# Patient Record
Sex: FEMALE | Race: BLACK OR AFRICAN AMERICAN | NOT HISPANIC OR LATINO | URBAN - METROPOLITAN AREA
[De-identification: names, ages, dates, MRNs, and addresses within clinical notes are randomized per-mention and may not be internally consistent; named-entity substitution may affect disease eponyms.]

---

## 2017-04-26 ENCOUNTER — EMERGENCY (EMERGENCY)
Facility: HOSPITAL | Age: 62
LOS: 1 days | Discharge: HOME | End: 2017-04-26
Attending: EMERGENCY MEDICINE | Admitting: EMERGENCY MEDICINE
Payer: COMMERCIAL

## 2017-04-26 VITALS
TEMPERATURE: 97 F | HEART RATE: 95 BPM | DIASTOLIC BLOOD PRESSURE: 83 MMHG | SYSTOLIC BLOOD PRESSURE: 164 MMHG | RESPIRATION RATE: 20 BRPM | OXYGEN SATURATION: 98 %

## 2017-04-26 DIAGNOSIS — Z98.89 OTHER SPECIFIED POSTPROCEDURAL STATES: Chronic | ICD-10-CM

## 2017-04-26 DIAGNOSIS — Z88.8 ALLERGY STATUS TO OTHER DRUGS, MEDICAMENTS AND BIOLOGICAL SUBSTANCES STATUS: ICD-10-CM
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
## 2017-04-26 DIAGNOSIS — K21.9 GASTRO-ESOPHAGEAL REFLUX DISEASE WITHOUT ESOPHAGITIS: ICD-10-CM

## 2017-04-26 DIAGNOSIS — Z96.641 PRESENCE OF RIGHT ARTIFICIAL HIP JOINT: Chronic | ICD-10-CM

## 2017-04-26 DIAGNOSIS — Z79.82 LONG TERM (CURRENT) USE OF ASPIRIN: ICD-10-CM
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
## 2017-04-26 DIAGNOSIS — S20.212A CONTUSION OF LEFT FRONT WALL OF THORAX, INITIAL ENCOUNTER: ICD-10-CM

## 2017-04-26 DIAGNOSIS — R07.82 INTERCOSTAL PAIN: ICD-10-CM

## 2017-04-26 DIAGNOSIS — I10 ESSENTIAL (PRIMARY) HYPERTENSION: ICD-10-CM

## 2017-04-26 PROCEDURE — 99284 EMERGENCY DEPT VISIT MOD MDM: CPT

## 2017-04-26 PROCEDURE — 71100 X-RAY EXAM RIBS UNI 2 VIEWS: CPT | Mod: 26,LT

## 2017-04-26 PROCEDURE — 71100 X-RAY EXAM RIBS UNI 2 VIEWS: CPT

## 2017-04-26 PROCEDURE — 99284 EMERGENCY DEPT VISIT MOD MDM: CPT | Mod: 25

## 2017-04-26 PROCEDURE — 71020: CPT | Mod: 26

## 2017-04-26 PROCEDURE — 71046 X-RAY EXAM CHEST 2 VIEWS: CPT

## 2017-04-26 NOTE — ED PROVIDER NOTE - PHYSICAL EXAMINATION
Attending note. Patient is alert and in no acute distress. Neck is nontender. Spine is nontender. Posterior ribs are nontender. Patient has pain in the left anterior rib approximately #6 and 7 in the midclavicular line. Lungs are clear and equal bilaterally. Heart is regular rate and rhythm without murmur. Abdomen is soft and nontender. There is no CVA tenderness.   Patient has limited range of motion of her left shoulder due to a total shoulder replacement.
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
## 2017-04-26 NOTE — ED ADULT NURSE NOTE - PSH
S/P arthroscopy of shoulder  left in 2009  S/P arthroscopy of shoulder  right - 2014  S/P hip replacement  left - 2010  S/P hip replacement, right  May 2016

## 2017-11-19 ENCOUNTER — INPATIENT (INPATIENT)
Facility: HOSPITAL | Age: 62
LOS: 2 days | Discharge: HOME | DRG: 440 | End: 2017-11-22
Attending: HOSPITALIST | Admitting: HOSPITALIST
Payer: COMMERCIAL

## 2017-11-19 VITALS
RESPIRATION RATE: 22 BRPM | OXYGEN SATURATION: 98 % | HEART RATE: 122 BPM | DIASTOLIC BLOOD PRESSURE: 104 MMHG | SYSTOLIC BLOOD PRESSURE: 165 MMHG | TEMPERATURE: 99 F

## 2017-11-19 DIAGNOSIS — Z98.89 OTHER SPECIFIED POSTPROCEDURAL STATES: Chronic | ICD-10-CM

## 2017-11-19 DIAGNOSIS — Z96.641 PRESENCE OF RIGHT ARTIFICIAL HIP JOINT: Chronic | ICD-10-CM

## 2017-11-19 LAB — GAS PNL BLDV: SIGNIFICANT CHANGE UP

## 2017-11-19 PROCEDURE — 93010 ELECTROCARDIOGRAM REPORT: CPT

## 2017-11-19 PROCEDURE — 99285 EMERGENCY DEPT VISIT HI MDM: CPT | Mod: 25

## 2017-11-19 RX ORDER — MORPHINE SULFATE 50 MG/1
4 CAPSULE, EXTENDED RELEASE ORAL ONCE
Qty: 0 | Refills: 0 | Status: DISCONTINUED | OUTPATIENT
Start: 2017-11-19 | End: 2017-11-19

## 2017-11-19 RX ORDER — PANTOPRAZOLE SODIUM 20 MG/1
40 TABLET, DELAYED RELEASE ORAL ONCE
Qty: 0 | Refills: 0 | Status: COMPLETED | OUTPATIENT
Start: 2017-11-19 | End: 2017-11-19

## 2017-11-19 RX ORDER — ONDANSETRON 8 MG/1
4 TABLET, FILM COATED ORAL ONCE
Qty: 0 | Refills: 0 | Status: COMPLETED | OUTPATIENT
Start: 2017-11-19 | End: 2017-11-19

## 2017-11-19 RX ORDER — SODIUM CHLORIDE 9 MG/ML
3 INJECTION INTRAMUSCULAR; INTRAVENOUS; SUBCUTANEOUS ONCE
Qty: 0 | Refills: 0 | Status: COMPLETED | OUTPATIENT
Start: 2017-11-19 | End: 2017-11-19

## 2017-11-19 RX ADMIN — MORPHINE SULFATE 4 MILLIGRAM(S): 50 CAPSULE, EXTENDED RELEASE ORAL at 23:53

## 2017-11-19 RX ADMIN — SODIUM CHLORIDE 3 MILLILITER(S): 9 INJECTION INTRAMUSCULAR; INTRAVENOUS; SUBCUTANEOUS at 23:52

## 2017-11-19 RX ADMIN — PANTOPRAZOLE SODIUM 40 MILLIGRAM(S): 20 TABLET, DELAYED RELEASE ORAL at 23:52

## 2017-11-19 RX ADMIN — ONDANSETRON 4 MILLIGRAM(S): 8 TABLET, FILM COATED ORAL at 23:52

## 2017-11-19 RX ADMIN — ONDANSETRON 4 MILLIGRAM(S): 8 TABLET, FILM COATED ORAL at 23:10

## 2017-11-19 NOTE — ED PROVIDER NOTE - MEDICAL DECISION MAKING DETAILS
60yo F with h/o chronic pancreatitis, c/o epigastric pain and nausea/vomiting; on exam afebrile, but in significant pain and vomiting; given zofran/morphine with some improvement.  Tender in epigastrium; concern for recurrence of pancreatitis: will obtain labs, ct ap and ecg.  Pain control and antiemetics as needed.

## 2017-11-19 NOTE — ED ADULT NURSE NOTE - OBJECTIVE STATEMENT
patient report hx of pancreatitis and c/o abd pain that started on Friday, worsening over the past couple of days  patient is complaining of abd pain, nausea and vomiting and dehydration, also reports some cramping. no blood in the vomiting.

## 2017-11-19 NOTE — ED PROVIDER NOTE - CARE PLAN
Principal Discharge DX:	Chronic pancreatitis, unspecified pancreatitis type Principal Discharge DX:	Chronic pancreatitis, unspecified pancreatitis type  Secondary Diagnosis:	Esophagitis

## 2017-11-19 NOTE — ED PROVIDER NOTE - OBJECTIVE STATEMENT
60yo F with h/o chronic pancreatitis, c/o nausea and abdominal pain since friday, intermittent.  Has vomiting numerous time all day today, now brown.  Feels like pancreatitis flare. Non-drinker. Unable to eat.  Last BM: Today.        PCP: Ananda Ascencio  GI: DANIELLE Messer 62yo F with h/o chronic pancreatitis, c/o nausea and abdominal pain since Friday, intermittent.  Has vomiting numerous time all day today, now brown.  Feels like pancreatitis flare. Non-drinker. Unable to eat.  Last BM: Today.  Denies fever/chills.      PCP: Ananda Ascencio  GI: DANIELLE Messer

## 2017-11-19 NOTE — ED ADULT NURSE NOTE - PSH
S/P arthroscopy of shoulder  right - 2014  S/P arthroscopy of shoulder  left in 2009  S/P hip replacement  left - 2010  S/P hip replacement, right  May 2016

## 2017-11-19 NOTE — ED PROVIDER NOTE - ATTENDING CONTRIBUTION TO CARE
I have examined and evaluated this patient with the above resident or PA, and agree with the documented clinical history, exam and plan.   Briefly: 60yo F with h/o chronic pancreatitis, c/o epigastric pain and nausea/vomiting; on exam afebrile, but in significant pain and vomiting; given zofran/morphine with some improvement.  Tender in epigastrium; concern for recurrence of pancreatitis.     Labs obtained: elevated lactate (4) and hypokalemia noted.  Lipase 11 but may not correlate given history of pancreatitis.  Yocasta's initial score for pancreatitis only 1 (age; no other criteria, WBC 12, , glucose 190, ast wnl).      CT AP obtained:    Plan for admission for continued iv hydration, pain control and additional workup and management as needed. I have examined and evaluated this patient with the above resident or PA, and agree with the documented clinical history, exam and plan.   Briefly: 62yo F with h/o chronic pancreatitis, c/o epigastric pain and nausea/vomiting; on exam afebrile, but in significant pain and vomiting; given zofran/morphine with some improvement.  Tender in epigastrium; concern for recurrence of pancreatitis.     Labs obtained: elevated lactate (4) and hypokalemia noted.  Lipase 11 but may not correlate given history of pancreatitis.  Yocasta's initial score for pancreatitis only 1 (age; no other criteria, WBC 12, , glucose 190, ast wnl).      CT AP obtained: shows chronic pancreatitis as well as evidence of esophagitis.    Plan for admission for chronic pancreatitis, for continued iv hydration given continued inability to tolerate po, pain control and additional workup and management as needed.

## 2017-11-20 DIAGNOSIS — Z29.9 ENCOUNTER FOR PROPHYLACTIC MEASURES, UNSPECIFIED: ICD-10-CM

## 2017-11-20 DIAGNOSIS — K86.1 OTHER CHRONIC PANCREATITIS: ICD-10-CM

## 2017-11-20 DIAGNOSIS — R52 PAIN, UNSPECIFIED: ICD-10-CM

## 2017-11-20 DIAGNOSIS — K20.9 ESOPHAGITIS, UNSPECIFIED: ICD-10-CM

## 2017-11-20 DIAGNOSIS — I10 ESSENTIAL (PRIMARY) HYPERTENSION: ICD-10-CM

## 2017-11-20 DIAGNOSIS — E86.0 DEHYDRATION: ICD-10-CM

## 2017-11-20 DIAGNOSIS — E87.6 HYPOKALEMIA: ICD-10-CM

## 2017-11-20 DIAGNOSIS — K85.90 ACUTE PANCREATITIS WITHOUT NECROSIS OR INFECTION, UNSPECIFIED: ICD-10-CM

## 2017-11-20 LAB
ALBUMIN SERPL ELPH-MCNC: 4.9 G/DL — SIGNIFICANT CHANGE UP (ref 3.3–5)
ALP SERPL-CCNC: 101 U/L — SIGNIFICANT CHANGE UP (ref 40–120)
ALT FLD-CCNC: 15 U/L RC — SIGNIFICANT CHANGE UP (ref 10–45)
ANION GAP SERPL CALC-SCNC: 13 MMOL/L — SIGNIFICANT CHANGE UP (ref 5–17)
ANION GAP SERPL CALC-SCNC: 15 MMOL/L — SIGNIFICANT CHANGE UP (ref 5–17)
ANION GAP SERPL CALC-SCNC: 22 MMOL/L — HIGH (ref 5–17)
APPEARANCE UR: CLEAR — SIGNIFICANT CHANGE UP
APTT BLD: 31.2 SEC — SIGNIFICANT CHANGE UP (ref 27.5–37.4)
AST SERPL-CCNC: 18 U/L — SIGNIFICANT CHANGE UP (ref 10–40)
BACTERIA # UR AUTO: ABNORMAL /HPF
BASE EXCESS BLDV CALC-SCNC: 4.3 MMOL/L — HIGH (ref -2–2)
BASE EXCESS BLDV CALC-SCNC: 6.8 MMOL/L — HIGH (ref -2–2)
BASOPHILS # BLD AUTO: 0 K/UL — SIGNIFICANT CHANGE UP (ref 0–0.2)
BASOPHILS # BLD AUTO: 0.01 K/UL — SIGNIFICANT CHANGE UP (ref 0–0.2)
BASOPHILS NFR BLD AUTO: 0.1 % — SIGNIFICANT CHANGE UP (ref 0–2)
BASOPHILS NFR BLD AUTO: 0.3 % — SIGNIFICANT CHANGE UP (ref 0–2)
BILIRUB SERPL-MCNC: 0.5 MG/DL — SIGNIFICANT CHANGE UP (ref 0.2–1.2)
BILIRUB UR-MCNC: NEGATIVE — SIGNIFICANT CHANGE UP
BUN SERPL-MCNC: 5 MG/DL — LOW (ref 7–23)
BUN SERPL-MCNC: 5 MG/DL — LOW (ref 7–23)
BUN SERPL-MCNC: 7 MG/DL — SIGNIFICANT CHANGE UP (ref 7–23)
CA-I SERPL-SCNC: 1.14 MMOL/L — SIGNIFICANT CHANGE UP (ref 1.12–1.3)
CA-I SERPL-SCNC: 1.16 MMOL/L — SIGNIFICANT CHANGE UP (ref 1.12–1.3)
CALCIUM SERPL-MCNC: 10.5 MG/DL — SIGNIFICANT CHANGE UP (ref 8.4–10.5)
CALCIUM SERPL-MCNC: 8.6 MG/DL — SIGNIFICANT CHANGE UP (ref 8.4–10.5)
CALCIUM SERPL-MCNC: 8.6 MG/DL — SIGNIFICANT CHANGE UP (ref 8.4–10.5)
CHLORIDE BLDV-SCNC: 100 MMOL/L — SIGNIFICANT CHANGE UP (ref 96–108)
CHLORIDE BLDV-SCNC: 106 MMOL/L — SIGNIFICANT CHANGE UP (ref 96–108)
CHLORIDE SERPL-SCNC: 104 MMOL/L — SIGNIFICANT CHANGE UP (ref 96–108)
CHLORIDE SERPL-SCNC: 105 MMOL/L — SIGNIFICANT CHANGE UP (ref 96–108)
CHLORIDE SERPL-SCNC: 97 MMOL/L — SIGNIFICANT CHANGE UP (ref 96–108)
CK MB BLD-MCNC: 3.4 % — SIGNIFICANT CHANGE UP (ref 0–3.5)
CK MB CFR SERPL CALC: 1.6 NG/ML — SIGNIFICANT CHANGE UP (ref 0–3.8)
CK SERPL-CCNC: 47 U/L — SIGNIFICANT CHANGE UP (ref 25–170)
CO2 BLDV-SCNC: 30 MMOL/L — SIGNIFICANT CHANGE UP (ref 22–30)
CO2 BLDV-SCNC: 31 MMOL/L — HIGH (ref 22–30)
CO2 SERPL-SCNC: 26 MMOL/L — SIGNIFICANT CHANGE UP (ref 22–31)
CO2 SERPL-SCNC: 26 MMOL/L — SIGNIFICANT CHANGE UP (ref 22–31)
CO2 SERPL-SCNC: 27 MMOL/L — SIGNIFICANT CHANGE UP (ref 22–31)
COLOR SPEC: SIGNIFICANT CHANGE UP
COMMENT - URINE: SIGNIFICANT CHANGE UP
CREAT SERPL-MCNC: 0.68 MG/DL — SIGNIFICANT CHANGE UP (ref 0.5–1.3)
CREAT SERPL-MCNC: 0.77 MG/DL — SIGNIFICANT CHANGE UP (ref 0.5–1.3)
CREAT SERPL-MCNC: 0.88 MG/DL — SIGNIFICANT CHANGE UP (ref 0.5–1.3)
DIFF PNL FLD: NEGATIVE — SIGNIFICANT CHANGE UP
EOSINOPHIL # BLD AUTO: 0 K/UL — SIGNIFICANT CHANGE UP (ref 0–0.5)
EOSINOPHIL # BLD AUTO: 0 K/UL — SIGNIFICANT CHANGE UP (ref 0–0.5)
EOSINOPHIL NFR BLD AUTO: 0 — SIGNIFICANT CHANGE UP
EOSINOPHIL NFR BLD AUTO: 0.3 % — SIGNIFICANT CHANGE UP (ref 0–6)
EPI CELLS # UR: SIGNIFICANT CHANGE UP /HPF
GAS PNL BLDV: 143 MMOL/L — SIGNIFICANT CHANGE UP (ref 136–145)
GAS PNL BLDV: 146 MMOL/L — HIGH (ref 136–145)
GAS PNL BLDV: SIGNIFICANT CHANGE UP
GLUCOSE BLDV-MCNC: 122 MG/DL — HIGH (ref 70–99)
GLUCOSE BLDV-MCNC: 196 MG/DL — HIGH (ref 70–99)
GLUCOSE SERPL-MCNC: 102 MG/DL — HIGH (ref 70–99)
GLUCOSE SERPL-MCNC: 117 MG/DL — HIGH (ref 70–99)
GLUCOSE SERPL-MCNC: 190 MG/DL — HIGH (ref 70–99)
GLUCOSE UR QL: NEGATIVE — SIGNIFICANT CHANGE UP
HCO3 BLDV-SCNC: 29 MMOL/L — SIGNIFICANT CHANGE UP (ref 21–29)
HCO3 BLDV-SCNC: 30 MMOL/L — HIGH (ref 21–29)
HCT VFR BLD CALC: 40.6 % — SIGNIFICANT CHANGE UP (ref 34.5–45)
HCT VFR BLD CALC: 46.6 % — HIGH (ref 34.5–45)
HCT VFR BLDA CALC: 45 % — SIGNIFICANT CHANGE UP (ref 39–50)
HCT VFR BLDA CALC: 47 % — SIGNIFICANT CHANGE UP (ref 39–50)
HGB BLD CALC-MCNC: 14.7 G/DL — SIGNIFICANT CHANGE UP (ref 11.5–15.5)
HGB BLD CALC-MCNC: 15.4 G/DL — SIGNIFICANT CHANGE UP (ref 11.5–15.5)
HGB BLD-MCNC: 13.2 G/DL — SIGNIFICANT CHANGE UP (ref 11.5–15.5)
HGB BLD-MCNC: 15.2 G/DL — SIGNIFICANT CHANGE UP (ref 11.5–15.5)
HIV 1 & 2 AB SERPL IA.RAPID: SIGNIFICANT CHANGE UP
IMM GRANULOCYTES NFR BLD AUTO: 0.5 % — SIGNIFICANT CHANGE UP (ref 0–1.5)
INR BLD: 1.1 RATIO — SIGNIFICANT CHANGE UP (ref 0.88–1.16)
KETONES UR-MCNC: ABNORMAL
LACTATE BLDV-MCNC: 1.9 MMOL/L — SIGNIFICANT CHANGE UP (ref 0.7–2)
LACTATE BLDV-MCNC: 4.5 MMOL/L — CRITICAL HIGH (ref 0.7–2)
LDH SERPL L TO P-CCNC: 249 U/L — HIGH (ref 50–242)
LEUKOCYTE ESTERASE UR-ACNC: NEGATIVE — SIGNIFICANT CHANGE UP
LIDOCAIN IGE QN: 11 U/L — SIGNIFICANT CHANGE UP (ref 7–60)
LYMPHOCYTES # BLD AUTO: 18.6 % — SIGNIFICANT CHANGE UP (ref 13–44)
LYMPHOCYTES # BLD AUTO: 2.3 K/UL — SIGNIFICANT CHANGE UP (ref 1–3.3)
LYMPHOCYTES # BLD AUTO: 2.76 K/UL — SIGNIFICANT CHANGE UP (ref 1–3.3)
LYMPHOCYTES # BLD AUTO: 27.4 % — SIGNIFICANT CHANGE UP (ref 13–44)
MAGNESIUM SERPL-MCNC: 1.5 MG/DL — LOW (ref 1.6–2.6)
MCHC RBC-ENTMCNC: 27.4 PG — SIGNIFICANT CHANGE UP (ref 27–34)
MCHC RBC-ENTMCNC: 28.7 PG — SIGNIFICANT CHANGE UP (ref 27–34)
MCHC RBC-ENTMCNC: 32.5 GM/DL — SIGNIFICANT CHANGE UP (ref 32–36)
MCHC RBC-ENTMCNC: 32.6 GM/DL — SIGNIFICANT CHANGE UP (ref 32–36)
MCV RBC AUTO: 84.4 FL — SIGNIFICANT CHANGE UP (ref 80–100)
MCV RBC AUTO: 88 FL — SIGNIFICANT CHANGE UP (ref 80–100)
MONOCYTES # BLD AUTO: 0.6 K/UL — SIGNIFICANT CHANGE UP (ref 0–0.9)
MONOCYTES # BLD AUTO: 0.81 K/UL — SIGNIFICANT CHANGE UP (ref 0–0.9)
MONOCYTES NFR BLD AUTO: 4.8 % — SIGNIFICANT CHANGE UP (ref 2–14)
MONOCYTES NFR BLD AUTO: 8 % — SIGNIFICANT CHANGE UP (ref 2–14)
NEUTROPHILS # BLD AUTO: 6.45 K/UL — SIGNIFICANT CHANGE UP (ref 1.8–7.4)
NEUTROPHILS # BLD AUTO: 9.3 K/UL — HIGH (ref 1.8–7.4)
NEUTROPHILS NFR BLD AUTO: 64 % — SIGNIFICANT CHANGE UP (ref 43–77)
NEUTROPHILS NFR BLD AUTO: 76 % — SIGNIFICANT CHANGE UP (ref 43–77)
NITRITE UR-MCNC: NEGATIVE — SIGNIFICANT CHANGE UP
OTHER CELLS CSF MANUAL: 16 ML/DL — LOW (ref 18–22)
PCO2 BLDV: 38 MMHG — SIGNIFICANT CHANGE UP (ref 35–50)
PCO2 BLDV: 43 MMHG — SIGNIFICANT CHANGE UP (ref 35–50)
PH BLDV: 7.44 — SIGNIFICANT CHANGE UP (ref 7.35–7.45)
PH BLDV: 7.51 — HIGH (ref 7.35–7.45)
PH UR: 7.5 — SIGNIFICANT CHANGE UP (ref 5–8)
PHOSPHATE SERPL-MCNC: 2.9 MG/DL — SIGNIFICANT CHANGE UP (ref 2.5–4.5)
PLATELET # BLD AUTO: 377 K/UL — SIGNIFICANT CHANGE UP (ref 150–400)
PLATELET # BLD AUTO: 416 K/UL — HIGH (ref 150–400)
PO2 BLDV: 46 MMHG — HIGH (ref 25–45)
PO2 BLDV: 49 MMHG — HIGH (ref 25–45)
POTASSIUM BLDV-SCNC: 2.8 MMOL/L — CRITICAL LOW (ref 3.5–5)
POTASSIUM BLDV-SCNC: 3.3 MMOL/L — LOW (ref 3.5–5)
POTASSIUM SERPL-MCNC: 3 MMOL/L — LOW (ref 3.5–5.3)
POTASSIUM SERPL-MCNC: 3.3 MMOL/L — LOW (ref 3.5–5.3)
POTASSIUM SERPL-MCNC: 3.6 MMOL/L — SIGNIFICANT CHANGE UP (ref 3.5–5.3)
POTASSIUM SERPL-SCNC: 3 MMOL/L — LOW (ref 3.5–5.3)
POTASSIUM SERPL-SCNC: 3.3 MMOL/L — LOW (ref 3.5–5.3)
POTASSIUM SERPL-SCNC: 3.6 MMOL/L — SIGNIFICANT CHANGE UP (ref 3.5–5.3)
PROT SERPL-MCNC: 9.4 G/DL — HIGH (ref 6–8.3)
PROT UR-MCNC: 100 MG/DL
PROTHROM AB SERPL-ACNC: 11.9 SEC — SIGNIFICANT CHANGE UP (ref 9.8–12.7)
RBC # BLD: 4.81 M/UL — SIGNIFICANT CHANGE UP (ref 3.8–5.2)
RBC # BLD: 5.3 M/UL — HIGH (ref 3.8–5.2)
RBC # FLD: 13.1 % — SIGNIFICANT CHANGE UP (ref 10.3–14.5)
RBC # FLD: 14.7 % — HIGH (ref 10.3–14.5)
RBC CASTS # UR COMP ASSIST: SIGNIFICANT CHANGE UP /HPF (ref 0–2)
SAO2 % BLDV: 79 % — SIGNIFICANT CHANGE UP (ref 67–88)
SAO2 % BLDV: 86 % — SIGNIFICANT CHANGE UP (ref 67–88)
SODIUM SERPL-SCNC: 144 MMOL/L — SIGNIFICANT CHANGE UP (ref 135–145)
SODIUM SERPL-SCNC: 145 MMOL/L — SIGNIFICANT CHANGE UP (ref 135–145)
SODIUM SERPL-SCNC: 146 MMOL/L — HIGH (ref 135–145)
SP GR SPEC: >1.03 — HIGH (ref 1.01–1.02)
TROPONIN T SERPL-MCNC: <0.01 NG/ML — SIGNIFICANT CHANGE UP (ref 0–0.06)
UROBILINOGEN FLD QL: NEGATIVE — SIGNIFICANT CHANGE UP
WBC # BLD: 10.08 K/UL — SIGNIFICANT CHANGE UP (ref 3.8–10.5)
WBC # BLD: 12.2 K/UL — HIGH (ref 3.8–10.5)
WBC # FLD AUTO: 10.08 K/UL — SIGNIFICANT CHANGE UP (ref 3.8–10.5)
WBC # FLD AUTO: 12.2 K/UL — HIGH (ref 3.8–10.5)
WBC UR QL: SIGNIFICANT CHANGE UP /HPF (ref 0–5)

## 2017-11-20 PROCEDURE — 74177 CT ABD & PELVIS W/CONTRAST: CPT | Mod: 26

## 2017-11-20 PROCEDURE — 71010: CPT | Mod: 26

## 2017-11-20 PROCEDURE — 99223 1ST HOSP IP/OBS HIGH 75: CPT | Mod: AI,GC

## 2017-11-20 RX ORDER — MORPHINE SULFATE 50 MG/1
4 CAPSULE, EXTENDED RELEASE ORAL ONCE
Qty: 0 | Refills: 0 | Status: DISCONTINUED | OUTPATIENT
Start: 2017-11-20 | End: 2017-11-20

## 2017-11-20 RX ORDER — ONDANSETRON 8 MG/1
4 TABLET, FILM COATED ORAL ONCE
Qty: 0 | Refills: 0 | Status: COMPLETED | OUTPATIENT
Start: 2017-11-20 | End: 2017-11-20

## 2017-11-20 RX ORDER — POTASSIUM CHLORIDE 20 MEQ
10 PACKET (EA) ORAL
Qty: 0 | Refills: 0 | Status: COMPLETED | OUTPATIENT
Start: 2017-11-20 | End: 2017-11-20

## 2017-11-20 RX ORDER — LIPASE/PROTEASE/AMYLASE 16-48-48K
24000 CAPSULE,DELAYED RELEASE (ENTERIC COATED) ORAL
Qty: 0 | Refills: 0 | Status: DISCONTINUED | OUTPATIENT
Start: 2017-11-20 | End: 2017-11-20

## 2017-11-20 RX ORDER — HYDROMORPHONE HYDROCHLORIDE 2 MG/ML
0.5 INJECTION INTRAMUSCULAR; INTRAVENOUS; SUBCUTANEOUS ONCE
Qty: 0 | Refills: 0 | Status: DISCONTINUED | OUTPATIENT
Start: 2017-11-20 | End: 2017-11-20

## 2017-11-20 RX ORDER — DOCUSATE SODIUM 100 MG
100 CAPSULE ORAL THREE TIMES A DAY
Qty: 0 | Refills: 0 | Status: DISCONTINUED | OUTPATIENT
Start: 2017-11-20 | End: 2017-11-22

## 2017-11-20 RX ORDER — ACETAMINOPHEN 500 MG
1000 TABLET ORAL ONCE
Qty: 0 | Refills: 0 | Status: COMPLETED | OUTPATIENT
Start: 2017-11-20 | End: 2017-11-20

## 2017-11-20 RX ORDER — HYDROMORPHONE HYDROCHLORIDE 2 MG/ML
1 INJECTION INTRAMUSCULAR; INTRAVENOUS; SUBCUTANEOUS ONCE
Qty: 0 | Refills: 0 | Status: DISCONTINUED | OUTPATIENT
Start: 2017-11-20 | End: 2017-11-20

## 2017-11-20 RX ORDER — SODIUM CHLORIDE 9 MG/ML
1000 INJECTION, SOLUTION INTRAVENOUS
Qty: 0 | Refills: 0 | Status: DISCONTINUED | OUTPATIENT
Start: 2017-11-20 | End: 2017-11-22

## 2017-11-20 RX ORDER — SODIUM CHLORIDE 9 MG/ML
1000 INJECTION INTRAMUSCULAR; INTRAVENOUS; SUBCUTANEOUS
Qty: 0 | Refills: 0 | Status: DISCONTINUED | OUTPATIENT
Start: 2017-11-20 | End: 2017-11-20

## 2017-11-20 RX ORDER — ONDANSETRON 8 MG/1
8 TABLET, FILM COATED ORAL EVERY 6 HOURS
Qty: 0 | Refills: 0 | Status: DISCONTINUED | OUTPATIENT
Start: 2017-11-20 | End: 2017-11-22

## 2017-11-20 RX ORDER — SODIUM CHLORIDE 9 MG/ML
2000 INJECTION INTRAMUSCULAR; INTRAVENOUS; SUBCUTANEOUS ONCE
Qty: 0 | Refills: 0 | Status: COMPLETED | OUTPATIENT
Start: 2017-11-20 | End: 2017-11-20

## 2017-11-20 RX ORDER — SENNA PLUS 8.6 MG/1
2 TABLET ORAL AT BEDTIME
Qty: 0 | Refills: 0 | Status: DISCONTINUED | OUTPATIENT
Start: 2017-11-20 | End: 2017-11-22

## 2017-11-20 RX ORDER — SODIUM CHLORIDE 9 MG/ML
1000 INJECTION, SOLUTION INTRAVENOUS
Qty: 0 | Refills: 0 | Status: DISCONTINUED | OUTPATIENT
Start: 2017-11-20 | End: 2017-11-20

## 2017-11-20 RX ORDER — LIPASE/PROTEASE/AMYLASE 16-48-48K
2 CAPSULE,DELAYED RELEASE (ENTERIC COATED) ORAL
Qty: 0 | Refills: 0 | Status: DISCONTINUED | OUTPATIENT
Start: 2017-11-20 | End: 2017-11-22

## 2017-11-20 RX ORDER — SODIUM CHLORIDE 9 MG/ML
1000 INJECTION INTRAMUSCULAR; INTRAVENOUS; SUBCUTANEOUS ONCE
Qty: 0 | Refills: 0 | Status: COMPLETED | OUTPATIENT
Start: 2017-11-20 | End: 2017-11-20

## 2017-11-20 RX ORDER — PANTOPRAZOLE SODIUM 20 MG/1
20 TABLET, DELAYED RELEASE ORAL ONCE
Qty: 0 | Refills: 0 | Status: DISCONTINUED | OUTPATIENT
Start: 2017-11-20 | End: 2017-11-20

## 2017-11-20 RX ORDER — FAMOTIDINE 10 MG/ML
20 INJECTION INTRAVENOUS ONCE
Qty: 0 | Refills: 0 | Status: DISCONTINUED | OUTPATIENT
Start: 2017-11-20 | End: 2017-11-20

## 2017-11-20 RX ORDER — METOPROLOL TARTRATE 50 MG
100 TABLET ORAL DAILY
Qty: 0 | Refills: 0 | Status: DISCONTINUED | OUTPATIENT
Start: 2017-11-20 | End: 2017-11-22

## 2017-11-20 RX ORDER — HYDROMORPHONE HYDROCHLORIDE 2 MG/ML
0.5 INJECTION INTRAMUSCULAR; INTRAVENOUS; SUBCUTANEOUS
Qty: 0 | Refills: 0 | Status: DISCONTINUED | OUTPATIENT
Start: 2017-11-20 | End: 2017-11-21

## 2017-11-20 RX ORDER — ACETAMINOPHEN 500 MG
650 TABLET ORAL EVERY 6 HOURS
Qty: 0 | Refills: 0 | Status: DISCONTINUED | OUTPATIENT
Start: 2017-11-20 | End: 2017-11-22

## 2017-11-20 RX ORDER — AMLODIPINE BESYLATE 2.5 MG/1
10 TABLET ORAL DAILY
Qty: 0 | Refills: 0 | Status: DISCONTINUED | OUTPATIENT
Start: 2017-11-20 | End: 2017-11-22

## 2017-11-20 RX ORDER — HYDROMORPHONE HYDROCHLORIDE 2 MG/ML
1 INJECTION INTRAMUSCULAR; INTRAVENOUS; SUBCUTANEOUS EVERY 6 HOURS
Qty: 0 | Refills: 0 | Status: DISCONTINUED | OUTPATIENT
Start: 2017-11-20 | End: 2017-11-21

## 2017-11-20 RX ORDER — PANTOPRAZOLE SODIUM 20 MG/1
40 TABLET, DELAYED RELEASE ORAL
Qty: 0 | Refills: 0 | Status: DISCONTINUED | OUTPATIENT
Start: 2017-11-20 | End: 2017-11-22

## 2017-11-20 RX ADMIN — SODIUM CHLORIDE 250 MILLILITER(S): 9 INJECTION, SOLUTION INTRAVENOUS at 13:38

## 2017-11-20 RX ADMIN — Medication 2 CAPSULE(S): at 13:38

## 2017-11-20 RX ADMIN — HYDROMORPHONE HYDROCHLORIDE 1 MILLIGRAM(S): 2 INJECTION INTRAMUSCULAR; INTRAVENOUS; SUBCUTANEOUS at 20:04

## 2017-11-20 RX ADMIN — ONDANSETRON 8 MILLIGRAM(S): 8 TABLET, FILM COATED ORAL at 21:40

## 2017-11-20 RX ADMIN — Medication 100 MILLIEQUIVALENT(S): at 02:33

## 2017-11-20 RX ADMIN — HYDROMORPHONE HYDROCHLORIDE 0.5 MILLIGRAM(S): 2 INJECTION INTRAMUSCULAR; INTRAVENOUS; SUBCUTANEOUS at 10:06

## 2017-11-20 RX ADMIN — ONDANSETRON 8 MILLIGRAM(S): 8 TABLET, FILM COATED ORAL at 09:42

## 2017-11-20 RX ADMIN — SODIUM CHLORIDE 1000 MILLILITER(S): 9 INJECTION INTRAMUSCULAR; INTRAVENOUS; SUBCUTANEOUS at 04:20

## 2017-11-20 RX ADMIN — Medication 100 MILLIEQUIVALENT(S): at 10:58

## 2017-11-20 RX ADMIN — SODIUM CHLORIDE 150 MILLILITER(S): 9 INJECTION, SOLUTION INTRAVENOUS at 15:01

## 2017-11-20 RX ADMIN — ONDANSETRON 4 MILLIGRAM(S): 8 TABLET, FILM COATED ORAL at 03:20

## 2017-11-20 RX ADMIN — MORPHINE SULFATE 4 MILLIGRAM(S): 50 CAPSULE, EXTENDED RELEASE ORAL at 03:41

## 2017-11-20 RX ADMIN — Medication 2 CAPSULE(S): at 17:51

## 2017-11-20 RX ADMIN — HYDROMORPHONE HYDROCHLORIDE 0.5 MILLIGRAM(S): 2 INJECTION INTRAMUSCULAR; INTRAVENOUS; SUBCUTANEOUS at 22:02

## 2017-11-20 RX ADMIN — ONDANSETRON 8 MILLIGRAM(S): 8 TABLET, FILM COATED ORAL at 16:05

## 2017-11-20 RX ADMIN — Medication 100 MILLIEQUIVALENT(S): at 13:39

## 2017-11-20 RX ADMIN — HYDROMORPHONE HYDROCHLORIDE 0.5 MILLIGRAM(S): 2 INJECTION INTRAMUSCULAR; INTRAVENOUS; SUBCUTANEOUS at 07:44

## 2017-11-20 RX ADMIN — Medication 100 MILLIGRAM(S): at 12:28

## 2017-11-20 RX ADMIN — Medication 1000 MILLIGRAM(S): at 04:12

## 2017-11-20 RX ADMIN — Medication 650 MILLIGRAM(S): at 07:07

## 2017-11-20 RX ADMIN — MORPHINE SULFATE 4 MILLIGRAM(S): 50 CAPSULE, EXTENDED RELEASE ORAL at 00:30

## 2017-11-20 RX ADMIN — HYDROMORPHONE HYDROCHLORIDE 1 MILLIGRAM(S): 2 INJECTION INTRAMUSCULAR; INTRAVENOUS; SUBCUTANEOUS at 12:29

## 2017-11-20 RX ADMIN — Medication 100 MILLIEQUIVALENT(S): at 07:03

## 2017-11-20 RX ADMIN — Medication 100 MILLIGRAM(S): at 07:08

## 2017-11-20 RX ADMIN — Medication 100 MILLIEQUIVALENT(S): at 09:20

## 2017-11-20 RX ADMIN — SODIUM CHLORIDE 150 MILLILITER(S): 9 INJECTION, SOLUTION INTRAVENOUS at 21:40

## 2017-11-20 RX ADMIN — Medication 100 MILLIEQUIVALENT(S): at 03:39

## 2017-11-20 RX ADMIN — Medication 400 MILLIGRAM(S): at 03:20

## 2017-11-20 RX ADMIN — PANTOPRAZOLE SODIUM 40 MILLIGRAM(S): 20 TABLET, DELAYED RELEASE ORAL at 17:09

## 2017-11-20 RX ADMIN — Medication 100 MILLIEQUIVALENT(S): at 01:36

## 2017-11-20 RX ADMIN — HYDROMORPHONE HYDROCHLORIDE 1 MILLIGRAM(S): 2 INJECTION INTRAMUSCULAR; INTRAVENOUS; SUBCUTANEOUS at 05:12

## 2017-11-20 RX ADMIN — SODIUM CHLORIDE 4000 MILLILITER(S): 9 INJECTION INTRAMUSCULAR; INTRAVENOUS; SUBCUTANEOUS at 01:36

## 2017-11-20 RX ADMIN — HYDROMORPHONE HYDROCHLORIDE 0.5 MILLIGRAM(S): 2 INJECTION INTRAMUSCULAR; INTRAVENOUS; SUBCUTANEOUS at 10:58

## 2017-11-20 RX ADMIN — MORPHINE SULFATE 4 MILLIGRAM(S): 50 CAPSULE, EXTENDED RELEASE ORAL at 03:46

## 2017-11-20 RX ADMIN — Medication 100 MILLIGRAM(S): at 13:42

## 2017-11-20 RX ADMIN — HYDROMORPHONE HYDROCHLORIDE 0.5 MILLIGRAM(S): 2 INJECTION INTRAMUSCULAR; INTRAVENOUS; SUBCUTANEOUS at 09:36

## 2017-11-20 RX ADMIN — MORPHINE SULFATE 4 MILLIGRAM(S): 50 CAPSULE, EXTENDED RELEASE ORAL at 04:12

## 2017-11-20 RX ADMIN — HYDROMORPHONE HYDROCHLORIDE 1 MILLIGRAM(S): 2 INJECTION INTRAMUSCULAR; INTRAVENOUS; SUBCUTANEOUS at 12:42

## 2017-11-20 RX ADMIN — Medication 100 MILLIEQUIVALENT(S): at 12:29

## 2017-11-20 RX ADMIN — HYDROMORPHONE HYDROCHLORIDE 0.5 MILLIGRAM(S): 2 INJECTION INTRAMUSCULAR; INTRAVENOUS; SUBCUTANEOUS at 07:14

## 2017-11-20 RX ADMIN — AMLODIPINE BESYLATE 10 MILLIGRAM(S): 2.5 TABLET ORAL at 12:28

## 2017-11-20 RX ADMIN — HYDROMORPHONE HYDROCHLORIDE 0.5 MILLIGRAM(S): 2 INJECTION INTRAMUSCULAR; INTRAVENOUS; SUBCUTANEOUS at 23:35

## 2017-11-20 RX ADMIN — HYDROMORPHONE HYDROCHLORIDE 1 MILLIGRAM(S): 2 INJECTION INTRAMUSCULAR; INTRAVENOUS; SUBCUTANEOUS at 19:34

## 2017-11-20 RX ADMIN — HYDROMORPHONE HYDROCHLORIDE 0.5 MILLIGRAM(S): 2 INJECTION INTRAMUSCULAR; INTRAVENOUS; SUBCUTANEOUS at 21:32

## 2017-11-20 RX ADMIN — MORPHINE SULFATE 4 MILLIGRAM(S): 50 CAPSULE, EXTENDED RELEASE ORAL at 01:36

## 2017-11-20 RX ADMIN — Medication 100 MILLIGRAM(S): at 21:40

## 2017-11-20 NOTE — PROGRESS NOTE ADULT - PROBLEM SELECTOR PLAN 4
- K 3.0 on admission   - Replete with 2 runs of 10mEq/100ml x3 of KCl   - Monitor K q8h - K 3.0 on admission   - Replete with 2 runs of 10mEq/100ml x3 of KCl   - Monitor K

## 2017-11-20 NOTE — PROGRESS NOTE ADULT - PROBLEM SELECTOR PLAN 5
- Lactate was 4.1 and K 3.0   - due to excessive vomiting   - c/w IVF LR 250cc/hr Lactate improving w IVF hydration   - due to excessive vomiting   - c/w IVF LR 150cc/hr  -PO fluids as tolerated

## 2017-11-20 NOTE — H&P ADULT - ASSESSMENT
61 y.o. female with chronic pancreatitis and HTN admitted for intractable vomiting, possible due to acute on chronic pancreatitis complicated by esophagitis, hypokalemia and dehydration.

## 2017-11-20 NOTE — PROGRESS NOTE ADULT - PROBLEM SELECTOR PLAN 7
- SCDs DVT: Heparin Subq  Diet: Advance as tolerated     Marlo Hastings [id number]  Department of Internal Medicine  Pager [phone]

## 2017-11-20 NOTE — H&P ADULT - HISTORY OF PRESENT ILLNESS
61 y.o. female with chronic pancreatitis and HTN presenting with nausea and multiple episodes of emesis over the last 3 days. The patient states she say her PCP on Thursday for her check up, complained at that time her morphine dose was no longer working for her chronic abdominal pain from her chronic pancreatitis.  The patient was started on prednisone to help with the pain by her PCP before increasing her morphine dose. On Friday the patient began to have emesis a few times a day, before becoming progressively worse over the weekend to the point where she was vomiting through out the day. The vomit has now become bilious, and scant specks of blood were seen in the vomit. Denies nell hematemesis or coffee ground vomit. The patient states he abdominal pain began to radiate to back, but was also having cramping pain in the abdomen burning epigastric after the emesis. The patient also endorsed fever and chills, though her highest temp was 100 F at home. Has been unable to tolerate PO intake excepts for sips of water. Patient has vocal cord polyps, which she is planned for surgery for in one month. The patient denies night sweats, cough, nasal congestion, dyspnea, chest pain, palpations, diarrhea, constipation, dysuria, urinary frequency, leg swelling, joint pain, and rashes. 61 y.o. female with chronic pancreatitis and HTN presenting with nausea and multiple episodes of emesis over the last 3 days. The patient states she say her PCP on Thursday for her check up, complained at that time her morphine dose was no longer working for her chronic abdominal pain from her chronic pancreatitis.  The patient was started on prednisone to help with the pain by her PCP before increasing her morphine dose. On Friday the patient began to have emesis a few times a day, before becoming progressively worse over the weekend to the point where she was vomiting through out the day. The vomit has now become bilious, and scant specks of blood were seen in the vomit. Denies nell hematemesis or coffee ground vomit. The patient states he abdominal pain began to radiate to back, but was also having cramping pain in the abdomen burning epigastric after the emesis. The patient also endorsed fever and chills, though her highest temp was 100 F at home. Has been unable to tolerate PO intake excepts for sips of water. Patient has vocal cord polyps, which she is planned for surgery for in one month. The patient denies night sweats, cough, nasal congestion, dyspnea, chest pain, palpations, diarrhea, constipation, dysuria, urinary frequency, leg swelling, joint pain, and rashes. Denies any recent sick contacts, recent travel, or new foods.

## 2017-11-20 NOTE — H&P ADULT - PROBLEM SELECTOR PLAN 2
- pt has epigastric pain, burning since the emesis became more frequent   - CT abd/pelvis shows Circumferential submucosal edema in the distal esophagus concerning for an esophagitis.  - most likely 2/2 to recurrent emesis  - Pt may need an EGD, given specks of blood in the emesis   - Protonix 40mg IV BID for now   - Zofran 8mg IV q6h for nausea/vomiting - pt has epigastric pain, burning since the emesis became more frequent   - CT abd/pelvis shows Circumferential submucosal edema in the distal esophagus concerning for an esophagitis.  - most likely 2/2 to recurrent emesis  - Protonix 40mg IV BID for now   - Zofran 8mg IV q6h for nausea/vomiting

## 2017-11-20 NOTE — H&P ADULT - NSHPPHYSICALEXAM_GEN_ALL_CORE
Vital Signs Last 24 Hrs  T(C): 37.2 (20 Nov 2017 04:10), Max: 37.3 (19 Nov 2017 22:49)  T(F): 99 (20 Nov 2017 04:10), Max: 99.2 (19 Nov 2017 22:49)  HR: 102 (20 Nov 2017 04:10) (102 - 122)  BP: 161/95 (20 Nov 2017 04:10) (161/95 - 208/105)  BP(mean): --  RR: 20 (20 Nov 2017 04:10) (20 - 22)  SpO2: 98% (20 Nov 2017 04:10) (97% - 98%)    PHYSICAL EXAM:  GENERAL: NAD, well-groomed, well-developed  HEAD:  Atraumatic, Normocephalic  EYES: EOMI, PERRLA, conjunctiva and sclera clear  ENMT: Dry mucous membranes   CHEST/LUNG: Clear to percussion bilaterally; No rales, rhonchi, wheezing, or rubs  HEART: tachycardia; No murmurs, rubs, or gallops  ABDOMEN: Soft, tender at the RUQ and epigastric, Normal BS, no rebound or guarding.   VASCULAR:  2+ Peripheral Pulses, No clubbing, cyanosis, or edema  NERVOUS SYSTEM:  Alert & Oriented X3, Good concentration; Motor Strength 5/5 B/L upper and lower extremities

## 2017-11-20 NOTE — H&P ADULT - PROBLEM SELECTOR PLAN 1
- Pt has hx of chronic pancreatitis with previous episodes of acute exacerbation   - The has had 3 days of acute abdominal pain radiating to the back with recurrent emesis   - Abd tender to palpation at the RUQ and epigastric region   - Lipase is low though at 11   - Pt is non drinker, and c/o of fever and chills, possible pt has viral infection that casued the flare.   - CT abd/pelvis shows signs of chronic pancreatitis, no acute inflammation or edema   - Pain control: Dilaudid 1mg q6h for severe pain, .5mg q3h for breakthrough pain   - Advance diet as tolerated   - IVF: Normal saline at 100cc/hr   - c/w pancrolipase - Pt has hx of chronic pancreatitis with previous episodes of acute exacerbation   - The has had 3 days of acute abdominal pain radiating to the back with recurrent emesis   - Abd tender to palpation at the RUQ and epigastric region   - Lipase is low though at 11   - Pt is non drinker, and c/o of fever and chills, possible pt has viral infection that casued the flare.   - CT abd/pelvis shows signs of chronic pancreatitis, no acute inflammation or edema   - Pain control: Dilaudid 1mg q6h for severe pain, .5mg q3h for breakthrough pain   - Advance diet as tolerated   - IVF: LR  at 250cc/hr   - c/w pancrolipase
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
## 2017-11-20 NOTE — PROGRESS NOTE ADULT - PROBLEM SELECTOR PLAN 2
- Pt has epigastric pain, burning since the emesis became more frequent   - CT abd/pelvis shows circumferential submucosal edema in the distal esophagus concerning for esophagitis.  - Most likely 2/2 to recurrent emesis  - C/w protonix 40mg IV BID for now   - C/w zofran 8mg IV q6h for nausea/vomiting

## 2017-11-20 NOTE — H&P ADULT - NSHPREVIEWOFSYSTEMS_GEN_ALL_CORE
REVIEW OF SYSTEMS:  CONSTITUTIONAL: No night sweats, or fatigue. + for fever, chills  EYES: No eye pain, visual disturbances, or discharge  ENMT:  No difficulty hearing, tinnitus, vertigo; No sinus or throat pain  RESPIRATORY: No cough, wheezing, or hemoptysis; No shortness of breath  CARDIOVASCULAR: No chest pain, palpitations, dizziness, or leg swelling  GASTROINTESTINAL: No hematemesis; No diarrhea or constipation. + for abdominal, epigastric pain, nausea, vomiting  GENITOURINARY: No dysuria, frequency, hematuria, or incontinence  NEUROLOGICAL: No headaches, memory loss, loss of strength, numbness, or tremors  SKIN: No itching, burning, rashes, or lesions   MUSCULOSKELETAL: No joint pain or swelling; No muscle, back, or extremity pain

## 2017-11-20 NOTE — PROGRESS NOTE ADULT - PROBLEM SELECTOR PLAN 2
- pt has epigastric pain, burning since the emesis became more frequent   - CT abd/pelvis shows Circumferential submucosal edema in the distal esophagus concerning for an esophagitis.  - most likely 2/2 to recurrent emesis  - Protonix 40mg IV BID for now   - Zofran 8mg IV q6h for nausea/vomiting Likely 2/2 to frequent emesis   - CT abd/pelvis shows Circumferential submucosal edema in the distal esophagus concerning for an esophagitis.  - Protonix 40mg IV BID for now   - Zofran 8mg IV q6h for nausea/vomiting

## 2017-11-20 NOTE — PROGRESS NOTE ADULT - SUBJECTIVE AND OBJECTIVE BOX
Patient is a 61y old  Female who presents with a chief complaint of vomiting (20 Nov 2017 05:18)      SUBJECTIVE / OVERNIGHT EVENTS:    HPI:  61 y.o. female with chronic pancreatitis and HTN presenting with nausea and multiple episodes of emesis over the last 3 days. The patient states she say her PCP on Thursday for her check up, complained at that time her morphine dose was no longer working for her chronic abdominal pain from her chronic pancreatitis.  The patient was started on prednisone to help with the pain by her PCP before increasing her morphine dose. On Friday the patient began to have emesis a few times a day, before becoming progressively worse over the weekend to the point where she was vomiting through out the day. The vomit has now become bilious, and scant specks of blood were seen in the vomit. Denies nell hematemesis or coffee ground vomit. The patient states he abdominal pain began to radiate to back, but was also having cramping pain in the abdomen burning epigastric after the emesis. The patient also endorsed fever and chills, though her highest temp was 100 F at home. Has been unable to tolerate PO intake excepts for sips of water. Patient has vocal cord polyps, which she is planned for surgery for in one month. The patient denies night sweats, cough, nasal congestion, dyspnea, chest pain, palpations, diarrhea, constipation, dysuria, urinary frequency, leg swelling, joint pain, and rashes. Denies any recent sick contacts, recent travel, or new foods. (20 Nov 2017 05:18)      MEDICATIONS  (STANDING):  amLODIPine   Tablet 10 milliGRAM(s) Oral daily  amylase/lipase/protease  (CREON 12,000 Units) 2 Capsule(s) Oral two times a day with meals  docusate sodium 100 milliGRAM(s) Oral three times a day  lactated ringers. 1000 milliLiter(s) (150 mL/Hr) IV Continuous <Continuous>  metoprolol succinate  milliGRAM(s) Oral daily  pantoprazole  Injectable 40 milliGRAM(s) IV Push two times a day    MEDICATIONS  (PRN):  acetaminophen   Tablet 650 milliGRAM(s) Oral every 6 hours PRN For Temp greater than 38 C (100.4 F)  HYDROmorphone  Injectable 1 milliGRAM(s) IV Push every 6 hours PRN Severe Pain (7 - 10)  HYDROmorphone  Injectable 0.5 milliGRAM(s) IV Push every 3 hours PRN Breakthrough severe pain  ondansetron Injectable 8 milliGRAM(s) IV Push every 6 hours PRN Nausea and/or Vomiting  senna 2 Tablet(s) Oral at bedtime PRN Constipation      Vital Signs Last 24 Hrs  T(C): 36.7 (20 Nov 2017 16:27), Max: 37.5 (20 Nov 2017 11:43)  T(F): 98.1 (20 Nov 2017 16:27), Max: 99.5 (20 Nov 2017 11:43)  HR: 81 (20 Nov 2017 16:27) (64 - 122)  BP: 142/72 (20 Nov 2017 16:27) (136/79 - 208/105)  BP(mean): --  RR: 18 (20 Nov 2017 16:27) (18 - 22)  SpO2: 96% (20 Nov 2017 16:27) (95% - 98%)  CAPILLARY BLOOD GLUCOSE        I&O's Summary    19 Nov 2017 07:01  -  20 Nov 2017 07:00  --------------------------------------------------------  IN: 1000 mL / OUT: 0 mL / NET: 1000 mL    20 Nov 2017 07:01  -  20 Nov 2017 20:03  --------------------------------------------------------  IN: 420 mL / OUT: 0 mL / NET: 420 mL        PHYSICAL EXAM:  GENERAL: NAD, well-developed  HEAD:  Atraumatic, Normocephalic  EYES: EOMI, PERRLA, conjunctiva and sclera clear  NECK: Supple, No JVD  CHEST/LUNG: Clear to auscultation bilaterally; No wheeze  HEART: Regular rate and rhythm; No murmurs, rubs, or gallops  ABDOMEN: Soft, Nontender, Nondistended; Bowel sounds present  EXTREMITIES:  2+ Peripheral Pulses, No clubbing, cyanosis, or edema  PSYCH: AAOx3  NEUROLOGY: non-focal  SKIN: No rashes or lesions    LABS:                        13.2   10.08 )-----------( 377      ( 20 Nov 2017 07:18 )             40.6     11-20    145  |  104  |  5<L>  ----------------------------<  117<H>  3.3<L>   |  26  |  0.68    Ca    8.6      20 Nov 2017 06:17  Phos  2.9     11-20  Mg     1.5     11-20    TPro  9.4<H>  /  Alb  4.9  /  TBili  0.5  /  DBili  x   /  AST  18  /  ALT  15  /  AlkPhos  101  11-19    PT/INR - ( 19 Nov 2017 23:48 )   PT: 11.9 sec;   INR: 1.10 ratio         PTT - ( 19 Nov 2017 23:48 )  PTT:31.2 sec  CARDIAC MARKERS ( 19 Nov 2017 23:48 )  x     / <0.01 ng/mL / 47 U/L / x     / 1.6 ng/mL      Urinalysis Basic - ( 20 Nov 2017 00:48 )    Color: PL Yellow / Appearance: Clear / SG: >1.030 / pH: x  Gluc: x / Ketone: Trace  / Bili: Negative / Urobili: Negative   Blood: x / Protein: 100 mg/dL / Nitrite: Negative   Leuk Esterase: Negative / RBC: 3-5 /HPF / WBC 3-5 /HPF   Sq Epi: x / Non Sq Epi: OCC /HPF / Bacteria: Few /HPF        RADIOLOGY & ADDITIONAL TESTS:    Imaging Personally Reviewed:    Consultant(s) Notes Reviewed:      Care Discussed with Consultants/Other Providers:

## 2017-11-20 NOTE — H&P ADULT - NSHPSOCIALHISTORY_GEN_ALL_CORE
Social History:    Marital Status:  (  x )    (   ) Single    (   )    (  )   Occupation:   Lives with: (  ) alone  (  ) children   (x  ) spouse   (  ) parents  (  ) other    Substance Use (street drugs): ( x ) never used  (  ) other:  Tobacco Usage:  (   ) never smoked   (   ) former smoker   (x   ) current smoker  (  10  ) pack year  (        ) last cigarette date  Alcohol Usage: non drinker

## 2017-11-20 NOTE — H&P ADULT - PROBLEM SELECTOR PLAN 5
- Lactate was 4.1 and K 3.0   - due to excessive vomiting   - c/w IVF NS 100cc/hr - Lactate was 4.1 and K 3.0   - due to excessive vomiting   - c/w IVF LR 250cc/hr

## 2017-11-20 NOTE — PROGRESS NOTE ADULT - PROBLEM SELECTOR PLAN 1
- Unclear etiology  - Pt has hx of chronic pancreatitis with previous episodes of acute exacerbation   - CT abd/pelvis shows signs of chronic pancreatitis, with no acute inflammation or edema, and pancreatic ductal dilatation but with no visualized stones  - C/w Dilaudid 1mg q6h for severe pain, 0.5mg q3h for breakthrough pain   - Advance diet as tolerated   - C/w LR @ 250 cc/hr, and if pt tolerating PO diet, will decrease fluid rate  - C/w Creon  - Will contact pt's outpatient GI for any additional workup

## 2017-11-20 NOTE — H&P ADULT - PROBLEM SELECTOR PLAN 6
- pt remains HTN   -most likely due to pain   - c/w amlodipine - pt remains HTN   -most likely due to pain   - c/w amlodipine and metoprolol

## 2017-11-20 NOTE — H&P ADULT - NSHPLABSRESULTS_GEN_ALL_CORE
The Labs were reviewed by me   The Radiology was reviewed by me    EKG tracing reviewed by me    11-19    146<H>  |  97  |  7   ----------------------------<  190<H>  3.0<L>   |  27  |  0.88    Ca    10.5      19 Nov 2017 23:48    TPro  9.4<H>  /  Alb  4.9  /  TBili  0.5  /  DBili  x   /  AST  18  /  ALT  15  /  AlkPhos  101  11-19      PT/INR - ( 19 Nov 2017 23:48 )   PT: 11.9 sec;   INR: 1.10 ratio         PTT - ( 19 Nov 2017 23:48 )  PTT:31.2 sec              Urinalysis Basic - ( 20 Nov 2017 00:48 )    Color: PL Yellow / Appearance: Clear / SG: >1.030 / pH: x  Gluc: x / Ketone: Trace  / Bili: Negative / Urobili: Negative   Blood: x / Protein: 100 mg/dL / Nitrite: Negative   Leuk Esterase: Negative / RBC: 3-5 /HPF / WBC 3-5 /HPF   Sq Epi: x / Non Sq Epi: OCC /HPF / Bacteria: Few /HPF                              15.2   12.2  )-----------( 416      ( 19 Nov 2017 23:48 )             46.6     CAPILLARY BLOOD GLUCOSE        pCO2, Venous: 43 mmHg (11-20-17 @ 03:38)  HCO3, Venous: 29 mmol/L (11-20-17 @ 03:38)  pO2, Venous: 46 mmHg (11-20-17 @ 03:38)  pH, Venous: 7.44 (11-20-17 @ 03:38)    Blood Gas Venous - Lactate: 1.9 mmoL/L (11-20-17 @ 03:38)  Blood Gas Venous - Lactate: 4.5 mmoL/L (11-19-17 @ 23:48)      Radiology:    < from: CT Abdomen and Pelvis w/ IV Cont (11.20.17 @ 00:22) >    1. Circumferential submucosal edema in the distal esophagus concerning   for an esophagitis.  2. Chronic pancreatitis.    < end of copied text > The Labs were reviewed by me   The Radiology was reviewed by me    EKG tracing reviewed by me    11-19    146<H>  |  97  |  7   ----------------------------<  190<H>  3.0<L>   |  27  |  0.88    Ca    10.5      19 Nov 2017 23:48    TPro  9.4<H>  /  Alb  4.9  /  TBili  0.5  /  DBili  x   /  AST  18  /  ALT  15  /  AlkPhos  101  11-19      PT/INR - ( 19 Nov 2017 23:48 )   PT: 11.9 sec;   INR: 1.10 ratio         PTT - ( 19 Nov 2017 23:48 )  PTT:31.2 sec              Urinalysis Basic - ( 20 Nov 2017 00:48 )    Color: PL Yellow / Appearance: Clear / SG: >1.030 / pH: x  Gluc: x / Ketone: Trace  / Bili: Negative / Urobili: Negative   Blood: x / Protein: 100 mg/dL / Nitrite: Negative   Leuk Esterase: Negative / RBC: 3-5 /HPF / WBC 3-5 /HPF   Sq Epi: x / Non Sq Epi: OCC /HPF / Bacteria: Few /HPF                              15.2   12.2  )-----------( 416      ( 19 Nov 2017 23:48 )             46.6     CAPILLARY BLOOD GLUCOSE        pCO2, Venous: 43 mmHg (11-20-17 @ 03:38)  HCO3, Venous: 29 mmol/L (11-20-17 @ 03:38)  pO2, Venous: 46 mmHg (11-20-17 @ 03:38)  pH, Venous: 7.44 (11-20-17 @ 03:38)    Blood Gas Venous - Lactate: 1.9 mmoL/L (11-20-17 @ 03:38)  Blood Gas Venous - Lactate: 4.5 mmoL/L (11-19-17 @ 23:48)      Radiology:    < from: CT Abdomen and Pelvis w/ IV Cont (11.20.17 @ 00:22) >    1. Circumferential submucosal edema in the distal esophagus concerning   for an esophagitis.  2. Chronic pancreatitis.    < end of copied text >    EKG: Sinus tachycardia

## 2017-11-20 NOTE — H&P ADULT - PROBLEM SELECTOR PLAN 3
- Dilaudid 1mg q6h for severe pain, .5mg q3h for breakthrough pain with hold parameters   - for abd. pain

## 2017-11-20 NOTE — PROGRESS NOTE ADULT - PROBLEM SELECTOR PLAN 1
- Hx of chronic pancreatitis with previous episodes of acute exacerbation c/b ARDS  - Abd tender to palpation at the RUQ and epigastric region, Lipase min elevated   - Pt is non drinker, and c/o of fever and chills, possible pt has viral infection that casued the flare.   - CT abd/pelvis shows signs of chronic pancreatitis, no acute inflammation or edema   - Pain control: Dilaudid 1mg q6h for severe pain, .5mg q3h for breakthrough pain   - Advance diet as tolerated   - IVF: LR  at 250cc/hr   - c/w pancrolipase - Abd tender to palpation at the RUQ and epigastric region, Lipase min elevated   - CT abd/pelvis shows signs of chronic pancreatitis, no acute inflammation or edema   - Pain control: Dilaudid 1mg q6h for severe pain, .5mg q3h for breakthrough pain   - Advance diet as tolerated   - IVF: LR  at 150cc/hr   - c/w pancrolipase

## 2017-11-20 NOTE — H&P ADULT - ATTENDING COMMENTS
Attending addendum:  Patient seen and examined, I agree with the above assessment and plan with any changes indicated below.    61 year old female with a history of chronic pancreatitis here with nausea, vomiting, and abdominal pain consistent with prior flares. Denies any fevers, chills or other symptoms. Labs, imaging, and ecg as above. Notable for mild leukocytosis in setting of significant hemoconcentration, hypokalemia, hypomag, and lactate elevation. UA concentrated with elevated spec grav.    Patient reports pain improving, able to tolerate ginger ale. Has appetite and would like to trial regular diet.   Labs improved with normal lactate, tachycardia improving.     Aggressive fluid resuscitation, pain control, electrolyte repletion, and PPI. ADAT. GI follow up outpatient regarding esophageal edema, likely secondary to emesis though would want to eval for less benign process. Restart antihypertensives.     Remainder of plan as above.

## 2017-11-21 ENCOUNTER — TRANSCRIPTION ENCOUNTER (OUTPATIENT)
Age: 62
End: 2017-11-21

## 2017-11-21 LAB
ALBUMIN SERPL ELPH-MCNC: 3.4 G/DL — SIGNIFICANT CHANGE UP (ref 3.3–5)
ALP SERPL-CCNC: 64 U/L — SIGNIFICANT CHANGE UP (ref 40–120)
ALT FLD-CCNC: 15 U/L — SIGNIFICANT CHANGE UP (ref 10–45)
ANION GAP SERPL CALC-SCNC: 13 MMOL/L — SIGNIFICANT CHANGE UP (ref 5–17)
AST SERPL-CCNC: 33 U/L — SIGNIFICANT CHANGE UP (ref 10–40)
BILIRUB SERPL-MCNC: 0.4 MG/DL — SIGNIFICANT CHANGE UP (ref 0.2–1.2)
BUN SERPL-MCNC: 5 MG/DL — LOW (ref 7–23)
CALCIUM SERPL-MCNC: 8.8 MG/DL — SIGNIFICANT CHANGE UP (ref 8.4–10.5)
CHLORIDE SERPL-SCNC: 103 MMOL/L — SIGNIFICANT CHANGE UP (ref 96–108)
CO2 SERPL-SCNC: 26 MMOL/L — SIGNIFICANT CHANGE UP (ref 22–31)
CREAT SERPL-MCNC: 0.59 MG/DL — SIGNIFICANT CHANGE UP (ref 0.5–1.3)
GLUCOSE SERPL-MCNC: 77 MG/DL — SIGNIFICANT CHANGE UP (ref 70–99)
HCT VFR BLD CALC: 37.6 % — SIGNIFICANT CHANGE UP (ref 34.5–45)
HGB BLD-MCNC: 12.1 G/DL — SIGNIFICANT CHANGE UP (ref 11.5–15.5)
MCHC RBC-ENTMCNC: 27.1 PG — SIGNIFICANT CHANGE UP (ref 27–34)
MCHC RBC-ENTMCNC: 32.2 GM/DL — SIGNIFICANT CHANGE UP (ref 32–36)
MCV RBC AUTO: 84.3 FL — SIGNIFICANT CHANGE UP (ref 80–100)
PLATELET # BLD AUTO: 358 K/UL — SIGNIFICANT CHANGE UP (ref 150–400)
POTASSIUM SERPL-MCNC: 3.9 MMOL/L — SIGNIFICANT CHANGE UP (ref 3.5–5.3)
POTASSIUM SERPL-SCNC: 3.9 MMOL/L — SIGNIFICANT CHANGE UP (ref 3.5–5.3)
PROT SERPL-MCNC: 6.4 G/DL — SIGNIFICANT CHANGE UP (ref 6–8.3)
RBC # BLD: 4.46 M/UL — SIGNIFICANT CHANGE UP (ref 3.8–5.2)
RBC # FLD: 14.8 % — HIGH (ref 10.3–14.5)
SODIUM SERPL-SCNC: 142 MMOL/L — SIGNIFICANT CHANGE UP (ref 135–145)
TRIGL SERPL-MCNC: 119 MG/DL — SIGNIFICANT CHANGE UP (ref 10–149)
WBC # BLD: 9.24 K/UL — SIGNIFICANT CHANGE UP (ref 3.8–10.5)
WBC # FLD AUTO: 9.24 K/UL — SIGNIFICANT CHANGE UP (ref 3.8–10.5)

## 2017-11-21 PROCEDURE — 99233 SBSQ HOSP IP/OBS HIGH 50: CPT | Mod: GC

## 2017-11-21 RX ORDER — ACETAMINOPHEN 500 MG
650 TABLET ORAL ONCE
Qty: 0 | Refills: 0 | Status: COMPLETED | OUTPATIENT
Start: 2017-11-21 | End: 2017-11-21

## 2017-11-21 RX ORDER — BENZOCAINE AND MENTHOL 5; 1 G/100ML; G/100ML
1 LIQUID ORAL EVERY 6 HOURS
Qty: 0 | Refills: 0 | Status: DISCONTINUED | OUTPATIENT
Start: 2017-11-21 | End: 2017-11-22

## 2017-11-21 RX ORDER — HYDROMORPHONE HYDROCHLORIDE 2 MG/ML
0.5 INJECTION INTRAMUSCULAR; INTRAVENOUS; SUBCUTANEOUS ONCE
Qty: 0 | Refills: 0 | Status: DISCONTINUED | OUTPATIENT
Start: 2017-11-21 | End: 2017-11-21

## 2017-11-21 RX ORDER — HYDROMORPHONE HYDROCHLORIDE 2 MG/ML
2 INJECTION INTRAMUSCULAR; INTRAVENOUS; SUBCUTANEOUS
Qty: 0 | Refills: 0 | Status: DISCONTINUED | OUTPATIENT
Start: 2017-11-21 | End: 2017-11-22

## 2017-11-21 RX ORDER — HYDROMORPHONE HYDROCHLORIDE 2 MG/ML
4 INJECTION INTRAMUSCULAR; INTRAVENOUS; SUBCUTANEOUS EVERY 4 HOURS
Qty: 0 | Refills: 0 | Status: DISCONTINUED | OUTPATIENT
Start: 2017-11-21 | End: 2017-11-22

## 2017-11-21 RX ADMIN — HYDROMORPHONE HYDROCHLORIDE 2 MILLIGRAM(S): 2 INJECTION INTRAMUSCULAR; INTRAVENOUS; SUBCUTANEOUS at 23:06

## 2017-11-21 RX ADMIN — SODIUM CHLORIDE 100 MILLILITER(S): 9 INJECTION, SOLUTION INTRAVENOUS at 17:06

## 2017-11-21 RX ADMIN — PANTOPRAZOLE SODIUM 40 MILLIGRAM(S): 20 TABLET, DELAYED RELEASE ORAL at 17:00

## 2017-11-21 RX ADMIN — HYDROMORPHONE HYDROCHLORIDE 1 MILLIGRAM(S): 2 INJECTION INTRAMUSCULAR; INTRAVENOUS; SUBCUTANEOUS at 14:55

## 2017-11-21 RX ADMIN — Medication 100 MILLIGRAM(S): at 22:02

## 2017-11-21 RX ADMIN — Medication 100 MILLIGRAM(S): at 05:02

## 2017-11-21 RX ADMIN — HYDROMORPHONE HYDROCHLORIDE 1 MILLIGRAM(S): 2 INJECTION INTRAMUSCULAR; INTRAVENOUS; SUBCUTANEOUS at 07:58

## 2017-11-21 RX ADMIN — AMLODIPINE BESYLATE 10 MILLIGRAM(S): 2.5 TABLET ORAL at 05:03

## 2017-11-21 RX ADMIN — ONDANSETRON 8 MILLIGRAM(S): 8 TABLET, FILM COATED ORAL at 05:03

## 2017-11-21 RX ADMIN — PANTOPRAZOLE SODIUM 40 MILLIGRAM(S): 20 TABLET, DELAYED RELEASE ORAL at 05:03

## 2017-11-21 RX ADMIN — HYDROMORPHONE HYDROCHLORIDE 0.5 MILLIGRAM(S): 2 INJECTION INTRAMUSCULAR; INTRAVENOUS; SUBCUTANEOUS at 16:57

## 2017-11-21 RX ADMIN — HYDROMORPHONE HYDROCHLORIDE 1 MILLIGRAM(S): 2 INJECTION INTRAMUSCULAR; INTRAVENOUS; SUBCUTANEOUS at 01:57

## 2017-11-21 RX ADMIN — Medication 2 CAPSULE(S): at 17:00

## 2017-11-21 RX ADMIN — HYDROMORPHONE HYDROCHLORIDE 0.5 MILLIGRAM(S): 2 INJECTION INTRAMUSCULAR; INTRAVENOUS; SUBCUTANEOUS at 00:05

## 2017-11-21 RX ADMIN — Medication 650 MILLIGRAM(S): at 23:07

## 2017-11-21 RX ADMIN — HYDROMORPHONE HYDROCHLORIDE 0.5 MILLIGRAM(S): 2 INJECTION INTRAMUSCULAR; INTRAVENOUS; SUBCUTANEOUS at 09:08

## 2017-11-21 RX ADMIN — HYDROMORPHONE HYDROCHLORIDE 0.5 MILLIGRAM(S): 2 INJECTION INTRAMUSCULAR; INTRAVENOUS; SUBCUTANEOUS at 01:24

## 2017-11-21 RX ADMIN — HYDROMORPHONE HYDROCHLORIDE 0.5 MILLIGRAM(S): 2 INJECTION INTRAMUSCULAR; INTRAVENOUS; SUBCUTANEOUS at 11:24

## 2017-11-21 RX ADMIN — HYDROMORPHONE HYDROCHLORIDE 0.5 MILLIGRAM(S): 2 INJECTION INTRAMUSCULAR; INTRAVENOUS; SUBCUTANEOUS at 00:54

## 2017-11-21 RX ADMIN — Medication 2 CAPSULE(S): at 09:03

## 2017-11-21 RX ADMIN — HYDROMORPHONE HYDROCHLORIDE 1 MILLIGRAM(S): 2 INJECTION INTRAMUSCULAR; INTRAVENOUS; SUBCUTANEOUS at 02:27

## 2017-11-21 RX ADMIN — Medication 100 MILLIGRAM(S): at 05:03

## 2017-11-21 RX ADMIN — HYDROMORPHONE HYDROCHLORIDE 1 MILLIGRAM(S): 2 INJECTION INTRAMUSCULAR; INTRAVENOUS; SUBCUTANEOUS at 08:28

## 2017-11-21 RX ADMIN — HYDROMORPHONE HYDROCHLORIDE 0.5 MILLIGRAM(S): 2 INJECTION INTRAMUSCULAR; INTRAVENOUS; SUBCUTANEOUS at 05:33

## 2017-11-21 RX ADMIN — HYDROMORPHONE HYDROCHLORIDE 0.5 MILLIGRAM(S): 2 INJECTION INTRAMUSCULAR; INTRAVENOUS; SUBCUTANEOUS at 16:27

## 2017-11-21 RX ADMIN — HYDROMORPHONE HYDROCHLORIDE 4 MILLIGRAM(S): 2 INJECTION INTRAMUSCULAR; INTRAVENOUS; SUBCUTANEOUS at 21:08

## 2017-11-21 RX ADMIN — HYDROMORPHONE HYDROCHLORIDE 0.5 MILLIGRAM(S): 2 INJECTION INTRAMUSCULAR; INTRAVENOUS; SUBCUTANEOUS at 11:54

## 2017-11-21 RX ADMIN — BENZOCAINE AND MENTHOL 1 LOZENGE: 5; 1 LIQUID ORAL at 06:53

## 2017-11-21 RX ADMIN — HYDROMORPHONE HYDROCHLORIDE 4 MILLIGRAM(S): 2 INJECTION INTRAMUSCULAR; INTRAVENOUS; SUBCUTANEOUS at 20:18

## 2017-11-21 RX ADMIN — HYDROMORPHONE HYDROCHLORIDE 1 MILLIGRAM(S): 2 INJECTION INTRAMUSCULAR; INTRAVENOUS; SUBCUTANEOUS at 14:25

## 2017-11-21 RX ADMIN — HYDROMORPHONE HYDROCHLORIDE 0.5 MILLIGRAM(S): 2 INJECTION INTRAMUSCULAR; INTRAVENOUS; SUBCUTANEOUS at 05:03

## 2017-11-21 RX ADMIN — HYDROMORPHONE HYDROCHLORIDE 0.5 MILLIGRAM(S): 2 INJECTION INTRAMUSCULAR; INTRAVENOUS; SUBCUTANEOUS at 09:38

## 2017-11-21 NOTE — DISCHARGE NOTE ADULT - CARE PROVIDER_API CALL
Juan Messer), Medicine  [address] [facility]  [address] [place] [address]  Phone: [phone]  Fax: [phone]

## 2017-11-21 NOTE — PROGRESS NOTE ADULT - PROBLEM SELECTOR PLAN 2
Likely 2/2 to frequent emesis   - CT abd/pelvis shows Circumferential submucosal edema in the distal esophagus concerning for an esophagitis.  - Protonix 40mg IV BID for now   - Zofran 8mg IV q6h for nausea/vomiting  - Cepacol for symptom relief

## 2017-11-21 NOTE — DISCHARGE NOTE ADULT - PATIENT PORTAL LINK FT
“You can access the [facility] Patient Portal, offered by [facility], by registering with the following website: [email or web address]”

## 2017-11-21 NOTE — DISCHARGE NOTE ADULT - HOSPITAL COURSE
62 y/o F PMH chronic pancreatitis presenting with worsening epigastric pain and emesis, unrelieved by pain control management by outpatient PCP. Pt admitted to medicine service for acute on chronic pancreatitis, course complicated by esophagitis, hypokalemia and dehydration. Patient's investigative studies were significant for minimally elevated lipase, hypokalemia and CT chest findings concerns for esophagitis likely secondary to frequent emesis and chronic pancreatitis. Pt was treated w IV fluid resuscitation, pain control and bowel rest. Her electrolyte abnormalities were corrected. She is currently symptomatically improving, now with mild epigastric pain, improved pain medication requirements and diet. Pt is currently hemodynamically stable and suitable for discharge home with outpatient follow up with her gastroenterologist.

## 2017-11-21 NOTE — DISCHARGE NOTE ADULT - OTHER SIGNIFICANT FINDINGS
< from: CT Abdomen and Pelvis w/ IV Cont (11.20.17 @ 00:22) >  IMPRESSION:     1. Circumferential submucosal edema in the distal esophagus concerning   for an esophagitis.  2. Chronic pancreatitis.    < end of copied text >

## 2017-11-21 NOTE — DISCHARGE NOTE ADULT - MEDICATION SUMMARY - MEDICATIONS TO STOP TAKING
I will STOP taking the medications listed below when I get home from the hospital:    methylPREDNISolone 4 mg oral tablet  -- 1 tab(s) by mouth once a day

## 2017-11-21 NOTE — DISCHARGE NOTE ADULT - MEDICATION SUMMARY - MEDICATIONS TO TAKE
I will START or STAY ON the medications listed below when I get home from the hospital:    methylPREDNISolone 4 mg oral tablet  -- 1 tab(s) by mouth once a day  -- Indication: For Acute on chronic pancreatitis    morphine 30 mg/12 hr oral capsule, extended release  -- 1 cap(s) by mouth every 12 hours  -- Indication: For Acute on chronic pancreatitis    ondansetron 8 mg oral tablet  -- 1 tab(s) by mouth every 6 hours   -- Indication: For Acute on chronic pancreatitis    Metoprolol Succinate  mg oral tablet, extended release  -- 1 tab(s) by mouth once a day  -- Indication: For Essential hypertension    Norvasc 10 mg oral tablet  -- 1 tab(s) by mouth once a day  -- Indication: For Essential hypertension    Creon 24,000 units oral delayed release capsule  -- 1 cap(s) by mouth 3 times a day  -- Indication: For Other chronic pancreatitis

## 2017-11-21 NOTE — PROGRESS NOTE ADULT - PROBLEM SELECTOR PLAN 1
- Epigastric tenderness, improving this AM. Lipase min elevated.  - CT abd/pelvis shows signs of chronic pancreatitis, no acute inflammation or edema   -Endoscopy 8/14 sig for inflammatory changes of gastric mucosa H pylori negative. Cyst biopsy at pancreatic head sig for inflammatory changes.  -F/U serum triglycerides    - Pain control: Dilaudid 1mg q6h for severe pain, .5mg q3h for breakthrough pain   - Advance diet as tolerated   - IVF: LR  at 150cc/hr   - c/w pancrolipase

## 2017-11-21 NOTE — DISCHARGE NOTE ADULT - ADDITIONAL INSTRUCTIONS
Please follow-up with your outpatient gastroenterologist Dr. Gurjit Mccauley DO (Pain medicine)  November 30th, 2017 at 10 am    Dr. Messer (Gastroenterology)  November 30th, 2017 at 1:45 pm

## 2017-11-21 NOTE — DISCHARGE NOTE ADULT - CARE PLAN
Principal Discharge DX:	Acute on chronic pancreatitis  Goal:	Improving  Instructions for follow-up, activity and diet:	You were admitted for acute exacerbation of chronic pancreatitis. You were treated w IV fluids, pain control and bowel rest. Your symptoms have improved. Please avoid oily, greasy foods, and/or alcohol as these are some of the causes of pancreatitis. Please follow-up with your outpatient gastroenterologist within 2 weeks of discharge.  Secondary Diagnosis:	Esophagitis  Goal:	Improving  Instructions for follow-up, activity and diet:	Your were found to have symptoms and imaging consistent w esophagitis likely secondary to frequent emesis. Cont conservative management including throat lozenges for persisting sore throat and or chest pain. Please follow-up with your outpatient provider with 2 weeks of discharge.

## 2017-11-21 NOTE — DISCHARGE NOTE ADULT - PLAN OF CARE
Improving You were admitted for acute exacerbation of chronic pancreatitis. You were treated w IV fluids, pain control and bowel rest. Your symptoms have improved. Please avoid oily, greasy foods, and/or alcohol as these are some of the causes of pancreatitis. Please follow-up with your outpatient gastroenterologist within 2 weeks of discharge. Your were found to have symptoms and imaging consistent w esophagitis likely secondary to frequent emesis. Cont conservative management including throat lozenges for persisting sore throat and or chest pain. Please follow-up with your outpatient provider with 2 weeks of discharge.

## 2017-11-21 NOTE — PROGRESS NOTE ADULT - PROBLEM SELECTOR PLAN 7
DVT: Heparin Subq  Diet: Advance as tolerated     Marlo Hastings MD-PGY1  Department of Internal Medicine  Pager [phone]

## 2017-11-22 VITALS
OXYGEN SATURATION: 96 % | RESPIRATION RATE: 18 BRPM | TEMPERATURE: 99 F | SYSTOLIC BLOOD PRESSURE: 143 MMHG | DIASTOLIC BLOOD PRESSURE: 70 MMHG | HEART RATE: 79 BPM

## 2017-11-22 PROCEDURE — 85014 HEMATOCRIT: CPT

## 2017-11-22 PROCEDURE — 96375 TX/PRO/DX INJ NEW DRUG ADDON: CPT

## 2017-11-22 PROCEDURE — 86703 HIV-1/HIV-2 1 RESULT ANTBDY: CPT

## 2017-11-22 PROCEDURE — 82330 ASSAY OF CALCIUM: CPT

## 2017-11-22 PROCEDURE — 82435 ASSAY OF BLOOD CHLORIDE: CPT

## 2017-11-22 PROCEDURE — 74177 CT ABD & PELVIS W/CONTRAST: CPT

## 2017-11-22 PROCEDURE — 84478 ASSAY OF TRIGLYCERIDES: CPT

## 2017-11-22 PROCEDURE — 82565 ASSAY OF CREATININE: CPT

## 2017-11-22 PROCEDURE — 83615 LACTATE (LD) (LDH) ENZYME: CPT

## 2017-11-22 PROCEDURE — 84100 ASSAY OF PHOSPHORUS: CPT

## 2017-11-22 PROCEDURE — 96374 THER/PROPH/DIAG INJ IV PUSH: CPT | Mod: XU

## 2017-11-22 PROCEDURE — 82947 ASSAY GLUCOSE BLOOD QUANT: CPT

## 2017-11-22 PROCEDURE — 85610 PROTHROMBIN TIME: CPT

## 2017-11-22 PROCEDURE — 84295 ASSAY OF SERUM SODIUM: CPT

## 2017-11-22 PROCEDURE — 85730 THROMBOPLASTIN TIME PARTIAL: CPT

## 2017-11-22 PROCEDURE — 80048 BASIC METABOLIC PNL TOTAL CA: CPT

## 2017-11-22 PROCEDURE — 93005 ELECTROCARDIOGRAM TRACING: CPT

## 2017-11-22 PROCEDURE — 82803 BLOOD GASES ANY COMBINATION: CPT

## 2017-11-22 PROCEDURE — 83690 ASSAY OF LIPASE: CPT

## 2017-11-22 PROCEDURE — 80053 COMPREHEN METABOLIC PANEL: CPT

## 2017-11-22 PROCEDURE — 84132 ASSAY OF SERUM POTASSIUM: CPT

## 2017-11-22 PROCEDURE — 82553 CREATINE MB FRACTION: CPT

## 2017-11-22 PROCEDURE — 99285 EMERGENCY DEPT VISIT HI MDM: CPT | Mod: 25

## 2017-11-22 PROCEDURE — 83735 ASSAY OF MAGNESIUM: CPT

## 2017-11-22 PROCEDURE — 81001 URINALYSIS AUTO W/SCOPE: CPT

## 2017-11-22 PROCEDURE — 83605 ASSAY OF LACTIC ACID: CPT

## 2017-11-22 PROCEDURE — 82550 ASSAY OF CK (CPK): CPT

## 2017-11-22 PROCEDURE — 71045 X-RAY EXAM CHEST 1 VIEW: CPT

## 2017-11-22 PROCEDURE — 99233 SBSQ HOSP IP/OBS HIGH 50: CPT | Mod: GC

## 2017-11-22 PROCEDURE — 84484 ASSAY OF TROPONIN QUANT: CPT

## 2017-11-22 PROCEDURE — 85027 COMPLETE CBC AUTOMATED: CPT

## 2017-11-22 PROCEDURE — 96376 TX/PRO/DX INJ SAME DRUG ADON: CPT

## 2017-11-22 RX ORDER — ONDANSETRON 8 MG/1
1 TABLET, FILM COATED ORAL
Qty: 12 | Refills: 0 | OUTPATIENT
Start: 2017-11-22 | End: 2017-11-25

## 2017-11-22 RX ADMIN — HYDROMORPHONE HYDROCHLORIDE 4 MILLIGRAM(S): 2 INJECTION INTRAMUSCULAR; INTRAVENOUS; SUBCUTANEOUS at 02:16

## 2017-11-22 RX ADMIN — HYDROMORPHONE HYDROCHLORIDE 4 MILLIGRAM(S): 2 INJECTION INTRAMUSCULAR; INTRAVENOUS; SUBCUTANEOUS at 01:15

## 2017-11-22 RX ADMIN — Medication 2 CAPSULE(S): at 08:51

## 2017-11-22 RX ADMIN — Medication 100 MILLIGRAM(S): at 15:43

## 2017-11-22 RX ADMIN — HYDROMORPHONE HYDROCHLORIDE 4 MILLIGRAM(S): 2 INJECTION INTRAMUSCULAR; INTRAVENOUS; SUBCUTANEOUS at 05:55

## 2017-11-22 RX ADMIN — HYDROMORPHONE HYDROCHLORIDE 4 MILLIGRAM(S): 2 INJECTION INTRAMUSCULAR; INTRAVENOUS; SUBCUTANEOUS at 15:00

## 2017-11-22 RX ADMIN — HYDROMORPHONE HYDROCHLORIDE 4 MILLIGRAM(S): 2 INJECTION INTRAMUSCULAR; INTRAVENOUS; SUBCUTANEOUS at 14:03

## 2017-11-22 RX ADMIN — Medication 100 MILLIGRAM(S): at 05:56

## 2017-11-22 RX ADMIN — Medication 650 MILLIGRAM(S): at 00:16

## 2017-11-22 RX ADMIN — HYDROMORPHONE HYDROCHLORIDE 4 MILLIGRAM(S): 2 INJECTION INTRAMUSCULAR; INTRAVENOUS; SUBCUTANEOUS at 09:56

## 2017-11-22 RX ADMIN — PANTOPRAZOLE SODIUM 40 MILLIGRAM(S): 20 TABLET, DELAYED RELEASE ORAL at 05:59

## 2017-11-22 RX ADMIN — HYDROMORPHONE HYDROCHLORIDE 4 MILLIGRAM(S): 2 INJECTION INTRAMUSCULAR; INTRAVENOUS; SUBCUTANEOUS at 10:40

## 2017-11-22 RX ADMIN — HYDROMORPHONE HYDROCHLORIDE 4 MILLIGRAM(S): 2 INJECTION INTRAMUSCULAR; INTRAVENOUS; SUBCUTANEOUS at 06:50

## 2017-11-22 RX ADMIN — AMLODIPINE BESYLATE 10 MILLIGRAM(S): 2.5 TABLET ORAL at 05:56

## 2017-11-22 RX ADMIN — Medication 2 CAPSULE(S): at 13:01

## 2017-11-22 NOTE — PROGRESS NOTE ADULT - PROBLEM SELECTOR PLAN 7
DVT: Heparin Subq  Diet: Advance as tolerated   Dispo: discharge planning    Marlo Hastings MD-PGY1  Department of Internal Medicine  Pager [phone]

## 2017-11-22 NOTE — PROGRESS NOTE ADULT - PROBLEM SELECTOR PROBLEM 1
Acute on chronic pancreatitis

## 2017-11-22 NOTE — PROGRESS NOTE ADULT - PROBLEM SELECTOR PLAN 3
- PO Dilaudid 4mg for severe pain, 2mg for breakthrough pain with hold parameters
- Dilaudid 1mg q6h for severe pain, .5mg q3h for breakthrough pain with hold parameters
- Dilaudid 1mg q6h for severe pain, .5mg q3h for breakthrough pain with hold parameters   - for abd. pain
- K 3.0 on admission   - Replete with 3 runs of 10mEq/100ml x3 of KCl   - Monitor K q12h

## 2017-11-22 NOTE — PROGRESS NOTE ADULT - SUBJECTIVE AND OBJECTIVE BOX
Patient is a 61y old  Female who presents with a chief complaint of Vomiting (21 Nov 2017 21:10)      SUBJECTIVE / OVERNIGHT EVENTS: No acute events overnight. Pt with improving epigastric pain this AM, now on PO pain control. Feels ready to go home today. Denies chest pain, SOB, emesis.     MEDICATIONS  (STANDING):  amLODIPine   Tablet 10 milliGRAM(s) Oral daily  amylase/lipase/protease  (CREON 12,000 Units) 2 Capsule(s) Oral two times a day with meals  docusate sodium 100 milliGRAM(s) Oral three times a day  lactated ringers. 1000 milliLiter(s) (100 mL/Hr) IV Continuous <Continuous>  metoprolol succinate  milliGRAM(s) Oral daily  pantoprazole  Injectable 40 milliGRAM(s) IV Push two times a day    MEDICATIONS  (PRN):  acetaminophen   Tablet 650 milliGRAM(s) Oral every 6 hours PRN For Temp greater than 38 C (100.4 F)  benzocaine 15 mG/menthol 3.6 mG Lozenge 1 Lozenge Oral every 6 hours PRN Sore Throat  HYDROmorphone   Tablet 4 milliGRAM(s) Oral every 4 hours PRN Moderate to Severe Pain  HYDROmorphone   Tablet 2 milliGRAM(s) Oral every 3 hours PRN Breakthrough Pain  ondansetron Injectable 8 milliGRAM(s) IV Push every 6 hours PRN Nausea and/or Vomiting  senna 2 Tablet(s) Oral at bedtime PRN Constipation      T(C): 37.1 (11-22-17 @ 06:17), Max: 37.2 (11-21-17 @ 20:50)  HR: 79 (11-22-17 @ 06:17) (79 - 90)  BP: 160/85 (11-22-17 @ 06:17) (143/77 - 160/85)  RR: 18 (11-22-17 @ 06:17) (18 - 18)  SpO2: 97% (11-22-17 @ 06:17) (95% - 97%)  CAPILLARY BLOOD GLUCOSE        I&O's Summary    21 Nov 2017 07:01  -  22 Nov 2017 07:00  --------------------------------------------------------  IN: 2280 mL / OUT: 0 mL / NET: 2280 mL        PHYSICAL EXAM:  GENERAL: NAD, well-developed  middle aged woman  HEAD:  Atraumatic, Normocephalic  CHEST/LUNG: Clear to auscultation bilaterally; No wheeze  HEART: Regular rate and rhythm; No murmurs, rubs, or gallops  ABDOMEN: Soft, Nondistended; Mildly reproducible epigastric pain   EXTREMITIES:  2+ Peripheral Pulses, No clubbing, cyanosis, or edema  PSYCH: AAOx3      LABS:    WBC Trend: 9.24<--, 10.08<--, 12.2<--  Hb Trend: 12.1<--, 13.2<--, 15.2<--  Plt Trend: 358<--, 377<--, 416<--  11-21    142  |  103  |  5<L>  ----------------------------<  77  3.9   |  26  |  0.59    Ca    8.8      21 Nov 2017 07:43    TPro  6.4  /  Alb  3.4  /  TBili  0.4  /  DBili  x   /  AST  33  /  ALT  15  /  AlkPhos  64  11-21    Creatinine Trend: 0.59<--, 0.77<--, 0.68<--, 0.88<--            Microbiology:  Urine Cx:  Blood Cx:    RADIOLOGY & ADDITIONAL TESTS:  X- Ray:  CT:  Ultrasound:  [ ] imaging personally reviewed and interpreted by me    Consultant(s) Notes Reviewed:      Care Discussed with Consultants/Other Providers:

## 2017-11-22 NOTE — PROGRESS NOTE ADULT - PROBLEM SELECTOR PLAN 6
- Well controlled on home medications   - c/w amlodipine and metoprolol
- SCDs for now given some specks of blood with emesis at home, but will start pharm DVT ppx if no additional episodes of blood with emesis    Katelin Patton PGY-3  [phone]
- pt remains HTN   -most likely due to pain   - c/w amlodipine and metoprolol
- pt remains HTN   -most likely due to pain   - c/w amlodipine and metoprolol

## 2017-11-22 NOTE — PROGRESS NOTE ADULT - PROBLEM SELECTOR PLAN 1
- Epigastric tenderness, improving this AM. Discharge Planning   - CT abd/pelvis shows signs of chronic pancreatitis, no acute inflammation or edema   -Endoscopy 8/14 sig for inflammatory changes of gastric mucosa H pylori negative. Cyst biopsy at pancreatic head sig for inflammatory changes.  -Triglycerides  WNL, unclear etiology of chronic pancreatitis   - Pain control: PO Dilaudid 4mg , PO Dilaudid 2 mg breakthrough  - Advance diet as tolerated   - IVF: LR  at 100cc/hr   - c/w pancrolipase

## 2017-11-22 NOTE — PROGRESS NOTE ADULT - ATTENDING COMMENTS
Patient is tolerating PO intake. Had breakfast this morning. Pain controlled on oral pain medications. Can be discharged today.     More than 60 minutes spent on discharge planning.

## 2019-04-04 ENCOUNTER — INPATIENT (INPATIENT)
Facility: HOSPITAL | Age: 64
LOS: 7 days | Discharge: HOME | DRG: 438 | End: 2019-04-12
Attending: HOSPITALIST | Admitting: STUDENT IN AN ORGANIZED HEALTH CARE EDUCATION/TRAINING PROGRAM
Payer: COMMERCIAL

## 2019-04-04 VITALS
HEART RATE: 134 BPM | HEIGHT: 64 IN | RESPIRATION RATE: 17 BRPM | OXYGEN SATURATION: 97 % | TEMPERATURE: 99 F | DIASTOLIC BLOOD PRESSURE: 95 MMHG | SYSTOLIC BLOOD PRESSURE: 139 MMHG | WEIGHT: 121.92 LBS

## 2019-04-04 DIAGNOSIS — Z98.89 OTHER SPECIFIED POSTPROCEDURAL STATES: Chronic | ICD-10-CM

## 2019-04-04 DIAGNOSIS — Z96.641 PRESENCE OF RIGHT ARTIFICIAL HIP JOINT: Chronic | ICD-10-CM

## 2019-04-04 LAB
ALBUMIN SERPL ELPH-MCNC: 4.6 G/DL — SIGNIFICANT CHANGE UP (ref 3.3–5)
ALP SERPL-CCNC: 79 U/L — SIGNIFICANT CHANGE UP (ref 40–120)
ALT FLD-CCNC: 21 U/L — SIGNIFICANT CHANGE UP (ref 10–45)
ANION GAP SERPL CALC-SCNC: 19 MMOL/L — HIGH (ref 5–17)
AST SERPL-CCNC: 42 U/L — HIGH (ref 10–40)
BASOPHILS # BLD AUTO: 0.1 K/UL — SIGNIFICANT CHANGE UP (ref 0–0.2)
BASOPHILS NFR BLD AUTO: 0.6 % — SIGNIFICANT CHANGE UP (ref 0–2)
BILIRUB SERPL-MCNC: 0.4 MG/DL — SIGNIFICANT CHANGE UP (ref 0.2–1.2)
BUN SERPL-MCNC: 14 MG/DL — SIGNIFICANT CHANGE UP (ref 7–23)
CALCIUM SERPL-MCNC: 10.6 MG/DL — HIGH (ref 8.4–10.5)
CHLORIDE SERPL-SCNC: 97 MMOL/L — SIGNIFICANT CHANGE UP (ref 96–108)
CO2 SERPL-SCNC: 25 MMOL/L — SIGNIFICANT CHANGE UP (ref 22–31)
CREAT SERPL-MCNC: 0.96 MG/DL — SIGNIFICANT CHANGE UP (ref 0.5–1.3)
EOSINOPHIL # BLD AUTO: 0 K/UL — SIGNIFICANT CHANGE UP (ref 0–0.5)
EOSINOPHIL NFR BLD AUTO: 0.3 % — SIGNIFICANT CHANGE UP (ref 0–6)
GAS PNL BLDV: SIGNIFICANT CHANGE UP
GLUCOSE SERPL-MCNC: 117 MG/DL — HIGH (ref 70–99)
HCT VFR BLD CALC: 44.9 % — SIGNIFICANT CHANGE UP (ref 34.5–45)
HGB BLD-MCNC: 14.1 G/DL — SIGNIFICANT CHANGE UP (ref 11.5–15.5)
LYMPHOCYTES # BLD AUTO: 26.7 % — SIGNIFICANT CHANGE UP (ref 13–44)
LYMPHOCYTES # BLD AUTO: 3 K/UL — SIGNIFICANT CHANGE UP (ref 1–3.3)
MCHC RBC-ENTMCNC: 27.8 PG — SIGNIFICANT CHANGE UP (ref 27–34)
MCHC RBC-ENTMCNC: 31.4 GM/DL — LOW (ref 32–36)
MCV RBC AUTO: 88.4 FL — SIGNIFICANT CHANGE UP (ref 80–100)
MONOCYTES # BLD AUTO: 0.9 K/UL — SIGNIFICANT CHANGE UP (ref 0–0.9)
MONOCYTES NFR BLD AUTO: 8.4 % — SIGNIFICANT CHANGE UP (ref 2–14)
NEUTROPHILS # BLD AUTO: 7.2 K/UL — SIGNIFICANT CHANGE UP (ref 1.8–7.4)
NEUTROPHILS NFR BLD AUTO: 64 % — SIGNIFICANT CHANGE UP (ref 43–77)
PLATELET # BLD AUTO: 361 K/UL — SIGNIFICANT CHANGE UP (ref 150–400)
POTASSIUM SERPL-MCNC: 4.6 MMOL/L — SIGNIFICANT CHANGE UP (ref 3.5–5.3)
POTASSIUM SERPL-SCNC: 4.6 MMOL/L — SIGNIFICANT CHANGE UP (ref 3.5–5.3)
PROT SERPL-MCNC: 7.9 G/DL — SIGNIFICANT CHANGE UP (ref 6–8.3)
RBC # BLD: 5.08 M/UL — SIGNIFICANT CHANGE UP (ref 3.8–5.2)
RBC # FLD: 15.2 % — HIGH (ref 10.3–14.5)
SODIUM SERPL-SCNC: 141 MMOL/L — SIGNIFICANT CHANGE UP (ref 135–145)
WBC # BLD: 11.3 K/UL — HIGH (ref 3.8–10.5)
WBC # FLD AUTO: 11.3 K/UL — HIGH (ref 3.8–10.5)

## 2019-04-04 PROCEDURE — 99285 EMERGENCY DEPT VISIT HI MDM: CPT | Mod: 25

## 2019-04-04 PROCEDURE — 74177 CT ABD & PELVIS W/CONTRAST: CPT | Mod: 26

## 2019-04-04 PROCEDURE — 93010 ELECTROCARDIOGRAM REPORT: CPT

## 2019-04-04 PROCEDURE — 71046 X-RAY EXAM CHEST 2 VIEWS: CPT | Mod: 26

## 2019-04-04 RX ORDER — MORPHINE SULFATE 50 MG/1
4 CAPSULE, EXTENDED RELEASE ORAL ONCE
Qty: 0 | Refills: 0 | Status: DISCONTINUED | OUTPATIENT
Start: 2019-04-04 | End: 2019-04-04

## 2019-04-04 RX ORDER — SODIUM CHLORIDE 9 MG/ML
1000 INJECTION INTRAMUSCULAR; INTRAVENOUS; SUBCUTANEOUS ONCE
Qty: 0 | Refills: 0 | Status: COMPLETED | OUTPATIENT
Start: 2019-04-04 | End: 2019-04-04

## 2019-04-04 RX ORDER — ONDANSETRON 8 MG/1
4 TABLET, FILM COATED ORAL ONCE
Qty: 0 | Refills: 0 | Status: COMPLETED | OUTPATIENT
Start: 2019-04-04 | End: 2019-04-04

## 2019-04-04 RX ORDER — SODIUM CHLORIDE 9 MG/ML
1000 INJECTION, SOLUTION INTRAVENOUS ONCE
Qty: 0 | Refills: 0 | Status: COMPLETED | OUTPATIENT
Start: 2019-04-04 | End: 2019-04-04

## 2019-04-04 RX ORDER — SODIUM CHLORIDE 9 MG/ML
3 INJECTION INTRAMUSCULAR; INTRAVENOUS; SUBCUTANEOUS ONCE
Qty: 0 | Refills: 0 | Status: COMPLETED | OUTPATIENT
Start: 2019-04-04 | End: 2019-04-04

## 2019-04-04 RX ADMIN — MORPHINE SULFATE 4 MILLIGRAM(S): 50 CAPSULE, EXTENDED RELEASE ORAL at 20:19

## 2019-04-04 RX ADMIN — SODIUM CHLORIDE 1000 MILLILITER(S): 9 INJECTION, SOLUTION INTRAVENOUS at 22:19

## 2019-04-04 RX ADMIN — SODIUM CHLORIDE 1000 MILLILITER(S): 9 INJECTION INTRAMUSCULAR; INTRAVENOUS; SUBCUTANEOUS at 20:19

## 2019-04-04 RX ADMIN — SODIUM CHLORIDE 3 MILLILITER(S): 9 INJECTION INTRAMUSCULAR; INTRAVENOUS; SUBCUTANEOUS at 20:20

## 2019-04-04 RX ADMIN — ONDANSETRON 4 MILLIGRAM(S): 8 TABLET, FILM COATED ORAL at 20:19

## 2019-04-04 NOTE — ED PROVIDER NOTE - CLINICAL SUMMARY MEDICAL DECISION MAKING FREE TEXT BOX
63F pmh chronic pancreatitis p/w severe abd pain and vomiting (possibly bloody?) since last night.  While abdominal exam is nontender, patient has dry mucus membranes and is tachycardic.  Concern for pancreatitis, possible sbo.  Will check labs, ekg, cxr, CT, give fluids, analgesia and reassess.  - Dena Esquivel, DO 63F pmh chronic pancreatitis p/w severe abd pain and vomiting (possibly bloody?) since last night.  While abdominal exam is nontender, patient has dry mucus membranes and is tachycardic.  Concern for pancreatitis, possible sbo.  Will check labs, ekg, cxr, CT, give fluids, analgesia and reassess.  - DO Denzel Mattson: vomiting blood and abdominal pain. hx of pancreatitis. abd soft.

## 2019-04-04 NOTE — ED ADULT NURSE NOTE - CHPI ED NUR SYMPTOMS NEG
no fever/no abdominal distension/no blood in stool/no burning urination/no chills/no hematuria/no dysuria/no diarrhea

## 2019-04-04 NOTE — ED PROVIDER NOTE - OBJECTIVE STATEMENT
63F pmh HTN, chronic pancreatitis p/w acute onset abd pain and vomiting since last night.  Pt has not been able to tolerate any PO and states she has been vomiting lots of "dark vomit" that she thinks was blood.  Had a small BM this morning and is passing small amounts of gas.  No hx of abdominal surgeries.  Pain is "shock-like" and severe but patient states it's not when you press on her abdomen.  denies chest pain, shortness of breath, f/c, urinary symptoms.  Pt has not taken anything for pain so far.

## 2019-04-04 NOTE — ED PROVIDER NOTE - ATTENDING CONTRIBUTION TO CARE
I performed a history and physical exam of the patient and discussed their management with the resident and /or advanced care provider. I reviewed the resident and /or ACP's note and agree with the documented findings and plan of care. My medical decision making and observations are found above.  Lungs trace rhonchi, Abd no extra tenderness with palpation. stated abd pain

## 2019-04-04 NOTE — ED PROVIDER NOTE - PHYSICAL EXAMINATION
Gen: AOx3, uncomfortable appearing  Head: NCAT  HEENT: PERRL, oral mucosa dry, normal conjunctiva  Lung: CTAB, no respiratory distress  CV: tachycardic, no murmurs, Normal perfusion  Abd: soft, nondistended and nontender to palpation, no CVA tenderness  MSK: No edema, no visible deformities  Neuro: No focal neurologic deficits  Skin: No rash

## 2019-04-04 NOTE — ED ADULT NURSE NOTE - OBJECTIVE STATEMENT
62 yo female presents to ED with abdominal pain.  PMH of HTN, and chronic pancreatitis.  pt states that she has not had an episoe of pancreatitis in two years, but yester day she started feeling severe abdominal pain, with N and V, and then later last night she started vomiting blood.  pt states she threw up blood today.  pt is AOx4, PERRL, lungs clear and equal bilat, abdomen is soft and non tender, but pt has episodes of pain unrelated to palpation.  pt states she has pain in her lower abdomen as well. Bowel sounds heard in all quadrants, PMS intact in all extremities, and full ROM in all Extremities.  PT Denies CP, SOB, HA, Blurry vision, dizziness, diarrhea. pt resting comfortably educated to call for assistance or any worsening symptoms.

## 2019-04-05 DIAGNOSIS — K92.0 HEMATEMESIS: ICD-10-CM

## 2019-04-05 DIAGNOSIS — I10 ESSENTIAL (PRIMARY) HYPERTENSION: ICD-10-CM

## 2019-04-05 DIAGNOSIS — K86.1 OTHER CHRONIC PANCREATITIS: ICD-10-CM

## 2019-04-05 DIAGNOSIS — Z29.9 ENCOUNTER FOR PROPHYLACTIC MEASURES, UNSPECIFIED: ICD-10-CM

## 2019-04-05 LAB
ANION GAP SERPL CALC-SCNC: 16 MMOL/L — SIGNIFICANT CHANGE UP (ref 5–17)
APTT BLD: 28.9 SEC — SIGNIFICANT CHANGE UP (ref 27.5–36.3)
BASOPHILS # BLD AUTO: 0.02 K/UL — SIGNIFICANT CHANGE UP (ref 0–0.2)
BASOPHILS NFR BLD AUTO: 0.2 % — SIGNIFICANT CHANGE UP (ref 0–2)
BLD GP AB SCN SERPL QL: NEGATIVE — SIGNIFICANT CHANGE UP
BUN SERPL-MCNC: 11 MG/DL — SIGNIFICANT CHANGE UP (ref 7–23)
CALCIUM SERPL-MCNC: 9.4 MG/DL — SIGNIFICANT CHANGE UP (ref 8.4–10.5)
CHLORIDE SERPL-SCNC: 101 MMOL/L — SIGNIFICANT CHANGE UP (ref 96–108)
CO2 SERPL-SCNC: 25 MMOL/L — SIGNIFICANT CHANGE UP (ref 22–31)
CREAT SERPL-MCNC: 0.71 MG/DL — SIGNIFICANT CHANGE UP (ref 0.5–1.3)
EOSINOPHIL # BLD AUTO: 0.05 K/UL — SIGNIFICANT CHANGE UP (ref 0–0.5)
EOSINOPHIL NFR BLD AUTO: 0.5 % — SIGNIFICANT CHANGE UP (ref 0–6)
GLUCOSE SERPL-MCNC: 94 MG/DL — SIGNIFICANT CHANGE UP (ref 70–99)
HCT VFR BLD CALC: 39.4 % — SIGNIFICANT CHANGE UP (ref 34.5–45)
HGB BLD-MCNC: 12.1 G/DL — SIGNIFICANT CHANGE UP (ref 11.5–15.5)
IMM GRANULOCYTES NFR BLD AUTO: 0.5 % — SIGNIFICANT CHANGE UP (ref 0–1.5)
INR BLD: 1.05 RATIO — SIGNIFICANT CHANGE UP (ref 0.88–1.16)
LYMPHOCYTES # BLD AUTO: 3.32 K/UL — HIGH (ref 1–3.3)
LYMPHOCYTES # BLD AUTO: 34.2 % — SIGNIFICANT CHANGE UP (ref 13–44)
MAGNESIUM SERPL-MCNC: 1.8 MG/DL — SIGNIFICANT CHANGE UP (ref 1.6–2.6)
MCHC RBC-ENTMCNC: 27.2 PG — SIGNIFICANT CHANGE UP (ref 27–34)
MCHC RBC-ENTMCNC: 30.7 GM/DL — LOW (ref 32–36)
MCV RBC AUTO: 88.5 FL — SIGNIFICANT CHANGE UP (ref 80–100)
MONOCYTES # BLD AUTO: 0.79 K/UL — SIGNIFICANT CHANGE UP (ref 0–0.9)
MONOCYTES NFR BLD AUTO: 8.1 % — SIGNIFICANT CHANGE UP (ref 2–14)
NEUTROPHILS # BLD AUTO: 5.48 K/UL — SIGNIFICANT CHANGE UP (ref 1.8–7.4)
NEUTROPHILS NFR BLD AUTO: 56.5 % — SIGNIFICANT CHANGE UP (ref 43–77)
PHOSPHATE SERPL-MCNC: 2.7 MG/DL — SIGNIFICANT CHANGE UP (ref 2.5–4.5)
PLATELET # BLD AUTO: 215 K/UL — SIGNIFICANT CHANGE UP (ref 150–400)
POTASSIUM SERPL-MCNC: 3.5 MMOL/L — SIGNIFICANT CHANGE UP (ref 3.5–5.3)
POTASSIUM SERPL-SCNC: 3.5 MMOL/L — SIGNIFICANT CHANGE UP (ref 3.5–5.3)
PROTHROM AB SERPL-ACNC: 11.9 SEC — SIGNIFICANT CHANGE UP (ref 10–13.1)
RBC # BLD: 4.45 M/UL — SIGNIFICANT CHANGE UP (ref 3.8–5.2)
RBC # FLD: 17.5 % — HIGH (ref 10.3–14.5)
RH IG SCN BLD-IMP: POSITIVE — SIGNIFICANT CHANGE UP
SODIUM SERPL-SCNC: 142 MMOL/L — SIGNIFICANT CHANGE UP (ref 135–145)
WBC # BLD: 9.71 K/UL — SIGNIFICANT CHANGE UP (ref 3.8–10.5)
WBC # FLD AUTO: 9.71 K/UL — SIGNIFICANT CHANGE UP (ref 3.8–10.5)

## 2019-04-05 PROCEDURE — 99223 1ST HOSP IP/OBS HIGH 75: CPT | Mod: GC

## 2019-04-05 PROCEDURE — 12345: CPT | Mod: NC

## 2019-04-05 RX ORDER — ONDANSETRON 8 MG/1
8 TABLET, FILM COATED ORAL EVERY 6 HOURS
Qty: 0 | Refills: 0 | Status: DISCONTINUED | OUTPATIENT
Start: 2019-04-05 | End: 2019-04-12

## 2019-04-05 RX ORDER — LIPASE/PROTEASE/AMYLASE 16-48-48K
2 CAPSULE,DELAYED RELEASE (ENTERIC COATED) ORAL THREE TIMES A DAY
Qty: 0 | Refills: 0 | Status: DISCONTINUED | OUTPATIENT
Start: 2019-04-05 | End: 2019-04-12

## 2019-04-05 RX ORDER — ACETAMINOPHEN 500 MG
650 TABLET ORAL EVERY 6 HOURS
Qty: 0 | Refills: 0 | Status: DISCONTINUED | OUTPATIENT
Start: 2019-04-05 | End: 2019-04-12

## 2019-04-05 RX ORDER — SODIUM CHLORIDE 9 MG/ML
1000 INJECTION, SOLUTION INTRAVENOUS
Qty: 0 | Refills: 0 | Status: DISCONTINUED | OUTPATIENT
Start: 2019-04-05 | End: 2019-04-05

## 2019-04-05 RX ORDER — MORPHINE SULFATE 50 MG/1
4 CAPSULE, EXTENDED RELEASE ORAL EVERY 6 HOURS
Qty: 0 | Refills: 0 | Status: DISCONTINUED | OUTPATIENT
Start: 2019-04-05 | End: 2019-04-05

## 2019-04-05 RX ORDER — MORPHINE SULFATE 50 MG/1
4 CAPSULE, EXTENDED RELEASE ORAL ONCE
Qty: 0 | Refills: 0 | Status: DISCONTINUED | OUTPATIENT
Start: 2019-04-05 | End: 2019-04-05

## 2019-04-05 RX ORDER — MORPHINE SULFATE 50 MG/1
1 CAPSULE, EXTENDED RELEASE ORAL
Qty: 0 | Refills: 0 | COMMUNITY

## 2019-04-05 RX ORDER — SODIUM CHLORIDE 9 MG/ML
1000 INJECTION, SOLUTION INTRAVENOUS
Qty: 0 | Refills: 0 | Status: DISCONTINUED | OUTPATIENT
Start: 2019-04-05 | End: 2019-04-08

## 2019-04-05 RX ORDER — MORPHINE SULFATE 50 MG/1
2 CAPSULE, EXTENDED RELEASE ORAL ONCE
Qty: 0 | Refills: 0 | Status: DISCONTINUED | OUTPATIENT
Start: 2019-04-05 | End: 2019-04-05

## 2019-04-05 RX ORDER — MORPHINE SULFATE 50 MG/1
4 CAPSULE, EXTENDED RELEASE ORAL EVERY 4 HOURS
Qty: 0 | Refills: 0 | Status: DISCONTINUED | OUTPATIENT
Start: 2019-04-05 | End: 2019-04-07

## 2019-04-05 RX ORDER — PANTOPRAZOLE SODIUM 20 MG/1
40 TABLET, DELAYED RELEASE ORAL EVERY 12 HOURS
Qty: 0 | Refills: 0 | Status: DISCONTINUED | OUTPATIENT
Start: 2019-04-05 | End: 2019-04-12

## 2019-04-05 RX ADMIN — MORPHINE SULFATE 4 MILLIGRAM(S): 50 CAPSULE, EXTENDED RELEASE ORAL at 02:27

## 2019-04-05 RX ADMIN — MORPHINE SULFATE 4 MILLIGRAM(S): 50 CAPSULE, EXTENDED RELEASE ORAL at 17:13

## 2019-04-05 RX ADMIN — Medication 650 MILLIGRAM(S): at 16:45

## 2019-04-05 RX ADMIN — Medication 2 CAPSULE(S): at 17:25

## 2019-04-05 RX ADMIN — SODIUM CHLORIDE 100 MILLILITER(S): 9 INJECTION, SOLUTION INTRAVENOUS at 04:57

## 2019-04-05 RX ADMIN — Medication 2 CAPSULE(S): at 12:56

## 2019-04-05 RX ADMIN — ONDANSETRON 8 MILLIGRAM(S): 8 TABLET, FILM COATED ORAL at 20:51

## 2019-04-05 RX ADMIN — MORPHINE SULFATE 4 MILLIGRAM(S): 50 CAPSULE, EXTENDED RELEASE ORAL at 12:43

## 2019-04-05 RX ADMIN — MORPHINE SULFATE 4 MILLIGRAM(S): 50 CAPSULE, EXTENDED RELEASE ORAL at 21:05

## 2019-04-05 RX ADMIN — MORPHINE SULFATE 4 MILLIGRAM(S): 50 CAPSULE, EXTENDED RELEASE ORAL at 06:23

## 2019-04-05 RX ADMIN — PANTOPRAZOLE SODIUM 40 MILLIGRAM(S): 20 TABLET, DELAYED RELEASE ORAL at 17:33

## 2019-04-05 RX ADMIN — Medication 2 CAPSULE(S): at 08:13

## 2019-04-05 RX ADMIN — MORPHINE SULFATE 4 MILLIGRAM(S): 50 CAPSULE, EXTENDED RELEASE ORAL at 07:59

## 2019-04-05 RX ADMIN — SODIUM CHLORIDE 100 MILLILITER(S): 9 INJECTION, SOLUTION INTRAVENOUS at 02:27

## 2019-04-05 RX ADMIN — MORPHINE SULFATE 4 MILLIGRAM(S): 50 CAPSULE, EXTENDED RELEASE ORAL at 00:03

## 2019-04-05 RX ADMIN — Medication 650 MILLIGRAM(S): at 17:15

## 2019-04-05 RX ADMIN — MORPHINE SULFATE 4 MILLIGRAM(S): 50 CAPSULE, EXTENDED RELEASE ORAL at 20:50

## 2019-04-05 RX ADMIN — MORPHINE SULFATE 2 MILLIGRAM(S): 50 CAPSULE, EXTENDED RELEASE ORAL at 10:58

## 2019-04-05 RX ADMIN — ONDANSETRON 8 MILLIGRAM(S): 8 TABLET, FILM COATED ORAL at 08:29

## 2019-04-05 RX ADMIN — MORPHINE SULFATE 4 MILLIGRAM(S): 50 CAPSULE, EXTENDED RELEASE ORAL at 04:31

## 2019-04-05 RX ADMIN — MORPHINE SULFATE 4 MILLIGRAM(S): 50 CAPSULE, EXTENDED RELEASE ORAL at 16:43

## 2019-04-05 RX ADMIN — PANTOPRAZOLE SODIUM 40 MILLIGRAM(S): 20 TABLET, DELAYED RELEASE ORAL at 04:57

## 2019-04-05 RX ADMIN — MORPHINE SULFATE 4 MILLIGRAM(S): 50 CAPSULE, EXTENDED RELEASE ORAL at 13:10

## 2019-04-05 RX ADMIN — MORPHINE SULFATE 2 MILLIGRAM(S): 50 CAPSULE, EXTENDED RELEASE ORAL at 10:28

## 2019-04-05 NOTE — CHART NOTE - NSCHARTNOTEFT_GEN_A_CORE
63F with HTN and chronic pancreatitis pw likely acute on chronic pancreatitis pain, c/b poss selam martinez tear. Pt was seen and examined at bedside this morning, resting comfortably, she is currently requiring IV morphine Q4h for pain management.       Problem/Plan - 1:  ·  Problem: Chronic pancreatitis, unspecified pancreatitis type.  Plan: CT abdomen/pelvis showing acute on chronic pancreatitis pain likely, no other acute pathology noted. Also, pts abdominal pain reminiscent of previous episodes.  - Continue IV morphine 4 mg q6h PRN for severe pain (roughly equivalent to her home dose, but she has not taken max doses at home)  - Continue zofran 8 mg IV q6h PRN for nausea and to help with PO intake.  - Clear liquids for now, can advance as tolerated.   - S/p 2L LR bolus, continue maintenance IVF due to dehydration and poor PO intake.  Continue Creon supplements 24,000U TID.    ISTOP reference # [id number].     Problem/Plan - 2:  ·  Problem: Hematemesis with nausea.  Plan: Pt with reported bleeding in emesis after a full day of vomiting and poor PO intake. Concern for Selam Martinez tear.   CBC shows stable Hgb, no anemia. Hgb 14.1.  No bloody emesis since patient presented to ED. Continue to monitor.  - GI consult emailed overnight.  - started on IV protonix 40 mg BID.      Problem/Plan - 3:  ·  Problem: HTN (hypertension).  Plan: Hold amlodipine 10 mg and metoprolol succinate  mg for now. Pt requires fluid hydration given multiple episodes of vomiting.      Problem/Plan - 4:  ·  Problem: Preventive measure.  Plan: DVT ppx: SCDs  Diet: Clear liquids, advance as tolerated 63F with HTN and chronic pancreatitis pw likely acute on chronic pancreatitis pain, c/b poss selam martinez tear. Pt was seen and examined at bedside this morning, resting comfortably, she is currently requiring IV morphine Q4h for pain management. states morphine helps control her pain well. currently her pain is 4/10, intermittent. On exam today There is no TTP, + BS, non distended.     # Chronic pancreatitis, unspecified pancreatitis type.    - Change IV morphine 4 mg to q4h PRN for severe pain   - Continue zofran 8 mg IV q6h PRN for nausea and to help with PO intake.  - Clear liquids for now, can advance as tolerated.   - S/p 2L LR bolus, continue maintenance IVF due to dehydration and poor PO intake.  Continue Creon supplements 24,000U TID.    ISTOP reference # [id number].    # Hematemesis with nausea.     Pt with reported bleeding in emesis after a full day of vomiting and poor PO intake. Concern for Selam Martinez tear.   - CBC does not show anemia, Hb stable   - GI consult   - started on IV protonix 40 mg BID.     # HTN (hypertension).    - continue to hold amlodipine 10 mg and metoprolol succinate  mg for now.  - Pt requires fluid hydration given multiple episodes of vomiting.   - BP currently well controlled     D/w medicine MONI Barba

## 2019-04-05 NOTE — H&P ADULT - NSHPREVIEWOFSYSTEMS_GEN_ALL_CORE
CONSTITUTIONAL: No weakness, fevers or chills  EYES/ENT: No visual changes; No throat pain   NECK: No pain or stiffness  RESPIRATORY: Slight cough 2/2 vomiting. No shortness of breath  CARDIOVASCULAR: No chest pain or palpitations. No leg swelling.   GASTROINTESTINAL: +Epigastric pain, +generalized abdominal pain, +Nausea and vomiting, +reported hematemesis. No diarrhea or constipation. No melena or hematochezia.  GENITOURINARY: No dysuria, frequency or hematuria  NEUROLOGICAL: No numbness or weakness  SKIN: No itching, rashes  MUSCULOSKELETAL: abd pain radiating to back; no other pains

## 2019-04-05 NOTE — H&P ADULT - PROBLEM SELECTOR PLAN 4
DVT ppx: SCDs  Diet: Clear liquids, advance as tolerated    Patt Morales MD PGY2  Medicine Night Admit Resident  Pager [phone]

## 2019-04-05 NOTE — H&P ADULT - NSICDXPASTSURGICALHX_GEN_ALL_CORE_FT
PAST SURGICAL HISTORY:  S/P arthroscopy of shoulder left in 2009    S/P arthroscopy of shoulder right - 2014    S/P hip replacement left - 2010    S/P hip replacement, right May 2016

## 2019-04-05 NOTE — H&P ADULT - PROBLEM SELECTOR PLAN 3
Hold amlodipine 10 mg and metoprolol succinate  mg for now. Pt requires fluid hydration given multiple episodes of vomiting.

## 2019-04-05 NOTE — H&P ADULT - PROBLEM SELECTOR PLAN 1
CT abdomen/pelvis showing acute on chronic pancreatitis, no other acute pathology noted. Also, pts abdominal pain reminiscent of previous episodes.  Continue IV morphine 4 mg q6h PRN (roughly equivalent to her home dose, but she has not taken max doses at home)  Continue zofran 8 mg IV q6h PRN for nausea and to help with PO intake.  Clear liquids for now, can advance as tolerated.   S/p 2L LR bolus, continue maintenance IVF due to dehydration and poor PO intake. CT abdomen/pelvis showing acute on chronic pancreatitis, no other acute pathology noted. Also, pts abdominal pain reminiscent of previous episodes.  - Continue IV morphine 4 mg q6h PRN for severe pain (roughly equivalent to her home dose, but she has not taken max doses at home)  - Continue zofran 8 mg IV q6h PRN for nausea and to help with PO intake.  - Clear liquids for now, can advance as tolerated.   - S/p 2L LR bolus, continue maintenance IVF due to dehydration and poor PO intake.  Continue Creon supplements 24,000U TID.    ISTOP reference # [id number] CT abdomen/pelvis showing acute on chronic pancreatitis pain likely, no other acute pathology noted. Also, pts abdominal pain reminiscent of previous episodes.  - Continue IV morphine 4 mg q6h PRN for severe pain (roughly equivalent to her home dose, but she has not taken max doses at home)  - Continue zofran 8 mg IV q6h PRN for nausea and to help with PO intake.  - Clear liquids for now, can advance as tolerated.   - S/p 2L LR bolus, continue maintenance IVF due to dehydration and poor PO intake.  Continue Creon supplements 24,000U TID.    ISTOP reference # [id number]

## 2019-04-05 NOTE — H&P ADULT - NSICDXPASTMEDICALHX_GEN_ALL_CORE_FT
PAST MEDICAL HISTORY:  ARDS survivor     Chronic pancreatitis     GERD (gastroesophageal reflux disease)     HTN (hypertension)     Hypokalemia     Insomnia

## 2019-04-05 NOTE — H&P ADULT - HISTORY OF PRESENT ILLNESS
63F with HTN and chronic pancreaittis presents with recurrent epigastric abdominal pain radiating to the back, along with diffuse abdominal pain, for the past 2 days, associated with nausea and multiple episodes of vomiting. 2 days ago, patient began to vomit with poor PO tolerance, vomited >7 times that day, and the following day her vomit turned dark and she reports bloody emesis. Last vomit was prior to coming to ER. Pt denies diarrhea or blood in stool. Pt states that the epigastric pain radiating to the back is typical of her pancreatitis pain. She states she was diagnosed first with pancreatitis about 5 years ago and gets flareups on a regular basis. States she has never abused alcohol, never had gallstones or hypertriglyceridemia. Pt denies eating unusual foods prior to start of abdominal pain and emesis. She cooks most of her food at home.  Pt is prescribed morphine 15 mg q6h PRN at home and states she took 2 morphine pills the day prior to admission and they did not help her pain.     IN the ED, patient afebrile, T 98.7, HR initially 134, /95, satting 97% on room air. Hgb within normal limits, 14.1, CT abdomen/pelvis showing pancreatic ductal dilatation consistent with chronic pancreatitis. Pt given 2L IV LR bolus, morphine 4 mg IV x 1, and zofran 4 mg x 1. 63F with HTN and chronic pancreatitis presents with recurrent epigastric abdominal pain radiating to the back, along with diffuse abdominal pain, for the past 2 days, associated with nausea and multiple episodes of vomiting. 2 days ago, patient began to vomit with poor PO tolerance, vomited >7 times that day, and the following day her vomit turned dark and she reports bloody emesis. Last vomit was prior to coming to ER. Pt denies diarrhea or blood in stool. Pt states that the epigastric pain radiating to the back is typical of her pancreatitis pain, which she gets every week. She states she was diagnosed first with pancreatitis about 5 years ago and gets flareups on a regular basis. States she has never abused alcohol, never had gallstones or hypertriglyceridemia. Pt denies eating unusual foods prior to start of abdominal pain and emesis. She cooks most of her food at home.  Pt is prescribed morphine 15 mg q6h PRN at home and states she took 2 morphine pills the day prior to admission and they did not help her pain.     IN the ED, patient afebrile, T 98.7, HR initially 134, /95, satting 97% on room air. Hgb within normal limits, 14.1, CT abdomen/pelvis showing pancreatic ductal dilatation consistent with chronic pancreatitis. Pt given 2L IV LR bolus, morphine 4 mg IV x 1, and zofran 4 mg x 1.

## 2019-04-05 NOTE — H&P ADULT - PROBLEM SELECTOR PLAN 2
Pt with reported bleeding in emesis after a full day of vomiting and poor PO intake.  CBC shows stable Hgb, no anemia. Hgb 14.1.  No bloody emesis since patient presented to ED. Appears to have stabilized.  Continue to monitor.  May have been in setting of frequent vomiting and esophageal irritation. Pt with reported bleeding in emesis after a full day of vomiting and poor PO intake. Concern for Carolynn Martinez tear.   CBC shows stable Hgb, no anemia. Hgb 14.1.  No bloody emesis since patient presented to ED. Continue to monitor.  - GI consult emailed overnight.  - started on IV protonix 40 mg BID

## 2019-04-05 NOTE — H&P ADULT - NSHPSOCIALHISTORY_GEN_ALL_CORE
Patient lives with her , sister lives close by as well.  Former smoker, quit earlier this year. Smoked 3-4 cigarettes daily for 10 years.  No drug use.  NO alcohol use.

## 2019-04-05 NOTE — H&P ADULT - ASSESSMENT
63F with HTN and chronic pancreatitis presenting with acute onset vomiting and generalized abdominal pain, admitted for acute on chronic pancreatitis. 63F with HTN and chronic pancreatitis pw likely acute on chronic pancreatitis pain, c/b poss selam shannon tear.

## 2019-04-05 NOTE — H&P ADULT - NSHPLABSRESULTS_GEN_ALL_CORE
04-04    141  |  97  |  14  ----------------------------<  117<H>  4.6   |  25  |  0.96    Ca    10.6<H>      04 Apr 2019 20:31    TPro  7.9  /  Alb  4.6  /  TBili  0.4  /  DBili  x   /  AST  42<H>  /  ALT  21  /  AlkPhos  79  04-04    CBC Full  -  ( 04 Apr 2019 22:06 )  WBC Count : 11.3 K/uL  RBC Count : 5.08 M/uL  Hemoglobin : 14.1 g/dL  Hematocrit : 44.9 %  Platelet Count - Automated : 361 K/uL  Mean Cell Volume : 88.4 fl  Mean Cell Hemoglobin : 27.8 pg  Mean Cell Hemoglobin Concentration : 31.4 gm/dL  Auto Neutrophil # : 7.2 K/uL  Auto Lymphocyte # : 3.0 K/uL  Auto Monocyte # : 0.9 K/uL  Auto Eosinophil # : 0.0 K/uL  Auto Basophil # : 0.1 K/uL  Auto Neutrophil % : 64.0 %  Auto Lymphocyte % : 26.7 %  Auto Monocyte % : 8.4 %  Auto Eosinophil % : 0.3 %  Auto Basophil % : 0.6 %    Lipase 11    VBG lactate 1.7    CT abdomen/pelvis 4/4/19  EXAM:  CT ABDOMEN AND PELVIS OC                           PROCEDURE DATE:  04/04/2019    INTERPRETATION:  CLINICAL INFORMATION: Abdominal pain and vomiting    COMPARISON: CT the abdomen and pelvis 11/20/2017.    PROCEDURE:   CT of the Abdomen and Pelvis was performed with intravenous contrast.   FINDINGS:    LOWER CHEST: Within normal limits.    LIVER: Within normal limits.  BILE DUCTS: Normal caliber.  GALLBLADDER: Within normal limits.  SPLEEN: Within normal limits.  PANCREAS: Redemonstration of multiple calcifications throughout the   pancreas with pancreatic ductal dilatation compatible with chronic   pancreatitis.   ADRENALS: Indeterminate 1.2 x 0.8 cm right adrenal lesion, not   significantly changed. Left adrenal gland within normal limits.  KIDNEYS/URETERS: Bilateral renal cysts and subcentimeter hypoattenuating   foci too small to characterize.    BLADDER: Limited evaluation due to streak artifact from bilateral hip   arthroplasties.  REPRODUCTIVE ORGANS: The uterus appears within normal limits. The adnexa   are difficult to evaluate due to streak artifact from bilateral hip   arthroplasties.    BOWEL: No bowel obstruction or inflammation. Appendix is normal. Small   hilar hernia. Colonic diverticulosis without evidence of diverticulitis.  PERITONEUM: No ascites.  VESSELS:  Atherosclerotic changes of the aorta and bilateral iliac   vessels.  RETROPERITONEUM: No lymphadenopathy.    ABDOMINAL WALL: Within normal limits.  BONES: Partially visualized bilateral total hip arthroplasties.   Degenerative changes of the spine.    IMPRESSION:   1. No bowel obstruction or inflammation.  2. Chronic pancreatitis.      CXR- clear lungs Personally reviewed imaging, labs, ekg.    04-04    141  |  97  |  14  ----------------------------<  117<H>  4.6   |  25  |  0.96    Ca    10.6<H>      04 Apr 2019 20:31    TPro  7.9  /  Alb  4.6  /  TBili  0.4  /  DBili  x   /  AST  42<H>  /  ALT  21  /  AlkPhos  79  04-04    CBC Full  -  ( 04 Apr 2019 22:06 )  WBC Count : 11.3 K/uL  RBC Count : 5.08 M/uL  Hemoglobin : 14.1 g/dL  Hematocrit : 44.9 %  Platelet Count - Automated : 361 K/uL  Mean Cell Volume : 88.4 fl  Mean Cell Hemoglobin : 27.8 pg  Mean Cell Hemoglobin Concentration : 31.4 gm/dL  Auto Neutrophil # : 7.2 K/uL  Auto Lymphocyte # : 3.0 K/uL  Auto Monocyte # : 0.9 K/uL  Auto Eosinophil # : 0.0 K/uL  Auto Basophil # : 0.1 K/uL  Auto Neutrophil % : 64.0 %  Auto Lymphocyte % : 26.7 %  Auto Monocyte % : 8.4 %  Auto Eosinophil % : 0.3 %  Auto Basophil % : 0.6 %    Lipase 11    VBG lactate 1.7    CT abdomen/pelvis 4/4/19  EXAM:  CT ABDOMEN AND PELVIS OC                           PROCEDURE DATE:  04/04/2019    INTERPRETATION:  CLINICAL INFORMATION: Abdominal pain and vomiting    COMPARISON: CT the abdomen and pelvis 11/20/2017.    PROCEDURE:   CT of the Abdomen and Pelvis was performed with intravenous contrast.   FINDINGS:    LOWER CHEST: Within normal limits.    LIVER: Within normal limits.  BILE DUCTS: Normal caliber.  GALLBLADDER: Within normal limits.  SPLEEN: Within normal limits.  PANCREAS: Redemonstration of multiple calcifications throughout the   pancreas with pancreatic ductal dilatation compatible with chronic   pancreatitis.   ADRENALS: Indeterminate 1.2 x 0.8 cm right adrenal lesion, not   significantly changed. Left adrenal gland within normal limits.  KIDNEYS/URETERS: Bilateral renal cysts and subcentimeter hypoattenuating   foci too small to characterize.    BLADDER: Limited evaluation due to streak artifact from bilateral hip   arthroplasties.  REPRODUCTIVE ORGANS: The uterus appears within normal limits. The adnexa   are difficult to evaluate due to streak artifact from bilateral hip   arthroplasties.    BOWEL: No bowel obstruction or inflammation. Appendix is normal. Small   hilar hernia. Colonic diverticulosis without evidence of diverticulitis.  PERITONEUM: No ascites.  VESSELS:  Atherosclerotic changes of the aorta and bilateral iliac   vessels.  RETROPERITONEUM: No lymphadenopathy.    ABDOMINAL WALL: Within normal limits.  BONES: Partially visualized bilateral total hip arthroplasties.   Degenerative changes of the spine.    IMPRESSION:   1. No bowel obstruction or inflammation.  2. Chronic pancreatitis.      CXR- clear lungs

## 2019-04-06 LAB
ANION GAP SERPL CALC-SCNC: 14 MMOL/L — SIGNIFICANT CHANGE UP (ref 5–17)
BASOPHILS # BLD AUTO: 0.03 K/UL — SIGNIFICANT CHANGE UP (ref 0–0.2)
BASOPHILS NFR BLD AUTO: 0.4 % — SIGNIFICANT CHANGE UP (ref 0–2)
BUN SERPL-MCNC: 6 MG/DL — LOW (ref 7–23)
CALCIUM SERPL-MCNC: 8.8 MG/DL — SIGNIFICANT CHANGE UP (ref 8.4–10.5)
CHLORIDE SERPL-SCNC: 105 MMOL/L — SIGNIFICANT CHANGE UP (ref 96–108)
CO2 SERPL-SCNC: 24 MMOL/L — SIGNIFICANT CHANGE UP (ref 22–31)
CREAT SERPL-MCNC: 0.58 MG/DL — SIGNIFICANT CHANGE UP (ref 0.5–1.3)
EOSINOPHIL # BLD AUTO: 0.15 K/UL — SIGNIFICANT CHANGE UP (ref 0–0.5)
EOSINOPHIL NFR BLD AUTO: 2.1 % — SIGNIFICANT CHANGE UP (ref 0–6)
GLUCOSE SERPL-MCNC: 79 MG/DL — SIGNIFICANT CHANGE UP (ref 70–99)
HCT VFR BLD CALC: 36 % — SIGNIFICANT CHANGE UP (ref 34.5–45)
HGB BLD-MCNC: 11.1 G/DL — LOW (ref 11.5–15.5)
IMM GRANULOCYTES NFR BLD AUTO: 0.6 % — SIGNIFICANT CHANGE UP (ref 0–1.5)
LYMPHOCYTES # BLD AUTO: 2.71 K/UL — SIGNIFICANT CHANGE UP (ref 1–3.3)
LYMPHOCYTES # BLD AUTO: 37.3 % — SIGNIFICANT CHANGE UP (ref 13–44)
MCHC RBC-ENTMCNC: 27.6 PG — SIGNIFICANT CHANGE UP (ref 27–34)
MCHC RBC-ENTMCNC: 30.8 GM/DL — LOW (ref 32–36)
MCV RBC AUTO: 89.6 FL — SIGNIFICANT CHANGE UP (ref 80–100)
MONOCYTES # BLD AUTO: 0.61 K/UL — SIGNIFICANT CHANGE UP (ref 0–0.9)
MONOCYTES NFR BLD AUTO: 8.4 % — SIGNIFICANT CHANGE UP (ref 2–14)
NEUTROPHILS # BLD AUTO: 3.73 K/UL — SIGNIFICANT CHANGE UP (ref 1.8–7.4)
NEUTROPHILS NFR BLD AUTO: 51.2 % — SIGNIFICANT CHANGE UP (ref 43–77)
PLATELET # BLD AUTO: 229 K/UL — SIGNIFICANT CHANGE UP (ref 150–400)
POTASSIUM SERPL-MCNC: 4 MMOL/L — SIGNIFICANT CHANGE UP (ref 3.5–5.3)
POTASSIUM SERPL-SCNC: 4 MMOL/L — SIGNIFICANT CHANGE UP (ref 3.5–5.3)
RBC # BLD: 4.02 M/UL — SIGNIFICANT CHANGE UP (ref 3.8–5.2)
RBC # FLD: 17.1 % — HIGH (ref 10.3–14.5)
SODIUM SERPL-SCNC: 143 MMOL/L — SIGNIFICANT CHANGE UP (ref 135–145)
WBC # BLD: 7.27 K/UL — SIGNIFICANT CHANGE UP (ref 3.8–10.5)
WBC # FLD AUTO: 7.27 K/UL — SIGNIFICANT CHANGE UP (ref 3.8–10.5)

## 2019-04-06 PROCEDURE — 99233 SBSQ HOSP IP/OBS HIGH 50: CPT

## 2019-04-06 RX ORDER — METOPROLOL TARTRATE 50 MG
100 TABLET ORAL DAILY
Qty: 0 | Refills: 0 | Status: DISCONTINUED | OUTPATIENT
Start: 2019-04-06 | End: 2019-04-12

## 2019-04-06 RX ORDER — SUCRALFATE 1 G
1 TABLET ORAL
Qty: 0 | Refills: 0 | Status: DISCONTINUED | OUTPATIENT
Start: 2019-04-06 | End: 2019-04-12

## 2019-04-06 RX ADMIN — MORPHINE SULFATE 4 MILLIGRAM(S): 50 CAPSULE, EXTENDED RELEASE ORAL at 13:26

## 2019-04-06 RX ADMIN — MORPHINE SULFATE 4 MILLIGRAM(S): 50 CAPSULE, EXTENDED RELEASE ORAL at 21:50

## 2019-04-06 RX ADMIN — MORPHINE SULFATE 4 MILLIGRAM(S): 50 CAPSULE, EXTENDED RELEASE ORAL at 00:43

## 2019-04-06 RX ADMIN — Medication 2 CAPSULE(S): at 12:38

## 2019-04-06 RX ADMIN — PANTOPRAZOLE SODIUM 40 MILLIGRAM(S): 20 TABLET, DELAYED RELEASE ORAL at 05:00

## 2019-04-06 RX ADMIN — MORPHINE SULFATE 4 MILLIGRAM(S): 50 CAPSULE, EXTENDED RELEASE ORAL at 04:51

## 2019-04-06 RX ADMIN — ONDANSETRON 8 MILLIGRAM(S): 8 TABLET, FILM COATED ORAL at 04:55

## 2019-04-06 RX ADMIN — MORPHINE SULFATE 4 MILLIGRAM(S): 50 CAPSULE, EXTENDED RELEASE ORAL at 05:06

## 2019-04-06 RX ADMIN — Medication 100 MILLIGRAM(S): at 12:56

## 2019-04-06 RX ADMIN — SODIUM CHLORIDE 100 MILLILITER(S): 9 INJECTION, SOLUTION INTRAVENOUS at 01:04

## 2019-04-06 RX ADMIN — ONDANSETRON 8 MILLIGRAM(S): 8 TABLET, FILM COATED ORAL at 21:32

## 2019-04-06 RX ADMIN — MORPHINE SULFATE 4 MILLIGRAM(S): 50 CAPSULE, EXTENDED RELEASE ORAL at 00:58

## 2019-04-06 RX ADMIN — Medication 2 CAPSULE(S): at 21:31

## 2019-04-06 RX ADMIN — MORPHINE SULFATE 4 MILLIGRAM(S): 50 CAPSULE, EXTENDED RELEASE ORAL at 12:56

## 2019-04-06 RX ADMIN — MORPHINE SULFATE 4 MILLIGRAM(S): 50 CAPSULE, EXTENDED RELEASE ORAL at 17:47

## 2019-04-06 RX ADMIN — PANTOPRAZOLE SODIUM 40 MILLIGRAM(S): 20 TABLET, DELAYED RELEASE ORAL at 17:36

## 2019-04-06 RX ADMIN — MORPHINE SULFATE 4 MILLIGRAM(S): 50 CAPSULE, EXTENDED RELEASE ORAL at 08:49

## 2019-04-06 RX ADMIN — SODIUM CHLORIDE 100 MILLILITER(S): 9 INJECTION, SOLUTION INTRAVENOUS at 12:56

## 2019-04-06 RX ADMIN — Medication 2 CAPSULE(S): at 08:46

## 2019-04-06 RX ADMIN — MORPHINE SULFATE 4 MILLIGRAM(S): 50 CAPSULE, EXTENDED RELEASE ORAL at 21:25

## 2019-04-06 RX ADMIN — MORPHINE SULFATE 4 MILLIGRAM(S): 50 CAPSULE, EXTENDED RELEASE ORAL at 17:17

## 2019-04-06 RX ADMIN — Medication 1 GRAM(S): at 17:36

## 2019-04-06 RX ADMIN — MORPHINE SULFATE 4 MILLIGRAM(S): 50 CAPSULE, EXTENDED RELEASE ORAL at 09:10

## 2019-04-06 NOTE — PROGRESS NOTE ADULT - ASSESSMENT
63F with HTN and chronic pancreatitis pw likely acute on chronic pancreatitis pain, c/b poss selam shannon tear.

## 2019-04-06 NOTE — PROGRESS NOTE ADULT - PROBLEM SELECTOR PLAN 4
DVT ppx: SCDs  Diet: Clear liquids, advance as tolerated    Patt Morales MD PGY2  Medicine Night Admit Resident  Pager [phone]

## 2019-04-06 NOTE — PROGRESS NOTE ADULT - PROBLEM SELECTOR PLAN 3
BP meds on hold yesterday due to dehydration n pt requiring IVF  BP noted to be elevated will start home metoprolol  mg today  - if BP remains uncontrolled will restart pt's home med amlodipine 10 mg daily.

## 2019-04-06 NOTE — PROGRESS NOTE ADULT - PROBLEM SELECTOR PLAN 2
Pt with reported bleeding in emesis after a full day of vomiting and poor PO intake. Concern for Carolynn Martinez tear.   CBC shows stable Hgb, no anemia. Hgb 12.  No bloody emesis since patient presented to ED. Continue to monitor.  GI consult called   c/w  protonix 40 mg BID

## 2019-04-06 NOTE — PROGRESS NOTE ADULT - SUBJECTIVE AND OBJECTIVE BOX
Patient is a 63y old  Female who presents with a chief complaint of abdominal pain (05 Apr 2019 03:29)      SUBJECTIVE / OVERNIGHT EVENTS: Patient seen and examined at bedside. States she still is requiring IV pain meds to help with abdominal pain thinks the pian is improving, however her nausea is resolved, reports she would like to eat soft food as she is hungry. Denies any further episodes of hematemesis. Denies any CP, SOB, n/v and dizziness      ROS:  All other review of systems negative    Allergies    diazepam (Other)    Intolerances        MEDICATIONS  (STANDING):  lactated ringers. 1000 milliLiter(s) (100 mL/Hr) IV Continuous <Continuous>  pancrelipase  (CREON 12,000 Lipase Units) 2 Capsule(s) Oral three times a day  pantoprazole  Injectable 40 milliGRAM(s) IV Push every 12 hours    MEDICATIONS  (PRN):  acetaminophen   Tablet .. 650 milliGRAM(s) Oral every 6 hours PRN Mild Pain (1 - 3), Moderate Pain (4 - 6)  morphine  - Injectable 4 milliGRAM(s) IV Push every 4 hours PRN Severe Pain (7 - 10)  ondansetron Injectable 8 milliGRAM(s) IV Push every 6 hours PRN Nausea and/or Vomiting      Vital Signs Last 24 Hrs  T(C): 37 (06 Apr 2019 07:24), Max: 37.3 (05 Apr 2019 15:42)  T(F): 98.6 (06 Apr 2019 07:24), Max: 99.2 (05 Apr 2019 15:42)  HR: 72 (06 Apr 2019 07:24) (72 - 96)  BP: 158/90 (06 Apr 2019 07:24) (125/82 - 185/100)  BP(mean): --  RR: 18 (06 Apr 2019 07:24) (18 - 20)  SpO2: 96% (06 Apr 2019 07:24) (94% - 97%)  CAPILLARY BLOOD GLUCOSE        I&O's Summary    05 Apr 2019 07:01  -  06 Apr 2019 07:00  --------------------------------------------------------  IN: 1960 mL / OUT: 0 mL / NET: 1960 mL    06 Apr 2019 07:01  -  06 Apr 2019 10:30  --------------------------------------------------------  IN: 360 mL / OUT: 0 mL / NET: 360 mL        PHYSICAL EXAM:  GENERAL: NAD, well-developed  HEAD:  Atraumatic, Normocephalic  EYES: EOMI, PERRLA, conjunctiva and sclera clear  CHEST/LUNG: Clear to auscultation bilaterally; No wheeze  HEART: Regular rate and rhythm; No murmurs, rubs, or gallops  ABDOMEN: Soft, mild TTP epigastric region, Nondistended; Bowel sounds present,   EXTREMITIES:  2+ Peripheral Pulses, No clubbing, cyanosis, or edema  NEUROLOGY: AAOx3, non-focal  SKIN: No rashes or lesions    LABS:                        12.1   9.71  )-----------( 215      ( 05 Apr 2019 09:01 )             39.4     04-06    143  |  105  |  6<L>  ----------------------------<  79  4.0   |  24  |  0.58    Ca    8.8      06 Apr 2019 07:12  Phos  2.7     04-05  Mg     1.8     04-05    TPro  7.9  /  Alb  4.6  /  TBili  0.4  /  DBili  x   /  AST  42<H>  /  ALT  21  /  AlkPhos  79  04-04    PT/INR - ( 05 Apr 2019 08:49 )   PT: 11.9 sec;   INR: 1.05 ratio         PTT - ( 05 Apr 2019 08:49 )  PTT:28.9 sec          RADIOLOGY & ADDITIONAL TESTS:      Care Discussed with Consultants/Other Providers: Medicine MONI Shields

## 2019-04-06 NOTE — CONSULT NOTE ADULT - SUBJECTIVE AND OBJECTIVE BOX
Chief Complaint:  Patient is a 63y old  Female who presents with a chief complaint of abdominal pain 63F with HTN and chronic pancreatitis presents with recurrent epigastric abdominal pain radiating to the back, along with diffuse abdominal pain, for the past 2 days, associated with nausea and multiple episodes of vomiting. 2 days ago, patient began to vomit with poor PO tolerance, vomited >7 times that day, and the following day her vomit turned dark and she reports bloody emesis. Last vomit was prior to coming to ER. Pt denies diarrhea or blood in stool. Pt states that the epigastric pain radiating to the back is typical of her pancreatitis pain, which she gets every week. She states she was diagnosed first with pancreatitis about 5 years ago and gets flareups on a regular basis. States she has never abused alcohol, never had gallstones or hypertriglyceridemia. Pt denies eating unusual foods prior to start of abdominal pain and emesis. She cooks most of her food at home.  Pt is prescribed morphine 15 mg q6h PRN at home and states she took 2 morphine pills the day prior to admission and they did not help her pain.     IN the ED, patient afebrile, T 98.7, HR initially 134, /95, satting 97% on room air. Hgb within normal limits, 14.1, CT abdomen/pelvis showing pancreatic ductal dilatation consistent with chronic pancreatitis. Pt given 2L IV LR bolus, morphine 4 mg IV x 1, and zofran 4 mg x 1.      Review of Systems:  Review of Systems: CONSTITUTIONAL: No weakness, fevers or chills  EYES/ENT: No visual changes; No throat pain   NECK: No pain or stiffness  RESPIRATORY: Slight cough 2/2 vomiting. No shortness of breath  CARDIOVASCULAR: No chest pain or palpitations. No leg swelling.   GASTROINTESTINAL: +Epigastric pain, +generalized abdominal pain, +Nausea and vomiting, +reported hematemesis. No diarrhea or constipation. No melena or hematochezia.  GENITOURINARY: No dysuria, frequency or hematuria  NEUROLOGICAL: No numbness or weakness  SKIN: No itching, rashes MUSCULOSKELETAL: abd pain radiating to back; no other pains	  now with some vomiting '? melena vs hematemesis    Allergies:  diazepam (Other)      Medications:  acetaminophen   Tablet .. 650 milliGRAM(s) Oral every 6 hours PRN  lactated ringers. 1000 milliLiter(s) IV Continuous <Continuous>  metoprolol succinate  milliGRAM(s) Oral daily  morphine  - Injectable 4 milliGRAM(s) IV Push every 4 hours PRN  ondansetron Injectable 8 milliGRAM(s) IV Push every 6 hours PRN  pancrelipase  (CREON 12,000 Lipase Units) 2 Capsule(s) Oral three times a day  pantoprazole  Injectable 40 milliGRAM(s) IV Push every 12 hours      PMHX/PSHX:  Insomnia  ARDS survivor  Chronic pancreatitis  Hypokalemia  GERD (gastroesophageal reflux disease)  HTN (hypertension)  S/P hip replacement, right  S/P arthroscopy of shoulder  S/P hip replacement  S/P arthroscopy of shoulder      Family history:  Family history of alcohol abuse  No pertinent family history in first degree relatives      Social History:     ROS:     General:  No wt loss, fevers, chills, night sweats, fatigue,   Eyes:  Good vision, no reported pain  ENT:  No sore throat, pain, runny nose, dysphagia  CV:  No pain, palpitations, hypo/hypertension  Resp:  No dyspnea, cough, tachypnea, wheezing  GI:  No pain, No nausea, No vomiting, No diarrhea, No constipation, No weight loss, No fever, No pruritis, No rectal bleeding, No tarry stools, No dysphagia,  :  No pain, bleeding, incontinence, nocturia  Muscle:  No pain, weakness  Neuro:  No weakness, tingling, memory problems  Psych:  No fatigue, insomnia, mood problems, depression  Endocrine:  No polyuria, polydipsia, cold/heat intolerance  Heme:  No petechiae, ecchymosis, easy bruisability  Skin:  No rash, tattoos, scars, edema      PHYSICAL EXAM:   Vital Signs:  Vital Signs Last 24 Hrs  T(C): 36.7 (06 Apr 2019 12:44), Max: 37.2 (05 Apr 2019 16:35)  T(F): 98 (06 Apr 2019 12:44), Max: 98.9 (05 Apr 2019 16:35)  HR: 87 (06 Apr 2019 12:44) (72 - 95)  BP: 159/93 (06 Apr 2019 12:44) (137/95 - 185/100)  BP(mean): --  RR: 18 (06 Apr 2019 12:44) (18 - 20)  SpO2: 87% (06 Apr 2019 12:44) (87% - 97%)  Daily     Daily     GENERAL:  Appears stated age, well-groomed, well-nourished, no distress  HEENT:  NC/AT,  conjunctivae clear and pink, no thyromegaly, nodules, adenopathy, no JVD, sclera -anicteric  CHEST:  Full & symmetric excursion, no increased effort, breath sounds clear  HEART:  Regular rhythm, S1, S2, no murmur/rub/S3/S4, no abdominal bruit, no edema  ABDOMEN:  Soft, non-tender, non-distended, normoactive bowel sounds,  no masses ,no hepato-splenomegaly, no signs of chronic liver disease  EXTEREMITIES:  no cyanosis,clubbing or edema  SKIN:  No rash/erythema/ecchymoses/petechiae/wounds/abscess/warm/dry  NEURO:  Alert, oriented, no asterixis, no tremor, no encephalopathy    LABS:                        11.1   7.27  )-----------( 229      ( 06 Apr 2019 11:27 )             36.0     04-06    143  |  105  |  6<L>  ----------------------------<  79  4.0   |  24  |  0.58    Ca    8.8      06 Apr 2019 07:12  Phos  2.7     04-05  Mg     1.8     04-05    TPro  7.9  /  Alb  4.6  /  TBili  0.4  /  DBili  x   /  AST  42<H>  /  ALT  21  /  AlkPhos  79  04-04    LIVER FUNCTIONS - ( 04 Apr 2019 20:31 )  Alb: 4.6 g/dL / Pro: 7.9 g/dL / ALK PHOS: 79 U/L / ALT: 21 U/L / AST: 42 U/L / GGT: x           PT/INR - ( 05 Apr 2019 08:49 )   PT: 11.9 sec;   INR: 1.05 ratio         PTT - ( 05 Apr 2019 08:49 )  PTT:28.9 sec        Imaging: Chief Complaint:  Patient is a 63y old  Female who presents with a chief complaint of abdominal pain 63F with HTN and chronic pancreatitis presents with recurrent epigastric abdominal pain radiating to the back, along with diffuse abdominal pain, for the past 2 days, associated with nausea and multiple episodes of vomiting. 2 days ago, patient began to vomit with poor PO tolerance, vomited >7 times that day, and the following day her vomit turned dark and she reports bloody emesis. Last vomit was prior to coming to ER. Pt denies diarrhea or blood in stool. Pt states that the epigastric pain radiating to the back is typical of her pancreatitis pain, which she gets every week. She states she was diagnosed first with pancreatitis about 5 years ago and gets flareups on a regular basis. States she has never abused alcohol, never had gallstones or hypertriglyceridemia. Pt denies eating unusual foods prior to start of abdominal pain and emesis. She cooks most of her food at home.  Pt is prescribed morphine 15 mg q6h PRN at home and states she took 2 morphine pills the day prior to admission and they did not help her pain.     IN the ED, patient afebrile, T 98.7, HR initially 134, /95, satting 97% on room air. Hgb within normal limits, 14.1, CT abdomen/pelvis showing pancreatic ductal dilatation consistent with chronic pancreatitis. Pt given 2L IV LR bolus, morphine 4 mg IV x 1, and zofran 4 mg x 1.      Review of Systems:  Review of Systems: CONSTITUTIONAL: No weakness, fevers or chills  EYES/ENT: No visual changes; No throat pain   NECK: No pain or stiffness  RESPIRATORY: Slight cough 2/2 vomiting. No shortness of breath  CARDIOVASCULAR: No chest pain or palpitations. No leg swelling.   GASTROINTESTINAL: +Epigastric pain, +generalized abdominal pain, +Nausea and vomiting, +reported hematemesis. No diarrhea or constipation. No melena or hematochezia.  GENITOURINARY: No dysuria, frequency or hematuria  NEUROLOGICAL: No numbness or weakness  SKIN: No itching, rashes MUSCULOSKELETAL: abd pain radiating to back; no other pains	  now with some vomiting '? melena vs hematemesis    Allergies:  diazepam (Other)      Medications:  acetaminophen   Tablet .. 650 milliGRAM(s) Oral every 6 hours PRN  lactated ringers. 1000 milliLiter(s) IV Continuous <Continuous>  metoprolol succinate  milliGRAM(s) Oral daily  morphine  - Injectable 4 milliGRAM(s) IV Push every 4 hours PRN  ondansetron Injectable 8 milliGRAM(s) IV Push every 6 hours PRN  pancrelipase  (CREON 12,000 Lipase Units) 2 Capsule(s) Oral three times a day  pantoprazole  Injectable 40 milliGRAM(s) IV Push every 12 hours      PMHX/PSHX:  Insomnia  ARDS survivor  Chronic pancreatitis  Hypokalemia  GERD (gastroesophageal reflux disease)  HTN (hypertension)  S/P hip replacement, right  S/P arthroscopy of shoulder  S/P hip replacement  S/P arthroscopy of shoulder      Family history:  Family history of alcohol abuse  No pertinent family history in first degree relatives      Social History: no etoh no cigs no ivda lives at home    ROS:     General:  No wt loss, fevers, chills, night sweats, fatigue,   Eyes:  Good vision, no reported pain  ENT:  No sore throat, pain, runny nose, dysphagia  CV:  No pain, palpitations, hypo/hypertension  Resp:  No dyspnea, cough, tachypnea, wheezing  GI:  No pain, No nausea, No vomiting, No diarrhea, No constipation, No weight loss, No fever, No pruritis, No rectal bleeding, No tarry stools, No dysphagia,  :  No pain, bleeding, incontinence, nocturia  Muscle:  No pain, weakness  Neuro:  No weakness, tingling, memory problems  Psych:  No fatigue, insomnia, mood problems, depression  Endocrine:  No polyuria, polydipsia, cold/heat intolerance  Heme:  No petechiae, ecchymosis, easy bruisability  Skin:  No rash, tattoos, scars, edema      PHYSICAL EXAM:   Vital Signs:  Vital Signs Last 24 Hrs  T(C): 36.7 (06 Apr 2019 12:44), Max: 37.2 (05 Apr 2019 16:35)  T(F): 98 (06 Apr 2019 12:44), Max: 98.9 (05 Apr 2019 16:35)  HR: 87 (06 Apr 2019 12:44) (72 - 95)  BP: 159/93 (06 Apr 2019 12:44) (137/95 - 185/100)  BP(mean): --  RR: 18 (06 Apr 2019 12:44) (18 - 20)  SpO2: 87% (06 Apr 2019 12:44) (87% - 97%)  Daily     Daily     GENERAL:  Appears stated age, well-groomed, well-nourished, no distress  HEENT:  NC/AT,  conjunctivae clear and pink, no thyromegaly, nodules, adenopathy, no JVD, sclera -anicteric  CHEST:  Full & symmetric excursion, no increased effort, breath sounds clear  HEART:  Regular rhythm, S1, S2, no murmur/rub/S3/S4, no abdominal bruit, no edema  ABDOMEN:  Soft, non-tender, non-distended, normoactive bowel sounds,  no masses ,no hepato-splenomegaly, no signs of chronic liver disease  EXTEREMITIES:  no cyanosis,clubbing or edema  SKIN:  No rash/erythema/ecchymoses/petechiae/wounds/abscess/warm/dry  NEURO:  Alert, oriented, no asterixis, no tremor, no encephalopathy    LABS:                        11.1   7.27  )-----------( 229      ( 06 Apr 2019 11:27 )             36.0     04-06    143  |  105  |  6<L>  ----------------------------<  79  4.0   |  24  |  0.58    Ca    8.8      06 Apr 2019 07:12  Phos  2.7     04-05  Mg     1.8     04-05    TPro  7.9  /  Alb  4.6  /  TBili  0.4  /  DBili  x   /  AST  42<H>  /  ALT  21  /  AlkPhos  79  04-04    LIVER FUNCTIONS - ( 04 Apr 2019 20:31 )  Alb: 4.6 g/dL / Pro: 7.9 g/dL / ALK PHOS: 79 U/L / ALT: 21 U/L / AST: 42 U/L / GGT: x           PT/INR - ( 05 Apr 2019 08:49 )   PT: 11.9 sec;   INR: 1.05 ratio         PTT - ( 05 Apr 2019 08:49 )  PTT:28.9 sec        Imaging:

## 2019-04-07 LAB
AMYLASE P1 CFR SERPL: 43 U/L — SIGNIFICANT CHANGE UP (ref 25–125)
ANION GAP SERPL CALC-SCNC: 12 MMOL/L — SIGNIFICANT CHANGE UP (ref 5–17)
BUN SERPL-MCNC: 5 MG/DL — LOW (ref 7–23)
CALCIUM SERPL-MCNC: 9 MG/DL — SIGNIFICANT CHANGE UP (ref 8.4–10.5)
CANCER AG19-9 SERPL-ACNC: 11 U/ML — SIGNIFICANT CHANGE UP
CEA SERPL-MCNC: 4 NG/ML — HIGH (ref 0–3.8)
CHLORIDE SERPL-SCNC: 106 MMOL/L — SIGNIFICANT CHANGE UP (ref 96–108)
CHOLEST SERPL-MCNC: 178 MG/DL — SIGNIFICANT CHANGE UP (ref 10–199)
CO2 SERPL-SCNC: 26 MMOL/L — SIGNIFICANT CHANGE UP (ref 22–31)
CREAT SERPL-MCNC: 0.57 MG/DL — SIGNIFICANT CHANGE UP (ref 0.5–1.3)
GLUCOSE SERPL-MCNC: 94 MG/DL — SIGNIFICANT CHANGE UP (ref 70–99)
HCT VFR BLD CALC: 35.8 % — SIGNIFICANT CHANGE UP (ref 34.5–45)
HGB BLD-MCNC: 11 G/DL — LOW (ref 11.5–15.5)
LIDOCAIN IGE QN: 6 U/L — LOW (ref 7–60)
MCHC RBC-ENTMCNC: 27.3 PG — SIGNIFICANT CHANGE UP (ref 27–34)
MCHC RBC-ENTMCNC: 30.7 GM/DL — LOW (ref 32–36)
MCV RBC AUTO: 88.8 FL — SIGNIFICANT CHANGE UP (ref 80–100)
PLATELET # BLD AUTO: 208 K/UL — SIGNIFICANT CHANGE UP (ref 150–400)
POTASSIUM SERPL-MCNC: 4 MMOL/L — SIGNIFICANT CHANGE UP (ref 3.5–5.3)
POTASSIUM SERPL-SCNC: 4 MMOL/L — SIGNIFICANT CHANGE UP (ref 3.5–5.3)
RBC # BLD: 4.03 M/UL — SIGNIFICANT CHANGE UP (ref 3.8–5.2)
RBC # FLD: 16.6 % — HIGH (ref 10.3–14.5)
SODIUM SERPL-SCNC: 144 MMOL/L — SIGNIFICANT CHANGE UP (ref 135–145)
TRIGL SERPL-MCNC: 108 MG/DL — SIGNIFICANT CHANGE UP (ref 10–149)
TSH SERPL-MCNC: 2.26 UIU/ML — SIGNIFICANT CHANGE UP (ref 0.27–4.2)
WBC # BLD: 6.78 K/UL — SIGNIFICANT CHANGE UP (ref 3.8–10.5)
WBC # FLD AUTO: 6.78 K/UL — SIGNIFICANT CHANGE UP (ref 3.8–10.5)

## 2019-04-07 PROCEDURE — 74181 MRI ABDOMEN W/O CONTRAST: CPT | Mod: 26

## 2019-04-07 PROCEDURE — 99233 SBSQ HOSP IP/OBS HIGH 50: CPT

## 2019-04-07 RX ORDER — MORPHINE SULFATE 50 MG/1
4 CAPSULE, EXTENDED RELEASE ORAL
Qty: 0 | Refills: 0 | Status: DISCONTINUED | OUTPATIENT
Start: 2019-04-07 | End: 2019-04-12

## 2019-04-07 RX ORDER — MORPHINE SULFATE 50 MG/1
1 CAPSULE, EXTENDED RELEASE ORAL EVERY 8 HOURS
Qty: 0 | Refills: 0 | Status: DISCONTINUED | OUTPATIENT
Start: 2019-04-07 | End: 2019-04-12

## 2019-04-07 RX ORDER — TRAMADOL HYDROCHLORIDE 50 MG/1
25 TABLET ORAL
Qty: 0 | Refills: 0 | Status: DISCONTINUED | OUTPATIENT
Start: 2019-04-07 | End: 2019-04-12

## 2019-04-07 RX ORDER — AMLODIPINE BESYLATE 2.5 MG/1
10 TABLET ORAL DAILY
Qty: 0 | Refills: 0 | Status: DISCONTINUED | OUTPATIENT
Start: 2019-04-07 | End: 2019-04-12

## 2019-04-07 RX ADMIN — MORPHINE SULFATE 4 MILLIGRAM(S): 50 CAPSULE, EXTENDED RELEASE ORAL at 20:24

## 2019-04-07 RX ADMIN — MORPHINE SULFATE 4 MILLIGRAM(S): 50 CAPSULE, EXTENDED RELEASE ORAL at 10:14

## 2019-04-07 RX ADMIN — MORPHINE SULFATE 4 MILLIGRAM(S): 50 CAPSULE, EXTENDED RELEASE ORAL at 02:03

## 2019-04-07 RX ADMIN — PANTOPRAZOLE SODIUM 40 MILLIGRAM(S): 20 TABLET, DELAYED RELEASE ORAL at 05:50

## 2019-04-07 RX ADMIN — MORPHINE SULFATE 4 MILLIGRAM(S): 50 CAPSULE, EXTENDED RELEASE ORAL at 17:46

## 2019-04-07 RX ADMIN — Medication 650 MILLIGRAM(S): at 23:15

## 2019-04-07 RX ADMIN — MORPHINE SULFATE 4 MILLIGRAM(S): 50 CAPSULE, EXTENDED RELEASE ORAL at 09:59

## 2019-04-07 RX ADMIN — MORPHINE SULFATE 4 MILLIGRAM(S): 50 CAPSULE, EXTENDED RELEASE ORAL at 17:16

## 2019-04-07 RX ADMIN — MORPHINE SULFATE 4 MILLIGRAM(S): 50 CAPSULE, EXTENDED RELEASE ORAL at 01:33

## 2019-04-07 RX ADMIN — MORPHINE SULFATE 4 MILLIGRAM(S): 50 CAPSULE, EXTENDED RELEASE ORAL at 06:20

## 2019-04-07 RX ADMIN — MORPHINE SULFATE 4 MILLIGRAM(S): 50 CAPSULE, EXTENDED RELEASE ORAL at 23:31

## 2019-04-07 RX ADMIN — Medication 2 CAPSULE(S): at 05:50

## 2019-04-07 RX ADMIN — PANTOPRAZOLE SODIUM 40 MILLIGRAM(S): 20 TABLET, DELAYED RELEASE ORAL at 17:09

## 2019-04-07 RX ADMIN — Medication 1 GRAM(S): at 05:50

## 2019-04-07 RX ADMIN — ONDANSETRON 8 MILLIGRAM(S): 8 TABLET, FILM COATED ORAL at 04:04

## 2019-04-07 RX ADMIN — ONDANSETRON 8 MILLIGRAM(S): 8 TABLET, FILM COATED ORAL at 20:24

## 2019-04-07 RX ADMIN — Medication 2 CAPSULE(S): at 17:08

## 2019-04-07 RX ADMIN — MORPHINE SULFATE 4 MILLIGRAM(S): 50 CAPSULE, EXTENDED RELEASE ORAL at 05:50

## 2019-04-07 RX ADMIN — MORPHINE SULFATE 4 MILLIGRAM(S): 50 CAPSULE, EXTENDED RELEASE ORAL at 14:00

## 2019-04-07 RX ADMIN — SODIUM CHLORIDE 100 MILLILITER(S): 9 INJECTION, SOLUTION INTRAVENOUS at 23:10

## 2019-04-07 RX ADMIN — Medication 1 GRAM(S): at 17:08

## 2019-04-07 RX ADMIN — MORPHINE SULFATE 4 MILLIGRAM(S): 50 CAPSULE, EXTENDED RELEASE ORAL at 21:00

## 2019-04-07 RX ADMIN — AMLODIPINE BESYLATE 10 MILLIGRAM(S): 2.5 TABLET ORAL at 17:07

## 2019-04-07 RX ADMIN — MORPHINE SULFATE 4 MILLIGRAM(S): 50 CAPSULE, EXTENDED RELEASE ORAL at 13:45

## 2019-04-07 RX ADMIN — Medication 100 MILLIGRAM(S): at 05:50

## 2019-04-07 RX ADMIN — Medication 650 MILLIGRAM(S): at 23:50

## 2019-04-07 NOTE — PROGRESS NOTE ADULT - PROBLEM SELECTOR PLAN 2
Pt with reported bleeding in emesis after a full day of vomiting and poor PO intake. Concern for Carolynn Martinez tear. Now resolved.  continue monitoring cbc/hb. Transfuse for hb < 7  No bloody emesis since patient presented to ED. Continue to monitor.  GI consult called- no plans for scope however will f/u MRCP   c/w  protonix 40 mg BID

## 2019-04-07 NOTE — PROGRESS NOTE ADULT - SUBJECTIVE AND OBJECTIVE BOX
INTERVAL HPI/OVERNIGHT EVENTS:  No new overnight event.  No N/V/D.  Tolerating diet clears but still with pain no hx of etoh abuse    Allergies    diazepam (Other)    Intolerances    General:  No wt loss, fevers, chills, night sweats, fatigue,   Eyes:  Good vision, no reported pain  ENT:  No sore throat, pain, runny nose, dysphagia  CV:  No pain, palpitations, hypo/hypertension  Resp:  No dyspnea, cough, tachypnea, wheezing  GI:  No pain, No nausea, No vomiting, No diarrhea, No constipation, No weight loss, No fever, No pruritis, No rectal bleeding, No tarry stools, No dysphagia,  :  No pain, bleeding, incontinence, nocturia  Muscle:  No pain, weakness  Neuro:  No weakness, tingling, memory problems  Psych:  No fatigue, insomnia, mood problems, depression  Endocrine:  No polyuria, polydipsia, cold/heat intolerance  Heme:  No petechiae, ecchymosis, easy bruisability  Skin:  No rash, tattoos, scars, edema      PHYSICAL EXAM:   Vital Signs:  Vital Signs Last 24 Hrs  T(C): 36.6 (07 Apr 2019 08:00), Max: 37.2 (07 Apr 2019 05:44)  T(F): 97.8 (07 Apr 2019 08:00), Max: 99 (07 Apr 2019 05:44)  HR: 68 (07 Apr 2019 08:00) (68 - 86)  BP: 155/88 (07 Apr 2019 08:00) (136/79 - 191/90)  BP(mean): --  RR: 18 (07 Apr 2019 08:00) (18 - 18)  SpO2: 96% (07 Apr 2019 08:00) (95% - 97%)  Daily     Daily I&O's Summary    06 Apr 2019 07:01  -  07 Apr 2019 07:00  --------------------------------------------------------  IN: 3340 mL / OUT: 0 mL / NET: 3340 mL    07 Apr 2019 07:01  -  07 Apr 2019 15:51  --------------------------------------------------------  IN: 0 mL / OUT: 0 mL / NET: 0 mL        GENERAL:  Appears stated age, well-groomed, well-nourished, no distress  HEENT:  NC/AT,  conjunctivae clear and pink, no thyromegaly, nodules, adenopathy, no JVD, sclera -anicteric  CHEST:  Full & symmetric excursion, no increased effort, breath sounds clear  HEART:  Regular rhythm, S1, S2, no murmur/rub/S3/S4, no abdominal bruit, no edema  ABDOMEN:  Soft, non-tender, non-distended, normoactive bowel sounds,  no masses ,no hepato-splenomegaly, no signs of chronic liver disease  EXTEREMITIES:  no cyanosis,clubbing or edema  SKIN:  No rash/erythema/ecchymoses/petechiae/wounds/abscess/warm/dry  NEURO:  Alert, oriented, no asterixis, no tremor, no encephalopathy      LABS:                        11.0   6.78  )-----------( 208      ( 07 Apr 2019 08:56 )             35.8     04-07    144  |  106  |  5<L>  ----------------------------<  94  4.0   |  26  |  0.57    Ca    9.0      07 Apr 2019 06:56          amylaseAmylase, Serum Total: 43 U/L (04-07 @ 06:56)     lipaseLipase, Serum: 6 U/L (04-07 @ 06:56)  Lipase, Serum: 11 U/L (04-04 @ 20:31)    RADIOLOGY & ADDITIONAL TESTS:

## 2019-04-07 NOTE — PROGRESS NOTE ADULT - ASSESSMENT
chronic/acute pancreatitis    may need eus to be done if workup neg  pain management consultation  add tramadol po  to follow up mrcp results    check imaging us/ct scan  in and out  ivf aggressively  ppi once a day    serial lft and amylaee and lipase and r/o other causes of pancreatitis autoimmune  surgical consultation  may need ercp pending results of mrcp

## 2019-04-07 NOTE — PROGRESS NOTE ADULT - SUBJECTIVE AND OBJECTIVE BOX
Hospitalist- Joseph Callaway MD  Pager: [phone]  If no response or off-hours, page [phone]  -------------------------------------  Patient is a 63y old  Female who presents with a chief complaint of abdominal pain (06 Apr 2019 16:06)  Subjective: No acute events overnight, although pt states pain is not controlled- morphine dose feels right, however analgesia wears off usually after 3 hours. States pain is localized mainly to deep epigastrum/back region and can generally not be reproduced on palpation due to its location. No fevers/chills. No further vomiting episodes.     14 point ros otherwise negatiave.     VITAL SIGNS:  Vital Signs Last 24 Hrs  T(C): 36.6 (07 Apr 2019 08:00), Max: 37.2 (07 Apr 2019 05:44)  T(F): 97.8 (07 Apr 2019 08:00), Max: 99 (07 Apr 2019 05:44)  HR: 68 (07 Apr 2019 08:00) (68 - 86)  BP: 155/88 (07 Apr 2019 08:00) (136/79 - 191/90)  BP(mean): --  RR: 18 (07 Apr 2019 08:00) (18 - 18)  SpO2: 96% (07 Apr 2019 08:00) (95% - 97%)      PHYSICAL EXAM:     GENERAL: no acute distress  HEENT: PERRLA, EOMI, moist oropharynx   RESPIRATORY: CTAB, no w/c   CARDIOVASCULAR: RRR, no murmurs, gallops, rubs  ABDOMINAL: soft, non-tender, non-distended, positive bowel sounds   EXTREMITIES: no clubbing, cyanosis, or edema  NEUROLOGICAL: alert and oriented x 3, non-focal  SKIN: no rashes or lesions   MUSCULOSKELETAL: no gross joint deformity                          11.0   6.78  )-----------( 208      ( 07 Apr 2019 08:56 )             35.8     04-07    144  |  106  |  5<L>  ----------------------------<  94  4.0   |  26  |  0.57    Ca    9.0      07 Apr 2019 06:56        CAPILLARY BLOOD GLUCOSE          MEDICATIONS  (STANDING):  lactated ringers. 1000 milliLiter(s) (100 mL/Hr) IV Continuous <Continuous>  metoprolol succinate  milliGRAM(s) Oral daily  pancrelipase  (CREON 12,000 Lipase Units) 2 Capsule(s) Oral three times a day  pantoprazole  Injectable 40 milliGRAM(s) IV Push every 12 hours  sucralfate suspension 1 Gram(s) Oral two times a day    MEDICATIONS  (PRN):  acetaminophen   Tablet .. 650 milliGRAM(s) Oral every 6 hours PRN Mild Pain (1 - 3), Moderate Pain (4 - 6)  morphine  - Injectable 4 milliGRAM(s) IV Push every 3 hours PRN Severe Pain (7 - 10)  morphine  - Injectable 1 milliGRAM(s) IV Push every 8 hours PRN breakthrough pain  ondansetron Injectable 8 milliGRAM(s) IV Push every 6 hours PRN Nausea and/or Vomiting

## 2019-04-08 LAB
AMYLASE P1 CFR SERPL: 44 U/L — SIGNIFICANT CHANGE UP (ref 25–125)
LIDOCAIN IGE QN: 6 U/L — LOW (ref 7–60)

## 2019-04-08 PROCEDURE — 99233 SBSQ HOSP IP/OBS HIGH 50: CPT

## 2019-04-08 RX ADMIN — MORPHINE SULFATE 4 MILLIGRAM(S): 50 CAPSULE, EXTENDED RELEASE ORAL at 12:29

## 2019-04-08 RX ADMIN — Medication 2 CAPSULE(S): at 13:43

## 2019-04-08 RX ADMIN — MORPHINE SULFATE 4 MILLIGRAM(S): 50 CAPSULE, EXTENDED RELEASE ORAL at 12:45

## 2019-04-08 RX ADMIN — MORPHINE SULFATE 4 MILLIGRAM(S): 50 CAPSULE, EXTENDED RELEASE ORAL at 03:03

## 2019-04-08 RX ADMIN — Medication 2 CAPSULE(S): at 08:19

## 2019-04-08 RX ADMIN — Medication 650 MILLIGRAM(S): at 16:15

## 2019-04-08 RX ADMIN — Medication 650 MILLIGRAM(S): at 15:44

## 2019-04-08 RX ADMIN — MORPHINE SULFATE 4 MILLIGRAM(S): 50 CAPSULE, EXTENDED RELEASE ORAL at 19:50

## 2019-04-08 RX ADMIN — AMLODIPINE BESYLATE 10 MILLIGRAM(S): 2.5 TABLET ORAL at 06:23

## 2019-04-08 RX ADMIN — MORPHINE SULFATE 4 MILLIGRAM(S): 50 CAPSULE, EXTENDED RELEASE ORAL at 06:23

## 2019-04-08 RX ADMIN — MORPHINE SULFATE 4 MILLIGRAM(S): 50 CAPSULE, EXTENDED RELEASE ORAL at 16:00

## 2019-04-08 RX ADMIN — Medication 100 MILLIGRAM(S): at 06:23

## 2019-04-08 RX ADMIN — MORPHINE SULFATE 1 MILLIGRAM(S): 50 CAPSULE, EXTENDED RELEASE ORAL at 18:04

## 2019-04-08 RX ADMIN — MORPHINE SULFATE 4 MILLIGRAM(S): 50 CAPSULE, EXTENDED RELEASE ORAL at 09:44

## 2019-04-08 RX ADMIN — MORPHINE SULFATE 1 MILLIGRAM(S): 50 CAPSULE, EXTENDED RELEASE ORAL at 08:17

## 2019-04-08 RX ADMIN — MORPHINE SULFATE 4 MILLIGRAM(S): 50 CAPSULE, EXTENDED RELEASE ORAL at 22:56

## 2019-04-08 RX ADMIN — MORPHINE SULFATE 4 MILLIGRAM(S): 50 CAPSULE, EXTENDED RELEASE ORAL at 20:20

## 2019-04-08 RX ADMIN — MORPHINE SULFATE 4 MILLIGRAM(S): 50 CAPSULE, EXTENDED RELEASE ORAL at 00:05

## 2019-04-08 RX ADMIN — Medication 1 GRAM(S): at 17:21

## 2019-04-08 RX ADMIN — PANTOPRAZOLE SODIUM 40 MILLIGRAM(S): 20 TABLET, DELAYED RELEASE ORAL at 06:23

## 2019-04-08 RX ADMIN — SODIUM CHLORIDE 100 MILLILITER(S): 9 INJECTION, SOLUTION INTRAVENOUS at 08:17

## 2019-04-08 RX ADMIN — MORPHINE SULFATE 1 MILLIGRAM(S): 50 CAPSULE, EXTENDED RELEASE ORAL at 18:21

## 2019-04-08 RX ADMIN — MORPHINE SULFATE 4 MILLIGRAM(S): 50 CAPSULE, EXTENDED RELEASE ORAL at 23:10

## 2019-04-08 RX ADMIN — MORPHINE SULFATE 4 MILLIGRAM(S): 50 CAPSULE, EXTENDED RELEASE ORAL at 03:30

## 2019-04-08 RX ADMIN — MORPHINE SULFATE 4 MILLIGRAM(S): 50 CAPSULE, EXTENDED RELEASE ORAL at 09:29

## 2019-04-08 RX ADMIN — ONDANSETRON 8 MILLIGRAM(S): 8 TABLET, FILM COATED ORAL at 19:52

## 2019-04-08 RX ADMIN — MORPHINE SULFATE 4 MILLIGRAM(S): 50 CAPSULE, EXTENDED RELEASE ORAL at 06:55

## 2019-04-08 RX ADMIN — PANTOPRAZOLE SODIUM 40 MILLIGRAM(S): 20 TABLET, DELAYED RELEASE ORAL at 17:21

## 2019-04-08 RX ADMIN — MORPHINE SULFATE 4 MILLIGRAM(S): 50 CAPSULE, EXTENDED RELEASE ORAL at 15:43

## 2019-04-08 RX ADMIN — Medication 1 GRAM(S): at 06:23

## 2019-04-08 RX ADMIN — Medication 2 CAPSULE(S): at 17:21

## 2019-04-08 RX ADMIN — MORPHINE SULFATE 1 MILLIGRAM(S): 50 CAPSULE, EXTENDED RELEASE ORAL at 09:17

## 2019-04-08 NOTE — PROGRESS NOTE ADULT - SUBJECTIVE AND OBJECTIVE BOX
Patient is a 63y old  Female who presents with a chief complaint of abdominal pain (08 Apr 2019 09:55)    SUBJECTIVE / OVERNIGHT EVENTS: no acute events overnight, pt denies any nausea or vomiting, pain is better controlled.     MEDICATIONS  (STANDING):  amLODIPine   Tablet 10 milliGRAM(s) Oral daily  lactated ringers. 1000 milliLiter(s) (100 mL/Hr) IV Continuous <Continuous>  metoprolol succinate  milliGRAM(s) Oral daily  pancrelipase  (CREON 12,000 Lipase Units) 2 Capsule(s) Oral three times a day  pantoprazole  Injectable 40 milliGRAM(s) IV Push every 12 hours  sucralfate suspension 1 Gram(s) Oral two times a day    MEDICATIONS  (PRN):  acetaminophen   Tablet .. 650 milliGRAM(s) Oral every 6 hours PRN Mild Pain (1 - 3), Moderate Pain (4 - 6)  morphine  - Injectable 4 milliGRAM(s) IV Push every 3 hours PRN Severe Pain (7 - 10)  morphine  - Injectable 1 milliGRAM(s) IV Push every 8 hours PRN breakthrough pain  ondansetron Injectable 8 milliGRAM(s) IV Push every 6 hours PRN Nausea and/or Vomiting  traMADol 25 milliGRAM(s) Oral two times a day PRN Severe Pain (7 - 10)      Vital Signs Last 24 Hrs  T(C): 36.8 (08 Apr 2019 08:04), Max: 37 (07 Apr 2019 16:11)  T(F): 98.2 (08 Apr 2019 08:04), Max: 98.6 (07 Apr 2019 16:11)  HR: 83 (08 Apr 2019 08:04) (72 - 83)  BP: 158/72 (08 Apr 2019 08:20) (150/71 - 194/82)  BP(mean): --  RR: 18 (08 Apr 2019 08:04) (18 - 18)  SpO2: 96% (08 Apr 2019 08:04) (95% - 98%)  CAPILLARY BLOOD GLUCOSE        I&O's Summary    07 Apr 2019 07:01  -  08 Apr 2019 07:00  --------------------------------------------------------  IN: 2319 mL / OUT: 0 mL / NET: 2319 mL    08 Apr 2019 07:01  -  08 Apr 2019 11:18  --------------------------------------------------------  IN: 240 mL / OUT: 0 mL / NET: 240 mL        PHYSICAL EXAM:  GENERAL: NAD  EYES:  conjunctiva and sclera clear  NECK: Supple, No JVD  CHEST/LUNG: Clear to auscultation bilaterally; No wheeze  HEART: +S1/S2, reg   ABDOMEN: Soft, Nontender, Nondistended; Bowel sounds present  EXTREMITIES: no peripheral edema   PSYCH: AAOx3      LABS:                        11.0   6.78  )-----------( 208      ( 07 Apr 2019 08:56 )             35.8     04-07    144  |  106  |  5<L>  ----------------------------<  94  4.0   |  26  |  0.57    Ca    9.0      07 Apr 2019 06:56              Consultant(s) Notes Reviewed:  GI    Care Discussed with Consultants/Other Providers:

## 2019-04-08 NOTE — PROGRESS NOTE ADULT - SUBJECTIVE AND OBJECTIVE BOX
Interval Events: pt seen and examined. She admits to epigastric pain, denies n/v  tolerating PO  await official MRCP results     MEDICATIONS  (STANDING):  amLODIPine   Tablet 10 milliGRAM(s) Oral daily  lactated ringers. 1000 milliLiter(s) (100 mL/Hr) IV Continuous <Continuous>  metoprolol succinate  milliGRAM(s) Oral daily  pancrelipase  (CREON 12,000 Lipase Units) 2 Capsule(s) Oral three times a day  pantoprazole  Injectable 40 milliGRAM(s) IV Push every 12 hours  sucralfate suspension 1 Gram(s) Oral two times a day    MEDICATIONS  (PRN):  acetaminophen   Tablet .. 650 milliGRAM(s) Oral every 6 hours PRN Mild Pain (1 - 3), Moderate Pain (4 - 6)  morphine  - Injectable 4 milliGRAM(s) IV Push every 3 hours PRN Severe Pain (7 - 10)  morphine  - Injectable 1 milliGRAM(s) IV Push every 8 hours PRN breakthrough pain  ondansetron Injectable 8 milliGRAM(s) IV Push every 6 hours PRN Nausea and/or Vomiting  traMADol 25 milliGRAM(s) Oral two times a day PRN Severe Pain (7 - 10)      Allergies    diazepam (Other)    Intolerances        Review of Systems:    General:  No wt loss, fevers, chills, night sweats,fatigue,   Eyes:  Good vision, no reported pain  ENT:  No sore throat, pain, runny nose, dysphagia  CV:  No pain, palpitations, hypo/hypertension  Resp:  No dyspnea, cough, tachypnea, wheezing  GI:  No pain, No nausea, No vomiting, No diarrhea, No constipation, No weight loss, No fever, No pruritis, No rectal bleeding, No melena, No dysphagia  :  No pain, bleeding, incontinence, nocturia  Muscle:  No pain, weakness  Neuro:  No weakness, tingling, memory problems  Psych:  No fatigue, insomnia, mood problems, depression  Endocrine:  No polyuria, polydypsia, cold/heat intolerance  Heme:  No petechiae, ecchymosis, easy bruisability  Skin:  No rash, tattoos, scars, edema      Vital Signs Last 24 Hrs  T(C): 36.8 (08 Apr 2019 08:04), Max: 37 (07 Apr 2019 16:11)  T(F): 98.2 (08 Apr 2019 08:04), Max: 98.6 (07 Apr 2019 16:11)  HR: 83 (08 Apr 2019 08:04) (72 - 83)  BP: 158/72 (08 Apr 2019 08:20) (150/71 - 194/82)  BP(mean): --  RR: 18 (08 Apr 2019 08:04) (18 - 18)  SpO2: 96% (08 Apr 2019 08:04) (95% - 98%)    PHYSICAL EXAM:    Constitutional: NAD, well-developed  HEENT: EOMI, throat clear  Neck: No LAD, supple  Respiratory: CTA and P  Cardiovascular: S1 and S2, RRR, no M  Gastrointestinal: BS+, soft, NT/ND, neg HSM,  Extremities: No peripheral edema, neg clubing, cyanosis  Vascular: 2+ peripheral pulses  Neurological: A/O x 3, no focal deficits  Psychiatric: Normal mood, normal affect  Skin: No rashes      LABS:                        11.0   6.78  )-----------( 208      ( 07 Apr 2019 08:56 )             35.8     04-07    144  |  106  |  5<L>  ----------------------------<  94  4.0   |  26  |  0.57    Ca    9.0      07 Apr 2019 06:56            RADIOLOGY & ADDITIONAL TESTS:

## 2019-04-08 NOTE — PROGRESS NOTE ADULT - PROBLEM SELECTOR PLAN 2
-Pt with reported bleeding in emesis after a full day of vomiting and poor PO intake. Concern for Carolynn Martinez tear. Now resolved.  continue monitoring cbc/hb. Transfuse for hb < 7  -No bloody emesis since patient presented to ED. Continue to monitor.  -c/w  protonix 40 mg BID

## 2019-04-08 NOTE — PROGRESS NOTE ADULT - ASSESSMENT
chronic/acute pancreatitis    may need eus to be done if workup neg  pain management consultation  tramadol po  to follow up mrcp results    check imaging ct scan reviewed   in and out  ivf aggressively  ppi once a day    serial lft and amylase and lipase and r/o other causes of pancreatitis autoimmune  surgical consultation  may need ercp pending results of mrcp    Advanced care planning was discussed with patient and family.  Advanced care planning forms were reviewed and discussed.  Risks, benefits and alternatives of gastroenterologic procedures were discussed in detail and all questions were answered.    30 minutes spent.

## 2019-04-09 DIAGNOSIS — D35.00 BENIGN NEOPLASM OF UNSPECIFIED ADRENAL GLAND: ICD-10-CM

## 2019-04-09 LAB
ANION GAP SERPL CALC-SCNC: 15 MMOL/L — SIGNIFICANT CHANGE UP (ref 5–17)
BUN SERPL-MCNC: 8 MG/DL — SIGNIFICANT CHANGE UP (ref 7–23)
CALCIUM SERPL-MCNC: 9.7 MG/DL — SIGNIFICANT CHANGE UP (ref 8.4–10.5)
CHLORIDE SERPL-SCNC: 103 MMOL/L — SIGNIFICANT CHANGE UP (ref 96–108)
CO2 SERPL-SCNC: 24 MMOL/L — SIGNIFICANT CHANGE UP (ref 22–31)
CREAT SERPL-MCNC: 0.75 MG/DL — SIGNIFICANT CHANGE UP (ref 0.5–1.3)
GLUCOSE SERPL-MCNC: 124 MG/DL — HIGH (ref 70–99)
HCT VFR BLD CALC: 42.1 % — SIGNIFICANT CHANGE UP (ref 34.5–45)
HGB BLD-MCNC: 12.7 G/DL — SIGNIFICANT CHANGE UP (ref 11.5–15.5)
MAGNESIUM SERPL-MCNC: 1.9 MG/DL — SIGNIFICANT CHANGE UP (ref 1.6–2.6)
MCHC RBC-ENTMCNC: 27.3 PG — SIGNIFICANT CHANGE UP (ref 27–34)
MCHC RBC-ENTMCNC: 30.2 GM/DL — LOW (ref 32–36)
MCV RBC AUTO: 90.3 FL — SIGNIFICANT CHANGE UP (ref 80–100)
PHOSPHATE SERPL-MCNC: 3.4 MG/DL — SIGNIFICANT CHANGE UP (ref 2.5–4.5)
PLATELET # BLD AUTO: 176 K/UL — SIGNIFICANT CHANGE UP (ref 150–400)
POTASSIUM SERPL-MCNC: 3.6 MMOL/L — SIGNIFICANT CHANGE UP (ref 3.5–5.3)
POTASSIUM SERPL-SCNC: 3.6 MMOL/L — SIGNIFICANT CHANGE UP (ref 3.5–5.3)
RBC # BLD: 4.66 M/UL — SIGNIFICANT CHANGE UP (ref 3.8–5.2)
RBC # FLD: 16.8 % — HIGH (ref 10.3–14.5)
SODIUM SERPL-SCNC: 142 MMOL/L — SIGNIFICANT CHANGE UP (ref 135–145)
WBC # BLD: 7.55 K/UL — SIGNIFICANT CHANGE UP (ref 3.8–10.5)
WBC # FLD AUTO: 7.55 K/UL — SIGNIFICANT CHANGE UP (ref 3.8–10.5)

## 2019-04-09 PROCEDURE — 99232 SBSQ HOSP IP/OBS MODERATE 35: CPT

## 2019-04-09 RX ADMIN — MORPHINE SULFATE 4 MILLIGRAM(S): 50 CAPSULE, EXTENDED RELEASE ORAL at 09:44

## 2019-04-09 RX ADMIN — ONDANSETRON 8 MILLIGRAM(S): 8 TABLET, FILM COATED ORAL at 23:48

## 2019-04-09 RX ADMIN — MORPHINE SULFATE 4 MILLIGRAM(S): 50 CAPSULE, EXTENDED RELEASE ORAL at 02:28

## 2019-04-09 RX ADMIN — Medication 2 CAPSULE(S): at 12:54

## 2019-04-09 RX ADMIN — MORPHINE SULFATE 4 MILLIGRAM(S): 50 CAPSULE, EXTENDED RELEASE ORAL at 12:54

## 2019-04-09 RX ADMIN — Medication 100 MILLIGRAM(S): at 05:41

## 2019-04-09 RX ADMIN — MORPHINE SULFATE 4 MILLIGRAM(S): 50 CAPSULE, EXTENDED RELEASE ORAL at 06:12

## 2019-04-09 RX ADMIN — MORPHINE SULFATE 4 MILLIGRAM(S): 50 CAPSULE, EXTENDED RELEASE ORAL at 02:12

## 2019-04-09 RX ADMIN — MORPHINE SULFATE 4 MILLIGRAM(S): 50 CAPSULE, EXTENDED RELEASE ORAL at 13:16

## 2019-04-09 RX ADMIN — ONDANSETRON 8 MILLIGRAM(S): 8 TABLET, FILM COATED ORAL at 02:13

## 2019-04-09 RX ADMIN — MORPHINE SULFATE 1 MILLIGRAM(S): 50 CAPSULE, EXTENDED RELEASE ORAL at 22:32

## 2019-04-09 RX ADMIN — PANTOPRAZOLE SODIUM 40 MILLIGRAM(S): 20 TABLET, DELAYED RELEASE ORAL at 05:41

## 2019-04-09 RX ADMIN — MORPHINE SULFATE 4 MILLIGRAM(S): 50 CAPSULE, EXTENDED RELEASE ORAL at 19:42

## 2019-04-09 RX ADMIN — Medication 2 CAPSULE(S): at 08:45

## 2019-04-09 RX ADMIN — PANTOPRAZOLE SODIUM 40 MILLIGRAM(S): 20 TABLET, DELAYED RELEASE ORAL at 17:33

## 2019-04-09 RX ADMIN — MORPHINE SULFATE 4 MILLIGRAM(S): 50 CAPSULE, EXTENDED RELEASE ORAL at 16:40

## 2019-04-09 RX ADMIN — AMLODIPINE BESYLATE 10 MILLIGRAM(S): 2.5 TABLET ORAL at 05:41

## 2019-04-09 RX ADMIN — MORPHINE SULFATE 4 MILLIGRAM(S): 50 CAPSULE, EXTENDED RELEASE ORAL at 16:25

## 2019-04-09 RX ADMIN — MORPHINE SULFATE 4 MILLIGRAM(S): 50 CAPSULE, EXTENDED RELEASE ORAL at 20:58

## 2019-04-09 RX ADMIN — MORPHINE SULFATE 1 MILLIGRAM(S): 50 CAPSULE, EXTENDED RELEASE ORAL at 22:17

## 2019-04-09 RX ADMIN — MORPHINE SULFATE 4 MILLIGRAM(S): 50 CAPSULE, EXTENDED RELEASE ORAL at 10:00

## 2019-04-09 RX ADMIN — MORPHINE SULFATE 4 MILLIGRAM(S): 50 CAPSULE, EXTENDED RELEASE ORAL at 23:47

## 2019-04-09 RX ADMIN — MORPHINE SULFATE 4 MILLIGRAM(S): 50 CAPSULE, EXTENDED RELEASE ORAL at 06:28

## 2019-04-09 RX ADMIN — Medication 1 GRAM(S): at 17:33

## 2019-04-09 RX ADMIN — Medication 2 CAPSULE(S): at 17:33

## 2019-04-09 RX ADMIN — Medication 1 GRAM(S): at 05:41

## 2019-04-09 NOTE — PROGRESS NOTE ADULT - SUBJECTIVE AND OBJECTIVE BOX
Interval Events: pt c/o severe epigastric pain, denies n/v  tolerating diet well    MEDICATIONS  (STANDING):  amLODIPine   Tablet 10 milliGRAM(s) Oral daily  metoprolol succinate  milliGRAM(s) Oral daily  pancrelipase  (CREON 12,000 Lipase Units) 2 Capsule(s) Oral three times a day  pantoprazole  Injectable 40 milliGRAM(s) IV Push every 12 hours  sucralfate suspension 1 Gram(s) Oral two times a day    MEDICATIONS  (PRN):  acetaminophen   Tablet .. 650 milliGRAM(s) Oral every 6 hours PRN Mild Pain (1 - 3), Moderate Pain (4 - 6)  morphine  - Injectable 4 milliGRAM(s) IV Push every 3 hours PRN Severe Pain (7 - 10)  morphine  - Injectable 1 milliGRAM(s) IV Push every 8 hours PRN breakthrough pain  ondansetron Injectable 8 milliGRAM(s) IV Push every 6 hours PRN Nausea and/or Vomiting  traMADol 25 milliGRAM(s) Oral two times a day PRN Severe Pain (7 - 10)      Allergies    diazepam (Other)    Intolerances        Review of Systems:    General:  No wt loss, fevers, chills, night sweats,fatigue,   Eyes:  Good vision, no reported pain  ENT:  No sore throat, pain, runny nose, dysphagia  CV:  No pain, palpitations, hypo/hypertension  Resp:  No dyspnea, cough, tachypnea, wheezing  GI:  + pain, No nausea, No vomiting, No diarrhea, No constipation, No weight loss, No fever, No pruritis, No rectal bleeding, No melena, No dysphagia  :  No pain, bleeding, incontinence, nocturia  Muscle:  No pain, weakness  Neuro:  No weakness, tingling, memory problems  Psych:  No fatigue, insomnia, mood problems, depression  Endocrine:  No polyuria, polydypsia, cold/heat intolerance  Heme:  No petechiae, ecchymosis, easy bruisability  Skin:  No rash, tattoos, scars, edema      Vital Signs Last 24 Hrs  T(C): 36.6 (09 Apr 2019 07:10), Max: 37.3 (08 Apr 2019 23:46)  T(F): 97.8 (09 Apr 2019 07:10), Max: 99.1 (08 Apr 2019 23:46)  HR: 82 (09 Apr 2019 07:10) (74 - 84)  BP: 147/86 (09 Apr 2019 07:10) (136/81 - 179/85)  BP(mean): --  RR: 18 (09 Apr 2019 07:10) (18 - 20)  SpO2: 97% (09 Apr 2019 07:10) (93% - 98%)    PHYSICAL EXAM:    Constitutional: NAD, well-developed  HEENT: EOMI, throat clear  Neck: No LAD, supple  Respiratory: CTA and P  Cardiovascular: S1 and S2, RRR, no M  Gastrointestinal: BS+, soft, NT/ND, neg HSM,  Extremities: No peripheral edema, neg clubing, cyanosis  Vascular: 2+ peripheral pulses  Neurological: A/O x 3, no focal deficits  Psychiatric: Normal mood, normal affect  Skin: No rashes      LABS:    04-09    142  |  103  |  8   ----------------------------<  124<H>  3.6   |  24  |  0.75    Ca    9.7      09 Apr 2019 10:53  Phos  3.4     04-09  Mg     1.9     04-09            RADIOLOGY & ADDITIONAL TESTS:

## 2019-04-09 NOTE — CONSULT NOTE ADULT - SUBJECTIVE AND OBJECTIVE BOX
Chief Complaint:  Patient is a 63y old  Female who presents with a chief complaint of abdominal pain (09 Apr 2019 13:45)      HPI:    Allergies:  diazepam (Other)      Home Medications:    Hospital Medications:  acetaminophen   Tablet .. 650 milliGRAM(s) Oral every 6 hours PRN  amLODIPine   Tablet 10 milliGRAM(s) Oral daily  metoprolol succinate  milliGRAM(s) Oral daily  morphine  - Injectable 4 milliGRAM(s) IV Push every 3 hours PRN  morphine  - Injectable 1 milliGRAM(s) IV Push every 8 hours PRN  ondansetron Injectable 8 milliGRAM(s) IV Push every 6 hours PRN  pancrelipase  (CREON 12,000 Lipase Units) 2 Capsule(s) Oral three times a day  pantoprazole  Injectable 40 milliGRAM(s) IV Push every 12 hours  sucralfate suspension 1 Gram(s) Oral two times a day  traMADol 25 milliGRAM(s) Oral two times a day PRN      PMHX/PSHX:  Insomnia  ARDS survivor  Chronic pancreatitis  Hypokalemia  GERD (gastroesophageal reflux disease)  HTN (hypertension)  S/P hip replacement, right  S/P arthroscopy of shoulder  S/P hip replacement  S/P arthroscopy of shoulder      Family history:  Family history of alcohol abuse  No pertinent family history in first degree relatives      Social History:     Review of systems: Negative, except as otherwise noted above      PHYSICAL EXAM:   Vital Signs:  Vital Signs Last 24 Hrs  T(C): 36.6 (09 Apr 2019 07:10), Max: 37.3 (08 Apr 2019 23:46)  T(F): 97.8 (09 Apr 2019 07:10), Max: 99.1 (08 Apr 2019 23:46)  HR: 82 (09 Apr 2019 07:10) (74 - 84)  BP: 147/86 (09 Apr 2019 07:10) (136/81 - 179/85)  BP(mean): --  RR: 18 (09 Apr 2019 07:10) (18 - 20)  SpO2: 97% (09 Apr 2019 07:10) (93% - 98%)  Daily     Daily     GENERAL:  No acute distress  HEENT:  Anicteric, no thrush  CHEST:  Non-labored breathing, lungs clear b/l  HEART:  +s1, s2 heart sounds, no murmurs  ABDOMEN:    EXTREMITIES:  warm and well perfused, no edema  SKIN:    NEURO:          LABS:    04-09 @ 10:53  Na 142 mmol/L  K 3.6 mmol/L  Cl 103 mmol/L  CO2 24 mmol/L  BUN 8 mg/dL  Creat 0.75 mg/dL  Glucose 124 mg/dL  Ca 9.7 mg/dL    Total protein --  Albumin --  T bili --  Alk phos --  AST --  ALT --          Imaging:    < from: MR MRCP No Cont (04.07.19 @ 18:07) >    EXAM:  MR MRCP                            PROCEDURE DATE:  04/07/2019            INTERPRETATION:  CLINICAL INFORMATION: Pancreatitis. Evaluate for   choledocholithiasis.    COMPARISON: CT abdomen and pelvis 4/4/2019 and MR abdomen 7/16/2014    PROCEDURE:     Multisequence MRI of the abdomen was performed without intravenous   contrast.    MRCP was performed.    FINDINGS:    LOWER CHEST: Within normal limits.    LIVER: Tiny right hepatic lobe cyst. Mild hepatic steatosis.    BILE DUCTS: No choledocholithiasis. No intrahepatic or extrahepatic   biliary ductal dilatation.  GALLBLADDER: No cholelithiasis.  SPLEEN: Within normal limits.  PANCREAS: Diffuse pancreatic calcifications and main pancreatic duct   dilation, measuring up to 8 mm in the pancreatic tail. These findings are   compatible with stigmata from chronic pancreatitis. No peripancreatic   edema. No pancreatic pseudocyst or peripancreatic collection. No   pancreatic mass.  ADRENALS: Unchanged size of a right adrenal nodule measuring 1.2 x 0.8 cm   compared to CT from 4/4/2019. Decreased signal intensity on out of phase   chemical shift imaging is compatible with an adrenal adenoma.  KIDNEYS/URETERS: Multiple simple renal cysts.    VISUALIZED PORTIONS:    BOWEL: Within normallimits.   PERITONEUM: No ascites.  VESSELS: No splenic artery thrombus, pseudoaneurysm, or splenic vein   thrombus.  RETROPERITONEUM: No lymphadenopathy.    ABDOMINAL WALL: Within normal limits.  BONES: Within normal limits.    IMPRESSION:     Chronic pancreatitis. No choledocholithiasis or pancreatic neoplasm.    Right adrenal adenoma.                  JOANNE MEDEROS M.D., RADIOLOGY RESIDENT  This document has been electronically signed.  MARIYA RICHARD M.D., ATTENDING RADIOLOGIST  This document has been electronically signed. Apr 8 2019  3:03PM                < end of copied text > Chief Complaint:  Patient is a 63y old  Female who presents with a chief complaint of abdominal pain (09 Apr 2019 13:45)      HPI: 63F with hypertension and chronic pancreatitis presents with abdominal pain, nausea, vomiting for two days. She reports diffuse abdominal pain worse in the upper abdomen with radiation to the back. She reports associated nausea and vomiting. She denies fever, chills, jaundice, pruritus. She reports numerous admissions to the hospital for pancreatitis over the past few years - she cannot provide further details. She denies alcohol or drug use. She states she previously smoked tobacco. She denies family history of disease of the gallbladder, bile ducts, pancreas, or liver. She is followed by Dr. Gresham. [facility] service was consulted to evaluate for EUS.     Allergies:  diazepam (Other)    Home Medications:  Creon 24,000 units oral delayed release capsule: 1 cap(s) orally 3 times a day (05 Apr 2019 03:20)  Metoprolol Succinate  mg oral tablet, extended release: 1 tab(s) orally once a day (05 Apr 2019 03:20)  morphine 15 mg oral tablet: 1 tab(s) orally every 6 hours, As Needed (05 Apr 2019 03:20)  Multiple Vitamins oral tablet: 1 tab(s) orally once a day (05 Apr 2019 03:20)  Norvasc 10 mg oral tablet: 1 tab(s) orally once a day (05 Apr 2019 03:20)  Vitamin B12:  (05 Apr 2019 03:20)  Vitamin D3 1000 intl units oral tablet: 1 tab(s) orally once a day (05 Apr 2019 03:20)      Hospital Medications:  acetaminophen   Tablet .. 650 milliGRAM(s) Oral every 6 hours PRN  amLODIPine   Tablet 10 milliGRAM(s) Oral daily  metoprolol succinate  milliGRAM(s) Oral daily  morphine  - Injectable 4 milliGRAM(s) IV Push every 3 hours PRN  morphine  - Injectable 1 milliGRAM(s) IV Push every 8 hours PRN  ondansetron Injectable 8 milliGRAM(s) IV Push every 6 hours PRN  pancrelipase  (CREON 12,000 Lipase Units) 2 Capsule(s) Oral three times a day  pantoprazole  Injectable 40 milliGRAM(s) IV Push every 12 hours  sucralfate suspension 1 Gram(s) Oral two times a day  traMADol 25 milliGRAM(s) Oral two times a day PRN      PMHX/PSHX:  Insomnia  ARDS survivor  Chronic pancreatitis  Hypokalemia  GERD (gastroesophageal reflux disease)  HTN (hypertension)  S/P hip replacement, right  S/P arthroscopy of shoulder  S/P hip replacement  S/P arthroscopy of shoulder      Family history:  Family history of alcohol abuse  No pertinent family history in first degree relatives      Social History: As above    Review of systems: Negative, except as otherwise noted above      PHYSICAL EXAM:   Vital Signs:  Vital Signs Last 24 Hrs  T(C): 36.6 (09 Apr 2019 07:10), Max: 37.3 (08 Apr 2019 23:46)  T(F): 97.8 (09 Apr 2019 07:10), Max: 99.1 (08 Apr 2019 23:46)  HR: 82 (09 Apr 2019 07:10) (74 - 84)  BP: 147/86 (09 Apr 2019 07:10) (136/81 - 179/85)  BP(mean): --  RR: 18 (09 Apr 2019 07:10) (18 - 20)  SpO2: 97% (09 Apr 2019 07:10) (93% - 98%)  Daily     Daily     GENERAL:  No acute distress  HEENT:  Anicteric  CHEST:  Non-labored breathing, lungs clear b/l  HEART:  +s1, s2 heart sounds, no murmurs  ABDOMEN:  Soft, mildly tender diffusely, no rebound, no guarding  EXTREMITIES:  warm and well perfused, no edema  SKIN:  No rash  NEURO:  Awake, interactive, following commands        LABS:    04-09 @ 10:53  Na 142 mmol/L  K 3.6 mmol/L  Cl 103 mmol/L  CO2 24 mmol/L  BUN 8 mg/dL  Creat 0.75 mg/dL  Glucose 124 mg/dL  Ca 9.7 mg/dL    Total protein --  Albumin --  T bili --  Alk phos --  AST --  ALT --          Imaging:    < from: MR MRCP No Cont (04.07.19 @ 18:07) >    EXAM:  MR MRCP                            PROCEDURE DATE:  04/07/2019            INTERPRETATION:  CLINICAL INFORMATION: Pancreatitis. Evaluate for   choledocholithiasis.    COMPARISON: CT abdomen and pelvis 4/4/2019 and MR abdomen 7/16/2014    PROCEDURE:     Multisequence MRI of the abdomen was performed without intravenous   contrast.    MRCP was performed.    FINDINGS:    LOWER CHEST: Within normal limits.    LIVER: Tiny right hepatic lobe cyst. Mild hepatic steatosis.    BILE DUCTS: No choledocholithiasis. No intrahepatic or extrahepatic   biliary ductal dilatation.  GALLBLADDER: No cholelithiasis.  SPLEEN: Within normal limits.  PANCREAS: Diffuse pancreatic calcifications and main pancreatic duct   dilation, measuring up to 8 mm in the pancreatic tail. These findings are   compatible with stigmata from chronic pancreatitis. No peripancreatic   edema. No pancreatic pseudocyst or peripancreatic collection. No   pancreatic mass.  ADRENALS: Unchanged size of a right adrenal nodule measuring 1.2 x 0.8 cm   compared to CT from 4/4/2019. Decreased signal intensity on out of phase   chemical shift imaging is compatible with an adrenal adenoma.  KIDNEYS/URETERS: Multiple simple renal cysts.    VISUALIZED PORTIONS:    BOWEL: Within normallimits.   PERITONEUM: No ascites.  VESSELS: No splenic artery thrombus, pseudoaneurysm, or splenic vein   thrombus.  RETROPERITONEUM: No lymphadenopathy.    ABDOMINAL WALL: Within normal limits.  BONES: Within normal limits.    IMPRESSION:     Chronic pancreatitis. No choledocholithiasis or pancreatic neoplasm.    Right adrenal adenoma.                  JOANNE MEDEROS M.D., RADIOLOGY RESIDENT  This document has been electronically signed.  MARIYA RICHARD M.D., ATTENDING RADIOLOGIST  This document has been electronically signed. Apr 8 2019  3:03PM                < end of copied text >

## 2019-04-09 NOTE — PROGRESS NOTE ADULT - PROBLEM SELECTOR PLAN 3
-Pt with reported bleeding in emesis after a full day of vomiting and poor PO intake. Concern for Carolynn Martinez tear. Now resolved.  continue monitoring cbc/hb. Transfuse for hb < 7  -No bloody emesis since patient presented to ED. Continue to monitor.  -c/w  protonix 40 mg BID  -resolved

## 2019-04-09 NOTE — PROGRESS NOTE ADULT - SUBJECTIVE AND OBJECTIVE BOX
Patient is a 63y old  Female who presents with a chief complaint of abdominal pain (08 Apr 2019 11:06)    SUBJECTIVE / OVERNIGHT EVENTS: no acute events overnight, pt is crying this morning because she lost IV access and needs her IV pain medications     MEDICATIONS  (STANDING):  amLODIPine   Tablet 10 milliGRAM(s) Oral daily  metoprolol succinate  milliGRAM(s) Oral daily  pancrelipase  (CREON 12,000 Lipase Units) 2 Capsule(s) Oral three times a day  pantoprazole  Injectable 40 milliGRAM(s) IV Push every 12 hours  sucralfate suspension 1 Gram(s) Oral two times a day    MEDICATIONS  (PRN):  acetaminophen   Tablet .. 650 milliGRAM(s) Oral every 6 hours PRN Mild Pain (1 - 3), Moderate Pain (4 - 6)  morphine  - Injectable 4 milliGRAM(s) IV Push every 3 hours PRN Severe Pain (7 - 10)  morphine  - Injectable 1 milliGRAM(s) IV Push every 8 hours PRN breakthrough pain  ondansetron Injectable 8 milliGRAM(s) IV Push every 6 hours PRN Nausea and/or Vomiting  traMADol 25 milliGRAM(s) Oral two times a day PRN Severe Pain (7 - 10)      Vital Signs Last 24 Hrs  T(C): 36.6 (09 Apr 2019 07:10), Max: 37.3 (08 Apr 2019 23:46)  T(F): 97.8 (09 Apr 2019 07:10), Max: 99.1 (08 Apr 2019 23:46)  HR: 82 (09 Apr 2019 07:10) (74 - 84)  BP: 147/86 (09 Apr 2019 07:10) (136/81 - 179/85)  BP(mean): --  RR: 18 (09 Apr 2019 07:10) (18 - 20)  SpO2: 97% (09 Apr 2019 07:10) (93% - 98%)  CAPILLARY BLOOD GLUCOSE        I&O's Summary    08 Apr 2019 07:01  -  09 Apr 2019 07:00  --------------------------------------------------------  IN: 1496 mL / OUT: 0 mL / NET: 1496 mL        PHYSICAL EXAM:  GENERAL: teary eyed   EYES: conjunctiva and sclera clear  NECK: Supple, No JVD  CHEST/LUNG: Clear to auscultation bilaterally; No wheeze  HEART: Regular rate and rhythm; No murmurs, rubs, or gallops  ABDOMEN: Soft, Nontender,   EXTREMITIES:  no peripheral edema   PSYCH: AAOx3      LABS:    04-09    142  |  103  |  8   ----------------------------<  124<H>  3.6   |  24  |  0.75    Ca    9.7      09 Apr 2019 10:53  Phos  3.4     04-09  Mg     1.9     04-09        Consultant(s) Notes Reviewed: GI

## 2019-04-09 NOTE — PROGRESS NOTE ADULT - ASSESSMENT
chronic/acute pancreatitis    pain management consultation  tramadol po    ct scan reviewed   in and out  ivf aggressively  ppi once a day    serial lft and amylase and lipase and r/o other causes of pancreatitis autoimmune  surgical consultation  mrcp negative  given continued pain, advanced practice GI called for possible EUS, await their recommendations

## 2019-04-10 LAB
ANA TITR SER: NEGATIVE — SIGNIFICANT CHANGE UP
IGG SERPL-MCNC: 708 MG/DL — SIGNIFICANT CHANGE UP (ref 700–1600)
IGG1 SER-MCNC: 470 MG/DL — SIGNIFICANT CHANGE UP (ref 248–810)
IGG2 SER-MCNC: 173 MG/DL — SIGNIFICANT CHANGE UP (ref 130–555)
IGG3 SER-MCNC: 53 MG/DL — SIGNIFICANT CHANGE UP (ref 15–102)
IGG4 SER-MCNC: 35 MG/DL — SIGNIFICANT CHANGE UP (ref 2–96)

## 2019-04-10 PROCEDURE — 99233 SBSQ HOSP IP/OBS HIGH 50: CPT

## 2019-04-10 RX ADMIN — MORPHINE SULFATE 4 MILLIGRAM(S): 50 CAPSULE, EXTENDED RELEASE ORAL at 14:39

## 2019-04-10 RX ADMIN — MORPHINE SULFATE 4 MILLIGRAM(S): 50 CAPSULE, EXTENDED RELEASE ORAL at 20:46

## 2019-04-10 RX ADMIN — MORPHINE SULFATE 4 MILLIGRAM(S): 50 CAPSULE, EXTENDED RELEASE ORAL at 08:01

## 2019-04-10 RX ADMIN — MORPHINE SULFATE 4 MILLIGRAM(S): 50 CAPSULE, EXTENDED RELEASE ORAL at 15:09

## 2019-04-10 RX ADMIN — Medication 100 MILLIGRAM(S): at 05:29

## 2019-04-10 RX ADMIN — PANTOPRAZOLE SODIUM 40 MILLIGRAM(S): 20 TABLET, DELAYED RELEASE ORAL at 05:29

## 2019-04-10 RX ADMIN — MORPHINE SULFATE 4 MILLIGRAM(S): 50 CAPSULE, EXTENDED RELEASE ORAL at 12:04

## 2019-04-10 RX ADMIN — Medication 1 GRAM(S): at 05:30

## 2019-04-10 RX ADMIN — MORPHINE SULFATE 4 MILLIGRAM(S): 50 CAPSULE, EXTENDED RELEASE ORAL at 17:17

## 2019-04-10 RX ADMIN — MORPHINE SULFATE 4 MILLIGRAM(S): 50 CAPSULE, EXTENDED RELEASE ORAL at 17:47

## 2019-04-10 RX ADMIN — ONDANSETRON 8 MILLIGRAM(S): 8 TABLET, FILM COATED ORAL at 08:01

## 2019-04-10 RX ADMIN — MORPHINE SULFATE 4 MILLIGRAM(S): 50 CAPSULE, EXTENDED RELEASE ORAL at 08:31

## 2019-04-10 RX ADMIN — Medication 2 CAPSULE(S): at 08:02

## 2019-04-10 RX ADMIN — MORPHINE SULFATE 4 MILLIGRAM(S): 50 CAPSULE, EXTENDED RELEASE ORAL at 21:01

## 2019-04-10 RX ADMIN — MORPHINE SULFATE 4 MILLIGRAM(S): 50 CAPSULE, EXTENDED RELEASE ORAL at 11:34

## 2019-04-10 RX ADMIN — MORPHINE SULFATE 4 MILLIGRAM(S): 50 CAPSULE, EXTENDED RELEASE ORAL at 04:14

## 2019-04-10 RX ADMIN — Medication 1 GRAM(S): at 17:12

## 2019-04-10 RX ADMIN — PANTOPRAZOLE SODIUM 40 MILLIGRAM(S): 20 TABLET, DELAYED RELEASE ORAL at 17:12

## 2019-04-10 RX ADMIN — Medication 2 CAPSULE(S): at 17:12

## 2019-04-10 RX ADMIN — ONDANSETRON 8 MILLIGRAM(S): 8 TABLET, FILM COATED ORAL at 20:49

## 2019-04-10 RX ADMIN — ONDANSETRON 8 MILLIGRAM(S): 8 TABLET, FILM COATED ORAL at 14:40

## 2019-04-10 RX ADMIN — AMLODIPINE BESYLATE 10 MILLIGRAM(S): 2.5 TABLET ORAL at 05:29

## 2019-04-10 RX ADMIN — MORPHINE SULFATE 4 MILLIGRAM(S): 50 CAPSULE, EXTENDED RELEASE ORAL at 00:03

## 2019-04-10 RX ADMIN — MORPHINE SULFATE 4 MILLIGRAM(S): 50 CAPSULE, EXTENDED RELEASE ORAL at 04:28

## 2019-04-10 RX ADMIN — Medication 2 CAPSULE(S): at 12:43

## 2019-04-10 NOTE — PROGRESS NOTE ADULT - ASSESSMENT
chronic/acute pancreatitis    pain management consultation  tramadol po  recommend weaning off narcotics     ct scan reviewed   in and out  ivf aggressively  ppi once a day    serial lft and amylase and lipase  mrcp negative  given continued pain, advanced practice GI called for possible EUS, await their recommendations

## 2019-04-10 NOTE — PROGRESS NOTE ADULT - SUBJECTIVE AND OBJECTIVE BOX
Patient is a 63y old  Female who presents with a chief complaint of abdominal pain (10 Apr 2019 09:43)    SUBJECTIVE / OVERNIGHT EVENTS: no acute events overnight, pain is better controlled today     MEDICATIONS  (STANDING):  amLODIPine   Tablet 10 milliGRAM(s) Oral daily  metoprolol succinate  milliGRAM(s) Oral daily  pancrelipase  (CREON 12,000 Lipase Units) 2 Capsule(s) Oral three times a day  pantoprazole  Injectable 40 milliGRAM(s) IV Push every 12 hours  sucralfate suspension 1 Gram(s) Oral two times a day    MEDICATIONS  (PRN):  acetaminophen   Tablet .. 650 milliGRAM(s) Oral every 6 hours PRN Mild Pain (1 - 3), Moderate Pain (4 - 6)  morphine  - Injectable 4 milliGRAM(s) IV Push every 3 hours PRN Severe Pain (7 - 10)  morphine  - Injectable 1 milliGRAM(s) IV Push every 8 hours PRN breakthrough pain  ondansetron Injectable 8 milliGRAM(s) IV Push every 6 hours PRN Nausea and/or Vomiting  traMADol 25 milliGRAM(s) Oral two times a day PRN Severe Pain (7 - 10)      Vital Signs Last 24 Hrs  T(C): 37.3 (10 Apr 2019 08:01), Max: 37.3 (10 Apr 2019 00:12)  T(F): 99.1 (10 Apr 2019 08:01), Max: 99.1 (10 Apr 2019 00:12)  HR: 73 (10 Apr 2019 08:01) (72 - 83)  BP: 153/93 (10 Apr 2019 08:01) (145/90 - 154/81)  BP(mean): --  RR: 18 (10 Apr 2019 08:01) (18 - 18)  SpO2: 96% (10 Apr 2019 08:01) (96% - 98%)  CAPILLARY BLOOD GLUCOSE        I&O's Summary    09 Apr 2019 07:01  -  10 Apr 2019 07:00  --------------------------------------------------------  IN: 740 mL / OUT: 0 mL / NET: 740 mL    10 Apr 2019 07:01  -  10 Apr 2019 11:31  --------------------------------------------------------  IN: 240 mL / OUT: 0 mL / NET: 240 mL        PHYSICAL EXAM:  GENERAL: NAD  EYES:  conjunctiva and sclera clear  NECK: Supple, No JVD  CHEST/LUNG: Clear to auscultation bilaterally; No wheeze  HEART: +S1/S2, reg   ABDOMEN: Soft, Nontender, non-distended   EXTREMITIES:  2+ Peripheral Pulses, No clubbing, cyanosis, or edema  PSYCH: AAOx3    LABS:                        12.7   7.55  )-----------( 176      ( 09 Apr 2019 15:11 )             42.1     04-09    142  |  103  |  8   ----------------------------<  124<H>  3.6   |  24  |  0.75    Ca    9.7      09 Apr 2019 10:53  Phos  3.4     04-09  Mg     1.9     04-09            Consultant(s) Notes Reviewed:  GI

## 2019-04-10 NOTE — PROGRESS NOTE ADULT - SUBJECTIVE AND OBJECTIVE BOX
INTERVAL HPI/OVERNIGHT EVENTS:    Pt seen and examined. Pain is better controlled today, however she is still requiring morphine. Pt denies n/v  tolerating PO well.     MEDICATIONS  (STANDING):  amLODIPine   Tablet 10 milliGRAM(s) Oral daily  metoprolol succinate  milliGRAM(s) Oral daily  pancrelipase  (CREON 12,000 Lipase Units) 2 Capsule(s) Oral three times a day  pantoprazole  Injectable 40 milliGRAM(s) IV Push every 12 hours  sucralfate suspension 1 Gram(s) Oral two times a day    MEDICATIONS  (PRN):  acetaminophen   Tablet .. 650 milliGRAM(s) Oral every 6 hours PRN Mild Pain (1 - 3), Moderate Pain (4 - 6)  morphine  - Injectable 4 milliGRAM(s) IV Push every 3 hours PRN Severe Pain (7 - 10)  morphine  - Injectable 1 milliGRAM(s) IV Push every 8 hours PRN breakthrough pain  ondansetron Injectable 8 milliGRAM(s) IV Push every 6 hours PRN Nausea and/or Vomiting  traMADol 25 milliGRAM(s) Oral two times a day PRN Severe Pain (7 - 10)      Allergies    diazepam (Other)    Intolerances        Review of Systems:    General:  No wt loss, fevers, chills, night sweats,fatigue,   Eyes:  Good vision, no reported pain  ENT:  No sore throat, pain, runny nose, dysphagia  CV:  No pain, palpitations, hypo/hypertension  Resp:  No dyspnea, cough, tachypnea, wheezing  GI:  No pain, No nausea, No vomiting, No diarrhea, No constipation, No weight loss, No fever, No pruritis, No rectal bleeding, No melena, No dysphagia  :  No pain, bleeding, incontinence, nocturia  Muscle:  No pain, weakness  Neuro:  No weakness, tingling, memory problems  Psych:  No fatigue, insomnia, mood problems, depression  Endocrine:  No polyuria, polydypsia, cold/heat intolerance  Heme:  No petechiae, ecchymosis, easy bruisability  Skin:  No rash, tattoos, scars, edema      Vital Signs Last 24 Hrs  T(C): 37.3 (10 Apr 2019 08:01), Max: 37.3 (10 Apr 2019 00:12)  T(F): 99.1 (10 Apr 2019 08:01), Max: 99.1 (10 Apr 2019 00:12)  HR: 73 (10 Apr 2019 08:01) (72 - 83)  BP: 153/93 (10 Apr 2019 08:01) (145/90 - 154/81)  BP(mean): --  RR: 18 (10 Apr 2019 08:01) (18 - 18)  SpO2: 96% (10 Apr 2019 08:01) (96% - 98%)    PHYSICAL EXAM:    Constitutional: NAD, well-developed  HEENT: EOMI, throat clear  Neck: No LAD, supple  Respiratory: CTA and P  Cardiovascular: S1 and S2, RRR, no M  Gastrointestinal: BS+, soft, NT/ND, neg HSM,  Extremities: No peripheral edema, neg clubing, cyanosis  Vascular: 2+ peripheral pulses  Neurological: A/O x 3, no focal deficits  Psychiatric: Normal mood, normal affect  Skin: No rashes      LABS:                        12.7   7.55  )-----------( 176      ( 09 Apr 2019 15:11 )             42.1     04-09    142  |  103  |  8   ----------------------------<  124<H>  3.6   |  24  |  0.75    Ca    9.7      09 Apr 2019 10:53  Phos  3.4     04-09  Mg     1.9     04-09            RADIOLOGY & ADDITIONAL TESTS:

## 2019-04-11 DIAGNOSIS — I80.8 PHLEBITIS AND THROMBOPHLEBITIS OF OTHER SITES: ICD-10-CM

## 2019-04-11 PROCEDURE — 99232 SBSQ HOSP IP/OBS MODERATE 35: CPT | Mod: GC

## 2019-04-11 PROCEDURE — 99233 SBSQ HOSP IP/OBS HIGH 50: CPT

## 2019-04-11 PROCEDURE — 93971 EXTREMITY STUDY: CPT | Mod: 26

## 2019-04-11 RX ORDER — HEPARIN SODIUM 5000 [USP'U]/ML
5000 INJECTION INTRAVENOUS; SUBCUTANEOUS EVERY 8 HOURS
Qty: 0 | Refills: 0 | Status: DISCONTINUED | OUTPATIENT
Start: 2019-04-11 | End: 2019-04-12

## 2019-04-11 RX ADMIN — MORPHINE SULFATE 4 MILLIGRAM(S): 50 CAPSULE, EXTENDED RELEASE ORAL at 17:15

## 2019-04-11 RX ADMIN — Medication 100 MILLIGRAM(S): at 04:46

## 2019-04-11 RX ADMIN — TRAMADOL HYDROCHLORIDE 25 MILLIGRAM(S): 50 TABLET ORAL at 05:39

## 2019-04-11 RX ADMIN — MORPHINE SULFATE 4 MILLIGRAM(S): 50 CAPSULE, EXTENDED RELEASE ORAL at 00:13

## 2019-04-11 RX ADMIN — TRAMADOL HYDROCHLORIDE 25 MILLIGRAM(S): 50 TABLET ORAL at 04:39

## 2019-04-11 RX ADMIN — MORPHINE SULFATE 4 MILLIGRAM(S): 50 CAPSULE, EXTENDED RELEASE ORAL at 23:53

## 2019-04-11 RX ADMIN — Medication 2 CAPSULE(S): at 17:18

## 2019-04-11 RX ADMIN — Medication 1 GRAM(S): at 17:18

## 2019-04-11 RX ADMIN — MORPHINE SULFATE 4 MILLIGRAM(S): 50 CAPSULE, EXTENDED RELEASE ORAL at 20:26

## 2019-04-11 RX ADMIN — MORPHINE SULFATE 4 MILLIGRAM(S): 50 CAPSULE, EXTENDED RELEASE ORAL at 23:38

## 2019-04-11 RX ADMIN — MORPHINE SULFATE 4 MILLIGRAM(S): 50 CAPSULE, EXTENDED RELEASE ORAL at 13:59

## 2019-04-11 RX ADMIN — PANTOPRAZOLE SODIUM 40 MILLIGRAM(S): 20 TABLET, DELAYED RELEASE ORAL at 04:46

## 2019-04-11 RX ADMIN — Medication 2 CAPSULE(S): at 09:15

## 2019-04-11 RX ADMIN — Medication 2 CAPSULE(S): at 13:17

## 2019-04-11 RX ADMIN — HEPARIN SODIUM 5000 UNIT(S): 5000 INJECTION INTRAVENOUS; SUBCUTANEOUS at 23:43

## 2019-04-11 RX ADMIN — ONDANSETRON 8 MILLIGRAM(S): 8 TABLET, FILM COATED ORAL at 04:39

## 2019-04-11 RX ADMIN — MORPHINE SULFATE 4 MILLIGRAM(S): 50 CAPSULE, EXTENDED RELEASE ORAL at 00:27

## 2019-04-11 RX ADMIN — AMLODIPINE BESYLATE 10 MILLIGRAM(S): 2.5 TABLET ORAL at 04:46

## 2019-04-11 RX ADMIN — MORPHINE SULFATE 4 MILLIGRAM(S): 50 CAPSULE, EXTENDED RELEASE ORAL at 20:54

## 2019-04-11 RX ADMIN — Medication 1 GRAM(S): at 04:46

## 2019-04-11 RX ADMIN — MORPHINE SULFATE 4 MILLIGRAM(S): 50 CAPSULE, EXTENDED RELEASE ORAL at 11:00

## 2019-04-11 RX ADMIN — PANTOPRAZOLE SODIUM 40 MILLIGRAM(S): 20 TABLET, DELAYED RELEASE ORAL at 17:18

## 2019-04-11 RX ADMIN — MORPHINE SULFATE 4 MILLIGRAM(S): 50 CAPSULE, EXTENDED RELEASE ORAL at 10:45

## 2019-04-11 NOTE — DIETITIAN INITIAL EVALUATION ADULT. - NS AS NUTRI INTERV ED CONTENT
Encouraged continued good PO intake, emphasis on continuing to limit fat, salt in diet and consuming balanced meals and taking Ensure Clear as needed.

## 2019-04-11 NOTE — PROGRESS NOTE ADULT - ASSESSMENT
Impression:  1. Chronic pancreatitis with PD calcifications and dilated main PD (8 mm at pancreatic tail)  2. Hypertension    Plan:  - Imaging reviewed with Biliary and Radiology attendings  - Recommend Surgery consult (Dr. Molina) for further evaluation  - Will consider elective ERCP with PD stenting and additional maneuvers  - Rest of GI care per Dr. Gresham  - Supportive care per primary team  - Page with questions Impression:  1. Chronic pancreatitis with PD calcifications and dilated main PD (8 mm at pancreatic tail)  2. Hypertension    Plan:  - Imaging reviewed with Biliary and Radiology attendings  - Recommend Surgery consult (Dr. Molina) for further input  - Will consider elective ERCP with PD stenting and additional maneuvers  - Rest of GI care per Dr. Gresham  - Supportive care per primary team  - Page with questions

## 2019-04-11 NOTE — DIETITIAN INITIAL EVALUATION ADULT. - NUTRITIONGOAL OUTCOME1
Father picked up: prescription. Identity was verified: Yes  [address] [facility].
Parent left  requesting a refill of Ritalin 10 mg and Concerta 36 mg. Mom would also like refills of risperdal and citalopram.  Parent states this medication is working well for [employer]. The [place] ePDMP has been checked and is consistent with our medication record in [address] [place] [address]. Printed for physician review. Last Refill: 11/4/17--only 4 tablets of each ordered    Last Medication Check: 11/10/17      Parent will come to pediatric office to  prescription.
pt will remain within 95% of current wt, have no further wt loss

## 2019-04-11 NOTE — DIETITIAN INITIAL EVALUATION ADULT. - PROBLEM SELECTOR PLAN 2
Pt with reported bleeding in emesis after a full day of vomiting and poor PO intake. Concern for Carolynn Martinez tear.   CBC shows stable Hgb, no anemia. Hgb 14.1.  No bloody emesis since patient presented to ED. Continue to monitor.  - GI consult emailed overnight.  - started on IV protonix 40 mg BID

## 2019-04-11 NOTE — PROGRESS NOTE ADULT - ATTENDING COMMENTS
Advanced care planning was discussed with patient and family.  Advanced care planning forms were reviewed and discussed.  Risks, benefits and alternatives of gastroenterologic procedures were discussed in detail and all questions were answered.    30 minutes spent.
pain out of proportion to radiographic findings  cont creon  eus eval  may be candidate fo islet cell transplant in the future
patient s/e  case discussed with Adonay Alvarez  patient appears to have multiple intraductal pancreatic stones causing pancreatitic obstruction  stone removal might be futile; rather than indefinite pancreatic stent changes done endoscopically  patient may benefit from pancreatic bypass surgery (pancreato-jejunostomy) for more controlled drainage  Dr. Molina to get involved  this was d/w with patient and primary team
The patient is a 9m Male complaining of fever.

## 2019-04-11 NOTE — DIETITIAN INITIAL EVALUATION ADULT. - OTHER INFO
Pt seen for LOS. Reports usual wt is about 115-120 pounds but usually she will lose wt when she is admitted and not feeling well, noted wt per flow sheets of 107.5 pounds, pt reports this is typical of what happens in house and then once she is home she is able to regain the wt lost. Pt reports NKFA. States at home she takes Creon with her meals, consumes small portions as tolerated; reports she also takes multivitamin, vitamin D and vitamin B12 at home. Reports she has tried Ensure Clear ordered in house and does seem to tolerate it, has tried Ensure shakes in the past and did not tolerate. State she is considering getting Ensure Clear when home.

## 2019-04-11 NOTE — DIETITIAN INITIAL EVALUATION ADULT. - PROBLEM SELECTOR PLAN 4
DVT ppx: SCDs  Diet: Clear liquids, advance as tolerated    Patt Morales MD PGY2  Medicine Night Admit Resident  Pager [phone]

## 2019-04-11 NOTE — DIETITIAN INITIAL EVALUATION ADULT. - PROBLEM SELECTOR PLAN 1
CT abdomen/pelvis showing acute on chronic pancreatitis pain likely, no other acute pathology noted. Also, pts abdominal pain reminiscent of previous episodes.  - Continue IV morphine 4 mg q6h PRN for severe pain (roughly equivalent to her home dose, but she has not taken max doses at home)  - Continue zofran 8 mg IV q6h PRN for nausea and to help with PO intake.  - Clear liquids for now, can advance as tolerated.   - S/p 2L LR bolus, continue maintenance IVF due to dehydration and poor PO intake.  Continue Creon supplements 24,000U TID.    ISTOP reference # [id number]

## 2019-04-11 NOTE — DIETITIAN INITIAL EVALUATION ADULT. - FACTORS AFF FOOD INTAKE
pt reports in house her appetite has been better and denies any further N+V; reports no chewing/swallowing issues; states regular daily BMs

## 2019-04-11 NOTE — DIETITIAN INITIAL EVALUATION ADULT. - ADHERENCE
low fat, low lactose as tolerated at home per pt and diet recall; pt reports trying to limit addition of salt to cooking/good

## 2019-04-11 NOTE — DIETITIAN INITIAL EVALUATION ADULT. - PERTINENT LABORATORY DATA
4/7: cholesterol 178, triglyceride 108, 4/9: glucose 124- slight elevation noted- would continue to monitor

## 2019-04-11 NOTE — PROGRESS NOTE ADULT - SUBJECTIVE AND OBJECTIVE BOX
Chief Complaint:  Patient is a 63y old  Female who presents with a chief complaint of abdominal pain (2019 12:17)      Interval Events:     Allergies:  diazepam (Other)      Hospital Medications:  acetaminophen   Tablet .. 650 milliGRAM(s) Oral every 6 hours PRN  amLODIPine   Tablet 10 milliGRAM(s) Oral daily  metoprolol succinate  milliGRAM(s) Oral daily  morphine  - Injectable 4 milliGRAM(s) IV Push every 3 hours PRN  morphine  - Injectable 1 milliGRAM(s) IV Push every 8 hours PRN  ondansetron Injectable 8 milliGRAM(s) IV Push every 6 hours PRN  pancrelipase  (CREON 12,000 Lipase Units) 2 Capsule(s) Oral three times a day  pantoprazole  Injectable 40 milliGRAM(s) IV Push every 12 hours  sucralfate suspension 1 Gram(s) Oral two times a day  traMADol 25 milliGRAM(s) Oral two times a day PRN      PMHX/PSHX:  Insomnia  ARDS survivor  Chronic pancreatitis  Hypokalemia  GERD (gastroesophageal reflux disease)  HTN (hypertension)  S/P hip replacement, right  S/P arthroscopy of shoulder  S/P hip replacement  S/P arthroscopy of shoulder      Family history:  Family history of alcohol abuse  No pertinent family history in first degree relatives      ROS: Negative, except as otherwise noted above    PHYSICAL EXAM:   Vital Signs:  Vital Signs Last 24 Hrs  T(C): 36.9 (2019 09:03), Max: 37.1 (10 Apr 2019 15:40)  T(F): 98.5 (2019 09:03), Max: 98.8 (10 Apr 2019 15:40)  HR: 71 (2019 09:03) (67 - 79)  BP: 146/83 (2019 09:03) (142/77 - 153/81)  BP(mean): --  RR: 18 (2019 09:03) (18 - 18)  SpO2: 95% (2019 09:03) (95% - 95%)  Daily     Daily Weight in k.1 (2019 11:24)    GENERAL: No acute distress    HEENT:  Anicteric, no thrush  CHEST:  Non-labored breathing, lungs clear b/l  HEART:  +s1, s2 heart sounds, no murmurs  ABDOMEN:    EXTREMITIES:  Warm and well perfused, no edema  SKIN:    NEURO:       LABS:  CBC Full  -  ( 2019 15:11 )  WBC Count : 7.55 K/uL  Hemoglobin : 12.7 g/dL  Hematocrit : 42.1 %  Platelet Count - Automated : 176 K/uL  Mean Cell Volume : 90.3 fl  Auto Neutrophil # :   Auto Neutrophil % : Chief Complaint:  Patient is a 63y old  Female who presents with a chief complaint of abdominal pain (2019 12:17)      Interval Events: Patient with abdominal pain, controlled with medications. She denies vomiting, hematemesis, melena, hematochezia, pruritus.     Allergies:  diazepam (Other)      Hospital Medications:  acetaminophen   Tablet .. 650 milliGRAM(s) Oral every 6 hours PRN  amLODIPine   Tablet 10 milliGRAM(s) Oral daily  metoprolol succinate  milliGRAM(s) Oral daily  morphine  - Injectable 4 milliGRAM(s) IV Push every 3 hours PRN  morphine  - Injectable 1 milliGRAM(s) IV Push every 8 hours PRN  ondansetron Injectable 8 milliGRAM(s) IV Push every 6 hours PRN  pancrelipase  (CREON 12,000 Lipase Units) 2 Capsule(s) Oral three times a day  pantoprazole  Injectable 40 milliGRAM(s) IV Push every 12 hours  sucralfate suspension 1 Gram(s) Oral two times a day  traMADol 25 milliGRAM(s) Oral two times a day PRN      PMHX/PSHX:  Insomnia  ARDS survivor  Chronic pancreatitis  Hypokalemia  GERD (gastroesophageal reflux disease)  HTN (hypertension)  S/P hip replacement, right  S/P arthroscopy of shoulder  S/P hip replacement  S/P arthroscopy of shoulder      Family history:  Family history of alcohol abuse  No pertinent family history in first degree relatives      ROS: Negative, except as otherwise noted above    PHYSICAL EXAM:   Vital Signs:  Vital Signs Last 24 Hrs  T(C): 36.9 (2019 09:03), Max: 37.1 (10 Apr 2019 15:40)  T(F): 98.5 (2019 09:03), Max: 98.8 (10 Apr 2019 15:40)  HR: 71 (2019 09:03) (67 - 79)  BP: 146/83 (2019 09:03) (142/77 - 153/81)  BP(mean): --  RR: 18 (2019 09:03) (18 - 18)  SpO2: 95% (2019 09:03) (95% - 95%)  Daily     Daily Weight in k.1 (2019 11:24)    GENERAL:  No acute distress  HEENT:  Anicteric  ABDOMEN:  Soft, mildly tender diffusely, no rebound, no guarding  SKIN:  No rash  NEURO:  Awake, interactive, following commands    LABS:  CBC Full  -  ( 2019 15:11 )  WBC Count : 7.55 K/uL  Hemoglobin : 12.7 g/dL  Hematocrit : 42.1 %  Platelet Count - Automated : 176 K/uL  Mean Cell Volume : 90.3 fl  Auto Neutrophil # :   Auto Neutrophil % :

## 2019-04-11 NOTE — PROGRESS NOTE ADULT - SUBJECTIVE AND OBJECTIVE BOX
INTERVAL HPI/OVERNIGHT EVENTS:    Pt seen and examined  worse pain this morning prior to morphine  Pt denies n/v  tolerating PO well.     MEDICATIONS  (STANDING):  amLODIPine   Tablet 10 milliGRAM(s) Oral daily  metoprolol succinate  milliGRAM(s) Oral daily  pancrelipase  (CREON 12,000 Lipase Units) 2 Capsule(s) Oral three times a day  pantoprazole  Injectable 40 milliGRAM(s) IV Push every 12 hours  sucralfate suspension 1 Gram(s) Oral two times a day    MEDICATIONS  (PRN):  acetaminophen   Tablet .. 650 milliGRAM(s) Oral every 6 hours PRN Mild Pain (1 - 3), Moderate Pain (4 - 6)  morphine  - Injectable 4 milliGRAM(s) IV Push every 3 hours PRN Severe Pain (7 - 10)  morphine  - Injectable 1 milliGRAM(s) IV Push every 8 hours PRN breakthrough pain  ondansetron Injectable 8 milliGRAM(s) IV Push every 6 hours PRN Nausea and/or Vomiting  traMADol 25 milliGRAM(s) Oral two times a day PRN Severe Pain (7 - 10)      Allergies    diazepam (Other)    Intolerances        Review of Systems:    General:  No wt loss, fevers, chills, night sweats,fatigue,   Eyes:  Good vision, no reported pain  ENT:  No sore throat, pain, runny nose, dysphagia  CV:  No pain, palpitations, hypo/hypertension  Resp:  No dyspnea, cough, tachypnea, wheezing  GI:  + pain, No nausea, No vomiting, No diarrhea, No constipation, No weight loss, No fever, No pruritis, No rectal bleeding, No melena, No dysphagia  :  No pain, bleeding, incontinence, nocturia  Muscle:  No pain, weakness  Neuro:  No weakness, tingling, memory problems  Psych:  No fatigue, insomnia, mood problems, depression  Endocrine:  No polyuria, polydypsia, cold/heat intolerance  Heme:  No petechiae, ecchymosis, easy bruisability  Skin:  No rash, tattoos, scars, edema      Vital Signs Last 24 Hrs  T(C): 36.9 (11 Apr 2019 09:03), Max: 37.1 (10 Apr 2019 15:40)  T(F): 98.5 (11 Apr 2019 09:03), Max: 98.8 (10 Apr 2019 15:40)  HR: 71 (11 Apr 2019 09:03) (67 - 79)  BP: 146/83 (11 Apr 2019 09:03) (142/77 - 153/81)  BP(mean): --  RR: 18 (11 Apr 2019 09:03) (18 - 18)  SpO2: 95% (11 Apr 2019 09:03) (95% - 95%)    PHYSICAL EXAM:    Constitutional: NAD, well-developed  HEENT: EOMI, throat clear  Neck: No LAD, supple  Respiratory: CTA and P  Cardiovascular: S1 and S2, RRR, no M  Gastrointestinal: BS+, soft, NT/ND, neg HSM,  Extremities: No peripheral edema, neg clubing, cyanosis  Vascular: 2+ peripheral pulses  Neurological: A/O x 3, no focal deficits  Psychiatric: Normal mood, normal affect  Skin: No rashes      LABS:                        12.7   7.55  )-----------( 176      ( 09 Apr 2019 15:11 )             42.1                 RADIOLOGY & ADDITIONAL TESTS:

## 2019-04-11 NOTE — DIETITIAN INITIAL EVALUATION ADULT. - ENERGY NEEDS
ht: 5'4", wt:115 pounds, BMI:19.7 kg/m2, IBW:120 pounds +/- 10%     Pt admitted c abdominal pain, pt c acute on chronic pancreatitis, S/P MRCP, noted pt c Carolynn Martinez tear, noted pt c adrenal adenoma (plan for outpatient follow up), noted pain being managed by team at this time.

## 2019-04-11 NOTE — DIETITIAN INITIAL EVALUATION ADULT. - PHYSICAL APPEARANCE
pt agreeable to nutrition focused physical exam, pt overall well nourished, no overt signs of muscle or fat wasting, some puffiness around eyes but otherwise pt appears to generally be thin

## 2019-04-11 NOTE — PROGRESS NOTE ADULT - ASSESSMENT
chronic/acute pancreatitis    pain management consultation  tramadol po  recommend weaning off narcotics as pain is out of proportion to radiologic findings    ct scan reviewed   in and out  ivf aggressively  ppi once a day    serial lft and amylase and lipase  mrcp negative  given continued pain, advanced practice GI called for possible EUS, await their recommendations chronic/acute pancreatitis    pain management consultation  tramadol po  recommend weaning off narcotics as pain is out of proportion to radiographic findings    ct scan reviewed   in and out  ivf aggressively  ppi once a day    serial lft and amylase and lipase  mrcp negative  given continued pain, [facility] practice GI called for possible EUS, await their recommendations chronic/acute pancreatitis    pain control  tramadol po    ct scan reviewed   in and out  ivf aggressively  ppi once a day    serial lft and amylase and lipase  mrcp negative  given continued pain, advanced practice GI called for possible EUS, await their recommendations

## 2019-04-11 NOTE — DIETITIAN INITIAL EVALUATION ADULT. - ORAL INTAKE PTA
except for right before admission due to having some N+V and pain; usually pt consumes fruits, vegetables, low fat foods, chicken or fish and some starches/good

## 2019-04-11 NOTE — CHART NOTE - NSCHARTNOTEFT_GEN_A_CORE
c/o right fore arm pain swelling, redness from the iv line, dopp US ordered to r/o DVT, NO NARCOTICS, cont tramadol, called private GI team and I was told house GI was called for EUS, as soon as they know anything,  Surgery was called by GI and consult is being progress for pancreatic duct stone.  Jason Miranda (PA) [facility] # [id number]

## 2019-04-12 ENCOUNTER — TRANSCRIPTION ENCOUNTER (OUTPATIENT)
Age: 64
End: 2019-04-12

## 2019-04-12 VITALS
RESPIRATION RATE: 19 BRPM | OXYGEN SATURATION: 95 % | TEMPERATURE: 99 F | SYSTOLIC BLOOD PRESSURE: 119 MMHG | DIASTOLIC BLOOD PRESSURE: 77 MMHG | HEART RATE: 87 BPM

## 2019-04-12 PROCEDURE — 93971 EXTREMITY STUDY: CPT

## 2019-04-12 PROCEDURE — 80048 BASIC METABOLIC PNL TOTAL CA: CPT

## 2019-04-12 PROCEDURE — 80053 COMPREHEN METABOLIC PANEL: CPT

## 2019-04-12 PROCEDURE — 86901 BLOOD TYPING SEROLOGIC RH(D): CPT

## 2019-04-12 PROCEDURE — 83605 ASSAY OF LACTIC ACID: CPT

## 2019-04-12 PROCEDURE — 82435 ASSAY OF BLOOD CHLORIDE: CPT

## 2019-04-12 PROCEDURE — 96375 TX/PRO/DX INJ NEW DRUG ADDON: CPT

## 2019-04-12 PROCEDURE — 85610 PROTHROMBIN TIME: CPT

## 2019-04-12 PROCEDURE — 83735 ASSAY OF MAGNESIUM: CPT

## 2019-04-12 PROCEDURE — 85730 THROMBOPLASTIN TIME PARTIAL: CPT

## 2019-04-12 PROCEDURE — 93005 ELECTROCARDIOGRAM TRACING: CPT

## 2019-04-12 PROCEDURE — 96376 TX/PRO/DX INJ SAME DRUG ADON: CPT

## 2019-04-12 PROCEDURE — 99239 HOSP IP/OBS DSCHRG MGMT >30: CPT

## 2019-04-12 PROCEDURE — 74177 CT ABD & PELVIS W/CONTRAST: CPT

## 2019-04-12 PROCEDURE — 99285 EMERGENCY DEPT VISIT HI MDM: CPT | Mod: 25

## 2019-04-12 PROCEDURE — 82465 ASSAY BLD/SERUM CHOLESTEROL: CPT

## 2019-04-12 PROCEDURE — 85027 COMPLETE CBC AUTOMATED: CPT

## 2019-04-12 PROCEDURE — 86301 IMMUNOASSAY TUMOR CA 19-9: CPT

## 2019-04-12 PROCEDURE — 82803 BLOOD GASES ANY COMBINATION: CPT

## 2019-04-12 PROCEDURE — 84100 ASSAY OF PHOSPHORUS: CPT

## 2019-04-12 PROCEDURE — 82947 ASSAY GLUCOSE BLOOD QUANT: CPT

## 2019-04-12 PROCEDURE — 82787 IGG 1 2 3 OR 4 EACH: CPT

## 2019-04-12 PROCEDURE — 82150 ASSAY OF AMYLASE: CPT

## 2019-04-12 PROCEDURE — 82378 CARCINOEMBRYONIC ANTIGEN: CPT

## 2019-04-12 PROCEDURE — 84295 ASSAY OF SERUM SODIUM: CPT

## 2019-04-12 PROCEDURE — 85014 HEMATOCRIT: CPT

## 2019-04-12 PROCEDURE — 96374 THER/PROPH/DIAG INJ IV PUSH: CPT | Mod: XU

## 2019-04-12 PROCEDURE — 84443 ASSAY THYROID STIM HORMONE: CPT

## 2019-04-12 PROCEDURE — 82330 ASSAY OF CALCIUM: CPT

## 2019-04-12 PROCEDURE — 84478 ASSAY OF TRIGLYCERIDES: CPT

## 2019-04-12 PROCEDURE — 84132 ASSAY OF SERUM POTASSIUM: CPT

## 2019-04-12 PROCEDURE — 83690 ASSAY OF LIPASE: CPT

## 2019-04-12 PROCEDURE — 74181 MRI ABDOMEN W/O CONTRAST: CPT

## 2019-04-12 PROCEDURE — 71046 X-RAY EXAM CHEST 2 VIEWS: CPT

## 2019-04-12 PROCEDURE — 86038 ANTINUCLEAR ANTIBODIES: CPT

## 2019-04-12 PROCEDURE — 86850 RBC ANTIBODY SCREEN: CPT

## 2019-04-12 PROCEDURE — 86900 BLOOD TYPING SEROLOGIC ABO: CPT

## 2019-04-12 RX ORDER — DOCUSATE SODIUM 100 MG
100 CAPSULE ORAL DAILY
Qty: 0 | Refills: 0 | Status: DISCONTINUED | OUTPATIENT
Start: 2019-04-12 | End: 2019-04-12

## 2019-04-12 RX ORDER — PREGABALIN 225 MG/1
0 CAPSULE ORAL
Qty: 0 | Refills: 0 | COMMUNITY

## 2019-04-12 RX ORDER — SUCRALFATE 1 G
10 TABLET ORAL
Qty: 600 | Refills: 0 | OUTPATIENT
Start: 2019-04-12 | End: 2019-05-11

## 2019-04-12 RX ORDER — DOCUSATE SODIUM 100 MG
1 CAPSULE ORAL
Qty: 0 | Refills: 0 | COMMUNITY
Start: 2019-04-12

## 2019-04-12 RX ORDER — SUCRALFATE 1 G
10 TABLET ORAL
Qty: 0 | Refills: 0 | COMMUNITY
Start: 2019-04-12

## 2019-04-12 RX ORDER — ONDANSETRON 8 MG/1
1 TABLET, FILM COATED ORAL
Qty: 12 | Refills: 0
Start: 2019-04-12 | End: 2019-04-14

## 2019-04-12 RX ADMIN — HEPARIN SODIUM 5000 UNIT(S): 5000 INJECTION INTRAVENOUS; SUBCUTANEOUS at 05:39

## 2019-04-12 RX ADMIN — MORPHINE SULFATE 4 MILLIGRAM(S): 50 CAPSULE, EXTENDED RELEASE ORAL at 16:13

## 2019-04-12 RX ADMIN — HEPARIN SODIUM 5000 UNIT(S): 5000 INJECTION INTRAVENOUS; SUBCUTANEOUS at 13:08

## 2019-04-12 RX ADMIN — MORPHINE SULFATE 1 MILLIGRAM(S): 50 CAPSULE, EXTENDED RELEASE ORAL at 02:18

## 2019-04-12 RX ADMIN — MORPHINE SULFATE 4 MILLIGRAM(S): 50 CAPSULE, EXTENDED RELEASE ORAL at 05:53

## 2019-04-12 RX ADMIN — MORPHINE SULFATE 4 MILLIGRAM(S): 50 CAPSULE, EXTENDED RELEASE ORAL at 13:05

## 2019-04-12 RX ADMIN — Medication 100 MILLIGRAM(S): at 05:37

## 2019-04-12 RX ADMIN — MORPHINE SULFATE 4 MILLIGRAM(S): 50 CAPSULE, EXTENDED RELEASE ORAL at 13:20

## 2019-04-12 RX ADMIN — ONDANSETRON 8 MILLIGRAM(S): 8 TABLET, FILM COATED ORAL at 05:37

## 2019-04-12 RX ADMIN — AMLODIPINE BESYLATE 10 MILLIGRAM(S): 2.5 TABLET ORAL at 05:37

## 2019-04-12 RX ADMIN — MORPHINE SULFATE 4 MILLIGRAM(S): 50 CAPSULE, EXTENDED RELEASE ORAL at 05:38

## 2019-04-12 RX ADMIN — PANTOPRAZOLE SODIUM 40 MILLIGRAM(S): 20 TABLET, DELAYED RELEASE ORAL at 05:38

## 2019-04-12 RX ADMIN — MORPHINE SULFATE 1 MILLIGRAM(S): 50 CAPSULE, EXTENDED RELEASE ORAL at 02:03

## 2019-04-12 RX ADMIN — Medication 100 MILLIGRAM(S): at 06:22

## 2019-04-12 RX ADMIN — MORPHINE SULFATE 4 MILLIGRAM(S): 50 CAPSULE, EXTENDED RELEASE ORAL at 09:50

## 2019-04-12 RX ADMIN — Medication 1 GRAM(S): at 05:37

## 2019-04-12 RX ADMIN — MORPHINE SULFATE 4 MILLIGRAM(S): 50 CAPSULE, EXTENDED RELEASE ORAL at 10:05

## 2019-04-12 RX ADMIN — Medication 2 CAPSULE(S): at 09:51

## 2019-04-12 NOTE — PROGRESS NOTE ADULT - SUBJECTIVE AND OBJECTIVE BOX
Patient is a 63y old  Female who presents with a chief complaint of abdominal pain (12 Apr 2019 07:41)      SUBJECTIVE / OVERNIGHT EVENTS: no acute events overnight, swelling on RUE is improving     MEDICATIONS  (STANDING):  amLODIPine   Tablet 10 milliGRAM(s) Oral daily  docusate sodium 100 milliGRAM(s) Oral daily  heparin  Injectable 5000 Unit(s) SubCutaneous every 8 hours  metoprolol succinate  milliGRAM(s) Oral daily  pancrelipase  (CREON 12,000 Lipase Units) 2 Capsule(s) Oral three times a day  pantoprazole  Injectable 40 milliGRAM(s) IV Push every 12 hours  sucralfate suspension 1 Gram(s) Oral two times a day    MEDICATIONS  (PRN):  acetaminophen   Tablet .. 650 milliGRAM(s) Oral every 6 hours PRN Mild Pain (1 - 3), Moderate Pain (4 - 6)  morphine  - Injectable 4 milliGRAM(s) IV Push every 3 hours PRN Severe Pain (7 - 10)  morphine  - Injectable 1 milliGRAM(s) IV Push every 8 hours PRN breakthrough pain  ondansetron Injectable 8 milliGRAM(s) IV Push every 6 hours PRN Nausea and/or Vomiting  traMADol 25 milliGRAM(s) Oral two times a day PRN Severe Pain (7 - 10)      Vital Signs Last 24 Hrs  T(C): 37.3 (12 Apr 2019 08:17), Max: 37.4 (11 Apr 2019 16:11)  T(F): 99.1 (12 Apr 2019 08:17), Max: 99.3 (11 Apr 2019 16:11)  HR: 77 (12 Apr 2019 08:17) (77 - 96)  BP: 130/74 (12 Apr 2019 08:17) (130/74 - 165/84)  BP(mean): --  RR: 18 (12 Apr 2019 08:17) (18 - 18)  SpO2: 94% (12 Apr 2019 08:17) (94% - 98%)  CAPILLARY BLOOD GLUCOSE        I&O's Summary    11 Apr 2019 07:01  -  12 Apr 2019 07:00  --------------------------------------------------------  IN: 940 mL / OUT: 0 mL / NET: 940 mL      PHYSICAL EXAM:  GENERAL: NAD  EYES:  conjunctiva and sclera clear  NECK: Supple, No JVD  CHEST/LUNG: Clear to auscultation bilaterally; No wheeze  HEART: +S1/S2, reg   ABDOMEN: Soft, Nontender, Nondistended; Bowel sounds present  EXTREMITIES: RUE with mild erythema and tenderness at previous IV site  PSYCH: AAOx3

## 2019-04-12 NOTE — PROGRESS NOTE ADULT - SUBJECTIVE AND OBJECTIVE BOX
INTERVAL HPI/OVERNIGHT EVENTS:    Pt seen and examined.   still requiring morphine around the clock for abdominal pain, which seems to control symptoms   tolerating PO well, no N/V     MEDICATIONS  (STANDING):  amLODIPine   Tablet 10 milliGRAM(s) Oral daily  docusate sodium 100 milliGRAM(s) Oral daily  heparin  Injectable 5000 Unit(s) SubCutaneous every 8 hours  metoprolol succinate  milliGRAM(s) Oral daily  pancrelipase  (CREON 12,000 Lipase Units) 2 Capsule(s) Oral three times a day  pantoprazole  Injectable 40 milliGRAM(s) IV Push every 12 hours  sucralfate suspension 1 Gram(s) Oral two times a day    MEDICATIONS  (PRN):  acetaminophen   Tablet .. 650 milliGRAM(s) Oral every 6 hours PRN Mild Pain (1 - 3), Moderate Pain (4 - 6)  morphine  - Injectable 4 milliGRAM(s) IV Push every 3 hours PRN Severe Pain (7 - 10)  morphine  - Injectable 1 milliGRAM(s) IV Push every 8 hours PRN breakthrough pain  ondansetron Injectable 8 milliGRAM(s) IV Push every 6 hours PRN Nausea and/or Vomiting  traMADol 25 milliGRAM(s) Oral two times a day PRN Severe Pain (7 - 10)      Allergies    diazepam (Other)    Intolerances        Review of Systems:    General:  No wt loss, fevers, chills, night sweats,fatigue,   Eyes:  Good vision, no reported pain  ENT:  No sore throat, pain, runny nose, dysphagia  CV:  No pain, palpitations, hypo/hypertension  Resp:  No dyspnea, cough, tachypnea, wheezing  GI:  + pain, No nausea, No vomiting, No diarrhea, No constipation, No weight loss, No fever, No pruritis, No rectal bleeding, No melena, No dysphagia  :  No pain, bleeding, incontinence, nocturia  Muscle:  No pain, weakness  Neuro:  No weakness, tingling, memory problems  Psych:  No fatigue, insomnia, mood problems, depression  Endocrine:  No polyuria, polydypsia, cold/heat intolerance  Heme:  No petechiae, ecchymosis, easy bruisability  Skin:  No rash, tattoos, scars, edema      Vital Signs Last 24 Hrs  T(C): 37.3 (12 Apr 2019 08:17), Max: 37.4 (11 Apr 2019 16:11)  T(F): 99.1 (12 Apr 2019 08:17), Max: 99.3 (11 Apr 2019 16:11)  HR: 77 (12 Apr 2019 08:17) (77 - 96)  BP: 130/74 (12 Apr 2019 08:17) (130/74 - 165/84)  BP(mean): --  RR: 18 (12 Apr 2019 08:17) (18 - 18)  SpO2: 94% (12 Apr 2019 08:17) (94% - 98%)    PHYSICAL EXAM:    Constitutional: NAD, well-developed  HEENT: EOMI, throat clear  Neck: No LAD, supple  Respiratory: CTA and P  Cardiovascular: S1 and S2, RRR, no M  Gastrointestinal: BS+, soft, NT/ND, neg HSM,  Extremities: No peripheral edema, neg clubing, cyanosis  Vascular: 2+ peripheral pulses  Neurological: A/O x 3, no focal deficits  Psychiatric: Normal mood, normal affect  Skin: No rashes      LABS:                RADIOLOGY & ADDITIONAL TESTS:

## 2019-04-12 NOTE — CONSULT NOTE ADULT - SUBJECTIVE AND OBJECTIVE BOX
Surgery Consultation Note  =====================================================  HPI:  63F with HTN and chronic pancreatitis presents with recurrent epigastric abdominal pain radiating to the back, along with diffuse abdominal pain, for the past 2 days, associated with nausea and multiple episodes of vomiting. 2 days ago, patient began to vomit with poor PO tolerance, vomited >7 times that day, and the following day her vomit turned dark and she reports bloody emesis. Last vomit was prior to coming to ER. Pt denies diarrhea or blood in stool. Pt states that the epigastric pain radiating to the back is typical of her pancreatitis pain, which she gets every week. She states she was diagnosed first with pancreatitis about 5 years ago and gets flareups on a regular basis. States she has never abused alcohol, never had gallstones or hypertriglyceridemia. Pt denies eating unusual foods prior to start of abdominal pain and emesis. She cooks most of her food at home.  Pt is prescribed morphine 15 mg q6h PRN at home and states she took 2 morphine pills the day prior to admission and they did not help her pain.     IN the ED, patient afebrile, T 98.7, HR initially 134, /95, satting 97% on room air. Hgb within normal limits, 14.1, CT abdomen/pelvis showing pancreatic ductal dilatation consistent with chronic pancreatitis. Pt given 2L IV LR bolus, morphine 4 mg IV x 1, and zofran 4 mg x 1. (05 Apr 2019 03:29)    Patient states that her pain has improved since admission but that she is still suffering chronic abdominal pain. She also states that her nausea/vomiting have resolved and that she has not had any episodes of hematemesis since admission. She also denies any recent fevers, chills, nausea, vomiting, diarrhea, or constipation.     Allergies: diazepam (Other)    PAST MEDICAL & SURGICAL HISTORY:  Insomnia  ARDS survivor  Chronic pancreatitis  Hypokalemia  GERD (gastroesophageal reflux disease)  HTN (hypertension)  S/P hip replacement, right: May 2016  S/P arthroscopy of shoulder: right - 2014  S/P hip replacement: left - 2010  S/P arthroscopy of shoulder: left in 2009    FAMILY HISTORY:  Family history of alcohol abuse    SOCIAL HISTORY:   Denies EtOH, smoking, drug use  Lives at home with , retired w/ extensive volunteering     ADVANCE DIRECTIVES: Presumed Full Code    REVIEW OF SYSTEMS:    General: Non-Contributory  Skin/Breast: Non-Contributory  Ophthalmologic: Non-Contributory  ENMT: Non-Contributory  Respiratory and Thorax: Non-Contributory  Cardiovascular: Non-Contributory  Gastrointestinal: Non-Contributory  Genitourinary: Non-Contributory  Musculoskeletal: Non-Contributory  Neurological: Non-Contributory  Psychiatric: Non-Contributory  Hematology/Lymphatics: Non-Contributory  Endocrine: Non-Contributory  Allergic/Immunologic: Non-Contributory    HOME MEDICATIONS:   Creon 24,000 units oral delayed release capsule: 1 cap(s) orally 3 times a day (05 Apr 2019 03:20)  Metoprolol Succinate  mg oral tablet, extended release: 1 tab(s) orally once a day (05 Apr 2019 03:20)  morphine 15 mg oral tablet: 1 tab(s) orally every 6 hours, As Needed (05 Apr 2019 03:20)  Multiple Vitamins oral tablet: 1 tab(s) orally once a day (05 Apr 2019 03:20)  Norvasc 10 mg oral tablet: 1 tab(s) orally once a day (05 Apr 2019 03:20)  Vitamin B12:  (05 Apr 2019 03:20)  Vitamin D3 1000 intl units oral tablet: 1 tab(s) orally once a day (05 Apr 2019 03:20)    CURRENT MEDICATIONS:   --------------------------------------------------------------------------------------  Neurologic Medications  acetaminophen   Tablet .. 650 milliGRAM(s) Oral every 6 hours PRN Mild Pain (1 - 3), Moderate Pain (4 - 6)  morphine  - Injectable 4 milliGRAM(s) IV Push every 3 hours PRN Severe Pain (7 - 10)  morphine  - Injectable 1 milliGRAM(s) IV Push every 8 hours PRN breakthrough pain  ondansetron Injectable 8 milliGRAM(s) IV Push every 6 hours PRN Nausea and/or Vomiting  traMADol 25 milliGRAM(s) Oral two times a day PRN Severe Pain (7 - 10)    Respiratory Medications    Cardiovascular Medications  amLODIPine   Tablet 10 milliGRAM(s) Oral daily  metoprolol succinate  milliGRAM(s) Oral daily    Gastrointestinal Medications  docusate sodium 100 milliGRAM(s) Oral daily  pancrelipase  (CREON 12,000 Lipase Units) 2 Capsule(s) Oral three times a day  pantoprazole  Injectable 40 milliGRAM(s) IV Push every 12 hours  sucralfate suspension 1 Gram(s) Oral two times a day    Genitourinary Medications    Hematologic/Oncologic Medications  heparin  Injectable 5000 Unit(s) SubCutaneous every 8 hours    Antimicrobial/Immunologic Medications    Endocrine/Metabolic Medications    Topical/Other Medications    --------------------------------------------------------------------------------------  VITAL SIGNS, INS/OUTS (last 24 hours):  --------------------------------------------------------------------------------------  Vital Signs Last 24 Hrs  T(C): 37.3 (12 Apr 2019 08:17), Max: 37.4 (11 Apr 2019 16:11)  T(F): 99.1 (12 Apr 2019 08:17), Max: 99.3 (11 Apr 2019 16:11)  HR: 77 (12 Apr 2019 08:17) (77 - 96)  BP: 130/74 (12 Apr 2019 08:17) (130/74 - 165/84)  BP(mean): --  RR: 18 (12 Apr 2019 08:17) (18 - 18)  SpO2: 94% (12 Apr 2019 08:17) (94% - 98%)  --------------------------------------------------------------------------------------    EXAM  General: NAD, resting comfortably  Resp: unlabored breathing on RA  CV: HDS, rrr  Abd: soft, nontender to deep palpation, nondistended  Extr: wwp     LABS  --------------------------------------------------------------------------------------  none  --------------------------------------------------------------------------------------    OTHER LABS    IMAGING RESULTS  < from: CT Abdomen and Pelvis w/ Oral Cont and w/ IV Cont (04.04.19 @ 22:38) >  LOWER CHEST: Within normal limits.    LIVER: Within normal limits.  BILE DUCTS: Normal caliber.  GALLBLADDER: Within normal limits.  SPLEEN: Within normal limits.  PANCREAS: Redemonstration of multiple calcifications throughout the   pancreas with pancreatic ductaldilatation compatible with chronic   pancreatitis.   ADRENALS: Indeterminate 1.2 x 0.8 cm right adrenal lesion, not   significantly changed. Left adrenal gland within normal limits.  KIDNEYS/URETERS: Bilateral renal cysts and subcentimeter hypoattenuating   foci too small to characterize.    BLADDER: Limited evaluation due to streak artifact from bilateral hip   arthroplasties.  REPRODUCTIVE ORGANS: The uterus appears within normal limits. The adnexa   are difficult to evaluate due to streak artifact from bilateral hip   arthroplasties.    BOWEL: No bowel obstruction or inflammation. Appendix is normal. Small   hilar hernia. Colonic diverticulosis without evidence of diverticulitis.  PERITONEUM: No ascites.  VESSELS:  Atherosclerotic changes of the aorta and bilateral iliac   vessels.  RETROPERITONEUM: No lymphadenopathy.    ABDOMINAL WALL: Within normal limits.  BONES: Partially visualized bilateral total hip arthroplasties.   Degenerative changes of the spine.    IMPRESSION:   1. No bowel obstruction or inflammation.  2. Chronic pancreatitis.    KRISH CHAVIS M.D., RADIOLOGY RESIDENT  This document has been electronically signed.  CARLA XIE M.D., ATTENDING RADIOLOGIST  This document has been electronically signed. Apr 5 2019  1:18AM        < end of copied text >

## 2019-04-12 NOTE — PROGRESS NOTE ADULT - NSHPATTENDINGPLANDISCUSS_GEN_ALL_CORE
team
team
patient, medicine NP Letty
medicine PA
patient
patient, medicine NP Melissa
patient, medicine NP Melissa

## 2019-04-12 NOTE — DISCHARGE NOTE NURSING/CASE MANAGEMENT/SOCIAL WORK - NSDCDPATPORTLINK_GEN_ALL_CORE
You can access the [facility] Patient Portal, offered by [facility], by registering with the following website: [email or web address]

## 2019-04-12 NOTE — PROGRESS NOTE ADULT - SUBJECTIVE AND OBJECTIVE BOX
Chief Complaint:  Patient is a 63y old  Female who presents with a chief complaint of abdominal pain (2019 13:30)      Interval Events:     Allergies:  diazepam (Other)      Hospital Medications:  acetaminophen   Tablet .. 650 milliGRAM(s) Oral every 6 hours PRN  amLODIPine   Tablet 10 milliGRAM(s) Oral daily  docusate sodium 100 milliGRAM(s) Oral daily  heparin  Injectable 5000 Unit(s) SubCutaneous every 8 hours  metoprolol succinate  milliGRAM(s) Oral daily  morphine  - Injectable 4 milliGRAM(s) IV Push every 3 hours PRN  morphine  - Injectable 1 milliGRAM(s) IV Push every 8 hours PRN  ondansetron Injectable 8 milliGRAM(s) IV Push every 6 hours PRN  pancrelipase  (CREON 12,000 Lipase Units) 2 Capsule(s) Oral three times a day  pantoprazole  Injectable 40 milliGRAM(s) IV Push every 12 hours  sucralfate suspension 1 Gram(s) Oral two times a day  traMADol 25 milliGRAM(s) Oral two times a day PRN      PMHX/PSHX:  Insomnia  ARDS survivor  Chronic pancreatitis  Hypokalemia  GERD (gastroesophageal reflux disease)  HTN (hypertension)  S/P hip replacement, right  S/P arthroscopy of shoulder  S/P hip replacement  S/P arthroscopy of shoulder      Family history:  Family history of alcohol abuse  No pertinent family history in first degree relatives      ROS: Negative, except as otherwise noted above    PHYSICAL EXAM:   Vital Signs:  Vital Signs Last 24 Hrs  T(C): 36.6 (2019 01:37), Max: 37.4 (2019 16:11)  T(F): 97.8 (2019 01:37), Max: 99.3 (2019 16:11)  HR: 93 (2019 05:36) (71 - 96)  BP: 165/84 (2019 05:36) (136/77 - 165/84)  BP(mean): --  RR: 18 (2019 01:37) (18 - 18)  SpO2: 98% (2019 01:37) (95% - 98%)  Daily     Daily Weight in k.1 (2019 11:24)    GENERAL: No acute distress    HEENT:  Anicteric, no thrush  CHEST:  Non-labored breathing, lungs clear b/l  HEART:  +s1, s2 heart sounds, no murmurs  ABDOMEN:    EXTREMITIES:  Warm and well perfused, no edema  SKIN:    NEURO:       LABS: Chief Complaint:  Patient is a 63y old  Female who presents with a chief complaint of abdominal pain (2019 13:30)      Interval Events: Patient with intermittent abdominal pain and nausea, improved with medications. She is tolerating PO diet.     Allergies:  diazepam (Other)      Hospital Medications:  acetaminophen   Tablet .. 650 milliGRAM(s) Oral every 6 hours PRN  amLODIPine   Tablet 10 milliGRAM(s) Oral daily  docusate sodium 100 milliGRAM(s) Oral daily  heparin  Injectable 5000 Unit(s) SubCutaneous every 8 hours  metoprolol succinate  milliGRAM(s) Oral daily  morphine  - Injectable 4 milliGRAM(s) IV Push every 3 hours PRN  morphine  - Injectable 1 milliGRAM(s) IV Push every 8 hours PRN  ondansetron Injectable 8 milliGRAM(s) IV Push every 6 hours PRN  pancrelipase  (CREON 12,000 Lipase Units) 2 Capsule(s) Oral three times a day  pantoprazole  Injectable 40 milliGRAM(s) IV Push every 12 hours  sucralfate suspension 1 Gram(s) Oral two times a day  traMADol 25 milliGRAM(s) Oral two times a day PRN      PMHX/PSHX:  Insomnia  ARDS survivor  Chronic pancreatitis  Hypokalemia  GERD (gastroesophageal reflux disease)  HTN (hypertension)  S/P hip replacement, right  S/P arthroscopy of shoulder  S/P hip replacement  S/P arthroscopy of shoulder      Family history:  Family history of alcohol abuse  No pertinent family history in first degree relatives      ROS: Negative, except as otherwise noted above    PHYSICAL EXAM:   Vital Signs:  Vital Signs Last 24 Hrs  T(C): 36.6 (2019 01:37), Max: 37.4 (2019 16:11)  T(F): 97.8 (2019 01:37), Max: 99.3 (2019 16:11)  HR: 93 (2019 05:36) (71 - 96)  BP: 165/84 (2019 05:36) (136/77 - 165/84)  BP(mean): --  RR: 18 (2019 01:37) (18 - 18)  SpO2: 98% (2019 01:37) (95% - 98%)  Daily     Daily Weight in k.1 (2019 11:24)    GENERAL:  No acute distress  HEENT:  Anicteric  ABDOMEN:  Soft, mildly tender diffusely, no rebound, no guarding  SKIN:  No rash  NEURO:  Awake, interactive, following commands    LABS:

## 2019-04-12 NOTE — DISCHARGE NOTE PROVIDER - HOSPITAL COURSE
· Assessment        63F with HTN and chronic pancreatitis pw likely acute on chronic pancreatitis pain:              CT abdomen/pelvis showing acute on chronic pancreatitis pain likely, no other acute pathology noted. Also, pts abdominal pain reminiscent of previous episodes.    - tolerating soft diet    - Continue Creon supplements 24,000U TID.    ISTOP reference # [id number]    -MRCP done, shows chronic pancreatitis, no choledocholithiasis seen    -no plans for ERCP    -does not seem to be any plans for inpatient EUS at this time    -GI requesting surgery eval for dilated pancreatic duct (?stone) , consult called again today by NP    -elective ERCP as an outpt by GI for PD stenting.          Problem/Plan - 2:    ·  Problem: Thrombophlebitis arm.  Plan: -DVT study neg    -continue with compresses.          Problem/Plan - 3:    ·  Problem: Adrenal adenoma.  Plan: -unchanged from 2013    -outpt follow up     -incidental finding on imaging. · Assessment        63F with HTN and chronic pancreatitis pw likely acute on chronic pancreatitis pain:              CT abdomen/pelvis showing acute on chronic pancreatitis with multiple PD calcification with dilated main PD.      Seen by GI and surgery, recommended for out patient evaluation with surgery for possible surgery     Tolerating diet. C/O thrombophlebitis L arm, DVT (-) Incidental finding of adrenal adenoma on CT, which    is unchanged since previous study. Patient to F/U with Dr Molina    -

## 2019-04-12 NOTE — DISCHARGE NOTE PROVIDER - CARE PROVIDER_API CALL
Hank Molina (MD)  Surgery  [address] [place] [id number]  Phone: [phone]  Fax: [phone]  Follow Up Time:     Ej Gresham (DO)  Gastroenterology; Internal Medicine  [address] [place] [id number]  Phone: [phone]  Fax: [phone]  Follow Up Time:

## 2019-04-12 NOTE — CONSULT NOTE ADULT - ASSESSMENT
ASSESSMENT:  63F with HTN and chronic pancreatitis presents with recurrent epigastric abdominal pain, hematemesis, poor PO tolerance w/ 20 lb weight loss, presents for surgical evaluation.     - Patient is a surgical candidate, no emergent surgical needs  - Recommend outpatient follow up with Dr. Molina  - Patient can contact Dr. Molina's office for follow up at [phone]  - Recommend obtaining nutrition lab evaluation (albumin, prealbumin, etc) to evaluate patient's malnourishment    Discussed with Dr. Jesse Crystal [facility] PGY-2  Surgery Pager [phone]
acute on chronic pancreatitis  ? gi bleed    monitor cbc, transfuse prn,  carafate 1 gram bid   PPI qd.  hold A/C and NSAIDs  ngt lavage if rebleeds   upper gastrointestinal endoscopy if rebleed occurs and once optimized    check imaging us/ct scan  in and out  ivf aggressively  ppi once a day  serial lft and amylase and lipase and r/o other causes of pancreatitis autoimmune  surgical consultation  may need ercp pending results of mrcp
Impression:  1. Chronic pancreatitis with calcifications and dilated main PD (8 mm at pancreatic tail)  2. Hypertension    Plan:  - Will review imaging with Biliary attending; can consider EUS as inpatient vs outpatient  - Follow up IgG4, total IgG  - Rest of GI care per Dr. Gresham  - Supportive care per primary team  - Page with questions

## 2019-04-12 NOTE — PROGRESS NOTE ADULT - PROBLEM SELECTOR PLAN 1
- patient appears to have multiple intraductal pancreatic stones causing pancreatitic obstruction  - stone removal might be futile; rather than indefinite pancreatic stent changes done endoscopically  - patient may benefit from pancreatic bypass surgery (pancreato-jejunostomy) for more controlled drainage  - appreciate advanced practice GI recommendations   - surgery team consulted, follow their ongoing recommendations  - pain control   - continue creon  - diet as tolerated
CT abdomen/pelvis showing acute on chronic pancreatitis pain likely, no other acute pathology noted. Also, pts abdominal pain reminiscent of previous episodes.  - Continue IV morphine 4 mg q6h PRN for severe pain   - Continue zofran 8 mg IV q6h PRN for nausea and to help with PO intake.  - diet advanced today    - S/p 2L LR bolus, if pt tolerates soft diet well will d/c IVF today   Continue Creon supplements 24,000U TID.  ISTOP reference # [id number]
CT abdomen/pelvis showing acute on chronic pancreatitis pain likely, no other acute pathology noted. Also, pts abdominal pain reminiscent of previous episodes.  - change to morphine 4mg iv q3 hours prn severe pain, will start morphin 1gm iv q8 hours prn breakthrough pain  - Continue zofran 8 mg IV q6h PRN for nausea and to help with PO intake.  - tolerating soft diet  - Continue Creon supplements 24,000U TID.  ISTOP reference # [id number]
CT abdomen/pelvis showing acute on chronic pancreatitis pain likely, no other acute pathology noted. Also, pts abdominal pain reminiscent of previous episodes.  - changed to morphine 4mg iv q3 hours prn severe pain, with morphine 1gm iv q8 hours prn breakthrough pain  - Continue zofran 8 mg IV q6h PRN for nausea and to help with PO intake.  - tolerating soft diet  - Continue Creon supplements 24,000U TID.  ISTOP reference # [id number]  -MRCP done, shows chronic pancreatitis, no choledocholithiasis seen  -no plans for ERCP  -however, advanced GI now evaluating for possible EUS, will f/u
CT abdomen/pelvis showing acute on chronic pancreatitis pain likely, no other acute pathology noted. Also, pts abdominal pain reminiscent of previous episodes.  - changed to morphine 4mg iv q3 hours prn severe pain, with morphine 1gm iv q8 hours prn breakthrough pain  - Continue zofran 8 mg IV q6h PRN for nausea and to help with PO intake.  - tolerating soft diet  - Continue Creon supplements 24,000U TID.  ISTOP reference # [id number]  -MRCP done, shows chronic pancreatitis, no choledocholithiasis seen  -no plans for ERCP  -will titrate down IV pain meds as tolerated, and transition to PO
CT abdomen/pelvis showing acute on chronic pancreatitis pain likely, no other acute pathology noted. Also, pts abdominal pain reminiscent of previous episodes.  - changed to morphine 4mg iv q3 hours prn severe pain, with morphine 1gm iv q8 hours prn breakthrough pain  - Continue zofran 8 mg IV q6h PRN for nausea and to help with PO intake.  - tolerating soft diet  - Continue Creon supplements 24,000U TID.  ISTOP reference # [id number]  -MRCP done, possible ERCP per GI. NP to follow up with GI, pt has been made NPO in case it will be done today.
CT abdomen/pelvis showing acute on chronic pancreatitis pain likely, no other acute pathology noted. Also, pts abdominal pain reminiscent of previous episodes.  - tolerating soft diet  - Continue Creon supplements 24,000U TID.  ISTOP reference # [id number]  -MRCP done, shows chronic pancreatitis, no choledocholithiasis seen  -no plans for ERCP  -does not seem to be any plans for inpatient EUS at this time  -GI requesting surgery eval for dilated pancreatic duct (?stone) , consult called again today by NP  -elective ERCP as an outpt by GI for PD stenting
CT abdomen/pelvis showing acute on chronic pancreatitis pain likely, no other acute pathology noted. Also, pts abdominal pain reminiscent of previous episodes.  - tolerating soft diet  - Continue Creon supplements 24,000U TID.  ISTOP reference # [id number]  -MRCP done, shows chronic pancreatitis, no choledocholithiasis seen  -no plans for ERCP  -does not seem to be any plans for inpatient EUS at this time  -now on oral meds for pain control

## 2019-04-12 NOTE — DISCHARGE NOTE PROVIDER - NSDCCPCAREPLAN_GEN_ALL_CORE_FT
PRINCIPAL DISCHARGE DIAGNOSIS  Diagnosis: Chronic pancreatitis, unspecified pancreatitis type  Assessment and Plan of Treatment:       SECONDARY DISCHARGE DIAGNOSES  Diagnosis: Adrenal adenoma  Assessment and Plan of Treatment: Adrenal adenoma    Diagnosis: HTN (hypertension)  Assessment and Plan of Treatment: HTN (hypertension)    Diagnosis: Thrombophlebitis arm  Assessment and Plan of Treatment: Thrombophlebitis arm PRINCIPAL DISCHARGE DIAGNOSIS  Diagnosis: Chronic pancreatitis, unspecified pancreatitis type  Assessment and Plan of Treatment: CT abdomen shows chronic pancreatitis with pancreatic duct calcification and dilated main duct . F/U with Dr Molina for surgery. Call for appointment for next week. F/U with Dr Chaudhry (GI). Pain improved. Tolerating diet      SECONDARY DISCHARGE DIAGNOSES  Diagnosis: Adrenal adenoma  Assessment and Plan of Treatment: Adrenal adenoma-incidental finding on CT. Stable from previous study. F/U with your PCP    Diagnosis: HTN (hypertension)  Assessment and Plan of Treatment: HTN (hypertension)-Continue current medications    Diagnosis: Thrombophlebitis arm  Assessment and Plan of Treatment: Thrombophlebitis arm

## 2019-04-12 NOTE — PROGRESS NOTE ADULT - PROBLEM SELECTOR PROBLEM 1
Chronic pancreatitis, unspecified pancreatitis type

## 2019-04-12 NOTE — PROGRESS NOTE ADULT - ASSESSMENT
Impression:  1. Chronic pancreatitis with PD calcifications and dilated main PD (8 mm at pancreatic tail)  2. Hypertension    Plan:  - Imaging reviewed with Biliary and Radiology attendings  - Follow up Surgery recommendations   - Will consider elective ERCP with PD stenting and additional maneuvers after further discussion with Surgery  - Rest of GI care per Dr. Gresham  - Supportive care per primary team  - Page with questions

## 2019-04-23 ENCOUNTER — APPOINTMENT (OUTPATIENT)
Dept: SURGERY | Facility: CLINIC | Age: 64
End: 2019-04-23
Payer: MEDICAID

## 2019-04-23 VITALS
HEART RATE: 70 BPM | RESPIRATION RATE: 14 BRPM | TEMPERATURE: 98.1 F | SYSTOLIC BLOOD PRESSURE: 116 MMHG | DIASTOLIC BLOOD PRESSURE: 79 MMHG | OXYGEN SATURATION: 98 %

## 2019-04-23 PROCEDURE — 99204 OFFICE O/P NEW MOD 45 MIN: CPT

## 2019-04-25 ENCOUNTER — APPOINTMENT (OUTPATIENT)
Dept: SURGERY | Facility: HOSPITAL | Age: 64
End: 2019-04-25

## 2019-04-30 NOTE — REVIEW OF SYSTEMS
[Negative] : Endocrine [FreeTextEntry8] : daily abdominal pain and paroxysmal severe abdominal pain requiring hospitalizations

## 2019-04-30 NOTE — HISTORY OF PRESENT ILLNESS
[de-identified] : 63  year old female with a 5 year history of classic calcific pancreatitis. She has had pain for 5 years resulting in multiple admissions, escalating narcotic support and never any mention that operative intervention might help her. I saw the patient during s recent hospitalization for exacerbation of pain.\par She now comes where I explained the operative strategy. I recommended a Jeffery procedure and explained the operative strategy. She understands. She is otherwise a healthy woman. Recent labs all normal. CA 19-9, IGG4, amylase/lipase, Imaging demonstrates a large duct with stones. Perfect anatomy for Jeffery procedure.\par \par Patient will let us know when she wishes to proceed to surgery. She will require medical clearance and PST\par \par Patient will contact my office.

## 2019-05-01 ENCOUNTER — INBOUND DOCUMENT (OUTPATIENT)
Age: 64
End: 2019-05-01

## 2019-05-06 ENCOUNTER — OUTPATIENT (OUTPATIENT)
Dept: OUTPATIENT SERVICES | Facility: HOSPITAL | Age: 64
LOS: 1 days | End: 2019-05-06
Payer: COMMERCIAL

## 2019-05-06 VITALS
RESPIRATION RATE: 16 BRPM | DIASTOLIC BLOOD PRESSURE: 78 MMHG | OXYGEN SATURATION: 98 % | TEMPERATURE: 99 F | SYSTOLIC BLOOD PRESSURE: 116 MMHG | HEIGHT: 64 IN | HEART RATE: 78 BPM | WEIGHT: 111.99 LBS

## 2019-05-06 DIAGNOSIS — Z01.818 ENCOUNTER FOR OTHER PREPROCEDURAL EXAMINATION: ICD-10-CM

## 2019-05-06 DIAGNOSIS — Z29.9 ENCOUNTER FOR PROPHYLACTIC MEASURES, UNSPECIFIED: ICD-10-CM

## 2019-05-06 DIAGNOSIS — Z96.641 PRESENCE OF RIGHT ARTIFICIAL HIP JOINT: Chronic | ICD-10-CM

## 2019-05-06 DIAGNOSIS — Z98.89 OTHER SPECIFIED POSTPROCEDURAL STATES: Chronic | ICD-10-CM

## 2019-05-06 DIAGNOSIS — Z98.890 OTHER SPECIFIED POSTPROCEDURAL STATES: Chronic | ICD-10-CM

## 2019-05-06 DIAGNOSIS — K86.1 OTHER CHRONIC PANCREATITIS: ICD-10-CM

## 2019-05-06 DIAGNOSIS — Z96.612 PRESENCE OF LEFT ARTIFICIAL SHOULDER JOINT: Chronic | ICD-10-CM

## 2019-05-06 DIAGNOSIS — I10 ESSENTIAL (PRIMARY) HYPERTENSION: ICD-10-CM

## 2019-05-06 LAB
ANION GAP SERPL CALC-SCNC: 13 MMOL/L — SIGNIFICANT CHANGE UP (ref 5–17)
BLD GP AB SCN SERPL QL: NEGATIVE — SIGNIFICANT CHANGE UP
BUN SERPL-MCNC: 21 MG/DL — SIGNIFICANT CHANGE UP (ref 7–23)
CALCIUM SERPL-MCNC: 9.6 MG/DL — SIGNIFICANT CHANGE UP (ref 8.4–10.5)
CHLORIDE SERPL-SCNC: 94 MMOL/L — LOW (ref 96–108)
CO2 SERPL-SCNC: 27 MMOL/L — SIGNIFICANT CHANGE UP (ref 22–31)
CREAT SERPL-MCNC: 0.95 MG/DL — SIGNIFICANT CHANGE UP (ref 0.5–1.3)
GLUCOSE SERPL-MCNC: 128 MG/DL — HIGH (ref 70–99)
POTASSIUM SERPL-MCNC: 3.2 MMOL/L — LOW (ref 3.5–5.3)
POTASSIUM SERPL-SCNC: 3.2 MMOL/L — LOW (ref 3.5–5.3)
RH IG SCN BLD-IMP: POSITIVE — SIGNIFICANT CHANGE UP
SODIUM SERPL-SCNC: 134 MMOL/L — LOW (ref 135–145)

## 2019-05-06 PROCEDURE — 80048 BASIC METABOLIC PNL TOTAL CA: CPT

## 2019-05-06 PROCEDURE — 86901 BLOOD TYPING SEROLOGIC RH(D): CPT

## 2019-05-06 PROCEDURE — 86900 BLOOD TYPING SEROLOGIC ABO: CPT

## 2019-05-06 PROCEDURE — G0463: CPT

## 2019-05-06 PROCEDURE — 86850 RBC ANTIBODY SCREEN: CPT

## 2019-05-06 RX ORDER — LIDOCAINE HCL 20 MG/ML
0.2 VIAL (ML) INJECTION ONCE
Qty: 0 | Refills: 0 | Status: DISCONTINUED | OUTPATIENT
Start: 2019-05-08 | End: 2019-05-08

## 2019-05-06 RX ORDER — SODIUM CHLORIDE 9 MG/ML
3 INJECTION INTRAMUSCULAR; INTRAVENOUS; SUBCUTANEOUS EVERY 8 HOURS
Qty: 0 | Refills: 0 | Status: DISCONTINUED | OUTPATIENT
Start: 2019-05-08 | End: 2019-05-08

## 2019-05-06 RX ORDER — CEFOTETAN DISODIUM 1 G
2 VIAL (EA) INJECTION ONCE
Qty: 0 | Refills: 0 | Status: DISCONTINUED | OUTPATIENT
Start: 2019-05-08 | End: 2019-05-08

## 2019-05-06 RX ORDER — CHLORHEXIDINE GLUCONATE 213 G/1000ML
1 SOLUTION TOPICAL ONCE
Qty: 0 | Refills: 0 | Status: DISCONTINUED | OUTPATIENT
Start: 2019-05-08 | End: 2019-05-08

## 2019-05-06 NOTE — H&P PST ADULT - HISTORY OF PRESENT ILLNESS
63 year old female with PMH of HTN, GERD, chronic pancreatitis ( last acute exacerbation with hospitalization 4/5-4/12) 63 year old female with PMH of HTN, GERD, ARDS  (2015), Some day current smoker, chronic pancreatitis ( last acute exacerbation with hospitalization 4/5-4/12) planned for Jeffery Procedure on 5/8/19.         **** Patient with very Poor dentition, denies any loose teeth. Does have Broken teeth in the front upper and lower, educate patient regarding the importance of dental hygiene prior to surgery, patient verbalizes understanding.

## 2019-05-06 NOTE — H&P PST ADULT - NSANTHOSAYNRD_GEN_A_CORE
No. NATE screening performed.  STOP BANG Legend: 0-2 = LOW Risk; 3-4 = INTERMEDIATE Risk; 5-8 = HIGH Risk

## 2019-05-06 NOTE — H&P PST ADULT - NSICDXPASTSURGICALHX_GEN_ALL_CORE_FT
PAST SURGICAL HISTORY:  History of vocal cord polypectomy 1/2018    S/P arthroscopy of shoulder left in 2009    S/P arthroscopy of shoulder right - 2014    S/P hip replacement left - 2010    S/P hip replacement, right May 2016    S/P shoulder replacement, left 2016

## 2019-05-06 NOTE — H&P PST ADULT - NEGATIVE NEUROLOGICAL SYMPTOMS
no confusion/no syncope/no difficulty walking/no weakness/no generalized seizures/no headache/no transient paralysis

## 2019-05-06 NOTE — H&P PST ADULT - NSICDXPROBLEM_GEN_ALL_CORE_FT
PROBLEM DIAGNOSES  Problem: Chronic pancreatitis  Assessment and Plan: KARYNA procedure  recent labs on sunrise   ( repeated BMP due to Last week episodes of Vomiting)  T&S send  baseline EKG on sunrise  preprocedure instructions discussed     Problem: Hypertension  Assessment and Plan: metoprolol, amlodipine am of surgery     Problem: Need for prophylactic measure  Assessment and Plan: The Caprini score indicates this patient is at risk for a VTE event (score 3-5).  Most surgical patients in this group would benefit from pharmacologic prophylaxis.  The surgical team will determine the balance between VTE risk and bleeding risk

## 2019-05-06 NOTE — H&P PST ADULT - DENTITION
upper and lower dentures/normal partial upper and lower dentures, POOR DENTITION , BROKEN/ MISSING TEETH, DENIES ANY loose TEETH. ( PLAN FOR DENTAL IMPLANTS IN THE FUTURE AS PER PT).

## 2019-05-06 NOTE — H&P PST ADULT - ASSESSMENT
ZHANEI VTE 2.0 SCORE [CLOT updated 2019]    AGE RELATED RISK FACTORS                                                       MOBILITY RELATED FACTORS  [ ] Age 41-60 years                                            (1 Point)                    [ ] Bed rest                                                        (1 Point)  [ ] Age: 61-74 years                                           (2 Points)                  [ ] Plaster cast                                                   (2 Points)  [ ] Age= 75 years                                              (3 Points)                    [ ] Bed bound for more than 72 hours                 (2 Points)    DISEASE RELATED RISK FACTORS                                               GENDER SPECIFIC FACTORS  [ ] Edema in the lower extremities                       (1 Point)              [ ] Pregnancy                                                     (1 Point)  [ ] Varicose veins                                               (1 Point)                     [ ] Post-partum < 6 weeks                                   (1 Point)             [ ] BMI > 25 Kg/m2                                            (1 Point)                     [ ] Hormonal therapy  or oral contraception          (1 Point)                 [ ] Sepsis (in the previous month)                        (1 Point)               [ ] History of pregnancy complications                 (1 point)  [ ] Pneumonia or serious lung disease                                               [ ] Unexplained or recurrent                     (1 Point)           (in the previous month)                               (1 Point)  [ ] Abnormal pulmonary function test                     (1 Point)                 SURGERY RELATED RISK FACTORS  [ ] Acute myocardial infarction                              (1 Point)               [ ]  Section                                             (1 Point)  [ ] Congestive heart failure (in the previous month)  (1 Point)      [ ] Minor surgery                                                  (1 Point)   [ ] Inflammatory bowel disease                             (1 Point)               [ ] Arthroscopic surgery                                        (2 Points)  [ ] Central venous access                                      (2 Points)                [ ] General surgery lasting more than 45 minutes (2 points)  [ ] Malignancy- Present or previous                   (2 Points)                [ ] Elective arthroplasty                                         (5 points)    [ ] Stroke (in the previous month)                          (5 Points)                                                                                                                                                           HEMATOLOGY RELATED FACTORS                                                 TRAUMA RELATED RISK FACTORS  [ ] Prior episodes of VTE                                     (3 Points)                [ ] Fracture of the hip, pelvis, or leg                       (5 Points)  [ ] Positive family history for VTE                         (3 Points)             [ ] Acute spinal cord injury (in the previous month)  (5 Points)  [ ] Prothrombin 58436 A                                     (3 Points)               [ ] Paralysis  (less than 1 month)                             (5 Points)  [ ] Factor V Leiden                                             (3 Points)                  [ ] Multiple Trauma within 1 month                        (5 Points)  [ ] Lupus anticoagulants                                     (3 Points)                                                           [ ] Anticardiolipin antibodies                               (3 Points)                                                       [ ] High homocysteine in the blood                      (3 Points)                                             [ ] Other congenital or acquired thrombophilia      (3 Points)                                                [ ] Heparin induced thrombocytopenia                  (3 Points)                                     Total Score [          ] ZHANEI VTE 2.0 SCORE [CLOT updated 2019]    AGE RELATED RISK FACTORS                                                       MOBILITY RELATED FACTORS  [ ] Age 41-60 years                                            (1 Point)                    [ ] Bed rest                                                        (1 Point)  [x ] Age: 61-74 years                                           (2 Points)                  [ ] Plaster cast                                                   (2 Points)  [ ] Age= 75 years                                              (3 Points)                    [ ] Bed bound for more than 72 hours                 (2 Points)    DISEASE RELATED RISK FACTORS                                               GENDER SPECIFIC FACTORS  [ ] Edema in the lower extremities                       (1 Point)              [ ] Pregnancy                                                     (1 Point)  [ ] Varicose veins                                               (1 Point)                     [ ] Post-partum < 6 weeks                                   (1 Point)             [ ] BMI > 25 Kg/m2                                            (1 Point)                     [ ] Hormonal therapy  or oral contraception          (1 Point)                 [ ] Sepsis (in the previous month)                        (1 Point)               [ ] History of pregnancy complications                 (1 point)  [ ] Pneumonia or serious lung disease                                               [ ] Unexplained or recurrent                     (1 Point)           (in the previous month)                               (1 Point)  [ ] Abnormal pulmonary function test                     (1 Point)                 SURGERY RELATED RISK FACTORS  [ ] Acute myocardial infarction                              (1 Point)               [ ]  Section                                             (1 Point)  [ ] Congestive heart failure (in the previous month)  (1 Point)      [ ] Minor surgery                                                  (1 Point)   [ ] Inflammatory bowel disease                             (1 Point)               [ ] Arthroscopic surgery                                        (2 Points)  [ ] Central venous access                                      (2 Points)                [x ] General surgery lasting more than 45 minutes (2 points)  [ ] Malignancy- Present or previous                   (2 Points)                [ ] Elective arthroplasty                                         (5 points)    [ ] Stroke (in the previous month)                          (5 Points)                                                                                                                                                           HEMATOLOGY RELATED FACTORS                                                 TRAUMA RELATED RISK FACTORS  [ ] Prior episodes of VTE                                     (3 Points)                [ ] Fracture of the hip, pelvis, or leg                       (5 Points)  [ ] Positive family history for VTE                         (3 Points)             [ ] Acute spinal cord injury (in the previous month)  (5 Points)  [ ] Prothrombin 29521 A                                     (3 Points)               [ ] Paralysis  (less than 1 month)                             (5 Points)  [ ] Factor V Leiden                                             (3 Points)                  [ ] Multiple Trauma within 1 month                        (5 Points)  [ ] Lupus anticoagulants                                     (3 Points)                                                           [ ] Anticardiolipin antibodies                               (3 Points)                                                       [ ] High homocysteine in the blood                      (3 Points)                                             [ ] Other congenital or acquired thrombophilia      (3 Points)                                                [ ] Heparin induced thrombocytopenia                  (3 Points)                                     Total Score [  4        ]

## 2019-05-06 NOTE — H&P PST ADULT - NSICDXPASTMEDICALHX_GEN_ALL_CORE_FT
PAST MEDICAL HISTORY:  ARDS survivor 2015    Chronic pancreatitis last episode 4/2019    GERD (gastroesophageal reflux disease)     HTN (hypertension)     Insomnia PAST MEDICAL HISTORY:  ARDS survivor 2015    Chronic abdominal pain     Chronic pancreatitis last episode 4/2019    GERD (gastroesophageal reflux disease)     History of adrenal adenoma stable on imaging    HTN (hypertension)     Thrombophlebitis superficial right UE during 4/2019 hospital stay, resolved as per pt    Vocal cord polyp removal 2018, benign as per pt

## 2019-05-07 ENCOUNTER — TRANSCRIPTION ENCOUNTER (OUTPATIENT)
Age: 64
End: 2019-05-07

## 2019-05-07 VITALS — WEIGHT: 111.99 LBS | HEIGHT: 64 IN

## 2019-05-07 PROBLEM — J38.1 POLYP OF VOCAL CORD AND LARYNX: Chronic | Status: ACTIVE | Noted: 2019-05-06

## 2019-05-07 PROBLEM — Z86.018 PERSONAL HISTORY OF OTHER BENIGN NEOPLASM: Chronic | Status: ACTIVE | Noted: 2019-05-06

## 2019-05-07 PROBLEM — I80.9 PHLEBITIS AND THROMBOPHLEBITIS OF UNSPECIFIED SITE: Chronic | Status: ACTIVE | Noted: 2019-05-06

## 2019-05-08 ENCOUNTER — APPOINTMENT (OUTPATIENT)
Dept: SURGERY | Facility: HOSPITAL | Age: 64
End: 2019-05-08

## 2019-05-08 ENCOUNTER — INPATIENT (INPATIENT)
Facility: HOSPITAL | Age: 64
LOS: 10 days | Discharge: HOME | DRG: 406 | End: 2019-05-19
Attending: SURGERY | Admitting: SURGERY
Payer: COMMERCIAL

## 2019-05-08 ENCOUNTER — RESULT REVIEW (OUTPATIENT)
Age: 64
End: 2019-05-08

## 2019-05-08 DIAGNOSIS — Z96.612 PRESENCE OF LEFT ARTIFICIAL SHOULDER JOINT: Chronic | ICD-10-CM

## 2019-05-08 DIAGNOSIS — K86.1 OTHER CHRONIC PANCREATITIS: ICD-10-CM

## 2019-05-08 DIAGNOSIS — Z98.890 OTHER SPECIFIED POSTPROCEDURAL STATES: Chronic | ICD-10-CM

## 2019-05-08 DIAGNOSIS — Z01.818 ENCOUNTER FOR OTHER PREPROCEDURAL EXAMINATION: ICD-10-CM

## 2019-05-08 DIAGNOSIS — Z96.641 PRESENCE OF RIGHT ARTIFICIAL HIP JOINT: Chronic | ICD-10-CM

## 2019-05-08 DIAGNOSIS — Z98.89 OTHER SPECIFIED POSTPROCEDURAL STATES: Chronic | ICD-10-CM

## 2019-05-08 LAB
ALBUMIN SERPL ELPH-MCNC: 3.5 G/DL — SIGNIFICANT CHANGE UP (ref 3.3–5)
ALP SERPL-CCNC: 55 U/L — SIGNIFICANT CHANGE UP (ref 40–120)
ALT FLD-CCNC: 18 U/L — SIGNIFICANT CHANGE UP (ref 10–45)
ANION GAP SERPL CALC-SCNC: 11 MMOL/L — SIGNIFICANT CHANGE UP (ref 5–17)
ANION GAP SERPL CALC-SCNC: 13 MMOL/L — SIGNIFICANT CHANGE UP (ref 5–17)
ANION GAP SERPL CALC-SCNC: 14 MMOL/L — SIGNIFICANT CHANGE UP (ref 5–17)
APTT BLD: 25.8 SEC — LOW (ref 27.5–36.3)
AST SERPL-CCNC: 27 U/L — SIGNIFICANT CHANGE UP (ref 10–40)
BILIRUB SERPL-MCNC: 0.4 MG/DL — SIGNIFICANT CHANGE UP (ref 0.2–1.2)
BUN SERPL-MCNC: 7 MG/DL — SIGNIFICANT CHANGE UP (ref 7–23)
BUN SERPL-MCNC: 7 MG/DL — SIGNIFICANT CHANGE UP (ref 7–23)
BUN SERPL-MCNC: 8 MG/DL — SIGNIFICANT CHANGE UP (ref 7–23)
CALCIUM SERPL-MCNC: 9.2 MG/DL — SIGNIFICANT CHANGE UP (ref 8.4–10.5)
CALCIUM SERPL-MCNC: 9.2 MG/DL — SIGNIFICANT CHANGE UP (ref 8.4–10.5)
CALCIUM SERPL-MCNC: 9.3 MG/DL — SIGNIFICANT CHANGE UP (ref 8.4–10.5)
CHLORIDE SERPL-SCNC: 101 MMOL/L — SIGNIFICANT CHANGE UP (ref 96–108)
CHLORIDE SERPL-SCNC: 102 MMOL/L — SIGNIFICANT CHANGE UP (ref 96–108)
CHLORIDE SERPL-SCNC: 104 MMOL/L — SIGNIFICANT CHANGE UP (ref 96–108)
CO2 SERPL-SCNC: 25 MMOL/L — SIGNIFICANT CHANGE UP (ref 22–31)
CO2 SERPL-SCNC: 27 MMOL/L — SIGNIFICANT CHANGE UP (ref 22–31)
CO2 SERPL-SCNC: 28 MMOL/L — SIGNIFICANT CHANGE UP (ref 22–31)
CREAT SERPL-MCNC: 0.6 MG/DL — SIGNIFICANT CHANGE UP (ref 0.5–1.3)
CREAT SERPL-MCNC: 0.66 MG/DL — SIGNIFICANT CHANGE UP (ref 0.5–1.3)
CREAT SERPL-MCNC: 0.67 MG/DL — SIGNIFICANT CHANGE UP (ref 0.5–1.3)
GLUCOSE SERPL-MCNC: 153 MG/DL — HIGH (ref 70–99)
GLUCOSE SERPL-MCNC: 156 MG/DL — HIGH (ref 70–99)
GLUCOSE SERPL-MCNC: 157 MG/DL — HIGH (ref 70–99)
HCT VFR BLD CALC: 32.8 % — LOW (ref 34.5–45)
HCT VFR BLD CALC: 36.4 % — SIGNIFICANT CHANGE UP (ref 34.5–45)
HGB BLD-MCNC: 10.9 G/DL — LOW (ref 11.5–15.5)
HGB BLD-MCNC: 11.8 G/DL — SIGNIFICANT CHANGE UP (ref 11.5–15.5)
INR BLD: 1.08 RATIO — SIGNIFICANT CHANGE UP (ref 0.88–1.16)
MAGNESIUM SERPL-MCNC: 2 MG/DL — SIGNIFICANT CHANGE UP (ref 1.6–2.6)
MAGNESIUM SERPL-MCNC: 2.2 MG/DL — SIGNIFICANT CHANGE UP (ref 1.6–2.6)
MCHC RBC-ENTMCNC: 28.6 PG — SIGNIFICANT CHANGE UP (ref 27–34)
MCHC RBC-ENTMCNC: 29.3 PG — SIGNIFICANT CHANGE UP (ref 27–34)
MCHC RBC-ENTMCNC: 32.5 GM/DL — SIGNIFICANT CHANGE UP (ref 32–36)
MCHC RBC-ENTMCNC: 33.3 GM/DL — SIGNIFICANT CHANGE UP (ref 32–36)
MCV RBC AUTO: 87.9 FL — SIGNIFICANT CHANGE UP (ref 80–100)
MCV RBC AUTO: 88 FL — SIGNIFICANT CHANGE UP (ref 80–100)
PHOSPHATE SERPL-MCNC: 3.6 MG/DL — SIGNIFICANT CHANGE UP (ref 2.5–4.5)
PHOSPHATE SERPL-MCNC: 4.3 MG/DL — SIGNIFICANT CHANGE UP (ref 2.5–4.5)
PLATELET # BLD AUTO: 234 K/UL — SIGNIFICANT CHANGE UP (ref 150–400)
PLATELET # BLD AUTO: 244 K/UL — SIGNIFICANT CHANGE UP (ref 150–400)
POTASSIUM SERPL-MCNC: 3.6 MMOL/L — SIGNIFICANT CHANGE UP (ref 3.5–5.3)
POTASSIUM SERPL-MCNC: 3.8 MMOL/L — SIGNIFICANT CHANGE UP (ref 3.5–5.3)
POTASSIUM SERPL-MCNC: 4.1 MMOL/L — SIGNIFICANT CHANGE UP (ref 3.5–5.3)
POTASSIUM SERPL-SCNC: 3.6 MMOL/L — SIGNIFICANT CHANGE UP (ref 3.5–5.3)
POTASSIUM SERPL-SCNC: 3.8 MMOL/L — SIGNIFICANT CHANGE UP (ref 3.5–5.3)
POTASSIUM SERPL-SCNC: 4.1 MMOL/L — SIGNIFICANT CHANGE UP (ref 3.5–5.3)
PROT SERPL-MCNC: 6.1 G/DL — SIGNIFICANT CHANGE UP (ref 6–8.3)
PROTHROM AB SERPL-ACNC: 12.5 SEC — SIGNIFICANT CHANGE UP (ref 10–12.9)
RBC # BLD: 3.73 M/UL — LOW (ref 3.8–5.2)
RBC # BLD: 4.14 M/UL — SIGNIFICANT CHANGE UP (ref 3.8–5.2)
RBC # FLD: 13.6 % — SIGNIFICANT CHANGE UP (ref 10.3–14.5)
RBC # FLD: 13.8 % — SIGNIFICANT CHANGE UP (ref 10.3–14.5)
SODIUM SERPL-SCNC: 139 MMOL/L — SIGNIFICANT CHANGE UP (ref 135–145)
SODIUM SERPL-SCNC: 143 MMOL/L — SIGNIFICANT CHANGE UP (ref 135–145)
SODIUM SERPL-SCNC: 143 MMOL/L — SIGNIFICANT CHANGE UP (ref 135–145)
WBC # BLD: 13.7 K/UL — HIGH (ref 3.8–10.5)
WBC # BLD: 15.8 K/UL — HIGH (ref 3.8–10.5)
WBC # FLD AUTO: 13.7 K/UL — HIGH (ref 3.8–10.5)
WBC # FLD AUTO: 15.8 K/UL — HIGH (ref 3.8–10.5)

## 2019-05-08 PROCEDURE — 48548 FUSE PANCREAS AND BOWEL: CPT | Mod: 22

## 2019-05-08 PROCEDURE — 88300 SURGICAL PATH GROSS: CPT | Mod: 26,59

## 2019-05-08 PROCEDURE — 48120 REMOVAL OF PANCREAS LESION: CPT

## 2019-05-08 PROCEDURE — 88307 TISSUE EXAM BY PATHOLOGIST: CPT | Mod: 26

## 2019-05-08 RX ORDER — ONDANSETRON 8 MG/1
4 TABLET, FILM COATED ORAL EVERY 6 HOURS
Qty: 0 | Refills: 0 | Status: DISCONTINUED | OUTPATIENT
Start: 2019-05-08 | End: 2019-05-08

## 2019-05-08 RX ORDER — SODIUM CHLORIDE 9 MG/ML
1000 INJECTION, SOLUTION INTRAVENOUS
Qty: 0 | Refills: 0 | Status: DISCONTINUED | OUTPATIENT
Start: 2019-05-08 | End: 2019-05-10

## 2019-05-08 RX ORDER — NALOXONE HYDROCHLORIDE 4 MG/.1ML
0.1 SPRAY NASAL
Qty: 0 | Refills: 0 | Status: DISCONTINUED | OUTPATIENT
Start: 2019-05-08 | End: 2019-05-08

## 2019-05-08 RX ORDER — NALOXONE HYDROCHLORIDE 4 MG/.1ML
0.1 SPRAY NASAL
Qty: 0 | Refills: 0 | Status: DISCONTINUED | OUTPATIENT
Start: 2019-05-08 | End: 2019-05-10

## 2019-05-08 RX ORDER — ACETAMINOPHEN 500 MG
1000 TABLET ORAL ONCE
Qty: 0 | Refills: 0 | Status: COMPLETED | OUTPATIENT
Start: 2019-05-08 | End: 2019-05-09

## 2019-05-08 RX ORDER — ACETAMINOPHEN 500 MG
1000 TABLET ORAL ONCE
Qty: 0 | Refills: 0 | Status: COMPLETED | OUTPATIENT
Start: 2019-05-09 | End: 2019-05-09

## 2019-05-08 RX ORDER — ACETAMINOPHEN 500 MG
1000 TABLET ORAL ONCE
Qty: 0 | Refills: 0 | Status: COMPLETED | OUTPATIENT
Start: 2019-05-09 | End: 2019-05-08

## 2019-05-08 RX ORDER — HEPARIN SODIUM 5000 [USP'U]/ML
5000 INJECTION INTRAVENOUS; SUBCUTANEOUS EVERY 8 HOURS
Qty: 0 | Refills: 0 | Status: DISCONTINUED | OUTPATIENT
Start: 2019-05-08 | End: 2019-05-17

## 2019-05-08 RX ORDER — METOPROLOL TARTRATE 50 MG
5 TABLET ORAL EVERY 6 HOURS
Qty: 0 | Refills: 0 | Status: DISCONTINUED | OUTPATIENT
Start: 2019-05-08 | End: 2019-05-12

## 2019-05-08 RX ORDER — ONDANSETRON 8 MG/1
4 TABLET, FILM COATED ORAL ONCE
Qty: 0 | Refills: 0 | Status: COMPLETED | OUTPATIENT
Start: 2019-05-08 | End: 2019-05-08

## 2019-05-08 RX ORDER — HYDROMORPHONE HYDROCHLORIDE 2 MG/ML
0.5 INJECTION INTRAMUSCULAR; INTRAVENOUS; SUBCUTANEOUS EVERY 4 HOURS
Qty: 0 | Refills: 0 | Status: DISCONTINUED | OUTPATIENT
Start: 2019-05-08 | End: 2019-05-10

## 2019-05-08 RX ORDER — HYDROMORPHONE HYDROCHLORIDE 2 MG/ML
0.5 INJECTION INTRAMUSCULAR; INTRAVENOUS; SUBCUTANEOUS
Qty: 0 | Refills: 0 | Status: DISCONTINUED | OUTPATIENT
Start: 2019-05-08 | End: 2019-05-08

## 2019-05-08 RX ADMIN — Medication 1000 MILLIGRAM(S): at 23:57

## 2019-05-08 RX ADMIN — HYDROMORPHONE HYDROCHLORIDE 0.5 MILLIGRAM(S): 2 INJECTION INTRAMUSCULAR; INTRAVENOUS; SUBCUTANEOUS at 14:19

## 2019-05-08 RX ADMIN — HYDROMORPHONE HYDROCHLORIDE 0.5 MILLIGRAM(S): 2 INJECTION INTRAMUSCULAR; INTRAVENOUS; SUBCUTANEOUS at 23:20

## 2019-05-08 RX ADMIN — HYDROMORPHONE HYDROCHLORIDE 0.5 MILLIGRAM(S): 2 INJECTION INTRAMUSCULAR; INTRAVENOUS; SUBCUTANEOUS at 14:49

## 2019-05-08 RX ADMIN — ONDANSETRON 4 MILLIGRAM(S): 8 TABLET, FILM COATED ORAL at 16:17

## 2019-05-08 RX ADMIN — HYDROMORPHONE HYDROCHLORIDE 0.5 MILLIGRAM(S): 2 INJECTION INTRAMUSCULAR; INTRAVENOUS; SUBCUTANEOUS at 17:04

## 2019-05-08 RX ADMIN — Medication 5 MILLIGRAM(S): at 21:03

## 2019-05-08 RX ADMIN — HYDROMORPHONE HYDROCHLORIDE 0.5 MILLIGRAM(S): 2 INJECTION INTRAMUSCULAR; INTRAVENOUS; SUBCUTANEOUS at 16:34

## 2019-05-08 RX ADMIN — SODIUM CHLORIDE 65 MILLILITER(S): 9 INJECTION, SOLUTION INTRAVENOUS at 16:58

## 2019-05-08 RX ADMIN — HYDROMORPHONE HYDROCHLORIDE 0.5 MILLIGRAM(S): 2 INJECTION INTRAMUSCULAR; INTRAVENOUS; SUBCUTANEOUS at 22:50

## 2019-05-08 RX ADMIN — Medication 5 MILLIGRAM(S): at 23:26

## 2019-05-08 RX ADMIN — SODIUM CHLORIDE 3 MILLILITER(S): 9 INJECTION INTRAMUSCULAR; INTRAVENOUS; SUBCUTANEOUS at 06:24

## 2019-05-08 RX ADMIN — Medication 400 MILLIGRAM(S): at 23:27

## 2019-05-08 RX ADMIN — HEPARIN SODIUM 5000 UNIT(S): 5000 INJECTION INTRAVENOUS; SUBCUTANEOUS at 21:03

## 2019-05-08 NOTE — PRE-OP CHECKLIST - IV STARTED
yes I, Gerardo Pastor, performed the initial face to face bedside interview with this patient regarding history of present illness, review of symptoms and relevant past medical, social and family history.  I completed an independent physical examination.  I was the initial provider who evaluated this patient. I have signed out the follow up of any pending tests (i.e. labs, radiological studies) to the ACP.  I have communicated the patient’s plan of care and disposition with the ACP.  The history, relevant review of systems, past medical and surgical history, medical decision making, and physical examination was documented by the scribe in my presence and I attest to the accuracy of the documentation.

## 2019-05-08 NOTE — PROGRESS NOTE ADULT - SUBJECTIVE AND OBJECTIVE BOX
Patient is scheduled for a Jeffery procedure today. She has had ne recent change in her clinical status

## 2019-05-08 NOTE — CHART NOTE - NSCHARTNOTEFT_GEN_A_CORE
General Surgery Post-Op Note    SUBJECTIVE:  This is a 63y Female POD 0 s/p Jeffery procedure for chronic pancreatitis. BPs labile ranging from 120s-190s, however, HTN assoc with pain. Pt normally takes morphine 30mg q4-6hr at home. Anesthesia saw pt during HTNsive episodes and increased the PCEA dose and gave a bolus. Now SBPs stable in the 130s. Pt c/o abd pain, denies CP, SOB, N/V.     OBJECTIVE:     ** VITAL SIGNS / I&O's **    Vital Signs Last 24 Hrs  T(C): 36.4 (08 May 2019 17:30), Max: 36.6 (08 May 2019 13:50)  T(F): 97.5 (08 May 2019 17:30), Max: 97.9 (08 May 2019 13:50)  HR: 69 (08 May 2019 17:30) (61 - 105)  BP: 138/72 (08 May 2019 17:30) (99/57 - 206/100)  BP(mean): 78 (08 May 2019 17:15) (72 - 145)  RR: 18 (08 May 2019 17:30) (15 - 618)  SpO2: 100% (08 May 2019 17:30) (98% - 100%)      08 May 2019 07:01  -  08 May 2019 17:58  --------------------------------------------------------  IN:    lactated ringers.: 130 mL  Total IN: 130 mL    OUT:    Bulb: 60 mL    Indwelling Catheter - Urethral: 240 mL  Total OUT: 300 mL    Total NET: -170 mL          ** PHYSICAL EXAM **    -- CONSTITUTIONAL: Alert, in NAD  -- PULMONARY: non-labored respirations  -- ABDOMEN: soft, non-distended, appropriately TTP, prevena vac holding suction with LUQ GREGORIO in place- SS drainage.   -- : Smith in place with clear yellow urine  -- NEURO: A&Ox3    ** LABS **                          10.9   13.7  )-----------( 234      ( 08 May 2019 14:34 )             32.8     08 May 2019 14:34    143    |  104    |  7      ----------------------------<  157    3.6     |  25     |  0.60     Ca    9.3        08 May 2019 14:34  Phos  4.3       08 May 2019 14:34  Mg     2.2       08 May 2019 14:34        CAPILLARY BLOOD GLUCOSE      POCT Blood Glucose.: 134 mg/dL (08 May 2019 05:35)                MEDICATIONS  (STANDING):  acetaminophen  IVPB .. 1000 milliGRAM(s) IV Intermittent once  heparin  Injectable 5000 Unit(s) SubCutaneous every 8 hours  HYDROmorphone (10 MICROgram(s)/mL) + BUpivacaine 0.0625% in 0.9% Sodium Chloride PCEA 250 milliLiter(s) Epidural PCA Continuous  lactated ringers. 1000 milliLiter(s) (65 mL/Hr) IV Continuous <Continuous>  metoprolol tartrate Injectable 5 milliGRAM(s) IV Push every 6 hours    MEDICATIONS  (PRN):  HYDROmorphone  Injectable 0.5 milliGRAM(s) IV Push every 10 minutes PRN Severe Pain (7 - 10)  HYDROmorphone  Injectable 0.5 milliGRAM(s) IV Push every 4 hours PRN breakthrough pain  HYDROmorphone (10 MICROgram(s)/mL) + BUpivacaine 0.0625% in 0.9% Sodium Chloride PCEA Rescue Clinician  Bolus 5 milliLiter(s) Epidural every 15 minutes PRN for Pain Score greater than 6  naloxone Injectable 0.1 milliGRAM(s) IV Push every 3 minutes PRN For ANY of the following changes in patient status:  A. RR LESS THAN 10 breaths per minute, B. Oxygen saturation LESS THAN 90%, C. Sedation score of 6        Assessment:  This is a 63yFemale POD 0 s/p Jeffery procedure for chronic pancreatitis.     Plan:  - Diet: NPO/IVF at 65ml/hr  - PCEA/IV tyl/0.5IV dilaudid PRN for breakthrough  - Monitor BP- IF HYPOTENSIVE, consider denis instead   - Encourage use of IS  - Monitor dressings  - DVT ppx: [facility] General Surgery Post-Op Note    SUBJECTIVE:  This is a 63y Female POD 0 s/p Jeffery procedure for chronic pancreatitis. BPs labile ranging from 120s-190s, however, HTN assoc with pain. Pt normally takes morphine 30mg q4-6hr at home. Anesthesia saw pt during HTNsive episodes and increased the PCEA dose and gave a bolus. Now SBPs stable in the 130s. Pt c/o abd pain, denies CP, SOB, N/V.     OBJECTIVE:     ** VITAL SIGNS / I&O's **    Vital Signs Last 24 Hrs  T(C): 36.4 (08 May 2019 17:30), Max: 36.6 (08 May 2019 13:50)  T(F): 97.5 (08 May 2019 17:30), Max: 97.9 (08 May 2019 13:50)  HR: 69 (08 May 2019 17:30) (61 - 105)  BP: 138/72 (08 May 2019 17:30) (99/57 - 206/100)  BP(mean): 78 (08 May 2019 17:15) (72 - 145)  RR: 18 (08 May 2019 17:30) (15 - 618)  SpO2: 100% (08 May 2019 17:30) (98% - 100%)      08 May 2019 07:01  -  08 May 2019 17:58  --------------------------------------------------------  IN:    lactated ringers.: 130 mL  Total IN: 130 mL    OUT:    Bulb: 60 mL    Indwelling Catheter - Urethral: 240 mL  Total OUT: 300 mL    Total NET: -170 mL          ** PHYSICAL EXAM **    -- CONSTITUTIONAL: Alert, in NAD  -- PULMONARY: non-labored respirations  -- ABDOMEN: soft, non-distended, appropriately TTP, prevena vac holding suction with LUQ GREGORIO in place- SS drainage.   -- : Smith in place with clear yellow urine  -- NEURO: A&Ox3    ** LABS **                          10.9   13.7  )-----------( 234      ( 08 May 2019 14:34 )             32.8     08 May 2019 14:34    143    |  104    |  7      ----------------------------<  157    3.6     |  25     |  0.60     Ca    9.3        08 May 2019 14:34  Phos  4.3       08 May 2019 14:34  Mg     2.2       08 May 2019 14:34        CAPILLARY BLOOD GLUCOSE      POCT Blood Glucose.: 134 mg/dL (08 May 2019 05:35)                MEDICATIONS  (STANDING):  acetaminophen  IVPB .. 1000 milliGRAM(s) IV Intermittent once  heparin  Injectable 5000 Unit(s) SubCutaneous every 8 hours  HYDROmorphone (10 MICROgram(s)/mL) + BUpivacaine 0.0625% in 0.9% Sodium Chloride PCEA 250 milliLiter(s) Epidural PCA Continuous  lactated ringers. 1000 milliLiter(s) (65 mL/Hr) IV Continuous <Continuous>  metoprolol tartrate Injectable 5 milliGRAM(s) IV Push every 6 hours    MEDICATIONS  (PRN):  HYDROmorphone  Injectable 0.5 milliGRAM(s) IV Push every 10 minutes PRN Severe Pain (7 - 10)  HYDROmorphone  Injectable 0.5 milliGRAM(s) IV Push every 4 hours PRN breakthrough pain  HYDROmorphone (10 MICROgram(s)/mL) + BUpivacaine 0.0625% in 0.9% Sodium Chloride PCEA Rescue Clinician  Bolus 5 milliLiter(s) Epidural every 15 minutes PRN for Pain Score greater than 6  naloxone Injectable 0.1 milliGRAM(s) IV Push every 3 minutes PRN For ANY of the following changes in patient status:  A. RR LESS THAN 10 breaths per minute, B. Oxygen saturation LESS THAN 90%, C. Sedation score of 6        Assessment:  This is a 63yFemale POD 0 s/p Jeffery procedure for chronic pancreatitis.     Plan:  - Diet: NPO/IVF at 65ml/hr  - PCEA/IV tyl/0.5IV dilaudid PRN for breakthrough  - Monitor BP- IF HYPOTENSIVE, consider denis/albumin instead of IVF boluses  - F/u stat labs and 10pm labs  - Q4H FS- start ISS if elevated  - Q6H CBC x24hrs  - Encourage use of IS  - Monitor prevena vac and GREGORIO drain  - DVT ppx: LON Briones, PGY-1  [facility] General Surgery [phone]

## 2019-05-08 NOTE — BRIEF OPERATIVE NOTE - OPERATION/FINDINGS
Epidural Placed, GETA induced. Smith inserted. Abx given. The patient's abdomen was prepped and draped. Time-out was performed.  A midline incision was made in supraumbilical. The abdomen was entered. A kocher maneuver was performed. The lesser sac was entered and the gastropancreatic adhesions were cleared. An 18G angio cath was placed into the main pancreatic body identifying the pancreatic duct. The pancreatic duct was exposed with electrocautery to the tail and head of the pancreas. Multiple large stones were extracted form t vessels. A 5mm trocar was placed in the midline suprapubic position. The appendix was then visualized, noted to be inflamed and nonperforated. The patient was then placed in Trendelenburg position. An atraumatic grasper was used to elevate the base of the appendix and a window was made in the mesoappendix between the base of the appendix and the cecum. Electocautery device was used to devide the mesoappendix. The base of the appendix was clearly identified and resected with a 45mm KASSANDRA blue vascular loaded stapler. The appendix was withdrawn from the abdomen through a 12 mm trocar with an endoscopic bag retrieval bag. It was removed from the abdominal cavity and passed off the Operating Room table as specimen. At this time the appendiceal stump was inspected and hemostasis was assured. Secondary trocars were removed under direct vision. No bleeding was noted. The laparoscope was withdrawn and the umbilical trocar removed.The fascia of the 12 mm port was reapproximated with a 0 Vicryl figure of 8 fashion.  Umbilical and Trocar sites measuring 5 mm were closed with 4-0 Monocryl stitch in a subcuticular fashion. The wounds were dressed with Dermabond. Patient tolerated the procedure well and there were no complications. All needle, lap and sponge counts were reported as correct. Patient was extubated in the Operating Room and taken to the recovery room in stable surgical condition. Epidural Placed, GETA induced. Smith inserted. Abx given. The patient's abdomen was prepped and draped. Time-out was performed.  A midline incision was made in supraumbilical. The abdomen was entered. A kocher maneuver was performed. The lesser sac was entered and the gastropancreatic adhesions were cleared. An 18G angio cath was placed into the main pancreatic body identifying the pancreatic duct. The pancreatic duct was exposed with electrocautery to the tail and head of the pancreas. The head of the pancreas was cored with bovey. Bleeding was controlled by circumferentially placed PDS sutures in the area surrounding the core.  Multiple large stones were extracted from the PD. Next our attention was moved to the jejunum, the LOT identified, 40cm from the LOT, the jejunum was divided with a KASSANDRA 80 Blue load. The limb was brought through the transverse mesocolon. We next completed a Jejunojejunostomy with a KASASNDRA 80 Blue load, the enterostomy was closed with a two layer interrupted lembert 3-0 Silk suture. The pancreaticojejunostomy was anastomosed with 3-0 PDS. The abdomen was irrigated. a 10F elvin drain was left in adjacent the PJ. The fasica was closed with #1 Looped PDS.  Patient tolerated the procedure well and there were no complications. All needle, lap and sponge counts were reported as correct. Patient was extubated in the Operating Room and taken to the recovery room in stable surgical condition.

## 2019-05-09 LAB
ANION GAP SERPL CALC-SCNC: 13 MMOL/L — SIGNIFICANT CHANGE UP (ref 5–17)
BUN SERPL-MCNC: 7 MG/DL — SIGNIFICANT CHANGE UP (ref 7–23)
CALCIUM SERPL-MCNC: 8.8 MG/DL — SIGNIFICANT CHANGE UP (ref 8.4–10.5)
CHLORIDE SERPL-SCNC: 98 MMOL/L — SIGNIFICANT CHANGE UP (ref 96–108)
CO2 SERPL-SCNC: 26 MMOL/L — SIGNIFICANT CHANGE UP (ref 22–31)
CREAT SERPL-MCNC: 0.51 MG/DL — SIGNIFICANT CHANGE UP (ref 0.5–1.3)
GLUCOSE BLDC GLUCOMTR-MCNC: 101 MG/DL — HIGH (ref 70–99)
GLUCOSE BLDC GLUCOMTR-MCNC: 112 MG/DL — HIGH (ref 70–99)
GLUCOSE SERPL-MCNC: 118 MG/DL — HIGH (ref 70–99)
HCT VFR BLD CALC: 32.5 % — LOW (ref 34.5–45)
HCT VFR BLD CALC: 33 % — LOW (ref 34.5–45)
HCT VFR BLD CALC: 34.1 % — LOW (ref 34.5–45)
HGB BLD-MCNC: 10.4 G/DL — LOW (ref 11.5–15.5)
HGB BLD-MCNC: 10.8 G/DL — LOW (ref 11.5–15.5)
HGB BLD-MCNC: 11.4 G/DL — LOW (ref 11.5–15.5)
MAGNESIUM SERPL-MCNC: 1.7 MG/DL — SIGNIFICANT CHANGE UP (ref 1.6–2.6)
MCHC RBC-ENTMCNC: 27.1 PG — SIGNIFICANT CHANGE UP (ref 27–34)
MCHC RBC-ENTMCNC: 29.3 PG — SIGNIFICANT CHANGE UP (ref 27–34)
MCHC RBC-ENTMCNC: 29.6 PG — SIGNIFICANT CHANGE UP (ref 27–34)
MCHC RBC-ENTMCNC: 31.5 GM/DL — LOW (ref 32–36)
MCHC RBC-ENTMCNC: 33.3 GM/DL — SIGNIFICANT CHANGE UP (ref 32–36)
MCHC RBC-ENTMCNC: 33.4 GM/DL — SIGNIFICANT CHANGE UP (ref 32–36)
MCV RBC AUTO: 85.9 FL — SIGNIFICANT CHANGE UP (ref 80–100)
MCV RBC AUTO: 88.1 FL — SIGNIFICANT CHANGE UP (ref 80–100)
MCV RBC AUTO: 88.6 FL — SIGNIFICANT CHANGE UP (ref 80–100)
PHOSPHATE SERPL-MCNC: 2.3 MG/DL — LOW (ref 2.5–4.5)
PLATELET # BLD AUTO: 216 K/UL — SIGNIFICANT CHANGE UP (ref 150–400)
PLATELET # BLD AUTO: 235 K/UL — SIGNIFICANT CHANGE UP (ref 150–400)
PLATELET # BLD AUTO: 242 K/UL — SIGNIFICANT CHANGE UP (ref 150–400)
POTASSIUM SERPL-MCNC: 3 MMOL/L — LOW (ref 3.5–5.3)
POTASSIUM SERPL-SCNC: 3 MMOL/L — LOW (ref 3.5–5.3)
RBC # BLD: 3.66 M/UL — LOW (ref 3.8–5.2)
RBC # BLD: 3.84 M/UL — SIGNIFICANT CHANGE UP (ref 3.8–5.2)
RBC # BLD: 3.87 M/UL — SIGNIFICANT CHANGE UP (ref 3.8–5.2)
RBC # FLD: 13.7 % — SIGNIFICANT CHANGE UP (ref 10.3–14.5)
RBC # FLD: 13.7 % — SIGNIFICANT CHANGE UP (ref 10.3–14.5)
RBC # FLD: 15.2 % — HIGH (ref 10.3–14.5)
SODIUM SERPL-SCNC: 137 MMOL/L — SIGNIFICANT CHANGE UP (ref 135–145)
WBC # BLD: 12.7 K/UL — HIGH (ref 3.8–10.5)
WBC # BLD: 13.3 K/UL — HIGH (ref 3.8–10.5)
WBC # BLD: 14.8 K/UL — HIGH (ref 3.8–10.5)
WBC # FLD AUTO: 12.7 K/UL — HIGH (ref 3.8–10.5)
WBC # FLD AUTO: 13.3 K/UL — HIGH (ref 3.8–10.5)
WBC # FLD AUTO: 14.8 K/UL — HIGH (ref 3.8–10.5)

## 2019-05-09 RX ORDER — ERYTHROMYCIN ETHYLSUCCINATE 400 MG
500 TABLET ORAL EVERY 8 HOURS
Refills: 0 | Status: DISCONTINUED | OUTPATIENT
Start: 2019-05-09 | End: 2019-05-16

## 2019-05-09 RX ORDER — ERYTHROMYCIN ETHYLSUCCINATE 400 MG
250 TABLET ORAL EVERY 8 HOURS
Refills: 0 | Status: DISCONTINUED | OUTPATIENT
Start: 2019-05-09 | End: 2019-05-09

## 2019-05-09 RX ORDER — ONDANSETRON 8 MG/1
4 TABLET, FILM COATED ORAL EVERY 6 HOURS
Refills: 0 | Status: DISCONTINUED | OUTPATIENT
Start: 2019-05-09 | End: 2019-05-19

## 2019-05-09 RX ORDER — ONDANSETRON 8 MG/1
4 TABLET, FILM COATED ORAL EVERY 6 HOURS
Refills: 0 | Status: DISCONTINUED | OUTPATIENT
Start: 2019-05-09 | End: 2019-05-09

## 2019-05-09 RX ORDER — PANTOPRAZOLE SODIUM 20 MG/1
40 TABLET, DELAYED RELEASE ORAL DAILY
Refills: 0 | Status: DISCONTINUED | OUTPATIENT
Start: 2019-05-09 | End: 2019-05-12

## 2019-05-09 RX ADMIN — Medication 5 MILLIGRAM(S): at 05:12

## 2019-05-09 RX ADMIN — Medication 1000 MILLIGRAM(S): at 12:28

## 2019-05-09 RX ADMIN — Medication 5 MILLIGRAM(S): at 16:57

## 2019-05-09 RX ADMIN — Medication 5 MILLIGRAM(S): at 23:18

## 2019-05-09 RX ADMIN — Medication 500 MILLIGRAM(S): at 21:58

## 2019-05-09 RX ADMIN — Medication 400 MILLIGRAM(S): at 16:58

## 2019-05-09 RX ADMIN — ONDANSETRON 4 MILLIGRAM(S): 8 TABLET, FILM COATED ORAL at 07:55

## 2019-05-09 RX ADMIN — Medication 400 MILLIGRAM(S): at 11:58

## 2019-05-09 RX ADMIN — Medication 5 MILLIGRAM(S): at 11:58

## 2019-05-09 RX ADMIN — HEPARIN SODIUM 5000 UNIT(S): 5000 INJECTION INTRAVENOUS; SUBCUTANEOUS at 05:13

## 2019-05-09 RX ADMIN — ONDANSETRON 4 MILLIGRAM(S): 8 TABLET, FILM COATED ORAL at 15:12

## 2019-05-09 RX ADMIN — Medication 400 MILLIGRAM(S): at 05:12

## 2019-05-09 RX ADMIN — HEPARIN SODIUM 5000 UNIT(S): 5000 INJECTION INTRAVENOUS; SUBCUTANEOUS at 15:12

## 2019-05-09 RX ADMIN — ONDANSETRON 4 MILLIGRAM(S): 8 TABLET, FILM COATED ORAL at 23:18

## 2019-05-09 RX ADMIN — Medication 400 MILLIGRAM(S): at 23:30

## 2019-05-09 RX ADMIN — Medication 250 MILLIGRAM(S): at 15:12

## 2019-05-09 RX ADMIN — SODIUM CHLORIDE 65 MILLILITER(S): 9 INJECTION, SOLUTION INTRAVENOUS at 11:58

## 2019-05-09 RX ADMIN — HEPARIN SODIUM 5000 UNIT(S): 5000 INJECTION INTRAVENOUS; SUBCUTANEOUS at 21:38

## 2019-05-09 RX ADMIN — Medication 1000 MILLIGRAM(S): at 05:42

## 2019-05-09 NOTE — PROGRESS NOTE ADULT - SUBJECTIVE AND OBJECTIVE BOX
Pain Management Attending Addendum    SUBJECTIVE: Patient doing well with PCEA    Therapy:    [X] PCEA    OBJECTIVE:   [X] Pain appropriately controlled    [ ] Other:    Side Effects:  [ ] None	             [X] Nausea              [ ] Pruritis        [ ] Weakness          [ ] Numbness        	[ ] Other:    ASSESSMENT/PLAN:    [X] Continue current therapy    [ ] Therapy changed to:    [ ] PRN Analgesics   [ ] IV PCA    Comments: increased 4hr limit

## 2019-05-09 NOTE — PROGRESS NOTE ADULT - SUBJECTIVE AND OBJECTIVE BOX
General Surgery Progress Note    Interval events: POD 1 s/p Jeffery procedure for chronic pancreatitis. Post-operatively, pt was hypertensive to the 170s-200s, however, this may have been related to pain control. Anesthesia saw pt in PACU and increased the PCEA dosage and gave additional boluses with SBP downtrending to 110s. Upon transfer to floor, pt was hypertensive to the 170s-180s o/n, anesthesia saw at bedside and increased the PCEA again and gave one additional bolus. Now SBPs ranging in the 150s-160s.     Overnight, there was concern for redness and oozing at the epidural site, for which anesthesia also evaluated at that time and was not concerned.     Post-op labs were stable.     SUBJECTIVE:  The patient was seen and examined. No acute events overnight. C/o minimal bilious mucous discharge, has a h/o nausea for which she takes zofran at home.     OBJECTIVE:     ** VITAL SIGNS / I&O's **    Vital Signs Last 24 Hrs  T(C): 36.9 (09 May 2019 05:13), Max: 37.3 (08 May 2019 20:15)  T(F): 98.4 (09 May 2019 05:13), Max: 99.1 (08 May 2019 20:15)  HR: 93 (09 May 2019 05:13) (61 - 102)  BP: 161/92 (09 May 2019 05:13) (99/57 - 206/100)  BP(mean): 111 (08 May 2019 19:00) (72 - 145)  RR: 18 (09 May 2019 05:13) (15 - 618)  SpO2: 96% (09 May 2019 05:13) (95% - 100%)      08 May 2019 07:01  -  09 May 2019 07:00  --------------------------------------------------------  IN:    lactated ringers.: 263 mL  Total IN: 263 mL    OUT:    Bulb: 75 mL    Indwelling Catheter - Urethral: 755 mL  Total OUT: 830 mL    Total NET: -567 mL          ** PHYSICAL EXAM **    -- CONSTITUTIONAL: Alert, NAD  -- PULMONARY: non-labored respirations  -- ABDOMEN: soft, non-distended, non-tender, Prevena vac holding suction, LUQ GREGORIO bulb SS output.   -- : strickland in place- clear yellow urine  -- NEURO: A&Ox3    ** LABS **                          11.4   14.8  )-----------( 235      ( 09 May 2019 00:06 )             34.1     08 May 2019 22:46    143    |  102    |  8      ----------------------------<  156    3.8     |  28     |  0.67     Ca    9.2        08 May 2019 22:46  Phos  3.6       08 May 2019 22:46  Mg     2.0       08 May 2019 22:46    TPro  6.1    /  Alb  3.5    /  TBili  0.4    /  DBili  x      /  AST  27     /  ALT  18     /  AlkPhos  55     08 May 2019 19:33    PT/INR - ( 08 May 2019 22:46 )   PT: 12.5 sec;   INR: 1.08 ratio         PTT - ( 08 May 2019 22:46 )  PTT:25.8 sec  CAPILLARY BLOOD GLUCOSE      POCT Blood Glucose.: 129 mg/dL (09 May 2019 06:40)  POCT Blood Glucose.: 119 mg/dL (09 May 2019 01:54)  POCT Blood Glucose.: 142 mg/dL (08 May 2019 22:28)        LIVER FUNCTIONS - ( 08 May 2019 19:33 )  Alb: 3.5 g/dL / Pro: 6.1 g/dL / ALK PHOS: 55 U/L / ALT: 18 U/L / AST: 27 U/L / GGT: x                 MEDICATIONS  (STANDING):  acetaminophen  IVPB .. 1000 milliGRAM(s) IV Intermittent once  acetaminophen  IVPB .. 1000 milliGRAM(s) IV Intermittent once  acetaminophen  IVPB .. 1000 milliGRAM(s) IV Intermittent once  heparin  Injectable 5000 Unit(s) SubCutaneous every 8 hours  HYDROmorphone (10 MICROgram(s)/mL) + BUpivacaine 0.0625% in 0.9% Sodium Chloride PCEA 250 milliLiter(s) Epidural PCA Continuous  lactated ringers. 1000 milliLiter(s) (65 mL/Hr) IV Continuous <Continuous>  metoprolol tartrate Injectable 5 milliGRAM(s) IV Push every 6 hours    MEDICATIONS  (PRN):  HYDROmorphone  Injectable 0.5 milliGRAM(s) IV Push every 4 hours PRN breakthrough pain  HYDROmorphone (10 MICROgram(s)/mL) + BUpivacaine 0.0625% in 0.9% Sodium Chloride PCEA Rescue Clinician  Bolus 5 milliLiter(s) Epidural every 15 minutes PRN for Pain Score greater than 6  naloxone Injectable 0.1 milliGRAM(s) IV Push every 3 minutes PRN For ANY of the following changes in patient status:  A. RR LESS THAN 10 breaths per minute, B. Oxygen saturation LESS THAN 90%, C. Sedation score of 6  ondansetron Injectable 4 milliGRAM(s) IV Push every 6 hours PRN Nausea and/or Vomiting

## 2019-05-09 NOTE — PROGRESS NOTE ADULT - SUBJECTIVE AND OBJECTIVE BOX
Day 1 of Anesthesia Pain Management Service    SUBJECTIVE: Patient doing well with PCEA    Pain Scale Score:   Refer to charted pain scores    THERAPY:  [X] Epidural Bupivacaine 0.0625% and Hydromorphone         [X] 10 micrograms/mL 	[ ] 5 micrograms/mL  [ ] Epidural Ropivacaine 0.2% plain – 1 mg/mL    Demand dose: 3 mL  Lockout: 15 minutes  Continuous Rate: 10 mL/hr    MEDICATIONS  (STANDING):  acetaminophen  IVPB .. 1000 milliGRAM(s) IV Intermittent once  acetaminophen  IVPB .. 1000 milliGRAM(s) IV Intermittent once  acetaminophen  IVPB .. 1000 milliGRAM(s) IV Intermittent once  erythromycin    ethylsuccinate Suspension 40 mG/mL 250 milliGRAM(s) Oral every 8 hours  heparin  Injectable 5000 Unit(s) SubCutaneous every 8 hours  HYDROmorphone (10 MICROgram(s)/mL) + BUpivacaine 0.0625% in 0.9% Sodium Chloride PCEA 250 milliLiter(s) Epidural PCA Continuous  lactated ringers. 1000 milliLiter(s) (65 mL/Hr) IV Continuous <Continuous>  metoprolol tartrate Injectable 5 milliGRAM(s) IV Push every 6 hours    MEDICATIONS  (PRN):  HYDROmorphone  Injectable 0.5 milliGRAM(s) IV Push every 4 hours PRN breakthrough pain  HYDROmorphone (10 MICROgram(s)/mL) + BUpivacaine 0.0625% in 0.9% Sodium Chloride PCEA Rescue Clinician  Bolus 5 milliLiter(s) Epidural every 15 minutes PRN for Pain Score greater than 6  naloxone Injectable 0.1 milliGRAM(s) IV Push every 3 minutes PRN For ANY of the following changes in patient status:  A. RR LESS THAN 10 breaths per minute, B. Oxygen saturation LESS THAN 90%, C. Sedation score of 6  ondansetron Injectable 4 milliGRAM(s) IV Push every 6 hours PRN Nausea and/or Vomiting      OBJECTIVE:    Assessment of Catheter Site:    [X] Epidural 	  [X] Dressing intact	[X] Site non-tender	[X] Site without erythema, discharge, edema  [X] Epidural tubing and connection checked	[X] Gross neurological exam within normal limits  [ ] Catheter removed – tip intact		[X] Afebrile	            [ ] Febrile: ___    PT/INR - ( 08 May 2019 22:46 )   PT: 12.5 sec;   INR: 1.08 ratio         PTT - ( 08 May 2019 22:46 )  PTT:25.8 sec                      10.4   13.30 )-----------( 242      ( 09 May 2019 07:44 )             33.0     Vital Signs Last 24 Hrs  T(C): 36.8 (05-09-19 @ 09:59), Max: 37.3 (05-08-19 @ 20:15)  T(F): 98.3 (05-09-19 @ 09:59), Max: 99.1 (05-08-19 @ 20:15)  HR: 93 (05-09-19 @ 05:13) (61 - 102)  BP: 161/92 (05-09-19 @ 05:13) (99/57 - 206/100)  BP(mean): 111 (05-08-19 @ 19:00) (72 - 145)  RR: 18 (05-09-19 @ 09:59) (15 - 618)  SpO2: 95% (05-09-19 @ 09:59) (95% - 100%)      Sedation Score:	[X] Alert 	[ ] Drowsy	[ ] Arousable  [ ] Asleep     [ ] Unresponsive    Side Effects:	[X] None	[ ] Nausea	[ ] Vomiting   [ ] Pruritus  		[ ] Weakness     [ ] Numbness	[ ] Other:    ASSESSMENT/ PLAN:    Therapy:	[X] Continue   [ ] Discontinue   [ ] Change to PRN Analgesics   [ ] Change to PCA    Documentation and Verification of current medications:  [X] Done	[ ] Not done, not eligible, reason:    COMMENTS: Followed by chronic pain management as outpatient. Plan to restart preop MSIR when tolerating po. [place] (Last entry March '19 Morphine IR 15mg po q6hrs)  Four hour limit increased to 60mL.

## 2019-05-09 NOTE — PROGRESS NOTE ADULT - ASSESSMENT
A: 64 yo F with h/o chronic pancreatitis now s/p Jeffery procedure (5/8)    P:  - DIET: NPO/IVF at 65ml/hr  - PCEA/IV tyl/0.5IV dilaudid PRN for breakthrough  - Monitor BP- IF HYPOTENSIVE, consider denis/albumin instead of IVF boluses  - F/u 10pm labs  - Q4H FS- start ISS if elevated  - Encourage use of IS  - Monitor prevena vac and GREGORIO drain  - DVT ppx: SQH  - OOB/PT    Gauri Briones, PGY-1  [facility] General Surgery [phone]

## 2019-05-10 LAB
ANION GAP SERPL CALC-SCNC: 12 MMOL/L — SIGNIFICANT CHANGE UP (ref 5–17)
BUN SERPL-MCNC: 6 MG/DL — LOW (ref 7–23)
CALCIUM SERPL-MCNC: 8.6 MG/DL — SIGNIFICANT CHANGE UP (ref 8.4–10.5)
CHLORIDE SERPL-SCNC: 102 MMOL/L — SIGNIFICANT CHANGE UP (ref 96–108)
CO2 SERPL-SCNC: 23 MMOL/L — SIGNIFICANT CHANGE UP (ref 22–31)
CREAT SERPL-MCNC: 0.54 MG/DL — SIGNIFICANT CHANGE UP (ref 0.5–1.3)
GLUCOSE BLDC GLUCOMTR-MCNC: 105 MG/DL — HIGH (ref 70–99)
GLUCOSE BLDC GLUCOMTR-MCNC: 108 MG/DL — HIGH (ref 70–99)
GLUCOSE BLDC GLUCOMTR-MCNC: 108 MG/DL — HIGH (ref 70–99)
GLUCOSE BLDC GLUCOMTR-MCNC: 98 MG/DL — SIGNIFICANT CHANGE UP (ref 70–99)
GLUCOSE SERPL-MCNC: 102 MG/DL — HIGH (ref 70–99)
HCT VFR BLD CALC: 28.9 % — LOW (ref 34.5–45)
HGB BLD-MCNC: 9.8 G/DL — LOW (ref 11.5–15.5)
MAGNESIUM SERPL-MCNC: 1.7 MG/DL — SIGNIFICANT CHANGE UP (ref 1.6–2.6)
MCHC RBC-ENTMCNC: 29.7 PG — SIGNIFICANT CHANGE UP (ref 27–34)
MCHC RBC-ENTMCNC: 33.7 GM/DL — SIGNIFICANT CHANGE UP (ref 32–36)
MCV RBC AUTO: 87.9 FL — SIGNIFICANT CHANGE UP (ref 80–100)
PHOSPHATE SERPL-MCNC: 2.2 MG/DL — LOW (ref 2.5–4.5)
PLATELET # BLD AUTO: 214 K/UL — SIGNIFICANT CHANGE UP (ref 150–400)
POTASSIUM SERPL-MCNC: 3.7 MMOL/L — SIGNIFICANT CHANGE UP (ref 3.5–5.3)
POTASSIUM SERPL-SCNC: 3.7 MMOL/L — SIGNIFICANT CHANGE UP (ref 3.5–5.3)
RBC # BLD: 3.29 M/UL — LOW (ref 3.8–5.2)
RBC # FLD: 13.3 % — SIGNIFICANT CHANGE UP (ref 10.3–14.5)
SODIUM SERPL-SCNC: 137 MMOL/L — SIGNIFICANT CHANGE UP (ref 135–145)
WBC # BLD: 12.2 K/UL — HIGH (ref 3.8–10.5)
WBC # FLD AUTO: 12.2 K/UL — HIGH (ref 3.8–10.5)

## 2019-05-10 PROCEDURE — 93010 ELECTROCARDIOGRAM REPORT: CPT

## 2019-05-10 RX ORDER — POTASSIUM CHLORIDE 20 MEQ
10 PACKET (EA) ORAL
Refills: 0 | Status: COMPLETED | OUTPATIENT
Start: 2019-05-10 | End: 2019-05-10

## 2019-05-10 RX ORDER — NALOXONE HYDROCHLORIDE 4 MG/.1ML
0.1 SPRAY NASAL
Refills: 0 | Status: DISCONTINUED | OUTPATIENT
Start: 2019-05-10 | End: 2019-05-11

## 2019-05-10 RX ORDER — ACETAMINOPHEN 500 MG
1000 TABLET ORAL ONCE
Refills: 0 | Status: COMPLETED | OUTPATIENT
Start: 2019-05-10 | End: 2019-05-10

## 2019-05-10 RX ORDER — HYDRALAZINE HCL 50 MG
10 TABLET ORAL ONCE
Refills: 0 | Status: COMPLETED | OUTPATIENT
Start: 2019-05-10 | End: 2019-05-10

## 2019-05-10 RX ORDER — POTASSIUM CHLORIDE 20 MEQ
20 PACKET (EA) ORAL
Refills: 0 | Status: DISCONTINUED | OUTPATIENT
Start: 2019-05-10 | End: 2019-05-10

## 2019-05-10 RX ORDER — HYDROMORPHONE HYDROCHLORIDE 2 MG/ML
30 INJECTION INTRAMUSCULAR; INTRAVENOUS; SUBCUTANEOUS
Refills: 0 | Status: DISCONTINUED | OUTPATIENT
Start: 2019-05-10 | End: 2019-05-11

## 2019-05-10 RX ORDER — METOPROLOL TARTRATE 50 MG
5 TABLET ORAL ONCE
Refills: 0 | Status: COMPLETED | OUTPATIENT
Start: 2019-05-10 | End: 2019-05-10

## 2019-05-10 RX ORDER — HYDROMORPHONE HYDROCHLORIDE 2 MG/ML
0.5 INJECTION INTRAMUSCULAR; INTRAVENOUS; SUBCUTANEOUS
Refills: 0 | Status: DISCONTINUED | OUTPATIENT
Start: 2019-05-10 | End: 2019-05-11

## 2019-05-10 RX ORDER — MAGNESIUM SULFATE 500 MG/ML
2 VIAL (ML) INJECTION ONCE
Refills: 0 | Status: COMPLETED | OUTPATIENT
Start: 2019-05-10 | End: 2019-05-11

## 2019-05-10 RX ORDER — SODIUM,POTASSIUM PHOSPHATES 278-250MG
1 POWDER IN PACKET (EA) ORAL ONCE
Refills: 0 | Status: DISCONTINUED | OUTPATIENT
Start: 2019-05-10 | End: 2019-05-10

## 2019-05-10 RX ORDER — ACETAMINOPHEN 500 MG
1000 TABLET ORAL ONCE
Refills: 0 | Status: COMPLETED | OUTPATIENT
Start: 2019-05-11 | End: 2019-05-11

## 2019-05-10 RX ORDER — ACETAMINOPHEN 500 MG
1000 TABLET ORAL ONCE
Refills: 0 | Status: COMPLETED | OUTPATIENT
Start: 2019-05-10 | End: 2019-05-11

## 2019-05-10 RX ORDER — DEXTROSE MONOHYDRATE, SODIUM CHLORIDE, AND POTASSIUM CHLORIDE 50; .745; 4.5 G/1000ML; G/1000ML; G/1000ML
1000 INJECTION, SOLUTION INTRAVENOUS
Refills: 0 | Status: DISCONTINUED | OUTPATIENT
Start: 2019-05-10 | End: 2019-05-12

## 2019-05-10 RX ORDER — METOCLOPRAMIDE HCL 10 MG
10 TABLET ORAL EVERY 4 HOURS
Refills: 0 | Status: DISCONTINUED | OUTPATIENT
Start: 2019-05-10 | End: 2019-05-13

## 2019-05-10 RX ORDER — POTASSIUM PHOSPHATE, MONOBASIC POTASSIUM PHOSPHATE, DIBASIC 236; 224 MG/ML; MG/ML
30 INJECTION, SOLUTION INTRAVENOUS ONCE
Refills: 0 | Status: COMPLETED | OUTPATIENT
Start: 2019-05-10 | End: 2019-05-11

## 2019-05-10 RX ADMIN — PANTOPRAZOLE SODIUM 40 MILLIGRAM(S): 20 TABLET, DELAYED RELEASE ORAL at 05:30

## 2019-05-10 RX ADMIN — ONDANSETRON 4 MILLIGRAM(S): 8 TABLET, FILM COATED ORAL at 21:37

## 2019-05-10 RX ADMIN — Medication 100 MILLIEQUIVALENT(S): at 02:25

## 2019-05-10 RX ADMIN — Medication 10 MILLIGRAM(S): at 02:16

## 2019-05-10 RX ADMIN — Medication 5 MILLIGRAM(S): at 01:29

## 2019-05-10 RX ADMIN — Medication 100 MILLIEQUIVALENT(S): at 19:34

## 2019-05-10 RX ADMIN — Medication 5 MILLIGRAM(S): at 05:29

## 2019-05-10 RX ADMIN — HEPARIN SODIUM 5000 UNIT(S): 5000 INJECTION INTRAVENOUS; SUBCUTANEOUS at 21:37

## 2019-05-10 RX ADMIN — Medication 5 MILLIGRAM(S): at 21:37

## 2019-05-10 RX ADMIN — HEPARIN SODIUM 5000 UNIT(S): 5000 INJECTION INTRAVENOUS; SUBCUTANEOUS at 12:50

## 2019-05-10 RX ADMIN — Medication 500 MILLIGRAM(S): at 05:29

## 2019-05-10 RX ADMIN — ONDANSETRON 4 MILLIGRAM(S): 8 TABLET, FILM COATED ORAL at 11:08

## 2019-05-10 RX ADMIN — Medication 1000 MILLIGRAM(S): at 13:19

## 2019-05-10 RX ADMIN — Medication 500 MILLIGRAM(S): at 12:50

## 2019-05-10 RX ADMIN — Medication 500 MILLIGRAM(S): at 23:45

## 2019-05-10 RX ADMIN — ONDANSETRON 4 MILLIGRAM(S): 8 TABLET, FILM COATED ORAL at 16:55

## 2019-05-10 RX ADMIN — ONDANSETRON 4 MILLIGRAM(S): 8 TABLET, FILM COATED ORAL at 05:29

## 2019-05-10 RX ADMIN — Medication 100 MILLIEQUIVALENT(S): at 16:55

## 2019-05-10 RX ADMIN — Medication 400 MILLIGRAM(S): at 21:36

## 2019-05-10 RX ADMIN — Medication 400 MILLIGRAM(S): at 12:49

## 2019-05-10 RX ADMIN — Medication 100 MILLIEQUIVALENT(S): at 21:54

## 2019-05-10 RX ADMIN — HYDROMORPHONE HYDROCHLORIDE 30 MILLILITER(S): 2 INJECTION INTRAMUSCULAR; INTRAVENOUS; SUBCUTANEOUS at 19:32

## 2019-05-10 RX ADMIN — HEPARIN SODIUM 5000 UNIT(S): 5000 INJECTION INTRAVENOUS; SUBCUTANEOUS at 05:29

## 2019-05-10 RX ADMIN — Medication 1000 MILLIGRAM(S): at 22:06

## 2019-05-10 RX ADMIN — Medication 5 MILLIGRAM(S): at 16:55

## 2019-05-10 RX ADMIN — Medication 100 MILLIEQUIVALENT(S): at 04:59

## 2019-05-10 RX ADMIN — Medication 1000 MILLIGRAM(S): at 00:18

## 2019-05-10 RX ADMIN — Medication 100 MILLIEQUIVALENT(S): at 07:17

## 2019-05-10 RX ADMIN — Medication 10 MILLIGRAM(S): at 15:22

## 2019-05-10 RX ADMIN — HYDROMORPHONE HYDROCHLORIDE 30 MILLILITER(S): 2 INJECTION INTRAMUSCULAR; INTRAVENOUS; SUBCUTANEOUS at 11:03

## 2019-05-10 RX ADMIN — Medication 5 MILLIGRAM(S): at 11:08

## 2019-05-10 NOTE — PROGRESS NOTE ADULT - SUBJECTIVE AND OBJECTIVE BOX
Day 2 of Anesthesia Pain Management Service    SUBJECTIVE: Patient doing well with PCEA    Pain Scale Score:   Refer to charted pain scores    THERAPY:  [X] Epidural Bupivacaine 0.0625% and Hydromorphone         [X] 10 micrograms/mL 	[ ] 5 micrograms/mL  [ ] Epidural Ropivacaine 0.2% plain – 1 mg/mL    Demand dose: 3 mL  Lockout: 15 minutes  Continuous Rate: 10 mL/hr    MEDICATIONS  (STANDING):  erythromycin    ethylsuccinate Suspension 40 mG/mL 500 milliGRAM(s) Oral every 8 hours  heparin  Injectable 5000 Unit(s) SubCutaneous every 8 hours  HYDROmorphone (10 MICROgram(s)/mL) + BUpivacaine 0.0625% in 0.9% Sodium Chloride PCEA 250 milliLiter(s) Epidural PCA Continuous  lactated ringers. 1000 milliLiter(s) (65 mL/Hr) IV Continuous <Continuous>  metoprolol tartrate Injectable 5 milliGRAM(s) IV Push every 6 hours  ondansetron Injectable 4 milliGRAM(s) IV Push every 6 hours  pantoprazole  Injectable 40 milliGRAM(s) IV Push daily  potassium chloride  20 mEq/100 mL IVPB 20 milliEquivalent(s) IV Intermittent every 2 hours  potassium phosphate / sodium phosphate powder 1 Packet(s) Oral once    MEDICATIONS  (PRN):  HYDROmorphone  Injectable 0.5 milliGRAM(s) IV Push every 4 hours PRN breakthrough pain  HYDROmorphone (10 MICROgram(s)/mL) + BUpivacaine 0.0625% in 0.9% Sodium Chloride PCEA Rescue Clinician  Bolus 5 milliLiter(s) Epidural every 15 minutes PRN for Pain Score greater than 6  naloxone Injectable 0.1 milliGRAM(s) IV Push every 3 minutes PRN For ANY of the following changes in patient status:  A. RR LESS THAN 10 breaths per minute, B. Oxygen saturation LESS THAN 90%, C. Sedation score of 6      OBJECTIVE:    Assessment of Catheter Site:    [X] Epidural 	  [X] Dressing intact	[X] Site non-tender	[X] Site without erythema, discharge, edema  [X] Epidural tubing and connection checked	[X] Gross neurological exam within normal limits  [ ] Catheter removed – tip intact		[X] Afebrile	            [ ] Febrile: ___    PT/INR - ( 08 May 2019 22:46 )   PT: 12.5 sec;   INR: 1.08 ratio         PTT - ( 08 May 2019 22:46 )  PTT:25.8 sec                      10.8   12.7  )-----------( 216      ( 09 May 2019 23:03 )             32.5     Vital Signs Last 24 Hrs  T(C): 36.7 (05-10-19 @ 05:24), Max: 37.1 (05-09-19 @ 23:13)  T(F): 98.1 (05-10-19 @ 05:24), Max: 98.8 (05-09-19 @ 23:13)  HR: 105 (05-10-19 @ 05:24) (86 - 110)  BP: 168/97 (05-10-19 @ 05:24) (134/85 - 187/113)  BP(mean): --  RR: 20 (05-10-19 @ 05:24) (18 - 20)  SpO2: 99% (05-10-19 @ 05:24) (94% - 99%)      Sedation Score:	[X] Alert	[ ] Drowsy	[ ] Arousable  [ ] Asleep     [ ] Unresponsive    Side Effects:	[X] None	[ ] Nausea	[ ] Vomiting   [ ] Pruritus  		[ ] Weakness     [ ] Numbness	[ ] Other:    ASSESSMENT/ PLAN:    Therapy:	[X] Continue   [ ] Discontinue   [ ] Change to PRN Analgesics   [ ] Change to PCA    Documentation and Verification of current medications:  [X] Done	[ ] Not done, not eligible, reason:    COMMENTS: Patient resting. Continue PCEA.

## 2019-05-10 NOTE — PROGRESS NOTE ADULT - ASSESSMENT
A: 64 yo F with h/o chronic pancreatitis now s/p Jeffery procedure (5/8)    P:  - DIET: NPO/IVF at 65ml/hr  - PCEA/IV tyl/0.5IV dilaudid PRN for breakthrough  - F/u 10pm labs  - Encourage use of IS  - Monitor prevena vac and GREGORIO drain  - DVT ppx: SQH  - OOB/PT

## 2019-05-10 NOTE — PROVIDER CONTACT NOTE (OTHER) - ACTION/TREATMENT ORDERED:
MD made aware, metropolol given, give scheduled Iv tylenol, give bolus PCEA dose MD made aware, Metroprolol given, give scheduled Iv Tylenol, give bolus PCEA dose. Repeat B.P Pt resting comfortably after medication given Safety maintained

## 2019-05-10 NOTE — PROGRESS NOTE ADULT - SUBJECTIVE AND OBJECTIVE BOX
General Surgery Progress Note    Subjective: Pt was seen and examined. still not having GI function. still having nausea similar to her baseline and taking zofran for it.                 ** PHYSICAL EXAM **    -- CONSTITUTIONAL: Alert, NAD  -- PULMONARY: non-labored respirations  -- ABDOMEN: soft, non-distended, non-tender, Prevena vac holding suction, LUQ GREGORIO bulb SS output.   -- : strickland in place- clear yellow urine  -- NEURO: A&Ox3    Vital Signs Last 24 Hrs  T(C): 36.7 (10 May 2019 05:24), Max: 37.1 (09 May 2019 23:13)  T(F): 98.1 (10 May 2019 05:24), Max: 98.8 (09 May 2019 23:13)  HR: 105 (10 May 2019 05:24) (86 - 110)  BP: 168/97 (10 May 2019 05:24) (134/85 - 187/113)  BP(mean): --  RR: 20 (10 May 2019 05:24) (18 - 20)  SpO2: 99% (10 May 2019 05:24) (94% - 99%)    I&O's Detail    08 May 2019 07:01  -  09 May 2019 07:00  --------------------------------------------------------  IN:    lactated ringers.: 263 mL  Total IN: 263 mL    OUT:    Bulb: 75 mL    Indwelling Catheter - Urethral: 755 mL  Total OUT: 830 mL    Total NET: -567 mL      09 May 2019 07:01  -  10 May 2019 06:44  --------------------------------------------------------  IN:    IV PiggyBack: 200 mL    lactated ringers.: 780 mL  Total IN: 980 mL    OUT:    Bulb: 45 mL    Indwelling Catheter - Urethral: 1200 mL  Total OUT: 1245 mL    Total NET: -265 mL          MEDICATIONS  (STANDING):  erythromycin    ethylsuccinate Suspension 40 mG/mL 500 milliGRAM(s) Oral every 8 hours  heparin  Injectable 5000 Unit(s) SubCutaneous every 8 hours  HYDROmorphone (10 MICROgram(s)/mL) + BUpivacaine 0.0625% in 0.9% Sodium Chloride PCEA 250 milliLiter(s) Epidural PCA Continuous  lactated ringers. 1000 milliLiter(s) (65 mL/Hr) IV Continuous <Continuous>  metoprolol tartrate Injectable 5 milliGRAM(s) IV Push every 6 hours  ondansetron Injectable 4 milliGRAM(s) IV Push every 6 hours  pantoprazole  Injectable 40 milliGRAM(s) IV Push daily  potassium chloride  10 mEq/100 mL IVPB 10 milliEquivalent(s) IV Intermittent every 1 hour    MEDICATIONS  (PRN):  HYDROmorphone  Injectable 0.5 milliGRAM(s) IV Push every 4 hours PRN breakthrough pain  HYDROmorphone (10 MICROgram(s)/mL) + BUpivacaine 0.0625% in 0.9% Sodium Chloride PCEA Rescue Clinician  Bolus 5 milliLiter(s) Epidural every 15 minutes PRN for Pain Score greater than 6  naloxone Injectable 0.1 milliGRAM(s) IV Push every 3 minutes PRN For ANY of the following changes in patient status:  A. RR LESS THAN 10 breaths per minute, B. Oxygen saturation LESS THAN 90%, C. Sedation score of 6      LABS:                        10.8   12.7  )-----------( 216      ( 09 May 2019 23:03 )             32.5     05-09    137  |  98  |  7   ----------------------------<  118<H>  3.0<L>   |  26  |  0.51    Ca    8.8      09 May 2019 23:03  Phos  2.3     05-09  Mg     1.7     05-09    TPro  6.1  /  Alb  3.5  /  TBili  0.4  /  DBili  x   /  AST  27  /  ALT  18  /  AlkPhos  55  05-08    PT/INR - ( 08 May 2019 22:46 )   PT: 12.5 sec;   INR: 1.08 ratio         PTT - ( 08 May 2019 22:46 )  PTT:25.8 sec    LIVER FUNCTIONS - ( 08 May 2019 19:33 )  Alb: 3.5 g/dL / Pro: 6.1 g/dL / ALK PHOS: 55 U/L / ALT: 18 U/L / AST: 27 U/L / GGT: x

## 2019-05-11 LAB
GLUCOSE BLDC GLUCOMTR-MCNC: 100 MG/DL — HIGH (ref 70–99)
GLUCOSE BLDC GLUCOMTR-MCNC: 103 MG/DL — HIGH (ref 70–99)
GLUCOSE BLDC GLUCOMTR-MCNC: 106 MG/DL — HIGH (ref 70–99)
GLUCOSE BLDC GLUCOMTR-MCNC: 127 MG/DL — HIGH (ref 70–99)

## 2019-05-11 RX ORDER — SODIUM CHLORIDE 9 MG/ML
1000 INJECTION, SOLUTION INTRAVENOUS
Refills: 0 | Status: DISCONTINUED | OUTPATIENT
Start: 2019-05-11 | End: 2019-05-12

## 2019-05-11 RX ORDER — ONDANSETRON 8 MG/1
4 TABLET, FILM COATED ORAL EVERY 6 HOURS
Refills: 0 | Status: DISCONTINUED | OUTPATIENT
Start: 2019-05-11 | End: 2019-05-12

## 2019-05-11 RX ORDER — ACETAMINOPHEN 500 MG
1000 TABLET ORAL ONCE
Refills: 0 | Status: COMPLETED | OUTPATIENT
Start: 2019-05-11 | End: 2019-05-11

## 2019-05-11 RX ORDER — NALOXONE HYDROCHLORIDE 4 MG/.1ML
0.1 SPRAY NASAL
Refills: 0 | Status: DISCONTINUED | OUTPATIENT
Start: 2019-05-11 | End: 2019-05-11

## 2019-05-11 RX ORDER — HYDROMORPHONE HYDROCHLORIDE 2 MG/ML
30 INJECTION INTRAMUSCULAR; INTRAVENOUS; SUBCUTANEOUS
Refills: 0 | Status: DISCONTINUED | OUTPATIENT
Start: 2019-05-11 | End: 2019-05-14

## 2019-05-11 RX ORDER — HYDROMORPHONE HYDROCHLORIDE 2 MG/ML
0.5 INJECTION INTRAMUSCULAR; INTRAVENOUS; SUBCUTANEOUS
Refills: 0 | Status: DISCONTINUED | OUTPATIENT
Start: 2019-05-11 | End: 2019-05-14

## 2019-05-11 RX ORDER — ONDANSETRON 8 MG/1
4 TABLET, FILM COATED ORAL EVERY 6 HOURS
Refills: 0 | Status: DISCONTINUED | OUTPATIENT
Start: 2019-05-11 | End: 2019-05-11

## 2019-05-11 RX ORDER — ACETAMINOPHEN 500 MG
1000 TABLET ORAL ONCE
Refills: 0 | Status: COMPLETED | OUTPATIENT
Start: 2019-05-12 | End: 2019-05-12

## 2019-05-11 RX ORDER — NALOXONE HYDROCHLORIDE 4 MG/.1ML
0.1 SPRAY NASAL
Refills: 0 | Status: DISCONTINUED | OUTPATIENT
Start: 2019-05-11 | End: 2019-05-13

## 2019-05-11 RX ORDER — POTASSIUM PHOSPHATE, MONOBASIC POTASSIUM PHOSPHATE, DIBASIC 236; 224 MG/ML; MG/ML
30 INJECTION, SOLUTION INTRAVENOUS ONCE
Refills: 0 | Status: DISCONTINUED | OUTPATIENT
Start: 2019-05-11 | End: 2019-05-11

## 2019-05-11 RX ORDER — INSULIN LISPRO 100/ML
VIAL (ML) SUBCUTANEOUS EVERY 6 HOURS
Refills: 0 | Status: DISCONTINUED | OUTPATIENT
Start: 2019-05-11 | End: 2019-05-12

## 2019-05-11 RX ADMIN — Medication 500 MILLIGRAM(S): at 05:41

## 2019-05-11 RX ADMIN — Medication 5 MILLIGRAM(S): at 17:51

## 2019-05-11 RX ADMIN — HEPARIN SODIUM 5000 UNIT(S): 5000 INJECTION INTRAVENOUS; SUBCUTANEOUS at 22:26

## 2019-05-11 RX ADMIN — PANTOPRAZOLE SODIUM 40 MILLIGRAM(S): 20 TABLET, DELAYED RELEASE ORAL at 05:41

## 2019-05-11 RX ADMIN — Medication 5 MILLIGRAM(S): at 12:19

## 2019-05-11 RX ADMIN — Medication 400 MILLIGRAM(S): at 04:55

## 2019-05-11 RX ADMIN — HYDROMORPHONE HYDROCHLORIDE 30 MILLILITER(S): 2 INJECTION INTRAMUSCULAR; INTRAVENOUS; SUBCUTANEOUS at 09:07

## 2019-05-11 RX ADMIN — Medication 500 MILLIGRAM(S): at 13:55

## 2019-05-11 RX ADMIN — HYDROMORPHONE HYDROCHLORIDE 0.5 MILLIGRAM(S): 2 INJECTION INTRAMUSCULAR; INTRAVENOUS; SUBCUTANEOUS at 09:07

## 2019-05-11 RX ADMIN — HYDROMORPHONE HYDROCHLORIDE 30 MILLILITER(S): 2 INJECTION INTRAMUSCULAR; INTRAVENOUS; SUBCUTANEOUS at 07:41

## 2019-05-11 RX ADMIN — HYDROMORPHONE HYDROCHLORIDE 30 MILLILITER(S): 2 INJECTION INTRAMUSCULAR; INTRAVENOUS; SUBCUTANEOUS at 11:54

## 2019-05-11 RX ADMIN — POTASSIUM PHOSPHATE, MONOBASIC POTASSIUM PHOSPHATE, DIBASIC 83.33 MILLIMOLE(S): 236; 224 INJECTION, SOLUTION INTRAVENOUS at 01:39

## 2019-05-11 RX ADMIN — Medication 1000 MILLIGRAM(S): at 18:18

## 2019-05-11 RX ADMIN — Medication 500 MILLIGRAM(S): at 22:26

## 2019-05-11 RX ADMIN — HEPARIN SODIUM 5000 UNIT(S): 5000 INJECTION INTRAVENOUS; SUBCUTANEOUS at 13:54

## 2019-05-11 RX ADMIN — HEPARIN SODIUM 5000 UNIT(S): 5000 INJECTION INTRAVENOUS; SUBCUTANEOUS at 05:41

## 2019-05-11 RX ADMIN — Medication 400 MILLIGRAM(S): at 17:48

## 2019-05-11 RX ADMIN — HYDROMORPHONE HYDROCHLORIDE 30 MILLILITER(S): 2 INJECTION INTRAMUSCULAR; INTRAVENOUS; SUBCUTANEOUS at 19:55

## 2019-05-11 RX ADMIN — Medication 5 MILLIGRAM(S): at 05:41

## 2019-05-11 RX ADMIN — Medication 50 GRAM(S): at 00:34

## 2019-05-11 RX ADMIN — ONDANSETRON 4 MILLIGRAM(S): 8 TABLET, FILM COATED ORAL at 04:55

## 2019-05-11 RX ADMIN — DEXTROSE MONOHYDRATE, SODIUM CHLORIDE, AND POTASSIUM CHLORIDE 65 MILLILITER(S): 50; .745; 4.5 INJECTION, SOLUTION INTRAVENOUS at 07:52

## 2019-05-11 RX ADMIN — Medication 1000 MILLIGRAM(S): at 05:25

## 2019-05-11 RX ADMIN — Medication 400 MILLIGRAM(S): at 11:51

## 2019-05-11 RX ADMIN — DEXTROSE MONOHYDRATE, SODIUM CHLORIDE, AND POTASSIUM CHLORIDE 65 MILLILITER(S): 50; .745; 4.5 INJECTION, SOLUTION INTRAVENOUS at 22:26

## 2019-05-11 RX ADMIN — ONDANSETRON 4 MILLIGRAM(S): 8 TABLET, FILM COATED ORAL at 12:19

## 2019-05-11 RX ADMIN — Medication 1000 MILLIGRAM(S): at 12:21

## 2019-05-11 NOTE — PROGRESS NOTE ADULT - SUBJECTIVE AND OBJECTIVE BOX
Day _3__ of Anesthesia Pain Management Service    SUBJECTIVE:    Pain Scale Score	At rest: ___ 	With Activity: ___ 	[x ] Refer to charted pain scores    THERAPY:    [ ] IV PCA Morphine		[ ] 5 mg/mL	[ ] 1 mg/mL  [x ] IV PCA Hydromorphone	[ ] 5 mg/mL	[x ] 1 mg/mL  [ ] IV PCA Fentanyl		[ ] 50 micrograms/mL    Demand dose 0.25    lockout 6   (minutes) Continuous Rate 0      MEDICATIONS  (STANDING):  acetaminophen  IVPB .. 1000 milliGRAM(s) IV Intermittent once  acetaminophen  IVPB .. 1000 milliGRAM(s) IV Intermittent once  dextrose 5% + sodium chloride 0.9% with potassium chloride 20 mEq/L 1000 milliLiter(s) (65 mL/Hr) IV Continuous <Continuous>  dextrose 5%. 1000 milliLiter(s) (50 mL/Hr) IV Continuous <Continuous>  erythromycin    ethylsuccinate Suspension 40 mG/mL 500 milliGRAM(s) Oral every 8 hours  heparin  Injectable 5000 Unit(s) SubCutaneous every 8 hours  HYDROmorphone PCA (1 mG/mL) 30 milliLiter(s) PCA Continuous PCA Continuous  insulin lispro (HumaLOG) corrective regimen sliding scale   SubCutaneous every 6 hours  metoprolol tartrate Injectable 5 milliGRAM(s) IV Push every 6 hours  ondansetron Injectable 4 milliGRAM(s) IV Push every 6 hours  pantoprazole  Injectable 40 milliGRAM(s) IV Push daily    MEDICATIONS  (PRN):  HYDROmorphone PCA (1 mG/mL) Rescue Clinician Bolus 0.5 milliGRAM(s) IV Push every 15 minutes PRN for Pain Scale GREATER THAN 6  metoclopramide Injectable 10 milliGRAM(s) IV Push every 4 hours PRN nausea and vomting  naloxone Injectable 0.1 milliGRAM(s) IV Push every 3 minutes PRN For ANY of the following changes in patient status:  A. RR LESS THAN 10 breaths per minute, B. Oxygen saturation LESS THAN 90%, C. Sedation score of 6      OBJECTIVE:    Sedation Score:	[x ] Alert	[ ] Drowsy 	[ ] Arousable	[ ] Asleep	[ ] Unresponsive    Side Effects:	[x ] None	[ ] Nausea	[ ] Vomiting	[ ] Pruritus  		[ ] Other:    Vital Signs Last 24 Hrs  T(C): 37.1 (11 May 2019 00:46), Max: 37.1 (10 May 2019 16:58)  T(F): 98.8 (11 May 2019 00:46), Max: 98.8 (10 May 2019 16:58)  HR: 112 (11 May 2019 00:46) (87 - 120)  BP: 135/87 (11 May 2019 00:46) (131/84 - 175/96)  BP(mean): --  RR: 18 (11 May 2019 00:46) (18 - 20)  SpO2: 95% (11 May 2019 00:46) (93% - 99%)    ASSESSMENT/ PLAN    Therapy to  be:	[x ] Continue   [ ] Discontinued   [ ] Change to prn Analgesics    Documentation and Verification of current medications:   [X] Done	[ ] Not done, not eligible    Comments:

## 2019-05-11 NOTE — PROGRESS NOTE ADULT - SUBJECTIVE AND OBJECTIVE BOX
General Surgery Progress Note    Subjective: Pt was seen and examined. still not having GI function. still having nausea similar to her baseline and taking zofran for it.                 ** PHYSICAL EXAM **    -- CONSTITUTIONAL: Alert, NAD  -- PULMONARY: non-labored respirations  -- ABDOMEN: soft, non-distended, non-tender, Prevena vac holding suction, LUQ GREGORIO bulb SS output.   -- : strickland in place- clear yellow urine  -- NEURO: A&Ox3      Vital Signs Last 24 Hrs  T(C): 37 (11 May 2019 05:34), Max: 37.1 (10 May 2019 16:58)  T(F): 98.6 (11 May 2019 05:34), Max: 98.8 (10 May 2019 16:58)  HR: 99 (11 May 2019 05:34) (87 - 120)  BP: 137/75 (11 May 2019 05:34) (131/84 - 175/96)  BP(mean): --  RR: 18 (11 May 2019 05:34) (18 - 18)  SpO2: 98% (11 May 2019 05:34) (93% - 98%)    I&O's Detail    10 May 2019 07:01  -  11 May 2019 07:00  --------------------------------------------------------  IN:    dextrose 5% + sodium chloride 0.9% with potassium chloride 20 mEq/L: 780 mL    IV PiggyBack: 100 mL    Solution: 100 mL  Total IN: 980 mL    OUT:    Bulb: 73 mL    Indwelling Catheter - Urethral: 650 mL    Voided: 450 mL  Total OUT: 1173 mL    Total NET: -193 mL          MEDICATIONS  (STANDING):  acetaminophen  IVPB .. 1000 milliGRAM(s) IV Intermittent once  dextrose 5% + sodium chloride 0.9% with potassium chloride 20 mEq/L 1000 milliLiter(s) (65 mL/Hr) IV Continuous <Continuous>  dextrose 5%. 1000 milliLiter(s) (50 mL/Hr) IV Continuous <Continuous>  erythromycin    ethylsuccinate Suspension 40 mG/mL 500 milliGRAM(s) Oral every 8 hours  heparin  Injectable 5000 Unit(s) SubCutaneous every 8 hours  HYDROmorphone PCA (1 mG/mL) 30 milliLiter(s) PCA Continuous PCA Continuous  insulin lispro (HumaLOG) corrective regimen sliding scale   SubCutaneous every 6 hours  metoprolol tartrate Injectable 5 milliGRAM(s) IV Push every 6 hours  ondansetron Injectable 4 milliGRAM(s) IV Push every 6 hours  pantoprazole  Injectable 40 milliGRAM(s) IV Push daily    MEDICATIONS  (PRN):  HYDROmorphone PCA (1 mG/mL) Rescue Clinician Bolus 0.5 milliGRAM(s) IV Push every 15 minutes PRN for Pain Scale GREATER THAN 6  metoclopramide Injectable 10 milliGRAM(s) IV Push every 4 hours PRN nausea and vomting  naloxone Injectable 0.1 milliGRAM(s) IV Push every 3 minutes PRN For ANY of the following changes in patient status:  A. RR LESS THAN 10 breaths per minute, B. Oxygen saturation LESS THAN 90%, C. Sedation score of 6      LABS:                        9.8    12.2  )-----------( 214      ( 10 May 2019 22:17 )             28.9     05-10    137  |  102  |  6<L>  ----------------------------<  102<H>  3.7   |  23  |  0.54    Ca    8.6      10 May 2019 22:17  Phos  2.2     05-10  Mg     1.7     05-10

## 2019-05-11 NOTE — PROVIDER CONTACT NOTE (OTHER) - ACTION/TREATMENT ORDERED:
MD Vigil aware and at bedside to assess, IV d/c'd immediately, pressure dressing applied to swollen area, cold packs applied to site and elevation encouraged immediately. No further intervention

## 2019-05-11 NOTE — PROGRESS NOTE ADULT - ASSESSMENT
A: 64 yo F with h/o chronic pancreatitis now s/p Jeffery procedure (5/8)    P:  - DIET: NPO/IVF at 65ml/hr  - PCEA/IV tyl/0.5IV dilaudid PRN for breakthrough  - F/u labs  - Encourage use of IS  - Monitor prevena vac and GREGORIO drain  - DVT ppx: SQH  - OOB/PT

## 2019-05-12 ENCOUNTER — TRANSCRIPTION ENCOUNTER (OUTPATIENT)
Age: 64
End: 2019-05-12

## 2019-05-12 LAB
ANION GAP SERPL CALC-SCNC: 11 MMOL/L — SIGNIFICANT CHANGE UP (ref 5–17)
ANION GAP SERPL CALC-SCNC: 11 MMOL/L — SIGNIFICANT CHANGE UP (ref 5–17)
BUN SERPL-MCNC: <4 MG/DL — LOW (ref 7–23)
BUN SERPL-MCNC: <4 MG/DL — LOW (ref 7–23)
CALCIUM SERPL-MCNC: 8.5 MG/DL — SIGNIFICANT CHANGE UP (ref 8.4–10.5)
CALCIUM SERPL-MCNC: 8.6 MG/DL — SIGNIFICANT CHANGE UP (ref 8.4–10.5)
CHLORIDE SERPL-SCNC: 106 MMOL/L — SIGNIFICANT CHANGE UP (ref 96–108)
CHLORIDE SERPL-SCNC: 106 MMOL/L — SIGNIFICANT CHANGE UP (ref 96–108)
CO2 SERPL-SCNC: 21 MMOL/L — LOW (ref 22–31)
CO2 SERPL-SCNC: 21 MMOL/L — LOW (ref 22–31)
CREAT SERPL-MCNC: 0.48 MG/DL — LOW (ref 0.5–1.3)
CREAT SERPL-MCNC: 0.52 MG/DL — SIGNIFICANT CHANGE UP (ref 0.5–1.3)
GLUCOSE BLDC GLUCOMTR-MCNC: 101 MG/DL — HIGH (ref 70–99)
GLUCOSE BLDC GLUCOMTR-MCNC: 103 MG/DL — HIGH (ref 70–99)
GLUCOSE BLDC GLUCOMTR-MCNC: 120 MG/DL — HIGH (ref 70–99)
GLUCOSE BLDC GLUCOMTR-MCNC: 138 MG/DL — HIGH (ref 70–99)
GLUCOSE SERPL-MCNC: 106 MG/DL — HIGH (ref 70–99)
GLUCOSE SERPL-MCNC: 109 MG/DL — HIGH (ref 70–99)
HBA1C BLD-MCNC: 5.1 % — SIGNIFICANT CHANGE UP (ref 4–5.6)
HCT VFR BLD CALC: 26.7 % — LOW (ref 34.5–45)
HCT VFR BLD CALC: 28 % — LOW (ref 34.5–45)
HGB BLD-MCNC: 8.7 G/DL — LOW (ref 11.5–15.5)
HGB BLD-MCNC: 9.1 G/DL — LOW (ref 11.5–15.5)
MAGNESIUM SERPL-MCNC: 1.8 MG/DL — SIGNIFICANT CHANGE UP (ref 1.6–2.6)
MAGNESIUM SERPL-MCNC: 1.8 MG/DL — SIGNIFICANT CHANGE UP (ref 1.6–2.6)
MCHC RBC-ENTMCNC: 28.9 PG — SIGNIFICANT CHANGE UP (ref 27–34)
MCHC RBC-ENTMCNC: 29 PG — SIGNIFICANT CHANGE UP (ref 27–34)
MCHC RBC-ENTMCNC: 32.6 GM/DL — SIGNIFICANT CHANGE UP (ref 32–36)
MCHC RBC-ENTMCNC: 32.7 GM/DL — SIGNIFICANT CHANGE UP (ref 32–36)
MCV RBC AUTO: 88.4 FL — SIGNIFICANT CHANGE UP (ref 80–100)
MCV RBC AUTO: 89.2 FL — SIGNIFICANT CHANGE UP (ref 80–100)
PHOSPHATE SERPL-MCNC: 2.5 MG/DL — SIGNIFICANT CHANGE UP (ref 2.5–4.5)
PHOSPHATE SERPL-MCNC: 3 MG/DL — SIGNIFICANT CHANGE UP (ref 2.5–4.5)
PLATELET # BLD AUTO: 262 K/UL — SIGNIFICANT CHANGE UP (ref 150–400)
PLATELET # BLD AUTO: 263 K/UL — SIGNIFICANT CHANGE UP (ref 150–400)
POTASSIUM SERPL-MCNC: 3.5 MMOL/L — SIGNIFICANT CHANGE UP (ref 3.5–5.3)
POTASSIUM SERPL-MCNC: 3.9 MMOL/L — SIGNIFICANT CHANGE UP (ref 3.5–5.3)
POTASSIUM SERPL-SCNC: 3.5 MMOL/L — SIGNIFICANT CHANGE UP (ref 3.5–5.3)
POTASSIUM SERPL-SCNC: 3.9 MMOL/L — SIGNIFICANT CHANGE UP (ref 3.5–5.3)
RBC # BLD: 3.02 M/UL — LOW (ref 3.8–5.2)
RBC # BLD: 3.14 M/UL — LOW (ref 3.8–5.2)
RBC # FLD: 13.6 % — SIGNIFICANT CHANGE UP (ref 10.3–14.5)
RBC # FLD: 13.8 % — SIGNIFICANT CHANGE UP (ref 10.3–14.5)
SODIUM SERPL-SCNC: 138 MMOL/L — SIGNIFICANT CHANGE UP (ref 135–145)
SODIUM SERPL-SCNC: 138 MMOL/L — SIGNIFICANT CHANGE UP (ref 135–145)
WBC # BLD: 10.2 K/UL — SIGNIFICANT CHANGE UP (ref 3.8–10.5)
WBC # BLD: 8.3 K/UL — SIGNIFICANT CHANGE UP (ref 3.8–10.5)
WBC # FLD AUTO: 10.2 K/UL — SIGNIFICANT CHANGE UP (ref 3.8–10.5)
WBC # FLD AUTO: 8.3 K/UL — SIGNIFICANT CHANGE UP (ref 3.8–10.5)

## 2019-05-12 RX ORDER — GLUCAGON INJECTION, SOLUTION 0.5 MG/.1ML
1 INJECTION, SOLUTION SUBCUTANEOUS ONCE
Refills: 0 | Status: DISCONTINUED | OUTPATIENT
Start: 2019-05-12 | End: 2019-05-12

## 2019-05-12 RX ORDER — INSULIN LISPRO 100/ML
VIAL (ML) SUBCUTANEOUS
Refills: 0 | Status: DISCONTINUED | OUTPATIENT
Start: 2019-05-12 | End: 2019-05-12

## 2019-05-12 RX ORDER — ACETAMINOPHEN 500 MG
1000 TABLET ORAL ONCE
Refills: 0 | Status: COMPLETED | OUTPATIENT
Start: 2019-05-13 | End: 2019-05-13

## 2019-05-12 RX ORDER — POTASSIUM PHOSPHATE, MONOBASIC POTASSIUM PHOSPHATE, DIBASIC 236; 224 MG/ML; MG/ML
30 INJECTION, SOLUTION INTRAVENOUS ONCE
Refills: 0 | Status: COMPLETED | OUTPATIENT
Start: 2019-05-12 | End: 2019-05-12

## 2019-05-12 RX ORDER — METOPROLOL TARTRATE 50 MG
100 TABLET ORAL DAILY
Refills: 0 | Status: DISCONTINUED | OUTPATIENT
Start: 2019-05-12 | End: 2019-05-19

## 2019-05-12 RX ORDER — ACETAMINOPHEN 500 MG
1000 TABLET ORAL ONCE
Refills: 0 | Status: COMPLETED | OUTPATIENT
Start: 2019-05-12 | End: 2019-05-12

## 2019-05-12 RX ORDER — AMLODIPINE BESYLATE 2.5 MG/1
10 TABLET ORAL DAILY
Refills: 0 | Status: DISCONTINUED | OUTPATIENT
Start: 2019-05-12 | End: 2019-05-19

## 2019-05-12 RX ORDER — DEXTROSE 50 % IN WATER 50 %
15 SYRINGE (ML) INTRAVENOUS ONCE
Refills: 0 | Status: DISCONTINUED | OUTPATIENT
Start: 2019-05-12 | End: 2019-05-12

## 2019-05-12 RX ORDER — DEXTROSE 50 % IN WATER 50 %
12.5 SYRINGE (ML) INTRAVENOUS ONCE
Refills: 0 | Status: DISCONTINUED | OUTPATIENT
Start: 2019-05-12 | End: 2019-05-12

## 2019-05-12 RX ORDER — DEXTROSE 50 % IN WATER 50 %
25 SYRINGE (ML) INTRAVENOUS ONCE
Refills: 0 | Status: DISCONTINUED | OUTPATIENT
Start: 2019-05-12 | End: 2019-05-12

## 2019-05-12 RX ORDER — MAGNESIUM SULFATE 500 MG/ML
2 VIAL (ML) INJECTION ONCE
Refills: 0 | Status: COMPLETED | OUTPATIENT
Start: 2019-05-12 | End: 2019-05-12

## 2019-05-12 RX ORDER — PANTOPRAZOLE SODIUM 20 MG/1
40 TABLET, DELAYED RELEASE ORAL
Refills: 0 | Status: DISCONTINUED | OUTPATIENT
Start: 2019-05-12 | End: 2019-05-19

## 2019-05-12 RX ORDER — DEXTROSE MONOHYDRATE, SODIUM CHLORIDE, AND POTASSIUM CHLORIDE 50; .745; 4.5 G/1000ML; G/1000ML; G/1000ML
1000 INJECTION, SOLUTION INTRAVENOUS
Refills: 0 | Status: DISCONTINUED | OUTPATIENT
Start: 2019-05-12 | End: 2019-05-14

## 2019-05-12 RX ORDER — INSULIN LISPRO 100/ML
VIAL (ML) SUBCUTANEOUS AT BEDTIME
Refills: 0 | Status: DISCONTINUED | OUTPATIENT
Start: 2019-05-12 | End: 2019-05-12

## 2019-05-12 RX ADMIN — Medication 500 MILLIGRAM(S): at 07:38

## 2019-05-12 RX ADMIN — ONDANSETRON 4 MILLIGRAM(S): 8 TABLET, FILM COATED ORAL at 07:00

## 2019-05-12 RX ADMIN — Medication 5 MILLIGRAM(S): at 00:17

## 2019-05-12 RX ADMIN — Medication 10 MILLIGRAM(S): at 10:36

## 2019-05-12 RX ADMIN — HEPARIN SODIUM 5000 UNIT(S): 5000 INJECTION INTRAVENOUS; SUBCUTANEOUS at 13:25

## 2019-05-12 RX ADMIN — Medication 1000 MILLIGRAM(S): at 00:47

## 2019-05-12 RX ADMIN — Medication 500 MILLIGRAM(S): at 13:25

## 2019-05-12 RX ADMIN — Medication 400 MILLIGRAM(S): at 13:23

## 2019-05-12 RX ADMIN — HYDROMORPHONE HYDROCHLORIDE 30 MILLILITER(S): 2 INJECTION INTRAMUSCULAR; INTRAVENOUS; SUBCUTANEOUS at 10:05

## 2019-05-12 RX ADMIN — HEPARIN SODIUM 5000 UNIT(S): 5000 INJECTION INTRAVENOUS; SUBCUTANEOUS at 21:31

## 2019-05-12 RX ADMIN — PANTOPRAZOLE SODIUM 40 MILLIGRAM(S): 20 TABLET, DELAYED RELEASE ORAL at 11:33

## 2019-05-12 RX ADMIN — DEXTROSE MONOHYDRATE, SODIUM CHLORIDE, AND POTASSIUM CHLORIDE 50 MILLILITER(S): 50; .745; 4.5 INJECTION, SOLUTION INTRAVENOUS at 08:08

## 2019-05-12 RX ADMIN — POTASSIUM PHOSPHATE, MONOBASIC POTASSIUM PHOSPHATE, DIBASIC 83.33 MILLIMOLE(S): 236; 224 INJECTION, SOLUTION INTRAVENOUS at 11:34

## 2019-05-12 RX ADMIN — Medication 100 MILLIGRAM(S): at 11:33

## 2019-05-12 RX ADMIN — PANTOPRAZOLE SODIUM 40 MILLIGRAM(S): 20 TABLET, DELAYED RELEASE ORAL at 07:00

## 2019-05-12 RX ADMIN — Medication 1000 MILLIGRAM(S): at 13:53

## 2019-05-12 RX ADMIN — HEPARIN SODIUM 5000 UNIT(S): 5000 INJECTION INTRAVENOUS; SUBCUTANEOUS at 07:01

## 2019-05-12 RX ADMIN — Medication 400 MILLIGRAM(S): at 07:00

## 2019-05-12 RX ADMIN — HYDROMORPHONE HYDROCHLORIDE 30 MILLILITER(S): 2 INJECTION INTRAMUSCULAR; INTRAVENOUS; SUBCUTANEOUS at 07:43

## 2019-05-12 RX ADMIN — Medication 1000 MILLIGRAM(S): at 20:04

## 2019-05-12 RX ADMIN — Medication 500 MILLIGRAM(S): at 21:31

## 2019-05-12 RX ADMIN — Medication 400 MILLIGRAM(S): at 19:34

## 2019-05-12 RX ADMIN — HYDROMORPHONE HYDROCHLORIDE 30 MILLILITER(S): 2 INJECTION INTRAMUSCULAR; INTRAVENOUS; SUBCUTANEOUS at 19:27

## 2019-05-12 RX ADMIN — Medication 50 GRAM(S): at 10:04

## 2019-05-12 RX ADMIN — DEXTROSE MONOHYDRATE, SODIUM CHLORIDE, AND POTASSIUM CHLORIDE 65 MILLILITER(S): 50; .745; 4.5 INJECTION, SOLUTION INTRAVENOUS at 07:01

## 2019-05-12 RX ADMIN — Medication 1000 MILLIGRAM(S): at 07:30

## 2019-05-12 RX ADMIN — Medication 400 MILLIGRAM(S): at 00:17

## 2019-05-12 RX ADMIN — ONDANSETRON 4 MILLIGRAM(S): 8 TABLET, FILM COATED ORAL at 00:17

## 2019-05-12 RX ADMIN — Medication 5 MILLIGRAM(S): at 07:00

## 2019-05-12 RX ADMIN — AMLODIPINE BESYLATE 10 MILLIGRAM(S): 2.5 TABLET ORAL at 11:33

## 2019-05-12 NOTE — PROGRESS NOTE ADULT - SUBJECTIVE AND OBJECTIVE BOX
General Surgery Progress Note    SUBJECTIVE:  The patient was seen and examined. No acute events overnight. Passing flatus and having a BM. Passed TOV yesterday. Denies N/V. Pain controlled with PCA. OOB and ambulating yesterday.     OBJECTIVE:     ** VITAL SIGNS / I&O's **    Vital Signs Last 24 Hrs  T(C): 36.8 (12 May 2019 06:05), Max: 37.7 (11 May 2019 22:29)  T(F): 98.3 (12 May 2019 06:05), Max: 99.8 (11 May 2019 22:29)  HR: 103 (12 May 2019 06:05) (92 - 103)  BP: 159/88 (12 May 2019 06:05) (131/80 - 159/88)  BP(mean): --  RR: 18 (12 May 2019 06:05) (18 - 18)  SpO2: 93% (12 May 2019 06:05) (93% - 99%)      11 May 2019 07:01  -  12 May 2019 07:00  --------------------------------------------------------  IN:    dextrose 5% + sodium chloride 0.9% with potassium chloride 20 mEq/L: 1425 mL    Solution: 200 mL  Total IN: 1625 mL    OUT:    Bulb: 10 mL    Voided: 600 mL  Total OUT: 610 mL    Total NET: 1015 mL          ** PHYSICAL EXAM **    -- CONSTITUTIONAL: Alert, NAD  -- PULMONARY: non-labored respirations  -- ABDOMEN: soft, non-distended, non-tender, Prevena vac holding suction, LUQ GREGORIO bulb SS  -- NEURO: A&Ox3    ** LABS **                          9.1    8.3   )-----------( 263      ( 12 May 2019 06:37 )             28.0     12 May 2019 06:37    138    |  106    |  <4     ----------------------------<  106    3.5     |  21     |  0.48     Ca    8.6        12 May 2019 06:37  Phos  2.2       10 May 2019 22:17  Mg     1.7       10 May 2019 22:17        CAPILLARY BLOOD GLUCOSE      POCT Blood Glucose.: 103 mg/dL (12 May 2019 05:55)  POCT Blood Glucose.: 101 mg/dL (12 May 2019 00:38)  POCT Blood Glucose.: 127 mg/dL (11 May 2019 17:53)  POCT Blood Glucose.: 106 mg/dL (11 May 2019 11:59)                MEDICATIONS  (STANDING):  dextrose 5% + sodium chloride 0.45% with potassium chloride 20 mEq/L 1000 milliLiter(s) (50 mL/Hr) IV Continuous <Continuous>  dextrose 5%. 1000 milliLiter(s) (50 mL/Hr) IV Continuous <Continuous>  dextrose 50% Injectable 12.5 Gram(s) IV Push once  dextrose 50% Injectable 25 Gram(s) IV Push once  dextrose 50% Injectable 25 Gram(s) IV Push once  erythromycin    ethylsuccinate Suspension 40 mG/mL 500 milliGRAM(s) Oral every 8 hours  heparin  Injectable 5000 Unit(s) SubCutaneous every 8 hours  HYDROmorphone PCA (1 mG/mL) 30 milliLiter(s) PCA Continuous PCA Continuous  insulin lispro (HumaLOG) corrective regimen sliding scale   SubCutaneous three times a day before meals  insulin lispro (HumaLOG) corrective regimen sliding scale   SubCutaneous at bedtime  metoprolol tartrate Injectable 5 milliGRAM(s) IV Push every 6 hours  ondansetron Injectable 4 milliGRAM(s) IV Push every 6 hours  pantoprazole  Injectable 40 milliGRAM(s) IV Push daily    MEDICATIONS  (PRN):  dextrose 40% Gel 15 Gram(s) Oral once PRN Blood Glucose LESS THAN 70 milliGRAM(s)/deciliter  glucagon  Injectable 1 milliGRAM(s) IntraMuscular once PRN Glucose LESS THAN 70 milligrams/deciliter  HYDROmorphone PCA (1 mG/mL) Rescue Clinician Bolus 0.5 milliGRAM(s) IV Push every 15 minutes PRN for Pain Scale GREATER THAN 6  metoclopramide Injectable 10 milliGRAM(s) IV Push every 4 hours PRN nausea and vomting  naloxone Injectable 0.1 milliGRAM(s) IV Push every 3 minutes PRN For ANY of the following changes in patient status:  A. RR LESS THAN 10 breaths per minute, B. Oxygen saturation LESS THAN 90%, C. Sedation score of 6  ondansetron Injectable 4 milliGRAM(s) IV Push every 6 hours PRN Nausea

## 2019-05-12 NOTE — PROGRESS NOTE ADULT - ASSESSMENT
A: 62 yo F with h/o chronic pancreatitis now s/p Jeffery procedure (5/8)    P:  - DIET: CLD today, IVF at 50cc/hr, F/u FS  - PCA/IV tyl standing  - Monitor BP- IF HYPOTENSIVE, consider denis/albumin instead of IVF boluses  - F/u 10pm labs  - Encourage use of IS  - Monitor prevena vac and GREGORIO drain- Will d/c Prevena on MONDAY 5/13  - DVT ppx: SQH  - OOB/PT    Gauri Briones, PGY-1  [facility] General Surgery [phone]

## 2019-05-12 NOTE — PROGRESS NOTE ADULT - SUBJECTIVE AND OBJECTIVE BOX
Day _4/2_ of Anesthesia Pain Management Service    SUBJECTIVE: Patient is doing well with IV PCA    Pain Scale Score:	[X] Refer to charted pain scores    THERAPY:    [ ] IV PCA Morphine		[ ] 5 mg/mL	[ ] 1 mg/mL  [X] IV PCA Hydromorphone	[ ] 5 mg/mL	[X] 1 mg/mL  [ ] IV PCA Fentanyl		[ ] 50 micrograms/mL    Demand dose: 0.2 mg     Lockout: 6 minutes   Continuous Rate: 0 mg/hr  4 Hour Limit: 4 mg    MEDICATIONS  (STANDING):  acetaminophen  IVPB .. 1000 milliGRAM(s) IV Intermittent once  acetaminophen  IVPB .. 1000 milliGRAM(s) IV Intermittent once  amLODIPine   Tablet 10 milliGRAM(s) Oral daily  dextrose 5% + sodium chloride 0.45% with potassium chloride 20 mEq/L 1000 milliLiter(s) (50 mL/Hr) IV Continuous <Continuous>  dextrose 5%. 1000 milliLiter(s) (50 mL/Hr) IV Continuous <Continuous>  dextrose 50% Injectable 12.5 Gram(s) IV Push once  dextrose 50% Injectable 25 Gram(s) IV Push once  dextrose 50% Injectable 25 Gram(s) IV Push once  erythromycin    ethylsuccinate Suspension 40 mG/mL 500 milliGRAM(s) Oral every 8 hours  heparin  Injectable 5000 Unit(s) SubCutaneous every 8 hours  HYDROmorphone PCA (1 mG/mL) 30 milliLiter(s) PCA Continuous PCA Continuous  insulin lispro (HumaLOG) corrective regimen sliding scale   SubCutaneous three times a day before meals  insulin lispro (HumaLOG) corrective regimen sliding scale   SubCutaneous at bedtime  metoprolol succinate  milliGRAM(s) Oral daily  ondansetron Injectable 4 milliGRAM(s) IV Push every 6 hours  pantoprazole    Tablet 40 milliGRAM(s) Oral before breakfast  potassium phosphate IVPB 30 milliMole(s) IV Intermittent once    MEDICATIONS  (PRN):  dextrose 40% Gel 15 Gram(s) Oral once PRN Blood Glucose LESS THAN 70 milliGRAM(s)/deciliter  glucagon  Injectable 1 milliGRAM(s) IntraMuscular once PRN Glucose LESS THAN 70 milligrams/deciliter  HYDROmorphone PCA (1 mG/mL) Rescue Clinician Bolus 0.5 milliGRAM(s) IV Push every 15 minutes PRN for Pain Scale GREATER THAN 6  metoclopramide Injectable 10 milliGRAM(s) IV Push every 4 hours PRN nausea and vomting  naloxone Injectable 0.1 milliGRAM(s) IV Push every 3 minutes PRN For ANY of the following changes in patient status:  A. RR LESS THAN 10 breaths per minute, B. Oxygen saturation LESS THAN 90%, C. Sedation score of 6      OBJECTIVE:    Sedation Score:	[ X] Alert	[ ] Drowsy 	[ ] Arousable	[ ] Asleep	[ ] Unresponsive    Side Effects:	[X ] None	[ ] Nausea	[ ] Vomiting	[ ] Pruritus  		[ ] Other:    Vital Signs Last 24 Hrs  T(C): 36.7 (12 May 2019 09:10), Max: 37.7 (11 May 2019 22:29)  T(F): 98.1 (12 May 2019 09:10), Max: 99.8 (11 May 2019 22:29)  HR: 99 (12 May 2019 09:10) (92 - 103)  BP: 160/83 (12 May 2019 09:10) (131/80 - 160/83)  BP(mean): --  RR: 18 (12 May 2019 09:10) (18 - 18)  SpO2: 95% (12 May 2019 09:10) (93% - 99%)    ASSESSMENT/ PLAN    Therapy to  be:               [X] Continued   [ ] Discontinued   [ ] Changed to PRN Analgesics    Documentation and Verification of current medications:   [X] Done	[ ] Not done, not eligible    Comments:

## 2019-05-12 NOTE — DISCHARGE NOTE PROVIDER - CARE PROVIDER_API CALL
Hank Molina)  Surgery  [address] [place] [address]  Phone: [phone]  Fax: [phone]  Follow Up Time:

## 2019-05-12 NOTE — DISCHARGE NOTE PROVIDER - HOSPITAL COURSE
63 year old female with PMH of HTN, GERD, ARDS (2015), current smoker, chronic pancreatitis ( last acute exacerbation with hospitalization 4/5-4/12) who underwent a scheduled Jeffery procedure on 5/8/19. A 10F GREGORIO drain was left adjacent to the pancreaticojejunostomy, and a prevena vac was placed over the midline incision. Pt tolerated the procedure well. A PCEA and strickland were placed intraop. Immediately post-op, pt continued to have episodes of HTN to the 180s-200s, anesthesia increased the PCEA dosage and gave additional boluses. Pt additionally takes metoprolol and amlodipine at home- which were started in-house. BPs now varying in the 130s-160s,     Her PCEA was transitioned to PCA on POD#3. Pt's diet was advanced to CLD on POD#4 and switched to PO meds. Pt was then advanced from CLD to LRD on POD#4 as well. The prevena vac was removed on POD#5, and replaced with gauze/paper tape PRN. The GREGORIO drain was removed on POD#......        Once patient was ambulating well, voiding without difficulty, and tolerating a full diet, they were found to be stable for discharge to home.  Pain was well-controlled at time of discharge. 63 year old female with PMH of HTN, GERD, ARDS (2015), current smoker, chronic pancreatitis ( last acute exacerbation with hospitalization 4/5-4/12) who underwent a scheduled Jeffery procedure on 5/8/19. A 10F GREOGRIO drain was left adjacent to the pancreaticojejunostomy, and a prevena vac was placed over the midline incision. Pt tolerated the procedure well. A PCEA and strickland were placed intraop. Immediately post-op, pt continued to have episodes of HTN to the 180s-200s, anesthesia increased the PCEA dosage and gave additional boluses. Pt additionally takes metoprolol and amlodipine at home- which were started in-house. BPs now varying in the 130s-160s.     Her PCEA was transitioned to PCA on POD#3. Pt's diet was advanced to CLD on POD#4 and switched to PO meds. Pt was then advanced from CLD to LRD on POD#4 as well. The prevena vac and GREGORIO drain were removed on POD#5, and replaced with gauze/paper tape PRN.         Once patient was ambulating well, voiding without difficulty, and tolerating a full diet, they were found to be stable for discharge to home.  Pain was well-controlled at time of discharge. 63 year old female with PMH of HTN, GERD, ARDS (2015), current smoker, chronic pancreatitis ( last acute exacerbation with hospitalization 4/5-4/12) who underwent a scheduled Jeffery procedure on 5/8/19. A 10F GREGORIO drain was left adjacent to the pancreaticojejunostomy, and a prevena vac was placed over the midline incision. Pt tolerated the procedure well. A PCEA and strickland were placed intraop. Immediately post-op, pt continued to have episodes of HTN to the 180s-200s, anesthesia increased the PCEA dosage and gave additional boluses. Pt additionally takes metoprolol and amlodipine at home- which were started in-house. BPs now varying in the 130s-160s.     Her PCEA was transitioned to PCA on POD#3. Pt's diet was advanced to CLD on POD#4 and switched to PO meds. Pt was then advanced from CLD to LRD on POD#4 as well. The prevena vac and GREGORIO drain were removed on POD#5, and replaced with gauze/paper tape PRN.         Once patient was ambulating well, voiding without difficulty, and tolerating a full diet, they were found to be stable for discharge to home.  Pain was well-controlled at time of discharge.  Patient will be discharged home with erythromycin, reglan, and zofran.  She will follow up as an outpatient with Dr. Molina within 1-2 weeks.  The patient felt comfortable with discharge at this time. 63 year old female with PMH of HTN, GERD, ARDS (2015), current smoker, chronic pancreatitis ( last acute exacerbation with hospitalization 4/5-4/12) who underwent a scheduled Jeffery procedure on 5/8/19. A 10F GREGORIO drain was left adjacent to the pancreaticojejunostomy, and a prevena vac was placed over the midline incision. Pt tolerated the procedure well. A PCEA and strickland were placed intraop. Immediately post-op, pt continued to have episodes of HTN to the 180s-200s, anesthesia increased the PCEA dosage and gave additional boluses. Pt additionally takes metoprolol and amlodipine at home- which were started in-house. BPs now varying in the 130s-160s.     Her PCEA was transitioned to PCA on POD#3. Pt's diet was advanced to CLD on POD#4 and switched to PO meds. Pt was then advanced from CLD to LRD on POD#4 as well. The prevena vac and GREGORIO drain were removed on POD#5, and replaced with gauze/paper tape PRN.         Once patient was ambulating well, voiding without difficulty, and tolerating a full diet, they were found to be stable for discharge to home.  Pain was well-controlled at time of discharge.  Patient will be discharged home with reglan, and zofran.  She will follow up as an outpatient with Dr. Molina within 1-2 weeks.  The patient felt comfortable with discharge at this time.

## 2019-05-12 NOTE — DISCHARGE NOTE PROVIDER - NSDCCPCAREPLAN_GEN_ALL_CORE_FT
PRINCIPAL DISCHARGE DIAGNOSIS  Diagnosis: Chronic pancreatitis  Assessment and Plan of Treatment:       SECONDARY DISCHARGE DIAGNOSES  Diagnosis: Hypertension  Assessment and Plan of Treatment: PRINCIPAL DISCHARGE DIAGNOSIS  Diagnosis: Chronic pancreatitis  Assessment and Plan of Treatment: s/p shanon procedure.  Continue taking erythromycin, reglan, and zofran as prescribed.      SECONDARY DISCHARGE DIAGNOSES  Diagnosis: Hypertension  Assessment and Plan of Treatment: Continue with home medications

## 2019-05-12 NOTE — DISCHARGE NOTE PROVIDER - NSDCFUADDINST_GEN_ALL_CORE_FT
WOUND CARE: Please keep incisions clean and dry. Please do not scrub or rub incisions. Please to do not apply lotion or powder on incisions.   BATHING: Please do not submerge wound underwater. You may shower and/or sponge bathe.  ACTIVITY: No heavy lifting or straining. Otherwise, you may return to your usual level of physical activity. If you are taking narcotic pain medication (such as Percocet), do NOT drive a car, operate machinery or make important decisions.  DIET: Return to your usual diet.  NOTIFY YOUR SURGEON IF: You have any bleeding that does not stop, any pus draining from your wound, any fever (over 100.4 F) or chills, persistent nausea/vomiting, persistent diarrhea, or if your pain is not controlled on your discharge pain medications.  FOLLOW-UP:  1. Please call [phone] to make a follow-up appointment within one week of discharge

## 2019-05-13 LAB
AMYLASE FLD-CCNC: 25 U/L — SIGNIFICANT CHANGE UP
ANION GAP SERPL CALC-SCNC: 13 MMOL/L — SIGNIFICANT CHANGE UP (ref 5–17)
BUN SERPL-MCNC: <4 MG/DL — LOW (ref 7–23)
CALCIUM SERPL-MCNC: 9.1 MG/DL — SIGNIFICANT CHANGE UP (ref 8.4–10.5)
CHLORIDE SERPL-SCNC: 102 MMOL/L — SIGNIFICANT CHANGE UP (ref 96–108)
CO2 SERPL-SCNC: 22 MMOL/L — SIGNIFICANT CHANGE UP (ref 22–31)
CREAT SERPL-MCNC: 0.61 MG/DL — SIGNIFICANT CHANGE UP (ref 0.5–1.3)
GLUCOSE SERPL-MCNC: 110 MG/DL — HIGH (ref 70–99)
HCT VFR BLD CALC: 28.6 % — LOW (ref 34.5–45)
HGB BLD-MCNC: 9.4 G/DL — LOW (ref 11.5–15.5)
MAGNESIUM SERPL-MCNC: 1.7 MG/DL — SIGNIFICANT CHANGE UP (ref 1.6–2.6)
MCHC RBC-ENTMCNC: 29.6 PG — SIGNIFICANT CHANGE UP (ref 27–34)
MCHC RBC-ENTMCNC: 33.1 GM/DL — SIGNIFICANT CHANGE UP (ref 32–36)
MCV RBC AUTO: 89.5 FL — SIGNIFICANT CHANGE UP (ref 80–100)
PHOSPHATE SERPL-MCNC: 3 MG/DL — SIGNIFICANT CHANGE UP (ref 2.5–4.5)
PLATELET # BLD AUTO: 337 K/UL — SIGNIFICANT CHANGE UP (ref 150–400)
POTASSIUM SERPL-MCNC: 4.5 MMOL/L — SIGNIFICANT CHANGE UP (ref 3.5–5.3)
POTASSIUM SERPL-SCNC: 4.5 MMOL/L — SIGNIFICANT CHANGE UP (ref 3.5–5.3)
RBC # BLD: 3.2 M/UL — LOW (ref 3.8–5.2)
RBC # FLD: 14.3 % — SIGNIFICANT CHANGE UP (ref 10.3–14.5)
SODIUM SERPL-SCNC: 137 MMOL/L — SIGNIFICANT CHANGE UP (ref 135–145)
WBC # BLD: 10.1 K/UL — SIGNIFICANT CHANGE UP (ref 3.8–10.5)
WBC # FLD AUTO: 10.1 K/UL — SIGNIFICANT CHANGE UP (ref 3.8–10.5)

## 2019-05-13 RX ORDER — ACETAMINOPHEN 500 MG
1000 TABLET ORAL ONCE
Refills: 0 | Status: COMPLETED | OUTPATIENT
Start: 2019-05-14 | End: 2019-05-14

## 2019-05-13 RX ORDER — GABAPENTIN 400 MG/1
400 CAPSULE ORAL THREE TIMES A DAY
Refills: 0 | Status: DISCONTINUED | OUTPATIENT
Start: 2019-05-13 | End: 2019-05-16

## 2019-05-13 RX ORDER — HYDROMORPHONE HYDROCHLORIDE 2 MG/ML
2 INJECTION INTRAMUSCULAR; INTRAVENOUS; SUBCUTANEOUS EVERY 4 HOURS
Refills: 0 | Status: DISCONTINUED | OUTPATIENT
Start: 2019-05-13 | End: 2019-05-14

## 2019-05-13 RX ORDER — METOCLOPRAMIDE HCL 10 MG
10 TABLET ORAL EVERY 6 HOURS
Refills: 0 | Status: DISCONTINUED | OUTPATIENT
Start: 2019-05-13 | End: 2019-05-19

## 2019-05-13 RX ORDER — ACETAMINOPHEN 500 MG
1000 TABLET ORAL ONCE
Refills: 0 | Status: COMPLETED | OUTPATIENT
Start: 2019-05-13 | End: 2019-05-13

## 2019-05-13 RX ADMIN — Medication 10 MILLIGRAM(S): at 17:40

## 2019-05-13 RX ADMIN — ONDANSETRON 4 MILLIGRAM(S): 8 TABLET, FILM COATED ORAL at 05:44

## 2019-05-13 RX ADMIN — HEPARIN SODIUM 5000 UNIT(S): 5000 INJECTION INTRAVENOUS; SUBCUTANEOUS at 13:16

## 2019-05-13 RX ADMIN — PANTOPRAZOLE SODIUM 40 MILLIGRAM(S): 20 TABLET, DELAYED RELEASE ORAL at 05:43

## 2019-05-13 RX ADMIN — Medication 1000 MILLIGRAM(S): at 06:12

## 2019-05-13 RX ADMIN — Medication 400 MILLIGRAM(S): at 00:04

## 2019-05-13 RX ADMIN — GABAPENTIN 400 MILLIGRAM(S): 400 CAPSULE ORAL at 21:21

## 2019-05-13 RX ADMIN — ONDANSETRON 4 MILLIGRAM(S): 8 TABLET, FILM COATED ORAL at 00:03

## 2019-05-13 RX ADMIN — Medication 400 MILLIGRAM(S): at 05:42

## 2019-05-13 RX ADMIN — Medication 400 MILLIGRAM(S): at 21:21

## 2019-05-13 RX ADMIN — Medication 500 MILLIGRAM(S): at 13:17

## 2019-05-13 RX ADMIN — ONDANSETRON 4 MILLIGRAM(S): 8 TABLET, FILM COATED ORAL at 17:40

## 2019-05-13 RX ADMIN — Medication 500 MILLIGRAM(S): at 21:21

## 2019-05-13 RX ADMIN — Medication 1000 MILLIGRAM(S): at 17:00

## 2019-05-13 RX ADMIN — ONDANSETRON 4 MILLIGRAM(S): 8 TABLET, FILM COATED ORAL at 11:14

## 2019-05-13 RX ADMIN — Medication 100 MILLIGRAM(S): at 05:45

## 2019-05-13 RX ADMIN — Medication 1000 MILLIGRAM(S): at 21:51

## 2019-05-13 RX ADMIN — HYDROMORPHONE HYDROCHLORIDE 30 MILLILITER(S): 2 INJECTION INTRAMUSCULAR; INTRAVENOUS; SUBCUTANEOUS at 19:17

## 2019-05-13 RX ADMIN — Medication 500 MILLIGRAM(S): at 05:43

## 2019-05-13 RX ADMIN — HEPARIN SODIUM 5000 UNIT(S): 5000 INJECTION INTRAVENOUS; SUBCUTANEOUS at 21:21

## 2019-05-13 RX ADMIN — HEPARIN SODIUM 5000 UNIT(S): 5000 INJECTION INTRAVENOUS; SUBCUTANEOUS at 05:44

## 2019-05-13 RX ADMIN — Medication 1000 MILLIGRAM(S): at 00:34

## 2019-05-13 RX ADMIN — HYDROMORPHONE HYDROCHLORIDE 30 MILLILITER(S): 2 INJECTION INTRAMUSCULAR; INTRAVENOUS; SUBCUTANEOUS at 07:22

## 2019-05-13 RX ADMIN — Medication 400 MILLIGRAM(S): at 16:30

## 2019-05-13 RX ADMIN — HYDROMORPHONE HYDROCHLORIDE 30 MILLILITER(S): 2 INJECTION INTRAMUSCULAR; INTRAVENOUS; SUBCUTANEOUS at 11:13

## 2019-05-13 RX ADMIN — AMLODIPINE BESYLATE 10 MILLIGRAM(S): 2.5 TABLET ORAL at 05:43

## 2019-05-13 NOTE — PROGRESS NOTE ADULT - ASSESSMENT
A: 62 yo F with h/o chronic pancreatitis now s/p Jeffery procedure (5/8)    P:  - Advance diet as tolerated   - pain control  - Monitor BP- IF HYPOTENSIVE, consider denis/albumin instead of IVF boluses  - Encourage use of IS  - DVT ppx: SQH  - OOB/PT      [facility] [id number]

## 2019-05-13 NOTE — PROGRESS NOTE ADULT - SUBJECTIVE AND OBJECTIVE BOX
General Surgery Progress Note    SUBJECTIVE:  The patient was seen and examined. No acute events overnight. Passing flatus and having a BM. Nausea at baseline. OOB and ambulating yesterday. Removed Vac this morning.     OBJECTIVE:     ** PHYSICAL EXAM **    -- CONSTITUTIONAL: Alert, NAD  -- PULMONARY: non-labored respirations  -- ABDOMEN: soft, non-distended, non-tender, Prevena vac holding suction, LUQ GREGORIO bulb SS  -- NEURO: A&Ox3    Vital Signs Last 24 Hrs  T(C): 36.8 (13 May 2019 05:38), Max: 36.9 (12 May 2019 12:42)  T(F): 98.2 (13 May 2019 05:38), Max: 98.4 (12 May 2019 12:42)  HR: 100 (13 May 2019 05:38) (93 - 100)  BP: 156/77 (13 May 2019 05:38) (127/52 - 160/83)  BP(mean): --  RR: 18 (13 May 2019 05:38) (18 - 18)  SpO2: 94% (13 May 2019 05:38) (94% - 98%)    I&O's Detail    11 May 2019 07:01  -  12 May 2019 07:00  --------------------------------------------------------  IN:    dextrose 5% + sodium chloride 0.9% with potassium chloride 20 mEq/L: 1425 mL    Solution: 200 mL  Total IN: 1625 mL    OUT:    Bulb: 10 mL    Voided: 600 mL  Total OUT: 610 mL    Total NET: 1015 mL      12 May 2019 07:01  -  13 May 2019 06:16  --------------------------------------------------------  IN:    dextrose 5% + sodium chloride 0.45% with potassium chloride 20 mEq/L: 600 mL    Oral Fluid: 1240 mL    Solution: 50 mL    Solution: 500 mL  Total IN: 2390 mL    OUT:    Bulb: 4 mL    Voided: 800 mL  Total OUT: 804 mL    Total NET: 1586 mL          MEDICATIONS  (STANDING):  amLODIPine   Tablet 10 milliGRAM(s) Oral daily  dextrose 5% + sodium chloride 0.45% with potassium chloride 20 mEq/L 1000 milliLiter(s) (50 mL/Hr) IV Continuous <Continuous>  erythromycin    ethylsuccinate Suspension 40 mG/mL 500 milliGRAM(s) Oral every 8 hours  heparin  Injectable 5000 Unit(s) SubCutaneous every 8 hours  HYDROmorphone PCA (1 mG/mL) 30 milliLiter(s) PCA Continuous PCA Continuous  metoprolol succinate  milliGRAM(s) Oral daily  ondansetron Injectable 4 milliGRAM(s) IV Push every 6 hours  pantoprazole    Tablet 40 milliGRAM(s) Oral before breakfast    MEDICATIONS  (PRN):  HYDROmorphone PCA (1 mG/mL) Rescue Clinician Bolus 0.5 milliGRAM(s) IV Push every 15 minutes PRN for Pain Scale GREATER THAN 6  metoclopramide Injectable 10 milliGRAM(s) IV Push every 4 hours PRN nausea and vomting      LABS:                        8.7    10.2  )-----------( 262      ( 12 May 2019 22:28 )             26.7     05-12    138  |  106  |  <4<L>  ----------------------------<  109<H>  3.9   |  21<L>  |  0.52    Ca    8.5      12 May 2019 22:28  Phos  3.0     05-12  Mg     1.8     05-12

## 2019-05-14 LAB
ANION GAP SERPL CALC-SCNC: 12 MMOL/L — SIGNIFICANT CHANGE UP (ref 5–17)
BUN SERPL-MCNC: <4 MG/DL — LOW (ref 7–23)
CALCIUM SERPL-MCNC: 8.4 MG/DL — SIGNIFICANT CHANGE UP (ref 8.4–10.5)
CHLORIDE SERPL-SCNC: 103 MMOL/L — SIGNIFICANT CHANGE UP (ref 96–108)
CO2 SERPL-SCNC: 24 MMOL/L — SIGNIFICANT CHANGE UP (ref 22–31)
CREAT SERPL-MCNC: 0.7 MG/DL — SIGNIFICANT CHANGE UP (ref 0.5–1.3)
GLUCOSE SERPL-MCNC: 91 MG/DL — SIGNIFICANT CHANGE UP (ref 70–99)
HCT VFR BLD CALC: 26 % — LOW (ref 34.5–45)
HGB BLD-MCNC: 8.6 G/DL — LOW (ref 11.5–15.5)
MAGNESIUM SERPL-MCNC: 2 MG/DL — SIGNIFICANT CHANGE UP (ref 1.6–2.6)
MCHC RBC-ENTMCNC: 29.3 PG — SIGNIFICANT CHANGE UP (ref 27–34)
MCHC RBC-ENTMCNC: 33 GM/DL — SIGNIFICANT CHANGE UP (ref 32–36)
MCV RBC AUTO: 88.8 FL — SIGNIFICANT CHANGE UP (ref 80–100)
PHOSPHATE SERPL-MCNC: 3.2 MG/DL — SIGNIFICANT CHANGE UP (ref 2.5–4.5)
PLATELET # BLD AUTO: 362 K/UL — SIGNIFICANT CHANGE UP (ref 150–400)
POTASSIUM SERPL-MCNC: 3.9 MMOL/L — SIGNIFICANT CHANGE UP (ref 3.5–5.3)
POTASSIUM SERPL-SCNC: 3.9 MMOL/L — SIGNIFICANT CHANGE UP (ref 3.5–5.3)
RBC # BLD: 2.93 M/UL — LOW (ref 3.8–5.2)
RBC # FLD: 14.4 % — SIGNIFICANT CHANGE UP (ref 10.3–14.5)
SODIUM SERPL-SCNC: 139 MMOL/L — SIGNIFICANT CHANGE UP (ref 135–145)
WBC # BLD: 9.7 K/UL — SIGNIFICANT CHANGE UP (ref 3.8–10.5)
WBC # FLD AUTO: 9.7 K/UL — SIGNIFICANT CHANGE UP (ref 3.8–10.5)

## 2019-05-14 RX ORDER — ACETAMINOPHEN 500 MG
975 TABLET ORAL EVERY 6 HOURS
Refills: 0 | Status: DISCONTINUED | OUTPATIENT
Start: 2019-05-14 | End: 2019-05-15

## 2019-05-14 RX ORDER — ACETAMINOPHEN 500 MG
325 TABLET ORAL ONCE
Refills: 0 | Status: COMPLETED | OUTPATIENT
Start: 2019-05-14 | End: 2019-05-14

## 2019-05-14 RX ORDER — HYDROMORPHONE HYDROCHLORIDE 2 MG/ML
2 INJECTION INTRAMUSCULAR; INTRAVENOUS; SUBCUTANEOUS EVERY 4 HOURS
Refills: 0 | Status: DISCONTINUED | OUTPATIENT
Start: 2019-05-14 | End: 2019-05-15

## 2019-05-14 RX ORDER — MAGNESIUM SULFATE 500 MG/ML
2 VIAL (ML) INJECTION ONCE
Refills: 0 | Status: COMPLETED | OUTPATIENT
Start: 2019-05-14 | End: 2019-05-14

## 2019-05-14 RX ORDER — HYDROMORPHONE HYDROCHLORIDE 2 MG/ML
2 INJECTION INTRAMUSCULAR; INTRAVENOUS; SUBCUTANEOUS ONCE
Refills: 0 | Status: DISCONTINUED | OUTPATIENT
Start: 2019-05-14 | End: 2019-05-14

## 2019-05-14 RX ORDER — ACETAMINOPHEN 500 MG
650 TABLET ORAL ONCE
Refills: 0 | Status: COMPLETED | OUTPATIENT
Start: 2019-05-14 | End: 2019-05-14

## 2019-05-14 RX ORDER — HYDROMORPHONE HYDROCHLORIDE 2 MG/ML
4 INJECTION INTRAMUSCULAR; INTRAVENOUS; SUBCUTANEOUS EVERY 4 HOURS
Refills: 0 | Status: DISCONTINUED | OUTPATIENT
Start: 2019-05-14 | End: 2019-05-15

## 2019-05-14 RX ADMIN — HYDROMORPHONE HYDROCHLORIDE 4 MILLIGRAM(S): 2 INJECTION INTRAMUSCULAR; INTRAVENOUS; SUBCUTANEOUS at 19:51

## 2019-05-14 RX ADMIN — Medication 650 MILLIGRAM(S): at 13:40

## 2019-05-14 RX ADMIN — Medication 10 MILLIGRAM(S): at 17:23

## 2019-05-14 RX ADMIN — HEPARIN SODIUM 5000 UNIT(S): 5000 INJECTION INTRAVENOUS; SUBCUTANEOUS at 21:21

## 2019-05-14 RX ADMIN — HYDROMORPHONE HYDROCHLORIDE 4 MILLIGRAM(S): 2 INJECTION INTRAMUSCULAR; INTRAVENOUS; SUBCUTANEOUS at 15:36

## 2019-05-14 RX ADMIN — Medication 975 MILLIGRAM(S): at 20:49

## 2019-05-14 RX ADMIN — Medication 650 MILLIGRAM(S): at 14:10

## 2019-05-14 RX ADMIN — AMLODIPINE BESYLATE 10 MILLIGRAM(S): 2.5 TABLET ORAL at 05:37

## 2019-05-14 RX ADMIN — Medication 1000 MILLIGRAM(S): at 06:02

## 2019-05-14 RX ADMIN — HYDROMORPHONE HYDROCHLORIDE 2 MILLIGRAM(S): 2 INJECTION INTRAMUSCULAR; INTRAVENOUS; SUBCUTANEOUS at 07:09

## 2019-05-14 RX ADMIN — Medication 500 MILLIGRAM(S): at 21:21

## 2019-05-14 RX ADMIN — HYDROMORPHONE HYDROCHLORIDE 4 MILLIGRAM(S): 2 INJECTION INTRAMUSCULAR; INTRAVENOUS; SUBCUTANEOUS at 19:21

## 2019-05-14 RX ADMIN — HYDROMORPHONE HYDROCHLORIDE 4 MILLIGRAM(S): 2 INJECTION INTRAMUSCULAR; INTRAVENOUS; SUBCUTANEOUS at 23:59

## 2019-05-14 RX ADMIN — HYDROMORPHONE HYDROCHLORIDE 0.5 MILLIGRAM(S): 2 INJECTION INTRAMUSCULAR; INTRAVENOUS; SUBCUTANEOUS at 10:00

## 2019-05-14 RX ADMIN — Medication 100 MILLIGRAM(S): at 05:35

## 2019-05-14 RX ADMIN — Medication 50 GRAM(S): at 01:05

## 2019-05-14 RX ADMIN — HYDROMORPHONE HYDROCHLORIDE 30 MILLILITER(S): 2 INJECTION INTRAMUSCULAR; INTRAVENOUS; SUBCUTANEOUS at 07:15

## 2019-05-14 RX ADMIN — Medication 400 MILLIGRAM(S): at 05:32

## 2019-05-14 RX ADMIN — Medication 10 MILLIGRAM(S): at 11:58

## 2019-05-14 RX ADMIN — GABAPENTIN 400 MILLIGRAM(S): 400 CAPSULE ORAL at 13:40

## 2019-05-14 RX ADMIN — ONDANSETRON 4 MILLIGRAM(S): 8 TABLET, FILM COATED ORAL at 05:36

## 2019-05-14 RX ADMIN — Medication 500 MILLIGRAM(S): at 14:59

## 2019-05-14 RX ADMIN — HEPARIN SODIUM 5000 UNIT(S): 5000 INJECTION INTRAVENOUS; SUBCUTANEOUS at 13:40

## 2019-05-14 RX ADMIN — Medication 325 MILLIGRAM(S): at 14:10

## 2019-05-14 RX ADMIN — ONDANSETRON 4 MILLIGRAM(S): 8 TABLET, FILM COATED ORAL at 17:22

## 2019-05-14 RX ADMIN — GABAPENTIN 400 MILLIGRAM(S): 400 CAPSULE ORAL at 05:36

## 2019-05-14 RX ADMIN — Medication 325 MILLIGRAM(S): at 13:40

## 2019-05-14 RX ADMIN — HEPARIN SODIUM 5000 UNIT(S): 5000 INJECTION INTRAVENOUS; SUBCUTANEOUS at 05:36

## 2019-05-14 RX ADMIN — HYDROMORPHONE HYDROCHLORIDE 2 MILLIGRAM(S): 2 INJECTION INTRAMUSCULAR; INTRAVENOUS; SUBCUTANEOUS at 17:46

## 2019-05-14 RX ADMIN — Medication 10 MILLIGRAM(S): at 06:40

## 2019-05-14 RX ADMIN — Medication 500 MILLIGRAM(S): at 05:36

## 2019-05-14 RX ADMIN — HYDROMORPHONE HYDROCHLORIDE 4 MILLIGRAM(S): 2 INJECTION INTRAMUSCULAR; INTRAVENOUS; SUBCUTANEOUS at 15:06

## 2019-05-14 RX ADMIN — HYDROMORPHONE HYDROCHLORIDE 4 MILLIGRAM(S): 2 INJECTION INTRAMUSCULAR; INTRAVENOUS; SUBCUTANEOUS at 11:59

## 2019-05-14 RX ADMIN — HYDROMORPHONE HYDROCHLORIDE 2 MILLIGRAM(S): 2 INJECTION INTRAMUSCULAR; INTRAVENOUS; SUBCUTANEOUS at 06:39

## 2019-05-14 RX ADMIN — HYDROMORPHONE HYDROCHLORIDE 2 MILLIGRAM(S): 2 INJECTION INTRAMUSCULAR; INTRAVENOUS; SUBCUTANEOUS at 18:16

## 2019-05-14 RX ADMIN — HYDROMORPHONE HYDROCHLORIDE 4 MILLIGRAM(S): 2 INJECTION INTRAMUSCULAR; INTRAVENOUS; SUBCUTANEOUS at 12:29

## 2019-05-14 RX ADMIN — Medication 975 MILLIGRAM(S): at 21:19

## 2019-05-14 RX ADMIN — GABAPENTIN 400 MILLIGRAM(S): 400 CAPSULE ORAL at 21:21

## 2019-05-14 RX ADMIN — PANTOPRAZOLE SODIUM 40 MILLIGRAM(S): 20 TABLET, DELAYED RELEASE ORAL at 05:36

## 2019-05-14 NOTE — DIETITIAN INITIAL EVALUATION ADULT. - ORAL INTAKE PTA
fair/1 egg, 1 sausage for breakfast, sandwich pizza for lunch, fish, chicken, steak, pork, spinach salad for dinner. snacks on fruit or other natural foods. she typically tolerates supplements mixed with fruit and yogurt. she does not tolerate hot cereal, rice, pudding, yogurt, bread. she can tolerate mashed potatoes, ice cream, toast. as per her ENT. when certain foods mix with her saliva, her saliva turns into another substance which causes her intolerances.

## 2019-05-14 NOTE — PROGRESS NOTE ADULT - SUBJECTIVE AND OBJECTIVE BOX
General Surgery Progress Note    SUBJECTIVE:  The patient was seen and examined. No acute events overnight. Pain controlled with PCA, gabapentin, and PO dilaudid. Would like to be transitioned off of PCA. Passing flatus/BM. Svitlana diet, no N/V. OOB and ambulating.     OBJECTIVE:     ** VITAL SIGNS / I&O's **    Vital Signs Last 24 Hrs  T(C): 37.1 (14 May 2019 05:35), Max: 37.1 (14 May 2019 05:35)  T(F): 98.8 (14 May 2019 05:35), Max: 98.8 (14 May 2019 05:35)  HR: 110 (14 May 2019 05:35) (95 - 110)  BP: 155/87 (14 May 2019 05:35) (139/84 - 157/72)  BP(mean): --  RR: 18 (14 May 2019 05:35) (18 - 18)  SpO2: 100% (14 May 2019 05:35) (96% - 100%)      12 May 2019 07:01  -  13 May 2019 07:00  --------------------------------------------------------  IN:    dextrose 5% + sodium chloride 0.45% with potassium chloride 20 mEq/L: 1200 mL    Oral Fluid: 1240 mL    Solution: 50 mL    Solution: 500 mL  Total IN: 2990 mL    OUT:    Bulb: 4 mL    Voided: 800 mL  Total OUT: 804 mL    Total NET: 2186 mL      13 May 2019 07:01  -  14 May 2019 06:33  --------------------------------------------------------  IN:    dextrose 5% + sodium chloride 0.45% with potassium chloride 20 mEq/L: 360 mL    Oral Fluid: 720 mL    Solution: 100 mL  Total IN: 1180 mL    OUT:    Voided: 1500 mL  Total OUT: 1500 mL    Total NET: -320 mL          ** PHYSICAL EXAM **    -- CONSTITUTIONAL: Alert, NAD  -- PULMONARY: non-labored respirations  -- ABDOMEN: soft, non-distended, minimally tender at GREGORIO site, incision with staples c/d/i  -- NEURO: A&Ox3    ** LABS **                          9.4    10.1  )-----------( 337      ( 13 May 2019 22:19 )             28.6     13 May 2019 22:19    137    |  102    |  <4     ----------------------------<  110    4.5     |  22     |  0.61     Ca    9.1        13 May 2019 22:19  Phos  3.0       13 May 2019 22:19  Mg     1.7       13 May 2019 22:19        CAPILLARY BLOOD GLUCOSE                    MEDICATIONS  (STANDING):  amLODIPine   Tablet 10 milliGRAM(s) Oral daily  dextrose 5% + sodium chloride 0.45% with potassium chloride 20 mEq/L 1000 milliLiter(s) (30 mL/Hr) IV Continuous <Continuous>  erythromycin    ethylsuccinate Suspension 40 mG/mL 500 milliGRAM(s) Oral every 8 hours  gabapentin 400 milliGRAM(s) Oral three times a day  heparin  Injectable 5000 Unit(s) SubCutaneous every 8 hours  HYDROmorphone PCA (1 mG/mL) 30 milliLiter(s) PCA Continuous PCA Continuous  metoclopramide Injectable 10 milliGRAM(s) IV Push every 6 hours  metoprolol succinate  milliGRAM(s) Oral daily  ondansetron Injectable 4 milliGRAM(s) IV Push every 6 hours  pantoprazole    Tablet 40 milliGRAM(s) Oral before breakfast    MEDICATIONS  (PRN):  HYDROmorphone   Tablet 2 milliGRAM(s) Oral every 4 hours PRN Severe Pain (7 - 10)  HYDROmorphone PCA (1 mG/mL) Rescue Clinician Bolus 0.5 milliGRAM(s) IV Push every 15 minutes PRN for Pain Scale GREATER THAN 6 General Surgery Progress Note    SUBJECTIVE:  The patient was seen and examined. No acute events overnight. Pain controlled with PCA, gabapentin, and PO dilaudid. Would like to be transitioned off of PCA. Passing flatus/BM. Svitlana diet, no N/V. OOB and ambulating.     OBJECTIVE:     ** VITAL SIGNS / I&O's **    Vital Signs Last 24 Hrs  T(C): 37.1 (14 May 2019 05:35), Max: 37.1 (14 May 2019 05:35)  T(F): 98.8 (14 May 2019 05:35), Max: 98.8 (14 May 2019 05:35)  HR: 110 (14 May 2019 05:35) (95 - 110)  BP: 155/87 (14 May 2019 05:35) (139/84 - 157/72)  BP(mean): --  RR: 18 (14 May 2019 05:35) (18 - 18)  SpO2: 100% (14 May 2019 05:35) (96% - 100%)      12 May 2019 07:01  -  13 May 2019 07:00  --------------------------------------------------------  IN:    dextrose 5% + sodium chloride 0.45% with potassium chloride 20 mEq/L: 1200 mL    Oral Fluid: 1240 mL    Solution: 50 mL    Solution: 500 mL  Total IN: 2990 mL    OUT:    Bulb: 4 mL    Voided: 800 mL  Total OUT: 804 mL    Total NET: 2186 mL      13 May 2019 07:01  -  14 May 2019 06:33  --------------------------------------------------------  IN:    dextrose 5% + sodium chloride 0.45% with potassium chloride 20 mEq/L: 360 mL    Oral Fluid: 720 mL    Solution: 100 mL  Total IN: 1180 mL    OUT:    Voided: 1500 mL  Total OUT: 1500 mL    Total NET: -320 mL          ** PHYSICAL EXAM **    -- CONSTITUTIONAL: Alert, NAD  -- PULMONARY: non-labored respirations  -- ABDOMEN: soft, non-distended, minimally tender at GREGORIO incision site c/d/i, incision with staples c/d/i  -- NEURO: A&Ox3    ** LABS **                          9.4    10.1  )-----------( 337      ( 13 May 2019 22:19 )             28.6     13 May 2019 22:19    137    |  102    |  <4     ----------------------------<  110    4.5     |  22     |  0.61     Ca    9.1        13 May 2019 22:19  Phos  3.0       13 May 2019 22:19  Mg     1.7       13 May 2019 22:19        CAPILLARY BLOOD GLUCOSE                    MEDICATIONS  (STANDING):  amLODIPine   Tablet 10 milliGRAM(s) Oral daily  dextrose 5% + sodium chloride 0.45% with potassium chloride 20 mEq/L 1000 milliLiter(s) (30 mL/Hr) IV Continuous <Continuous>  erythromycin    ethylsuccinate Suspension 40 mG/mL 500 milliGRAM(s) Oral every 8 hours  gabapentin 400 milliGRAM(s) Oral three times a day  heparin  Injectable 5000 Unit(s) SubCutaneous every 8 hours  HYDROmorphone PCA (1 mG/mL) 30 milliLiter(s) PCA Continuous PCA Continuous  metoclopramide Injectable 10 milliGRAM(s) IV Push every 6 hours  metoprolol succinate  milliGRAM(s) Oral daily  ondansetron Injectable 4 milliGRAM(s) IV Push every 6 hours  pantoprazole    Tablet 40 milliGRAM(s) Oral before breakfast    MEDICATIONS  (PRN):  HYDROmorphone   Tablet 2 milliGRAM(s) Oral every 4 hours PRN Severe Pain (7 - 10)  HYDROmorphone PCA (1 mG/mL) Rescue Clinician Bolus 0.5 milliGRAM(s) IV Push every 15 minutes PRN for Pain Scale GREATER THAN 6

## 2019-05-14 NOTE — PROGRESS NOTE ADULT - ASSESSMENT
A: 62 yo F with h/o chronic pancreatitis now s/p Jeffery procedure (5/8)    P:  - c/w regular diet  - pain control with PCA, gabapentin, PO dilaudid  - Monitor BP- IF HYPOTENSIVE, consider denis/albumin instead of IVF boluses  - Encourage use of IS  - DVT ppx: SQH  - OOB/PT      [facility] [id number]

## 2019-05-14 NOTE — DIETITIAN INITIAL EVALUATION ADULT. - OTHER INFO
seen for length of stay, admitted for pancreatic surgery-s/p Jeffery procedure (5/8), consumption varies depending upon N/V. Denies difficulty chewing /swallow. last BM today. Allergy to lemon. IBW +/- 10%=120pounds

## 2019-05-14 NOTE — DIETITIAN INITIAL EVALUATION ADULT. - PERTINENT MEDS FT
amLODIPine   Tablet 10  dextrose 5% + sodium chloride 0.45% with potassium chloride 20 mEq/L 1000  erythromycin    ethylsuccinate Suspension 40 mG/mL 500  gabapentin 400  heparin  Injectable 5000  HYDROmorphone   Tablet 2 PRN  HYDROmorphone PCA (1 mG/mL) 30  HYDROmorphone PCA (1 mG/mL) Rescue Clinician Bolus 0.5 PRN  metoclopramide Injectable 10  metoprolol succinate   ondansetron Injectable 4  pantoprazole    Tablet 40

## 2019-05-14 NOTE — DIETITIAN INITIAL EVALUATION ADULT. - NS AS NUTRI INTERV MEALS SNACK
continue Regular diet, pt understand the need to chose Lowfat selections and refused to have diet changed to LowFat secondary to chronic pancreatitis

## 2019-05-14 NOTE — PROGRESS NOTE ADULT - SUBJECTIVE AND OBJECTIVE BOX
Day 6/4 of Anesthesia Pain Management Service    SUBJECTIVE: Getting better    Pain Scale Score:	[X] Refer to charted pain scores    THERAPY:    [ ] IV PCA Morphine		        [ ] 5 mg/mL	[ ] 1 mg/mL  [X] IV PCA Hydromorphone	[ ] 5 mg/mL	[X] 1 mg/mL  [ ] IV PCA Fentanyl		        [ ] 50 micrograms/mL    Demand dose: 0.3 mg     Lockout: 6 minutes   Continuous Rate: 0 mg/hr  4 Hour Limit: 6 mg    MEDICATIONS  (STANDING):  amLODIPine   Tablet 10 milliGRAM(s) Oral daily  dextrose 5% + sodium chloride 0.45% with potassium chloride 20 mEq/L 1000 milliLiter(s) (30 mL/Hr) IV Continuous <Continuous>  erythromycin    ethylsuccinate Suspension 40 mG/mL 500 milliGRAM(s) Oral every 8 hours  gabapentin 400 milliGRAM(s) Oral three times a day  heparin  Injectable 5000 Unit(s) SubCutaneous every 8 hours  metoclopramide Injectable 10 milliGRAM(s) IV Push every 6 hours  metoprolol succinate  milliGRAM(s) Oral daily  ondansetron Injectable 4 milliGRAM(s) IV Push every 6 hours  pantoprazole    Tablet 40 milliGRAM(s) Oral before breakfast    MEDICATIONS  (PRN):  HYDROmorphone   Tablet 2 milliGRAM(s) Oral every 4 hours PRN Severe Pain (7 - 10)  HYDROmorphone   Tablet 4 milliGRAM(s) Oral every 4 hours PRN Severe Pain (7 - 10)      OBJECTIVE:    Sedation Score:	[ X] Alert	 [ ] Drowsy 	[ ] Arousable	[ ] Asleep	[ ] Unresponsive    Side Effects:	[X ] None	[ ] Nausea	[ ] Vomiting	[ ] Pruritus  		[ ] Other:    Vital Signs Last 24 Hrs  T(C): 36.7 (14 May 2019 10:16), Max: 37.1 (14 May 2019 05:35)  T(F): 98.1 (14 May 2019 10:16), Max: 98.8 (14 May 2019 05:35)  HR: 104 (14 May 2019 10:16) (95 - 110)  BP: 117/66 (14 May 2019 10:16) (117/66 - 157/72)  BP(mean): --  RR: 18 (14 May 2019 10:16) (18 - 18)  SpO2: 96% (14 May 2019 10:16) (96% - 100%)    ASSESSMENT/ PLAN    Therapy to  be:               [  ] Continued   [X ] Discontinued   [ X] Changed to PRN Analgesics    Documentation and Verification of current medications:   [X] Done	[ ] Not done, not eligible    Comments: D\C PCA. Transition to po analgesics prn.

## 2019-05-15 LAB
ANION GAP SERPL CALC-SCNC: 12 MMOL/L — SIGNIFICANT CHANGE UP (ref 5–17)
BUN SERPL-MCNC: 5 MG/DL — LOW (ref 7–23)
CALCIUM SERPL-MCNC: 8.6 MG/DL — SIGNIFICANT CHANGE UP (ref 8.4–10.5)
CHLORIDE SERPL-SCNC: 103 MMOL/L — SIGNIFICANT CHANGE UP (ref 96–108)
CO2 SERPL-SCNC: 24 MMOL/L — SIGNIFICANT CHANGE UP (ref 22–31)
CREAT SERPL-MCNC: 0.74 MG/DL — SIGNIFICANT CHANGE UP (ref 0.5–1.3)
GLUCOSE SERPL-MCNC: 118 MG/DL — HIGH (ref 70–99)
HCT VFR BLD CALC: 28.6 % — LOW (ref 34.5–45)
HGB BLD-MCNC: 9.3 G/DL — LOW (ref 11.5–15.5)
MAGNESIUM SERPL-MCNC: 1.8 MG/DL — SIGNIFICANT CHANGE UP (ref 1.6–2.6)
MCHC RBC-ENTMCNC: 29 PG — SIGNIFICANT CHANGE UP (ref 27–34)
MCHC RBC-ENTMCNC: 32.5 GM/DL — SIGNIFICANT CHANGE UP (ref 32–36)
MCV RBC AUTO: 89.2 FL — SIGNIFICANT CHANGE UP (ref 80–100)
PHOSPHATE SERPL-MCNC: 3 MG/DL — SIGNIFICANT CHANGE UP (ref 2.5–4.5)
PLATELET # BLD AUTO: 486 K/UL — HIGH (ref 150–400)
POTASSIUM SERPL-MCNC: 3.7 MMOL/L — SIGNIFICANT CHANGE UP (ref 3.5–5.3)
POTASSIUM SERPL-SCNC: 3.7 MMOL/L — SIGNIFICANT CHANGE UP (ref 3.5–5.3)
RBC # BLD: 3.2 M/UL — LOW (ref 3.8–5.2)
RBC # FLD: 15 % — HIGH (ref 10.3–14.5)
SODIUM SERPL-SCNC: 139 MMOL/L — SIGNIFICANT CHANGE UP (ref 135–145)
WBC # BLD: 10.1 K/UL — SIGNIFICANT CHANGE UP (ref 3.8–10.5)
WBC # FLD AUTO: 10.1 K/UL — SIGNIFICANT CHANGE UP (ref 3.8–10.5)

## 2019-05-15 RX ORDER — ACETAMINOPHEN 500 MG
1000 TABLET ORAL ONCE
Refills: 0 | Status: COMPLETED | OUTPATIENT
Start: 2019-05-16 | End: 2019-05-16

## 2019-05-15 RX ORDER — ACETAMINOPHEN 500 MG
1000 TABLET ORAL ONCE
Refills: 0 | Status: COMPLETED | OUTPATIENT
Start: 2019-05-15 | End: 2019-05-15

## 2019-05-15 RX ORDER — METOCLOPRAMIDE HCL 10 MG
1 TABLET ORAL
Qty: 120 | Refills: 0
Start: 2019-05-15 | End: 2019-06-13

## 2019-05-15 RX ORDER — HYDROMORPHONE HYDROCHLORIDE 2 MG/ML
2 INJECTION INTRAMUSCULAR; INTRAVENOUS; SUBCUTANEOUS
Refills: 0 | Status: DISCONTINUED | OUTPATIENT
Start: 2019-05-15 | End: 2019-05-19

## 2019-05-15 RX ORDER — HYDROMORPHONE HYDROCHLORIDE 2 MG/ML
4 INJECTION INTRAMUSCULAR; INTRAVENOUS; SUBCUTANEOUS
Refills: 0 | Status: DISCONTINUED | OUTPATIENT
Start: 2019-05-15 | End: 2019-05-19

## 2019-05-15 RX ORDER — ERYTHROMYCIN ETHYLSUCCINATE 400 MG
6 TABLET ORAL
Qty: 1440 | Refills: 0
Start: 2019-05-15 | End: 2019-06-13

## 2019-05-15 RX ORDER — ACETAMINOPHEN 500 MG
3 TABLET ORAL
Qty: 0 | Refills: 0 | DISCHARGE
Start: 2019-05-15

## 2019-05-15 RX ADMIN — Medication 500 MILLIGRAM(S): at 22:06

## 2019-05-15 RX ADMIN — Medication 975 MILLIGRAM(S): at 09:35

## 2019-05-15 RX ADMIN — HEPARIN SODIUM 5000 UNIT(S): 5000 INJECTION INTRAVENOUS; SUBCUTANEOUS at 13:12

## 2019-05-15 RX ADMIN — HYDROMORPHONE HYDROCHLORIDE 4 MILLIGRAM(S): 2 INJECTION INTRAMUSCULAR; INTRAVENOUS; SUBCUTANEOUS at 17:16

## 2019-05-15 RX ADMIN — HYDROMORPHONE HYDROCHLORIDE 4 MILLIGRAM(S): 2 INJECTION INTRAMUSCULAR; INTRAVENOUS; SUBCUTANEOUS at 09:39

## 2019-05-15 RX ADMIN — GABAPENTIN 400 MILLIGRAM(S): 400 CAPSULE ORAL at 05:27

## 2019-05-15 RX ADMIN — AMLODIPINE BESYLATE 10 MILLIGRAM(S): 2.5 TABLET ORAL at 05:27

## 2019-05-15 RX ADMIN — HEPARIN SODIUM 5000 UNIT(S): 5000 INJECTION INTRAVENOUS; SUBCUTANEOUS at 22:05

## 2019-05-15 RX ADMIN — HYDROMORPHONE HYDROCHLORIDE 4 MILLIGRAM(S): 2 INJECTION INTRAMUSCULAR; INTRAVENOUS; SUBCUTANEOUS at 20:20

## 2019-05-15 RX ADMIN — ONDANSETRON 4 MILLIGRAM(S): 8 TABLET, FILM COATED ORAL at 02:30

## 2019-05-15 RX ADMIN — Medication 100 MILLIGRAM(S): at 05:27

## 2019-05-15 RX ADMIN — Medication 500 MILLIGRAM(S): at 05:28

## 2019-05-15 RX ADMIN — Medication 975 MILLIGRAM(S): at 15:15

## 2019-05-15 RX ADMIN — ONDANSETRON 4 MILLIGRAM(S): 8 TABLET, FILM COATED ORAL at 13:12

## 2019-05-15 RX ADMIN — HYDROMORPHONE HYDROCHLORIDE 4 MILLIGRAM(S): 2 INJECTION INTRAMUSCULAR; INTRAVENOUS; SUBCUTANEOUS at 19:49

## 2019-05-15 RX ADMIN — HYDROMORPHONE HYDROCHLORIDE 4 MILLIGRAM(S): 2 INJECTION INTRAMUSCULAR; INTRAVENOUS; SUBCUTANEOUS at 12:37

## 2019-05-15 RX ADMIN — ONDANSETRON 4 MILLIGRAM(S): 8 TABLET, FILM COATED ORAL at 22:04

## 2019-05-15 RX ADMIN — Medication 975 MILLIGRAM(S): at 03:00

## 2019-05-15 RX ADMIN — HYDROMORPHONE HYDROCHLORIDE 4 MILLIGRAM(S): 2 INJECTION INTRAMUSCULAR; INTRAVENOUS; SUBCUTANEOUS at 05:41

## 2019-05-15 RX ADMIN — Medication 500 MILLIGRAM(S): at 13:51

## 2019-05-15 RX ADMIN — GABAPENTIN 400 MILLIGRAM(S): 400 CAPSULE ORAL at 22:05

## 2019-05-15 RX ADMIN — HYDROMORPHONE HYDROCHLORIDE 4 MILLIGRAM(S): 2 INJECTION INTRAMUSCULAR; INTRAVENOUS; SUBCUTANEOUS at 22:35

## 2019-05-15 RX ADMIN — HYDROMORPHONE HYDROCHLORIDE 4 MILLIGRAM(S): 2 INJECTION INTRAMUSCULAR; INTRAVENOUS; SUBCUTANEOUS at 12:33

## 2019-05-15 RX ADMIN — HYDROMORPHONE HYDROCHLORIDE 4 MILLIGRAM(S): 2 INJECTION INTRAMUSCULAR; INTRAVENOUS; SUBCUTANEOUS at 22:03

## 2019-05-15 RX ADMIN — PANTOPRAZOLE SODIUM 40 MILLIGRAM(S): 20 TABLET, DELAYED RELEASE ORAL at 05:27

## 2019-05-15 RX ADMIN — ONDANSETRON 4 MILLIGRAM(S): 8 TABLET, FILM COATED ORAL at 08:30

## 2019-05-15 RX ADMIN — HYDROMORPHONE HYDROCHLORIDE 4 MILLIGRAM(S): 2 INJECTION INTRAMUSCULAR; INTRAVENOUS; SUBCUTANEOUS at 06:11

## 2019-05-15 RX ADMIN — Medication 10 MILLIGRAM(S): at 16:32

## 2019-05-15 RX ADMIN — HYDROMORPHONE HYDROCHLORIDE 4 MILLIGRAM(S): 2 INJECTION INTRAMUSCULAR; INTRAVENOUS; SUBCUTANEOUS at 16:30

## 2019-05-15 RX ADMIN — Medication 10 MILLIGRAM(S): at 05:28

## 2019-05-15 RX ADMIN — HEPARIN SODIUM 5000 UNIT(S): 5000 INJECTION INTRAVENOUS; SUBCUTANEOUS at 05:28

## 2019-05-15 RX ADMIN — Medication 10 MILLIGRAM(S): at 00:00

## 2019-05-15 RX ADMIN — Medication 400 MILLIGRAM(S): at 18:28

## 2019-05-15 RX ADMIN — Medication 975 MILLIGRAM(S): at 08:30

## 2019-05-15 RX ADMIN — Medication 975 MILLIGRAM(S): at 02:30

## 2019-05-15 RX ADMIN — GABAPENTIN 400 MILLIGRAM(S): 400 CAPSULE ORAL at 13:12

## 2019-05-15 RX ADMIN — HYDROMORPHONE HYDROCHLORIDE 4 MILLIGRAM(S): 2 INJECTION INTRAMUSCULAR; INTRAVENOUS; SUBCUTANEOUS at 00:29

## 2019-05-15 RX ADMIN — Medication 975 MILLIGRAM(S): at 13:12

## 2019-05-15 RX ADMIN — Medication 10 MILLIGRAM(S): at 10:42

## 2019-05-15 NOTE — PROVIDER CONTACT NOTE (OTHER) - ACTION/TREATMENT ORDERED:
Provider has given OK to administer 4mg dilaudid PO early. Will continue to monitor patient and maintain patient safety.

## 2019-05-15 NOTE — PROGRESS NOTE ADULT - ASSESSMENT
A: 64 yo F with h/o chronic pancreatitis now s/p Jeffery procedure (5/8)    P:  - c/w regular diet  - DVT ppx: SQH  - OOB/PT  - possible dc home today       [facility] [id number] A: 64 yo F with h/o chronic pancreatitis now s/p Jeffery procedure (5/8)    P:  - c/w regular diet  - DVT ppx: SQH  - OOB/IS  - Pain control with PO tylenol, PO dilaudid, gabapentin  - possible dc home today with Rxs for erythromycin, reglan, zofran (home med)      [facility] [phone]

## 2019-05-15 NOTE — PROGRESS NOTE ADULT - SUBJECTIVE AND OBJECTIVE BOX
General Surgery Progress Note    SUBJECTIVE:  The patient was seen and examined. No acute events overnight. Pain controlled with PCA, gabapentin, and PO dilaudid. Would like to be transitioned off of PCA. Passing flatus/BM. Svitlana diet, no N/V. OOB and ambulating.     OBJECTIVE:       Vital Signs Last 24 Hrs  T(C): 37.4 (15 May 2019 05:21), Max: 37.4 (14 May 2019 21:22)  T(F): 99.3 (15 May 2019 05:21), Max: 99.4 (14 May 2019 21:22)  HR: 95 (15 May 2019 05:21) (94 - 108)  BP: 155/69 (15 May 2019 05:21) (107/80 - 155/69)  BP(mean): --  RR: 18 (15 May 2019 05:21) (18 - 18)  SpO2: 98% (15 May 2019 05:21) (94% - 98%)    I&O's Detail    13 May 2019 07:01  -  14 May 2019 07:00  --------------------------------------------------------  IN:    dextrose 5% + sodium chloride 0.45% with potassium chloride 20 mEq/L: 720 mL    Oral Fluid: 720 mL    Solution: 100 mL  Total IN: 1540 mL    OUT:    Voided: 1500 mL  Total OUT: 1500 mL    Total NET: 40 mL      14 May 2019 07:01  -  15 May 2019 06:53  --------------------------------------------------------  IN:    dextrose 5% + sodium chloride 0.45% with potassium chloride 20 mEq/L: 180 mL    Oral Fluid: 1230 mL  Total IN: 1410 mL    OUT:    Voided: 1600 mL  Total OUT: 1600 mL    Total NET: -190 mL          MEDICATIONS  (STANDING):  acetaminophen   Tablet .. 975 milliGRAM(s) Oral every 6 hours  amLODIPine   Tablet 10 milliGRAM(s) Oral daily  erythromycin    ethylsuccinate Suspension 40 mG/mL 500 milliGRAM(s) Oral every 8 hours  gabapentin 400 milliGRAM(s) Oral three times a day  heparin  Injectable 5000 Unit(s) SubCutaneous every 8 hours  metoclopramide Injectable 10 milliGRAM(s) IV Push every 6 hours  metoprolol succinate  milliGRAM(s) Oral daily  ondansetron Injectable 4 milliGRAM(s) IV Push every 6 hours  pantoprazole    Tablet 40 milliGRAM(s) Oral before breakfast    MEDICATIONS  (PRN):  HYDROmorphone   Tablet 4 milliGRAM(s) Oral every 4 hours PRN Severe Pain (7 - 10)  HYDROmorphone   Tablet 2 milliGRAM(s) Oral every 4 hours PRN Moderate Pain (4 - 6)      LABS:                        8.6    9.7   )-----------( 362      ( 14 May 2019 22:51 )             26.0     05-14    139  |  103  |  <4<L>  ----------------------------<  91  3.9   |  24  |  0.70    Ca    8.4      14 May 2019 22:51  Phos  3.2     05-14  Mg     2.0     05-14              ** PHYSICAL EXAM **    -- CONSTITUTIONAL: Alert, NAD  -- PULMONARY: non-labored respirations  -- ABDOMEN: soft, non-distended, minimally tender at GREGORIO incision site, incision with staples c/d/i  -- NEURO: A&Ox3 General Surgery Progress Note    SUBJECTIVE:  The patient was seen and examined. No acute events overnight. Pain better controlled with PO tylenol, gabapentin, dilaudid. Passing flatus/BM. Svitlana diet, no N/V. OOB and ambulating.     OBJECTIVE:       Vital Signs Last 24 Hrs  T(C): 37.4 (15 May 2019 05:21), Max: 37.4 (14 May 2019 21:22)  T(F): 99.3 (15 May 2019 05:21), Max: 99.4 (14 May 2019 21:22)  HR: 95 (15 May 2019 05:21) (94 - 108)  BP: 155/69 (15 May 2019 05:21) (107/80 - 155/69)  BP(mean): --  RR: 18 (15 May 2019 05:21) (18 - 18)  SpO2: 98% (15 May 2019 05:21) (94% - 98%)    I&O's Detail    13 May 2019 07:01  -  14 May 2019 07:00  --------------------------------------------------------  IN:    dextrose 5% + sodium chloride 0.45% with potassium chloride 20 mEq/L: 720 mL    Oral Fluid: 720 mL    Solution: 100 mL  Total IN: 1540 mL    OUT:    Voided: 1500 mL  Total OUT: 1500 mL    Total NET: 40 mL      14 May 2019 07:01  -  15 May 2019 06:53  --------------------------------------------------------  IN:    dextrose 5% + sodium chloride 0.45% with potassium chloride 20 mEq/L: 180 mL    Oral Fluid: 1230 mL  Total IN: 1410 mL    OUT:    Voided: 1600 mL  Total OUT: 1600 mL    Total NET: -190 mL          MEDICATIONS  (STANDING):  acetaminophen   Tablet .. 975 milliGRAM(s) Oral every 6 hours  amLODIPine   Tablet 10 milliGRAM(s) Oral daily  erythromycin    ethylsuccinate Suspension 40 mG/mL 500 milliGRAM(s) Oral every 8 hours  gabapentin 400 milliGRAM(s) Oral three times a day  heparin  Injectable 5000 Unit(s) SubCutaneous every 8 hours  metoclopramide Injectable 10 milliGRAM(s) IV Push every 6 hours  metoprolol succinate  milliGRAM(s) Oral daily  ondansetron Injectable 4 milliGRAM(s) IV Push every 6 hours  pantoprazole    Tablet 40 milliGRAM(s) Oral before breakfast    MEDICATIONS  (PRN):  HYDROmorphone   Tablet 4 milliGRAM(s) Oral every 4 hours PRN Severe Pain (7 - 10)  HYDROmorphone   Tablet 2 milliGRAM(s) Oral every 4 hours PRN Moderate Pain (4 - 6)      LABS:                        8.6    9.7   )-----------( 362      ( 14 May 2019 22:51 )             26.0     05-14    139  |  103  |  <4<L>  ----------------------------<  91  3.9   |  24  |  0.70    Ca    8.4      14 May 2019 22:51  Phos  3.2     05-14  Mg     2.0     05-14              ** PHYSICAL EXAM **    -- CONSTITUTIONAL: Alert, NAD  -- PULMONARY: non-labored respirations  -- ABDOMEN: soft, non-distended, minimally tender at GREGORIO incision site, incision with staples c/d/i  -- NEURO: A&Ox3

## 2019-05-15 NOTE — PROVIDER CONTACT NOTE (OTHER) - ASSESSMENT
Patient c/o pain in left arm, upon RN assessment, area above IV site is swollen and warm to touch - RN concerned for hematoma.
Pt A&Ox4. Denies headache, asymptomatic. Pt in pain
Pt A&Ox4. Denies headache, pt asymptomatic.
Pt A&Ox4. No complaints of pain at present time.
Pt A+Ox4, VSS, denies CP; c/o abd pain severe and discomfort. Not due for dilaudid until 1330.
Pt resting in bed w/ cont pulse ox. Hr is 117-120
RN assessed epidural site, leaking w/ dry blood on bed and part of PCEA snapped off.   Elevated , MD aware.

## 2019-05-16 LAB
ANION GAP SERPL CALC-SCNC: 13 MMOL/L — SIGNIFICANT CHANGE UP (ref 5–17)
BUN SERPL-MCNC: 4 MG/DL — LOW (ref 7–23)
CALCIUM SERPL-MCNC: 8.8 MG/DL — SIGNIFICANT CHANGE UP (ref 8.4–10.5)
CHLORIDE SERPL-SCNC: 102 MMOL/L — SIGNIFICANT CHANGE UP (ref 96–108)
CO2 SERPL-SCNC: 25 MMOL/L — SIGNIFICANT CHANGE UP (ref 22–31)
CREAT SERPL-MCNC: 0.58 MG/DL — SIGNIFICANT CHANGE UP (ref 0.5–1.3)
GLUCOSE SERPL-MCNC: 99 MG/DL — SIGNIFICANT CHANGE UP (ref 70–99)
HCT VFR BLD CALC: 31.3 % — LOW (ref 34.5–45)
HGB BLD-MCNC: 10.2 G/DL — LOW (ref 11.5–15.5)
MAGNESIUM SERPL-MCNC: 1.8 MG/DL — SIGNIFICANT CHANGE UP (ref 1.6–2.6)
MCHC RBC-ENTMCNC: 29 PG — SIGNIFICANT CHANGE UP (ref 27–34)
MCHC RBC-ENTMCNC: 32.5 GM/DL — SIGNIFICANT CHANGE UP (ref 32–36)
MCV RBC AUTO: 89.2 FL — SIGNIFICANT CHANGE UP (ref 80–100)
PHOSPHATE SERPL-MCNC: 3 MG/DL — SIGNIFICANT CHANGE UP (ref 2.5–4.5)
PLATELET # BLD AUTO: 604 K/UL — HIGH (ref 150–400)
POTASSIUM SERPL-MCNC: 3.5 MMOL/L — SIGNIFICANT CHANGE UP (ref 3.5–5.3)
POTASSIUM SERPL-SCNC: 3.5 MMOL/L — SIGNIFICANT CHANGE UP (ref 3.5–5.3)
RBC # BLD: 3.51 M/UL — LOW (ref 3.8–5.2)
RBC # FLD: 15 % — HIGH (ref 10.3–14.5)
SODIUM SERPL-SCNC: 140 MMOL/L — SIGNIFICANT CHANGE UP (ref 135–145)
WBC # BLD: 8.4 K/UL — SIGNIFICANT CHANGE UP (ref 3.8–10.5)
WBC # FLD AUTO: 8.4 K/UL — SIGNIFICANT CHANGE UP (ref 3.8–10.5)

## 2019-05-16 PROCEDURE — 74018 RADEX ABDOMEN 1 VIEW: CPT | Mod: 26

## 2019-05-16 RX ORDER — ACETAMINOPHEN 500 MG
1000 TABLET ORAL ONCE
Refills: 0 | Status: COMPLETED | OUTPATIENT
Start: 2019-05-16 | End: 2019-05-16

## 2019-05-16 RX ORDER — ACETAMINOPHEN 500 MG
1000 TABLET ORAL ONCE
Refills: 0 | Status: COMPLETED | OUTPATIENT
Start: 2019-05-17 | End: 2019-05-16

## 2019-05-16 RX ORDER — ACETAMINOPHEN 500 MG
1000 TABLET ORAL ONCE
Refills: 0 | Status: DISCONTINUED | OUTPATIENT
Start: 2019-05-17 | End: 2019-05-17

## 2019-05-16 RX ORDER — ACETAMINOPHEN 500 MG
1000 TABLET ORAL ONCE
Refills: 0 | Status: COMPLETED | OUTPATIENT
Start: 2019-05-17 | End: 2019-05-17

## 2019-05-16 RX ADMIN — Medication 400 MILLIGRAM(S): at 23:33

## 2019-05-16 RX ADMIN — HYDROMORPHONE HYDROCHLORIDE 4 MILLIGRAM(S): 2 INJECTION INTRAMUSCULAR; INTRAVENOUS; SUBCUTANEOUS at 04:06

## 2019-05-16 RX ADMIN — HYDROMORPHONE HYDROCHLORIDE 4 MILLIGRAM(S): 2 INJECTION INTRAMUSCULAR; INTRAVENOUS; SUBCUTANEOUS at 16:45

## 2019-05-16 RX ADMIN — HYDROMORPHONE HYDROCHLORIDE 2 MILLIGRAM(S): 2 INJECTION INTRAMUSCULAR; INTRAVENOUS; SUBCUTANEOUS at 19:25

## 2019-05-16 RX ADMIN — Medication 1000 MILLIGRAM(S): at 17:30

## 2019-05-16 RX ADMIN — HYDROMORPHONE HYDROCHLORIDE 4 MILLIGRAM(S): 2 INJECTION INTRAMUSCULAR; INTRAVENOUS; SUBCUTANEOUS at 21:00

## 2019-05-16 RX ADMIN — ONDANSETRON 4 MILLIGRAM(S): 8 TABLET, FILM COATED ORAL at 14:03

## 2019-05-16 RX ADMIN — HEPARIN SODIUM 5000 UNIT(S): 5000 INJECTION INTRAVENOUS; SUBCUTANEOUS at 23:33

## 2019-05-16 RX ADMIN — HYDROMORPHONE HYDROCHLORIDE 4 MILLIGRAM(S): 2 INJECTION INTRAMUSCULAR; INTRAVENOUS; SUBCUTANEOUS at 01:04

## 2019-05-16 RX ADMIN — Medication 400 MILLIGRAM(S): at 00:22

## 2019-05-16 RX ADMIN — Medication 10 MILLIGRAM(S): at 23:33

## 2019-05-16 RX ADMIN — Medication 500 MILLIGRAM(S): at 05:37

## 2019-05-16 RX ADMIN — GABAPENTIN 400 MILLIGRAM(S): 400 CAPSULE ORAL at 05:38

## 2019-05-16 RX ADMIN — AMLODIPINE BESYLATE 10 MILLIGRAM(S): 2.5 TABLET ORAL at 05:37

## 2019-05-16 RX ADMIN — HYDROMORPHONE HYDROCHLORIDE 4 MILLIGRAM(S): 2 INJECTION INTRAMUSCULAR; INTRAVENOUS; SUBCUTANEOUS at 16:32

## 2019-05-16 RX ADMIN — Medication 1000 MILLIGRAM(S): at 06:10

## 2019-05-16 RX ADMIN — Medication 100 MILLIGRAM(S): at 05:37

## 2019-05-16 RX ADMIN — HEPARIN SODIUM 5000 UNIT(S): 5000 INJECTION INTRAVENOUS; SUBCUTANEOUS at 14:03

## 2019-05-16 RX ADMIN — ONDANSETRON 4 MILLIGRAM(S): 8 TABLET, FILM COATED ORAL at 20:12

## 2019-05-16 RX ADMIN — Medication 10 MILLIGRAM(S): at 00:22

## 2019-05-16 RX ADMIN — ONDANSETRON 4 MILLIGRAM(S): 8 TABLET, FILM COATED ORAL at 08:19

## 2019-05-16 RX ADMIN — Medication 10 MILLIGRAM(S): at 11:04

## 2019-05-16 RX ADMIN — HYDROMORPHONE HYDROCHLORIDE 2 MILLIGRAM(S): 2 INJECTION INTRAMUSCULAR; INTRAVENOUS; SUBCUTANEOUS at 15:12

## 2019-05-16 RX ADMIN — HYDROMORPHONE HYDROCHLORIDE 2 MILLIGRAM(S): 2 INJECTION INTRAMUSCULAR; INTRAVENOUS; SUBCUTANEOUS at 11:02

## 2019-05-16 RX ADMIN — ONDANSETRON 4 MILLIGRAM(S): 8 TABLET, FILM COATED ORAL at 03:09

## 2019-05-16 RX ADMIN — HYDROMORPHONE HYDROCHLORIDE 2 MILLIGRAM(S): 2 INJECTION INTRAMUSCULAR; INTRAVENOUS; SUBCUTANEOUS at 19:02

## 2019-05-16 RX ADMIN — HYDROMORPHONE HYDROCHLORIDE 4 MILLIGRAM(S): 2 INJECTION INTRAMUSCULAR; INTRAVENOUS; SUBCUTANEOUS at 12:30

## 2019-05-16 RX ADMIN — HEPARIN SODIUM 5000 UNIT(S): 5000 INJECTION INTRAVENOUS; SUBCUTANEOUS at 05:38

## 2019-05-16 RX ADMIN — Medication 400 MILLIGRAM(S): at 05:37

## 2019-05-16 RX ADMIN — HYDROMORPHONE HYDROCHLORIDE 4 MILLIGRAM(S): 2 INJECTION INTRAMUSCULAR; INTRAVENOUS; SUBCUTANEOUS at 12:19

## 2019-05-16 RX ADMIN — HYDROMORPHONE HYDROCHLORIDE 4 MILLIGRAM(S): 2 INJECTION INTRAMUSCULAR; INTRAVENOUS; SUBCUTANEOUS at 01:35

## 2019-05-16 RX ADMIN — Medication 10 MILLIGRAM(S): at 05:38

## 2019-05-16 RX ADMIN — HYDROMORPHONE HYDROCHLORIDE 2 MILLIGRAM(S): 2 INJECTION INTRAMUSCULAR; INTRAVENOUS; SUBCUTANEOUS at 14:56

## 2019-05-16 RX ADMIN — Medication 1000 MILLIGRAM(S): at 00:52

## 2019-05-16 RX ADMIN — HYDROMORPHONE HYDROCHLORIDE 4 MILLIGRAM(S): 2 INJECTION INTRAMUSCULAR; INTRAVENOUS; SUBCUTANEOUS at 04:40

## 2019-05-16 RX ADMIN — Medication 400 MILLIGRAM(S): at 17:15

## 2019-05-16 RX ADMIN — HYDROMORPHONE HYDROCHLORIDE 4 MILLIGRAM(S): 2 INJECTION INTRAMUSCULAR; INTRAVENOUS; SUBCUTANEOUS at 07:51

## 2019-05-16 RX ADMIN — HYDROMORPHONE HYDROCHLORIDE 4 MILLIGRAM(S): 2 INJECTION INTRAMUSCULAR; INTRAVENOUS; SUBCUTANEOUS at 20:30

## 2019-05-16 RX ADMIN — HYDROMORPHONE HYDROCHLORIDE 4 MILLIGRAM(S): 2 INJECTION INTRAMUSCULAR; INTRAVENOUS; SUBCUTANEOUS at 08:05

## 2019-05-16 RX ADMIN — PANTOPRAZOLE SODIUM 40 MILLIGRAM(S): 20 TABLET, DELAYED RELEASE ORAL at 05:38

## 2019-05-16 RX ADMIN — Medication 10 MILLIGRAM(S): at 17:15

## 2019-05-16 RX ADMIN — HYDROMORPHONE HYDROCHLORIDE 4 MILLIGRAM(S): 2 INJECTION INTRAMUSCULAR; INTRAVENOUS; SUBCUTANEOUS at 23:32

## 2019-05-16 RX ADMIN — Medication 400 MILLIGRAM(S): at 11:06

## 2019-05-16 RX ADMIN — HYDROMORPHONE HYDROCHLORIDE 2 MILLIGRAM(S): 2 INJECTION INTRAMUSCULAR; INTRAVENOUS; SUBCUTANEOUS at 11:23

## 2019-05-16 RX ADMIN — Medication 1000 MILLIGRAM(S): at 11:23

## 2019-05-16 NOTE — PROGRESS NOTE ADULT - SUBJECTIVE AND OBJECTIVE BOX
General Surgery Progress Note    SUBJECTIVE:  The patient was seen and examined. No acute events overnight. C/o worsening abd pain (lower abd) this AM, combined with nausea. Passing flatus/BM. OOB/amb.     OBJECTIVE:     ** VITAL SIGNS / I&O's **    Vital Signs Last 24 Hrs  T(C): 37.4 (16 May 2019 09:23), Max: 37.4 (16 May 2019 09:23)  T(F): 99.4 (16 May 2019 09:23), Max: 99.4 (16 May 2019 09:23)  HR: 98 (16 May 2019 09:23) (87 - 100)  BP: 135/75 (16 May 2019 09:23) (135/75 - 176/83)  BP(mean): --  RR: 18 (16 May 2019 09:23) (18 - 18)  SpO2: 98% (16 May 2019 09:23) (94% - 98%)      15 May 2019 07:01  -  16 May 2019 07:00  --------------------------------------------------------  IN:    Oral Fluid: 1360 mL    Solution: 200 mL  Total IN: 1560 mL    OUT:    Voided: 1050 mL  Total OUT: 1050 mL    Total NET: 510 mL      16 May 2019 07:01  -  16 May 2019 09:55  --------------------------------------------------------  IN:    Oral Fluid: 360 mL  Total IN: 360 mL    OUT:    Voided: 450 mL  Total OUT: 450 mL    Total NET: -90 mL          ** PHYSICAL EXAM **    -- CONSTITUTIONAL: Alert, NAD  -- PULMONARY: non-labored respirations  -- ABDOMEN: soft, mildly distended, mildly TTP lower abd, incision with staples c/d/i  -- NEURO: A&Ox3    ** LABS **                          9.3    10.1  )-----------( 486      ( 15 May 2019 22:46 )             28.6     15 May 2019 22:46    139    |  103    |  5      ----------------------------<  118    3.7     |  24     |  0.74     Ca    8.6        15 May 2019 22:46  Phos  3.0       15 May 2019 22:46  Mg     1.8       15 May 2019 22:46        CAPILLARY BLOOD GLUCOSE                    MEDICATIONS  (STANDING):  acetaminophen  IVPB .. 1000 milliGRAM(s) IV Intermittent once  acetaminophen  IVPB .. 1000 milliGRAM(s) IV Intermittent once  amLODIPine   Tablet 10 milliGRAM(s) Oral daily  erythromycin    ethylsuccinate Suspension 40 mG/mL 500 milliGRAM(s) Oral every 8 hours  gabapentin 400 milliGRAM(s) Oral three times a day  heparin  Injectable 5000 Unit(s) SubCutaneous every 8 hours  metoclopramide Injectable 10 milliGRAM(s) IV Push every 6 hours  metoprolol succinate  milliGRAM(s) Oral daily  ondansetron Injectable 4 milliGRAM(s) IV Push every 6 hours  pantoprazole    Tablet 40 milliGRAM(s) Oral before breakfast    MEDICATIONS  (PRN):  HYDROmorphone   Tablet 4 milliGRAM(s) Oral every 3 hours PRN Severe Pain (7 - 10)  HYDROmorphone   Tablet 2 milliGRAM(s) Oral every 3 hours PRN Pain (1-6)

## 2019-05-16 NOTE — PROGRESS NOTE ADULT - ASSESSMENT
A: 62 yo F with h/o chronic pancreatitis now s/p Jeffery procedure (5/8)    P:  - c/w regular diet  - f/u AXR  - DVT ppx: SQH  - OOB/IS  - Pain control with IV tylenol, PO dilaudid, gabapentin  - possible dc home today with Rxs for erythromycin, reglan, zofran (home med)- Rxs have been sent to pt's pharmacy    Gauri Briones, PGY-1  [facility] General Surgery [phone]

## 2019-05-17 LAB
ANION GAP SERPL CALC-SCNC: 11 MMOL/L — SIGNIFICANT CHANGE UP (ref 5–17)
BUN SERPL-MCNC: 7 MG/DL — SIGNIFICANT CHANGE UP (ref 7–23)
CALCIUM SERPL-MCNC: 9 MG/DL — SIGNIFICANT CHANGE UP (ref 8.4–10.5)
CHLORIDE SERPL-SCNC: 104 MMOL/L — SIGNIFICANT CHANGE UP (ref 96–108)
CO2 SERPL-SCNC: 25 MMOL/L — SIGNIFICANT CHANGE UP (ref 22–31)
CREAT SERPL-MCNC: 0.6 MG/DL — SIGNIFICANT CHANGE UP (ref 0.5–1.3)
GLUCOSE SERPL-MCNC: 115 MG/DL — HIGH (ref 70–99)
HCT VFR BLD CALC: 34.5 % — SIGNIFICANT CHANGE UP (ref 34.5–45)
HGB BLD-MCNC: 10.8 G/DL — LOW (ref 11.5–15.5)
MAGNESIUM SERPL-MCNC: 2.1 MG/DL — SIGNIFICANT CHANGE UP (ref 1.6–2.6)
MCHC RBC-ENTMCNC: 28 PG — SIGNIFICANT CHANGE UP (ref 27–34)
MCHC RBC-ENTMCNC: 31.2 GM/DL — LOW (ref 32–36)
MCV RBC AUTO: 89.9 FL — SIGNIFICANT CHANGE UP (ref 80–100)
PHOSPHATE SERPL-MCNC: 3.1 MG/DL — SIGNIFICANT CHANGE UP (ref 2.5–4.5)
PLATELET # BLD AUTO: 752 K/UL — HIGH (ref 150–400)
POTASSIUM SERPL-MCNC: 3.9 MMOL/L — SIGNIFICANT CHANGE UP (ref 3.5–5.3)
POTASSIUM SERPL-SCNC: 3.9 MMOL/L — SIGNIFICANT CHANGE UP (ref 3.5–5.3)
RBC # BLD: 3.84 M/UL — SIGNIFICANT CHANGE UP (ref 3.8–5.2)
RBC # FLD: 15.7 % — HIGH (ref 10.3–14.5)
SODIUM SERPL-SCNC: 140 MMOL/L — SIGNIFICANT CHANGE UP (ref 135–145)
WBC # BLD: 7.3 K/UL — SIGNIFICANT CHANGE UP (ref 3.8–10.5)
WBC # FLD AUTO: 7.3 K/UL — SIGNIFICANT CHANGE UP (ref 3.8–10.5)

## 2019-05-17 RX ORDER — IBUPROFEN 200 MG
400 TABLET ORAL EVERY 6 HOURS
Refills: 0 | Status: DISCONTINUED | OUTPATIENT
Start: 2019-05-17 | End: 2019-05-17

## 2019-05-17 RX ORDER — MAGNESIUM SULFATE 500 MG/ML
2 VIAL (ML) INJECTION ONCE
Refills: 0 | Status: COMPLETED | OUTPATIENT
Start: 2019-05-17 | End: 2019-05-17

## 2019-05-17 RX ORDER — POTASSIUM CHLORIDE 20 MEQ
20 PACKET (EA) ORAL
Refills: 0 | Status: COMPLETED | OUTPATIENT
Start: 2019-05-17 | End: 2019-05-17

## 2019-05-17 RX ORDER — ACETAMINOPHEN 500 MG
650 TABLET ORAL
Refills: 0 | Status: DISCONTINUED | OUTPATIENT
Start: 2019-05-17 | End: 2019-05-19

## 2019-05-17 RX ORDER — ACETAMINOPHEN 500 MG
650 TABLET ORAL
Refills: 0 | Status: DISCONTINUED | OUTPATIENT
Start: 2019-05-17 | End: 2019-05-17

## 2019-05-17 RX ORDER — ENOXAPARIN SODIUM 100 MG/ML
40 INJECTION SUBCUTANEOUS DAILY
Refills: 0 | Status: DISCONTINUED | OUTPATIENT
Start: 2019-05-17 | End: 2019-05-19

## 2019-05-17 RX ORDER — ACETAMINOPHEN 500 MG
1000 TABLET ORAL ONCE
Refills: 0 | Status: DISCONTINUED | OUTPATIENT
Start: 2019-05-17 | End: 2019-05-17

## 2019-05-17 RX ORDER — ERYTHROMYCIN ETHYLSUCCINATE 400 MG
500 TABLET ORAL EVERY 8 HOURS
Refills: 0 | Status: DISCONTINUED | OUTPATIENT
Start: 2019-05-17 | End: 2019-05-17

## 2019-05-17 RX ORDER — IBUPROFEN 200 MG
400 TABLET ORAL
Refills: 0 | Status: DISCONTINUED | OUTPATIENT
Start: 2019-05-17 | End: 2019-05-19

## 2019-05-17 RX ADMIN — HYDROMORPHONE HYDROCHLORIDE 2 MILLIGRAM(S): 2 INJECTION INTRAMUSCULAR; INTRAVENOUS; SUBCUTANEOUS at 21:58

## 2019-05-17 RX ADMIN — Medication 10 MILLIGRAM(S): at 23:07

## 2019-05-17 RX ADMIN — HYDROMORPHONE HYDROCHLORIDE 4 MILLIGRAM(S): 2 INJECTION INTRAMUSCULAR; INTRAVENOUS; SUBCUTANEOUS at 20:24

## 2019-05-17 RX ADMIN — HYDROMORPHONE HYDROCHLORIDE 4 MILLIGRAM(S): 2 INJECTION INTRAMUSCULAR; INTRAVENOUS; SUBCUTANEOUS at 16:08

## 2019-05-17 RX ADMIN — Medication 1000 MILLIGRAM(S): at 12:40

## 2019-05-17 RX ADMIN — HYDROMORPHONE HYDROCHLORIDE 2 MILLIGRAM(S): 2 INJECTION INTRAMUSCULAR; INTRAVENOUS; SUBCUTANEOUS at 10:21

## 2019-05-17 RX ADMIN — Medication 400 MILLIGRAM(S): at 05:09

## 2019-05-17 RX ADMIN — HYDROMORPHONE HYDROCHLORIDE 4 MILLIGRAM(S): 2 INJECTION INTRAMUSCULAR; INTRAVENOUS; SUBCUTANEOUS at 12:40

## 2019-05-17 RX ADMIN — Medication 650 MILLIGRAM(S): at 17:40

## 2019-05-17 RX ADMIN — Medication 10 MILLIGRAM(S): at 05:10

## 2019-05-17 RX ADMIN — HYDROMORPHONE HYDROCHLORIDE 2 MILLIGRAM(S): 2 INJECTION INTRAMUSCULAR; INTRAVENOUS; SUBCUTANEOUS at 13:54

## 2019-05-17 RX ADMIN — HYDROMORPHONE HYDROCHLORIDE 2 MILLIGRAM(S): 2 INJECTION INTRAMUSCULAR; INTRAVENOUS; SUBCUTANEOUS at 17:40

## 2019-05-17 RX ADMIN — ONDANSETRON 4 MILLIGRAM(S): 8 TABLET, FILM COATED ORAL at 02:50

## 2019-05-17 RX ADMIN — Medication 500 MILLIGRAM(S): at 13:46

## 2019-05-17 RX ADMIN — HYDROMORPHONE HYDROCHLORIDE 4 MILLIGRAM(S): 2 INJECTION INTRAMUSCULAR; INTRAVENOUS; SUBCUTANEOUS at 16:25

## 2019-05-17 RX ADMIN — HYDROMORPHONE HYDROCHLORIDE 4 MILLIGRAM(S): 2 INJECTION INTRAMUSCULAR; INTRAVENOUS; SUBCUTANEOUS at 02:49

## 2019-05-17 RX ADMIN — Medication 10 MILLIGRAM(S): at 17:19

## 2019-05-17 RX ADMIN — HYDROMORPHONE HYDROCHLORIDE 2 MILLIGRAM(S): 2 INJECTION INTRAMUSCULAR; INTRAVENOUS; SUBCUTANEOUS at 14:15

## 2019-05-17 RX ADMIN — Medication 100 MILLIGRAM(S): at 05:09

## 2019-05-17 RX ADMIN — Medication 400 MILLIGRAM(S): at 21:58

## 2019-05-17 RX ADMIN — Medication 400 MILLIGRAM(S): at 21:05

## 2019-05-17 RX ADMIN — HYDROMORPHONE HYDROCHLORIDE 2 MILLIGRAM(S): 2 INJECTION INTRAMUSCULAR; INTRAVENOUS; SUBCUTANEOUS at 01:55

## 2019-05-17 RX ADMIN — PANTOPRAZOLE SODIUM 40 MILLIGRAM(S): 20 TABLET, DELAYED RELEASE ORAL at 05:11

## 2019-05-17 RX ADMIN — HEPARIN SODIUM 5000 UNIT(S): 5000 INJECTION INTRAVENOUS; SUBCUTANEOUS at 05:09

## 2019-05-17 RX ADMIN — Medication 50 GRAM(S): at 06:32

## 2019-05-17 RX ADMIN — HYDROMORPHONE HYDROCHLORIDE 4 MILLIGRAM(S): 2 INJECTION INTRAMUSCULAR; INTRAVENOUS; SUBCUTANEOUS at 00:05

## 2019-05-17 RX ADMIN — Medication 1000 MILLIGRAM(S): at 00:05

## 2019-05-17 RX ADMIN — Medication 650 MILLIGRAM(S): at 23:07

## 2019-05-17 RX ADMIN — Medication 20 MILLIEQUIVALENT(S): at 05:08

## 2019-05-17 RX ADMIN — Medication 650 MILLIGRAM(S): at 17:21

## 2019-05-17 RX ADMIN — Medication 10 MILLIGRAM(S): at 12:15

## 2019-05-17 RX ADMIN — HYDROMORPHONE HYDROCHLORIDE 4 MILLIGRAM(S): 2 INJECTION INTRAMUSCULAR; INTRAVENOUS; SUBCUTANEOUS at 12:15

## 2019-05-17 RX ADMIN — ONDANSETRON 4 MILLIGRAM(S): 8 TABLET, FILM COATED ORAL at 08:02

## 2019-05-17 RX ADMIN — HYDROMORPHONE HYDROCHLORIDE 2 MILLIGRAM(S): 2 INJECTION INTRAMUSCULAR; INTRAVENOUS; SUBCUTANEOUS at 05:10

## 2019-05-17 RX ADMIN — ONDANSETRON 4 MILLIGRAM(S): 8 TABLET, FILM COATED ORAL at 13:46

## 2019-05-17 RX ADMIN — HYDROMORPHONE HYDROCHLORIDE 4 MILLIGRAM(S): 2 INJECTION INTRAMUSCULAR; INTRAVENOUS; SUBCUTANEOUS at 08:00

## 2019-05-17 RX ADMIN — Medication 20 MILLIEQUIVALENT(S): at 06:33

## 2019-05-17 RX ADMIN — HEPARIN SODIUM 5000 UNIT(S): 5000 INJECTION INTRAVENOUS; SUBCUTANEOUS at 13:46

## 2019-05-17 RX ADMIN — AMLODIPINE BESYLATE 10 MILLIGRAM(S): 2.5 TABLET ORAL at 05:09

## 2019-05-17 RX ADMIN — HYDROMORPHONE HYDROCHLORIDE 2 MILLIGRAM(S): 2 INJECTION INTRAMUSCULAR; INTRAVENOUS; SUBCUTANEOUS at 01:22

## 2019-05-17 RX ADMIN — HYDROMORPHONE HYDROCHLORIDE 4 MILLIGRAM(S): 2 INJECTION INTRAMUSCULAR; INTRAVENOUS; SUBCUTANEOUS at 08:20

## 2019-05-17 RX ADMIN — HYDROMORPHONE HYDROCHLORIDE 4 MILLIGRAM(S): 2 INJECTION INTRAMUSCULAR; INTRAVENOUS; SUBCUTANEOUS at 03:20

## 2019-05-17 RX ADMIN — ONDANSETRON 4 MILLIGRAM(S): 8 TABLET, FILM COATED ORAL at 21:05

## 2019-05-17 RX ADMIN — Medication 1000 MILLIGRAM(S): at 05:40

## 2019-05-17 RX ADMIN — HYDROMORPHONE HYDROCHLORIDE 2 MILLIGRAM(S): 2 INJECTION INTRAMUSCULAR; INTRAVENOUS; SUBCUTANEOUS at 10:40

## 2019-05-17 RX ADMIN — HYDROMORPHONE HYDROCHLORIDE 2 MILLIGRAM(S): 2 INJECTION INTRAMUSCULAR; INTRAVENOUS; SUBCUTANEOUS at 05:40

## 2019-05-17 RX ADMIN — HYDROMORPHONE HYDROCHLORIDE 4 MILLIGRAM(S): 2 INJECTION INTRAMUSCULAR; INTRAVENOUS; SUBCUTANEOUS at 23:07

## 2019-05-17 RX ADMIN — HYDROMORPHONE HYDROCHLORIDE 4 MILLIGRAM(S): 2 INJECTION INTRAMUSCULAR; INTRAVENOUS; SUBCUTANEOUS at 19:15

## 2019-05-17 RX ADMIN — Medication 400 MILLIGRAM(S): at 12:14

## 2019-05-17 RX ADMIN — HYDROMORPHONE HYDROCHLORIDE 2 MILLIGRAM(S): 2 INJECTION INTRAMUSCULAR; INTRAVENOUS; SUBCUTANEOUS at 17:22

## 2019-05-17 RX ADMIN — HYDROMORPHONE HYDROCHLORIDE 2 MILLIGRAM(S): 2 INJECTION INTRAMUSCULAR; INTRAVENOUS; SUBCUTANEOUS at 21:06

## 2019-05-17 NOTE — PROGRESS NOTE ADULT - SUBJECTIVE AND OBJECTIVE BOX
BLUE SURGERY DAILY PROGRESS NOTE:       SUBJECTIVE/ROS: Patient seen and examined at bedside.  Overnight patient with some nausea/emesis.  Abdominal Xray yesterday showed non-obstructive bowel gas pattern.  Pain is still suboptimally controlled despite multimodal pain regimen overnight, however pain improved this morning.         MEDICATIONS  (STANDING):  acetaminophen  IVPB .. 1000 milliGRAM(s) IV Intermittent once  acetaminophen  IVPB .. 1000 milliGRAM(s) IV Intermittent once  amLODIPine   Tablet 10 milliGRAM(s) Oral daily  heparin  Injectable 5000 Unit(s) SubCutaneous every 8 hours  magnesium sulfate  IVPB 2 Gram(s) IV Intermittent once  metoclopramide Injectable 10 milliGRAM(s) IV Push every 6 hours  metoprolol succinate  milliGRAM(s) Oral daily  ondansetron Injectable 4 milliGRAM(s) IV Push every 6 hours  pantoprazole    Tablet 40 milliGRAM(s) Oral before breakfast  potassium chloride    Tablet ER 20 milliEquivalent(s) Oral every 2 hours    MEDICATIONS  (PRN):  HYDROmorphone   Tablet 4 milliGRAM(s) Oral every 3 hours PRN Severe Pain (7 - 10)  HYDROmorphone   Tablet 2 milliGRAM(s) Oral every 3 hours PRN Pain (1-6)      OBJECTIVE:    Vital Signs Last 24 Hrs  T(C): 37.2 (17 May 2019 05:06), Max: 37.4 (16 May 2019 09:23)  T(F): 99 (17 May 2019 05:06), Max: 99.4 (16 May 2019 09:23)  HR: 113 (17 May 2019 05:06) (92 - 130)  BP: 158/93 (17 May 2019 05:06) (132/82 - 168/79)  BP(mean): --  RR: 18 (17 May 2019 05:06) (18 - 19)  SpO2: 97% (17 May 2019 05:06) (96% - 99%)        I&O's Detail    15 May 2019 07:01  -  16 May 2019 07:00  --------------------------------------------------------  IN:    Oral Fluid: 1360 mL    Solution: 200 mL  Total IN: 1560 mL    OUT:    Voided: 1050 mL  Total OUT: 1050 mL    Total NET: 510 mL      16 May 2019 07:01  -  17 May 2019 06:29  --------------------------------------------------------  IN:    Oral Fluid: 900 mL    Solution: 200 mL  Total IN: 1100 mL    OUT:    Emesis: 250 mL    Voided: 1050 mL  Total OUT: 1300 mL    Total NET: -200 mL          Daily     Daily     LABS:                        10.2   8.4   )-----------( 604      ( 16 May 2019 22:46 )             31.3     05-16    140  |  102  |  4<L>  ----------------------------<  99  3.5   |  25  |  0.58    Ca    8.8      16 May 2019 22:46  Phos  3.0     05-16  Mg     1.8     05-16                    PHYSICAL EXAM: BLUE SURGERY DAILY PROGRESS NOTE:       SUBJECTIVE/ROS: Patient seen and examined at bedside.  Overnight patient with some nausea/emesis.  Abdominal Xray yesterday showed non-obstructive bowel gas pattern.  Pain is still suboptimally controlled despite multimodal pain regimen overnight, however pain improved this morning.         MEDICATIONS  (STANDING):  acetaminophen  IVPB .. 1000 milliGRAM(s) IV Intermittent once  acetaminophen  IVPB .. 1000 milliGRAM(s) IV Intermittent once  amLODIPine   Tablet 10 milliGRAM(s) Oral daily  heparin  Injectable 5000 Unit(s) SubCutaneous every 8 hours  magnesium sulfate  IVPB 2 Gram(s) IV Intermittent once  metoclopramide Injectable 10 milliGRAM(s) IV Push every 6 hours  metoprolol succinate  milliGRAM(s) Oral daily  ondansetron Injectable 4 milliGRAM(s) IV Push every 6 hours  pantoprazole    Tablet 40 milliGRAM(s) Oral before breakfast  potassium chloride    Tablet ER 20 milliEquivalent(s) Oral every 2 hours    MEDICATIONS  (PRN):  HYDROmorphone   Tablet 4 milliGRAM(s) Oral every 3 hours PRN Severe Pain (7 - 10)  HYDROmorphone   Tablet 2 milliGRAM(s) Oral every 3 hours PRN Pain (1-6)      OBJECTIVE:    Vital Signs Last 24 Hrs  T(C): 37.2 (17 May 2019 05:06), Max: 37.4 (16 May 2019 09:23)  T(F): 99 (17 May 2019 05:06), Max: 99.4 (16 May 2019 09:23)  HR: 113 (17 May 2019 05:06) (92 - 130)  BP: 158/93 (17 May 2019 05:06) (132/82 - 168/79)  BP(mean): --  RR: 18 (17 May 2019 05:06) (18 - 19)  SpO2: 97% (17 May 2019 05:06) (96% - 99%)        I&O's Detail    15 May 2019 07:01  -  16 May 2019 07:00  --------------------------------------------------------  IN:    Oral Fluid: 1360 mL    Solution: 200 mL  Total IN: 1560 mL    OUT:    Voided: 1050 mL  Total OUT: 1050 mL    Total NET: 510 mL      16 May 2019 07:01  -  17 May 2019 06:29  --------------------------------------------------------  IN:    Oral Fluid: 900 mL    Solution: 200 mL  Total IN: 1100 mL    OUT:    Emesis: 250 mL    Voided: 1050 mL  Total OUT: 1300 mL    Total NET: -200 mL          Daily     Daily     LABS:                        10.2   8.4   )-----------( 604      ( 16 May 2019 22:46 )             31.3     05-16    140  |  102  |  4<L>  ----------------------------<  99  3.5   |  25  |  0.58    Ca    8.8      16 May 2019 22:46  Phos  3.0     05-16  Mg     1.8     05-16                    PHYSICAL EXAM:  -- CONSTITUTIONAL: Alert, NAD  -- PULMONARY: non-labored respirations  -- ABDOMEN: soft, mildly distended, mildly TTP lower abd, incision with staples c/d/i  -- NEURO: A&Ox3

## 2019-05-17 NOTE — PROVIDER CONTACT NOTE (OTHER) - SITUATION
pt eats, takes pain meds and vomits. pt took 4mg dilaudid, ate frosted flakes for breakfast then vomited approx 200 ml
pt vomited approx 250ml of brown liquid, pt states it is the "broth from earlier"
pt with elevated BP/HR, approx 150/90 
BP: 175/109
PCEA site with bloody drainage
Patient c/o pain in left arm, upon RN assessment, area above IV site is swollen and warm to touch - RN concerned for hematoma.
Pt BP: 187/113
Pt has elevated HR, turning in bed and complains of wet gown. RN assessed epidural site, leaking and part of PCEA snapped off.
Pt has remained tachy throughout the night and is now laying comfortably in bed with HR from 117-120 on cont pulse ox
abdominal pain, giving dilaudid 4mg PO early

## 2019-05-17 NOTE — PROVIDER CONTACT NOTE (OTHER) - DATE AND TIME:
16-May-2019 17:30
17-May-2019 10:15
17-May-2019 17:00
08-May-2019 22:15
10-May-2019 01:00
10-May-2019 02:05
10-May-2019 09:50
10-May-2019 10:10
11-May-2019 11:40
15-May-2019 12:36

## 2019-05-17 NOTE — PROVIDER CONTACT NOTE (OTHER) - NAME OF MD/NP/PA/DO NOTIFIED:
Lilian Rich
lashaun barragan
lashaun barragan
Aleksander Moya MD
Juan Luis Fajardo MD
Juan Luis Fajardo MD
KATIE CASTILLO
MD Gene Allred
MD Mills, ISHA Castaneda from pain service
MD Musa

## 2019-05-17 NOTE — PROGRESS NOTE ADULT - ASSESSMENT
64 yo F with h/o chronic pancreatitis now s/p Jeffery procedure (5/8)    P:  - c/w regular diet  - DVT ppx: SQH  - OOB/IS  - Pain control with IV tylenol, PO dilaudid, gabapentin  - Upon discharge patient will need rxs for erythromycin, reglan, zofran (home med)- Rxs have been sent to pt's pharmacy       Lilian Rich PA-C 62 yo F with h/o chronic pancreatitis now s/p Jeffery procedure (5/8)    P:  - c/w regular diet  - DVT ppx: SQH  - OOB/IS  - Pain control with IV tylenol, PO dilaudid, gabapentin  - Upon discharge patient will need rxs for erythromycin, reglan, zofran (home med)- Rxs have been sent to pt's pharmacy       BLUE Sx #[id number] 62 yo F with h/o chronic pancreatitis now s/p Jeffery procedure (5/8)    P:  - c/w regular diet  - DVT ppx: SQH  - OOB/IS  - Pain control with IV tylenol, PO dilaudid, gabapentin  - Upon discharge patient will need rxs for reglan, zofran (home med)- Rxs have been sent to pt's pharmacy       BLUE Sx #[id number]

## 2019-05-18 RX ADMIN — Medication 400 MILLIGRAM(S): at 03:05

## 2019-05-18 RX ADMIN — HYDROMORPHONE HYDROCHLORIDE 2 MILLIGRAM(S): 2 INJECTION INTRAMUSCULAR; INTRAVENOUS; SUBCUTANEOUS at 12:10

## 2019-05-18 RX ADMIN — Medication 650 MILLIGRAM(S): at 05:43

## 2019-05-18 RX ADMIN — HYDROMORPHONE HYDROCHLORIDE 4 MILLIGRAM(S): 2 INJECTION INTRAMUSCULAR; INTRAVENOUS; SUBCUTANEOUS at 07:45

## 2019-05-18 RX ADMIN — ONDANSETRON 4 MILLIGRAM(S): 8 TABLET, FILM COATED ORAL at 20:30

## 2019-05-18 RX ADMIN — Medication 10 MILLIGRAM(S): at 12:11

## 2019-05-18 RX ADMIN — Medication 10 MILLIGRAM(S): at 05:25

## 2019-05-18 RX ADMIN — HYDROMORPHONE HYDROCHLORIDE 4 MILLIGRAM(S): 2 INJECTION INTRAMUSCULAR; INTRAVENOUS; SUBCUTANEOUS at 16:36

## 2019-05-18 RX ADMIN — HYDROMORPHONE HYDROCHLORIDE 2 MILLIGRAM(S): 2 INJECTION INTRAMUSCULAR; INTRAVENOUS; SUBCUTANEOUS at 09:13

## 2019-05-18 RX ADMIN — HYDROMORPHONE HYDROCHLORIDE 4 MILLIGRAM(S): 2 INJECTION INTRAMUSCULAR; INTRAVENOUS; SUBCUTANEOUS at 00:00

## 2019-05-18 RX ADMIN — Medication 400 MILLIGRAM(S): at 15:05

## 2019-05-18 RX ADMIN — HYDROMORPHONE HYDROCHLORIDE 2 MILLIGRAM(S): 2 INJECTION INTRAMUSCULAR; INTRAVENOUS; SUBCUTANEOUS at 05:25

## 2019-05-18 RX ADMIN — HYDROMORPHONE HYDROCHLORIDE 4 MILLIGRAM(S): 2 INJECTION INTRAMUSCULAR; INTRAVENOUS; SUBCUTANEOUS at 22:50

## 2019-05-18 RX ADMIN — Medication 650 MILLIGRAM(S): at 05:25

## 2019-05-18 RX ADMIN — HYDROMORPHONE HYDROCHLORIDE 2 MILLIGRAM(S): 2 INJECTION INTRAMUSCULAR; INTRAVENOUS; SUBCUTANEOUS at 02:13

## 2019-05-18 RX ADMIN — Medication 650 MILLIGRAM(S): at 12:31

## 2019-05-18 RX ADMIN — HYDROMORPHONE HYDROCHLORIDE 2 MILLIGRAM(S): 2 INJECTION INTRAMUSCULAR; INTRAVENOUS; SUBCUTANEOUS at 12:40

## 2019-05-18 RX ADMIN — AMLODIPINE BESYLATE 10 MILLIGRAM(S): 2.5 TABLET ORAL at 05:25

## 2019-05-18 RX ADMIN — Medication 10 MILLIGRAM(S): at 19:39

## 2019-05-18 RX ADMIN — Medication 400 MILLIGRAM(S): at 15:35

## 2019-05-18 RX ADMIN — HYDROMORPHONE HYDROCHLORIDE 2 MILLIGRAM(S): 2 INJECTION INTRAMUSCULAR; INTRAVENOUS; SUBCUTANEOUS at 09:30

## 2019-05-18 RX ADMIN — HYDROMORPHONE HYDROCHLORIDE 4 MILLIGRAM(S): 2 INJECTION INTRAMUSCULAR; INTRAVENOUS; SUBCUTANEOUS at 14:02

## 2019-05-18 RX ADMIN — HYDROMORPHONE HYDROCHLORIDE 2 MILLIGRAM(S): 2 INJECTION INTRAMUSCULAR; INTRAVENOUS; SUBCUTANEOUS at 21:13

## 2019-05-18 RX ADMIN — Medication 100 MILLIGRAM(S): at 05:25

## 2019-05-18 RX ADMIN — HYDROMORPHONE HYDROCHLORIDE 4 MILLIGRAM(S): 2 INJECTION INTRAMUSCULAR; INTRAVENOUS; SUBCUTANEOUS at 03:31

## 2019-05-18 RX ADMIN — Medication 400 MILLIGRAM(S): at 03:31

## 2019-05-18 RX ADMIN — ONDANSETRON 4 MILLIGRAM(S): 8 TABLET, FILM COATED ORAL at 09:16

## 2019-05-18 RX ADMIN — HYDROMORPHONE HYDROCHLORIDE 2 MILLIGRAM(S): 2 INJECTION INTRAMUSCULAR; INTRAVENOUS; SUBCUTANEOUS at 05:43

## 2019-05-18 RX ADMIN — Medication 400 MILLIGRAM(S): at 20:30

## 2019-05-18 RX ADMIN — PANTOPRAZOLE SODIUM 40 MILLIGRAM(S): 20 TABLET, DELAYED RELEASE ORAL at 05:25

## 2019-05-18 RX ADMIN — HYDROMORPHONE HYDROCHLORIDE 4 MILLIGRAM(S): 2 INJECTION INTRAMUSCULAR; INTRAVENOUS; SUBCUTANEOUS at 19:37

## 2019-05-18 RX ADMIN — Medication 400 MILLIGRAM(S): at 09:45

## 2019-05-18 RX ADMIN — HYDROMORPHONE HYDROCHLORIDE 4 MILLIGRAM(S): 2 INJECTION INTRAMUSCULAR; INTRAVENOUS; SUBCUTANEOUS at 10:31

## 2019-05-18 RX ADMIN — HYDROMORPHONE HYDROCHLORIDE 4 MILLIGRAM(S): 2 INJECTION INTRAMUSCULAR; INTRAVENOUS; SUBCUTANEOUS at 17:05

## 2019-05-18 RX ADMIN — HYDROMORPHONE HYDROCHLORIDE 4 MILLIGRAM(S): 2 INJECTION INTRAMUSCULAR; INTRAVENOUS; SUBCUTANEOUS at 03:05

## 2019-05-18 RX ADMIN — HYDROMORPHONE HYDROCHLORIDE 4 MILLIGRAM(S): 2 INJECTION INTRAMUSCULAR; INTRAVENOUS; SUBCUTANEOUS at 13:33

## 2019-05-18 RX ADMIN — Medication 650 MILLIGRAM(S): at 18:01

## 2019-05-18 RX ADMIN — HYDROMORPHONE HYDROCHLORIDE 2 MILLIGRAM(S): 2 INJECTION INTRAMUSCULAR; INTRAVENOUS; SUBCUTANEOUS at 15:35

## 2019-05-18 RX ADMIN — ENOXAPARIN SODIUM 40 MILLIGRAM(S): 100 INJECTION SUBCUTANEOUS at 12:12

## 2019-05-18 RX ADMIN — HYDROMORPHONE HYDROCHLORIDE 2 MILLIGRAM(S): 2 INJECTION INTRAMUSCULAR; INTRAVENOUS; SUBCUTANEOUS at 01:36

## 2019-05-18 RX ADMIN — Medication 650 MILLIGRAM(S): at 00:00

## 2019-05-18 RX ADMIN — HYDROMORPHONE HYDROCHLORIDE 2 MILLIGRAM(S): 2 INJECTION INTRAMUSCULAR; INTRAVENOUS; SUBCUTANEOUS at 22:03

## 2019-05-18 RX ADMIN — ONDANSETRON 4 MILLIGRAM(S): 8 TABLET, FILM COATED ORAL at 03:06

## 2019-05-18 RX ADMIN — HYDROMORPHONE HYDROCHLORIDE 2 MILLIGRAM(S): 2 INJECTION INTRAMUSCULAR; INTRAVENOUS; SUBCUTANEOUS at 18:31

## 2019-05-18 RX ADMIN — Medication 400 MILLIGRAM(S): at 09:16

## 2019-05-18 RX ADMIN — ONDANSETRON 4 MILLIGRAM(S): 8 TABLET, FILM COATED ORAL at 14:25

## 2019-05-18 RX ADMIN — HYDROMORPHONE HYDROCHLORIDE 2 MILLIGRAM(S): 2 INJECTION INTRAMUSCULAR; INTRAVENOUS; SUBCUTANEOUS at 18:01

## 2019-05-18 RX ADMIN — Medication 400 MILLIGRAM(S): at 21:14

## 2019-05-18 RX ADMIN — HYDROMORPHONE HYDROCHLORIDE 4 MILLIGRAM(S): 2 INJECTION INTRAMUSCULAR; INTRAVENOUS; SUBCUTANEOUS at 11:00

## 2019-05-18 RX ADMIN — Medication 650 MILLIGRAM(S): at 23:26

## 2019-05-18 RX ADMIN — HYDROMORPHONE HYDROCHLORIDE 4 MILLIGRAM(S): 2 INJECTION INTRAMUSCULAR; INTRAVENOUS; SUBCUTANEOUS at 07:29

## 2019-05-18 RX ADMIN — Medication 650 MILLIGRAM(S): at 22:50

## 2019-05-18 RX ADMIN — HYDROMORPHONE HYDROCHLORIDE 4 MILLIGRAM(S): 2 INJECTION INTRAMUSCULAR; INTRAVENOUS; SUBCUTANEOUS at 23:26

## 2019-05-18 RX ADMIN — HYDROMORPHONE HYDROCHLORIDE 2 MILLIGRAM(S): 2 INJECTION INTRAMUSCULAR; INTRAVENOUS; SUBCUTANEOUS at 15:05

## 2019-05-18 RX ADMIN — Medication 650 MILLIGRAM(S): at 12:11

## 2019-05-18 NOTE — PROGRESS NOTE ADULT - SUBJECTIVE AND OBJECTIVE BOX
General Surgery Progress Note    SUBJECTIVE:  The patient was seen and examined. No acute events overnight. Pt stated her pain was improved in the AM. Able to filippo CLD without nausea per pt. Would like to try solid foods today.     OBJECTIVE:     ** VITAL SIGNS / I&O's **    Vital Signs Last 24 Hrs  T(C): 36.9 (18 May 2019 04:45), Max: 37.1 (17 May 2019 16:47)  T(F): 98.5 (18 May 2019 04:45), Max: 98.7 (17 May 2019 16:47)  HR: 105 (18 May 2019 04:45) (97 - 108)  BP: 127/78 (18 May 2019 04:45) (127/78 - 166/89)  BP(mean): --  RR: 18 (18 May 2019 04:45) (18 - 18)  SpO2: 97% (18 May 2019 04:45) (95% - 100%)      17 May 2019 07:01  -  18 May 2019 07:00  --------------------------------------------------------  IN:    Oral Fluid: 1040 mL    Solution: 100 mL  Total IN: 1140 mL    OUT:    Emesis: 300 mL    Voided: 1150 mL  Total OUT: 1450 mL    Total NET: -310 mL          ** PHYSICAL EXAM **    -- CONSTITUTIONAL: Alert, NAD  -- PULMONARY: non-labored respirations  -- ABDOMEN: soft, mildly distended, mildly TTP lower abd, incision with staples c/d/i  -- NEURO: A&Ox3      ** LABS **                          10.8   7.3   )-----------( 752      ( 17 May 2019 22:38 )             34.5     17 May 2019 22:38    140    |  104    |  7      ----------------------------<  115    3.9     |  25     |  0.60     Ca    9.0        17 May 2019 22:38  Phos  3.1       17 May 2019 22:38  Mg     2.1       17 May 2019 22:38        CAPILLARY BLOOD GLUCOSE                    MEDICATIONS  (STANDING):  acetaminophen   Tablet .. 650 milliGRAM(s) Oral <User Schedule>  amLODIPine   Tablet 10 milliGRAM(s) Oral daily  enoxaparin Injectable 40 milliGRAM(s) SubCutaneous daily  ibuprofen  Tablet. 400 milliGRAM(s) Oral <User Schedule>  metoclopramide Injectable 10 milliGRAM(s) IV Push every 6 hours  metoprolol succinate  milliGRAM(s) Oral daily  ondansetron Injectable 4 milliGRAM(s) IV Push every 6 hours  pantoprazole    Tablet 40 milliGRAM(s) Oral before breakfast    MEDICATIONS  (PRN):  HYDROmorphone   Tablet 4 milliGRAM(s) Oral every 3 hours PRN Severe Pain (7 - 10)  HYDROmorphone   Tablet 2 milliGRAM(s) Oral every 3 hours PRN Pain (1-6) General Surgery Progress Note    SUBJECTIVE:  The patient was seen and examined. No acute events overnight. Pt stated her pain was improved in the AM. Able to filippo CLD without nausea per pt. Would like to try solid foods today.     OBJECTIVE:     ** VITAL SIGNS / I&O's **    Vital Signs Last 24 Hrs  T(C): 36.9 (18 May 2019 04:45), Max: 37.1 (17 May 2019 16:47)  T(F): 98.5 (18 May 2019 04:45), Max: 98.7 (17 May 2019 16:47)  HR: 105 (18 May 2019 04:45) (97 - 108)  BP: 127/78 (18 May 2019 04:45) (127/78 - 166/89)  BP(mean): --  RR: 18 (18 May 2019 04:45) (18 - 18)  SpO2: 97% (18 May 2019 04:45) (95% - 100%)      17 May 2019 07:01  -  18 May 2019 07:00  --------------------------------------------------------  IN:    Oral Fluid: 1040 mL    Solution: 100 mL  Total IN: 1140 mL    OUT:    Emesis: 300 mL    Voided: 1150 mL  Total OUT: 1450 mL    Total NET: -310 mL          ** PHYSICAL EXAM **    -- CONSTITUTIONAL: Alert, NAD  -- PULMONARY: non-labored respirations  -- ABDOMEN: soft, mildly distended, mildly TTP at incision with staples c/d/i, no rebound/guarding  -- NEURO: A&Ox3      ** LABS **                          10.8   7.3   )-----------( 752      ( 17 May 2019 22:38 )             34.5     17 May 2019 22:38    140    |  104    |  7      ----------------------------<  115    3.9     |  25     |  0.60     Ca    9.0        17 May 2019 22:38  Phos  3.1       17 May 2019 22:38  Mg     2.1       17 May 2019 22:38        CAPILLARY BLOOD GLUCOSE                    MEDICATIONS  (STANDING):  acetaminophen   Tablet .. 650 milliGRAM(s) Oral <User Schedule>  amLODIPine   Tablet 10 milliGRAM(s) Oral daily  enoxaparin Injectable 40 milliGRAM(s) SubCutaneous daily  ibuprofen  Tablet. 400 milliGRAM(s) Oral <User Schedule>  metoclopramide Injectable 10 milliGRAM(s) IV Push every 6 hours  metoprolol succinate  milliGRAM(s) Oral daily  ondansetron Injectable 4 milliGRAM(s) IV Push every 6 hours  pantoprazole    Tablet 40 milliGRAM(s) Oral before breakfast    MEDICATIONS  (PRN):  HYDROmorphone   Tablet 4 milliGRAM(s) Oral every 3 hours PRN Severe Pain (7 - 10)  HYDROmorphone   Tablet 2 milliGRAM(s) Oral every 3 hours PRN Pain (1-6)

## 2019-05-18 NOTE — PROGRESS NOTE ADULT - ASSESSMENT
A: 64 yo F with h/o chronic pancreatitis now s/p Jeffery procedure (5/8)    P:  - c/w regular diet- f/u N/V  - DVT ppx: SQH  - OOB/IS  - Pain control with IV tylenol, PO dilaudid, gabapentin  - dc home with Rxs for erythromycin, reglan, zofran (home med)- Rxs have been sent to pt's pharmacy    Gauri Briones, PGY-1  [facility] General Surgery [phone]

## 2019-05-19 ENCOUNTER — TRANSCRIPTION ENCOUNTER (OUTPATIENT)
Age: 64
End: 2019-05-19

## 2019-05-19 VITALS
HEART RATE: 92 BPM | OXYGEN SATURATION: 98 % | RESPIRATION RATE: 18 BRPM | TEMPERATURE: 99 F | SYSTOLIC BLOOD PRESSURE: 117 MMHG | DIASTOLIC BLOOD PRESSURE: 70 MMHG

## 2019-05-19 RX ORDER — HYDROMORPHONE HYDROCHLORIDE 2 MG/ML
1 INJECTION INTRAMUSCULAR; INTRAVENOUS; SUBCUTANEOUS
Qty: 28 | Refills: 0
Start: 2019-05-19 | End: 2019-05-25

## 2019-05-19 RX ORDER — MORPHINE SULFATE 50 MG/1
1 CAPSULE, EXTENDED RELEASE ORAL
Qty: 0 | Refills: 0 | DISCHARGE

## 2019-05-19 RX ADMIN — AMLODIPINE BESYLATE 10 MILLIGRAM(S): 2.5 TABLET ORAL at 05:16

## 2019-05-19 RX ADMIN — HYDROMORPHONE HYDROCHLORIDE 4 MILLIGRAM(S): 2 INJECTION INTRAMUSCULAR; INTRAVENOUS; SUBCUTANEOUS at 12:10

## 2019-05-19 RX ADMIN — Medication 400 MILLIGRAM(S): at 08:30

## 2019-05-19 RX ADMIN — HYDROMORPHONE HYDROCHLORIDE 2 MILLIGRAM(S): 2 INJECTION INTRAMUSCULAR; INTRAVENOUS; SUBCUTANEOUS at 00:40

## 2019-05-19 RX ADMIN — ONDANSETRON 4 MILLIGRAM(S): 8 TABLET, FILM COATED ORAL at 08:01

## 2019-05-19 RX ADMIN — Medication 400 MILLIGRAM(S): at 02:46

## 2019-05-19 RX ADMIN — Medication 650 MILLIGRAM(S): at 05:16

## 2019-05-19 RX ADMIN — Medication 400 MILLIGRAM(S): at 08:01

## 2019-05-19 RX ADMIN — HYDROMORPHONE HYDROCHLORIDE 4 MILLIGRAM(S): 2 INJECTION INTRAMUSCULAR; INTRAVENOUS; SUBCUTANEOUS at 08:25

## 2019-05-19 RX ADMIN — Medication 10 MILLIGRAM(S): at 08:01

## 2019-05-19 RX ADMIN — HYDROMORPHONE HYDROCHLORIDE 4 MILLIGRAM(S): 2 INJECTION INTRAMUSCULAR; INTRAVENOUS; SUBCUTANEOUS at 07:56

## 2019-05-19 RX ADMIN — PANTOPRAZOLE SODIUM 40 MILLIGRAM(S): 20 TABLET, DELAYED RELEASE ORAL at 05:16

## 2019-05-19 RX ADMIN — HYDROMORPHONE HYDROCHLORIDE 2 MILLIGRAM(S): 2 INJECTION INTRAMUSCULAR; INTRAVENOUS; SUBCUTANEOUS at 09:57

## 2019-05-19 RX ADMIN — HYDROMORPHONE HYDROCHLORIDE 4 MILLIGRAM(S): 2 INJECTION INTRAMUSCULAR; INTRAVENOUS; SUBCUTANEOUS at 03:39

## 2019-05-19 RX ADMIN — HYDROMORPHONE HYDROCHLORIDE 2 MILLIGRAM(S): 2 INJECTION INTRAMUSCULAR; INTRAVENOUS; SUBCUTANEOUS at 01:30

## 2019-05-19 RX ADMIN — Medication 100 MILLIGRAM(S): at 05:16

## 2019-05-19 RX ADMIN — HYDROMORPHONE HYDROCHLORIDE 2 MILLIGRAM(S): 2 INJECTION INTRAMUSCULAR; INTRAVENOUS; SUBCUTANEOUS at 05:16

## 2019-05-19 RX ADMIN — Medication 400 MILLIGRAM(S): at 03:39

## 2019-05-19 RX ADMIN — Medication 10 MILLIGRAM(S): at 02:47

## 2019-05-19 RX ADMIN — HYDROMORPHONE HYDROCHLORIDE 4 MILLIGRAM(S): 2 INJECTION INTRAMUSCULAR; INTRAVENOUS; SUBCUTANEOUS at 02:46

## 2019-05-19 RX ADMIN — HYDROMORPHONE HYDROCHLORIDE 2 MILLIGRAM(S): 2 INJECTION INTRAMUSCULAR; INTRAVENOUS; SUBCUTANEOUS at 06:16

## 2019-05-19 RX ADMIN — Medication 650 MILLIGRAM(S): at 06:15

## 2019-05-19 RX ADMIN — HYDROMORPHONE HYDROCHLORIDE 4 MILLIGRAM(S): 2 INJECTION INTRAMUSCULAR; INTRAVENOUS; SUBCUTANEOUS at 11:42

## 2019-05-19 RX ADMIN — Medication 650 MILLIGRAM(S): at 11:42

## 2019-05-19 RX ADMIN — ONDANSETRON 4 MILLIGRAM(S): 8 TABLET, FILM COATED ORAL at 02:46

## 2019-05-19 RX ADMIN — HYDROMORPHONE HYDROCHLORIDE 2 MILLIGRAM(S): 2 INJECTION INTRAMUSCULAR; INTRAVENOUS; SUBCUTANEOUS at 10:25

## 2019-05-19 NOTE — PROGRESS NOTE ADULT - SUBJECTIVE AND OBJECTIVE BOX
General Surgery Progress Note    SUBJECTIVE:  The patient was seen and examined. No acute events overnight. Pain well controlled yesterday, filippo solid food without N/V.   Removed every other staple and replaced with tegaderm.     OBJECTIVE:     ** VITAL SIGNS / I&O's **    Vital Signs Last 24 Hrs  T(C): 37.1 (18 May 2019 21:43), Max: 37.2 (18 May 2019 13:45)  T(F): 98.8 (18 May 2019 21:43), Max: 99 (18 May 2019 13:45)  HR: 95 (18 May 2019 21:43) (91 - 105)  BP: 120/76 (18 May 2019 21:43) (118/68 - 142/93)  BP(mean): --  RR: 18 (18 May 2019 21:43) (18 - 18)  SpO2: 99% (18 May 2019 21:43) (96% - 100%)      17 May 2019 07:01  -  18 May 2019 07:00  --------------------------------------------------------  IN:    Oral Fluid: 1040 mL    Solution: 100 mL  Total IN: 1140 mL    OUT:    Emesis: 300 mL    Voided: 1150 mL  Total OUT: 1450 mL    Total NET: -310 mL      18 May 2019 07:01  -  19 May 2019 00:32  --------------------------------------------------------  IN:    Oral Fluid: 820 mL  Total IN: 820 mL    OUT:    Voided: 750 mL  Total OUT: 750 mL    Total NET: 70 mL          ** PHYSICAL EXAM **    -- CONSTITUTIONAL: Alert, NAD  -- PULMONARY: non-labored respirations  -- ABDOMEN: soft, non-distended, mildly TTP at incision with staples c/d/i, no rebound/guarding  -- NEURO: A&Ox3    ** LABS **                          10.8   7.3   )-----------( 752      ( 17 May 2019 22:38 )             34.5     17 May 2019 22:38    140    |  104    |  7      ----------------------------<  115    3.9     |  25     |  0.60     Ca    9.0        17 May 2019 22:38  Phos  3.1       17 May 2019 22:38  Mg     2.1       17 May 2019 22:38        CAPILLARY BLOOD GLUCOSE                    MEDICATIONS  (STANDING):  acetaminophen   Tablet .. 650 milliGRAM(s) Oral <User Schedule>  amLODIPine   Tablet 10 milliGRAM(s) Oral daily  enoxaparin Injectable 40 milliGRAM(s) SubCutaneous daily  ibuprofen  Tablet. 400 milliGRAM(s) Oral <User Schedule>  metoclopramide Injectable 10 milliGRAM(s) IV Push every 6 hours  metoprolol succinate  milliGRAM(s) Oral daily  ondansetron Injectable 4 milliGRAM(s) IV Push every 6 hours  pantoprazole    Tablet 40 milliGRAM(s) Oral before breakfast    MEDICATIONS  (PRN):  HYDROmorphone   Tablet 4 milliGRAM(s) Oral every 3 hours PRN Severe Pain (7 - 10)  HYDROmorphone   Tablet 2 milliGRAM(s) Oral every 3 hours PRN Pain (1-6)

## 2019-05-19 NOTE — DISCHARGE NOTE NURSING/CASE MANAGEMENT/SOCIAL WORK - NSDCDPATPORTLINK_GEN_ALL_CORE
You can access the [facility], offered by [facility], by registering with the following website: [email or web address]

## 2019-05-19 NOTE — DISCHARGE NOTE NURSING/CASE MANAGEMENT/SOCIAL WORK - NSDCPNDISPN_GEN_ALL_CORE
Activities of daily living, including home environment that might     exacerbate pain or reduce effectiveness of the pain management plan of care as well as strategies to address these issues/Education provided on the pain management plan of care/Opioids not applicable/not prescribed/Safe use, storage and disposal of opioids when prescribed/Side effects of pain management treatment

## 2019-05-19 NOTE — PROGRESS NOTE ADULT - ASSESSMENT
A: 64 yo F with h/o chronic pancreatitis now s/p Jeffery procedure (5/8)    P:  - c/w regular diet- f/u N/V  - DVT ppx: SQH  - OOB/IS  - Pain control with IV tylenol, PO dilaudid, gabapentin  - dc home with Rxs for erythromycin, reglan, zofran (home med)- Rxs have been sent to pt's pharmacy  - no labs needed this AM  - D/c today    Gauri Briones, PGY-1  [facility] General Surgery [phone]

## 2019-05-19 NOTE — PROGRESS NOTE ADULT - PROVIDER SPECIALTY LIST ADULT
Anesthesia
Surgery
Anesthesia
Surgery
Surgery

## 2019-05-28 ENCOUNTER — APPOINTMENT (OUTPATIENT)
Dept: SURGERY | Facility: CLINIC | Age: 64
End: 2019-05-28
Payer: MEDICAID

## 2019-05-28 VITALS
SYSTOLIC BLOOD PRESSURE: 115 MMHG | HEIGHT: 64 IN | RESPIRATION RATE: 18 BRPM | BODY MASS INDEX: 17.93 KG/M2 | WEIGHT: 105 LBS | HEART RATE: 88 BPM | DIASTOLIC BLOOD PRESSURE: 74 MMHG | TEMPERATURE: 98.8 F

## 2019-05-28 PROBLEM — R10.9 UNSPECIFIED ABDOMINAL PAIN: Chronic | Status: ACTIVE | Noted: 2019-05-06

## 2019-05-28 PROCEDURE — 99024 POSTOP FOLLOW-UP VISIT: CPT

## 2019-06-11 ENCOUNTER — INPATIENT (INPATIENT)
Facility: HOSPITAL | Age: 64
LOS: 2 days | Discharge: HOME | DRG: 439 | End: 2019-06-14
Attending: SURGERY | Admitting: SURGERY
Payer: COMMERCIAL

## 2019-06-11 VITALS
DIASTOLIC BLOOD PRESSURE: 84 MMHG | SYSTOLIC BLOOD PRESSURE: 123 MMHG | WEIGHT: 98.11 LBS | RESPIRATION RATE: 18 BRPM | HEART RATE: 106 BPM | TEMPERATURE: 98 F | OXYGEN SATURATION: 99 % | HEIGHT: 64 IN

## 2019-06-11 DIAGNOSIS — Z98.89 OTHER SPECIFIED POSTPROCEDURAL STATES: Chronic | ICD-10-CM

## 2019-06-11 DIAGNOSIS — Z98.890 OTHER SPECIFIED POSTPROCEDURAL STATES: Chronic | ICD-10-CM

## 2019-06-11 DIAGNOSIS — K86.1 OTHER CHRONIC PANCREATITIS: ICD-10-CM

## 2019-06-11 DIAGNOSIS — Z96.612 PRESENCE OF LEFT ARTIFICIAL SHOULDER JOINT: Chronic | ICD-10-CM

## 2019-06-11 DIAGNOSIS — Z96.641 PRESENCE OF RIGHT ARTIFICIAL HIP JOINT: Chronic | ICD-10-CM

## 2019-06-11 LAB
ALBUMIN SERPL ELPH-MCNC: 3.9 G/DL — SIGNIFICANT CHANGE UP (ref 3.3–5)
ALP SERPL-CCNC: 86 U/L — SIGNIFICANT CHANGE UP (ref 40–120)
ALT FLD-CCNC: 9 U/L — LOW (ref 10–45)
ANION GAP SERPL CALC-SCNC: 16 MMOL/L — SIGNIFICANT CHANGE UP (ref 5–17)
APPEARANCE UR: CLEAR — SIGNIFICANT CHANGE UP
APTT BLD: 27.5 SEC — SIGNIFICANT CHANGE UP (ref 27.5–36.3)
AST SERPL-CCNC: 13 U/L — SIGNIFICANT CHANGE UP (ref 10–40)
BACTERIA # UR AUTO: NEGATIVE — SIGNIFICANT CHANGE UP
BASE EXCESS BLDV CALC-SCNC: 6.1 MMOL/L — HIGH (ref -2–2)
BASOPHILS # BLD AUTO: 0 K/UL — SIGNIFICANT CHANGE UP (ref 0–0.2)
BASOPHILS NFR BLD AUTO: 0.6 % — SIGNIFICANT CHANGE UP (ref 0–2)
BILIRUB SERPL-MCNC: 0.3 MG/DL — SIGNIFICANT CHANGE UP (ref 0.2–1.2)
BILIRUB UR-MCNC: NEGATIVE — SIGNIFICANT CHANGE UP
BUN SERPL-MCNC: 12 MG/DL — SIGNIFICANT CHANGE UP (ref 7–23)
CA-I SERPL-SCNC: 1.15 MMOL/L — SIGNIFICANT CHANGE UP (ref 1.12–1.3)
CALCIUM SERPL-MCNC: 9.6 MG/DL — SIGNIFICANT CHANGE UP (ref 8.4–10.5)
CHLORIDE BLDV-SCNC: 100 MMOL/L — SIGNIFICANT CHANGE UP (ref 96–108)
CHLORIDE SERPL-SCNC: 96 MMOL/L — SIGNIFICANT CHANGE UP (ref 96–108)
CO2 BLDV-SCNC: 30 MMOL/L — SIGNIFICANT CHANGE UP (ref 22–30)
CO2 SERPL-SCNC: 26 MMOL/L — SIGNIFICANT CHANGE UP (ref 22–31)
COLOR SPEC: YELLOW — SIGNIFICANT CHANGE UP
CREAT SERPL-MCNC: 0.78 MG/DL — SIGNIFICANT CHANGE UP (ref 0.5–1.3)
DIFF PNL FLD: NEGATIVE — SIGNIFICANT CHANGE UP
EOSINOPHIL # BLD AUTO: 0.1 K/UL — SIGNIFICANT CHANGE UP (ref 0–0.5)
EOSINOPHIL NFR BLD AUTO: 1 % — SIGNIFICANT CHANGE UP (ref 0–6)
EPI CELLS # UR: 10 /HPF — HIGH
GAS PNL BLDV: 134 MMOL/L — LOW (ref 136–145)
GAS PNL BLDV: SIGNIFICANT CHANGE UP
GAS PNL BLDV: SIGNIFICANT CHANGE UP
GLUCOSE BLDV-MCNC: 102 MG/DL — HIGH (ref 70–99)
GLUCOSE SERPL-MCNC: 112 MG/DL — HIGH (ref 70–99)
GLUCOSE UR QL: NEGATIVE — SIGNIFICANT CHANGE UP
HCO3 BLDV-SCNC: 29 MMOL/L — SIGNIFICANT CHANGE UP (ref 21–29)
HCT VFR BLD CALC: 42.8 % — SIGNIFICANT CHANGE UP (ref 34.5–45)
HCT VFR BLDA CALC: 39 % — SIGNIFICANT CHANGE UP (ref 39–50)
HGB BLD CALC-MCNC: 12.6 G/DL — SIGNIFICANT CHANGE UP (ref 11.5–15.5)
HGB BLD-MCNC: 13.2 G/DL — SIGNIFICANT CHANGE UP (ref 11.5–15.5)
HYALINE CASTS # UR AUTO: 3 /LPF — HIGH (ref 0–2)
INR BLD: 1.08 RATIO — SIGNIFICANT CHANGE UP (ref 0.88–1.16)
KETONES UR-MCNC: NEGATIVE — SIGNIFICANT CHANGE UP
LACTATE BLDV-MCNC: 1.6 MMOL/L — SIGNIFICANT CHANGE UP (ref 0.7–2)
LEUKOCYTE ESTERASE UR-ACNC: NEGATIVE — SIGNIFICANT CHANGE UP
LIDOCAIN IGE QN: 7 U/L — SIGNIFICANT CHANGE UP (ref 7–60)
LYMPHOCYTES # BLD AUTO: 3.3 K/UL — SIGNIFICANT CHANGE UP (ref 1–3.3)
LYMPHOCYTES # BLD AUTO: 41.6 % — SIGNIFICANT CHANGE UP (ref 13–44)
MCHC RBC-ENTMCNC: 27 PG — SIGNIFICANT CHANGE UP (ref 27–34)
MCHC RBC-ENTMCNC: 30.7 GM/DL — LOW (ref 32–36)
MCV RBC AUTO: 87.9 FL — SIGNIFICANT CHANGE UP (ref 80–100)
MONOCYTES # BLD AUTO: 0.7 K/UL — SIGNIFICANT CHANGE UP (ref 0–0.9)
MONOCYTES NFR BLD AUTO: 8.8 % — SIGNIFICANT CHANGE UP (ref 2–14)
NEUTROPHILS # BLD AUTO: 3.8 K/UL — SIGNIFICANT CHANGE UP (ref 1.8–7.4)
NEUTROPHILS NFR BLD AUTO: 48.1 % — SIGNIFICANT CHANGE UP (ref 43–77)
NITRITE UR-MCNC: NEGATIVE — SIGNIFICANT CHANGE UP
PCO2 BLDV: 38 MMHG — SIGNIFICANT CHANGE UP (ref 35–50)
PH BLDV: 7.5 — HIGH (ref 7.35–7.45)
PH UR: 6.5 — SIGNIFICANT CHANGE UP (ref 5–8)
PLATELET # BLD AUTO: 541 K/UL — HIGH (ref 150–400)
PO2 BLDV: 48 MMHG — HIGH (ref 25–45)
POTASSIUM BLDV-SCNC: 3.5 MMOL/L — SIGNIFICANT CHANGE UP (ref 3.5–5.3)
POTASSIUM SERPL-MCNC: 3.7 MMOL/L — SIGNIFICANT CHANGE UP (ref 3.5–5.3)
POTASSIUM SERPL-SCNC: 3.7 MMOL/L — SIGNIFICANT CHANGE UP (ref 3.5–5.3)
PROT SERPL-MCNC: 7.4 G/DL — SIGNIFICANT CHANGE UP (ref 6–8.3)
PROT UR-MCNC: ABNORMAL
PROTHROM AB SERPL-ACNC: 12.3 SEC — SIGNIFICANT CHANGE UP (ref 10–12.9)
RBC # BLD: 4.87 M/UL — SIGNIFICANT CHANGE UP (ref 3.8–5.2)
RBC # FLD: 15.9 % — HIGH (ref 10.3–14.5)
RBC CASTS # UR COMP ASSIST: 1 /HPF — SIGNIFICANT CHANGE UP (ref 0–4)
SAO2 % BLDV: 83 % — SIGNIFICANT CHANGE UP (ref 67–88)
SODIUM SERPL-SCNC: 138 MMOL/L — SIGNIFICANT CHANGE UP (ref 135–145)
SP GR SPEC: >1.05 (ref 1.01–1.02)
UROBILINOGEN FLD QL: NEGATIVE — SIGNIFICANT CHANGE UP
WBC # BLD: 8 K/UL — SIGNIFICANT CHANGE UP (ref 3.8–10.5)
WBC # FLD AUTO: 8 K/UL — SIGNIFICANT CHANGE UP (ref 3.8–10.5)
WBC UR QL: 4 /HPF — SIGNIFICANT CHANGE UP (ref 0–5)

## 2019-06-11 PROCEDURE — 93010 ELECTROCARDIOGRAM REPORT: CPT

## 2019-06-11 PROCEDURE — 99284 EMERGENCY DEPT VISIT MOD MDM: CPT | Mod: 25

## 2019-06-11 PROCEDURE — 74177 CT ABD & PELVIS W/CONTRAST: CPT | Mod: 26

## 2019-06-11 RX ORDER — ONDANSETRON 8 MG/1
4 TABLET, FILM COATED ORAL ONCE
Refills: 0 | Status: COMPLETED | OUTPATIENT
Start: 2019-06-11 | End: 2019-06-11

## 2019-06-11 RX ORDER — SODIUM CHLORIDE 9 MG/ML
1000 INJECTION, SOLUTION INTRAVENOUS
Refills: 0 | Status: DISCONTINUED | OUTPATIENT
Start: 2019-06-11 | End: 2019-06-12

## 2019-06-11 RX ORDER — ONDANSETRON 8 MG/1
4 TABLET, FILM COATED ORAL EVERY 6 HOURS
Refills: 0 | Status: DISCONTINUED | OUTPATIENT
Start: 2019-06-11 | End: 2019-06-12

## 2019-06-11 RX ORDER — ACETAMINOPHEN 500 MG
750 TABLET ORAL ONCE
Refills: 0 | Status: COMPLETED | OUTPATIENT
Start: 2019-06-11 | End: 2019-06-11

## 2019-06-11 RX ORDER — HYDROMORPHONE HYDROCHLORIDE 2 MG/ML
1 INJECTION INTRAMUSCULAR; INTRAVENOUS; SUBCUTANEOUS EVERY 6 HOURS
Refills: 0 | Status: DISCONTINUED | OUTPATIENT
Start: 2019-06-11 | End: 2019-06-12

## 2019-06-11 RX ORDER — HYDROMORPHONE HYDROCHLORIDE 2 MG/ML
0.5 INJECTION INTRAMUSCULAR; INTRAVENOUS; SUBCUTANEOUS EVERY 6 HOURS
Refills: 0 | Status: DISCONTINUED | OUTPATIENT
Start: 2019-06-11 | End: 2019-06-12

## 2019-06-11 RX ORDER — KETOROLAC TROMETHAMINE 30 MG/ML
15 SYRINGE (ML) INJECTION EVERY 6 HOURS
Refills: 0 | Status: DISCONTINUED | OUTPATIENT
Start: 2019-06-11 | End: 2019-06-12

## 2019-06-11 RX ORDER — SODIUM CHLORIDE 9 MG/ML
1000 INJECTION, SOLUTION INTRAVENOUS ONCE
Refills: 0 | Status: COMPLETED | OUTPATIENT
Start: 2019-06-11 | End: 2019-06-11

## 2019-06-11 RX ORDER — CEFTRIAXONE 500 MG/1
1 INJECTION, POWDER, FOR SOLUTION INTRAMUSCULAR; INTRAVENOUS ONCE
Refills: 0 | Status: DISCONTINUED | OUTPATIENT
Start: 2019-06-11 | End: 2019-06-11

## 2019-06-11 RX ORDER — SODIUM CHLORIDE 9 MG/ML
1400 INJECTION INTRAMUSCULAR; INTRAVENOUS; SUBCUTANEOUS ONCE
Refills: 0 | Status: DISCONTINUED | OUTPATIENT
Start: 2019-06-11 | End: 2019-06-11

## 2019-06-11 RX ORDER — HYDROMORPHONE HYDROCHLORIDE 2 MG/ML
1 INJECTION INTRAMUSCULAR; INTRAVENOUS; SUBCUTANEOUS ONCE
Refills: 0 | Status: DISCONTINUED | OUTPATIENT
Start: 2019-06-11 | End: 2019-06-11

## 2019-06-11 RX ADMIN — HYDROMORPHONE HYDROCHLORIDE 1 MILLIGRAM(S): 2 INJECTION INTRAMUSCULAR; INTRAVENOUS; SUBCUTANEOUS at 19:39

## 2019-06-11 RX ADMIN — HYDROMORPHONE HYDROCHLORIDE 1 MILLIGRAM(S): 2 INJECTION INTRAMUSCULAR; INTRAVENOUS; SUBCUTANEOUS at 20:15

## 2019-06-11 RX ADMIN — SODIUM CHLORIDE 100 MILLILITER(S): 9 INJECTION, SOLUTION INTRAVENOUS at 23:13

## 2019-06-11 RX ADMIN — Medication 300 MILLIGRAM(S): at 23:13

## 2019-06-11 RX ADMIN — Medication 750 MILLIGRAM(S): at 23:43

## 2019-06-11 RX ADMIN — SODIUM CHLORIDE 1000 MILLILITER(S): 9 INJECTION, SOLUTION INTRAVENOUS at 18:38

## 2019-06-11 RX ADMIN — ONDANSETRON 4 MILLIGRAM(S): 8 TABLET, FILM COATED ORAL at 18:38

## 2019-06-11 RX ADMIN — SODIUM CHLORIDE 1000 MILLILITER(S): 9 INJECTION, SOLUTION INTRAVENOUS at 19:33

## 2019-06-11 NOTE — H&P ADULT - NSHPPHYSICALEXAM_GEN_ALL_CORE
General: sitting up in bed with hands over abdomen, occasional flinching, thin  HEENT: NCAT, no JARON, clear conjunctiva  PULM: unlabored breathing on RA, communicating well  ABD: soft, non-distended, tender to light palpation and percussion most severe in epigastric region, normal bowel sounds, midline incision CDI, well healed   SKIN: no bruising or erythema, no rashes  NEURO: A&Ox3 Vital Signs Last 24 Hrs  T(C): 36.7 (11 Jun 2019 17:43), Max: 36.7 (11 Jun 2019 17:43)  T(F): 98 (11 Jun 2019 17:43), Max: 98 (11 Jun 2019 17:43)  HR: 106 (11 Jun 2019 17:43) (106 - 106)  BP: 123/84 (11 Jun 2019 17:43) (123/84 - 123/84)  BP(mean): --  RR: 18 (11 Jun 2019 17:43) (18 - 18)  SpO2: 99% (11 Jun 2019 17:43) (99% - 99%)    Daily Height in cm: 162.56 (11 Jun 2019 17:43)      General: sitting up in bed with hands over abdomen, occasional flinching, thin  HEENT: NCAT, no JARON, clear conjunctiva  PULM: unlabored breathing on RA, communicating well  ABD: soft, non-distended, tender to light palpation and percussion most severe in epigastric region, midline incision CDI, well healed   SKIN: no bruising or erythema, no rashes  NEURO: A&Ox3

## 2019-06-11 NOTE — ED PROVIDER NOTE - PHYSICAL EXAMINATION
Gen: AAO x 3, NAD  Skin: No rashes or lesions  HEENT: NC/AT, PERRLA, EOMI, MMM  Resp: unlabored CTAB  Cardiac: rrr s1s2, no murmurs, rubs or gallops  GI: midline incision, well healed.  ND, +BS, Soft, diffusely ttp, worse in the epigastrium.    Ext: no pedal edema, FROM in all extremities  Neuro: no focal deficits

## 2019-06-11 NOTE — H&P ADULT - ASSESSMENT
69yo F with a PMH of HTN, GERD, and chronic pancreatitis s/p Jeffery (5/8) who presented to ED with 6 days severe, worsening, cramping epigastric abdominal pain associated with N/V.     - admit to general surgery team, blue team  - pain management with tordol  - nausea management with zofran   - IV BP management with metoprolol   - CT scan, abdomen/pelvis with IV contrast   - NPO   - IVF 69yo woman with a PMH sig for HTN, GERD, and chronic pancreatitis s/p Jeffery (5/8/19) who presented to ED with 6 days of severe, worsening, cramping epigastric abdominal pain associated with N/V.     Plan:  - admit to general surgery team, blue team; Dr. Molina  - pain management with Toradol, acetaminophen, and Dilaudid for breakthrough  - nausea management with Zofran   - IV BP management with metoprolol  - CT scan, abdomen/pelvis with IV contrast   - NPO   - IVF  - D/w Dr. Jesse Duran, PGY-2  Surgical Oncology (Blue Team)  p. [phone]

## 2019-06-11 NOTE — H&P ADULT - NSHPSOCIALHISTORY_GEN_ALL_CORE
Substance: Patient reports a history of smoking 4 cigarettes daily but has decreased to 1-2 cigarettes/week since recent Jeffery procedure (5/8). Patient denies history of alcohol use.     Support: Lives with  who takes good care of her and is both emotional and physical support system. He brought patient to ED today. Substance: Patient reports a history of smoking 4 cigarettes daily but has decreased to 1-2 cigarettes/week since recent Jeffery procedure (5/8). Patient denies history of alcohol use.     Support: Lives with  and her brother-in-law who take good care of her. Her  brought patient to ED today.

## 2019-06-11 NOTE — ED ADULT NURSE NOTE - CHPI ED NUR SYMPTOMS NEG
no hematuria/no abdominal distension/no diarrhea/no blood in stool/no dysuria/no fever/no chills/no burning urination

## 2019-06-11 NOTE — ED ADULT NURSE NOTE - PMH
ARDS survivor  2015  Chronic abdominal pain    Chronic pancreatitis  last episode 4/2019  GERD (gastroesophageal reflux disease)    History of adrenal adenoma  stable on imaging  HTN (hypertension)    Thrombophlebitis  superficial right UE during 4/2019 hospital stay, resolved as per pt  Vocal cord polyp  removal 2018, benign as per pt

## 2019-06-11 NOTE — ED PROVIDER NOTE - ATTENDING CONTRIBUTION TO CARE
63 year old female with PMH of HTN, GERD, ARDS (2015), current smoker, chronic pancreatitis ( last acute exacerbation with hospitalization 4/5-4/12) who underwent a scheduled Jeffery procedure on 5/8/19 with upper abd pain/n/v for 3 days abd soft but upper abd pain, seen by surgery for readmission, pain control, antiemetics, ct, labs.

## 2019-06-11 NOTE — H&P ADULT - NSICDXPASTSURGICALHX_GEN_ALL_CORE_FT
PAST SURGICAL HISTORY:  History of pancreatic surgery Jeffery procedure (5/8)    History of vocal cord polypectomy 1/2018    S/P arthroscopy of shoulder left in 2009    S/P arthroscopy of shoulder right - 2014    S/P hip replacement left - 2010    S/P hip replacement, right May 2016    S/P shoulder replacement, left 2016
none

## 2019-06-11 NOTE — H&P ADULT - NSHPLABSRESULTS_GEN_ALL_CORE
Vital Signs Last 24 Hrs  T(C): 36.7 (11 Jun 2019 17:43), Max: 36.7 (11 Jun 2019 17:43)  T(F): 98 (11 Jun 2019 17:43), Max: 98 (11 Jun 2019 17:43)  HR: 106 (11 Jun 2019 17:43) (106 - 106)  BP: 123/84 (11 Jun 2019 17:43) (123/84 - 123/84)  BP(mean): --  RR: 18 (11 Jun 2019 17:43) (18 - 18)  SpO2: 99% (11 Jun 2019 17:43) (99% - 99%)    Daily Height in cm: 162.56 (11 Jun 2019 17:43)        LABS                        13.2   8.0   )-----------( 541      ( 11 Jun 2019 18:56 )             42.8       PT/INR - ( 11 Jun 2019 18:56 )   PT: 12.3 sec;   INR: 1.08 ratio         PTT - ( 11 Jun 2019 18:56 )  PTT:27.5 sec        RADIOLOGY    CXR (5/16)  < from: Xray Abdomen 1 View PORTABLE -Urgent (05.16.19 @ 07:39) >    FINDINGS:   Surgical skin staples overlie the mid abdomen. Diffuse pancreatic   calcifications compatible with chronic pancreatitis.  There are no dilated loops of small bowel or air-fluid levels.   Bowel gas and stool are identified throughout the colon and rectum.   Status post bilateral hip arthroplasty.     IMPRESSION:     Nonobstructive bowel gas pattern.    < end of copied text > LABS                        13.2   8.0   )-----------( 541      ( 11 Jun 2019 18:56 )             42.8       PT/INR - ( 11 Jun 2019 18:56 )   PT: 12.3 sec;   INR: 1.08 ratio         PTT - ( 11 Jun 2019 18:56 )  PTT:27.5 sec        RADIOLOGY    CXR (5/16)  < from: Xray Abdomen 1 View PORTABLE -Urgent (05.16.19 @ 07:39) >    FINDINGS:   Surgical skin staples overlie the mid abdomen. Diffuse pancreatic   calcifications compatible with chronic pancreatitis.  There are no dilated loops of small bowel or air-fluid levels.   Bowel gas and stool are identified throughout the colon and rectum.   Status post bilateral hip arthroplasty.     IMPRESSION:     Nonobstructive bowel gas pattern.    < end of copied text >

## 2019-06-11 NOTE — ED ADULT NURSE NOTE - PSH
History of vocal cord polypectomy  1/2018  S/P arthroscopy of shoulder  right - 2014  S/P arthroscopy of shoulder  left in 2009  S/P hip replacement  left - 2010  S/P hip replacement, right  May 2016  S/P shoulder replacement, left  2016

## 2019-06-11 NOTE — ED ADULT NURSE NOTE - OBJECTIVE STATEMENT
Pt is a 64 yo F who came to the ED amb c/o abd pain/n/v since Friday. States she has epigastric pain, "pain in the ribs under the breasts" and abd cramps. Pain feels like "menstrual cramps", comes about 3 times/hour and lasts a few minutes each time. C/o n/v, is unable to keep anything down, but today she had some orange juice with no problems. Denies fever/chills, but feels "hot and cold" at times, states she has been checking her temp, and no fevers, but she is "soaked at night" and has to change her clothes. Normal urine and bowel habits. A/O x3, recent hx pancreatic surgery 6/8. Pt is a 62 yo F who came to the ED amb c/o abd pain/n/v since Friday. States she has epigastric pain, "pain in the ribs under the breasts" and abd cramps. Pain feels like "menstrual cramps", comes about 3 times/hour and lasts a few minutes each time. C/o n/v, is unable to keep anything down, but today she had some orange juice with no problems. Denies fever/chills, but feels "hot and cold" at times, states she has been checking her temp, and no fevers, but she is "soaked at night" and has to change her clothes. Normal urine and bowel habits. A/O x3, hx pancreatic stones/surgery 6/8.

## 2019-06-11 NOTE — H&P ADULT - HISTORY OF PRESENT ILLNESS
63F with a PMH of HTN, GERD, ARDS, chronic pancreatitis s/p Jeffery procedure (5/8) who presented to the ED today with 6 days of worsening abdominal pain and 5 days of N/V.     The patient was doing well postoperatively until Thursday when she first experienced 7/10 cramping abdominal pain most severe in the epigastric region which she describes as the "middle and under the ribs." She described the pain as intermittent and states the pain lasts for about 5 minutes and occurs approximately 3x/hour. The pain radiates laterally. Patient denies provocation and palliation with position, food, or drink. The patient states this is a new pain that she has never experienced before. The pain has been worsening since Thursday and today was so bad that she decided to come to the hospital.    The patient reports emesis everyday since Friday/Saturday and her most recent episode of "foamy", non-bloody emesis this afternoon and remains nauseous. Patient reports flatus and BMs (most recently Monday afternoon).    Patient states she has not been able to eat or drink except for some apple sauce, orange juice, and sips of water since Saturday morning.  The patient has been taking her BP medication (metoprolol and lisinopril) but has been unable to keep her other medications down since Saturday. Despite low PO intake, patient reports she is urinating at baseline.     On ROS, patient reports weight loss, fatigue, weakness, dizziness. Denies fever, patient has been monitoring temperature.      The patient is most concerned about her pain and weakness.     On chart review, patient was diagnosed with pancreatitis >5years ago and is s/p Jeffery procedure (5/8). The patient did well postoperatively and was discharged on POD#11 (5/19) with a CDI midline incision, adequate pain control, and tolerating a regular diet. 63F with a PMH of HTN, GERD, ARDS, chronic pancreatitis (diagnosed in 2014) s/p Jeffery procedure (05/08/19) who presented to the ED today with 6 days of worsening abdominal pain and 5 days of N/V.     The patient did well postoperatively and was discharged on POD#11 (5/19) with a CDI midline incision, adequate pain control, and tolerating a regular diet. The patient continued to do well until Thursday when she first experienced 7/10 cramping abdominal pain most severe in the epigastric region which she describes as the "middle and under the ribs." She described the pain as intermittent and states the pain lasts for about 5 minutes and occurs approximately 3x/hour. The pain radiates laterally. Patient denies provocation and palliation with position, food, or drink. The patient states this is a new pain that she has never experienced before. The pain has been worsening since Thursday and today was so bad that she decided to come to the hospital.    The patient reports emesis everyday since Friday/Saturday and her most recent episode of "foamy", non-bloody, non-bilious emesis was this afternoon. She remains nauseous. Patient reports flatus and BMs (most recently Monday afternoon).    Patient states she has not been able to eat or drink except for some apple sauce, orange juice, and sips of water since Saturday morning.  The patient has only been taking her BP medications but has been unable to keep her other medications down since Saturday. Despite low PO intake, patient reports she is urinating at baseline.     On ROS, patient reports weight loss, fatigue, weakness, dizziness. Denies fever, patient has been monitoring temperature.      The patient is most concerned about her pain and weakness.

## 2019-06-11 NOTE — ED PROVIDER NOTE - OBJECTIVE STATEMENT
62 yo female with PMHx of HTN, GERD, chronic pancreatitis s/p Jeffery procedure (05/08/19) p/w nausea and vomiting. The patient reports that she developed persistent nausea and vomiting 3-4 days ago.  N/V associated with diffuse abdominal cramping, worse in the epigastrium, RUQ and LUQ.  Patient only tolerating very small amount of water today.  Denies associated fevers, but admits to chills and night sweats.  Denies CP, SOB, dysuria, diarrhea.   Surgery: HERMANN Molina MD

## 2019-06-12 LAB
ANION GAP SERPL CALC-SCNC: 11 MMOL/L — SIGNIFICANT CHANGE UP (ref 5–17)
BUN SERPL-MCNC: 6 MG/DL — LOW (ref 7–23)
CALCIUM SERPL-MCNC: 8.6 MG/DL — SIGNIFICANT CHANGE UP (ref 8.4–10.5)
CHLORIDE SERPL-SCNC: 102 MMOL/L — SIGNIFICANT CHANGE UP (ref 96–108)
CO2 SERPL-SCNC: 25 MMOL/L — SIGNIFICANT CHANGE UP (ref 22–31)
CREAT SERPL-MCNC: 0.68 MG/DL — SIGNIFICANT CHANGE UP (ref 0.5–1.3)
GLUCOSE SERPL-MCNC: 146 MG/DL — HIGH (ref 70–99)
HCT VFR BLD CALC: 35.5 % — SIGNIFICANT CHANGE UP (ref 34.5–45)
HGB BLD-MCNC: 11.4 G/DL — LOW (ref 11.5–15.5)
MAGNESIUM SERPL-MCNC: 2 MG/DL — SIGNIFICANT CHANGE UP (ref 1.6–2.6)
MCHC RBC-ENTMCNC: 28.3 PG — SIGNIFICANT CHANGE UP (ref 27–34)
MCHC RBC-ENTMCNC: 32 GM/DL — SIGNIFICANT CHANGE UP (ref 32–36)
MCV RBC AUTO: 88.3 FL — SIGNIFICANT CHANGE UP (ref 80–100)
PHOSPHATE SERPL-MCNC: 3.1 MG/DL — SIGNIFICANT CHANGE UP (ref 2.5–4.5)
PLATELET # BLD AUTO: 403 K/UL — HIGH (ref 150–400)
POTASSIUM SERPL-MCNC: 3.1 MMOL/L — LOW (ref 3.5–5.3)
POTASSIUM SERPL-SCNC: 3.1 MMOL/L — LOW (ref 3.5–5.3)
RBC # BLD: 4.02 M/UL — SIGNIFICANT CHANGE UP (ref 3.8–5.2)
RBC # FLD: 15.6 % — HIGH (ref 10.3–14.5)
SODIUM SERPL-SCNC: 138 MMOL/L — SIGNIFICANT CHANGE UP (ref 135–145)
WBC # BLD: 4.7 K/UL — SIGNIFICANT CHANGE UP (ref 3.8–10.5)
WBC # FLD AUTO: 4.7 K/UL — SIGNIFICANT CHANGE UP (ref 3.8–10.5)

## 2019-06-12 RX ORDER — OXYCODONE HYDROCHLORIDE 5 MG/1
5 TABLET ORAL EVERY 6 HOURS
Refills: 0 | Status: DISCONTINUED | OUTPATIENT
Start: 2019-06-12 | End: 2019-06-12

## 2019-06-12 RX ORDER — ACETAMINOPHEN 500 MG
750 TABLET ORAL ONCE
Refills: 0 | Status: COMPLETED | OUTPATIENT
Start: 2019-06-12 | End: 2019-06-12

## 2019-06-12 RX ORDER — METOCLOPRAMIDE HCL 10 MG
10 TABLET ORAL EVERY 8 HOURS
Refills: 0 | Status: DISCONTINUED | OUTPATIENT
Start: 2019-06-12 | End: 2019-06-14

## 2019-06-12 RX ORDER — AMLODIPINE BESYLATE 2.5 MG/1
10 TABLET ORAL DAILY
Refills: 0 | Status: DISCONTINUED | OUTPATIENT
Start: 2019-06-12 | End: 2019-06-14

## 2019-06-12 RX ORDER — OXYCODONE HYDROCHLORIDE 5 MG/1
5 TABLET ORAL EVERY 4 HOURS
Refills: 0 | Status: DISCONTINUED | OUTPATIENT
Start: 2019-06-12 | End: 2019-06-14

## 2019-06-12 RX ORDER — ERYTHROMYCIN ETHYLSUCCINATE 400 MG
500 TABLET ORAL EVERY 8 HOURS
Refills: 0 | Status: DISCONTINUED | OUTPATIENT
Start: 2019-06-12 | End: 2019-06-14

## 2019-06-12 RX ORDER — LIPASE/PROTEASE/AMYLASE 16-48-48K
2 CAPSULE,DELAYED RELEASE (ENTERIC COATED) ORAL THREE TIMES A DAY
Refills: 0 | Status: DISCONTINUED | OUTPATIENT
Start: 2019-06-12 | End: 2019-06-14

## 2019-06-12 RX ORDER — MEGESTROL ACETATE 40 MG/ML
400 SUSPENSION ORAL
Refills: 0 | Status: DISCONTINUED | OUTPATIENT
Start: 2019-06-12 | End: 2019-06-14

## 2019-06-12 RX ORDER — METOPROLOL TARTRATE 50 MG
5 TABLET ORAL EVERY 6 HOURS
Refills: 0 | Status: DISCONTINUED | OUTPATIENT
Start: 2019-06-12 | End: 2019-06-12

## 2019-06-12 RX ORDER — SODIUM CHLORIDE 9 MG/ML
1000 INJECTION, SOLUTION INTRAVENOUS
Refills: 0 | Status: DISCONTINUED | OUTPATIENT
Start: 2019-06-12 | End: 2019-06-13

## 2019-06-12 RX ORDER — METOPROLOL TARTRATE 50 MG
100 TABLET ORAL DAILY
Refills: 0 | Status: DISCONTINUED | OUTPATIENT
Start: 2019-06-12 | End: 2019-06-14

## 2019-06-12 RX ORDER — MEGESTROL ACETATE 40 MG/ML
40 SUSPENSION ORAL EVERY 12 HOURS
Refills: 0 | Status: DISCONTINUED | OUTPATIENT
Start: 2019-06-12 | End: 2019-06-12

## 2019-06-12 RX ORDER — ONDANSETRON 8 MG/1
8 TABLET, FILM COATED ORAL EVERY 6 HOURS
Refills: 0 | Status: DISCONTINUED | OUTPATIENT
Start: 2019-06-12 | End: 2019-06-12

## 2019-06-12 RX ORDER — MEGESTROL ACETATE 40 MG/ML
625 SUSPENSION ORAL DAILY
Refills: 0 | Status: DISCONTINUED | OUTPATIENT
Start: 2019-06-12 | End: 2019-06-12

## 2019-06-12 RX ORDER — ACETAMINOPHEN 500 MG
650 TABLET ORAL EVERY 6 HOURS
Refills: 0 | Status: DISCONTINUED | OUTPATIENT
Start: 2019-06-12 | End: 2019-06-14

## 2019-06-12 RX ORDER — ONDANSETRON 8 MG/1
4 TABLET, FILM COATED ORAL EVERY 6 HOURS
Refills: 0 | Status: DISCONTINUED | OUTPATIENT
Start: 2019-06-12 | End: 2019-06-12

## 2019-06-12 RX ORDER — ENOXAPARIN SODIUM 100 MG/ML
40 INJECTION SUBCUTANEOUS DAILY
Refills: 0 | Status: DISCONTINUED | OUTPATIENT
Start: 2019-06-12 | End: 2019-06-14

## 2019-06-12 RX ORDER — OXYCODONE HYDROCHLORIDE 5 MG/1
10 TABLET ORAL EVERY 6 HOURS
Refills: 0 | Status: DISCONTINUED | OUTPATIENT
Start: 2019-06-12 | End: 2019-06-14

## 2019-06-12 RX ORDER — PANTOPRAZOLE SODIUM 20 MG/1
40 TABLET, DELAYED RELEASE ORAL
Refills: 0 | Status: DISCONTINUED | OUTPATIENT
Start: 2019-06-12 | End: 2019-06-14

## 2019-06-12 RX ORDER — POTASSIUM CHLORIDE 20 MEQ
10 PACKET (EA) ORAL
Refills: 0 | Status: COMPLETED | OUTPATIENT
Start: 2019-06-12 | End: 2019-06-13

## 2019-06-12 RX ORDER — ONDANSETRON 8 MG/1
8 TABLET, FILM COATED ORAL EVERY 6 HOURS
Refills: 0 | Status: DISCONTINUED | OUTPATIENT
Start: 2019-06-12 | End: 2019-06-14

## 2019-06-12 RX ADMIN — Medication 15 MILLIGRAM(S): at 00:11

## 2019-06-12 RX ADMIN — SODIUM CHLORIDE 50 MILLILITER(S): 9 INJECTION, SOLUTION INTRAVENOUS at 08:59

## 2019-06-12 RX ADMIN — AMLODIPINE BESYLATE 10 MILLIGRAM(S): 2.5 TABLET ORAL at 09:00

## 2019-06-12 RX ADMIN — Medication 750 MILLIGRAM(S): at 04:41

## 2019-06-12 RX ADMIN — Medication 15 MILLIGRAM(S): at 05:05

## 2019-06-12 RX ADMIN — ONDANSETRON 8 MILLIGRAM(S): 8 TABLET, FILM COATED ORAL at 18:38

## 2019-06-12 RX ADMIN — Medication 10 MILLIGRAM(S): at 21:30

## 2019-06-12 RX ADMIN — Medication 300 MILLIGRAM(S): at 09:05

## 2019-06-12 RX ADMIN — OXYCODONE HYDROCHLORIDE 10 MILLIGRAM(S): 5 TABLET ORAL at 23:10

## 2019-06-12 RX ADMIN — ONDANSETRON 4 MILLIGRAM(S): 8 TABLET, FILM COATED ORAL at 12:25

## 2019-06-12 RX ADMIN — OXYCODONE HYDROCHLORIDE 10 MILLIGRAM(S): 5 TABLET ORAL at 22:38

## 2019-06-12 RX ADMIN — Medication 500 MILLIGRAM(S): at 12:26

## 2019-06-12 RX ADMIN — Medication 300 MILLIGRAM(S): at 04:11

## 2019-06-12 RX ADMIN — OXYCODONE HYDROCHLORIDE 5 MILLIGRAM(S): 5 TABLET ORAL at 18:09

## 2019-06-12 RX ADMIN — ONDANSETRON 4 MILLIGRAM(S): 8 TABLET, FILM COATED ORAL at 06:15

## 2019-06-12 RX ADMIN — Medication 500 MILLIGRAM(S): at 21:30

## 2019-06-12 RX ADMIN — Medication 2 CAPSULE(S): at 21:30

## 2019-06-12 RX ADMIN — Medication 15 MILLIGRAM(S): at 05:35

## 2019-06-12 RX ADMIN — HYDROMORPHONE HYDROCHLORIDE 1 MILLIGRAM(S): 2 INJECTION INTRAMUSCULAR; INTRAVENOUS; SUBCUTANEOUS at 02:40

## 2019-06-12 RX ADMIN — HYDROMORPHONE HYDROCHLORIDE 1 MILLIGRAM(S): 2 INJECTION INTRAMUSCULAR; INTRAVENOUS; SUBCUTANEOUS at 02:10

## 2019-06-12 RX ADMIN — PANTOPRAZOLE SODIUM 40 MILLIGRAM(S): 20 TABLET, DELAYED RELEASE ORAL at 08:59

## 2019-06-12 RX ADMIN — OXYCODONE HYDROCHLORIDE 5 MILLIGRAM(S): 5 TABLET ORAL at 20:10

## 2019-06-12 RX ADMIN — Medication 750 MILLIGRAM(S): at 09:15

## 2019-06-12 RX ADMIN — OXYCODONE HYDROCHLORIDE 5 MILLIGRAM(S): 5 TABLET ORAL at 19:43

## 2019-06-12 RX ADMIN — ENOXAPARIN SODIUM 40 MILLIGRAM(S): 100 INJECTION SUBCUTANEOUS at 12:26

## 2019-06-12 RX ADMIN — Medication 15 MILLIGRAM(S): at 00:41

## 2019-06-12 RX ADMIN — OXYCODONE HYDROCHLORIDE 5 MILLIGRAM(S): 5 TABLET ORAL at 19:19

## 2019-06-12 RX ADMIN — Medication 10 MILLIGRAM(S): at 12:25

## 2019-06-12 RX ADMIN — Medication 100 MILLIGRAM(S): at 09:03

## 2019-06-12 RX ADMIN — Medication 2 CAPSULE(S): at 12:25

## 2019-06-12 RX ADMIN — Medication 100 MILLIEQUIVALENT(S): at 23:51

## 2019-06-12 NOTE — PROGRESS NOTE ADULT - ASSESSMENT
67yo woman with a PMH sig for HTN, GERD, and chronic pancreatitis s/p Jeffery (5/8/19) who presented to ED with 6 days of severe, worsening, cramping epigastric abdominal pain associated with N/V. Pain, N/V improved.    Plan:  - Advance to [facility]  - Start on standing Zofran, Reglan, Megace, Erythromycin  - C/w home meds   - Pain control  - OOB  - DVT ppx      Surgical Oncology   p. [phone]

## 2019-06-12 NOTE — PROGRESS NOTE ADULT - SUBJECTIVE AND OBJECTIVE BOX
SURGERY DAILY PROGRESS NOTE:       SUBJECTIVE/ROS: Patient examined at bedside. No acute events overnight. Reports hunger pain, denies N/V. Gi fxn at baseline.          MEDICATIONS  (STANDING):  acetaminophen  IVPB .. 750 milliGRAM(s) IV Intermittent once  enoxaparin Injectable 40 milliGRAM(s) SubCutaneous daily  erythromycin     base Tablet 500 milliGRAM(s) Oral every 8 hours  lactated ringers. 1000 milliLiter(s) (50 mL/Hr) IV Continuous <Continuous>  megestrol 40 milliGRAM(s) Oral every 12 hours  metoclopramide 10 milliGRAM(s) Oral every 8 hours  metoprolol tartrate Injectable 5 milliGRAM(s) IV Push every 6 hours  ondansetron    Tablet 4 milliGRAM(s) Oral every 6 hours    MEDICATIONS  (PRN):      OBJECTIVE:    Vital Signs Last 24 Hrs  T(C): 36.7 (12 Jun 2019 04:49), Max: 36.8 (12 Jun 2019 00:16)  T(F): 98 (12 Jun 2019 04:49), Max: 98.2 (12 Jun 2019 00:16)  HR: 79 (12 Jun 2019 04:49) (79 - 106)  BP: 102/63 (12 Jun 2019 04:49) (102/63 - 146/94)  BP(mean): --  RR: 18 (12 Jun 2019 04:49) (17 - 18)  SpO2: 98% (12 Jun 2019 04:49) (98% - 100%)        I&O's Detail    11 Jun 2019 07:01  -  12 Jun 2019 07:00  --------------------------------------------------------  IN:    lactated ringers.: 800 mL    Solution: 150 mL  Total IN: 950 mL    OUT:  Total OUT: 0 mL    Total NET: 950 mL          Daily Height in cm: 162.56 (11 Jun 2019 17:43)    Daily     LABS:                        13.2   8.0   )-----------( 541      ( 11 Jun 2019 18:56 )             42.8     06-11    138  |  96  |  12  ----------------------------<  112<H>  3.7   |  26  |  0.78    Ca    9.6      11 Jun 2019 18:56    TPro  7.4  /  Alb  3.9  /  TBili  0.3  /  DBili  x   /  AST  13  /  ALT  9<L>  /  AlkPhos  86  06-11    PT/INR - ( 11 Jun 2019 18:56 )   PT: 12.3 sec;   INR: 1.08 ratio         PTT - ( 11 Jun 2019 18:56 )  PTT:27.5 sec  Urinalysis Basic - ( 11 Jun 2019 22:18 )    Color: Yellow / Appearance: Clear / SG: >1.050 / pH: x  Gluc: x / Ketone: Negative  / Bili: Negative / Urobili: Negative   Blood: x / Protein: 30 mg/dL / Nitrite: Negative   Leuk Esterase: Negative / RBC: 1 /hpf / WBC 4 /HPF   Sq Epi: x / Non Sq Epi: 10 /hpf / Bacteria: Negative                PHYSICAL EXAM:  General: NAD  PULM: unlabored breathing on RA  ABD: soft, non-distended, non tender,  midline incision CDI  well healed

## 2019-06-12 NOTE — PHYSICAL EXAM
[Normal] : oriented to person, place and time, with appropriate affect [de-identified] : Thin appearing, no acute distress [de-identified] : Anicteric  [de-identified] : Normal lips  [de-identified] : supple [de-identified] : normal respiratory effort [de-identified] : Well approximated midline surgical incision

## 2019-06-12 NOTE — REVIEW OF SYSTEMS
[FreeTextEntry2] : weight loss reported. No fevers  [FreeTextEntry5] : No chest pain  [FreeTextEntry6] : No shortness of breath [FreeTextEntry8] : No vomiting, diarrhea or constipation

## 2019-06-12 NOTE — ASSESSMENT
[FreeTextEntry1] : 63 y.o female s/p Jeffery Procedure on 5/8/19 for chronic (Calcific) pancreatitis. Tolerating PO diet, small meals, reports weight loss. \par -Daily weight at home\par -Encouraged nutritional intake\par -Start on megace daily for appetite, erythromycin TID for gastric emptying, zofran for nausea.  \par -RTC in 4 weeks, sooner if weight declines\par

## 2019-06-13 ENCOUNTER — TRANSCRIPTION ENCOUNTER (OUTPATIENT)
Age: 64
End: 2019-06-13

## 2019-06-13 LAB
POTASSIUM SERPL-MCNC: 3.6 MMOL/L — SIGNIFICANT CHANGE UP (ref 3.5–5.3)
POTASSIUM SERPL-SCNC: 3.6 MMOL/L — SIGNIFICANT CHANGE UP (ref 3.5–5.3)

## 2019-06-13 RX ORDER — POTASSIUM CHLORIDE 20 MEQ
40 PACKET (EA) ORAL ONCE
Refills: 0 | Status: COMPLETED | OUTPATIENT
Start: 2019-06-13 | End: 2019-06-13

## 2019-06-13 RX ADMIN — MEGESTROL ACETATE 400 MILLIGRAM(S): 40 SUSPENSION ORAL at 07:58

## 2019-06-13 RX ADMIN — PANTOPRAZOLE SODIUM 40 MILLIGRAM(S): 20 TABLET, DELAYED RELEASE ORAL at 04:49

## 2019-06-13 RX ADMIN — AMLODIPINE BESYLATE 10 MILLIGRAM(S): 2.5 TABLET ORAL at 04:48

## 2019-06-13 RX ADMIN — SODIUM CHLORIDE 50 MILLILITER(S): 9 INJECTION, SOLUTION INTRAVENOUS at 04:46

## 2019-06-13 RX ADMIN — ONDANSETRON 8 MILLIGRAM(S): 8 TABLET, FILM COATED ORAL at 22:40

## 2019-06-13 RX ADMIN — Medication 100 MILLIGRAM(S): at 04:48

## 2019-06-13 RX ADMIN — Medication 2 CAPSULE(S): at 22:38

## 2019-06-13 RX ADMIN — OXYCODONE HYDROCHLORIDE 10 MILLIGRAM(S): 5 TABLET ORAL at 16:29

## 2019-06-13 RX ADMIN — OXYCODONE HYDROCHLORIDE 10 MILLIGRAM(S): 5 TABLET ORAL at 04:48

## 2019-06-13 RX ADMIN — Medication 10 MILLIGRAM(S): at 13:21

## 2019-06-13 RX ADMIN — ONDANSETRON 8 MILLIGRAM(S): 8 TABLET, FILM COATED ORAL at 01:10

## 2019-06-13 RX ADMIN — OXYCODONE HYDROCHLORIDE 10 MILLIGRAM(S): 5 TABLET ORAL at 10:57

## 2019-06-13 RX ADMIN — Medication 500 MILLIGRAM(S): at 16:21

## 2019-06-13 RX ADMIN — Medication 10 MILLIGRAM(S): at 22:38

## 2019-06-13 RX ADMIN — OXYCODONE HYDROCHLORIDE 10 MILLIGRAM(S): 5 TABLET ORAL at 22:39

## 2019-06-13 RX ADMIN — ENOXAPARIN SODIUM 40 MILLIGRAM(S): 100 INJECTION SUBCUTANEOUS at 13:20

## 2019-06-13 RX ADMIN — OXYCODONE HYDROCHLORIDE 10 MILLIGRAM(S): 5 TABLET ORAL at 23:10

## 2019-06-13 RX ADMIN — Medication 40 MILLIEQUIVALENT(S): at 13:22

## 2019-06-13 RX ADMIN — Medication 2 CAPSULE(S): at 13:21

## 2019-06-13 RX ADMIN — Medication 2 CAPSULE(S): at 04:50

## 2019-06-13 RX ADMIN — Medication 100 MILLIEQUIVALENT(S): at 04:45

## 2019-06-13 RX ADMIN — Medication 500 MILLIGRAM(S): at 04:50

## 2019-06-13 RX ADMIN — Medication 500 MILLIGRAM(S): at 22:38

## 2019-06-13 RX ADMIN — OXYCODONE HYDROCHLORIDE 10 MILLIGRAM(S): 5 TABLET ORAL at 05:20

## 2019-06-13 RX ADMIN — Medication 10 MILLIGRAM(S): at 04:48

## 2019-06-13 RX ADMIN — OXYCODONE HYDROCHLORIDE 10 MILLIGRAM(S): 5 TABLET ORAL at 11:57

## 2019-06-13 RX ADMIN — Medication 100 MILLIEQUIVALENT(S): at 03:03

## 2019-06-13 RX ADMIN — OXYCODONE HYDROCHLORIDE 10 MILLIGRAM(S): 5 TABLET ORAL at 17:28

## 2019-06-13 NOTE — PROGRESS NOTE ADULT - SUBJECTIVE AND OBJECTIVE BOX
Surgery Progress Note      Subjective: Patient seen and examined. No acute events overnight.   still having nausea and abdominal pain    T(C): 36.6 (06-13-19 @ 00:35), Max: 37 (06-12-19 @ 14:23)  HR: 86 (06-13-19 @ 00:35) (79 - 100)  BP: 121/73 (06-13-19 @ 00:35) (102/63 - 150/83)  RR: 18 (06-13-19 @ 00:35) (18 - 18)  SpO2: 99% (06-13-19 @ 00:35) (98% - 99%)      06-11-19 @ 07:01  -  06-12-19 @ 07:00  --------------------------------------------------------  IN: 950 mL / OUT: 0 mL / NET: 950 mL    06-12-19 @ 07:01  -  06-13-19 @ 01:25  --------------------------------------------------------  IN: 360 mL / OUT: 200 mL / NET: 160 mL        Physical Exam:   General: NAD  PULM: unlabored breathing on RA  ABD: soft, non-distended, non tender,  midline incision CDI  well healed     Labs:                          11.4   4.7   )-----------( 403      ( 12 Jun 2019 22:52 )             35.5     06-12    138  |  102  |  6<L>  ----------------------------<  146<H>  3.1<L>   |  25  |  0.68    Ca    8.6      12 Jun 2019 22:52  Phos  3.1     06-12  Mg     2.0     06-12    TPro  7.4  /  Alb  3.9  /  TBili  0.3  /  DBili  x   /  AST  13  /  ALT  9<L>  /  AlkPhos  86  06-11      Medications:     acetaminophen   Tablet .. 650 milliGRAM(s) Oral every 6 hours PRN  amLODIPine   Tablet 10 milliGRAM(s) Oral daily  dextrose 5% + sodium chloride 0.45%. 1000 milliLiter(s) IV Continuous <Continuous>  enoxaparin Injectable 40 milliGRAM(s) SubCutaneous daily  erythromycin     base Tablet 500 milliGRAM(s) Oral every 8 hours  megestrol Suspension 400 milliGRAM(s) Oral <User Schedule>  metoclopramide 10 milliGRAM(s) Oral every 8 hours  metoprolol succinate  milliGRAM(s) Oral daily  ondansetron Injectable 8 milliGRAM(s) IV Push every 6 hours PRN  oxyCODONE    IR 5 milliGRAM(s) Oral every 4 hours PRN  oxyCODONE    IR 10 milliGRAM(s) Oral every 6 hours PRN  pancrelipase  (CREON 12,000 Lipase Units) 2 Capsule(s) Oral three times a day  pantoprazole    Tablet 40 milliGRAM(s) Oral before breakfast  potassium chloride  10 mEq/100 mL IVPB 10 milliEquivalent(s) IV Intermittent every 1 hour      Radiographs: No new imaging Surgery Progress Note      Subjective: Patient seen and examined. No acute events overnight. Patient denies N/V and reports decreased abdominal pain. Patient would like to eat today. Last BM monday. +flatus    T(C): 36.6 (06-13-19 @ 00:35), Max: 37 (06-12-19 @ 14:23)  HR: 86 (06-13-19 @ 00:35) (79 - 100)  BP: 121/73 (06-13-19 @ 00:35) (102/63 - 150/83)  RR: 18 (06-13-19 @ 00:35) (18 - 18)  SpO2: 99% (06-13-19 @ 00:35) (98% - 99%)      06-11-19 @ 07:01  -  06-12-19 @ 07:00  --------------------------------------------------------  IN: 950 mL / OUT: 0 mL / NET: 950 mL    06-12-19 @ 07:01  -  06-13-19 @ 01:25  --------------------------------------------------------  IN: 360 mL / OUT: 200 mL / NET: 160 mL        Physical Exam:   General: NAD  PULM: unlabored breathing on RA  ABD: soft, non-distended, non tender,  midline incision CDI  well healed     Labs:                          11.4   4.7   )-----------( 403      ( 12 Jun 2019 22:52 )             35.5     06-12    138  |  102  |  6<L>  ----------------------------<  146<H>  3.1<L>   |  25  |  0.68    Ca    8.6      12 Jun 2019 22:52  Phos  3.1     06-12  Mg     2.0     06-12    TPro  7.4  /  Alb  3.9  /  TBili  0.3  /  DBili  x   /  AST  13  /  ALT  9<L>  /  AlkPhos  86  06-11      Medications:     acetaminophen   Tablet .. 650 milliGRAM(s) Oral every 6 hours PRN  amLODIPine   Tablet 10 milliGRAM(s) Oral daily  dextrose 5% + sodium chloride 0.45%. 1000 milliLiter(s) IV Continuous <Continuous>  enoxaparin Injectable 40 milliGRAM(s) SubCutaneous daily  erythromycin     base Tablet 500 milliGRAM(s) Oral every 8 hours  megestrol Suspension 400 milliGRAM(s) Oral <User Schedule>  metoclopramide 10 milliGRAM(s) Oral every 8 hours  metoprolol succinate  milliGRAM(s) Oral daily  ondansetron Injectable 8 milliGRAM(s) IV Push every 6 hours PRN  oxyCODONE    IR 5 milliGRAM(s) Oral every 4 hours PRN  oxyCODONE    IR 10 milliGRAM(s) Oral every 6 hours PRN  pancrelipase  (CREON 12,000 Lipase Units) 2 Capsule(s) Oral three times a day  pantoprazole    Tablet 40 milliGRAM(s) Oral before breakfast  potassium chloride  10 mEq/100 mL IVPB 10 milliEquivalent(s) IV Intermittent every 1 hour      Radiographs: No new imaging

## 2019-06-13 NOTE — DISCHARGE NOTE PROVIDER - HOSPITAL COURSE
63F with a PMH of HTN, GERD, ARDS, chronic pancreatitis (diagnosed in 2014) s/p Jeffery procedure (05/08/19) who presented to the ED 6/11 with 6 days of worsening abdominal pain and 5 days of N/V.     The patient did well postoperatively and was discharged on POD#11 (5/19) with a CDI midline incision, adequate pain control, and tolerating a regular diet. The patient continued to do well until Thursday when she first experienced 7/10 cramping abdominal pain most severe in the epigastric region which she describes as the "middle and under the ribs." She described the pain as intermittent and states the pain lasts for about 5 minutes and occurs approximately 3x/hour. The pain radiates laterally. Patient denies provocation and palliation with position, food, or drink. The patient states this is a new pain that she has never experienced before. The pain has been worsening since Thursday and today was so bad that she decided to come to the hospital.    The patient reports emesis everyday since Friday/Saturday and her most recent episode of "foamy", non-bloody, non-bilious emesis was this afternoon. She remains nauseous. Patient reports flatus and BMs (most recently Monday afternoon).    Patient states she has not been able to eat or drink except for some apple sauce, orange juice, and sips of water since Saturday morning.  The patient has only been taking her BP medications but has been unable to keep her other medications down since Saturday. Despite low PO intake, patient reports she is urinating at baseline.     On ROS, patient reports weight loss, fatigue, weakness, dizziness. Denies fever, patient has been monitoring temperature.      The patient is most concerned about her pain and weakness.    CT Abdomen and Pelvis 6/11 showed        *  Postoperative changes consistent with the given history of Jeffery     procedure. No evidence of drainable peripancreatic fluid collection is     identified.    *  Prominent distention of the gallbladder. Correlate with LFTs.    *  Mild wall thickening of the stomach with prominence of the gastric     mucosa, raising a question of underlying gastritis.    She was admitted to the surgical service and was placed on promotility agents.  Diet was advanced from clears to regular as tolerated.  She did not have any nausea or vomiting and tolerated diet without difficulty.  She is ambulating, voiding, is tolerating a diet, and is stable for discharge home.  She will follow-up with Dr. Molina and her PMD within 1-2 weeks.

## 2019-06-13 NOTE — PROGRESS NOTE ADULT - ASSESSMENT
67yo woman with a PMH sig for HTN, GERD, and chronic pancreatitis s/p Jeffery (5/8/19) who presented to ED with 6 days of severe, worsening, cramping epigastric abdominal pain associated with N/V.     - CLD  - Zofran, Reglan, Megace, Erythromycin  - C/w home meds   - Pain control  - OOB  - DVT ppx      Surgical Oncology   p. [phone] 67yo woman with a PMH sig for HTN, GERD, and chronic pancreatitis s/p Jeffery (5/8/19) who presented to ED with 6 days of severe, worsening, cramping epigastric abdominal pain associated with N/V.     - advance to regular diet  - Zofran, Reglan, Megace, Erythromycin  - C/w home meds   - Pain control  - OOB  - DVT ppx  - plan for discharge in the AM    Patrica Renee, MS4   Surgical Oncology, p. [phone]

## 2019-06-13 NOTE — DISCHARGE NOTE PROVIDER - CARE PROVIDER_API CALL
Hank Molina)  Surgery  [address] [place] [address]  Phone: [phone]  Fax: [phone]  Follow Up Time:

## 2019-06-14 ENCOUNTER — TRANSCRIPTION ENCOUNTER (OUTPATIENT)
Age: 64
End: 2019-06-14

## 2019-06-14 VITALS
SYSTOLIC BLOOD PRESSURE: 125 MMHG | OXYGEN SATURATION: 98 % | HEART RATE: 80 BPM | TEMPERATURE: 98 F | RESPIRATION RATE: 18 BRPM | DIASTOLIC BLOOD PRESSURE: 85 MMHG

## 2019-06-14 RX ADMIN — PANTOPRAZOLE SODIUM 40 MILLIGRAM(S): 20 TABLET, DELAYED RELEASE ORAL at 05:07

## 2019-06-14 RX ADMIN — Medication 500 MILLIGRAM(S): at 05:37

## 2019-06-14 RX ADMIN — Medication 2 CAPSULE(S): at 05:08

## 2019-06-14 RX ADMIN — Medication 100 MILLIGRAM(S): at 05:38

## 2019-06-14 RX ADMIN — AMLODIPINE BESYLATE 10 MILLIGRAM(S): 2.5 TABLET ORAL at 05:38

## 2019-06-14 RX ADMIN — MEGESTROL ACETATE 400 MILLIGRAM(S): 40 SUSPENSION ORAL at 08:56

## 2019-06-14 RX ADMIN — ONDANSETRON 8 MILLIGRAM(S): 8 TABLET, FILM COATED ORAL at 05:10

## 2019-06-14 RX ADMIN — Medication 10 MILLIGRAM(S): at 05:08

## 2019-06-14 RX ADMIN — OXYCODONE HYDROCHLORIDE 10 MILLIGRAM(S): 5 TABLET ORAL at 05:07

## 2019-06-14 RX ADMIN — OXYCODONE HYDROCHLORIDE 10 MILLIGRAM(S): 5 TABLET ORAL at 05:37

## 2019-06-14 NOTE — PROGRESS NOTE ADULT - SUBJECTIVE AND OBJECTIVE BOX
Surgery Progress Note      Subjective: Patient seen and examined. No acute events overnight.     T(C): 36.9 (06-14-19 @ 00:30), Max: 37.2 (06-13-19 @ 21:29)  HR: 71 (06-14-19 @ 00:30) (71 - 90)  BP: 113/71 (06-14-19 @ 00:30) (102/63 - 118/71)  RR: 16 (06-14-19 @ 00:30) (16 - 18)  SpO2: 99% (06-14-19 @ 00:30) (96% - 100%)      06-12-19 @ 07:01  -  06-13-19 @ 07:00  --------------------------------------------------------  IN: 660 mL / OUT: 200 mL / NET: 460 mL    06-13-19 @ 07:01  -  06-14-19 @ 04:39  --------------------------------------------------------  IN: 900 mL / OUT: 400 mL / NET: 500 mL        Physical Exam:   General: NAD  Pulm: unlabored breathing on RA  Abdomen: soft, non-distended, non tender, midline incision well healed     Labs:                          11.4   4.7   )-----------( 403      ( 12 Jun 2019 22:52 )             35.5     06-13    x   |  x   |  x   ----------------------------<  x   3.6   |  x   |  x     Ca    8.6      12 Jun 2019 22:52  Phos  3.1     06-12  Mg     2.0     06-12    	    Medications:     acetaminophen   Tablet .. 650 milliGRAM(s) Oral every 6 hours PRN  amLODIPine   Tablet 10 milliGRAM(s) Oral daily  enoxaparin Injectable 40 milliGRAM(s) SubCutaneous daily  erythromycin     base Tablet 500 milliGRAM(s) Oral every 8 hours  megestrol Suspension 400 milliGRAM(s) Oral <User Schedule>  metoclopramide 10 milliGRAM(s) Oral every 8 hours  metoprolol succinate  milliGRAM(s) Oral daily  ondansetron Injectable 8 milliGRAM(s) IV Push every 6 hours PRN  oxyCODONE    IR 5 milliGRAM(s) Oral every 4 hours PRN  oxyCODONE    IR 10 milliGRAM(s) Oral every 6 hours PRN  pancrelipase  (CREON 12,000 Lipase Units) 2 Capsule(s) Oral three times a day  pantoprazole    Tablet 40 milliGRAM(s) Oral before breakfast      Radiographs: No new imaging Surgery Progress Note      Subjective: Patient seen and examined. No acute events overnight. No N/V. Denies pain.     T(C): 36.9 (06-14-19 @ 00:30), Max: 37.2 (06-13-19 @ 21:29)  HR: 71 (06-14-19 @ 00:30) (71 - 90)  BP: 113/71 (06-14-19 @ 00:30) (102/63 - 118/71)  RR: 16 (06-14-19 @ 00:30) (16 - 18)  SpO2: 99% (06-14-19 @ 00:30) (96% - 100%)      06-12-19 @ 07:01  -  06-13-19 @ 07:00  --------------------------------------------------------  IN: 660 mL / OUT: 200 mL / NET: 460 mL    06-13-19 @ 07:01  -  06-14-19 @ 04:39  --------------------------------------------------------  IN: 900 mL / OUT: 400 mL / NET: 500 mL        Physical Exam:   General: NAD  Pulm: unlabored breathing on RA  Abdomen: soft, non-distended, non tender, midline incision well healed     Labs:                          11.4   4.7   )-----------( 403      ( 12 Jun 2019 22:52 )             35.5     06-13    x   |  x   |  x   ----------------------------<  x   3.6   |  x   |  x     Ca    8.6      12 Jun 2019 22:52  Phos  3.1     06-12  Mg     2.0     06-12    	    Medications:     acetaminophen   Tablet .. 650 milliGRAM(s) Oral every 6 hours PRN  amLODIPine   Tablet 10 milliGRAM(s) Oral daily  enoxaparin Injectable 40 milliGRAM(s) SubCutaneous daily  erythromycin     base Tablet 500 milliGRAM(s) Oral every 8 hours  megestrol Suspension 400 milliGRAM(s) Oral <User Schedule>  metoclopramide 10 milliGRAM(s) Oral every 8 hours  metoprolol succinate  milliGRAM(s) Oral daily  ondansetron Injectable 8 milliGRAM(s) IV Push every 6 hours PRN  oxyCODONE    IR 5 milliGRAM(s) Oral every 4 hours PRN  oxyCODONE    IR 10 milliGRAM(s) Oral every 6 hours PRN  pancrelipase  (CREON 12,000 Lipase Units) 2 Capsule(s) Oral three times a day  pantoprazole    Tablet 40 milliGRAM(s) Oral before breakfast      Radiographs: No new imaging

## 2019-06-14 NOTE — DISCHARGE NOTE NURSING/CASE MANAGEMENT/SOCIAL WORK - NSDCDPATPORTLINK_GEN_ALL_CORE
You can access the [email or web address], offered by [facility], by registering with the following website: [email or web address]

## 2019-06-14 NOTE — PROGRESS NOTE ADULT - ASSESSMENT
69yo woman with a PMH sig for HTN, GERD, and chronic pancreatitis s/p Jeffery (5/8/19) who presented to ED with 6 days of severe, worsening, cramping epigastric abdominal pain associated with N/V.     - regular diet  - Zofran, Reglan, Megace, Erythromycin  - C/w home meds   - Pain control  - DVT ppx  - discharge planning    [facility] surgery  [id number] 69yo woman with a PMH sig for HTN, GERD, and chronic pancreatitis s/p Jeffery (5/8/19) who presented to ED with 6 days of severe, worsening, cramping epigastric abdominal pain associated with N/V.     - regular diet  - transfer patient back home meds   - DVT ppx  - discharge this morning    [facility] surgery  [id number]

## 2019-06-16 LAB
CULTURE RESULTS: SIGNIFICANT CHANGE UP
CULTURE RESULTS: SIGNIFICANT CHANGE UP
SPECIMEN SOURCE: SIGNIFICANT CHANGE UP
SPECIMEN SOURCE: SIGNIFICANT CHANGE UP

## 2019-06-25 ENCOUNTER — APPOINTMENT (OUTPATIENT)
Dept: SURGERY | Facility: CLINIC | Age: 64
End: 2019-06-25
Payer: MEDICAID

## 2019-06-25 VITALS — BODY MASS INDEX: 18.2 KG/M2 | WEIGHT: 106 LBS

## 2019-06-25 PROCEDURE — 99024 POSTOP FOLLOW-UP VISIT: CPT

## 2019-06-25 RX ORDER — ERYTHROMYCIN 250 MG/1
250 TABLET, FILM COATED ORAL 3 TIMES DAILY
Qty: 180 | Refills: 1 | Status: DISCONTINUED | COMMUNITY
Start: 2019-05-28 | End: 2019-06-25

## 2019-07-03 ENCOUNTER — INBOUND DOCUMENT (OUTPATIENT)
Age: 64
End: 2019-07-03

## 2019-07-09 NOTE — ASSESSMENT
[FreeTextEntry1] : Patient is s/p Jeffery procedure for chronic pancreatitis. Doing well and has excellent appetite. No longer on pain Rx.\par -Continue current meds (megace, EES, reglan, zofran) \par -RTC in 4 weeks \par Doing great s/p Jeffery prodecure.\par RTC 4 weeks

## 2019-07-09 NOTE — REVIEW OF SYSTEMS
[Negative] : Constitutional [FreeTextEntry5] : no chest pain  [FreeTextEntry6] : no shortness of breath  [FreeTextEntry8] : normal bowel movements, no abdominal pain or vomiting

## 2019-07-09 NOTE — HISTORY OF PRESENT ILLNESS
[de-identified] : Patient is well known to me, with history of chronic calcific pancreatitis, here for second POV s/p Jeffery procedure on 5/8/19.  She had gained 10 lbs but got sick, now up 1 lb total. She reports having really good appetite and eating at all times during the day, even at 2 am. She has stopped taking dilaudid. She is very pleased with how she is doing post-operatively, and states that she now has her life back.

## 2019-07-09 NOTE — PHYSICAL EXAM
[Normal] : oriented to person, place and time, with appropriate affect [de-identified] : Thin female  [de-identified] : normal lips  [de-identified] : anicteric  [de-identified] : normal respiratory effort  [de-identified] : supple  [de-identified] : well approximated surgical incision  [de-identified] : no deformities appreciated  [de-identified] : normal appearance

## 2019-07-10 PROCEDURE — 82150 ASSAY OF AMYLASE: CPT

## 2019-07-10 PROCEDURE — 82565 ASSAY OF CREATININE: CPT

## 2019-07-10 PROCEDURE — 83605 ASSAY OF LACTIC ACID: CPT

## 2019-07-10 PROCEDURE — 85610 PROTHROMBIN TIME: CPT

## 2019-07-10 PROCEDURE — 93005 ELECTROCARDIOGRAM TRACING: CPT

## 2019-07-10 PROCEDURE — 87040 BLOOD CULTURE FOR BACTERIA: CPT

## 2019-07-10 PROCEDURE — 83036 HEMOGLOBIN GLYCOSYLATED A1C: CPT

## 2019-07-10 PROCEDURE — 84132 ASSAY OF SERUM POTASSIUM: CPT

## 2019-07-10 PROCEDURE — 84295 ASSAY OF SERUM SODIUM: CPT

## 2019-07-10 PROCEDURE — 85730 THROMBOPLASTIN TIME PARTIAL: CPT

## 2019-07-10 PROCEDURE — 82330 ASSAY OF CALCIUM: CPT

## 2019-07-10 PROCEDURE — 82962 GLUCOSE BLOOD TEST: CPT

## 2019-07-10 PROCEDURE — 83735 ASSAY OF MAGNESIUM: CPT

## 2019-07-10 PROCEDURE — 84100 ASSAY OF PHOSPHORUS: CPT

## 2019-07-10 PROCEDURE — 82947 ASSAY GLUCOSE BLOOD QUANT: CPT

## 2019-07-10 PROCEDURE — 96361 HYDRATE IV INFUSION ADD-ON: CPT

## 2019-07-10 PROCEDURE — 96375 TX/PRO/DX INJ NEW DRUG ADDON: CPT

## 2019-07-10 PROCEDURE — 80048 BASIC METABOLIC PNL TOTAL CA: CPT

## 2019-07-10 PROCEDURE — 82803 BLOOD GASES ANY COMBINATION: CPT

## 2019-07-10 PROCEDURE — 85027 COMPLETE CBC AUTOMATED: CPT

## 2019-07-10 PROCEDURE — 74018 RADEX ABDOMEN 1 VIEW: CPT

## 2019-07-10 PROCEDURE — 81001 URINALYSIS AUTO W/SCOPE: CPT

## 2019-07-10 PROCEDURE — 88307 TISSUE EXAM BY PATHOLOGIST: CPT

## 2019-07-10 PROCEDURE — 82435 ASSAY OF BLOOD CHLORIDE: CPT

## 2019-07-10 PROCEDURE — 96374 THER/PROPH/DIAG INJ IV PUSH: CPT | Mod: XU

## 2019-07-10 PROCEDURE — C1889: CPT

## 2019-07-10 PROCEDURE — 88300 SURGICAL PATH GROSS: CPT

## 2019-07-10 PROCEDURE — 85014 HEMATOCRIT: CPT

## 2019-07-10 PROCEDURE — 83690 ASSAY OF LIPASE: CPT

## 2019-07-10 PROCEDURE — 80053 COMPREHEN METABOLIC PANEL: CPT

## 2019-07-10 PROCEDURE — 74177 CT ABD & PELVIS W/CONTRAST: CPT

## 2019-07-10 PROCEDURE — 99285 EMERGENCY DEPT VISIT HI MDM: CPT | Mod: 25

## 2019-07-23 ENCOUNTER — APPOINTMENT (OUTPATIENT)
Dept: SURGERY | Facility: CLINIC | Age: 64
End: 2019-07-23
Payer: MEDICAID

## 2019-07-23 PROCEDURE — 99024 POSTOP FOLLOW-UP VISIT: CPT

## 2019-07-24 VITALS
HEART RATE: 90 BPM | TEMPERATURE: 98.1 F | DIASTOLIC BLOOD PRESSURE: 77 MMHG | WEIGHT: 123 LBS | SYSTOLIC BLOOD PRESSURE: 150 MMHG | BODY MASS INDEX: 21.11 KG/M2 | RESPIRATION RATE: 14 BRPM

## 2019-07-24 RX ORDER — PANCRELIPASE 120000; 24000; 76000 [USP'U]/1; [USP'U]/1; [USP'U]/1
24000-76000 CAPSULE, DELAYED RELEASE PELLETS ORAL
Refills: 0 | Status: ACTIVE | COMMUNITY

## 2019-08-14 ENCOUNTER — RX RENEWAL (OUTPATIENT)
Age: 64
End: 2019-08-14

## 2019-08-22 NOTE — PHYSICAL EXAM
[Normal] : oriented to person, place and time, with appropriate affect [de-identified] : anicteric  [de-identified] : normal lips  [de-identified] : supple  [de-identified] : normal respiratory effort  [de-identified] : not distended  [de-identified] : normal appearance

## 2019-08-22 NOTE — HISTORY OF PRESENT ILLNESS
[de-identified] : 64 y/o female who is well known to me with history of chronic calcific pancreatitis, and is s/p Jeffery procedure on 5/8/19. She is eating well and has gained 10 lbs since surgery. Recently she had only one attack of pain, limited to one day and is now symptom free. She is very pleased with her outcome after surgery and is doing great.

## 2019-08-22 NOTE — ASSESSMENT
[FreeTextEntry1] : s/p Jeffery procedure, here for POV. Patient is doing great. \par -Continue current Rx\par -RTC in 2 months

## 2019-08-28 ENCOUNTER — RX RENEWAL (OUTPATIENT)
Age: 64
End: 2019-08-28

## 2019-09-24 ENCOUNTER — APPOINTMENT (OUTPATIENT)
Dept: SURGERY | Facility: CLINIC | Age: 64
End: 2019-09-24
Payer: MEDICAID

## 2019-09-24 VITALS
TEMPERATURE: 98.4 F | WEIGHT: 126 LBS | DIASTOLIC BLOOD PRESSURE: 76 MMHG | HEIGHT: 64 IN | HEART RATE: 86 BPM | SYSTOLIC BLOOD PRESSURE: 126 MMHG | RESPIRATION RATE: 18 BRPM | BODY MASS INDEX: 21.51 KG/M2

## 2019-09-24 PROCEDURE — 99213 OFFICE O/P EST LOW 20 MIN: CPT

## 2019-10-03 NOTE — ASSESSMENT
[FreeTextEntry1] : s/p Jeffery procedure for chronic pancreatitis \par -Can stop reglan, otherwise continue current Rx \par -RTC in 3 months

## 2019-10-03 NOTE — HISTORY OF PRESENT ILLNESS
[de-identified] : 62 y/o female who is well known to me with history of chronic calcific pancreatitis, and is s/p Jeffery procedure on 5/8/19. She recovered well post-op and has gained weight since the surgery. She continues taking the erythromycin, and states it helps and she notices discomfort in the evening if she misses a dose. Has intermittent nausea a couple x per month, takes zofran.

## 2019-10-03 NOTE — PHYSICAL EXAM
[Normal] : normal external ears and hearing [de-identified] : anicteric  [de-identified] : normal lips  [de-identified] : supple  [de-identified] : normal respiratory effort  [de-identified] : not distended, well approximated surgical incision  [de-identified] : normal appearance

## 2019-11-13 NOTE — H&P ADULT - PROBLEM/PLAN-2
DISPLAY PLAN FREE TEXT Additional Notes: Will order serum k+ lab for increased dosage of spironolactone to be drawn in 2-3 weeks. Detail Level: Simple

## 2020-01-14 ENCOUNTER — APPOINTMENT (OUTPATIENT)
Dept: SURGERY | Facility: CLINIC | Age: 65
End: 2020-01-14
Payer: MEDICAID

## 2020-01-14 VITALS
DIASTOLIC BLOOD PRESSURE: 89 MMHG | SYSTOLIC BLOOD PRESSURE: 172 MMHG | RESPIRATION RATE: 18 BRPM | HEART RATE: 98 BPM | BODY MASS INDEX: 24.24 KG/M2 | WEIGHT: 142 LBS | OXYGEN SATURATION: 98 % | HEIGHT: 64 IN

## 2020-01-14 PROCEDURE — 99214 OFFICE O/P EST MOD 30 MIN: CPT

## 2020-01-14 RX ORDER — ERYTHROMYCIN 500 MG/1
500 TABLET, FILM COATED ORAL 3 TIMES DAILY
Qty: 180 | Refills: 1 | Status: DISCONTINUED | COMMUNITY
Start: 2019-06-25 | End: 2020-01-14

## 2020-01-14 RX ORDER — MEGESTROL ACETATE 40 MG/ML
40 SUSPENSION ORAL DAILY
Qty: 1200 | Refills: 1 | Status: DISCONTINUED | COMMUNITY
Start: 2019-05-28 | End: 2020-01-14

## 2020-01-16 NOTE — PHYSICAL EXAM
[Normal] : normal external ears and hearing [de-identified] : anicteric  [de-identified] : normal lips  [de-identified] : supple  [de-identified] : normal respiratory effort  [de-identified] : not distended, well approximated surgical incision  [de-identified] : normal appearance

## 2020-01-16 NOTE — REVIEW OF SYSTEMS
[Negative] : Respiratory [FreeTextEntry2] : no fevers. no pain. intential slow weight gain to baseline  [FreeTextEntry5] : no chest pain or orthopnea  [FreeTextEntry8] : as per HPI  [de-identified] : takes PRN oxycodone for pain back

## 2020-01-16 NOTE — ASSESSMENT
[FreeTextEntry1] : s/p Jeffery 5/2019 for chronic pancreatitis, doiong spectacularly well. No subsequent hospitalizations. \par -Increase Creon to 2 tabs  w/ meals/ + HS\par -No longer need to take EES and megace (pt stopped meds) \par -RTC in 6 months  Jax Mejia  (RN)  2017 07:20:14

## 2020-01-16 NOTE — HISTORY OF PRESENT ILLNESS
[de-identified] : 63 y/o female who is well known to me with history of chronic calcific pancreatitis, and is s/p Jeffery procedure on 5/8/19. She is back to her baseline weight and has stopped taking EES and megace. Reports tolerating PO diet well. Minimal episodes of nausea. Denies pain. She has noticed increase flatus and associated abdominal cramping discomfort with the flatus that occurs at bedtime.  Takes Creon 24,000 unit dose 1 tab w/ meals.  Denies diarrhea, steatorrhea, vomiting, or constipation. \par She states she is very pleased with how she is doing and is very happy.  Prior to the surgery had recurrent hospital admissions, now hasn’t needed to go to the hospital for pancreatitis.

## 2020-02-13 ENCOUNTER — INPATIENT (INPATIENT)
Facility: HOSPITAL | Age: 65
LOS: 7 days | Discharge: HOME | DRG: 176 | End: 2020-02-21
Attending: INTERNAL MEDICINE | Admitting: HOSPITALIST
Payer: COMMERCIAL

## 2020-02-13 VITALS
TEMPERATURE: 98 F | HEIGHT: 64 IN | RESPIRATION RATE: 28 BRPM | WEIGHT: 130.07 LBS | SYSTOLIC BLOOD PRESSURE: 110 MMHG | DIASTOLIC BLOOD PRESSURE: 68 MMHG | OXYGEN SATURATION: 92 % | HEART RATE: 131 BPM

## 2020-02-13 DIAGNOSIS — I26.99 OTHER PULMONARY EMBOLISM WITHOUT ACUTE COR PULMONALE: ICD-10-CM

## 2020-02-13 DIAGNOSIS — Z96.612 PRESENCE OF LEFT ARTIFICIAL SHOULDER JOINT: Chronic | ICD-10-CM

## 2020-02-13 DIAGNOSIS — I10 ESSENTIAL (PRIMARY) HYPERTENSION: ICD-10-CM

## 2020-02-13 DIAGNOSIS — Z98.890 OTHER SPECIFIED POSTPROCEDURAL STATES: Chronic | ICD-10-CM

## 2020-02-13 DIAGNOSIS — Z98.89 OTHER SPECIFIED POSTPROCEDURAL STATES: Chronic | ICD-10-CM

## 2020-02-13 DIAGNOSIS — K21.9 GASTRO-ESOPHAGEAL REFLUX DISEASE WITHOUT ESOPHAGITIS: ICD-10-CM

## 2020-02-13 DIAGNOSIS — Z02.9 ENCOUNTER FOR ADMINISTRATIVE EXAMINATIONS, UNSPECIFIED: ICD-10-CM

## 2020-02-13 DIAGNOSIS — Z96.641 PRESENCE OF RIGHT ARTIFICIAL HIP JOINT: Chronic | ICD-10-CM

## 2020-02-13 DIAGNOSIS — R09.89 OTHER SPECIFIED SYMPTOMS AND SIGNS INVOLVING THE CIRCULATORY AND RESPIRATORY SYSTEMS: ICD-10-CM

## 2020-02-13 LAB
ALBUMIN SERPL ELPH-MCNC: 3.5 G/DL — SIGNIFICANT CHANGE UP (ref 3.3–5)
ALP SERPL-CCNC: 110 U/L — SIGNIFICANT CHANGE UP (ref 40–120)
ALT FLD-CCNC: 10 U/L — SIGNIFICANT CHANGE UP (ref 10–45)
ANION GAP SERPL CALC-SCNC: 16 MMOL/L — SIGNIFICANT CHANGE UP (ref 5–17)
APPEARANCE UR: CLEAR — SIGNIFICANT CHANGE UP
APTT BLD: 144.5 SEC — CRITICAL HIGH (ref 27.5–36.3)
APTT BLD: 27.6 SEC — SIGNIFICANT CHANGE UP (ref 27.5–36.3)
AST SERPL-CCNC: 12 U/L — SIGNIFICANT CHANGE UP (ref 10–40)
BACTERIA # UR AUTO: NEGATIVE — SIGNIFICANT CHANGE UP
BASOPHILS # BLD AUTO: 0 K/UL — SIGNIFICANT CHANGE UP (ref 0–0.2)
BASOPHILS NFR BLD AUTO: 0 % — SIGNIFICANT CHANGE UP (ref 0–2)
BILIRUB SERPL-MCNC: 0.5 MG/DL — SIGNIFICANT CHANGE UP (ref 0.2–1.2)
BILIRUB UR-MCNC: NEGATIVE — SIGNIFICANT CHANGE UP
BUN SERPL-MCNC: 13 MG/DL — SIGNIFICANT CHANGE UP (ref 7–23)
CALCIUM SERPL-MCNC: 9.5 MG/DL — SIGNIFICANT CHANGE UP (ref 8.4–10.5)
CHLORIDE SERPL-SCNC: 98 MMOL/L — SIGNIFICANT CHANGE UP (ref 96–108)
CO2 SERPL-SCNC: 24 MMOL/L — SIGNIFICANT CHANGE UP (ref 22–31)
COLOR SPEC: SIGNIFICANT CHANGE UP
CREAT SERPL-MCNC: 0.96 MG/DL — SIGNIFICANT CHANGE UP (ref 0.5–1.3)
DIFF PNL FLD: NEGATIVE — SIGNIFICANT CHANGE UP
EOSINOPHIL # BLD AUTO: 0.15 K/UL — SIGNIFICANT CHANGE UP (ref 0–0.5)
EOSINOPHIL NFR BLD AUTO: 0.9 % — SIGNIFICANT CHANGE UP (ref 0–6)
EPI CELLS # UR: 10 /HPF — HIGH
GAS PNL BLDV: SIGNIFICANT CHANGE UP
GAS PNL BLDV: SIGNIFICANT CHANGE UP
GLUCOSE SERPL-MCNC: 204 MG/DL — HIGH (ref 70–99)
GLUCOSE UR QL: NEGATIVE — SIGNIFICANT CHANGE UP
HCT VFR BLD CALC: 28.2 % — LOW (ref 34.5–45)
HCT VFR BLD CALC: 34.7 % — SIGNIFICANT CHANGE UP (ref 34.5–45)
HGB BLD-MCNC: 10.1 G/DL — LOW (ref 11.5–15.5)
HGB BLD-MCNC: 8.6 G/DL — LOW (ref 11.5–15.5)
HYALINE CASTS # UR AUTO: 7 /LPF — HIGH (ref 0–2)
INR BLD: 1.43 RATIO — HIGH (ref 0.88–1.16)
KETONES UR-MCNC: NEGATIVE — SIGNIFICANT CHANGE UP
LEUKOCYTE ESTERASE UR-ACNC: NEGATIVE — SIGNIFICANT CHANGE UP
LIDOCAIN IGE QN: 4 U/L — LOW (ref 7–60)
LYMPHOCYTES # BLD AUTO: 1.69 K/UL — SIGNIFICANT CHANGE UP (ref 1–3.3)
LYMPHOCYTES # BLD AUTO: 10.4 % — LOW (ref 13–44)
MCHC RBC-ENTMCNC: 20.8 PG — LOW (ref 27–34)
MCHC RBC-ENTMCNC: 21.8 PG — LOW (ref 27–34)
MCHC RBC-ENTMCNC: 29.1 GM/DL — LOW (ref 32–36)
MCHC RBC-ENTMCNC: 30.5 GM/DL — LOW (ref 32–36)
MCV RBC AUTO: 71.4 FL — LOW (ref 80–100)
MCV RBC AUTO: 71.5 FL — LOW (ref 80–100)
MONOCYTES # BLD AUTO: 0.85 K/UL — SIGNIFICANT CHANGE UP (ref 0–0.9)
MONOCYTES NFR BLD AUTO: 5.2 % — SIGNIFICANT CHANGE UP (ref 2–14)
NEUTROPHILS # BLD AUTO: 13.01 K/UL — HIGH (ref 1.8–7.4)
NEUTROPHILS NFR BLD AUTO: 79.1 % — HIGH (ref 43–77)
NITRITE UR-MCNC: NEGATIVE — SIGNIFICANT CHANGE UP
NRBC # BLD: 0 /100 WBCS — SIGNIFICANT CHANGE UP (ref 0–0)
PH UR: 7 — SIGNIFICANT CHANGE UP (ref 5–8)
PLATELET # BLD AUTO: 614 K/UL — HIGH (ref 150–400)
PLATELET # BLD AUTO: 650 K/UL — HIGH (ref 150–400)
POTASSIUM SERPL-MCNC: 4.7 MMOL/L — SIGNIFICANT CHANGE UP (ref 3.5–5.3)
POTASSIUM SERPL-SCNC: 4.7 MMOL/L — SIGNIFICANT CHANGE UP (ref 3.5–5.3)
PROT SERPL-MCNC: 8.2 G/DL — SIGNIFICANT CHANGE UP (ref 6–8.3)
PROT UR-MCNC: ABNORMAL
PROTHROM AB SERPL-ACNC: 16.5 SEC — HIGH (ref 10–12.9)
RAPID RVP RESULT: SIGNIFICANT CHANGE UP
RBC # BLD: 3.95 M/UL — SIGNIFICANT CHANGE UP (ref 3.8–5.2)
RBC # BLD: 4.85 M/UL — SIGNIFICANT CHANGE UP (ref 3.8–5.2)
RBC # FLD: 21.5 % — HIGH (ref 10.3–14.5)
RBC # FLD: 21.7 % — HIGH (ref 10.3–14.5)
RBC CASTS # UR COMP ASSIST: 2 /HPF — SIGNIFICANT CHANGE UP (ref 0–4)
SODIUM SERPL-SCNC: 138 MMOL/L — SIGNIFICANT CHANGE UP (ref 135–145)
SP GR SPEC: >1.05 (ref 1.01–1.02)
UROBILINOGEN FLD QL: NEGATIVE — SIGNIFICANT CHANGE UP
WBC # BLD: 12.37 K/UL — HIGH (ref 3.8–10.5)
WBC # BLD: 16.26 K/UL — HIGH (ref 3.8–10.5)
WBC # FLD AUTO: 12.37 K/UL — HIGH (ref 3.8–10.5)
WBC # FLD AUTO: 16.26 K/UL — HIGH (ref 3.8–10.5)
WBC UR QL: 11 /HPF — HIGH (ref 0–5)

## 2020-02-13 PROCEDURE — 74177 CT ABD & PELVIS W/CONTRAST: CPT | Mod: 26

## 2020-02-13 PROCEDURE — 71045 X-RAY EXAM CHEST 1 VIEW: CPT | Mod: 26

## 2020-02-13 PROCEDURE — G0452: CPT | Mod: 26

## 2020-02-13 PROCEDURE — 99223 1ST HOSP IP/OBS HIGH 75: CPT

## 2020-02-13 PROCEDURE — 93970 EXTREMITY STUDY: CPT | Mod: 26

## 2020-02-13 PROCEDURE — 71275 CT ANGIOGRAPHY CHEST: CPT | Mod: 26

## 2020-02-13 PROCEDURE — 99291 CRITICAL CARE FIRST HOUR: CPT

## 2020-02-13 RX ORDER — MORPHINE SULFATE 50 MG/1
4 CAPSULE, EXTENDED RELEASE ORAL ONCE
Refills: 0 | Status: DISCONTINUED | OUTPATIENT
Start: 2020-02-13 | End: 2020-02-13

## 2020-02-13 RX ORDER — ONDANSETRON 8 MG/1
4 TABLET, FILM COATED ORAL ONCE
Refills: 0 | Status: COMPLETED | OUTPATIENT
Start: 2020-02-13 | End: 2020-02-13

## 2020-02-13 RX ORDER — PANTOPRAZOLE SODIUM 20 MG/1
40 TABLET, DELAYED RELEASE ORAL
Refills: 0 | Status: DISCONTINUED | OUTPATIENT
Start: 2020-02-13 | End: 2020-02-21

## 2020-02-13 RX ORDER — SODIUM CHLORIDE 9 MG/ML
1000 INJECTION INTRAMUSCULAR; INTRAVENOUS; SUBCUTANEOUS ONCE
Refills: 0 | Status: COMPLETED | OUTPATIENT
Start: 2020-02-13 | End: 2020-02-13

## 2020-02-13 RX ORDER — HEPARIN SODIUM 5000 [USP'U]/ML
2000 INJECTION INTRAVENOUS; SUBCUTANEOUS EVERY 6 HOURS
Refills: 0 | Status: DISCONTINUED | OUTPATIENT
Start: 2020-02-13 | End: 2020-02-19

## 2020-02-13 RX ORDER — PREGABALIN 225 MG/1
1 CAPSULE ORAL
Qty: 0 | Refills: 0 | DISCHARGE

## 2020-02-13 RX ORDER — HEPARIN SODIUM 5000 [USP'U]/ML
INJECTION INTRAVENOUS; SUBCUTANEOUS
Qty: 25000 | Refills: 0 | Status: DISCONTINUED | OUTPATIENT
Start: 2020-02-13 | End: 2020-02-13

## 2020-02-13 RX ORDER — AMLODIPINE BESYLATE 2.5 MG/1
10 TABLET ORAL DAILY
Refills: 0 | Status: DISCONTINUED | OUTPATIENT
Start: 2020-02-14 | End: 2020-02-21

## 2020-02-13 RX ORDER — HEPARIN SODIUM 5000 [USP'U]/ML
4500 INJECTION INTRAVENOUS; SUBCUTANEOUS ONCE
Refills: 0 | Status: COMPLETED | OUTPATIENT
Start: 2020-02-13 | End: 2020-02-13

## 2020-02-13 RX ORDER — METOPROLOL TARTRATE 50 MG
100 TABLET ORAL DAILY
Refills: 0 | Status: DISCONTINUED | OUTPATIENT
Start: 2020-02-14 | End: 2020-02-21

## 2020-02-13 RX ORDER — CHOLECALCIFEROL (VITAMIN D3) 125 MCG
1 CAPSULE ORAL
Qty: 0 | Refills: 0 | DISCHARGE

## 2020-02-13 RX ORDER — HEPARIN SODIUM 5000 [USP'U]/ML
4500 INJECTION INTRAVENOUS; SUBCUTANEOUS EVERY 6 HOURS
Refills: 0 | Status: DISCONTINUED | OUTPATIENT
Start: 2020-02-13 | End: 2020-02-19

## 2020-02-13 RX ORDER — MORPHINE SULFATE 50 MG/1
4 CAPSULE, EXTENDED RELEASE ORAL EVERY 4 HOURS
Refills: 0 | Status: DISCONTINUED | OUTPATIENT
Start: 2020-02-13 | End: 2020-02-15

## 2020-02-13 RX ORDER — HEPARIN SODIUM 5000 [USP'U]/ML
900 INJECTION INTRAVENOUS; SUBCUTANEOUS
Qty: 25000 | Refills: 0 | Status: DISCONTINUED | OUTPATIENT
Start: 2020-02-13 | End: 2020-02-19

## 2020-02-13 RX ORDER — OXYCODONE AND ACETAMINOPHEN 5; 325 MG/1; MG/1
1 TABLET ORAL ONCE
Refills: 0 | Status: DISCONTINUED | OUTPATIENT
Start: 2020-02-13 | End: 2020-02-13

## 2020-02-13 RX ORDER — ONDANSETRON 8 MG/1
8 TABLET, FILM COATED ORAL
Refills: 0 | Status: DISCONTINUED | OUTPATIENT
Start: 2020-02-13 | End: 2020-02-21

## 2020-02-13 RX ADMIN — HEPARIN SODIUM 1100 UNIT(S)/HR: 5000 INJECTION INTRAVENOUS; SUBCUTANEOUS at 21:38

## 2020-02-13 RX ADMIN — HEPARIN SODIUM 4500 UNIT(S): 5000 INJECTION INTRAVENOUS; SUBCUTANEOUS at 11:00

## 2020-02-13 RX ADMIN — HEPARIN SODIUM 1100 UNIT(S)/HR: 5000 INJECTION INTRAVENOUS; SUBCUTANEOUS at 11:01

## 2020-02-13 RX ADMIN — MORPHINE SULFATE 4 MILLIGRAM(S): 50 CAPSULE, EXTENDED RELEASE ORAL at 10:28

## 2020-02-13 RX ADMIN — OXYCODONE AND ACETAMINOPHEN 1 TABLET(S): 5; 325 TABLET ORAL at 16:19

## 2020-02-13 RX ADMIN — ONDANSETRON 4 MILLIGRAM(S): 8 TABLET, FILM COATED ORAL at 06:37

## 2020-02-13 RX ADMIN — MORPHINE SULFATE 4 MILLIGRAM(S): 50 CAPSULE, EXTENDED RELEASE ORAL at 21:00

## 2020-02-13 RX ADMIN — MORPHINE SULFATE 4 MILLIGRAM(S): 50 CAPSULE, EXTENDED RELEASE ORAL at 12:51

## 2020-02-13 RX ADMIN — SODIUM CHLORIDE 1000 MILLILITER(S): 9 INJECTION INTRAMUSCULAR; INTRAVENOUS; SUBCUTANEOUS at 06:28

## 2020-02-13 RX ADMIN — MORPHINE SULFATE 4 MILLIGRAM(S): 50 CAPSULE, EXTENDED RELEASE ORAL at 21:14

## 2020-02-13 RX ADMIN — MORPHINE SULFATE 4 MILLIGRAM(S): 50 CAPSULE, EXTENDED RELEASE ORAL at 06:28

## 2020-02-13 RX ADMIN — MORPHINE SULFATE 4 MILLIGRAM(S): 50 CAPSULE, EXTENDED RELEASE ORAL at 17:10

## 2020-02-13 RX ADMIN — HEPARIN SODIUM 900 UNIT(S)/HR: 5000 INJECTION INTRAVENOUS; SUBCUTANEOUS at 22:01

## 2020-02-13 NOTE — ED PROVIDER NOTE - PSH
History of pancreatic surgery  Jeffery procedure (5/8)  History of vocal cord polypectomy  1/2018  S/P arthroscopy of shoulder  right - 2014  S/P arthroscopy of shoulder  left in 2009  S/P hip replacement  left - 2010  S/P hip replacement, right  May 2016  S/P shoulder replacement, left  2016

## 2020-02-13 NOTE — ED PROVIDER NOTE - ATTENDING CONTRIBUTION TO CARE
MD Miller:  patient seen and evaluated personally.   I agree with the History & Physical,  Impression & Plan other than what was detailed in my note.  MD Miller    Pt w/ hx of chronic pancratitis, not on home 02, smoker but no hx of copd, presents to ed w/ cc of left sided flank/abd pain, also having mild sob, no hemoptysis, leg swelling, recent travels, feels differnent than pancreatitis, on 02 for comfort. pain worse w/ deep breath. afebrile vitals stable, 02 stable on 2 liters. lungs cta b/l. abd soft but ttp over luq and l flank. differential broad at this time. sob breath could be from pain limitng breathing vs pe. given pt ill appearing, plan for ct abd and chest to screen for pe. cardiac workup.

## 2020-02-13 NOTE — CONSULT NOTE ADULT - ATTENDING COMMENTS
Unprovoked PE with negative biomarkers and RV appears normal on CT    1.  Heparin gtt for now  2.  Send APLS serology in an  female with unprovoked event.   3.  Venous duplex  4.  Echocardiogram  5.  No role for catheter based therapies.  I anticipate the HR will improve with time  6.  If shows stability can consider transition to DOAC soon (favor a 3 week run in with Xarelto 15mg BID - not yet though)      Armond [id number]

## 2020-02-13 NOTE — H&P ADULT - HISTORY OF PRESENT ILLNESS
64 yr old female with a PMH sig for HTN and chronic pancreatitis who p/w left sided back pain for past 4-5 days.  Pain progressed and was eventually accompanied by dyspnea.  She came to the ED and was found to have b/l PE.  Patient states her symptoms started last Friday with typical URI - type symptoms, pain started on Monday.  Denies any leg pain or swelling.  She denies any previous h/o PE or DVT.  No family h/o blood clots.  Former smoker, but she quit about one year ago.  Denies any hormone replacement therapy.  No personal history of nor family history of miscarriages or spontaenous abortions.

## 2020-02-13 NOTE — H&P ADULT - NSHPPHYSICALEXAM_GEN_ALL_CORE
Vital Signs Last 24 Hrs  T(C): 37.1 (13 Feb 2020 12:26), Max: 37.1 (13 Feb 2020 12:26)  T(F): 98.7 (13 Feb 2020 12:26), Max: 98.7 (13 Feb 2020 12:26)  HR: 113 (13 Feb 2020 12:26) (113 - 131)  BP: 124/74 (13 Feb 2020 12:26) (110/68 - 129/73)  BP(mean): --  RR: 26 (13 Feb 2020 12:26) (26 - 28)  SpO2: 95% (13 Feb 2020 12:26) (92% - 97%)  CAPILLARY BLOOD GLUCOSE        I&O's Summary      PHYSICAL EXAM:  GENERAL: Mild distress secondary to pain, well-developed  HEAD:  Atraumatic, Normocephalic  EYES: EOMI, PERRLA, conjunctiva and sclera clear  NECK: Supple, No JVD  CHEST/LUNG: Clear to auscultation bilaterally; No wheeze  HEART: Regular rate and rhythm; No murmurs, rubs, or gallops  ABDOMEN: Soft, Nontender, Nondistended; Bowel sounds present  EXTREMITIES:  2+ Peripheral Pulses, No clubbing, cyanosis, or edema  PSYCH: AAOx3  NEUROLOGY: non-focal  SKIN: No rashes or lesions

## 2020-02-13 NOTE — H&P ADULT - NSICDXPASTSURGICALHX_GEN_ALL_CORE_FT
PAST SURGICAL HISTORY:  History of pancreatic surgery Jeffery procedure (5/8)    History of vocal cord polypectomy 1/2018    S/P arthroscopy of shoulder left in 2009    S/P arthroscopy of shoulder right - 2014    S/P hip replacement left - 2010    S/P hip replacement, right May 2016    S/P shoulder replacement, left 2016

## 2020-02-13 NOTE — CONSULT NOTE ADULT - SUBJECTIVE AND OBJECTIVE BOX
Vascular Cardiology  Consult [place]     [facility] [id number]              EMAIL [email or web address]   OFFICE [phone]    CC:      HPI:           Allergies    diazepam (Other)  lemon (Other)    Intolerances    	    MEDICATIONS:  heparin  Infusion.  Unit(s)/Hr IV Continuous <Continuous>  heparin  Injectable 4500 Unit(s) IV Push once  heparin  Injectable 4500 Unit(s) IV Push every 6 hours PRN  heparin  Injectable 2000 Unit(s) IV Push every 6 hours PRN                  PAST MEDICAL & SURGICAL HISTORY:  Chronic abdominal pain  Thrombophlebitis: superficial right UE during 4/2019 hospital stay, resolved as per pt  Vocal cord polyp: removal 2018, benign as per pt  History of adrenal adenoma: stable on imaging  ARDS survivor: 2015  Chronic pancreatitis: last episode 4/2019  GERD (gastroesophageal reflux disease)  HTN (hypertension)  History of pancreatic surgery: Jeffery procedure (5/8)  S/P shoulder replacement, left: 2016  History of vocal cord polypectomy: 1/2018  S/P hip replacement, right: May 2016  S/P arthroscopy of shoulder: right - 2014  S/P hip replacement: left - 2010  S/P arthroscopy of shoulder: left in 2009      FAMILY HISTORY:  Family history of alcohol abuse      SOCIAL HISTORY:  unchanged    REVIEW OF SYSTEMS:  CONSTITUTIONAL: No fever, weight loss, or fatigue  EYES: No eye pain, visual disturbances, or discharge  ENMT:  No difficulty hearing, tinnitus, vertigo; No sinus or throat pain  NECK: No pain or stiffness  RESPIRATORY: No cough, wheezing, chills or hemoptysis; No Shortness of Breath    CARDIOVASCULAR: No chest pain, palpitations, passing out, dizziness, or leg swelling    GASTROINTESTINAL: No abdominal or epigastric pain. No nausea, vomiting, or hematemesis; No diarrhea or constipation. No melena or hematochezia.  GENITOURINARY: No dysuria, frequency, hematuria, or incontinence  NEUROLOGICAL: No headaches, memory loss, loss of strength, numbness, or tremors  SKIN: No itching, burning, rashes, or lesions   LYMPH Nodes: No enlarged glands  ENDOCRINE: No heat or cold intolerance; No hair loss  MUSCULOSKELETAL: No joint pain or swelling; No muscle, back, or extremity pain  PSYCHIATRIC: No depression, anxiety, mood swings, or difficulty sleeping  HEME/LYMPH: No easy bruising, or bleeding gums  ALLERY AND IMMUNOLOGIC: No hives or eczema	    [ x] All others negative	  [ ] Unable to obtain    PHYSICAL EXAM:  T(C): 36.6 (02-13-20 @ 06:03), Max: 36.6 (02-13-20 @ 06:03)  HR: 115 (02-13-20 @ 07:33) (115 - 131)  BP: 116/91 (02-13-20 @ 07:33) (110/68 - 129/73)  RR: 28 (02-13-20 @ 07:33) (26 - 28)  SpO2: 97% (02-13-20 @ 07:33) (92% - 97%)  Wt(kg): --  I&O's Summary      Appearance: Normal	  HEENT:   Normal oral mucosa, PERRL, EOMI	  Carotid:   Right:  No Bruit      Left:  No bruit  Lymphatic: No lymphadenopathy  Cardiovascular: Normal S1 S2, No JVD, No murmurs  Respiratory: Lungs clear to auscultation	  Psychiatry: A & O x 3, Mood & affect appropriate  Gastrointestinal:  Soft, Non-tender, + BS	  Skin: No rashes, No ecchymoses, No cyanosis	  Neurologic: Non-focal  Extremities: Normal range of motion.    Vascular Pulse Exam:  Right Femoral:  Palpable       Left Femoral:  Palpable  Right Popliteal:  Palpable      Left Popliteal:  Palpable  Right DP:  Palpable                 Left DP:  Palpable  Right PT:  Palpable                 Left DP:  Palpable    Foot Exam:  Warm, no ulceration.        LABS:	 	    CBC Full  -  ( 13 Feb 2020 06:33 )  WBC Count : 16.26 K/uL  Hemoglobin : 10.1 g/dL  Hematocrit : 34.7 %  Platelet Count - Automated : 650 K/uL  Mean Cell Volume : 71.5 fl  Mean Cell Hemoglobin : 20.8 pg  Mean Cell Hemoglobin Concentration : 29.1 gm/dL  Auto Neutrophil # : 13.01 K/uL  Auto Lymphocyte # : 1.69 K/uL  Auto Monocyte # : 0.85 K/uL  Auto Eosinophil # : 0.15 K/uL  Auto Basophil # : 0.00 K/uL  Auto Neutrophil % : 79.1 %  Auto Lymphocyte % : 10.4 %  Auto Monocyte % : 5.2 %  Auto Eosinophil % : 0.9 %  Auto Basophil % : 0.0 %    02-13    138  |  98  |  13  ----------------------------<  204<H>  4.7   |  24  |  0.96    Ca    9.5      13 Feb 2020 06:33    TPro  8.2  /  Alb  3.5  /  TBili  0.5  /  DBili  x   /  AST  12  /  ALT  10  /  AlkPhos  110  02-13          Assessment:  1.            Plan:  1.          Thank you      Vascular Cardiology Service    Please call with any questions:   [facility] - [phone]  Office [phone]  email:  [email or web address]  Consult [place]     [facility] [id number]              EMAIL [email or web address]   OFFICE [phone]    CC:  SOB    HPI:     64F with PMHx of HTN, chronic pancreatitis s/p Jeffery, who presents with SOB for 6 days, and L flank pain for 4 days. About a week ago, patient started having cough and sore throat associated with some SOB, thought it was a cold. She has an oximeter at home and said sometimes her O2 sats was <90%. On Monday, patient started having L back/flank pain to the point she couldn't move and stayed in bed on Monday and Tuesday. Had pain with inspiration. She could only keep down water or orange juice, felt nauseous with solids. No fevers, no LE edema or pain.   Of not patient reports no history of VTE, no family history of VTE. She has smoked about 4 cigs/day "on and off for 20 years." No recent travel. Denies taking any form of estrogen. Has had mammograms and had colonoscopy within last year or so, reports that they were normal.      Allergies    diazepam (Other)  lemon (Other)    Intolerances    	    MEDICATIONS:  heparin  Infusion.  Unit(s)/Hr IV Continuous <Continuous>  heparin  Injectable 4500 Unit(s) IV Push once  heparin  Injectable 4500 Unit(s) IV Push every 6 hours PRN  heparin  Injectable 2000 Unit(s) IV Push every 6 hours PRN                  PAST MEDICAL & SURGICAL HISTORY:  Chronic abdominal pain  Thrombophlebitis: superficial right UE during 4/2019 hospital stay, resolved as per pt  Vocal cord polyp: removal 2018, benign as per pt  History of adrenal adenoma: stable on imaging  ARDS survivor: 2015  Chronic pancreatitis: last episode 4/2019  GERD (gastroesophageal reflux disease)  HTN (hypertension)  History of pancreatic surgery: Jeffery procedure (5/8)  S/P shoulder replacement, left: 2016  History of vocal cord polypectomy: 1/2018  S/P hip replacement, right: May 2016  S/P arthroscopy of shoulder: right - 2014  S/P hip replacement: left - 2010  S/P arthroscopy of shoulder: left in 2009      FAMILY HISTORY:  Family history of alcohol abuse      SOCIAL HISTORY:  unchanged    REVIEW OF SYSTEMS:  CONSTITUTIONAL: No fever, weight loss, or fatigue  EYES: No eye pain, visual disturbances, or discharge  ENMT:  No difficulty hearing, tinnitus, vertigo; No sinus or throat pain  NECK: No pain or stiffness  RESPIRATORY: No cough, wheezing, chills or hemoptysis; No Shortness of Breath    CARDIOVASCULAR: No chest pain, palpitations, passing out, dizziness, or leg swelling    GASTROINTESTINAL: No abdominal or epigastric pain. No nausea, vomiting, or hematemesis; No diarrhea or constipation. No melena or hematochezia.  GENITOURINARY: No dysuria, frequency, hematuria, or incontinence  NEUROLOGICAL: No headaches, memory loss, loss of strength, numbness, or tremors  SKIN: No itching, burning, rashes, or lesions   LYMPH Nodes: No enlarged glands  ENDOCRINE: No heat or cold intolerance; No hair loss  MUSCULOSKELETAL: No joint pain or swelling; No muscle, back, or extremity pain  PSYCHIATRIC: No depression, anxiety, mood swings, or difficulty sleeping  HEME/LYMPH: No easy bruising, or bleeding gums  ALLERY AND IMMUNOLOGIC: No hives or eczema	    [ x] All others negative	  [ ] Unable to obtain    PHYSICAL EXAM:  T(C): 36.6 (02-13-20 @ 06:03), Max: 36.6 (02-13-20 @ 06:03)  HR: 115 (02-13-20 @ 07:33) (115 - 131)  BP: 116/91 (02-13-20 @ 07:33) (110/68 - 129/73)  RR: 28 (02-13-20 @ 07:33) (26 - 28)  SpO2: 97% (02-13-20 @ 07:33) (92% - 97%)  Wt(kg): --  I&O's Summary      Appearance: Normal	  HEENT:   Normal oral mucosa, PERRL, EOMI	  Carotid:   Right:  No Bruit      Left:  No bruit  Lymphatic: No lymphadenopathy  Cardiovascular: Normal S1 S2, No JVD, No murmurs  Respiratory: Lungs clear to auscultation	  Psychiatry: A & O x 3, Mood & affect appropriate  Gastrointestinal:  Soft, Non-tender, + BS	  Skin: No rashes, No ecchymoses, No cyanosis	  Neurologic: Non-focal  Extremities: Normal range of motion.    Vascular Pulse Exam:  Right Femoral:  Palpable       Left Femoral:  Palpable  Right Popliteal:  Palpable      Left Popliteal:  Palpable  Right DP:  Palpable                 Left DP:  Palpable  Right PT:  Palpable                 Left DP:  Palpable    Foot Exam:  Warm, no ulceration.        LABS:	 	    CBC Full  -  ( 13 Feb 2020 06:33 )  WBC Count : 16.26 K/uL  Hemoglobin : 10.1 g/dL  Hematocrit : 34.7 %  Platelet Count - Automated : 650 K/uL  Mean Cell Volume : 71.5 fl  Mean Cell Hemoglobin : 20.8 pg  Mean Cell Hemoglobin Concentration : 29.1 gm/dL  Auto Neutrophil # : 13.01 K/uL  Auto Lymphocyte # : 1.69 K/uL  Auto Monocyte # : 0.85 K/uL  Auto Eosinophil # : 0.15 K/uL  Auto Basophil # : 0.00 K/uL  Auto Neutrophil % : 79.1 %  Auto Lymphocyte % : 10.4 %  Auto Monocyte % : 5.2 %  Auto Eosinophil % : 0.9 %  Auto Basophil % : 0.0 %    02-13    138  |  98  |  13  ----------------------------<  204<H>  4.7   |  24  |  0.96    Ca    9.5      13 Feb 2020 06:33    TPro  8.2  /  Alb  3.5  /  TBili  0.5  /  DBili  x   /  AST  12  /  ALT  10  /  AlkPhos  110  02-13          Assessment:  64F with PMHx of HTN, chronic pancreatitis s/p Jeffery, who presents with SOB for 6 days, and L flank pain for 4 days, found to have PE (L>R). ProBNP and trop are within normal range. RV is not enlarged on CT, no TTE available yet. Patient is tachycardic and requiring supplemental O2.      1. PE- unprovoked, overall low risk given no evidence of R heart strain on CT, normal proBNP and troponin. No other risk factors for PE- no family history, no recent travel or surgery within last few months, former smoker but has not smoked in about a year, is up to date on cancer screenings.   2. HTN  3. chronic pancreatitis s/p Jeffery         Plan:  1. Continue AC with heparin gtt  2. Would check for APLS- lupus anticoagulant, anticardiolipin antibodies, anti–beta-2 glycoprotein antibodies  3. Check TTE and LE venous dopplers  4. Admit to tele under hospitalist service           Thank you      Vascular Cardiology Service    Please call with any questions:   [place] [phone]  Office [phone]  email:  [email or web address]

## 2020-02-13 NOTE — H&P ADULT - ASSESSMENT
64 yr old female with PE, no apparent risk factors.  Currently HD stable with negative troponins and BNP.

## 2020-02-13 NOTE — ED PROVIDER NOTE - OBJECTIVE STATEMENT
MH of HTN, GERD, ARDS, chronic pancreatitis (diagnosed in 2014) s/p Jeffery procedure (05/08/19), p/w severe L sided back pain and difficulty breathing. Pt states she has had this pain for the last 2-3 days but the pain acutely worsened this morning, and she felt as though she could not breath 2/2 pain. Pain exacerbated by movement and by inspiration, pain not reproducible w/ palpation. No new rashes, no dysuria, no fever/chills. Pt has chronic nausea 2/2 to her chronic pancreatitis, states she has not been eating/drinking much this week 2/2 this nausea, exacerbated by URI sx for the last few days. Pt reports cough w/ minimal white sputum production.

## 2020-02-13 NOTE — H&P ADULT - NSICDXPASTMEDICALHX_GEN_ALL_CORE_FT
PAST MEDICAL HISTORY:  ARDS survivor 2015    Chronic abdominal pain     Chronic pancreatitis last episode 4/2019    GERD (gastroesophageal reflux disease)     History of adrenal adenoma stable on imaging    HTN (hypertension)     Thrombophlebitis superficial right UE during 4/2019 hospital stay, resolved as per pt    Vocal cord polyp removal 2018, benign as per pt

## 2020-02-13 NOTE — ED ADULT NURSE NOTE - OBJECTIVE STATEMENT
64 year old female presents to the ED via walk in with c/o SOB and back pain x 4 days. Patient current every day smoker, but has not smoked in 1 week due to increasing SOB. Patient tachypneic, tachycardic and increased pain with positional movement to left side of back. Describes pain as worsening with deep breath in. No c/o abd pain, CP, V/D. Mild nausea x 4 days with decreased PO intake. No sick contacts. Denies fever/chills. Family at bedside, comfort and safety measures maintained. 64 year old female presents to the ED via walk in with c/o SOB and back pain x 4 days. Patient current every day smoker, but has not smoked in 1 week due to increasing SOB. Patient tachypneic, tachycardic and increased pain with positional movement to left side of back. Cough with mild productive white sputum. Lung sounds clear bilat. Describes pain as worsening with deep breath in. No c/o abd pain, CP, V/D. Mild nausea x 4 days with decreased PO intake. No sick contacts. Denies fever/chills. Family at bedside, comfort and safety measures maintained.

## 2020-02-13 NOTE — PROVIDER CONTACT NOTE (CRITICAL VALUE NOTIFICATION) - ASSESSMENT
Pt is A/ox4. Bp 122/68  T 98.1 (4% on supplemental O2. Pt denies SOB, Dizziness or SOB. No s/s of bleeding. PT is asymptomatic at this time.

## 2020-02-13 NOTE — ED PROVIDER NOTE - CLINICAL SUMMARY MEDICAL DECISION MAKING FREE TEXT BOX
Pt w/ hx chronic pancreatitis and related nausea sequelae p/w L flank pain and difficulty breathing, sx for 2 days but acutely worsened today, O2 91% on RA. SOB likely 2/2 pain w/ inspiration, CXR clear and lung exam WNL, no evidence of pneumotx or pulmonary pathology as etiology, pt reports decreased PO intake this week so possible first time kidney stone, also possible back pain 2/2 pancreatitis flare although pt states this is not typical for her pancreatitis as she has no abdominal pain and her abd exam benign. No fever, low susp. for infx etiology. Will obtain CT abd pelvis and CTA chest to r/o stone vs abd path. vs PE as etiology. Cont to reassess, anticipate admission -[place]o

## 2020-02-13 NOTE — H&P ADULT - PROBLEM SELECTOR PLAN 1
Heparin gtt  tele  check TTE, LE gonzález  hypercoaguable w/u (due to presence of clot will not be able to check for protein C or S deficiency) Heparin gtt  tele  check TTE, LE dopllers  Morphine PRN pain (4mg IVP)  Appreciate vascular cardiology evaluation    hypercoaguable w/u (due to presence of clot will not be able to check for protein C or S deficiency)

## 2020-02-13 NOTE — H&P ADULT - NSHPLABSRESULTS_GEN_ALL_CORE
LABS:                        10.1   16.26 )-----------( 650      ( 13 Feb 2020 06:33 )             34.7     02-13    138  |  98  |  13  ----------------------------<  204<H>  4.7   |  24  |  0.96    Ca    9.5      13 Feb 2020 06:33    TPro  8.2  /  Alb  3.5  /  TBili  0.5  /  DBili  x   /  AST  12  /  ALT  10  /  AlkPhos  110  02-13    PT/INR - ( 13 Feb 2020 10:39 )   PT: 16.5 sec;   INR: 1.43 ratio         PTT - ( 13 Feb 2020 10:39 )  PTT:27.6 sec          RADIOLOGY & ADDITIONAL TESTS:    Imaging Personally Reviewed: CTA reveiwed, b/l PE, left main PE and multiple PE on right

## 2020-02-13 NOTE — PROVIDER CONTACT NOTE (CRITICAL VALUE NOTIFICATION) - ACTION/TREATMENT ORDERED:
NP Letitia aware. RN instructed to follow protocol as per sunrise. Such is as follows: Delay infusion for 1 hour and resume at 9ml/hr.

## 2020-02-13 NOTE — ED ADULT NURSE REASSESSMENT NOTE - NS ED NURSE REASSESS COMMENT FT1
sitting up in bed, eating lunch, nasal cannula in place, heparin gtt in place, (pt stood on scale for actual/measured wt), denies c/o, adm pending sitting up in bed, eating lunch, nasal cannula in place, heparin gtt in place, (pt stood on scale for actual/measured wt), denies c/o, adm pending, *seen by cardiology resident earlier in the morning

## 2020-02-14 DIAGNOSIS — R59.1 GENERALIZED ENLARGED LYMPH NODES: ICD-10-CM

## 2020-02-14 LAB
ANION GAP SERPL CALC-SCNC: 14 MMOL/L — SIGNIFICANT CHANGE UP (ref 5–17)
APTT BLD: 56.2 SEC — HIGH (ref 27.5–36.3)
APTT BLD: 91.8 SEC — HIGH (ref 27.5–36.3)
APTT BLD: 92.7 SEC — HIGH (ref 27.5–36.3)
BUN SERPL-MCNC: 10 MG/DL — SIGNIFICANT CHANGE UP (ref 7–23)
CALCIUM SERPL-MCNC: 8.9 MG/DL — SIGNIFICANT CHANGE UP (ref 8.4–10.5)
CHLORIDE SERPL-SCNC: 103 MMOL/L — SIGNIFICANT CHANGE UP (ref 96–108)
CO2 SERPL-SCNC: 23 MMOL/L — SIGNIFICANT CHANGE UP (ref 22–31)
CREAT SERPL-MCNC: 0.79 MG/DL — SIGNIFICANT CHANGE UP (ref 0.5–1.3)
CULTURE RESULTS: SIGNIFICANT CHANGE UP
DRVVT SCREEN TO CONFIRM RATIO: SIGNIFICANT CHANGE UP
GLUCOSE SERPL-MCNC: 95 MG/DL — SIGNIFICANT CHANGE UP (ref 70–99)
HCT VFR BLD CALC: 28.9 % — LOW (ref 34.5–45)
HGB BLD-MCNC: 8.2 G/DL — LOW (ref 11.5–15.5)
INR BLD: 1.37 RATIO — HIGH (ref 0.88–1.16)
LA NT DPL PPP QL: 44.9 SEC — SIGNIFICANT CHANGE UP
MCHC RBC-ENTMCNC: 20.7 PG — LOW (ref 27–34)
MCHC RBC-ENTMCNC: 28.4 GM/DL — LOW (ref 32–36)
MCV RBC AUTO: 72.8 FL — LOW (ref 80–100)
NORMALIZED SCT PPP-RTO: 0.79 RATIO — SIGNIFICANT CHANGE UP (ref 0–1.16)
NORMALIZED SCT PPP-RTO: SIGNIFICANT CHANGE UP
NRBC # BLD: 0 /100 WBCS — SIGNIFICANT CHANGE UP (ref 0–0)
PLATELET # BLD AUTO: 661 K/UL — HIGH (ref 150–400)
POTASSIUM SERPL-MCNC: 4.2 MMOL/L — SIGNIFICANT CHANGE UP (ref 3.5–5.3)
POTASSIUM SERPL-SCNC: 4.2 MMOL/L — SIGNIFICANT CHANGE UP (ref 3.5–5.3)
PROTHROM AB SERPL-ACNC: 15.8 SEC — HIGH (ref 10–12.9)
RBC # BLD: 3.97 M/UL — SIGNIFICANT CHANGE UP (ref 3.8–5.2)
RBC # FLD: 21.5 % — HIGH (ref 10.3–14.5)
SODIUM SERPL-SCNC: 140 MMOL/L — SIGNIFICANT CHANGE UP (ref 135–145)
SPECIMEN SOURCE: SIGNIFICANT CHANGE UP
WBC # BLD: 12.5 K/UL — HIGH (ref 3.8–10.5)
WBC # FLD AUTO: 12.5 K/UL — HIGH (ref 3.8–10.5)

## 2020-02-14 PROCEDURE — 99233 SBSQ HOSP IP/OBS HIGH 50: CPT

## 2020-02-14 PROCEDURE — 99223 1ST HOSP IP/OBS HIGH 75: CPT

## 2020-02-14 PROCEDURE — 93306 TTE W/DOPPLER COMPLETE: CPT | Mod: 26

## 2020-02-14 RX ORDER — LIPASE/PROTEASE/AMYLASE 16-48-48K
2 CAPSULE,DELAYED RELEASE (ENTERIC COATED) ORAL
Refills: 0 | Status: DISCONTINUED | OUTPATIENT
Start: 2020-02-14 | End: 2020-02-21

## 2020-02-14 RX ORDER — LIDOCAINE 4 G/100G
1 CREAM TOPICAL DAILY
Refills: 0 | Status: DISCONTINUED | OUTPATIENT
Start: 2020-02-14 | End: 2020-02-21

## 2020-02-14 RX ORDER — SENNA PLUS 8.6 MG/1
2 TABLET ORAL AT BEDTIME
Refills: 0 | Status: DISCONTINUED | OUTPATIENT
Start: 2020-02-14 | End: 2020-02-19

## 2020-02-14 RX ORDER — MORPHINE SULFATE 50 MG/1
2 CAPSULE, EXTENDED RELEASE ORAL ONCE
Refills: 0 | Status: DISCONTINUED | OUTPATIENT
Start: 2020-02-14 | End: 2020-02-14

## 2020-02-14 RX ORDER — ACETAMINOPHEN 500 MG
650 TABLET ORAL EVERY 6 HOURS
Refills: 0 | Status: DISCONTINUED | OUTPATIENT
Start: 2020-02-14 | End: 2020-02-21

## 2020-02-14 RX ORDER — POLYETHYLENE GLYCOL 3350 17 G/17G
17 POWDER, FOR SOLUTION ORAL DAILY
Refills: 0 | Status: DISCONTINUED | OUTPATIENT
Start: 2020-02-14 | End: 2020-02-21

## 2020-02-14 RX ADMIN — LIDOCAINE 1 PATCH: 4 CREAM TOPICAL at 22:32

## 2020-02-14 RX ADMIN — HEPARIN SODIUM 1000 UNIT(S)/HR: 5000 INJECTION INTRAVENOUS; SUBCUTANEOUS at 06:23

## 2020-02-14 RX ADMIN — POLYETHYLENE GLYCOL 3350 17 GRAM(S): 17 POWDER, FOR SOLUTION ORAL at 10:59

## 2020-02-14 RX ADMIN — MORPHINE SULFATE 4 MILLIGRAM(S): 50 CAPSULE, EXTENDED RELEASE ORAL at 08:05

## 2020-02-14 RX ADMIN — PANTOPRAZOLE SODIUM 40 MILLIGRAM(S): 20 TABLET, DELAYED RELEASE ORAL at 04:48

## 2020-02-14 RX ADMIN — ONDANSETRON 8 MILLIGRAM(S): 8 TABLET, FILM COATED ORAL at 23:41

## 2020-02-14 RX ADMIN — MORPHINE SULFATE 4 MILLIGRAM(S): 50 CAPSULE, EXTENDED RELEASE ORAL at 14:45

## 2020-02-14 RX ADMIN — MORPHINE SULFATE 4 MILLIGRAM(S): 50 CAPSULE, EXTENDED RELEASE ORAL at 00:38

## 2020-02-14 RX ADMIN — Medication 100 MILLIGRAM(S): at 04:48

## 2020-02-14 RX ADMIN — MORPHINE SULFATE 4 MILLIGRAM(S): 50 CAPSULE, EXTENDED RELEASE ORAL at 18:38

## 2020-02-14 RX ADMIN — MORPHINE SULFATE 4 MILLIGRAM(S): 50 CAPSULE, EXTENDED RELEASE ORAL at 15:15

## 2020-02-14 RX ADMIN — LIDOCAINE 1 PATCH: 4 CREAM TOPICAL at 10:59

## 2020-02-14 RX ADMIN — Medication 2 CAPSULE(S): at 17:11

## 2020-02-14 RX ADMIN — Medication 650 MILLIGRAM(S): at 23:41

## 2020-02-14 RX ADMIN — MORPHINE SULFATE 2 MILLIGRAM(S): 50 CAPSULE, EXTENDED RELEASE ORAL at 10:59

## 2020-02-14 RX ADMIN — MORPHINE SULFATE 4 MILLIGRAM(S): 50 CAPSULE, EXTENDED RELEASE ORAL at 08:35

## 2020-02-14 RX ADMIN — MORPHINE SULFATE 4 MILLIGRAM(S): 50 CAPSULE, EXTENDED RELEASE ORAL at 04:42

## 2020-02-14 RX ADMIN — MORPHINE SULFATE 4 MILLIGRAM(S): 50 CAPSULE, EXTENDED RELEASE ORAL at 22:20

## 2020-02-14 RX ADMIN — LIDOCAINE 1 PATCH: 4 CREAM TOPICAL at 19:00

## 2020-02-14 RX ADMIN — Medication 650 MILLIGRAM(S): at 17:12

## 2020-02-14 RX ADMIN — Medication 650 MILLIGRAM(S): at 17:11

## 2020-02-14 RX ADMIN — HEPARIN SODIUM 1000 UNIT(S)/HR: 5000 INJECTION INTRAVENOUS; SUBCUTANEOUS at 22:00

## 2020-02-14 RX ADMIN — SENNA PLUS 2 TABLET(S): 8.6 TABLET ORAL at 21:53

## 2020-02-14 RX ADMIN — HEPARIN SODIUM 1000 UNIT(S)/HR: 5000 INJECTION INTRAVENOUS; SUBCUTANEOUS at 13:24

## 2020-02-14 RX ADMIN — Medication 650 MILLIGRAM(S): at 10:59

## 2020-02-14 RX ADMIN — AMLODIPINE BESYLATE 10 MILLIGRAM(S): 2.5 TABLET ORAL at 04:48

## 2020-02-14 RX ADMIN — MORPHINE SULFATE 4 MILLIGRAM(S): 50 CAPSULE, EXTENDED RELEASE ORAL at 21:46

## 2020-02-14 RX ADMIN — MORPHINE SULFATE 4 MILLIGRAM(S): 50 CAPSULE, EXTENDED RELEASE ORAL at 05:01

## 2020-02-14 RX ADMIN — MORPHINE SULFATE 2 MILLIGRAM(S): 50 CAPSULE, EXTENDED RELEASE ORAL at 11:30

## 2020-02-14 RX ADMIN — MORPHINE SULFATE 4 MILLIGRAM(S): 50 CAPSULE, EXTENDED RELEASE ORAL at 00:49

## 2020-02-14 RX ADMIN — Medication 650 MILLIGRAM(S): at 11:04

## 2020-02-14 RX ADMIN — HEPARIN SODIUM 2000 UNIT(S): 5000 INJECTION INTRAVENOUS; SUBCUTANEOUS at 06:26

## 2020-02-14 NOTE — CONSULT NOTE ADULT - ASSESSMENT
64F PMH HTN, chronic pancreatitis s/p Jeffery procedure (2019), history of ARDS (2015), GERD, and s/p vocal cord polypectomy who presents with dyspnea and left-sided back pain, found to have bilateral DVTs (possible chronic component) with mediastinal (left paratracheal & subcarinal) & L hilar lymphadenopathy, with wedge-shaped opacity in the posterior LLL worrisome for malignancy, especially given smoking history.    1. Bilateral PE:  - The linear defects/web on right are suggestive of chronic PE  - The filling defect on L extending to SCOTT and completely occluding the LLL can be acute or acute on chronic, especially given history  - Agree with heparin drip  - Hypercoagulable workup per primary team  - Up to date with colonoscopy and mammography screening  - Will need repeat LE dopplers and CTPA in 3 months to assess for resolution. May need V/Q to assess for chronic thromboembolic disease in the future. No evidence of pulmonary hypertension on 2D-echo    2. Subcarinal, left paratracheal, and left hilar lymphadenopathy with LLL wedge-shaped opacity:  - Worrisome for primary lung cancer  - Plan for EBUS with TBNA on Wed 2/19 at 2pm  - Will need to hold heparin gtt for 8 hours prior to procedure  - Consider obtaining CT-guided biopsy of the LLL opacity    3. Centrilobular emphysema:  - Denies history of COPD, but is a former smoker with increased A-P diameter on exam  - If dyspnea persists despite a/c, consider ICS/LABA  - Will need outpatient PFTs    4. Discussed with patient and team

## 2020-02-14 NOTE — CONSULT NOTE ADULT - SUBJECTIVE AND OBJECTIVE BOX
[facility] - Division of Pulmonary, Critical Care and Sleep Medicine   Please call [phone] between 8am-pm weekdays, [phone] after hours and weekends      CHIEF COMPLAINT: dyspnea and back pain    HPI:  64F PMH HTN, chronic pancreatitis s/p Jeffery procedure (2019), history of ARDS (2015), GERD, and s/p vocal cord polypectomy who presents with dyspnea, left-sided back pain, and fatigue with loss of appetite for about 5 days. Denies any leg pain or swelling. In the ED, she was found on CTPA to have a large filling defect in the L main pulmonary artery extending into the left upper lobar and left interlobar pulm arteries with complete opacification of the LLL pulm artery. There are thin linear filling defects in the bifurcation of the right upper pulmonary artery and the right interlobar pulmonary arteries extending to the right lower pulmonary artery, appears chronic.    CT also with mediastinal adenopathy, including left paratracheal, subcarinal, and left hilar. There is also a wedge shaped opacity in the posterior right lower lobe, possible malignancy. There is centrilobular emphysema.    She was started on heparin infusion. Pulmonary called for the adenopathy and possible biopsy.    Last colonoscopy in 2018 normal. Mammogram in June 2019 negative. Denies recent travel, weight loss, fevers, night sweats, or chills. No personal or family history of miscarriages or spontaneous abortions. Reports history of smoking but denies any history of COPD or lung cancer. No family history of malignancy, sarcoidosis, or TB. No recent prolonged immobility.    PAST MEDICAL & SURGICAL HISTORY:  Chronic abdominal pain  Thrombophlebitis: superficial right UE during 4/2019 hospital stay, resolved as per pt  Vocal cord polyp: removal 2018, benign as per pt  History of adrenal adenoma: stable on imaging  ARDS survivor: 2015  Chronic pancreatitis: last episode 4/2019  GERD (gastroesophageal reflux disease)  HTN (hypertension)  History of pancreatic surgery: Jeffery procedure (5/8)  S/P shoulder replacement, left: 2016  History of vocal cord polypectomy: 1/2018  S/P hip replacement, right: May 2016  S/P arthroscopy of shoulder: right - 2014  S/P hip replacement: left - 2010  S/P arthroscopy of shoulder: left in 2009      FAMILY HISTORY:  Father passed away at age 45 from alcohol abuse  Mother passed away at age 67 from infected heart valve    SOCIAL HISTORY:  Quit smoking in 2019, used to smoke 4 cigs/day for 20 years  No current alcohol use  No illicit drug use    Allergies:  diazepam (Other)  lemon (Other)    HOME MEDICATIONS: dilaudid, reglan, tylenol, norvasc, metoprolol, creon, vitamin d3, omeprazole, b12, zofran    REVIEW OF SYSTEMS:  Constitutional: [ ] fevers [ ] chills [ ] weight loss [ ] weight gain  HEENT: [ ] dry eyes [ ] eye irritation [ ] postnasal drip [ ] nasal congestion  CV: [ ] chest pain [ ] orthopnea [ ] palpitations [ ] murmur  Resp: [x ] cough [x ] shortness of breath [ ] wheezing [ ] sputum [ ] hemoptysis  GI: [ ] nausea [ ] vomiting [ ] diarrhea [ ] constipation [ ] abd pain [ ] dysphagia   : [ ] dysuria [ ] nocturia [ ] hematuria [ ] increased urinary frequency  Musculoskeletal: [ ] myalgias [ ] arthralgias   Skin: [ ] rash [ ] itch  Neurological: [ ] headache [ ] dizziness [ ] syncope [ ] weakness [ ] numbness  Psychiatric: [ ] anxiety [ ] depression  Endocrine: [ ] diabetes [ ] thyroid problem  Hematologic/Lymphatic: [ ] anemia [ ] bleeding problem  Allergic/Immunologic: [ ] itchy eyes [ ] nasal discharge [ ] hives [ ] angioedema    OBJECTIVE:  ICU Vital Signs Last 24 Hrs  T(C): 36.8 (14 Feb 2020 13:15), Max: 37.1 (14 Feb 2020 00:38)  T(F): 98.3 (14 Feb 2020 13:15), Max: 98.8 (14 Feb 2020 00:38)  HR: 93 (14 Feb 2020 13:15) (83 - 110)  BP: 105/59 (14 Feb 2020 13:15) (105/59 - 138/78)  RR: 24 (14 Feb 2020 13:15) (20 - 26)  SpO2: 98% (14 Feb 2020 13:15) (94% - 98%)    02-13 @ 07:01  -  02-14 @ 07:00  --------------------------------------------------------  IN: 228 mL / OUT: 0 mL / NET: 228 mL      PHYSICAL EXAM:  General:  NAD; AAOx3  Neuro: CN II-XII grossly intact; moving all extremities   HEENT: NC/AT; EOMI; MMM  CV: normal S1 & S2; regular rate and rhythm   Pulm: +barrel chest with increased A-P diameter; mildly prolonged expiratory phase; no wheezing rales, or rhonchi  GI: soft; NT/ND  Ext: no edema; pulses intact  Skin: warm, well perfused    HOSPITAL MEDICATIONS:  MEDICATIONS  (STANDING):  acetaminophen   Tablet .. 650 milliGRAM(s) Oral every 6 hours  amLODIPine   Tablet 10 milliGRAM(s) Oral daily  heparin  Infusion. 900 Unit(s)/Hr (9 mL/Hr) IV Continuous <Continuous>  lidocaine   Patch 1 Patch Transdermal daily  metoprolol succinate  milliGRAM(s) Oral daily  pancrelipase  (CREON 12,000 Lipase Units) 2 Capsule(s) Oral three times a day with meals  pantoprazole    Tablet 40 milliGRAM(s) Oral before breakfast  polyethylene glycol 3350 17 Gram(s) Oral daily  senna 2 Tablet(s) Oral at bedtime    MEDICATIONS  (PRN):  benzonatate 100 milliGRAM(s) Oral three times a day PRN Cough  heparin  Injectable 4500 Unit(s) IV Push every 6 hours PRN For aPTT less than 40  heparin  Injectable 2000 Unit(s) IV Push every 6 hours PRN For aPTT between 40 - 57  morphine  - Injectable 4 milliGRAM(s) IV Push every 4 hours PRN Moderate Pain (4 - 6)  ondansetron    Tablet 8 milliGRAM(s) Oral four times a day PRN Nausea and/or Vomiting      LABS:                        8.2    12.50 )-----------( 661      ( 14 Feb 2020 05:56 )             28.9     Hgb Trend: 8.2<--, 8.6<--, 10.1<--  02-14    140  |  103  |  10  ----------------------------<  95  4.2   |  23  |  0.79    Ca    8.9      14 Feb 2020 05:56    TPro  8.2  /  Alb  3.5  /  TBili  0.5  /  DBili  x   /  AST  12  /  ALT  10  /  AlkPhos  110  02-13    Creatinine Trend: 0.79<--, 0.96<--  PT/INR - ( 14 Feb 2020 05:58 )   PT: 15.8 sec;   INR: 1.37 ratio         PTT - ( 14 Feb 2020 12:35 )  PTT:91.8 sec  Urinalysis Basic - ( 13 Feb 2020 13:23 )    Color: Light Yellow / Appearance: Clear / SG: >1.050 / pH: x  Gluc: x / Ketone: Negative  / Bili: Negative / Urobili: Negative   Blood: x / Protein: 30 mg/dL / Nitrite: Negative   Leuk Esterase: Negative / RBC: 2 /hpf / WBC 11 /HPF   Sq Epi: x / Non Sq Epi: 10 /hpf / Bacteria: Negative        Venous Blood Gas:  02-13 @ 11:37  7.37/49/23/28/23  VBG Lactate: 1.6  Venous Blood Gas:  02-13 @ 06:33  7.44/42/34/28/53  VBG Lactate: 2.9    < from: CT Angio Chest w/ IV Cont (02.13.20 @ 07:54) >  FINDINGS:    CHEST:     PULMONARY ARTERIES:  Thin linear filling defects within the bifurcation of the right upper pulmonary artery and the right interlobar pulmonary arteries, extending into the right lower lobar pulmonary artery. A large filling defect is present within the left main pulmonary artery extending into the left upper lobar and left interlobar pulmonary arteries with complete opacification of the left lower lobar pulmonary artery.    No endobronchial lesion. Bronchiectasis.  Centrilobular emphysema. Scattered areas of groundglass opacities, for example in the right anterior upper lobe. Peripheral wedge-shaped opacity in the posterior right lower lobe, indeterminate.  No pleural effusion.  Heart size is normal. No pericardial effusion. Calcific atherosclerosis of the aorta.  There is hilar lymphadenopathy.  There are multiple enlarged mediastinal lymph nodes. For example, there is a subcarinal lymph nodes measuring approximately 1.5 cm in short axis. A left paratracheal lymph node measures approximately 1.5 cm in short axis.      ABDOMEN AND PELVIS:    LIVER: Subcentimeter hypodense focus adjacent tothe gallbladder, too small to characterize.  BILE DUCTS: Normal caliber.  GALLBLADDER: Within normal limits.  SPLEEN: Within normal limits.  PANCREAS: Calcifications within the pancreas, likely related to chronic pancreatitis.  ADRENALS: Indeterminant 0.8 cm right adrenal lesion, not significantly changed. KIDNEYS/URETERS: Bilateral renal cysts. Additional hypodense foci in the bilateral kidneys, too small to characterize.    BLADDER: Evaluation is slightly limited secondary to streak artifact from bilateral hip arthroplasties.  REPRODUCTIVE ORGANS: Visualized portions are within normal limits. Limited evaluation secondary to streak artifact from bilateral hip arthroplasties.    BOWEL: Small hiatal hernia. No bowel obstruction. Postsurgical changes are present. Appendix is normal.  PERITONEUM: No ascites.  VESSELS: Calcific atherosclerosis of the aorta.  RETROPERITONEUM/LYMPH NODES: No lymphadenopathy.    ABDOMINAL WALL: Postsurgical changes.  BONES: Degenerative changes of the spine. Bilateral hip with arthroplasty.    IMPRESSION:     Thin linear filling defects within the bifurcation of the right upper pulmonary artery and the right interlobar pulmonary arteries, extending into the right lower lobar pulmonary artery, which may represent an acute or subacute pulmonary embolus/web.    A large filling defect is present within the left main pulmonary artery extending into the left upper lobar and left interlobar pulmonary arteries with complete opacification of the left lower lobar pulmonary artery, which is age indeterminant.    These findings were discussed with Dr. Powers at 2/13/2020 8:36 AM by Dr. Crystal with read back confirmation.    < end of copied text >    < from: Transthoracic Echocardiogram (02.14.20 @ 05:59) >  Observations:  Mitral Valve: Mitral annular calcification, otherwise  normal mitral valve. Minimal mitral regurgitation.  Aortic Valve/Aorta: Normal trileaflet aortic valve. Peak  transaortic valve gradient equals 6 mm Hg. Peak left  ventricular outflow tract gradient equals 3 mm Hg, LVOT  velocity time integral equals 13 cm.  Aortic Root: 3.1 cm.  Left Atrium: Normal left atrium.  LA volume index = 34  cc/m2.  Left Ventricle: Normal left ventricular systolic function.  No segmental wall motion abnormalities. Mild concentric  left ventricular hypertrophy. Mild diastolic dysfunction  (Stage I).  Right Heart: Normal right atrium. Right ventricular  enlargement with normal right ventricular systolic  function. Normal tricuspid valve. Mild tricuspid  regurgitation. Normal pulmonic valve.  Pericardium/Pleura: Normal pericardium with no pericardial  effusion.  Hemodynamic: Estimated right atrial pressure is 8 mm Hg.  Estimated right ventricular systolic pressure equals 39 mm  Hg, assuming right atrial pressure equals 8 mm Hg,  consistent with borderline pulmonary hypertension.  ------------------------------------------------------------------------  Conclusions:  1. Normal left atrium.LA volume index = 34 cc/m2.  2. Mild concentric left ventricular hypertrophy.  3. Normal left ventricular systolic function. No segmental  wall motion abnormalities.  4. Right ventricular enlargement with normal right  ventricular systolic function.  5. Estimated pulmonary artery systolic pressure equals 39  mm Hg, assuming right atrial pressure equals 8 mm Hg,  consistent with borderline pulmonary pressures    < end of copied text >      Lower extremity dopplers (2/13): right below the knee DVT in the right PT and soleal veins

## 2020-02-14 NOTE — PROGRESS NOTE ADULT - SUBJECTIVE AND OBJECTIVE BOX
Vascular Cardiology  Progress note     [place] [id number]              EMAIL [email or web address]   OFFICE [phone]    CC:      INTERVAL HISTORY:           Allergies    diazepam (Other)  lemon (Other)    Intolerances    	    MEDICATIONS:  amLODIPine   Tablet 10 milliGRAM(s) Oral daily  heparin  Infusion. 900 Unit(s)/Hr IV Continuous <Continuous>  heparin  Injectable 4500 Unit(s) IV Push every 6 hours PRN  heparin  Injectable 2000 Unit(s) IV Push every 6 hours PRN  metoprolol succinate  milliGRAM(s) Oral daily        morphine  - Injectable 4 milliGRAM(s) IV Push every 4 hours PRN  ondansetron    Tablet 8 milliGRAM(s) Oral four times a day PRN    pantoprazole    Tablet 40 milliGRAM(s) Oral before breakfast          PAST MEDICAL & SURGICAL HISTORY:  Chronic abdominal pain  Thrombophlebitis: superficial right UE during 4/2019 hospital stay, resolved as per pt  Vocal cord polyp: removal 2018, benign as per pt  History of adrenal adenoma: stable on imaging  ARDS survivor: 2015  Chronic pancreatitis: last episode 4/2019  GERD (gastroesophageal reflux disease)  HTN (hypertension)  History of pancreatic surgery: Jeffery procedure (5/8)  S/P shoulder replacement, left: 2016  History of vocal cord polypectomy: 1/2018  S/P hip replacement, right: May 2016  S/P arthroscopy of shoulder: right - 2014  S/P hip replacement: left - 2010  S/P arthroscopy of shoulder: left in 2009      FAMILY HISTORY:  Family history of alcohol abuse      SOCIAL HISTORY:  unchanged    REVIEW OF SYSTEMS:  CONSTITUTIONAL: No fever, weight loss, or fatigue  EYES: No eye pain, visual disturbances, or discharge  ENMT:  No difficulty hearing, tinnitus, vertigo; No sinus or throat pain  NECK: No pain or stiffness  RESPIRATORY: No cough, wheezing, chills or hemoptysis; No Shortness of Breath  CARDIOVASCULAR: No chest pain, palpitations, passing out, dizziness, or leg swelling  GASTROINTESTINAL: No abdominal or epigastric pain. No nausea, vomiting, or hematemesis; No diarrhea or constipation. No melena or hematochezia.  GENITOURINARY: No dysuria, frequency, hematuria, or incontinence  NEUROLOGICAL: No headaches, memory loss, loss of strength, numbness, or tremors  SKIN: No itching, burning, rashes, or lesions   LYMPH Nodes: No enlarged glands  ENDOCRINE: No heat or cold intolerance; No hair loss  MUSCULOSKELETAL: No joint pain or swelling; No muscle, back, or extremity pain  PSYCHIATRIC: No depression, anxiety, mood swings, or difficulty sleeping  HEME/LYMPH: No easy bruising, or bleeding gums  ALLERY AND IMMUNOLOGIC: No hives or eczema	    [ x] All others negative	  [ ] Unable to obtain    PHYSICAL EXAM:  T(C): 36.9 (02-14-20 @ 04:57), Max: 37.1 (02-13-20 @ 12:26)  HR: 83 (02-14-20 @ 04:57) (83 - 120)  BP: 107/56 (02-14-20 @ 04:57) (107/56 - 138/78)  RR: 24 (02-14-20 @ 04:57) (20 - 26)  SpO2: 97% (02-14-20 @ 04:57) (94% - 97%)  Wt(kg): --  I&O's Summary    13 Feb 2020 07:01  -  14 Feb 2020 07:00  --------------------------------------------------------  IN: 228 mL / OUT: 0 mL / NET: 228 mL        Appearance: Normal	  HEENT:   Normal oral mucosa, PERRL, EOMI	  Carotid:  Right: No bruit    Left:  No bruit  Lymphatic: No lymphadenopathy  Cardiovascular: Normal S1 S2, No JVD, No murmurs, No edema  Respiratory: Lungs clear to auscultation	  Psychiatry: A & O x 3, Mood & affect appropriate  Gastrointestinal:  Soft, Non-tender, + BS	  Skin: No rashes, No ecchymoses, No cyanosis	  Neurologic: Non-focal  Extremities: Normal range of motion, No clubbing, cyanosis.  Vascular:   Right Femoral:  Palpable   Left Femoral:  Palpable  Right Popliteal: Palpable   Left Popliteal:  palpable  Right DP:  Palpable             Left DP:  Palpable  Right PT:  Palpable              Left PT:  Palpable      LABS:	 	    CBC Full  -  ( 14 Feb 2020 05:56 )  WBC Count : 12.50 K/uL  Hemoglobin : 8.2 g/dL  Hematocrit : 28.9 %  Platelet Count - Automated : 661 K/uL  Mean Cell Volume : 72.8 fl  Mean Cell Hemoglobin : 20.7 pg  Mean Cell Hemoglobin Concentration : 28.4 gm/dL  Auto Neutrophil # : x  Auto Lymphocyte # : x  Auto Monocyte # : x  Auto Eosinophil # : x  Auto Basophil # : x  Auto Neutrophil % : x  Auto Lymphocyte % : x  Auto Monocyte % : x  Auto Eosinophil % : x  Auto Basophil % : x    02-14    140  |  103  |  10  ----------------------------<  95  4.2   |  23  |  0.79  02-13    138  |  98  |  13  ----------------------------<  204<H>  4.7   |  24  |  0.96    Ca    8.9      14 Feb 2020 05:56  Ca    9.5      13 Feb 2020 06:33    TPro  8.2  /  Alb  3.5  /  TBili  0.5  /  DBili  x   /  AST  12  /  ALT  10  /  AlkPhos  110  02-13    LE venous duplex:  IMPRESSION:   Positive DVT below the right knee in one of the paired right posterior tibial veins and the right soleal vein.  No evidence of deep venous thrombosis in the left lower extremity.           64F with PMHx of HTN, chronic pancreatitis s/p Jeffery, who presents with SOB for 6 days, and L flank pain for 4 days, found to have PE (L>R). ProBNP and trop are within normal range. RV is not enlarged on CT, no TTE available yet. Patient is tachycardic and requiring supplemental O2.      1. PE- unprovoked, overall low risk given no evidence of R heart strain on CT, normal proBNP and troponin. No other risk factors for PE- no family history, no recent travel or surgery within last few months, former smoker but has not smoked in about a year, is up to date on cancer screenings.   2. DVT- below knee in one of the paired right posterior tibial veins and the right soleal vein  3. HTN  4. chronic pancreatitis s/p Jeffery         Plan:  1. Continue AC with heparin gtt (eventually with change to PO AC)  2. f/u testing APLS- lupus anticoagulant, anticardiolipin antibodies, anti–beta-2 glycoprotein antibodies  3. Check TTE  4. Continue tele      Thank you      Vascular Cardiology Service   [facility] - [id number]  Office [phone]  email:   [email or web address]  Progress note     [place] [id number]              EMAIL [email or web address]   OFFICE [phone]    CC:  SOB    INTERVAL HISTORY:  LE dopplers with below knee DVT on R    Patient reports breathing is slightly improved however still has L flank pain, controlled with pain medications. Denies LE swelling or pain.         Allergies    diazepam (Other)  lemon (Other)    Intolerances    	    MEDICATIONS:  amLODIPine   Tablet 10 milliGRAM(s) Oral daily  heparin  Infusion. 900 Unit(s)/Hr IV Continuous <Continuous>  heparin  Injectable 4500 Unit(s) IV Push every 6 hours PRN  heparin  Injectable 2000 Unit(s) IV Push every 6 hours PRN  metoprolol succinate  milliGRAM(s) Oral daily        morphine  - Injectable 4 milliGRAM(s) IV Push every 4 hours PRN  ondansetron    Tablet 8 milliGRAM(s) Oral four times a day PRN    pantoprazole    Tablet 40 milliGRAM(s) Oral before breakfast          PAST MEDICAL & SURGICAL HISTORY:  Chronic abdominal pain  Thrombophlebitis: superficial right UE during 4/2019 hospital stay, resolved as per pt  Vocal cord polyp: removal 2018, benign as per pt  History of adrenal adenoma: stable on imaging  ARDS survivor: 2015  Chronic pancreatitis: last episode 4/2019  GERD (gastroesophageal reflux disease)  HTN (hypertension)  History of pancreatic surgery: Jeffery procedure (5/8)  S/P shoulder replacement, left: 2016  History of vocal cord polypectomy: 1/2018  S/P hip replacement, right: May 2016  S/P arthroscopy of shoulder: right - 2014  S/P hip replacement: left - 2010  S/P arthroscopy of shoulder: left in 2009      FAMILY HISTORY:  Family history of alcohol abuse      SOCIAL HISTORY:  unchanged    REVIEW OF SYSTEMS:  CONSTITUTIONAL: No fever, weight loss, or fatigue  EYES: No eye pain, visual disturbances, or discharge  ENMT:  No difficulty hearing, tinnitus, vertigo; No sinus or throat pain  NECK: No pain or stiffness  RESPIRATORY: No cough, wheezing, chills or hemoptysis; No Shortness of Breath  CARDIOVASCULAR: No chest pain, palpitations, passing out, dizziness, or leg swelling  GASTROINTESTINAL: No abdominal or epigastric pain. No nausea, vomiting, or hematemesis; No diarrhea or constipation. No melena or hematochezia.  GENITOURINARY: No dysuria, frequency, hematuria, or incontinence  NEUROLOGICAL: No headaches, memory loss, loss of strength, numbness, or tremors  SKIN: No itching, burning, rashes, or lesions   LYMPH Nodes: No enlarged glands  ENDOCRINE: No heat or cold intolerance; No hair loss  MUSCULOSKELETAL: No joint pain or swelling; No muscle, back, or extremity pain  PSYCHIATRIC: No depression, anxiety, mood swings, or difficulty sleeping  HEME/LYMPH: No easy bruising, or bleeding gums  ALLERY AND IMMUNOLOGIC: No hives or eczema	    [ x] All others negative	  [ ] Unable to obtain    PHYSICAL EXAM:  T(C): 36.9 (02-14-20 @ 04:57), Max: 37.1 (02-13-20 @ 12:26)  HR: 83 (02-14-20 @ 04:57) (83 - 120)  BP: 107/56 (02-14-20 @ 04:57) (107/56 - 138/78)  RR: 24 (02-14-20 @ 04:57) (20 - 26)  SpO2: 97% (02-14-20 @ 04:57) (94% - 97%)  Wt(kg): --  I&O's Summary    13 Feb 2020 07:01  -  14 Feb 2020 07:00  --------------------------------------------------------  IN: 228 mL / OUT: 0 mL / NET: 228 mL        Appearance: Normal	  HEENT:   Normal oral mucosa, PERRL, EOMI	  Carotid:  Right: No bruit    Left:  No bruit  Lymphatic: No lymphadenopathy  Cardiovascular: Normal S1 S2, No JVD, No murmurs, No edema  Respiratory: Lungs clear to auscultation	  Psychiatry: A & O x 3, Mood & affect appropriate  Gastrointestinal:  Soft, Non-tender, + BS	  Skin: No rashes, No ecchymoses, No cyanosis	  Neurologic: Non-focal  Extremities: Normal range of motion, No clubbing, cyanosis.  Vascular:   Right Femoral:  Palpable   Left Femoral:  Palpable  Right Popliteal: Palpable   Left Popliteal:  palpable  Right DP:  Palpable             Left DP:  Palpable  Right PT:  Palpable              Left PT:  Palpable      LABS:	 	    CBC Full  -  ( 14 Feb 2020 05:56 )  WBC Count : 12.50 K/uL  Hemoglobin : 8.2 g/dL  Hematocrit : 28.9 %  Platelet Count - Automated : 661 K/uL  Mean Cell Volume : 72.8 fl  Mean Cell Hemoglobin : 20.7 pg  Mean Cell Hemoglobin Concentration : 28.4 gm/dL  Auto Neutrophil # : x  Auto Lymphocyte # : x  Auto Monocyte # : x  Auto Eosinophil # : x  Auto Basophil # : x  Auto Neutrophil % : x  Auto Lymphocyte % : x  Auto Monocyte % : x  Auto Eosinophil % : x  Auto Basophil % : x    02-14    140  |  103  |  10  ----------------------------<  95  4.2   |  23  |  0.79  02-13    138  |  98  |  13  ----------------------------<  204<H>  4.7   |  24  |  0.96    Ca    8.9      14 Feb 2020 05:56  Ca    9.5      13 Feb 2020 06:33    TPro  8.2  /  Alb  3.5  /  TBili  0.5  /  DBili  x   /  AST  12  /  ALT  10  /  AlkPhos  110  02-13    LE venous duplex:  IMPRESSION:   Positive DVT below the right knee in one of the paired right posterior tibial veins and the right soleal vein.  No evidence of deep venous thrombosis in the left lower extremity.           64F with PMHx of HTN, chronic pancreatitis s/p Jeffery, who presents with SOB for 6 days, and L flank pain for 4 days, found to have PE (L>R). ProBNP and trop are within normal range. RV is not enlarged on CT, no TTE available yet. Patient is tachycardic and requiring supplemental O2.      1. PE- unprovoked, overall low risk given no evidence of R heart strain on CT, normal proBNP and troponin. No other risk factors for PE- no family history, no recent travel or surgery within last few months, former smoker but has not smoked in about a year, is up to date on cancer screenings.   2. DVT- below knee in one of the paired right posterior tibial veins and the right soleal vein  3. HTN  4. chronic pancreatitis s/p Jeffery         Plan:  1. Please consult pulmonology for eval for possible biopsy given enlarged lymph nodes on CT scan and patient is former smoker  2. Continue AC with heparin gtt. If pulm plans to perform biopsy during this admission will continue hep gtt. If they plan surveillance CT or biopsy at a later date, would switch to xarelto 15mg BID starting tonight  3. f/u testing APLS- lupus anticoagulant, anticardiolipin antibodies, anti–beta-2 glycoprotein antibodies  4. Check TTE  5. Continue tele      Thank you      Vascular Cardiology Service   [facility] [address]  Office [phone]  email:   [email or web address]

## 2020-02-14 NOTE — PROGRESS NOTE ADULT - PROBLEM SELECTOR PLAN 1
unprovoked. no signs of heart strain, trop/bnp normal, TTE with RV enlargement but normal RV function  doppler w/ r below knee DVT  - Heparin gtt, monitor PTT closely, to continue pending biopsy. eventual transition to xarelto  - morphine 4 mg iv q4h prn - will c/w   - added tylenol atc, lidocaine patch  - senna/miralax standing while on opioids  - Appreciate vascular cardiology evaluation    - f/u  APLS testing

## 2020-02-14 NOTE — PROGRESS NOTE ADULT - SUBJECTIVE AND OBJECTIVE BOX
Patient is a 64y old  Female who presents with a chief complaint of Back pain, SOB (14 Feb 2020 09:10)        SUBJECTIVE / OVERNIGHT EVENTS:  overnight no acute events.  feels back pain with inspiration  no n/v/f/chills.  feels some sob as well.    CAPILLARY BLOOD GLUCOSE        I&O's Summary    13 Feb 2020 07:01  -  14 Feb 2020 07:00  --------------------------------------------------------  IN: 228 mL / OUT: 0 mL / NET: 228 mL        Vital Signs Last 24 Hrs  T(C): 36.8 (14 Feb 2020 13:15), Max: 37.1 (14 Feb 2020 00:38)  T(F): 98.3 (14 Feb 2020 13:15), Max: 98.8 (14 Feb 2020 00:38)  HR: 93 (14 Feb 2020 13:15) (83 - 110)  BP: 105/59 (14 Feb 2020 13:15) (105/59 - 138/78)  BP(mean): --  RR: 24 (14 Feb 2020 13:15) (20 - 26)  SpO2: 98% (14 Feb 2020 13:15) (94% - 98%)    PHYSICAL EXAM:  GENERAL:  Well appearing, thin f, in NAD, occasional coughing  HEAD:  NCAT  EYES: PERRLA, conjunctiva clear  NECK: Supple  CHEST/LUNG: CTA B/L. No w/r/r.  HEART: Reg rate. Normal S1, S2. No m/r/g.   ABDOMEN: SNTND.   EXTREMITIES:  2+ Peripheral Pulses, No clubbing, cyanosis, edema.  PSYCH:  appropriate affect      LABS:                        8.2    12.50 )-----------( 661      ( 14 Feb 2020 05:56 )             28.9     02-14    140  |  103  |  10  ----------------------------<  95  4.2   |  23  |  0.79    Ca    8.9      14 Feb 2020 05:56    TPro  8.2  /  Alb  3.5  /  TBili  0.5  /  DBili  x   /  AST  12  /  ALT  10  /  AlkPhos  110  02-13    PT/INR - ( 14 Feb 2020 05:58 )   PT: 15.8 sec;   INR: 1.37 ratio         PTT - ( 14 Feb 2020 12:35 )  PTT:91.8 sec      Urinalysis Basic - ( 13 Feb 2020 13:23 )    Color: Light Yellow / Appearance: Clear / SG: >1.050 / pH: x  Gluc: x / Ketone: Negative  / Bili: Negative / Urobili: Negative   Blood: x / Protein: 30 mg/dL / Nitrite: Negative   Leuk Esterase: Negative / RBC: 2 /hpf / WBC 11 /HPF   Sq Epi: x / Non Sq Epi: 10 /hpf / Bacteria: Negative          RADIOLOGY & ADDITIONAL TESTS:  Consultant(s) Notes Reviewed:  [facility] cards  Care Discussed with Consultants/Other Providers: Floor NP/PA    MEDICATIONS  (STANDING):  acetaminophen   Tablet .. 650 milliGRAM(s) Oral every 6 hours  amLODIPine   Tablet 10 milliGRAM(s) Oral daily  heparin  Infusion. 900 Unit(s)/Hr (9 mL/Hr) IV Continuous <Continuous>  lidocaine   Patch 1 Patch Transdermal daily  metoprolol succinate  milliGRAM(s) Oral daily  pancrelipase  (CREON 12,000 Lipase Units) 2 Capsule(s) Oral three times a day with meals  pantoprazole    Tablet 40 milliGRAM(s) Oral before breakfast  polyethylene glycol 3350 17 Gram(s) Oral daily  senna 2 Tablet(s) Oral at bedtime    MEDICATIONS  (PRN):  benzonatate 100 milliGRAM(s) Oral three times a day PRN Cough  heparin  Injectable 4500 Unit(s) IV Push every 6 hours PRN For aPTT less than 40  heparin  Injectable 2000 Unit(s) IV Push every 6 hours PRN For aPTT between 40 - 57  morphine  - Injectable 4 milliGRAM(s) IV Push every 4 hours PRN Moderate Pain (4 - 6)  ondansetron    Tablet 8 milliGRAM(s) Oral four times a day PRN Nausea and/or Vomiting

## 2020-02-14 NOTE — PROGRESS NOTE ADULT - PROBLEM SELECTOR PLAN 5
dispo: c/w hep gtt, monitor pain control and transition to oral pain meds eventually, biopsy of lymphadenopathy on wednesday. no PT needs.    Transitions of Care Status:  1.  Name of PCP: Ananda Chavira  2.  PCP Contacted on Admission: [ ] Y    [x] N    3.  PCP contacted at Discharge: [ ] Y    [ ] N    [ ] N/A  4.  Post-Discharge Appointment Date and Location:  5.  Summary of Handoff given to PCP:

## 2020-02-14 NOTE — PROGRESS NOTE ADULT - PROBLEM SELECTOR PLAN 2
seen incidentally on CTchest with hilar/mediastinal LAD  - pulm consulted, for bronch/bx on wednesday

## 2020-02-14 NOTE — PHARMACOTHERAPY INTERVENTION NOTE - COMMENTS
Confirmed home medications with patient and pharmacy, updated in Outpatient Medication Review. Communicated with MD.     - Jose Moffett, Pharmacy Intern. Confirmed home medications with patient and pharmacy, updated in Outpatient Medication Review. Communicated with MD.     Jose Moffett, Pharmacy Intern    Ary Sandoval, PharmD   [phone]

## 2020-02-15 LAB
ANION GAP SERPL CALC-SCNC: 11 MMOL/L — SIGNIFICANT CHANGE UP (ref 5–17)
APTT BLD: 98 SEC — HIGH (ref 27.5–36.3)
BUN SERPL-MCNC: 9 MG/DL — SIGNIFICANT CHANGE UP (ref 7–23)
CALCIUM SERPL-MCNC: 8.9 MG/DL — SIGNIFICANT CHANGE UP (ref 8.4–10.5)
CHLORIDE SERPL-SCNC: 106 MMOL/L — SIGNIFICANT CHANGE UP (ref 96–108)
CO2 SERPL-SCNC: 24 MMOL/L — SIGNIFICANT CHANGE UP (ref 22–31)
CREAT SERPL-MCNC: 0.74 MG/DL — SIGNIFICANT CHANGE UP (ref 0.5–1.3)
GLUCOSE SERPL-MCNC: 83 MG/DL — SIGNIFICANT CHANGE UP (ref 70–99)
HCT VFR BLD CALC: 28.6 % — LOW (ref 34.5–45)
HGB BLD-MCNC: 8.6 G/DL — LOW (ref 11.5–15.5)
MCHC RBC-ENTMCNC: 21.6 PG — LOW (ref 27–34)
MCHC RBC-ENTMCNC: 30.1 GM/DL — LOW (ref 32–36)
MCV RBC AUTO: 71.7 FL — LOW (ref 80–100)
NRBC # BLD: 0 /100 WBCS — SIGNIFICANT CHANGE UP (ref 0–0)
PLATELET # BLD AUTO: 731 K/UL — HIGH (ref 150–400)
POTASSIUM SERPL-MCNC: 4 MMOL/L — SIGNIFICANT CHANGE UP (ref 3.5–5.3)
POTASSIUM SERPL-SCNC: 4 MMOL/L — SIGNIFICANT CHANGE UP (ref 3.5–5.3)
RBC # BLD: 3.99 M/UL — SIGNIFICANT CHANGE UP (ref 3.8–5.2)
RBC # FLD: 21.6 % — HIGH (ref 10.3–14.5)
SODIUM SERPL-SCNC: 141 MMOL/L — SIGNIFICANT CHANGE UP (ref 135–145)
WBC # BLD: 13.57 K/UL — HIGH (ref 3.8–10.5)
WBC # FLD AUTO: 13.57 K/UL — HIGH (ref 3.8–10.5)

## 2020-02-15 PROCEDURE — 99233 SBSQ HOSP IP/OBS HIGH 50: CPT

## 2020-02-15 RX ORDER — MORPHINE SULFATE 50 MG/1
6 CAPSULE, EXTENDED RELEASE ORAL
Refills: 0 | Status: DISCONTINUED | OUTPATIENT
Start: 2020-02-15 | End: 2020-02-16

## 2020-02-15 RX ORDER — IPRATROPIUM/ALBUTEROL SULFATE 18-103MCG
3 AEROSOL WITH ADAPTER (GRAM) INHALATION EVERY 8 HOURS
Refills: 0 | Status: DISCONTINUED | OUTPATIENT
Start: 2020-02-15 | End: 2020-02-21

## 2020-02-15 RX ORDER — MORPHINE SULFATE 50 MG/1
4 CAPSULE, EXTENDED RELEASE ORAL
Refills: 0 | Status: DISCONTINUED | OUTPATIENT
Start: 2020-02-15 | End: 2020-02-19

## 2020-02-15 RX ADMIN — Medication 100 MILLIGRAM(S): at 15:39

## 2020-02-15 RX ADMIN — AMLODIPINE BESYLATE 10 MILLIGRAM(S): 2.5 TABLET ORAL at 05:33

## 2020-02-15 RX ADMIN — MORPHINE SULFATE 6 MILLIGRAM(S): 50 CAPSULE, EXTENDED RELEASE ORAL at 21:49

## 2020-02-15 RX ADMIN — Medication 2 CAPSULE(S): at 08:15

## 2020-02-15 RX ADMIN — Medication 2 CAPSULE(S): at 17:06

## 2020-02-15 RX ADMIN — Medication 3 MILLILITER(S): at 12:29

## 2020-02-15 RX ADMIN — HEPARIN SODIUM 1000 UNIT(S)/HR: 5000 INJECTION INTRAVENOUS; SUBCUTANEOUS at 11:01

## 2020-02-15 RX ADMIN — MORPHINE SULFATE 4 MILLIGRAM(S): 50 CAPSULE, EXTENDED RELEASE ORAL at 08:16

## 2020-02-15 RX ADMIN — PANTOPRAZOLE SODIUM 40 MILLIGRAM(S): 20 TABLET, DELAYED RELEASE ORAL at 05:33

## 2020-02-15 RX ADMIN — Medication 100 MILLIGRAM(S): at 05:33

## 2020-02-15 RX ADMIN — MORPHINE SULFATE 4 MILLIGRAM(S): 50 CAPSULE, EXTENDED RELEASE ORAL at 13:00

## 2020-02-15 RX ADMIN — Medication 650 MILLIGRAM(S): at 00:20

## 2020-02-15 RX ADMIN — Medication 650 MILLIGRAM(S): at 00:31

## 2020-02-15 RX ADMIN — Medication 650 MILLIGRAM(S): at 12:38

## 2020-02-15 RX ADMIN — Medication 650 MILLIGRAM(S): at 23:16

## 2020-02-15 RX ADMIN — Medication 650 MILLIGRAM(S): at 06:03

## 2020-02-15 RX ADMIN — MORPHINE SULFATE 6 MILLIGRAM(S): 50 CAPSULE, EXTENDED RELEASE ORAL at 22:49

## 2020-02-15 RX ADMIN — POLYETHYLENE GLYCOL 3350 17 GRAM(S): 17 POWDER, FOR SOLUTION ORAL at 12:29

## 2020-02-15 RX ADMIN — MORPHINE SULFATE 6 MILLIGRAM(S): 50 CAPSULE, EXTENDED RELEASE ORAL at 15:39

## 2020-02-15 RX ADMIN — MORPHINE SULFATE 6 MILLIGRAM(S): 50 CAPSULE, EXTENDED RELEASE ORAL at 19:00

## 2020-02-15 RX ADMIN — Medication 650 MILLIGRAM(S): at 17:32

## 2020-02-15 RX ADMIN — SENNA PLUS 2 TABLET(S): 8.6 TABLET ORAL at 21:51

## 2020-02-15 RX ADMIN — LIDOCAINE 1 PATCH: 4 CREAM TOPICAL at 12:29

## 2020-02-15 RX ADMIN — LIDOCAINE 1 PATCH: 4 CREAM TOPICAL at 19:05

## 2020-02-15 RX ADMIN — Medication 650 MILLIGRAM(S): at 12:30

## 2020-02-15 RX ADMIN — Medication 650 MILLIGRAM(S): at 17:06

## 2020-02-15 RX ADMIN — MORPHINE SULFATE 4 MILLIGRAM(S): 50 CAPSULE, EXTENDED RELEASE ORAL at 12:30

## 2020-02-15 RX ADMIN — Medication 2 CAPSULE(S): at 12:29

## 2020-02-15 RX ADMIN — Medication 650 MILLIGRAM(S): at 05:33

## 2020-02-15 RX ADMIN — MORPHINE SULFATE 4 MILLIGRAM(S): 50 CAPSULE, EXTENDED RELEASE ORAL at 02:43

## 2020-02-15 RX ADMIN — Medication 100 MILLIGRAM(S): at 02:45

## 2020-02-15 RX ADMIN — MORPHINE SULFATE 6 MILLIGRAM(S): 50 CAPSULE, EXTENDED RELEASE ORAL at 17:32

## 2020-02-15 RX ADMIN — Medication 3 MILLILITER(S): at 21:49

## 2020-02-15 NOTE — PROGRESS NOTE ADULT - PROBLEM SELECTOR PLAN 1
unprovoked. no signs of heart strain, trop/bnp normal, TTE with RV enlargement but normal RV function  doppler w/ r below knee DVT  - Heparin gtt, monitor PTT closely, to continue pending biopsy (likely Wednesday). eventual transition to xarelto.  - increase morphine to 6mg for severe pain and 4 mg for moderate pain iv q3h prn   - added tylenol atc, lidocaine patch  - senna/miralax standing while on opioids  - Appreciate vascular cardiology evaluation    - f/u  APLS testing

## 2020-02-15 NOTE — PROGRESS NOTE ADULT - PROBLEM SELECTOR PLAN 2
seen incidentally on CT chest with hilar/mediastinal LAD  - pulm consulted, for bronch/bx likely on Wednesday

## 2020-02-15 NOTE — PROGRESS NOTE ADULT - ASSESSMENT
64 yr old F PMH chronic pancreatitis, HTN p/w left sided back pain/SOB admitted with unproked PE, found to have mediastinal and hilar lymphadenopathy awaiting bronchoscopy/bx on wednesday 2/19.

## 2020-02-15 NOTE — PROGRESS NOTE ADULT - SUBJECTIVE AND OBJECTIVE BOX
Patient is a 64y old  Female who presents with a chief complaint of Back pain, SOB (14 Feb 2020 16:56)      SUBJECTIVE / OVERNIGHT EVENTS:    I found the patient moaning in pain this morning. she was c/o significant chest pain. she states that the morphine is only lasting 3 hrs and with increased pain she has increased shortness of breath.    ROS: ( - ) Fever, ( - )Chills,  ( - )Nausea/Vomiting, ( - ) Cough, ( + )Shortness of breath, ( + )Chest Pain    MEDICATIONS  (STANDING):  acetaminophen   Tablet .. 650 milliGRAM(s) Oral every 6 hours  albuterol/ipratropium for Nebulization 3 milliLiter(s) Nebulizer every 8 hours  amLODIPine   Tablet 10 milliGRAM(s) Oral daily  heparin  Infusion. 900 Unit(s)/Hr (9 mL/Hr) IV Continuous <Continuous>  lidocaine   Patch 1 Patch Transdermal daily  metoprolol succinate  milliGRAM(s) Oral daily  pancrelipase  (CREON 12,000 Lipase Units) 2 Capsule(s) Oral three times a day with meals  pantoprazole    Tablet 40 milliGRAM(s) Oral before breakfast  polyethylene glycol 3350 17 Gram(s) Oral daily  senna 2 Tablet(s) Oral at bedtime    MEDICATIONS  (PRN):  benzonatate 100 milliGRAM(s) Oral three times a day PRN Cough  heparin  Injectable 4500 Unit(s) IV Push every 6 hours PRN For aPTT less than 40  heparin  Injectable 2000 Unit(s) IV Push every 6 hours PRN For aPTT between 40 - 57  morphine  - Injectable 4 milliGRAM(s) IV Push every 3 hours PRN Moderate Pain (4 - 6)  morphine  - Injectable 6 milliGRAM(s) IV Push every 3 hours PRN Severe Pain (7 - 10)  ondansetron    Tablet 8 milliGRAM(s) Oral four times a day PRN Nausea and/or Vomiting      T(C): 36.8 (02-15 @ 13:58), Max: 37.3 (02-15 @ 04:18)   HR: 97   BP: 117/69   RR: 18   SpO2: 100%    PHYSICAL EXAM:  GENERAL:  Well appearing, thin f, in NAD, occasional coughing  EYES: PERRLA, conjunctiva clear  NECK: Supple  CHEST/LUNG: +rhonchi, no wheezing no rales  HEART: Reg rate. Normal S1, S2. No m/r/g.   ABDOMEN: SNTND.   EXTREMITIES:  2+ Peripheral Pulses, No clubbing, cyanosis, edema.  PSYCH:  appropriate affect      LABS:                        8.6    13.57 )-----------( 731      ( 15 Feb 2020 10:14 )             28.6      02-15    141  |  106  |  9   ----------------------------<  83  4.0   |  24  |  0.74    Ca    8.9      15 Feb 2020 07:12         CAPILLARY BLOOD GLUCOSE          RADIOLOGY & ADDITIONAL TESTS:    Imaging Personally Reviewed:  Consultant(s) Notes Reviewed:    Care Discussed with Consultants/Other Providers:

## 2020-02-16 LAB
ANION GAP SERPL CALC-SCNC: 13 MMOL/L — SIGNIFICANT CHANGE UP (ref 5–17)
APTT BLD: 86 SEC — HIGH (ref 27.5–36.3)
BUN SERPL-MCNC: 7 MG/DL — SIGNIFICANT CHANGE UP (ref 7–23)
CALCIUM SERPL-MCNC: 9.1 MG/DL — SIGNIFICANT CHANGE UP (ref 8.4–10.5)
CHLORIDE SERPL-SCNC: 103 MMOL/L — SIGNIFICANT CHANGE UP (ref 96–108)
CO2 SERPL-SCNC: 23 MMOL/L — SIGNIFICANT CHANGE UP (ref 22–31)
CREAT SERPL-MCNC: 0.76 MG/DL — SIGNIFICANT CHANGE UP (ref 0.5–1.3)
GLUCOSE SERPL-MCNC: 89 MG/DL — SIGNIFICANT CHANGE UP (ref 70–99)
HCT VFR BLD CALC: 25.6 % — LOW (ref 34.5–45)
HGB BLD-MCNC: 7.8 G/DL — LOW (ref 11.5–15.5)
MAGNESIUM SERPL-MCNC: 1.6 MG/DL — SIGNIFICANT CHANGE UP (ref 1.6–2.6)
MCHC RBC-ENTMCNC: 21.6 PG — LOW (ref 27–34)
MCHC RBC-ENTMCNC: 30.5 GM/DL — LOW (ref 32–36)
MCV RBC AUTO: 70.9 FL — LOW (ref 80–100)
NRBC # BLD: 0 /100 WBCS — SIGNIFICANT CHANGE UP (ref 0–0)
PLATELET # BLD AUTO: 701 K/UL — HIGH (ref 150–400)
POTASSIUM SERPL-MCNC: 3.5 MMOL/L — SIGNIFICANT CHANGE UP (ref 3.5–5.3)
POTASSIUM SERPL-SCNC: 3.5 MMOL/L — SIGNIFICANT CHANGE UP (ref 3.5–5.3)
RBC # BLD: 3.61 M/UL — LOW (ref 3.8–5.2)
RBC # FLD: 21.6 % — HIGH (ref 10.3–14.5)
SODIUM SERPL-SCNC: 139 MMOL/L — SIGNIFICANT CHANGE UP (ref 135–145)
WBC # BLD: 11.79 K/UL — HIGH (ref 3.8–10.5)
WBC # FLD AUTO: 11.79 K/UL — HIGH (ref 3.8–10.5)

## 2020-02-16 PROCEDURE — 99233 SBSQ HOSP IP/OBS HIGH 50: CPT

## 2020-02-16 RX ORDER — MORPHINE SULFATE 50 MG/1
8 CAPSULE, EXTENDED RELEASE ORAL
Refills: 0 | Status: DISCONTINUED | OUTPATIENT
Start: 2020-02-16 | End: 2020-02-19

## 2020-02-16 RX ADMIN — Medication 650 MILLIGRAM(S): at 23:08

## 2020-02-16 RX ADMIN — MORPHINE SULFATE 6 MILLIGRAM(S): 50 CAPSULE, EXTENDED RELEASE ORAL at 04:09

## 2020-02-16 RX ADMIN — MORPHINE SULFATE 8 MILLIGRAM(S): 50 CAPSULE, EXTENDED RELEASE ORAL at 23:07

## 2020-02-16 RX ADMIN — LIDOCAINE 1 PATCH: 4 CREAM TOPICAL at 00:31

## 2020-02-16 RX ADMIN — AMLODIPINE BESYLATE 10 MILLIGRAM(S): 2.5 TABLET ORAL at 04:08

## 2020-02-16 RX ADMIN — MORPHINE SULFATE 8 MILLIGRAM(S): 50 CAPSULE, EXTENDED RELEASE ORAL at 13:47

## 2020-02-16 RX ADMIN — MORPHINE SULFATE 8 MILLIGRAM(S): 50 CAPSULE, EXTENDED RELEASE ORAL at 20:40

## 2020-02-16 RX ADMIN — LIDOCAINE 1 PATCH: 4 CREAM TOPICAL at 23:00

## 2020-02-16 RX ADMIN — MORPHINE SULFATE 6 MILLIGRAM(S): 50 CAPSULE, EXTENDED RELEASE ORAL at 01:41

## 2020-02-16 RX ADMIN — HEPARIN SODIUM 1000 UNIT(S)/HR: 5000 INJECTION INTRAVENOUS; SUBCUTANEOUS at 10:43

## 2020-02-16 RX ADMIN — MORPHINE SULFATE 8 MILLIGRAM(S): 50 CAPSULE, EXTENDED RELEASE ORAL at 17:05

## 2020-02-16 RX ADMIN — MORPHINE SULFATE 6 MILLIGRAM(S): 50 CAPSULE, EXTENDED RELEASE ORAL at 07:38

## 2020-02-16 RX ADMIN — ONDANSETRON 8 MILLIGRAM(S): 8 TABLET, FILM COATED ORAL at 11:12

## 2020-02-16 RX ADMIN — Medication 650 MILLIGRAM(S): at 11:42

## 2020-02-16 RX ADMIN — Medication 2 CAPSULE(S): at 07:29

## 2020-02-16 RX ADMIN — SENNA PLUS 2 TABLET(S): 8.6 TABLET ORAL at 21:05

## 2020-02-16 RX ADMIN — Medication 650 MILLIGRAM(S): at 04:08

## 2020-02-16 RX ADMIN — Medication 2 CAPSULE(S): at 11:13

## 2020-02-16 RX ADMIN — MORPHINE SULFATE 6 MILLIGRAM(S): 50 CAPSULE, EXTENDED RELEASE ORAL at 10:57

## 2020-02-16 RX ADMIN — Medication 3 MILLILITER(S): at 13:33

## 2020-02-16 RX ADMIN — MORPHINE SULFATE 6 MILLIGRAM(S): 50 CAPSULE, EXTENDED RELEASE ORAL at 01:06

## 2020-02-16 RX ADMIN — Medication 3 MILLILITER(S): at 04:08

## 2020-02-16 RX ADMIN — LIDOCAINE 1 PATCH: 4 CREAM TOPICAL at 11:12

## 2020-02-16 RX ADMIN — Medication 650 MILLIGRAM(S): at 17:19

## 2020-02-16 RX ADMIN — Medication 3 MILLILITER(S): at 21:06

## 2020-02-16 RX ADMIN — MORPHINE SULFATE 6 MILLIGRAM(S): 50 CAPSULE, EXTENDED RELEASE ORAL at 07:23

## 2020-02-16 RX ADMIN — MORPHINE SULFATE 6 MILLIGRAM(S): 50 CAPSULE, EXTENDED RELEASE ORAL at 10:42

## 2020-02-16 RX ADMIN — PANTOPRAZOLE SODIUM 40 MILLIGRAM(S): 20 TABLET, DELAYED RELEASE ORAL at 04:08

## 2020-02-16 RX ADMIN — MORPHINE SULFATE 8 MILLIGRAM(S): 50 CAPSULE, EXTENDED RELEASE ORAL at 16:50

## 2020-02-16 RX ADMIN — MORPHINE SULFATE 8 MILLIGRAM(S): 50 CAPSULE, EXTENDED RELEASE ORAL at 20:09

## 2020-02-16 RX ADMIN — Medication 650 MILLIGRAM(S): at 16:49

## 2020-02-16 RX ADMIN — MORPHINE SULFATE 8 MILLIGRAM(S): 50 CAPSULE, EXTENDED RELEASE ORAL at 23:20

## 2020-02-16 RX ADMIN — Medication 650 MILLIGRAM(S): at 11:12

## 2020-02-16 RX ADMIN — Medication 2 CAPSULE(S): at 16:49

## 2020-02-16 RX ADMIN — MORPHINE SULFATE 8 MILLIGRAM(S): 50 CAPSULE, EXTENDED RELEASE ORAL at 13:34

## 2020-02-16 RX ADMIN — Medication 100 MILLIGRAM(S): at 04:08

## 2020-02-16 NOTE — PROGRESS NOTE ADULT - PROBLEM SELECTOR PLAN 1
unprovoked. no signs of heart strain, trop/bnp normal, TTE with RV enlargement but normal RV function  doppler w/ r below knee DVT  - Heparin gtt, monitor PTT closely, to continue pending biopsy. eventual transition to xarelto  - increase morphine 8mg for severe pain and 4 mg iv q3h prn.   - added tylenol atc, lidocaine patch  - senna/miralax standing while on opioids  - Appreciate vascular cardiology evaluation    - f/u  APLS testing unprovoked. no signs of heart strain, trop/bnp normal, TTE with RV enlargement but normal RV function  doppler w/ r below knee DVT  - Heparin gtt, monitor PTT closely, to continue pending biopsy. eventual transition to xarelto  - increase morphine 8mg for severe pain and 4 mg iv q3h prn.   - tylenol atc, lidocaine patch  - senna/miralax standing while on opioids  - Appreciate vascular cardiology evaluation    - f/u  APLS testing

## 2020-02-16 NOTE — PROGRESS NOTE ADULT - PROBLEM SELECTOR PLAN 2
seen incidentally on CTchest with hilar/mediastinal LAD  - pulm consulted, for bronch/bx on wednesday seen incidentally on CT chest with hilar/mediastinal LAD  - pulm consulted, for bronch/bx on Wednesday

## 2020-02-16 NOTE — PROGRESS NOTE ADULT - PROBLEM SELECTOR PLAN 5
dispo: c/w hep gtt, monitor pain control and transition to oral pain meds eventually, biopsy of lymphadenopathy on wednesday. no PT needs.    Transitions of Care Status:  1.  Name of PCP: Ananda Chavira  2.  PCP Contacted on Admission: [ ] Y    [x] N    3.  PCP contacted at Discharge: [ ] Y    [ ] N    [ ] N/A  4.  Post-Discharge Appointment Date and Location:  5.  Summary of Handoff given to PCP: dispo: c/w hep gtt, monitor pain control and transition to oral pain meds eventually, biopsy of lymphadenopathy on Wednesday. no PT needs.    Transitions of Care Status:  1.  Name of PCP: Anadna Chavira  2.  PCP Contacted on Admission: [ ] Y    [x] N    3.  PCP contacted at Discharge: [ ] Y    [ ] N    [ ] N/A  4.  Post-Discharge Appointment Date and Location:  5.  Summary of Handoff given to PCP:

## 2020-02-16 NOTE — PROGRESS NOTE ADULT - SUBJECTIVE AND OBJECTIVE BOX
Patient is a 64y old  Female who presents with a chief complaint of Back pain, SOB (15 Feb 2020 17:59)      SUBJECTIVE / OVERNIGHT EVENTS:    No acute overnight events.  Pt states that the adjustment of her pain medication has improved her symptoms. At its worse the pain in 10/10 but it does come down to a 5 or 6 out of 10.    ROS: ( - ) Fever, ( - )Chills,  ( - )Nausea/Vomiting, ( + ) Cough, ( - )Shortness of breath, ( + )Chest Pain    MEDICATIONS  (STANDING):  acetaminophen   Tablet .. 650 milliGRAM(s) Oral every 6 hours  albuterol/ipratropium for Nebulization 3 milliLiter(s) Nebulizer every 8 hours  amLODIPine   Tablet 10 milliGRAM(s) Oral daily  heparin  Infusion. 900 Unit(s)/Hr (9 mL/Hr) IV Continuous <Continuous>  lidocaine   Patch 1 Patch Transdermal daily  metoprolol succinate  milliGRAM(s) Oral daily  pancrelipase  (CREON 12,000 Lipase Units) 2 Capsule(s) Oral three times a day with meals  pantoprazole    Tablet 40 milliGRAM(s) Oral before breakfast  polyethylene glycol 3350 17 Gram(s) Oral daily  senna 2 Tablet(s) Oral at bedtime    MEDICATIONS  (PRN):  benzonatate 100 milliGRAM(s) Oral three times a day PRN Cough  heparin  Injectable 4500 Unit(s) IV Push every 6 hours PRN For aPTT less than 40  heparin  Injectable 2000 Unit(s) IV Push every 6 hours PRN For aPTT between 40 - 57  morphine  - Injectable 8 milliGRAM(s) IV Push every 3 hours PRN Severe Pain (7 - 10)  morphine  - Injectable 4 milliGRAM(s) IV Push every 3 hours PRN Moderate Pain (4 - 6)  ondansetron    Tablet 8 milliGRAM(s) Oral four times a day PRN Nausea and/or Vomiting      T(C): 36.3 (02-16 @ 13:34), Max: 37.2 (02-15 @ 20:31)   HR: 94   BP: 110/63   RR: 16   SpO2: 100%    PHYSICAL EXAM:  GENERAL: NAD, well-developed  CHEST/LUNG: Clear to auscultation bilaterally; No wheeze  HEART: Regular rate and rhythm; No murmurs, rubs, or gallops  ABDOMEN: Soft, Nontender, Nondistended; Bowel sounds present  EXTREMITIES:  2+ Peripheral Pulses, No clubbing, cyanosis, or edema  PSYCH: AAOx3  NEUROLOGY: non-focal    LABS:                        7.8    11.79 )-----------( 701      ( 16 Feb 2020 06:33 )             25.6      02-16    139  |  103  |  7   ----------------------------<  89  3.5   |  23  |  0.76    Ca    9.1      16 Feb 2020 06:33  Mg     1.6     02-16         CAPILLARY BLOOD GLUCOSE          RADIOLOGY & ADDITIONAL TESTS:    Imaging Personally Reviewed:  Consultant(s) Notes Reviewed:    Care Discussed with Consultants/Other Providers:

## 2020-02-17 LAB
ANION GAP SERPL CALC-SCNC: 12 MMOL/L — SIGNIFICANT CHANGE UP (ref 5–17)
APTT BLD: 78.1 SEC — HIGH (ref 27.5–36.3)
BUN SERPL-MCNC: 6 MG/DL — LOW (ref 7–23)
CALCIUM SERPL-MCNC: 9 MG/DL — SIGNIFICANT CHANGE UP (ref 8.4–10.5)
CHLORIDE SERPL-SCNC: 106 MMOL/L — SIGNIFICANT CHANGE UP (ref 96–108)
CO2 SERPL-SCNC: 22 MMOL/L — SIGNIFICANT CHANGE UP (ref 22–31)
CREAT SERPL-MCNC: 0.74 MG/DL — SIGNIFICANT CHANGE UP (ref 0.5–1.3)
GLUCOSE SERPL-MCNC: 99 MG/DL — SIGNIFICANT CHANGE UP (ref 70–99)
HCT VFR BLD CALC: 26.4 % — LOW (ref 34.5–45)
HGB BLD-MCNC: 7.8 G/DL — LOW (ref 11.5–15.5)
MCHC RBC-ENTMCNC: 21.3 PG — LOW (ref 27–34)
MCHC RBC-ENTMCNC: 29.5 GM/DL — LOW (ref 32–36)
MCV RBC AUTO: 71.9 FL — LOW (ref 80–100)
NRBC # BLD: 0 /100 WBCS — SIGNIFICANT CHANGE UP (ref 0–0)
PLATELET # BLD AUTO: 674 K/UL — HIGH (ref 150–400)
POTASSIUM SERPL-MCNC: 3.7 MMOL/L — SIGNIFICANT CHANGE UP (ref 3.5–5.3)
POTASSIUM SERPL-SCNC: 3.7 MMOL/L — SIGNIFICANT CHANGE UP (ref 3.5–5.3)
RBC # BLD: 3.67 M/UL — LOW (ref 3.8–5.2)
RBC # FLD: 21.5 % — HIGH (ref 10.3–14.5)
SODIUM SERPL-SCNC: 140 MMOL/L — SIGNIFICANT CHANGE UP (ref 135–145)
WBC # BLD: 12.54 K/UL — HIGH (ref 3.8–10.5)
WBC # FLD AUTO: 12.54 K/UL — HIGH (ref 3.8–10.5)

## 2020-02-17 PROCEDURE — 99233 SBSQ HOSP IP/OBS HIGH 50: CPT

## 2020-02-17 RX ORDER — OXYCODONE HYDROCHLORIDE 5 MG/1
15 TABLET ORAL
Refills: 0 | Status: DISCONTINUED | OUTPATIENT
Start: 2020-02-17 | End: 2020-02-21

## 2020-02-17 RX ADMIN — Medication 650 MILLIGRAM(S): at 11:06

## 2020-02-17 RX ADMIN — SENNA PLUS 2 TABLET(S): 8.6 TABLET ORAL at 21:10

## 2020-02-17 RX ADMIN — PANTOPRAZOLE SODIUM 40 MILLIGRAM(S): 20 TABLET, DELAYED RELEASE ORAL at 05:25

## 2020-02-17 RX ADMIN — Medication 650 MILLIGRAM(S): at 05:55

## 2020-02-17 RX ADMIN — HEPARIN SODIUM 1000 UNIT(S)/HR: 5000 INJECTION INTRAVENOUS; SUBCUTANEOUS at 11:06

## 2020-02-17 RX ADMIN — MORPHINE SULFATE 8 MILLIGRAM(S): 50 CAPSULE, EXTENDED RELEASE ORAL at 12:17

## 2020-02-17 RX ADMIN — OXYCODONE HYDROCHLORIDE 15 MILLIGRAM(S): 5 TABLET ORAL at 12:22

## 2020-02-17 RX ADMIN — Medication 650 MILLIGRAM(S): at 05:25

## 2020-02-17 RX ADMIN — Medication 2 CAPSULE(S): at 08:34

## 2020-02-17 RX ADMIN — Medication 3 MILLILITER(S): at 21:10

## 2020-02-17 RX ADMIN — MORPHINE SULFATE 8 MILLIGRAM(S): 50 CAPSULE, EXTENDED RELEASE ORAL at 17:50

## 2020-02-17 RX ADMIN — Medication 650 MILLIGRAM(S): at 17:05

## 2020-02-17 RX ADMIN — OXYCODONE HYDROCHLORIDE 15 MILLIGRAM(S): 5 TABLET ORAL at 17:35

## 2020-02-17 RX ADMIN — MORPHINE SULFATE 8 MILLIGRAM(S): 50 CAPSULE, EXTENDED RELEASE ORAL at 02:13

## 2020-02-17 RX ADMIN — ONDANSETRON 8 MILLIGRAM(S): 8 TABLET, FILM COATED ORAL at 11:07

## 2020-02-17 RX ADMIN — Medication 650 MILLIGRAM(S): at 02:35

## 2020-02-17 RX ADMIN — MORPHINE SULFATE 8 MILLIGRAM(S): 50 CAPSULE, EXTENDED RELEASE ORAL at 21:10

## 2020-02-17 RX ADMIN — AMLODIPINE BESYLATE 10 MILLIGRAM(S): 2.5 TABLET ORAL at 05:25

## 2020-02-17 RX ADMIN — MORPHINE SULFATE 8 MILLIGRAM(S): 50 CAPSULE, EXTENDED RELEASE ORAL at 08:35

## 2020-02-17 RX ADMIN — OXYCODONE HYDROCHLORIDE 15 MILLIGRAM(S): 5 TABLET ORAL at 11:52

## 2020-02-17 RX ADMIN — MORPHINE SULFATE 8 MILLIGRAM(S): 50 CAPSULE, EXTENDED RELEASE ORAL at 05:56

## 2020-02-17 RX ADMIN — Medication 2 CAPSULE(S): at 11:53

## 2020-02-17 RX ADMIN — LIDOCAINE 1 PATCH: 4 CREAM TOPICAL at 11:06

## 2020-02-17 RX ADMIN — Medication 2 CAPSULE(S): at 17:05

## 2020-02-17 RX ADMIN — Medication 650 MILLIGRAM(S): at 11:36

## 2020-02-17 RX ADMIN — Medication 3 MILLILITER(S): at 05:25

## 2020-02-17 RX ADMIN — OXYCODONE HYDROCHLORIDE 15 MILLIGRAM(S): 5 TABLET ORAL at 17:05

## 2020-02-17 RX ADMIN — MORPHINE SULFATE 8 MILLIGRAM(S): 50 CAPSULE, EXTENDED RELEASE ORAL at 12:02

## 2020-02-17 RX ADMIN — MORPHINE SULFATE 8 MILLIGRAM(S): 50 CAPSULE, EXTENDED RELEASE ORAL at 21:45

## 2020-02-17 RX ADMIN — MORPHINE SULFATE 8 MILLIGRAM(S): 50 CAPSULE, EXTENDED RELEASE ORAL at 08:50

## 2020-02-17 RX ADMIN — Medication 100 MILLIGRAM(S): at 05:25

## 2020-02-17 RX ADMIN — Medication 3 MILLILITER(S): at 14:31

## 2020-02-17 RX ADMIN — Medication 650 MILLIGRAM(S): at 17:35

## 2020-02-17 RX ADMIN — MORPHINE SULFATE 8 MILLIGRAM(S): 50 CAPSULE, EXTENDED RELEASE ORAL at 17:20

## 2020-02-17 RX ADMIN — MORPHINE SULFATE 8 MILLIGRAM(S): 50 CAPSULE, EXTENDED RELEASE ORAL at 02:30

## 2020-02-17 RX ADMIN — MORPHINE SULFATE 8 MILLIGRAM(S): 50 CAPSULE, EXTENDED RELEASE ORAL at 05:26

## 2020-02-17 NOTE — PROGRESS NOTE ADULT - PROBLEM SELECTOR PLAN 2
seen incidentally on CT chest with hilar/mediastinal LAD  - pulm consulted, for bronch/bx on Wednesday

## 2020-02-17 NOTE — PROGRESS NOTE ADULT - PROBLEM SELECTOR PLAN 1
unprovoked. no signs of heart strain, trop/bnp normal, TTE with RV enlargement but normal RV function  doppler w/ r below knee DVT  - Heparin gtt, monitor PTT closely, to continue pending biopsy. eventual transition to xarelto  - c/w morphine 8mg for severe pain and 4 mg iv q3h prn. will also start on standing oxycodone 15mg ER q12  - tylenol atc, lidocaine patch  - senna/miralax standing while on opioids  - Appreciate vascular cardiology evaluation    - f/u  APLS testing

## 2020-02-17 NOTE — PROGRESS NOTE ADULT - SUBJECTIVE AND OBJECTIVE BOX
Patient is a 64y old  Female who presents with a chief complaint of Back pain, SOB (16 Feb 2020 16:48)      SUBJECTIVE / OVERNIGHT EVENTS:    No acute overnight events.  Pt states that her chest pain has improved with her current regimen her only issue is the length of time that she is receiving pain relief.     ROS: ( - ) Fever, ( - )Chills,  ( - )Nausea/Vomiting, ( - ) Cough, ( - )Shortness of breath, ( - )Chest Pain    MEDICATIONS  (STANDING):  acetaminophen   Tablet .. 650 milliGRAM(s) Oral every 6 hours  albuterol/ipratropium for Nebulization 3 milliLiter(s) Nebulizer every 8 hours  amLODIPine   Tablet 10 milliGRAM(s) Oral daily  heparin  Infusion. 900 Unit(s)/Hr (9 mL/Hr) IV Continuous <Continuous>  lidocaine   Patch 1 Patch Transdermal daily  metoprolol succinate  milliGRAM(s) Oral daily  oxyCODONE  ER Tablet 15 milliGRAM(s) Oral two times a day  pancrelipase  (CREON 12,000 Lipase Units) 2 Capsule(s) Oral three times a day with meals  pantoprazole    Tablet 40 milliGRAM(s) Oral before breakfast  polyethylene glycol 3350 17 Gram(s) Oral daily  senna 2 Tablet(s) Oral at bedtime    MEDICATIONS  (PRN):  benzonatate 100 milliGRAM(s) Oral three times a day PRN Cough  heparin  Injectable 4500 Unit(s) IV Push every 6 hours PRN For aPTT less than 40  heparin  Injectable 2000 Unit(s) IV Push every 6 hours PRN For aPTT between 40 - 57  morphine  - Injectable 8 milliGRAM(s) IV Push every 3 hours PRN Severe Pain (7 - 10)  morphine  - Injectable 4 milliGRAM(s) IV Push every 3 hours PRN Moderate Pain (4 - 6)  ondansetron    Tablet 8 milliGRAM(s) Oral four times a day PRN Nausea and/or Vomiting      T(C): 36.6 (02-17 @ 13:10), Max: 36.9 (02-17 @ 05:04)   HR: 103   BP: 125/67   RR: 18   SpO2: 95%    PHYSICAL EXAM:  GENERAL: NAD, well-developed  CHEST/LUNG: Clear to auscultation bilaterally; No wheeze  HEART: Regular rate and rhythm; No murmurs, rubs, or gallops  ABDOMEN: Soft, Nontender, Nondistended; Bowel sounds present  EXTREMITIES:  2+ Peripheral Pulses, No clubbing, cyanosis, or edema  PSYCH: AAOx3  NEUROLOGY: non-focal    LABS:                        7.8    12.54 )-----------( 674      ( 17 Feb 2020 06:18 )             26.4      02-17    140  |  106  |  6<L>  ----------------------------<  99  3.7   |  22  |  0.74    Ca    9.0      17 Feb 2020 06:18  Mg     1.6     02-16         CAPILLARY BLOOD GLUCOSE          RADIOLOGY & ADDITIONAL TESTS:    Imaging Personally Reviewed:  Consultant(s) Notes Reviewed:    Care Discussed with Consultants/Other Providers:

## 2020-02-18 LAB
ANION GAP SERPL CALC-SCNC: 15 MMOL/L — SIGNIFICANT CHANGE UP (ref 5–17)
APTT BLD: 62.9 SEC — HIGH (ref 27.5–36.3)
BUN SERPL-MCNC: 7 MG/DL — SIGNIFICANT CHANGE UP (ref 7–23)
CALCIUM SERPL-MCNC: 8.9 MG/DL — SIGNIFICANT CHANGE UP (ref 8.4–10.5)
CHLORIDE SERPL-SCNC: 107 MMOL/L — SIGNIFICANT CHANGE UP (ref 96–108)
CO2 SERPL-SCNC: 21 MMOL/L — LOW (ref 22–31)
CREAT SERPL-MCNC: 0.74 MG/DL — SIGNIFICANT CHANGE UP (ref 0.5–1.3)
GLUCOSE SERPL-MCNC: 99 MG/DL — SIGNIFICANT CHANGE UP (ref 70–99)
HCT VFR BLD CALC: 26.2 % — LOW (ref 34.5–45)
HGB BLD-MCNC: 7.7 G/DL — LOW (ref 11.5–15.5)
MCHC RBC-ENTMCNC: 21.4 PG — LOW (ref 27–34)
MCHC RBC-ENTMCNC: 29.4 GM/DL — LOW (ref 32–36)
MCV RBC AUTO: 72.8 FL — LOW (ref 80–100)
NRBC # BLD: 0 /100 WBCS — SIGNIFICANT CHANGE UP (ref 0–0)
PLATELET # BLD AUTO: 632 K/UL — HIGH (ref 150–400)
POTASSIUM SERPL-MCNC: 3.7 MMOL/L — SIGNIFICANT CHANGE UP (ref 3.5–5.3)
POTASSIUM SERPL-SCNC: 3.7 MMOL/L — SIGNIFICANT CHANGE UP (ref 3.5–5.3)
RBC # BLD: 3.6 M/UL — LOW (ref 3.8–5.2)
RBC # FLD: 22.2 % — HIGH (ref 10.3–14.5)
SODIUM SERPL-SCNC: 143 MMOL/L — SIGNIFICANT CHANGE UP (ref 135–145)
WBC # BLD: 12.41 K/UL — HIGH (ref 3.8–10.5)
WBC # FLD AUTO: 12.41 K/UL — HIGH (ref 3.8–10.5)

## 2020-02-18 PROCEDURE — 99233 SBSQ HOSP IP/OBS HIGH 50: CPT

## 2020-02-18 RX ORDER — FAMOTIDINE 10 MG/ML
20 INJECTION INTRAVENOUS ONCE
Refills: 0 | Status: COMPLETED | OUTPATIENT
Start: 2020-02-18 | End: 2020-02-18

## 2020-02-18 RX ADMIN — FAMOTIDINE 20 MILLIGRAM(S): 10 INJECTION INTRAVENOUS at 21:48

## 2020-02-18 RX ADMIN — AMLODIPINE BESYLATE 10 MILLIGRAM(S): 2.5 TABLET ORAL at 06:17

## 2020-02-18 RX ADMIN — MORPHINE SULFATE 8 MILLIGRAM(S): 50 CAPSULE, EXTENDED RELEASE ORAL at 16:53

## 2020-02-18 RX ADMIN — OXYCODONE HYDROCHLORIDE 15 MILLIGRAM(S): 5 TABLET ORAL at 23:22

## 2020-02-18 RX ADMIN — Medication 650 MILLIGRAM(S): at 14:49

## 2020-02-18 RX ADMIN — Medication 2 CAPSULE(S): at 21:49

## 2020-02-18 RX ADMIN — MORPHINE SULFATE 8 MILLIGRAM(S): 50 CAPSULE, EXTENDED RELEASE ORAL at 14:49

## 2020-02-18 RX ADMIN — Medication 650 MILLIGRAM(S): at 16:56

## 2020-02-18 RX ADMIN — SENNA PLUS 2 TABLET(S): 8.6 TABLET ORAL at 21:48

## 2020-02-18 RX ADMIN — Medication 650 MILLIGRAM(S): at 13:06

## 2020-02-18 RX ADMIN — MORPHINE SULFATE 8 MILLIGRAM(S): 50 CAPSULE, EXTENDED RELEASE ORAL at 09:28

## 2020-02-18 RX ADMIN — MORPHINE SULFATE 8 MILLIGRAM(S): 50 CAPSULE, EXTENDED RELEASE ORAL at 03:35

## 2020-02-18 RX ADMIN — LIDOCAINE 1 PATCH: 4 CREAM TOPICAL at 13:05

## 2020-02-18 RX ADMIN — Medication 3 MILLILITER(S): at 06:19

## 2020-02-18 RX ADMIN — Medication 2 CAPSULE(S): at 13:06

## 2020-02-18 RX ADMIN — MORPHINE SULFATE 8 MILLIGRAM(S): 50 CAPSULE, EXTENDED RELEASE ORAL at 20:16

## 2020-02-18 RX ADMIN — MORPHINE SULFATE 8 MILLIGRAM(S): 50 CAPSULE, EXTENDED RELEASE ORAL at 20:45

## 2020-02-18 RX ADMIN — OXYCODONE HYDROCHLORIDE 15 MILLIGRAM(S): 5 TABLET ORAL at 23:53

## 2020-02-18 RX ADMIN — MORPHINE SULFATE 8 MILLIGRAM(S): 50 CAPSULE, EXTENDED RELEASE ORAL at 13:04

## 2020-02-18 RX ADMIN — Medication 3 MILLILITER(S): at 14:53

## 2020-02-18 RX ADMIN — OXYCODONE HYDROCHLORIDE 15 MILLIGRAM(S): 5 TABLET ORAL at 06:17

## 2020-02-18 RX ADMIN — Medication 650 MILLIGRAM(S): at 01:15

## 2020-02-18 RX ADMIN — HEPARIN SODIUM 1000 UNIT(S)/HR: 5000 INJECTION INTRAVENOUS; SUBCUTANEOUS at 09:18

## 2020-02-18 RX ADMIN — Medication 3 MILLILITER(S): at 21:49

## 2020-02-18 RX ADMIN — Medication 650 MILLIGRAM(S): at 06:17

## 2020-02-18 RX ADMIN — MORPHINE SULFATE 8 MILLIGRAM(S): 50 CAPSULE, EXTENDED RELEASE ORAL at 02:54

## 2020-02-18 RX ADMIN — MORPHINE SULFATE 8 MILLIGRAM(S): 50 CAPSULE, EXTENDED RELEASE ORAL at 10:53

## 2020-02-18 RX ADMIN — LIDOCAINE 1 PATCH: 4 CREAM TOPICAL at 00:58

## 2020-02-18 RX ADMIN — Medication 100 MILLIGRAM(S): at 06:17

## 2020-02-18 RX ADMIN — PANTOPRAZOLE SODIUM 40 MILLIGRAM(S): 20 TABLET, DELAYED RELEASE ORAL at 06:17

## 2020-02-18 RX ADMIN — Medication 2 CAPSULE(S): at 09:21

## 2020-02-18 RX ADMIN — Medication 650 MILLIGRAM(S): at 00:55

## 2020-02-18 NOTE — PROGRESS NOTE ADULT - SUBJECTIVE AND OBJECTIVE BOX
Vascular Cardiology  Progress note     SPECTRA [id number]              EMAIL [email or web address]   OFFICE [phone]    CC:      INTERVAL HISTORY:           Allergies    diazepam (Other)  lemon (Other)    Intolerances    	    MEDICATIONS:  amLODIPine   Tablet 10 milliGRAM(s) Oral daily  heparin  Infusion. 900 Unit(s)/Hr IV Continuous <Continuous>  heparin  Injectable 4500 Unit(s) IV Push every 6 hours PRN  heparin  Injectable 2000 Unit(s) IV Push every 6 hours PRN  metoprolol succinate  milliGRAM(s) Oral daily      albuterol/ipratropium for Nebulization 3 milliLiter(s) Nebulizer every 8 hours  benzonatate 100 milliGRAM(s) Oral three times a day PRN    acetaminophen   Tablet .. 650 milliGRAM(s) Oral every 6 hours  morphine  - Injectable 8 milliGRAM(s) IV Push every 3 hours PRN  morphine  - Injectable 4 milliGRAM(s) IV Push every 3 hours PRN  ondansetron    Tablet 8 milliGRAM(s) Oral four times a day PRN  oxyCODONE  ER Tablet 15 milliGRAM(s) Oral two times a day    pancrelipase  (CREON 12,000 Lipase Units) 2 Capsule(s) Oral three times a day with meals  pantoprazole    Tablet 40 milliGRAM(s) Oral before breakfast  polyethylene glycol 3350 17 Gram(s) Oral daily  senna 2 Tablet(s) Oral at bedtime      lidocaine   Patch 1 Patch Transdermal daily      PAST MEDICAL & SURGICAL HISTORY:  Chronic abdominal pain  Thrombophlebitis: superficial right UE during 4/2019 hospital stay, resolved as per pt  Vocal cord polyp: removal 2018, benign as per pt  History of adrenal adenoma: stable on imaging  ARDS survivor: 2015  Chronic pancreatitis: last episode 4/2019  GERD (gastroesophageal reflux disease)  HTN (hypertension)  History of pancreatic surgery: Jeffery procedure (5/8)  S/P shoulder replacement, left: 2016  History of vocal cord polypectomy: 1/2018  S/P hip replacement, right: May 2016  S/P arthroscopy of shoulder: right - 2014  S/P hip replacement: left - 2010  S/P arthroscopy of shoulder: left in 2009      FAMILY HISTORY:  Family history of alcohol abuse      SOCIAL HISTORY:  unchanged    REVIEW OF SYSTEMS:  CONSTITUTIONAL: No fever, weight loss, or fatigue  EYES: No eye pain, visual disturbances, or discharge  ENMT:  No difficulty hearing, tinnitus, vertigo; No sinus or throat pain  NECK: No pain or stiffness  RESPIRATORY: No cough, wheezing, chills or hemoptysis; No Shortness of Breath  CARDIOVASCULAR: No chest pain, palpitations, passing out, dizziness, or leg swelling  GASTROINTESTINAL: No abdominal or epigastric pain. No nausea, vomiting, or hematemesis; No diarrhea or constipation. No melena or hematochezia.  GENITOURINARY: No dysuria, frequency, hematuria, or incontinence  NEUROLOGICAL: No headaches, memory loss, loss of strength, numbness, or tremors  SKIN: No itching, burning, rashes, or lesions   LYMPH Nodes: No enlarged glands  ENDOCRINE: No heat or cold intolerance; No hair loss  MUSCULOSKELETAL: No joint pain or swelling; No muscle, back, or extremity pain  PSYCHIATRIC: No depression, anxiety, mood swings, or difficulty sleeping  HEME/LYMPH: No easy bruising, or bleeding gums  ALLERY AND IMMUNOLOGIC: No hives or eczema	    [ x] All others negative	  [ ] Unable to obtain    PHYSICAL EXAM:  T(C): 36.8 (02-18-20 @ 06:04), Max: 37.1 (02-17-20 @ 20:44)  HR: 86 (02-18-20 @ 06:04) (86 - 116)  BP: 117/66 (02-18-20 @ 06:04) (117/66 - 127/69)  RR: 18 (02-18-20 @ 06:04) (18 - 20)  SpO2: 100% (02-18-20 @ 06:04) (95% - 100%)  Wt(kg): --  I&O's Summary    17 Feb 2020 07:01  -  18 Feb 2020 07:00  --------------------------------------------------------  IN: 687 mL / OUT: 600 mL / NET: 87 mL        Appearance: Normal	  HEENT:   Normal oral mucosa, PERRL, EOMI	  Carotid:  Right: No bruit    Left:  No bruit  Lymphatic: No lymphadenopathy  Cardiovascular: Normal S1 S2, No JVD, No murmurs, No edema  Respiratory: Lungs clear to auscultation	  Psychiatry: A & O x 3, Mood & affect appropriate  Gastrointestinal:  Soft, Non-tender, + BS	  Skin: No rashes, No ecchymoses, No cyanosis	  Neurologic: Non-focal  Extremities: Normal range of motion, No clubbing, cyanosis.  Vascular:   Right Femoral:  Palpable   Left Femoral:  Palpable  Right Popliteal: Palpable   Left Popliteal:  palpable  Right DP:  Palpable             Left DP:  Palpable  Right PT:  Palpable              Left PT:  Palpable      LABS:	 	    CBC Full  -  ( 18 Feb 2020 06:22 )  WBC Count : 12.41 K/uL  Hemoglobin : 7.7 g/dL  Hematocrit : 26.2 %  Platelet Count - Automated : 632 K/uL  Mean Cell Volume : 72.8 fl  Mean Cell Hemoglobin : 21.4 pg  Mean Cell Hemoglobin Concentration : 29.4 gm/dL  Auto Neutrophil # : x  Auto Lymphocyte # : x  Auto Monocyte # : x  Auto Eosinophil # : x  Auto Basophil # : x  Auto Neutrophil % : x  Auto Lymphocyte % : x  Auto Monocyte % : x  Auto Eosinophil % : x  Auto Basophil % : x    02-18    143  |  107  |  7   ----------------------------<  99  3.7   |  21<L>  |  0.74  02-17    140  |  106  |  6<L>  ----------------------------<  99  3.7   |  22  |  0.74    Ca    8.9      18 Feb 2020 06:22  Ca    9.0      17 Feb 2020 06:18    TTE:  Conclusions:  1. Normal left atrium.  LA volume index = 34 cc/m2.  2. Mild concentric left ventricular hypertrophy.  3. Normal left ventricular systolic function. No segmental  wall motion abnormalities.  4. Right ventricular enlargement with normal right  ventricular systolic function.  5. Estimated pulmonary artery systolic pressure equals 39  mm Hg, assuming right atrial pressure equals 8 mm Hg,  consistent with borderline pulmonary pressures.    64F with PMHx of HTN, chronic pancreatitis s/p Jeffery, who presents with SOB for 6 days, and L flank pain for 4 days, found to have PE (L>R). ProBNP and trop are within normal range. RV is not enlarged on CT or TTE.     1. PE- unprovoked, overall low risk given no evidence of R heart strain on CT or TTE, normal proBNP and troponin. No other risk factors for PE- no family history, no recent travel or surgery within last few months, former smoker but has not smoked in about a year, is up to date on cancer screenings. On CT chest however, multiple enlarged lymph nodes and LLL opacity seen concerning for lung pathology, pulm consulted.  2. DVT- below knee in one of the paired right posterior tibial veins and the right soleal vein  3. HTN  4. chronic pancreatitis s/p Jeffery         Plan:  1. Continue AC with heparin gtt as there is plan for EBUS with pulm on 2/19   2. f/u testing APLS- lupus anticoagulant, anticardiolipin antibodies, anti–beta-2 glycoprotein antibodies      Thank you      Vascular Cardiology Service   [id number]  [id number]  Office [phone]  email:   [email or web address] Vascular Cardiology  Progress note     SPECTRA [id number]              EMAIL [email or web address]   OFFICE [phone]    INTERVAL HISTORY:  Reports overall breathing and flank pain is better than on Friday. Has been getting up and walking around.          Allergies    diazepam (Other)  lemon (Other)    Intolerances    	    MEDICATIONS:  amLODIPine   Tablet 10 milliGRAM(s) Oral daily  heparin  Infusion. 900 Unit(s)/Hr IV Continuous <Continuous>  heparin  Injectable 4500 Unit(s) IV Push every 6 hours PRN  heparin  Injectable 2000 Unit(s) IV Push every 6 hours PRN  metoprolol succinate  milliGRAM(s) Oral daily      albuterol/ipratropium for Nebulization 3 milliLiter(s) Nebulizer every 8 hours  benzonatate 100 milliGRAM(s) Oral three times a day PRN    acetaminophen   Tablet .. 650 milliGRAM(s) Oral every 6 hours  morphine  - Injectable 8 milliGRAM(s) IV Push every 3 hours PRN  morphine  - Injectable 4 milliGRAM(s) IV Push every 3 hours PRN  ondansetron    Tablet 8 milliGRAM(s) Oral four times a day PRN  oxyCODONE  ER Tablet 15 milliGRAM(s) Oral two times a day    pancrelipase  (CREON 12,000 Lipase Units) 2 Capsule(s) Oral three times a day with meals  pantoprazole    Tablet 40 milliGRAM(s) Oral before breakfast  polyethylene glycol 3350 17 Gram(s) Oral daily  senna 2 Tablet(s) Oral at bedtime      lidocaine   Patch 1 Patch Transdermal daily      PAST MEDICAL & SURGICAL HISTORY:  Chronic abdominal pain  Thrombophlebitis: superficial right UE during 4/2019 hospital stay, resolved as per pt  Vocal cord polyp: removal 2018, benign as per pt  History of adrenal adenoma: stable on imaging  ARDS survivor: 2015  Chronic pancreatitis: last episode 4/2019  GERD (gastroesophageal reflux disease)  HTN (hypertension)  History of pancreatic surgery: Jeffery procedure (5/8)  S/P shoulder replacement, left: 2016  History of vocal cord polypectomy: 1/2018  S/P hip replacement, right: May 2016  S/P arthroscopy of shoulder: right - 2014  S/P hip replacement: left - 2010  S/P arthroscopy of shoulder: left in 2009      FAMILY HISTORY:  Family history of alcohol abuse      SOCIAL HISTORY:  unchanged    REVIEW OF SYSTEMS:  CONSTITUTIONAL: No fever, weight loss, or fatigue  EYES: No eye pain, visual disturbances, or discharge  ENMT:  No difficulty hearing, tinnitus, vertigo; No sinus or throat pain  NECK: No pain or stiffness  RESPIRATORY: No cough, wheezing, chills or hemoptysis; No Shortness of Breath  CARDIOVASCULAR: No chest pain, palpitations, passing out, dizziness, or leg swelling  GASTROINTESTINAL: No abdominal or epigastric pain. No nausea, vomiting, or hematemesis; No diarrhea or constipation. No melena or hematochezia.  GENITOURINARY: No dysuria, frequency, hematuria, or incontinence  NEUROLOGICAL: No headaches, memory loss, loss of strength, numbness, or tremors  SKIN: No itching, burning, rashes, or lesions   LYMPH Nodes: No enlarged glands  ENDOCRINE: No heat or cold intolerance; No hair loss  MUSCULOSKELETAL: No joint pain or swelling; No muscle, back, or extremity pain  PSYCHIATRIC: No depression, anxiety, mood swings, or difficulty sleeping  HEME/LYMPH: No easy bruising, or bleeding gums  ALLERY AND IMMUNOLOGIC: No hives or eczema	    [ x] All others negative	  [ ] Unable to obtain    PHYSICAL EXAM:  T(C): 36.8 (02-18-20 @ 06:04), Max: 37.1 (02-17-20 @ 20:44)  HR: 86 (02-18-20 @ 06:04) (86 - 116)  BP: 117/66 (02-18-20 @ 06:04) (117/66 - 127/69)  RR: 18 (02-18-20 @ 06:04) (18 - 20)  SpO2: 100% (02-18-20 @ 06:04) (95% - 100%)  Wt(kg): --  I&O's Summary    17 Feb 2020 07:01  -  18 Feb 2020 07:00  --------------------------------------------------------  IN: 687 mL / OUT: 600 mL / NET: 87 mL        Appearance: Normal	  HEENT:   Normal oral mucosa, PERRL, EOMI	  Carotid:  Right: No bruit    Left:  No bruit  Lymphatic: No lymphadenopathy  Cardiovascular: Normal S1 S2, No JVD, No murmurs, No edema  Respiratory: Lungs clear to auscultation	  Psychiatry: A & O x 3, Mood & affect appropriate  Gastrointestinal:  Soft, Non-tender, + BS	  Skin: No rashes, No ecchymoses, No cyanosis	  Neurologic: Non-focal  Extremities: Normal range of motion, No clubbing, cyanosis.  Vascular:   Right Femoral:  Palpable   Left Femoral:  Palpable  Right Popliteal: Palpable   Left Popliteal:  palpable  Right DP:  Palpable             Left DP:  Palpable  Right PT:  Palpable              Left PT:  Palpable      LABS:	 	    CBC Full  -  ( 18 Feb 2020 06:22 )  WBC Count : 12.41 K/uL  Hemoglobin : 7.7 g/dL  Hematocrit : 26.2 %  Platelet Count - Automated : 632 K/uL  Mean Cell Volume : 72.8 fl  Mean Cell Hemoglobin : 21.4 pg  Mean Cell Hemoglobin Concentration : 29.4 gm/dL  Auto Neutrophil # : x  Auto Lymphocyte # : x  Auto Monocyte # : x  Auto Eosinophil # : x  Auto Basophil # : x  Auto Neutrophil % : x  Auto Lymphocyte % : x  Auto Monocyte % : x  Auto Eosinophil % : x  Auto Basophil % : x    02-18    143  |  107  |  7   ----------------------------<  99  3.7   |  21<L>  |  0.74  02-17    140  |  106  |  6<L>  ----------------------------<  99  3.7   |  22  |  0.74    Ca    8.9      18 Feb 2020 06:22  Ca    9.0      17 Feb 2020 06:18    TTE:  Conclusions:  1. Normal left atrium.  LA volume index = 34 cc/m2.  2. Mild concentric left ventricular hypertrophy.  3. Normal left ventricular systolic function. No segmental  wall motion abnormalities.  4. Right ventricular enlargement with normal right  ventricular systolic function.  5. Estimated pulmonary artery systolic pressure equals 39  mm Hg, assuming right atrial pressure equals 8 mm Hg,  consistent with borderline pulmonary pressures.    64F with PMHx of HTN, chronic pancreatitis s/p Jeffery, who presents with SOB for 6 days, and L flank pain for 4 days, found to have PE (L>R). ProBNP and trop are within normal range. RV is not enlarged on CT or TTE.     1. PE- unprovoked, overall low risk given no evidence of R heart strain on CT or TTE, normal proBNP and troponin. No other risk factors for PE- no family history, no recent travel or surgery within last few months, former smoker but has not smoked in about a year, is up to date on cancer screenings. On CT chest however, multiple enlarged lymph nodes and LLL opacity seen concerning for lung pathology, pulm consulted.  2. DVT- below knee in one of the paired right posterior tibial veins and the right soleal vein  3. HTN  4. chronic pancreatitis s/p Jeffery         Plan:  1. Continue AC with heparin gtt as there is plan for EBUS with pulm on 2/19. Please stop hep gtt 6 hours prior to EBUS  2. f/u testing APLS- lupus anticoagulant, anticardiolipin antibodies, anti–beta-2 glycoprotein antibodies      Thank you      Vascular Cardiology Service   [place] - [id number]  Office [phone]  email:   [email or web address]

## 2020-02-18 NOTE — PROGRESS NOTE ADULT - PROBLEM SELECTOR PLAN 1
Cont Heparin gtt, monitor PTT closely, hold prior to bronch tomorrow.  Transition to Xarelto after bronch  Cont morphine 8mg for severe pain and 4 mg iv q3h prn. will also start on standing oxycodone 15mg ER q12  Will obtain pain management consult- outpatient pain MD is Dr. Gurjit Tompkins.

## 2020-02-18 NOTE — PROGRESS NOTE ADULT - SUBJECTIVE AND OBJECTIVE BOX
Yogesh Kolb MD  Pager [phone]  Spectra [phone]  Cell Phone [phone]    Patient is a 64y old  Female who presents with a chief complaint of Back pain, SOB (18 Feb 2020 09:35)        SUBJECTIVE / OVERNIGHT EVENTS: Patient c/o L sided back/rib pain, not related to inspiration. Continued dry cough  TELEMETRY: SR 80-90's, PVCs      MEDICATIONS  (STANDING):  acetaminophen   Tablet .. 650 milliGRAM(s) Oral every 6 hours  albuterol/ipratropium for Nebulization 3 milliLiter(s) Nebulizer every 8 hours  amLODIPine   Tablet 10 milliGRAM(s) Oral daily  heparin  Infusion. 900 Unit(s)/Hr (9 mL/Hr) IV Continuous <Continuous>  lidocaine   Patch 1 Patch Transdermal daily  metoprolol succinate  milliGRAM(s) Oral daily  oxyCODONE  ER Tablet 15 milliGRAM(s) Oral two times a day  pancrelipase  (CREON 12,000 Lipase Units) 2 Capsule(s) Oral three times a day with meals  pantoprazole    Tablet 40 milliGRAM(s) Oral before breakfast  polyethylene glycol 3350 17 Gram(s) Oral daily  senna 2 Tablet(s) Oral at bedtime    MEDICATIONS  (PRN):  benzonatate 100 milliGRAM(s) Oral three times a day PRN Cough  heparin  Injectable 4500 Unit(s) IV Push every 6 hours PRN For aPTT less than 40  heparin  Injectable 2000 Unit(s) IV Push every 6 hours PRN For aPTT between 40 - 57  morphine  - Injectable 8 milliGRAM(s) IV Push every 3 hours PRN Severe Pain (7 - 10)  morphine  - Injectable 4 milliGRAM(s) IV Push every 3 hours PRN Moderate Pain (4 - 6)  ondansetron    Tablet 8 milliGRAM(s) Oral four times a day PRN Nausea and/or Vomiting      Vital Signs Last 24 Hrs  T(C): 36.8 (18 Feb 2020 06:04), Max: 37.1 (17 Feb 2020 20:44)  T(F): 98.3 (18 Feb 2020 06:04), Max: 98.8 (17 Feb 2020 20:44)  HR: 86 (18 Feb 2020 06:04) (86 - 116)  BP: 117/66 (18 Feb 2020 06:04) (117/66 - 127/69)  BP(mean): --  RR: 18 (18 Feb 2020 06:04) (18 - 20)  SpO2: 100% (18 Feb 2020 06:04) (95% - 100%)  CAPILLARY BLOOD GLUCOSE        I&O's Summary    17 Feb 2020 07:01  -  18 Feb 2020 07:00  --------------------------------------------------------  IN: 687 mL / OUT: 600 mL / NET: 87 mL          PHYSICAL EXAM  GENERAL: NAD, well-developed  HEAD:  Atraumatic, Normocephalic  EYES: EOMI, PERRLA, conjunctiva and sclera clear  CHEST/LUNG: Clear to auscultation bilaterally, decreased BS at R base; no wheezes  HEART: Regular rate and rhythm; No murmurs, rubs, or gallops  ABDOMEN: Soft, Nontender, Nondistended; Bowel sounds present  EXTREMITIES:  2+ Peripheral Pulses, No clubbing, cyanosis, or edema  PSYCH: AAOx3      LABS:                        7.7    12.41 )-----------( 632      ( 18 Feb 2020 06:22 )             26.2     02-18    143  |  107  |  7   ----------------------------<  99  3.7   |  21<L>  |  0.74    Ca    8.9      18 Feb 2020 06:22      PTT - ( 18 Feb 2020 07:49 )  PTT:62.9 sec            RADIOLOGY & ADDITIONAL TESTS:    Imaging Personally Reviewed:  Consultant(s) Notes Reviewed:    Care Discussed with Consultants/Other Providers:

## 2020-02-18 NOTE — PROGRESS NOTE ADULT - SUBJECTIVE AND OBJECTIVE BOX
INTERVAL EVENTS        OBJECTIVE    Vital Signs Last 24 Hrs  T(C): 36.8 (18 Feb 2020 06:04), Max: 37.1 (17 Feb 2020 20:44)  T(F): 98.3 (18 Feb 2020 06:04), Max: 98.8 (17 Feb 2020 20:44)  HR: 86 (18 Feb 2020 06:04) (86 - 116)  BP: 117/66 (18 Feb 2020 06:04) (117/66 - 127/69)  BP(mean): --  RR: 18 (18 Feb 2020 06:04) (18 - 20)  SpO2: 100% (18 Feb 2020 06:04) (95% - 100%)          MEDICATIONS  (STANDING):  acetaminophen   Tablet .. 650 milliGRAM(s) Oral every 6 hours  albuterol/ipratropium for Nebulization 3 milliLiter(s) Nebulizer every 8 hours  amLODIPine   Tablet 10 milliGRAM(s) Oral daily  heparin  Infusion. 900 Unit(s)/Hr (9 mL/Hr) IV Continuous <Continuous>  lidocaine   Patch 1 Patch Transdermal daily  metoprolol succinate  milliGRAM(s) Oral daily  oxyCODONE  ER Tablet 15 milliGRAM(s) Oral two times a day  pancrelipase  (CREON 12,000 Lipase Units) 2 Capsule(s) Oral three times a day with meals  pantoprazole    Tablet 40 milliGRAM(s) Oral before breakfast  polyethylene glycol 3350 17 Gram(s) Oral daily  senna 2 Tablet(s) Oral at bedtime    MEDICATIONS  (PRN):  benzonatate 100 milliGRAM(s) Oral three times a day PRN Cough  heparin  Injectable 4500 Unit(s) IV Push every 6 hours PRN For aPTT less than 40  heparin  Injectable 2000 Unit(s) IV Push every 6 hours PRN For aPTT between 40 - 57  morphine  - Injectable 8 milliGRAM(s) IV Push every 3 hours PRN Severe Pain (7 - 10)  morphine  - Injectable 4 milliGRAM(s) IV Push every 3 hours PRN Moderate Pain (4 - 6)  ondansetron    Tablet 8 milliGRAM(s) Oral four times a day PRN Nausea and/or Vomiting      LABORATORY                          7.7    12.41 )-----------( 632      ( 18 Feb 2020 06:22 )             26.2     02-18    143  |  107  |  7   ----------------------------<  99  3.7   |  21<L>  |  0.74    Ca    8.9      18 Feb 2020 06:22      RADIOLOGY    CT Angio Chest w/ IV Cont (02.13.20 @ 07:54) >  Thin linear filling defects within the bifurcation of the right upper pulmonary artery and the right interlobar pulmonary arteries, extending into the right lower lobar pulmonary artery. A large filling defect is present within the left main pulmonary artery extending into the left upper lobar and left interlobar pulmonary arteries with complete opacification of the left lower lobar pulmonary artery.  No endobronchial lesion. Bronchiectasis.  Centrilobular emphysema. Scattered areas of groundglass opacities, for example in the right anterior upper lobe. Peripheral wedge-shaped opacity in the posterior right lower lobe, indeterminate.  No pleural effusion.  Heart size is normal. No pericardial effusion. Calcific atherosclerosis of the aorta.  There is hilar lymphadenopathy.  There are multiple enlarged mediastinal lymph nodes. For example, there is a subcarinal lymph nodes measuring approximately 1.5 cm in short axis. A left paratracheal lymph node measures approximately 1.5 cm in short axis.      < end of copied text >

## 2020-02-18 NOTE — PROGRESS NOTE ADULT - PROBLEM SELECTOR PLAN 5
Transitions of Care Status:  1.  Name of PCP: Ananda Chavira  2.  PCP Contacted on Admission: [ ] Y    [x] N    3.  PCP contacted at Discharge: [ ] Y    [ ] N    [ ] N/A  4.  Post-Discharge Appointment Date and Location:  5.  Summary of Handoff given to PCP:    D/C home on Xarelto after bronch, outpatient f/u for path results. Will likely require home O2.

## 2020-02-19 ENCOUNTER — RESULT REVIEW (OUTPATIENT)
Age: 65
End: 2020-02-19

## 2020-02-19 LAB
ANION GAP SERPL CALC-SCNC: 12 MMOL/L — SIGNIFICANT CHANGE UP (ref 5–17)
APTT BLD: 36.6 SEC — HIGH (ref 27.5–36.3)
BUN SERPL-MCNC: 5 MG/DL — LOW (ref 7–23)
CALCIUM SERPL-MCNC: 8.9 MG/DL — SIGNIFICANT CHANGE UP (ref 8.4–10.5)
CHLORIDE SERPL-SCNC: 107 MMOL/L — SIGNIFICANT CHANGE UP (ref 96–108)
CO2 SERPL-SCNC: 23 MMOL/L — SIGNIFICANT CHANGE UP (ref 22–31)
CREAT SERPL-MCNC: 0.76 MG/DL — SIGNIFICANT CHANGE UP (ref 0.5–1.3)
DNA PLOIDY SPEC FC-IMP: SIGNIFICANT CHANGE UP
GLUCOSE SERPL-MCNC: 81 MG/DL — SIGNIFICANT CHANGE UP (ref 70–99)
HCT VFR BLD CALC: 26.2 % — LOW (ref 34.5–45)
HGB BLD-MCNC: 7.7 G/DL — LOW (ref 11.5–15.5)
MCHC RBC-ENTMCNC: 21.1 PG — LOW (ref 27–34)
MCHC RBC-ENTMCNC: 29.4 GM/DL — LOW (ref 32–36)
MCV RBC AUTO: 71.8 FL — LOW (ref 80–100)
NRBC # BLD: 0 /100 WBCS — SIGNIFICANT CHANGE UP (ref 0–0)
PLATELET # BLD AUTO: 677 K/UL — HIGH (ref 150–400)
POTASSIUM SERPL-MCNC: 4.1 MMOL/L — SIGNIFICANT CHANGE UP (ref 3.5–5.3)
POTASSIUM SERPL-SCNC: 4.1 MMOL/L — SIGNIFICANT CHANGE UP (ref 3.5–5.3)
PTR INTERPRETATION: SIGNIFICANT CHANGE UP
RBC # BLD: 3.65 M/UL — LOW (ref 3.8–5.2)
RBC # FLD: 22.7 % — HIGH (ref 10.3–14.5)
SODIUM SERPL-SCNC: 142 MMOL/L — SIGNIFICANT CHANGE UP (ref 135–145)
WBC # BLD: 11.07 K/UL — HIGH (ref 3.8–10.5)
WBC # FLD AUTO: 11.07 K/UL — HIGH (ref 3.8–10.5)

## 2020-02-19 PROCEDURE — 99233 SBSQ HOSP IP/OBS HIGH 50: CPT

## 2020-02-19 PROCEDURE — 88342 IMHCHEM/IMCYTCHM 1ST ANTB: CPT | Mod: 26,59

## 2020-02-19 PROCEDURE — 99233 SBSQ HOSP IP/OBS HIGH 50: CPT | Mod: GC

## 2020-02-19 PROCEDURE — 88112 CYTOPATH CELL ENHANCE TECH: CPT | Mod: 26,59

## 2020-02-19 PROCEDURE — 88173 CYTOPATH EVAL FNA REPORT: CPT | Mod: 26

## 2020-02-19 PROCEDURE — 99232 SBSQ HOSP IP/OBS MODERATE 35: CPT

## 2020-02-19 PROCEDURE — 31653 BRONCH EBUS SAMPLNG 3/> NODE: CPT | Mod: GC

## 2020-02-19 PROCEDURE — 88341 IMHCHEM/IMCYTCHM EA ADD ANTB: CPT | Mod: 26

## 2020-02-19 PROCEDURE — 88305 TISSUE EXAM BY PATHOLOGIST: CPT | Mod: 26

## 2020-02-19 RX ORDER — HEPARIN SODIUM 5000 [USP'U]/ML
2500 INJECTION INTRAVENOUS; SUBCUTANEOUS EVERY 6 HOURS
Refills: 0 | Status: DISCONTINUED | OUTPATIENT
Start: 2020-02-19 | End: 2020-02-20

## 2020-02-19 RX ORDER — OXYCODONE HYDROCHLORIDE 5 MG/1
1 TABLET ORAL
Qty: 60 | Refills: 0
Start: 2020-02-19 | End: 2020-03-19

## 2020-02-19 RX ORDER — MORPHINE SULFATE 50 MG/1
4 CAPSULE, EXTENDED RELEASE ORAL
Refills: 0 | Status: DISCONTINUED | OUTPATIENT
Start: 2020-02-19 | End: 2020-02-21

## 2020-02-19 RX ORDER — LANOLIN ALCOHOL/MO/W.PET/CERES
5 CREAM (GRAM) TOPICAL AT BEDTIME
Refills: 0 | Status: DISCONTINUED | OUTPATIENT
Start: 2020-02-19 | End: 2020-02-21

## 2020-02-19 RX ORDER — MORPHINE SULFATE 50 MG/1
4 CAPSULE, EXTENDED RELEASE ORAL
Refills: 0 | Status: DISCONTINUED | OUTPATIENT
Start: 2020-02-19 | End: 2020-02-19

## 2020-02-19 RX ORDER — MENTHOL AND METHYL SALICYLATE 10; 30 G/100G; G/100G
1 CREAM TOPICAL
Refills: 0 | Status: DISCONTINUED | OUTPATIENT
Start: 2020-02-19 | End: 2020-02-21

## 2020-02-19 RX ORDER — OXYCODONE HYDROCHLORIDE 5 MG/1
20 TABLET ORAL EVERY 6 HOURS
Refills: 0 | Status: DISCONTINUED | OUTPATIENT
Start: 2020-02-19 | End: 2020-02-21

## 2020-02-19 RX ORDER — HEPARIN SODIUM 5000 [USP'U]/ML
5000 INJECTION INTRAVENOUS; SUBCUTANEOUS EVERY 6 HOURS
Refills: 0 | Status: DISCONTINUED | OUTPATIENT
Start: 2020-02-19 | End: 2020-02-20

## 2020-02-19 RX ORDER — HEPARIN SODIUM 5000 [USP'U]/ML
1000 INJECTION INTRAVENOUS; SUBCUTANEOUS
Qty: 25000 | Refills: 0 | Status: DISCONTINUED | OUTPATIENT
Start: 2020-02-19 | End: 2020-02-20

## 2020-02-19 RX ADMIN — Medication 100 MILLIGRAM(S): at 05:02

## 2020-02-19 RX ADMIN — Medication 650 MILLIGRAM(S): at 05:02

## 2020-02-19 RX ADMIN — Medication 100 MILLIGRAM(S): at 18:22

## 2020-02-19 RX ADMIN — LIDOCAINE 1 PATCH: 4 CREAM TOPICAL at 12:03

## 2020-02-19 RX ADMIN — Medication 3 MILLILITER(S): at 18:22

## 2020-02-19 RX ADMIN — Medication 3 MILLILITER(S): at 05:02

## 2020-02-19 RX ADMIN — Medication 650 MILLIGRAM(S): at 11:55

## 2020-02-19 RX ADMIN — AMLODIPINE BESYLATE 10 MILLIGRAM(S): 2.5 TABLET ORAL at 05:02

## 2020-02-19 RX ADMIN — LIDOCAINE 1 PATCH: 4 CREAM TOPICAL at 21:34

## 2020-02-19 RX ADMIN — PANTOPRAZOLE SODIUM 40 MILLIGRAM(S): 20 TABLET, DELAYED RELEASE ORAL at 05:02

## 2020-02-19 RX ADMIN — MORPHINE SULFATE 8 MILLIGRAM(S): 50 CAPSULE, EXTENDED RELEASE ORAL at 11:55

## 2020-02-19 RX ADMIN — POLYETHYLENE GLYCOL 3350 17 GRAM(S): 17 POWDER, FOR SOLUTION ORAL at 18:28

## 2020-02-19 RX ADMIN — MORPHINE SULFATE 4 MILLIGRAM(S): 50 CAPSULE, EXTENDED RELEASE ORAL at 18:21

## 2020-02-19 RX ADMIN — Medication 3 MILLILITER(S): at 21:24

## 2020-02-19 RX ADMIN — Medication 5 MILLIGRAM(S): at 21:24

## 2020-02-19 RX ADMIN — Medication 650 MILLIGRAM(S): at 05:30

## 2020-02-19 RX ADMIN — MORPHINE SULFATE 4 MILLIGRAM(S): 50 CAPSULE, EXTENDED RELEASE ORAL at 21:22

## 2020-02-19 RX ADMIN — MENTHOL AND METHYL SALICYLATE 1 APPLICATION(S): 10; 30 CREAM TOPICAL at 21:46

## 2020-02-19 RX ADMIN — HEPARIN SODIUM 1000 UNIT(S)/HR: 5000 INJECTION INTRAVENOUS; SUBCUTANEOUS at 18:48

## 2020-02-19 RX ADMIN — MORPHINE SULFATE 4 MILLIGRAM(S): 50 CAPSULE, EXTENDED RELEASE ORAL at 21:30

## 2020-02-19 RX ADMIN — Medication 650 MILLIGRAM(S): at 18:22

## 2020-02-19 NOTE — PROGRESS NOTE ADULT - ASSESSMENT
65 yo woman with hypertension, chronic pancreatitis s/p Jeffery procedure (2019), history of ARDS (2015), GERD, and s/p vocal cord polypectomy, admitted for pulmonary embolism. Pulmonary service consulted for mediastinal and hilar lymphadenopathy.     1. Pulmonary embolism - filling defect on left extending to SCOTT and occluding LLL, some linear filling defects/web in right suggestive of chronicity. No evidence of right heart strain on echocardiogram. Continue heparin drip for anticoagulation. Hold for now for planned procedure.     2. Mediastinal and hilar adenopathy - worrisome for malignancy. Plan for bronchoscopy with EBUS today.     3. Emphysema - pulmonary function tests as an outpatient      --------------------------------------------------------  Ranjit Cade, PGY-4  Pulmonary/Critical Care Fellow  Pager: [phone] ([facility]), [phone] ([facility])  Pulmonary spectra: [id number]

## 2020-02-19 NOTE — PROGRESS NOTE ADULT - SUBJECTIVE AND OBJECTIVE BOX
Yogesh Kolb MD  Pager [phone]  [facility] [phone]  Cell Phone [phone]    Patient is a 64y old  Female who presents with a chief complaint of Back pain, SOB (19 Feb 2020 09:19)        SUBJECTIVE / OVERNIGHT EVENTS:      MEDICATIONS  (STANDING):  acetaminophen   Tablet .. 650 milliGRAM(s) Oral every 6 hours  albuterol/ipratropium for Nebulization 3 milliLiter(s) Nebulizer every 8 hours  amLODIPine   Tablet 10 milliGRAM(s) Oral daily  heparin  Infusion. 900 Unit(s)/Hr (9 mL/Hr) IV Continuous <Continuous>  lidocaine   Patch 1 Patch Transdermal daily  metoprolol succinate  milliGRAM(s) Oral daily  oxyCODONE  ER Tablet 15 milliGRAM(s) Oral two times a day  pancrelipase  (CREON 12,000 Lipase Units) 2 Capsule(s) Oral three times a day with meals  pantoprazole    Tablet 40 milliGRAM(s) Oral before breakfast  polyethylene glycol 3350 17 Gram(s) Oral daily  senna 2 Tablet(s) Oral at bedtime    MEDICATIONS  (PRN):  benzonatate 100 milliGRAM(s) Oral three times a day PRN Cough  heparin  Injectable 4500 Unit(s) IV Push every 6 hours PRN For aPTT less than 40  heparin  Injectable 2000 Unit(s) IV Push every 6 hours PRN For aPTT between 40 - 57  morphine  - Injectable 8 milliGRAM(s) IV Push every 3 hours PRN Severe Pain (7 - 10)  morphine  - Injectable 4 milliGRAM(s) IV Push every 3 hours PRN Moderate Pain (4 - 6)  ondansetron    Tablet 8 milliGRAM(s) Oral four times a day PRN Nausea and/or Vomiting      Vital Signs Last 24 Hrs  T(C): 36.9 (19 Feb 2020 05:03), Max: 36.9 (19 Feb 2020 05:03)  T(F): 98.4 (19 Feb 2020 05:03), Max: 98.4 (19 Feb 2020 05:03)  HR: 104 (19 Feb 2020 05:03) (93 - 104)  BP: 116/65 (19 Feb 2020 05:03) (115/61 - 131/71)  BP(mean): --  RR: 18 (19 Feb 2020 05:03) (18 - 22)  SpO2: 95% (19 Feb 2020 05:03) (95% - 100%)  CAPILLARY BLOOD GLUCOSE        I&O's Summary    18 Feb 2020 07:01  -  19 Feb 2020 07:00  --------------------------------------------------------  IN: 90 mL / OUT: 950 mL / NET: -860 mL          PHYSICAL EXAM  GENERAL: NAD, well-developed  HEAD:  Atraumatic, Normocephalic  EYES: EOMI, PERRLA, conjunctiva and sclera clear  CHEST/LUNG: Clear to auscultation bilaterally; No wheeze  HEART: Regular rate and rhythm; No murmurs, rubs, or gallops  ABDOMEN: Soft, Nontender, Nondistended; Bowel sounds present  EXTREMITIES:  2+ Peripheral Pulses, No clubbing, cyanosis, or edema  PSYCH: AAOx3      LABS:                        7.7    11.07 )-----------( 677      ( 19 Feb 2020 05:58 )             26.2     02-19    142  |  107  |  5<L>  ----------------------------<  81  4.1   |  23  |  0.76    Ca    8.9      19 Feb 2020 05:58      PTT - ( 19 Feb 2020 08:41 )  PTT:36.6 sec            RADIOLOGY & ADDITIONAL TESTS:    Imaging Personally Reviewed:  Consultant(s) Notes Reviewed:    Care Discussed with Consultants/Other Providers:

## 2020-02-19 NOTE — PROGRESS NOTE ADULT - PROBLEM SELECTOR PLAN 5
Transitions of Care Status:  1.  Name of PCP: Ananda Chavira  2.  PCP Contacted on Admission: [ ] Y    [x] N    3.  PCP contacted at Discharge: [ ] Y    [ ] N    [ ] N/A  4.  Post-Discharge Appointment Date and Location:  5.  Summary of Handoff given to PCP:    D/C home on Xarelto after bronch, outpatient f/u for path results.

## 2020-02-19 NOTE — PROGRESS NOTE ADULT - PROBLEM SELECTOR PLAN 1
Holding heparin gtt for bronch  F/u testing APLS- lupus anticoagulant, anticardiolipin antibodies, anti–beta-2 glycoprotein antibodies  Start Xarelto after bronch  Cont morphine 8mg for severe pain and 4 mg for moderate pain IV q3h prn, oxycodone 15mg ER q12 standing  Await pain management consult- outpatient pain MD is Dr. Gurjit Tompkins.

## 2020-02-19 NOTE — PROGRESS NOTE ADULT - SUBJECTIVE AND OBJECTIVE BOX
INTERVAL EVENTS  No acute events overnight  NPO since midnight  Heparin drip held this morning      REVIEW OF SYSTEMS  CONSTITUTIONAL: no fever or chills  CARDIOVASCULAR: no edema  RESPIRATORY: as above  REST OF SYSTEMS: negative      OBJECTIVE    Vital Signs Last 24 Hrs  T(C): 36.9 (19 Feb 2020 05:03), Max: 36.9 (19 Feb 2020 05:03)  T(F): 98.4 (19 Feb 2020 05:03), Max: 98.4 (19 Feb 2020 05:03)  HR: 104 (19 Feb 2020 05:03) (93 - 104)  BP: 116/65 (19 Feb 2020 05:03) (115/61 - 131/71)  BP(mean): --  RR: 18 (19 Feb 2020 05:03) (18 - 22)  SpO2: 95% (19 Feb 2020 05:03) (95% - 100%)    PHYSICAL EXAM:  General: no acute distress  HEENT: normocephalic  Eyes: extraocular movements intact, pupils equal and reactive to light  Neck: supple  Respiratory: non labored breathing, decreased breath sounds in bases  Cardiovascular: normal rate, regular rhythm  Gastrointestinal: abdomen soft, non tender, non distended  Extremities: no lower extremity edema  Neurological: alert, oriented, no focal deficits  Psychiatric: cooperative      MEDICATIONS  (STANDING):  acetaminophen   Tablet .. 650 milliGRAM(s) Oral every 6 hours  albuterol/ipratropium for Nebulization 3 milliLiter(s) Nebulizer every 8 hours  amLODIPine   Tablet 10 milliGRAM(s) Oral daily  heparin  Infusion. 900 Unit(s)/Hr (9 mL/Hr) IV Continuous <Continuous>  lidocaine   Patch 1 Patch Transdermal daily  metoprolol succinate  milliGRAM(s) Oral daily  oxyCODONE  ER Tablet 15 milliGRAM(s) Oral two times a day  pancrelipase  (CREON 12,000 Lipase Units) 2 Capsule(s) Oral three times a day with meals  pantoprazole    Tablet 40 milliGRAM(s) Oral before breakfast  polyethylene glycol 3350 17 Gram(s) Oral daily  senna 2 Tablet(s) Oral at bedtime    MEDICATIONS  (PRN):  benzonatate 100 milliGRAM(s) Oral three times a day PRN Cough  heparin  Injectable 4500 Unit(s) IV Push every 6 hours PRN For aPTT less than 40  heparin  Injectable 2000 Unit(s) IV Push every 6 hours PRN For aPTT between 40 - 57  morphine  - Injectable 8 milliGRAM(s) IV Push every 3 hours PRN Severe Pain (7 - 10)  morphine  - Injectable 4 milliGRAM(s) IV Push every 3 hours PRN Moderate Pain (4 - 6)  ondansetron    Tablet 8 milliGRAM(s) Oral four times a day PRN Nausea and/or Vomiting      LABORATORY                          7.7    11.07 )-----------( 677      ( 19 Feb 2020 05:58 )             26.2     02-19    142  |  107  |  5<L>  ----------------------------<  81  4.1   |  23  |  0.76    Ca    8.9      19 Feb 2020 05:58      RADIOLOGY    CT Angio Chest w/ IV Cont (02.13.20 @ 07:54) >  Thin linear filling defects within the bifurcation of the right upper pulmonary artery and the right interlobar pulmonary arteries, extending into the right lower lobar pulmonary artery, which may represent an acute or subacute pulmonary embolus/web.  A large filling defect is present within the left main pulmonary artery extending into the left upper lobar and left interlobar pulmonary arteries with complete opacification of the left lower lobar pulmonary artery, which is age indeterminant.  These findings were discussed with Dr. Powers at 2/13/2020 8:36 AM by Dr. Crystal with read back confirmation.    < end of copied text >      Transthoracic Echocardiogram (02.14.20 @ 05:59) >  Conclusions:  1. Normal left atrium.LA volume index = 34 cc/m2.  2. Mild concentric left ventricular hypertrophy.  3. Normal left ventricular systolic function. No segmental wall motion abnormalities.  4. Right ventricular enlargement with normal right ventricular systolic function.  5. Estimated pulmonary artery systolic pressure equals 39 mm Hg, assuming right atrial pressure equals 8 mm Hg, consistent with borderline pulmonary pressures.    < end of copied text >

## 2020-02-19 NOTE — PROGRESS NOTE ADULT - SUBJECTIVE AND OBJECTIVE BOX
Vascular Cardiology  Progress note     [facility] [id number]              EMAIL [email or web address]   OFFICE [phone]    INTERVAL HISTORY:  Has been NPO since midnight, hep gtt stopped this AM. No worsening SOB, no acute overnight events.         Allergies    diazepam (Other)  lemon (Other)    Intolerances    	    MEDICATIONS:  amLODIPine   Tablet 10 milliGRAM(s) Oral daily  heparin  Infusion. 900 Unit(s)/Hr IV Continuous <Continuous>  heparin  Injectable 4500 Unit(s) IV Push every 6 hours PRN  heparin  Injectable 2000 Unit(s) IV Push every 6 hours PRN  metoprolol succinate  milliGRAM(s) Oral daily      albuterol/ipratropium for Nebulization 3 milliLiter(s) Nebulizer every 8 hours  benzonatate 100 milliGRAM(s) Oral three times a day PRN    acetaminophen   Tablet .. 650 milliGRAM(s) Oral every 6 hours  morphine  - Injectable 8 milliGRAM(s) IV Push every 3 hours PRN  morphine  - Injectable 4 milliGRAM(s) IV Push every 3 hours PRN  ondansetron    Tablet 8 milliGRAM(s) Oral four times a day PRN  oxyCODONE  ER Tablet 15 milliGRAM(s) Oral two times a day    pancrelipase  (CREON 12,000 Lipase Units) 2 Capsule(s) Oral three times a day with meals  pantoprazole    Tablet 40 milliGRAM(s) Oral before breakfast  polyethylene glycol 3350 17 Gram(s) Oral daily  senna 2 Tablet(s) Oral at bedtime      lidocaine   Patch 1 Patch Transdermal daily      PAST MEDICAL & SURGICAL HISTORY:  Chronic abdominal pain  Thrombophlebitis: superficial right UE during 4/2019 hospital stay, resolved as per pt  Vocal cord polyp: removal 2018, benign as per pt  History of adrenal adenoma: stable on imaging  ARDS survivor: 2015  Chronic pancreatitis: last episode 4/2019  GERD (gastroesophageal reflux disease)  HTN (hypertension)  History of pancreatic surgery: Jeffery procedure (5/8)  S/P shoulder replacement, left: 2016  History of vocal cord polypectomy: 1/2018  S/P hip replacement, right: May 2016  S/P arthroscopy of shoulder: right - 2014  S/P hip replacement: left - 2010  S/P arthroscopy of shoulder: left in 2009      FAMILY HISTORY:  Family history of alcohol abuse      SOCIAL HISTORY:  unchanged    REVIEW OF SYSTEMS:  CONSTITUTIONAL: No fever, weight loss, or fatigue  EYES: No eye pain, visual disturbances, or discharge  ENMT:  No difficulty hearing, tinnitus, vertigo; No sinus or throat pain  NECK: No pain or stiffness  RESPIRATORY: No cough, wheezing, chills or hemoptysis; No Shortness of Breath  CARDIOVASCULAR: No chest pain, palpitations, passing out, dizziness, or leg swelling  GASTROINTESTINAL: No abdominal or epigastric pain. No nausea, vomiting, or hematemesis; No diarrhea or constipation. No melena or hematochezia.  GENITOURINARY: No dysuria, frequency, hematuria, or incontinence  NEUROLOGICAL: No headaches, memory loss, loss of strength, numbness, or tremors  SKIN: No itching, burning, rashes, or lesions   LYMPH Nodes: No enlarged glands  ENDOCRINE: No heat or cold intolerance; No hair loss  MUSCULOSKELETAL: No joint pain or swelling; No muscle, back, or extremity pain  PSYCHIATRIC: No depression, anxiety, mood swings, or difficulty sleeping  HEME/LYMPH: No easy bruising, or bleeding gums  ALLERY AND IMMUNOLOGIC: No hives or eczema	    [ x] All others negative	  [ ] Unable to obtain    PHYSICAL EXAM:  T(C): 36.9 (02-19-20 @ 05:03), Max: 36.9 (02-19-20 @ 05:03)  HR: 104 (02-19-20 @ 05:03) (93 - 104)  BP: 116/65 (02-19-20 @ 05:03) (115/61 - 131/71)  RR: 18 (02-19-20 @ 05:03) (18 - 22)  SpO2: 95% (02-19-20 @ 05:03) (95% - 100%)  Wt(kg): --  I&O's Summary    18 Feb 2020 07:01  -  19 Feb 2020 07:00  --------------------------------------------------------  IN: 90 mL / OUT: 950 mL / NET: -860 mL        Appearance: Normal	  HEENT:   Normal oral mucosa, PERRL, EOMI	  Carotid:  Right: No bruit    Left:  No bruit  Lymphatic: No lymphadenopathy  Cardiovascular: Normal S1 S2, No JVD, No murmurs, No edema  Respiratory: Lungs clear to auscultation	  Psychiatry: A & O x 3, Mood & affect appropriate  Gastrointestinal:  Soft, Non-tender, + BS	  Skin: No rashes, No ecchymoses, No cyanosis	  Neurologic: Non-focal  Extremities: Normal range of motion, No clubbing, cyanosis.  Vascular:   Right Femoral:  Palpable   Left Femoral:  Palpable  Right Popliteal: Palpable   Left Popliteal:  palpable  Right DP:  Palpable             Left DP:  Palpable  Right PT:  Palpable              Left PT:  Palpable      LABS:	 	    CBC Full  -  ( 19 Feb 2020 05:58 )  WBC Count : 11.07 K/uL  Hemoglobin : 7.7 g/dL  Hematocrit : 26.2 %  Platelet Count - Automated : 677 K/uL  Mean Cell Volume : 71.8 fl  Mean Cell Hemoglobin : 21.1 pg  Mean Cell Hemoglobin Concentration : 29.4 gm/dL  Auto Neutrophil # : x  Auto Lymphocyte # : x  Auto Monocyte # : x  Auto Eosinophil # : x  Auto Basophil # : x  Auto Neutrophil % : x  Auto Lymphocyte % : x  Auto Monocyte % : x  Auto Eosinophil % : x  Auto Basophil % : x    02-19    142  |  107  |  5<L>  ----------------------------<  81  4.1   |  23  |  0.76  02-18    143  |  107  |  7   ----------------------------<  99  3.7   |  21<L>  |  0.74    Ca    8.9      19 Feb 2020 05:58  Ca    8.9      18 Feb 2020 06:22            Assessment:  1. PE- unprovoked, overall low risk given no evidence of R heart strain on CT or TTE, normal proBNP and troponin. No other risk factors for PE- no family history, no recent travel or surgery within last few months, former smoker but has not smoked in about a year, is up to date on cancer screenings. On CT chest however, multiple enlarged lymph nodes and LLL opacity seen concerning for lung pathology, pulm consulted, plan for EBUS  2. DVT- below knee in one of the paired right posterior tibial veins and the right soleal vein  3. HTN  4. chronic pancreatitis s/p Jeffery     Plan:  1.Hep gtt on hold as there is plan for EBUS with pulm today. Will discuss with pulm when to restart post procedure  2. f/u testing APLS- lupus anticoagulant, anticardiolipin antibodies, anti–beta-2 glycoprotein antibodies    Thank you      Vascular Cardiology Service   [facility] [address]  Office [phone]  email:   [email or web address]

## 2020-02-19 NOTE — CHART NOTE - NSCHARTNOTEFT_GEN_A_CORE
Patient scheduled for Bronchoscopy with EBUS tomorrow at 2 pm. Please keep NPO after midnight and hold heparin 6-8h prior to procedure.     --------------------------------------------------------  Ranjit Cade, PGY-4  Pulmonary/Critical Care Fellow  Pager: [phone] ([facility]), [phone] (NS)  Pulmonary spectra: [phone]
Patient is now s/p Bronchoscopy and EBUS  She tolerated procedure.  No endobronchial lesions seen.  Lymph node aspirations done at right and left paratracheal and subcarinal stations.  Please see physical chart for full report.  She may resume a diet at 18:00 on 2/19/2020  Please resume Heparin gtt tonight.   Communicated to patient and NP from primary team.

## 2020-02-20 LAB
ANION GAP SERPL CALC-SCNC: 13 MMOL/L — SIGNIFICANT CHANGE UP (ref 5–17)
APTT BLD: 70.4 SEC — HIGH (ref 27.5–36.3)
APTT BLD: 85.1 SEC — HIGH (ref 27.5–36.3)
BUN SERPL-MCNC: 6 MG/DL — LOW (ref 7–23)
CALCIUM SERPL-MCNC: 8.6 MG/DL — SIGNIFICANT CHANGE UP (ref 8.4–10.5)
CHLORIDE SERPL-SCNC: 108 MMOL/L — SIGNIFICANT CHANGE UP (ref 96–108)
CO2 SERPL-SCNC: 22 MMOL/L — SIGNIFICANT CHANGE UP (ref 22–31)
CREAT SERPL-MCNC: 0.74 MG/DL — SIGNIFICANT CHANGE UP (ref 0.5–1.3)
GLUCOSE SERPL-MCNC: 89 MG/DL — SIGNIFICANT CHANGE UP (ref 70–99)
HCT VFR BLD CALC: 26.5 % — LOW (ref 34.5–45)
HCT VFR BLD CALC: 28.1 % — LOW (ref 34.5–45)
HGB BLD-MCNC: 8 G/DL — LOW (ref 11.5–15.5)
HGB BLD-MCNC: 8.4 G/DL — LOW (ref 11.5–15.5)
INR BLD: 1.26 RATIO — HIGH (ref 0.88–1.16)
MAGNESIUM SERPL-MCNC: 1.8 MG/DL — SIGNIFICANT CHANGE UP (ref 1.6–2.6)
MCHC RBC-ENTMCNC: 21.4 PG — LOW (ref 27–34)
MCHC RBC-ENTMCNC: 21.6 PG — LOW (ref 27–34)
MCHC RBC-ENTMCNC: 29.9 GM/DL — LOW (ref 32–36)
MCHC RBC-ENTMCNC: 30.2 GM/DL — LOW (ref 32–36)
MCV RBC AUTO: 71 FL — LOW (ref 80–100)
MCV RBC AUTO: 72.4 FL — LOW (ref 80–100)
NRBC # BLD: 0 /100 WBCS — SIGNIFICANT CHANGE UP (ref 0–0)
NRBC # BLD: 0 /100 WBCS — SIGNIFICANT CHANGE UP (ref 0–0)
PHOSPHATE SERPL-MCNC: 3.2 MG/DL — SIGNIFICANT CHANGE UP (ref 2.5–4.5)
PLATELET # BLD AUTO: 578 K/UL — HIGH (ref 150–400)
PLATELET # BLD AUTO: 620 K/UL — HIGH (ref 150–400)
POTASSIUM SERPL-MCNC: 4.6 MMOL/L — SIGNIFICANT CHANGE UP (ref 3.5–5.3)
POTASSIUM SERPL-SCNC: 4.6 MMOL/L — SIGNIFICANT CHANGE UP (ref 3.5–5.3)
PROTHROM AB SERPL-ACNC: 14.3 SEC — HIGH (ref 10–13.1)
RBC # BLD: 3.73 M/UL — LOW (ref 3.8–5.2)
RBC # BLD: 3.88 M/UL — SIGNIFICANT CHANGE UP (ref 3.8–5.2)
RBC # FLD: 23 % — HIGH (ref 10.3–14.5)
RBC # FLD: 23.7 % — HIGH (ref 10.3–14.5)
SODIUM SERPL-SCNC: 143 MMOL/L — SIGNIFICANT CHANGE UP (ref 135–145)
WBC # BLD: 10.29 K/UL — SIGNIFICANT CHANGE UP (ref 3.8–10.5)
WBC # BLD: 10.84 K/UL — HIGH (ref 3.8–10.5)
WBC # FLD AUTO: 10.29 K/UL — SIGNIFICANT CHANGE UP (ref 3.8–10.5)
WBC # FLD AUTO: 10.84 K/UL — HIGH (ref 3.8–10.5)

## 2020-02-20 PROCEDURE — 99232 SBSQ HOSP IP/OBS MODERATE 35: CPT

## 2020-02-20 PROCEDURE — 99233 SBSQ HOSP IP/OBS HIGH 50: CPT

## 2020-02-20 PROCEDURE — 99233 SBSQ HOSP IP/OBS HIGH 50: CPT | Mod: GC

## 2020-02-20 RX ORDER — RIVAROXABAN 15 MG-20MG
15 KIT ORAL
Refills: 0 | Status: DISCONTINUED | OUTPATIENT
Start: 2020-02-20 | End: 2020-02-20

## 2020-02-20 RX ORDER — APIXABAN 2.5 MG/1
10 TABLET, FILM COATED ORAL EVERY 12 HOURS
Refills: 0 | Status: COMPLETED | OUTPATIENT
Start: 2020-02-20 | End: 2020-02-20

## 2020-02-20 RX ORDER — APIXABAN 2.5 MG/1
1 TABLET, FILM COATED ORAL
Qty: 60 | Refills: 0
Start: 2020-02-20 | End: 2020-03-20

## 2020-02-20 RX ORDER — APIXABAN 2.5 MG/1
10 TABLET, FILM COATED ORAL EVERY 12 HOURS
Refills: 0 | Status: DISCONTINUED | OUTPATIENT
Start: 2020-02-20 | End: 2020-02-20

## 2020-02-20 RX ORDER — APIXABAN 2.5 MG/1
5 TABLET, FILM COATED ORAL EVERY 12 HOURS
Refills: 0 | Status: DISCONTINUED | OUTPATIENT
Start: 2020-02-21 | End: 2020-02-21

## 2020-02-20 RX ADMIN — HEPARIN SODIUM 1000 UNIT(S)/HR: 5000 INJECTION INTRAVENOUS; SUBCUTANEOUS at 01:27

## 2020-02-20 RX ADMIN — MORPHINE SULFATE 4 MILLIGRAM(S): 50 CAPSULE, EXTENDED RELEASE ORAL at 05:41

## 2020-02-20 RX ADMIN — LIDOCAINE 1 PATCH: 4 CREAM TOPICAL at 19:15

## 2020-02-20 RX ADMIN — MORPHINE SULFATE 4 MILLIGRAM(S): 50 CAPSULE, EXTENDED RELEASE ORAL at 10:36

## 2020-02-20 RX ADMIN — Medication 650 MILLIGRAM(S): at 12:13

## 2020-02-20 RX ADMIN — MENTHOL AND METHYL SALICYLATE 1 APPLICATION(S): 10; 30 CREAM TOPICAL at 05:46

## 2020-02-20 RX ADMIN — Medication 2 CAPSULE(S): at 08:32

## 2020-02-20 RX ADMIN — LIDOCAINE 1 PATCH: 4 CREAM TOPICAL at 11:45

## 2020-02-20 RX ADMIN — MENTHOL AND METHYL SALICYLATE 1 APPLICATION(S): 10; 30 CREAM TOPICAL at 21:11

## 2020-02-20 RX ADMIN — APIXABAN 10 MILLIGRAM(S): 2.5 TABLET, FILM COATED ORAL at 21:08

## 2020-02-20 RX ADMIN — Medication 100 MILLIGRAM(S): at 18:39

## 2020-02-20 RX ADMIN — OXYCODONE HYDROCHLORIDE 20 MILLIGRAM(S): 5 TABLET ORAL at 13:45

## 2020-02-20 RX ADMIN — Medication 3 MILLILITER(S): at 13:44

## 2020-02-20 RX ADMIN — Medication 650 MILLIGRAM(S): at 19:15

## 2020-02-20 RX ADMIN — Medication 5 MILLIGRAM(S): at 21:08

## 2020-02-20 RX ADMIN — MENTHOL AND METHYL SALICYLATE 1 APPLICATION(S): 10; 30 CREAM TOPICAL at 18:37

## 2020-02-20 RX ADMIN — LIDOCAINE 1 PATCH: 4 CREAM TOPICAL at 00:54

## 2020-02-20 RX ADMIN — MENTHOL AND METHYL SALICYLATE 1 APPLICATION(S): 10; 30 CREAM TOPICAL at 12:02

## 2020-02-20 RX ADMIN — PANTOPRAZOLE SODIUM 40 MILLIGRAM(S): 20 TABLET, DELAYED RELEASE ORAL at 05:40

## 2020-02-20 RX ADMIN — MORPHINE SULFATE 4 MILLIGRAM(S): 50 CAPSULE, EXTENDED RELEASE ORAL at 01:04

## 2020-02-20 RX ADMIN — AMLODIPINE BESYLATE 10 MILLIGRAM(S): 2.5 TABLET ORAL at 05:40

## 2020-02-20 RX ADMIN — OXYCODONE HYDROCHLORIDE 15 MILLIGRAM(S): 5 TABLET ORAL at 11:05

## 2020-02-20 RX ADMIN — Medication 650 MILLIGRAM(S): at 11:43

## 2020-02-20 RX ADMIN — HEPARIN SODIUM 1000 UNIT(S)/HR: 5000 INJECTION INTRAVENOUS; SUBCUTANEOUS at 08:33

## 2020-02-20 RX ADMIN — Medication 650 MILLIGRAM(S): at 18:38

## 2020-02-20 RX ADMIN — APIXABAN 10 MILLIGRAM(S): 2.5 TABLET, FILM COATED ORAL at 10:35

## 2020-02-20 RX ADMIN — MORPHINE SULFATE 4 MILLIGRAM(S): 50 CAPSULE, EXTENDED RELEASE ORAL at 01:40

## 2020-02-20 RX ADMIN — Medication 3 MILLILITER(S): at 05:40

## 2020-02-20 RX ADMIN — MORPHINE SULFATE 4 MILLIGRAM(S): 50 CAPSULE, EXTENDED RELEASE ORAL at 10:51

## 2020-02-20 RX ADMIN — POLYETHYLENE GLYCOL 3350 17 GRAM(S): 17 POWDER, FOR SOLUTION ORAL at 11:49

## 2020-02-20 RX ADMIN — OXYCODONE HYDROCHLORIDE 20 MILLIGRAM(S): 5 TABLET ORAL at 00:00

## 2020-02-20 RX ADMIN — OXYCODONE HYDROCHLORIDE 20 MILLIGRAM(S): 5 TABLET ORAL at 20:38

## 2020-02-20 RX ADMIN — Medication 100 MILLIGRAM(S): at 21:10

## 2020-02-20 RX ADMIN — OXYCODONE HYDROCHLORIDE 20 MILLIGRAM(S): 5 TABLET ORAL at 19:46

## 2020-02-20 RX ADMIN — OXYCODONE HYDROCHLORIDE 15 MILLIGRAM(S): 5 TABLET ORAL at 10:35

## 2020-02-20 RX ADMIN — Medication 2 CAPSULE(S): at 11:50

## 2020-02-20 RX ADMIN — LIDOCAINE 1 PATCH: 4 CREAM TOPICAL at 23:00

## 2020-02-20 RX ADMIN — Medication 100 MILLIGRAM(S): at 05:40

## 2020-02-20 RX ADMIN — Medication 2 CAPSULE(S): at 18:38

## 2020-02-20 RX ADMIN — Medication 3 MILLILITER(S): at 21:08

## 2020-02-20 NOTE — PROGRESS NOTE ADULT - ASSESSMENT
64 yr old F PMH chronic pancreatitis, HTN p/w left sided back pain/SOB admitted with unproked PE, found to have mediastinal and hilar lymphadenopathy s/p bronchoscopy with FNA on 2/19

## 2020-02-20 NOTE — PROGRESS NOTE ADULT - SUBJECTIVE AND OBJECTIVE BOX
Yogesh Kolb MD  Pager [phone]  [facility] [phone]  Cell Phone [phone]    Patient is a 64y old  Female who presents with a chief complaint of Back pain, SOB (20 Feb 2020 07:04)        SUBJECTIVE / OVERNIGHT EVENTS: Patient doing well. States pain has improved slightly, trying not to request IV pain meds as frequently  TELEMETRY:       MEDICATIONS  (STANDING):  acetaminophen   Tablet .. 650 milliGRAM(s) Oral every 6 hours  albuterol/ipratropium for Nebulization 3 milliLiter(s) Nebulizer every 8 hours  amLODIPine   Tablet 10 milliGRAM(s) Oral daily  apixaban 10 milliGRAM(s) Oral every 12 hours  lidocaine   Patch 1 Patch Transdermal daily  melatonin 5 milliGRAM(s) Oral at bedtime  methyl salicylate 15%/menthol 10% Topical Cream 1 Application(s) Topical four times a day  metoprolol succinate  milliGRAM(s) Oral daily  oxyCODONE  ER Tablet 15 milliGRAM(s) Oral two times a day  pancrelipase  (CREON 12,000 Lipase Units) 2 Capsule(s) Oral three times a day with meals  pantoprazole    Tablet 40 milliGRAM(s) Oral before breakfast  polyethylene glycol 3350 17 Gram(s) Oral daily    MEDICATIONS  (PRN):  benzonatate 100 milliGRAM(s) Oral three times a day PRN Cough  morphine  - Injectable 4 milliGRAM(s) IV Push every 3 hours PRN Severe Pain (7 - 10)  ondansetron    Tablet 8 milliGRAM(s) Oral four times a day PRN Nausea and/or Vomiting  oxyCODONE    IR 20 milliGRAM(s) Oral every 6 hours PRN Moderate Pain (4 - 6)      Vital Signs Last 24 Hrs  T(C): 37 (20 Feb 2020 05:32), Max: 37 (20 Feb 2020 05:32)  T(F): 98.6 (20 Feb 2020 05:32), Max: 98.6 (20 Feb 2020 05:32)  HR: 103 (20 Feb 2020 05:32) (101 - 105)  BP: 107/82 (20 Feb 2020 05:32) (107/82 - 118/71)  BP(mean): --  RR: 20 (20 Feb 2020 05:32) (18 - 20)  SpO2: 94% (20 Feb 2020 05:32) (94% - 100%)  CAPILLARY BLOOD GLUCOSE        I&O's Summary    19 Feb 2020 07:01  -  20 Feb 2020 07:00  --------------------------------------------------------  IN: 110 mL / OUT: 100 mL / NET: 10 mL          PHYSICAL EXAM  GENERAL: NAD, well-developed  HEAD:  Atraumatic, Normocephalic  EYES: EOMI, PERRLA, conjunctiva and sclera clear  CHEST/LUNG: Clear to auscultation bilaterally; No wheeze  HEART: Regular rate and rhythm; No murmurs, rubs, or gallops  ABDOMEN: Soft, Nontender, Nondistended; Bowel sounds present  EXTREMITIES:  2+ Peripheral Pulses, No clubbing, cyanosis, or edema  PSYCH: AAOx3      LABS:                        8.4    10.84 )-----------( 620      ( 20 Feb 2020 06:00 )             28.1     02-20    143  |  108  |  6<L>  ----------------------------<  89  4.6   |  22  |  0.74    Ca    8.6      20 Feb 2020 06:00  Phos  3.2     02-20  Mg     1.8     02-20      PT/INR - ( 20 Feb 2020 08:32 )   PT: 14.3 sec;   INR: 1.26 ratio         PTT - ( 20 Feb 2020 08:12 )  PTT:85.1 sec            RADIOLOGY & ADDITIONAL TESTS:    Imaging Personally Reviewed:  Consultant(s) Notes Reviewed:    Care Discussed with Consultants/Other Providers: Yogesh Kolb MD  Pager [phone]  [facility] [phone]  Cell Phone [phone]    Patient is a 64y old  Female who presents with a chief complaint of Back pain, SOB (20 Feb 2020 07:04)        SUBJECTIVE / OVERNIGHT EVENTS: Patient doing well. States pain has improved slightly, trying not to request IV pain meds as frequently  TELEMETRY: SR 80-90's, 6 seconds PAT up to 130      MEDICATIONS  (STANDING):  acetaminophen   Tablet .. 650 milliGRAM(s) Oral every 6 hours  albuterol/ipratropium for Nebulization 3 milliLiter(s) Nebulizer every 8 hours  amLODIPine   Tablet 10 milliGRAM(s) Oral daily  apixaban 10 milliGRAM(s) Oral every 12 hours  lidocaine   Patch 1 Patch Transdermal daily  melatonin 5 milliGRAM(s) Oral at bedtime  methyl salicylate 15%/menthol 10% Topical Cream 1 Application(s) Topical four times a day  metoprolol succinate  milliGRAM(s) Oral daily  oxyCODONE  ER Tablet 15 milliGRAM(s) Oral two times a day  pancrelipase  (CREON 12,000 Lipase Units) 2 Capsule(s) Oral three times a day with meals  pantoprazole    Tablet 40 milliGRAM(s) Oral before breakfast  polyethylene glycol 3350 17 Gram(s) Oral daily    MEDICATIONS  (PRN):  benzonatate 100 milliGRAM(s) Oral three times a day PRN Cough  morphine  - Injectable 4 milliGRAM(s) IV Push every 3 hours PRN Severe Pain (7 - 10)  ondansetron    Tablet 8 milliGRAM(s) Oral four times a day PRN Nausea and/or Vomiting  oxyCODONE    IR 20 milliGRAM(s) Oral every 6 hours PRN Moderate Pain (4 - 6)      Vital Signs Last 24 Hrs  T(C): 37 (20 Feb 2020 05:32), Max: 37 (20 Feb 2020 05:32)  T(F): 98.6 (20 Feb 2020 05:32), Max: 98.6 (20 Feb 2020 05:32)  HR: 103 (20 Feb 2020 05:32) (101 - 105)  BP: 107/82 (20 Feb 2020 05:32) (107/82 - 118/71)  BP(mean): --  RR: 20 (20 Feb 2020 05:32) (18 - 20)  SpO2: 94% (20 Feb 2020 05:32) (94% - 100%)  CAPILLARY BLOOD GLUCOSE        I&O's Summary    19 Feb 2020 07:01  -  20 Feb 2020 07:00  --------------------------------------------------------  IN: 110 mL / OUT: 100 mL / NET: 10 mL          PHYSICAL EXAM  GENERAL: NAD, well-developed  HEAD:  Atraumatic, Normocephalic  EYES: EOMI, PERRLA, conjunctiva and sclera clear  CHEST/LUNG: Clear to auscultation bilaterally; No wheeze  HEART: Regular rate and rhythm; No murmurs, rubs, or gallops  ABDOMEN: Soft, Nontender, Nondistended; Bowel sounds present  EXTREMITIES:  2+ Peripheral Pulses, No clubbing, cyanosis, or edema  PSYCH: AAOx3      LABS:                        8.4    10.84 )-----------( 620      ( 20 Feb 2020 06:00 )             28.1     02-20    143  |  108  |  6<L>  ----------------------------<  89  4.6   |  22  |  0.74    Ca    8.6      20 Feb 2020 06:00  Phos  3.2     02-20  Mg     1.8     02-20      PT/INR - ( 20 Feb 2020 08:32 )   PT: 14.3 sec;   INR: 1.26 ratio         PTT - ( 20 Feb 2020 08:12 )  PTT:85.1 sec            RADIOLOGY & ADDITIONAL TESTS:    Imaging Personally Reviewed:  Consultant(s) Notes Reviewed:    Care Discussed with Consultants/Other Providers:

## 2020-02-20 NOTE — PROGRESS NOTE ADULT - PROBLEM SELECTOR PLAN 1
Transition to Eliquis  F/u testing APLS- lupus anticoagulant, anticardiolipin antibodies, anti–beta-2 glycoprotein antibodies  D/w pain management- Cont oxycodone 15mg ER q12 standing and oxycodone IR 20 mg PO g 6 prn.  Outpatient pain management f/u with Dr. Gurjit Tompkins.

## 2020-02-20 NOTE — PHARMACOTHERAPY INTERVENTION NOTE - COMMENTS
Confirmed coverage of Eliquis and Oxycodone ER with [facility] pharmacy. Patient has $0 copay for each medication.      Lacy Baker, Pharmacy Intern Confirmed coverage of Eliquis and Oxycodone ER with [facility] pharmacy in anticipation of discharge. Patient has $0 copay for both medications.      Lacy Baker, Pharmacy Intern    Ary Sandoval, PharmD   [phone]

## 2020-02-20 NOTE — DIETITIAN INITIAL EVALUATION ADULT. - PROBLEM SELECTOR PLAN 1
Heparin gtt  tele  check TTE, LE dopllers  Morphine PRN pain (4mg IVP)  Appreciate vascular cardiology evaluation    hypercoaguable w/u (due to presence of clot will not be able to check for protein C or S deficiency)

## 2020-02-20 NOTE — PROGRESS NOTE ADULT - SUBJECTIVE AND OBJECTIVE BOX
Vascular Cardiology  Progress note     SPECTRA [id number]              EMAIL [email or web address]   OFFICE [phone]    INTERVAL HISTORY:  Had EBUS yesterday, denies any hemoptysis or any other signs of bleeding. No SOB or pain with ambulation.          Allergies    diazepam (Other)  lemon (Other)    Intolerances    	    MEDICATIONS:  amLODIPine   Tablet 10 milliGRAM(s) Oral daily  apixaban 10 milliGRAM(s) Oral every 12 hours  metoprolol succinate  milliGRAM(s) Oral daily      albuterol/ipratropium for Nebulization 3 milliLiter(s) Nebulizer every 8 hours  benzonatate 100 milliGRAM(s) Oral three times a day PRN    acetaminophen   Tablet .. 650 milliGRAM(s) Oral every 6 hours  melatonin 5 milliGRAM(s) Oral at bedtime  morphine  - Injectable 4 milliGRAM(s) IV Push every 3 hours PRN  ondansetron    Tablet 8 milliGRAM(s) Oral four times a day PRN  oxyCODONE    IR 20 milliGRAM(s) Oral every 6 hours PRN  oxyCODONE  ER Tablet 15 milliGRAM(s) Oral two times a day    pancrelipase  (CREON 12,000 Lipase Units) 2 Capsule(s) Oral three times a day with meals  pantoprazole    Tablet 40 milliGRAM(s) Oral before breakfast  polyethylene glycol 3350 17 Gram(s) Oral daily      lidocaine   Patch 1 Patch Transdermal daily  methyl salicylate 15%/menthol 10% Topical Cream 1 Application(s) Topical four times a day      PAST MEDICAL & SURGICAL HISTORY:  Chronic abdominal pain  Thrombophlebitis: superficial right UE during 4/2019 hospital stay, resolved as per pt  Vocal cord polyp: removal 2018, benign as per pt  History of adrenal adenoma: stable on imaging  ARDS survivor: 2015  Chronic pancreatitis: last episode 4/2019  GERD (gastroesophageal reflux disease)  HTN (hypertension)  History of pancreatic surgery: Jeffery procedure (5/8)  S/P shoulder replacement, left: 2016  History of vocal cord polypectomy: 1/2018  S/P hip replacement, right: May 2016  S/P arthroscopy of shoulder: right - 2014  S/P hip replacement: left - 2010  S/P arthroscopy of shoulder: left in 2009      FAMILY HISTORY:  Family history of alcohol abuse      SOCIAL HISTORY:  unchanged    REVIEW OF SYSTEMS:  CONSTITUTIONAL: No fever, weight loss, or fatigue  EYES: No eye pain, visual disturbances, or discharge  ENMT:  No difficulty hearing, tinnitus, vertigo; No sinus or throat pain  NECK: No pain or stiffness  RESPIRATORY: No cough, wheezing, chills or hemoptysis; No Shortness of Breath  CARDIOVASCULAR: No chest pain, palpitations, passing out, dizziness, or leg swelling  GASTROINTESTINAL: No abdominal or epigastric pain. No nausea, vomiting, or hematemesis; No diarrhea or constipation. No melena or hematochezia.  GENITOURINARY: No dysuria, frequency, hematuria, or incontinence  NEUROLOGICAL: No headaches, memory loss, loss of strength, numbness, or tremors  SKIN: No itching, burning, rashes, or lesions   LYMPH Nodes: No enlarged glands  ENDOCRINE: No heat or cold intolerance; No hair loss  MUSCULOSKELETAL: No joint pain or swelling; No muscle, back, or extremity pain  PSYCHIATRIC: No depression, anxiety, mood swings, or difficulty sleeping  HEME/LYMPH: No easy bruising, or bleeding gums  ALLERY AND IMMUNOLOGIC: No hives or eczema	    [ x] All others negative	  [ ] Unable to obtain    PHYSICAL EXAM:  T(C): 36.9 (02-20-20 @ 10:30), Max: 37 (02-20-20 @ 05:32)  HR: 99 (02-20-20 @ 10:30) (99 - 105)  BP: 113/67 (02-20-20 @ 10:30) (107/82 - 118/71)  RR: 18 (02-20-20 @ 10:30) (18 - 20)  SpO2: 99% (02-20-20 @ 10:30) (94% - 100%)  Wt(kg): --  I&O's Summary    19 Feb 2020 07:01  -  20 Feb 2020 07:00  --------------------------------------------------------  IN: 110 mL / OUT: 100 mL / NET: 10 mL    20 Feb 2020 07:01  -  20 Feb 2020 12:03  --------------------------------------------------------  IN: 360 mL / OUT: 150 mL / NET: 210 mL        Appearance: Normal	  HEENT:   Normal oral mucosa, PERRL, EOMI	  Carotid:  Right: No bruit    Left:  No bruit  Lymphatic: No lymphadenopathy  Cardiovascular: Normal S1 S2, No JVD, No murmurs, No edema  Respiratory: Lungs clear to auscultation	  Psychiatry: A & O x 3, Mood & affect appropriate  Gastrointestinal:  Soft, Non-tender, + BS	  Skin: No rashes, No ecchymoses, No cyanosis	  Neurologic: Non-focal  Extremities: Normal range of motion, No clubbing, cyanosis.  Vascular:   Right Femoral:  Palpable   Left Femoral:  Palpable  Right Popliteal: Palpable   Left Popliteal:  palpable  Right DP:  Palpable             Left DP:  Palpable  Right PT:  Palpable              Left PT:  Palpable      LABS:	 	    CBC Full  -  ( 20 Feb 2020 06:00 )  WBC Count : 10.84 K/uL  Hemoglobin : 8.4 g/dL  Hematocrit : 28.1 %  Platelet Count - Automated : 620 K/uL  Mean Cell Volume : 72.4 fl  Mean Cell Hemoglobin : 21.6 pg  Mean Cell Hemoglobin Concentration : 29.9 gm/dL  Auto Neutrophil # : x  Auto Lymphocyte # : x  Auto Monocyte # : x  Auto Eosinophil # : x  Auto Basophil # : x  Auto Neutrophil % : x  Auto Lymphocyte % : x  Auto Monocyte % : x  Auto Eosinophil % : x  Auto Basophil % : x    02-20    143  |  108  |  6<L>  ----------------------------<  89  4.6   |  22  |  0.74  02-19    142  |  107  |  5<L>  ----------------------------<  81  4.1   |  23  |  0.76    Ca    8.6      20 Feb 2020 06:00  Ca    8.9      19 Feb 2020 05:58  Phos  3.2     02-20  Mg     1.8     02-20        Assessment:  1. PE- unprovoked, overall low risk given no evidence of R heart strain on CT or TTE, normal proBNP and troponin. No other risk factors for PE- no family history, no recent travel or surgery within last few months, former smoker but has not smoked in about a year, is up to date on cancer screenings. On CT chest however, multiple enlarged lymph nodes and LLL opacity seen concerning for malignancy, pulm consulted. Pt s/p EBUS on 2/19/20  2. DVT- below knee in one of the paired right posterior tibial veins and the right soleal vein  3. HTN  4. chronic pancreatitis s/p Jeffery     Plan:  1. Stop heparin gtt. Start Eliquis 10mg BID for one day (as patient has been on hep gtt for 6 days), then switch to Eliquis 5mg BID  2. F/u biopsy results  3. f/u testing APLS- lupus anticoagulant, anticardiolipin antibodies, anti–beta-2 glycoprotein antibodies    Thank you      Vascular Cardiology Service   [facility] [id number]  Office [phone]  email:   [email or web address]

## 2020-02-20 NOTE — DIETITIAN INITIAL EVALUATION ADULT. - PERTINENT LABORATORY DATA
02-20 Na143 mmol/L Glu 89 mg/dL K+ 4.6 mmol/L Cr  0.74 mg/dL BUN 6 mg/dL<L> Phos 3.2 mg/dL Alb n/a   PAB n/a

## 2020-02-20 NOTE — PHARMACOTHERAPY INTERVENTION NOTE - COMMENTS
Counseled patient on new anticoagulant Eliquis - dosing, indication, and possible side effects. Anticoagulant medication card provided and reviewed with patient.    Ary Sandoval, PharmD  [phone]

## 2020-02-20 NOTE — PROGRESS NOTE ADULT - ASSESSMENT
65 yo woman with hypertension, chronic pancreatitis s/p Jeffery procedure (2019), history of ARDS (2015), GERD, and s/p vocal cord polypectomy, admitted for pulmonary embolism. Pulmonary service consulted for mediastinal and hilar lymphadenopathy.     1. Pulmonary embolism - filling defect on left extending to SCOTT and occluding LLL, some linear filling defects/web in right suggestive of chronicity. No evidence of right heart strain on echocardiogram. Continue anticoagulation.     2. Mediastinal and hilar adenopathy - worrisome for malignancy. Bronchoscopy with EBUS done . Follow pathology results.     3. Emphysema - pulmonary function tests as an outpatient    Pulmonary follow up upon discharge at [address] [place] [address], [address] ([phone]).  Please e-mail [email or web address] to make an appointment for the patient and include the name, , and a phone number for you and the patient.    --------------------------------------------------------  Ranjit Cade, PGY-4  Pulmonary/Critical Care Fellow  Pager: [phone] ([facility]), [phone] (NS)  Pulmonary spectra: [phone] 63 yo woman with hypertension, chronic pancreatitis s/p Jeffery procedure (2019), history of ARDS (2015), GERD, and s/p vocal cord polypectomy, admitted for pulmonary embolism. Pulmonary service consulted for mediastinal and hilar lymphadenopathy.     1. Pulmonary embolism - filling defect on left extending to SCOTT and occluding LLL, some linear filling defects/web in right suggestive of chronicity. No evidence of right heart strain on echocardiogram. Continue anticoagulation.     2. Mediastinal and hilar adenopathy - Bronchoscopy with EBUS done 02/19. Preliminary results suggestive of malignancy. Follow final pathology results. Needs outpatient PET scan and oncology referral.     3. Emphysema - pulmonary function tests as an outpatient      Pulmonary follow up with Dr. Ramy Abad on 03/02/20 at [address] [place] [address], [address] ([phone]).    --------------------------------------------------------  Ranjit Samayoa, PGY-4  Pulmonary/Critical Care Fellow  Pager: [phone] ([facility]), [phone] (NS)  Pulmonary spectra: [id number]

## 2020-02-20 NOTE — DIETITIAN INITIAL EVALUATION ADULT. - OTHER INFO
64F with PMH hypertension, chronic pancreatitis s/p Jeffery procedure (2019), history of ARDS (2015), GERD, and s/p vocal cord polypectomy, admitted for pulmonary embolism, found to have mediastinal and hilar lymphadenopathy s/p bronchoscopy/bx 2/19.    Pt admits good appetite and PO intake in-house and PTA. Admits to eating 3 meals at home and follows low sodium diet as well as avoids concentrated sweets/sugar because her  was Dx with DM 3 years ago. Pt admits to cooking a lot and per diet recall, diet healthful and includes high vegetable and protein consumption; avoids red meat 2/2 pancreatitis and states she cant digest it well. Pt admits to 10 pound weight gain since May; wt gain intentional, states she was given medication by MD to keep weight on to help with pancreatitis.  pounds; weight noted 2/17 - 134.9 pounds, appears consistent with reported UBW. Nutrition focused physical exam conducted with verbal consent from pt; no muscle/fat loss present. Denies N+V, diarrhea/constipation - last BM 2/19, on bowel regimen; no chewing/swallowing difficulties. Allergic to jessie (reaction: salivary gland swelling). Denies micronutrient supplementation PTA; takes Creon for pancreatitis.    Discussed Heart Healthy Reduced Sodium Nutrition Therapy - reinforced foods high in sodium, foods low in sodium, avoiding processed foods and use of table salt. Reviewed increased intake of fresh/frozen fruits and vegetables, and sodium free alternatives to flavoring food. Also reinforced Pancreatitis Nutrition therapy and low fat diet - literature provided.

## 2020-02-20 NOTE — PROGRESS NOTE ADULT - PROBLEM SELECTOR PLAN 2
Await FNA path results from bronch  Patient states she will follow up at [facility] if results reveal malignancy- deferring eval at this time

## 2020-02-20 NOTE — PROGRESS NOTE ADULT - PROBLEM SELECTOR PLAN 5
Transitions of Care Status:  1.  Name of PCP: Ananda Chavira  2.  PCP Contacted on Admission: [ ] Y    [x] N    3.  PCP contacted at Discharge: [ ] Y    [ ] N    [ ] N/A  4.  Post-Discharge Appointment Date and Location:  5.  Summary of Handoff given to PCP:    D/C home on Eliquis tomorrow if HD stable, outpatient f/u for path results.

## 2020-02-20 NOTE — DIETITIAN INITIAL EVALUATION ADULT. - PERTINENT MEDS FT
MEDICATIONS  (STANDING):  acetaminophen   Tablet .. 650 milliGRAM(s) Oral every 6 hours  albuterol/ipratropium for Nebulization 3 milliLiter(s) Nebulizer every 8 hours  amLODIPine   Tablet 10 milliGRAM(s) Oral daily  heparin  Infusion. 1000 Unit(s)/Hr (10 mL/Hr) IV Continuous <Continuous>  lidocaine   Patch 1 Patch Transdermal daily  melatonin 5 milliGRAM(s) Oral at bedtime  methyl salicylate 15%/menthol 10% Topical Cream 1 Application(s) Topical four times a day  metoprolol succinate  milliGRAM(s) Oral daily  oxyCODONE  ER Tablet 15 milliGRAM(s) Oral two times a day  pancrelipase  (CREON 12,000 Lipase Units) 2 Capsule(s) Oral three times a day with meals  pantoprazole    Tablet 40 milliGRAM(s) Oral before breakfast  polyethylene glycol 3350 17 Gram(s) Oral daily

## 2020-02-20 NOTE — DIETITIAN INITIAL EVALUATION ADULT. - ADD RECOMMEND
1. Continue current diet as ordered. 2. Provided Heart Healthy Reduced Sodium and Pancreatitis nutrition therapy - literature provided. Pt made aware RD remains available for additional information PRN. 3. Monitor PO intake/tolerance, weights, labs, hydration status, bowels, and skin integrity.

## 2020-02-20 NOTE — DIETITIAN INITIAL EVALUATION ADULT. - PHYSICAL APPEARANCE
other (specify) Ht: 5'4" Wt: 134.9 pounds BMI: 23.2 kg/m2 IBW: 120 (+/-10%) 112%IBW  Edema: none noted  Skin per nursing flowsheets: no pressure injuries noted

## 2020-02-21 ENCOUNTER — TRANSCRIPTION ENCOUNTER (OUTPATIENT)
Age: 65
End: 2020-02-21

## 2020-02-21 VITALS
SYSTOLIC BLOOD PRESSURE: 117 MMHG | DIASTOLIC BLOOD PRESSURE: 61 MMHG | TEMPERATURE: 98 F | OXYGEN SATURATION: 95 % | HEART RATE: 97 BPM | RESPIRATION RATE: 18 BRPM

## 2020-02-21 LAB
ANION GAP SERPL CALC-SCNC: 9 MMOL/L — SIGNIFICANT CHANGE UP (ref 5–17)
APTT BLD: 32 SEC — SIGNIFICANT CHANGE UP (ref 27.5–36.3)
BUN SERPL-MCNC: 7 MG/DL — SIGNIFICANT CHANGE UP (ref 7–23)
CALCIUM SERPL-MCNC: 8.6 MG/DL — SIGNIFICANT CHANGE UP (ref 8.4–10.5)
CHLORIDE SERPL-SCNC: 110 MMOL/L — HIGH (ref 96–108)
CO2 SERPL-SCNC: 24 MMOL/L — SIGNIFICANT CHANGE UP (ref 22–31)
CREAT SERPL-MCNC: 0.72 MG/DL — SIGNIFICANT CHANGE UP (ref 0.5–1.3)
GLUCOSE SERPL-MCNC: 107 MG/DL — HIGH (ref 70–99)
MAGNESIUM SERPL-MCNC: 1.7 MG/DL — SIGNIFICANT CHANGE UP (ref 1.6–2.6)
POTASSIUM SERPL-MCNC: 4.3 MMOL/L — SIGNIFICANT CHANGE UP (ref 3.5–5.3)
POTASSIUM SERPL-SCNC: 4.3 MMOL/L — SIGNIFICANT CHANGE UP (ref 3.5–5.3)
SODIUM SERPL-SCNC: 143 MMOL/L — SIGNIFICANT CHANGE UP (ref 135–145)

## 2020-02-21 PROCEDURE — 96374 THER/PROPH/DIAG INJ IV PUSH: CPT | Mod: XU

## 2020-02-21 PROCEDURE — 87798 DETECT AGENT NOS DNA AMP: CPT

## 2020-02-21 PROCEDURE — 85730 THROMBOPLASTIN TIME PARTIAL: CPT

## 2020-02-21 PROCEDURE — 82565 ASSAY OF CREATININE: CPT

## 2020-02-21 PROCEDURE — 94640 AIRWAY INHALATION TREATMENT: CPT

## 2020-02-21 PROCEDURE — 96376 TX/PRO/DX INJ SAME DRUG ADON: CPT | Mod: XU

## 2020-02-21 PROCEDURE — 82803 BLOOD GASES ANY COMBINATION: CPT

## 2020-02-21 PROCEDURE — 85610 PROTHROMBIN TIME: CPT

## 2020-02-21 PROCEDURE — 71275 CT ANGIOGRAPHY CHEST: CPT

## 2020-02-21 PROCEDURE — 88172 CYTP DX EVAL FNA 1ST EA SITE: CPT

## 2020-02-21 PROCEDURE — 84484 ASSAY OF TROPONIN QUANT: CPT

## 2020-02-21 PROCEDURE — 99239 HOSP IP/OBS DSCHRG MGMT >30: CPT

## 2020-02-21 PROCEDURE — 83880 ASSAY OF NATRIURETIC PEPTIDE: CPT

## 2020-02-21 PROCEDURE — 81241 F5 GENE: CPT

## 2020-02-21 PROCEDURE — 88341 IMHCHEM/IMCYTCHM EA ADD ANTB: CPT

## 2020-02-21 PROCEDURE — 82330 ASSAY OF CALCIUM: CPT

## 2020-02-21 PROCEDURE — 99232 SBSQ HOSP IP/OBS MODERATE 35: CPT

## 2020-02-21 PROCEDURE — 83735 ASSAY OF MAGNESIUM: CPT

## 2020-02-21 PROCEDURE — 80048 BASIC METABOLIC PNL TOTAL CA: CPT

## 2020-02-21 PROCEDURE — 84295 ASSAY OF SERUM SODIUM: CPT

## 2020-02-21 PROCEDURE — 84100 ASSAY OF PHOSPHORUS: CPT

## 2020-02-21 PROCEDURE — 81240 F2 GENE: CPT

## 2020-02-21 PROCEDURE — 82947 ASSAY GLUCOSE BLOOD QUANT: CPT

## 2020-02-21 PROCEDURE — 85027 COMPLETE CBC AUTOMATED: CPT

## 2020-02-21 PROCEDURE — 82435 ASSAY OF BLOOD CHLORIDE: CPT

## 2020-02-21 PROCEDURE — 93970 EXTREMITY STUDY: CPT

## 2020-02-21 PROCEDURE — 80053 COMPREHEN METABOLIC PANEL: CPT

## 2020-02-21 PROCEDURE — 85014 HEMATOCRIT: CPT

## 2020-02-21 PROCEDURE — 88112 CYTOPATH CELL ENHANCE TECH: CPT

## 2020-02-21 PROCEDURE — 84132 ASSAY OF SERUM POTASSIUM: CPT

## 2020-02-21 PROCEDURE — 87581 M.PNEUMON DNA AMP PROBE: CPT

## 2020-02-21 PROCEDURE — 93005 ELECTROCARDIOGRAM TRACING: CPT

## 2020-02-21 PROCEDURE — 88305 TISSUE EXAM BY PATHOLOGIST: CPT

## 2020-02-21 PROCEDURE — 71045 X-RAY EXAM CHEST 1 VIEW: CPT

## 2020-02-21 PROCEDURE — 99285 EMERGENCY DEPT VISIT HI MDM: CPT | Mod: 25

## 2020-02-21 PROCEDURE — 74177 CT ABD & PELVIS W/CONTRAST: CPT

## 2020-02-21 PROCEDURE — 93306 TTE W/DOPPLER COMPLETE: CPT

## 2020-02-21 PROCEDURE — 88173 CYTOPATH EVAL FNA REPORT: CPT

## 2020-02-21 PROCEDURE — 87486 CHLMYD PNEUM DNA AMP PROBE: CPT

## 2020-02-21 PROCEDURE — 88342 IMHCHEM/IMCYTCHM 1ST ANTB: CPT

## 2020-02-21 PROCEDURE — 87086 URINE CULTURE/COLONY COUNT: CPT

## 2020-02-21 PROCEDURE — 83605 ASSAY OF LACTIC ACID: CPT

## 2020-02-21 PROCEDURE — 81001 URINALYSIS AUTO W/SCOPE: CPT

## 2020-02-21 PROCEDURE — 87633 RESP VIRUS 12-25 TARGETS: CPT

## 2020-02-21 PROCEDURE — 96375 TX/PRO/DX INJ NEW DRUG ADDON: CPT | Mod: XU

## 2020-02-21 PROCEDURE — 83690 ASSAY OF LIPASE: CPT

## 2020-02-21 RX ORDER — ACETAMINOPHEN 500 MG
2 TABLET ORAL
Qty: 0 | Refills: 0 | DISCHARGE
Start: 2020-02-21

## 2020-02-21 RX ORDER — MENTHOL AND METHYL SALICYLATE 10; 30 G/100G; G/100G
1 CREAM TOPICAL
Qty: 1 | Refills: 0
Start: 2020-02-21 | End: 2020-03-21

## 2020-02-21 RX ORDER — POLYETHYLENE GLYCOL 3350 17 G/17G
17 POWDER, FOR SOLUTION ORAL
Qty: 1 | Refills: 0
Start: 2020-02-21 | End: 2020-03-21

## 2020-02-21 RX ORDER — LANOLIN ALCOHOL/MO/W.PET/CERES
1 CREAM (GRAM) TOPICAL
Qty: 30 | Refills: 0
Start: 2020-02-21 | End: 2020-03-21

## 2020-02-21 RX ORDER — IPRATROPIUM/ALBUTEROL SULFATE 18-103MCG
3 AEROSOL WITH ADAPTER (GRAM) INHALATION
Qty: 270 | Refills: 0
Start: 2020-02-21 | End: 2020-03-21

## 2020-02-21 RX ORDER — OXYCODONE HYDROCHLORIDE 5 MG/1
1 TABLET ORAL
Qty: 0 | Refills: 0 | DISCHARGE

## 2020-02-21 RX ORDER — METOPROLOL TARTRATE 50 MG
1 TABLET ORAL
Qty: 0 | Refills: 0 | DISCHARGE
Start: 2020-02-21

## 2020-02-21 RX ADMIN — PANTOPRAZOLE SODIUM 40 MILLIGRAM(S): 20 TABLET, DELAYED RELEASE ORAL at 05:38

## 2020-02-21 RX ADMIN — OXYCODONE HYDROCHLORIDE 15 MILLIGRAM(S): 5 TABLET ORAL at 11:06

## 2020-02-21 RX ADMIN — Medication 100 MILLIGRAM(S): at 05:42

## 2020-02-21 RX ADMIN — AMLODIPINE BESYLATE 10 MILLIGRAM(S): 2.5 TABLET ORAL at 05:38

## 2020-02-21 RX ADMIN — APIXABAN 5 MILLIGRAM(S): 2.5 TABLET, FILM COATED ORAL at 09:11

## 2020-02-21 RX ADMIN — MENTHOL AND METHYL SALICYLATE 1 APPLICATION(S): 10; 30 CREAM TOPICAL at 05:41

## 2020-02-21 RX ADMIN — Medication 2 CAPSULE(S): at 09:11

## 2020-02-21 RX ADMIN — OXYCODONE HYDROCHLORIDE 15 MILLIGRAM(S): 5 TABLET ORAL at 09:11

## 2020-02-21 RX ADMIN — Medication 100 MILLIGRAM(S): at 05:38

## 2020-02-21 RX ADMIN — Medication 3 MILLILITER(S): at 13:15

## 2020-02-21 RX ADMIN — OXYCODONE HYDROCHLORIDE 20 MILLIGRAM(S): 5 TABLET ORAL at 05:39

## 2020-02-21 RX ADMIN — Medication 3 MILLILITER(S): at 05:37

## 2020-02-21 RX ADMIN — POLYETHYLENE GLYCOL 3350 17 GRAM(S): 17 POWDER, FOR SOLUTION ORAL at 13:15

## 2020-02-21 RX ADMIN — OXYCODONE HYDROCHLORIDE 20 MILLIGRAM(S): 5 TABLET ORAL at 06:40

## 2020-02-21 RX ADMIN — Medication 2 CAPSULE(S): at 13:15

## 2020-02-21 RX ADMIN — Medication 650 MILLIGRAM(S): at 13:15

## 2020-02-21 NOTE — DISCHARGE NOTE PROVIDER - NSDCMRMEDTOKEN_GEN_ALL_CORE_FT
acetaminophen 325 mg oral tablet: 2 tab(s) orally every 6 hours  Creon 24,000 units oral delayed release capsule: 1 cap(s) orally 3 times a day  Eliquis 5 mg oral tablet: 1 tab(s) orally every 12 hours   hydroxychloroquine 200 mg oral tablet: 1 tab(s) orally once a day  metoprolol succinate 100 mg oral tablet, extended release: 1 tab(s) orally once a day  multivitamin:   Norvasc 10 mg oral tablet: 1 tab(s) orally once a day  omeprazole 40 mg oral delayed release capsule: 1 cap(s) orally once a day  ondansetron 8 mg oral tablet: 1 tab(s) orally 4 times a day, As Needed  oxyCODONE 15 mg oral tablet, extended release: 1 tab(s) orally 2 times a day MDD:MDD 2 daily  oxyCODONE 20 mg oral tablet: 1 tab(s) orally every 4 to 6 hours as needed for pain    Note: patient says she hasn&#x27;t needed to use in a while  Vitamin B12:   Vitamin D3: acetaminophen 325 mg oral tablet: 2 tab(s) orally every 6 hours  benzonatate 100 mg oral capsule: 1 cap(s) orally 3 times a day, As needed, Cough  Creon 24,000 units oral delayed release capsule: 1 cap(s) orally 3 times a day  Eliquis 5 mg oral tablet: 1 tab(s) orally every 12 hours   hydroxychloroquine 200 mg oral tablet: 1 tab(s) orally once a day  ipratropium-albuterol 0.5 mg-2.5 mg/3 mLinhalation solution: 3 milliliter(s) inhaled every 8 hours  melatonin 5 mg oral tablet: 1 tab(s) orally once a day (at bedtime)  menthol-methyl salicylate 10%-15% topical cream: 1 application topically 4 times a day to mid left back area - extending to under the breast area  metoprolol succinate 100 mg oral tablet, extended release: 1 tab(s) orally once a day  multivitamin:   Norvasc 10 mg oral tablet: 1 tab(s) orally once a day  omeprazole 40 mg oral delayed release capsule: 1 cap(s) orally once a day  ondansetron 8 mg oral tablet: 1 tab(s) orally 4 times a day, As Needed  oxyCODONE 15 mg oral tablet, extended release: 1 tab(s) orally 2 times a day MDD:MDD 2 daily  polyethylene glycol 3350 oral powder for reconstitution: 17 gram(s) orally once a day  Vitamin B12:   Vitamin D3:

## 2020-02-21 NOTE — DISCHARGE NOTE NURSING/CASE MANAGEMENT/SOCIAL WORK - PATIENT PORTAL LINK FT
You can access the FollowMyHealth Patient Portal offered by [facility] by registering at the following website: [email or web address]. By joining [employer]’s FollowMyHealth portal, you will also be able to view your health information using other applications (apps) compatible with our system.

## 2020-02-21 NOTE — DISCHARGE NOTE PROVIDER - NSDCFUSCHEDAPPT_GEN_ALL_CORE_FT
HIMANSHU VILLEGAS ; 03/02/2020 ; NPP Med Pulm [facility] HIMANSHU VILLEGAS ; 03/02/2020 ; NPP Med Pulm [address] [facility]

## 2020-02-21 NOTE — DISCHARGE NOTE PROVIDER - HOSPITAL COURSE
64 yr old F PMH chronic pancreatitis, HTN p/w left sided back pain/SOB. Found to have PE on CTA, No evidence of right heart strain on echocardiogram. LE doppler with right below the knee deep vein thrombosis. Seen and evaluated by Vascular and Pulm, started on AC. Also noted to have mediastinal and hilar lymphadenopathy s/p bronchoscopy with FNA on 2/19, Preliminary results suggestive of malignancy. Follow final pathology results. Needs outpatient PET scan and oncology referral. Patient remains stable for DC home today with close follow up with Pulm, PCP as per Dr. Kolb.

## 2020-02-21 NOTE — DISCHARGE NOTE NURSING/CASE MANAGEMENT/SOCIAL WORK - NSDPACMPNY_GEN_ALL_CORE
Discussion/Summary   X-ray of hip is showing arthritis        Verified Results  XR HIP RT 2V 31Mar2017 09:53AM JOYCE ADAMS     Test Name Result Flag Reference   XR HIP RT 2V (Report)     Accession #    [id number]    EXAM: XR HIP RT 2V    CLINICAL INDICATION: Right hip pain    COMPARISON: None.    FINDINGS: Two views right hip demonstrate no evidence of acute fracture or dislocation. Mild   degenerative change noted. Soft tissues appear unremarkable.    IMPRESSION:    No definite acute fracture. Mild degenerative change.    **** F I N A L ****    Transcribed By: NANCY   03/31/17 11:00 am    Dictated By:      VELASQUEZ, FUNMI RYAN MD    Electronically Reviewed and Approved By:      VELASQUEZ, FUNMI RYAN MD 03/31/17 11:02 am       
Family

## 2020-02-21 NOTE — PROGRESS NOTE ADULT - REASON FOR ADMISSION
Back pain, SOB

## 2020-02-21 NOTE — PROGRESS NOTE ADULT - ASSESSMENT
63 yo woman with hypertension, chronic pancreatitis s/p Jeffery procedure (2019), history of ARDS (2015), GERD, and s/p vocal cord polypectomy, admitted for pulmonary embolism. Pulmonary service consulted for mediastinal and hilar lymphadenopathy evident in chest CT.     1. Venous thromboembolism - bilateral pulmonary embolism with large filling defect in left main pulmonary artery extending to SCOTT and occluding LLL, some linear filling defects/web in right suggestive of chronicity. No evidence of right heart strain on echocardiogram. LE doppler with right below the knee deep vein thrombosis. Continue anticoagulation.     2. Mediastinal and hilar adenopathy - Bronchoscopy with EBUS performed 02/19. Preliminary results suggestive of malignancy. Follow final pathology results. Needs outpatient PET scan and oncology referral.     3. Emphysema - pulmonary function tests as an outpatient      Pulmonary follow up with Dr. Ramy Abad on 03/02/20 at [address] [place] [address], [address] ([phone]).    --------------------------------------------------------  Ranjit Samayoa, PGY-4  Pulmonary/Critical Care Fellow  Pager: [phone] ([facility]), [phone] (NS)  Pulmonary spectra: [phone] 63 yo woman with hypertension, chronic pancreatitis s/p Jeffery procedure (2019), history of ARDS (2015), GERD, and s/p vocal cord polypectomy, admitted for pulmonary embolism. Pulmonary service consulted for mediastinal and hilar lymphadenopathy evident in chest CT.     1. Venous thromboembolism - bilateral pulmonary embolism with large filling defect in left main pulmonary artery extending to SCOTT and occluding LLL, some linear filling defects/web in right suggestive of chronicity. No evidence of right heart strain on echocardiogram. LE doppler with right below the knee deep vein thrombosis. Continue anticoagulation.     2. Mediastinal and hilar adenopathy - Bronchoscopy with EBUS performed 02/19. Preliminary results suggestive of malignancy. Follow final pathology results. Needs outpatient PET scan and oncology referral.     3. Emphysema - pulmonary function tests as an outpatient      Pulmonary follow up with Dr. Ramy Abad on 03/02/20 1:30 pm at [address] [place] [address], [address] ([phone]).    --------------------------------------------------------  Ranjit Samayoa, PGY-4  Pulmonary/Critical Care Fellow  Pager: [phone] ([facility]), [phone] (NS)  Pulmonary spectra: [phone]

## 2020-02-21 NOTE — PROGRESS NOTE ADULT - PROBLEM SELECTOR PLAN 5
Transitions of Care Status:  1.  Name of PCP: Ananda Chavira  2.  PCP Contacted on Admission: [ ] Y    [x] N    3.  PCP contacted at Discharge: [ ] Y    [ ] N    [ ] N/A  4.  Post-Discharge Appointment Date and Location:  5.  Summary of Handoff given to PCP:    D/C home on Eliquis today, outpatient f/u for path results. 40 minutes spent discharging the patient.

## 2020-02-21 NOTE — PROGRESS NOTE ADULT - SUBJECTIVE AND OBJECTIVE BOX
Yogesh Kolb MD  Pager [phone]  Spectra [phone]  Cell Phone [phone]    Patient is a 64y old  Female who presents with a chief complaint of Back pain, SOB (21 Feb 2020 11:32)        SUBJECTIVE / OVERNIGHT EVENTS: Patient c/o sore throat after procedure. Otherwise the same. Continued rib pain improves with medications.  TELEMETRY: SR/ST 's, PVCs, PACs, couplets      MEDICATIONS  (STANDING):  acetaminophen   Tablet .. 650 milliGRAM(s) Oral every 6 hours  albuterol/ipratropium for Nebulization 3 milliLiter(s) Nebulizer every 8 hours  amLODIPine   Tablet 10 milliGRAM(s) Oral daily  apixaban 5 milliGRAM(s) Oral every 12 hours  lidocaine   Patch 1 Patch Transdermal daily  melatonin 5 milliGRAM(s) Oral at bedtime  methyl salicylate 15%/menthol 10% Topical Cream 1 Application(s) Topical four times a day  metoprolol succinate  milliGRAM(s) Oral daily  oxyCODONE  ER Tablet 15 milliGRAM(s) Oral two times a day  pancrelipase  (CREON 12,000 Lipase Units) 2 Capsule(s) Oral three times a day with meals  pantoprazole    Tablet 40 milliGRAM(s) Oral before breakfast  polyethylene glycol 3350 17 Gram(s) Oral daily    MEDICATIONS  (PRN):  benzonatate 100 milliGRAM(s) Oral three times a day PRN Cough  morphine  - Injectable 4 milliGRAM(s) IV Push every 3 hours PRN Severe Pain (7 - 10)  ondansetron    Tablet 8 milliGRAM(s) Oral four times a day PRN Nausea and/or Vomiting  oxyCODONE    IR 20 milliGRAM(s) Oral every 6 hours PRN Moderate Pain (4 - 6)      Vital Signs Last 24 Hrs  T(C): 36.9 (21 Feb 2020 04:18), Max: 37.4 (20 Feb 2020 20:56)  T(F): 98.5 (21 Feb 2020 04:18), Max: 99.4 (20 Feb 2020 20:56)  HR: 104 (21 Feb 2020 04:18) (101 - 104)  BP: 108/65 (21 Feb 2020 04:18) (103/58 - 108/65)  BP(mean): --  RR: 18 (21 Feb 2020 04:18) (18 - 18)  SpO2: 93% (21 Feb 2020 04:18) (91% - 93%)  CAPILLARY BLOOD GLUCOSE        I&O's Summary    20 Feb 2020 07:01  -  21 Feb 2020 07:00  --------------------------------------------------------  IN: 1047 mL / OUT: 550 mL / NET: 497 mL    21 Feb 2020 07:01  -  21 Feb 2020 12:15  --------------------------------------------------------  IN: 240 mL / OUT: 0 mL / NET: 240 mL          PHYSICAL EXAM  GENERAL: NAD, well-developed  HEAD:  Atraumatic, Normocephalic  EYES: EOMI, PERRLA, conjunctiva and sclera clear  CHEST/LUNG: Clear to auscultation bilaterally; No wheeze  HEART: Regular rate and rhythm; No murmurs, rubs, or gallops  ABDOMEN: Soft, Nontender, Nondistended; Bowel sounds present  EXTREMITIES:  2+ Peripheral Pulses, No clubbing, cyanosis, or edema  PSYCH: AAOx3      LABS:                        8.4    10.84 )-----------( 620      ( 20 Feb 2020 06:00 )             28.1     02-20    143  |  108  |  6<L>  ----------------------------<  89  4.6   |  22  |  0.74    Ca    8.6      20 Feb 2020 06:00  Phos  3.2     02-20  Mg     1.8     02-20      PT/INR - ( 20 Feb 2020 08:32 )   PT: 14.3 sec;   INR: 1.26 ratio         PTT - ( 20 Feb 2020 08:12 )  PTT:85.1 sec            RADIOLOGY & ADDITIONAL TESTS:    Imaging Personally Reviewed:  Consultant(s) Notes Reviewed:    Care Discussed with Consultants/Other Providers:

## 2020-02-21 NOTE — DISCHARGE NOTE PROVIDER - NSFOLLOWUPCLINICS_GEN_ALL_ED_FT
[facility]  Hematology/Oncology  [address] [id number]  Phone: [phone]  Fax:   Follow Up Time: 1 week

## 2020-02-21 NOTE — DISCHARGE NOTE PROVIDER - PROVIDER TOKENS
PROVIDER:[[id number]],FOLLOWUP:[1 week]],PROVIDER:[TOKEN:[[id number]]] PROVIDER:[TOKEN:[[id number]],FOLLOWUP:[1 week]],PROVIDER:[TOKEN:[[id number]]],PROVIDER:[TOKEN:[[id number]]]

## 2020-02-21 NOTE — DISCHARGE NOTE NURSING/CASE MANAGEMENT/SOCIAL WORK - NSDCFUADDAPPT_GEN_ALL_CORE_FT
Please follow up with your PCP, Dr. Chavira in a week for further management  Please follow up with Pulm, Dr. Ramy Abad on 03/02/20 at [address] [place] [address], [address] ([phone]) including biopsy results  Please follow up with outpatient pain management, Dr. Gurjit Tompkins  Please follow up with vascular cardiology, Dr. Sourav Leahy

## 2020-02-21 NOTE — PROGRESS NOTE ADULT - ATTENDING COMMENTS
Patient seen and examined, data and imaging personally reviewed by me.  She is stable from the respiratory standpoint today.  Currently undergoing EBUS for sampling of mediastinal adenopathy.  Heparin has been held for several hours.  Agree with current management.
Transition to Eliquis   Pulmonary followup appreciated.    [place] [id number]
Heparin gtt for now  Can hold prior to EBUS  Appreciate pulmonary recs  Will decide on oral a/c post ebus    [place] [id number]
Patient seen and examined, data and imaging personally reviewed by me.  Preliminary biopsy is suspicious for carcinoma- final path is pending.  Discussed with patient.  Advised to follow up with Dr. Abad or myself at [address] [place] [address].  We will order PEt scan once final path is available.
Pulmonary consult for:  paratrachial lymph nodes and emphysema optimization  Continue heparin gtt for now.  If no plans for biopsy- then Transition to Xarelto 15mg BID.      [place] [id number]
I agree with plan as outlined above.  Continue Apixaban.  To follow up with Dr. Abad.
Anna Rai MD  Division of Hospital Medicine  Pager: [phone]  Office: [phone]

## 2020-02-21 NOTE — DISCHARGE NOTE PROVIDER - CARE PROVIDER_API CALL
Ananda Chavira)  Internal Medicine  [address] [facility], [address]  [place] [address]  Phone: [phone]  Fax: [phone]  Follow Up Time: 1 week    Ramy Abad)  Critical Care Medicine; Pulmonary Disease  [address] [facility], [address]  [place], [place] [address]  Phone: [phone]  Fax: [phone]  Follow Up Time: Ananda Chavira)  Internal Medicine  [address] [facility], [address]  [place] [address]  Phone: [phone]  Fax: [phone]  Follow Up Time: 1 week    Ramy Abad)  Critical Care Medicine; Pulmonary Disease  [address] [facility], [address]  [place], [place] [address]  Phone: [phone]  Fax: [phone]  Follow Up Time:     Sourav Leahy (DO)  Cardiovascular Disease; Internal Medicine; Nuclear Cardiology  [address] [place] [address]  Phone: [phone]  Fax: [phone]  Follow Up Time:

## 2020-02-21 NOTE — PROGRESS NOTE ADULT - PROBLEM SELECTOR PLAN 1
Transition to Eliquis  F/u testing APLS- lupus anticoagulant, anticardiolipin antibodies, anti–beta-2 glycoprotein antibodies  Cont oxycodone 15mg ER q12 standing and oxycodone IR 20 mg PO g 6 prn.  Outpatient pain management f/u with Dr. Gurjit Tompkins.  Outpatient vascular cardiology f/u Dr. Sourav Leahy

## 2020-02-21 NOTE — PROGRESS NOTE ADULT - SUBJECTIVE AND OBJECTIVE BOX
INTERVAL EVENTS  Switched to apixaban yesterday  No acute events overnight  No shortness of breath or chest pain    REVIEW OF SYSTEMS  CONSTITUTIONAL: no fever or chills  CARDIOVASCULAR: no edema  RESPIRATORY: as above  REST OF SYSTEMS: negative      OBJECTIVE    Vital Signs Last 24 Hrs  T(C): 36.9 (21 Feb 2020 04:18), Max: 37.4 (20 Feb 2020 20:56)  T(F): 98.5 (21 Feb 2020 04:18), Max: 99.4 (20 Feb 2020 20:56)  HR: 104 (21 Feb 2020 04:18) (99 - 113)  BP: 108/65 (21 Feb 2020 04:18) (103/58 - 134/71)  BP(mean): --  RR: 18 (21 Feb 2020 04:18) (18 - 18)  SpO2: 93% (21 Feb 2020 04:18) (91% - 99%)    PHYSICAL EXAM:  General: no acute distress  HEENT: normocephalic  Eyes: extraocular movements intact, pupils equal and reactive to light  Neck: supple  Respiratory: non labored breathing, decreased breath sounds in bases  Cardiovascular: normal rate, regular rhythm  Gastrointestinal: abdomen soft, non tender, non distended  Extremities: no lower extremity edema  Neurological: alert, oriented, no focal deficits  Psychiatric: cooperative      MEDICATIONS  (STANDING):  acetaminophen   Tablet .. 650 milliGRAM(s) Oral every 6 hours  albuterol/ipratropium for Nebulization 3 milliLiter(s) Nebulizer every 8 hours  amLODIPine   Tablet 10 milliGRAM(s) Oral daily  apixaban 5 milliGRAM(s) Oral every 12 hours  lidocaine   Patch 1 Patch Transdermal daily  melatonin 5 milliGRAM(s) Oral at bedtime  methyl salicylate 15%/menthol 10% Topical Cream 1 Application(s) Topical four times a day  metoprolol succinate  milliGRAM(s) Oral daily  oxyCODONE  ER Tablet 15 milliGRAM(s) Oral two times a day  pancrelipase  (CREON 12,000 Lipase Units) 2 Capsule(s) Oral three times a day with meals  pantoprazole    Tablet 40 milliGRAM(s) Oral before breakfast  polyethylene glycol 3350 17 Gram(s) Oral daily    MEDICATIONS  (PRN):  benzonatate 100 milliGRAM(s) Oral three times a day PRN Cough  morphine  - Injectable 4 milliGRAM(s) IV Push every 3 hours PRN Severe Pain (7 - 10)  ondansetron    Tablet 8 milliGRAM(s) Oral four times a day PRN Nausea and/or Vomiting  oxyCODONE    IR 20 milliGRAM(s) Oral every 6 hours PRN Moderate Pain (4 - 6)        LABORATORY                                           8.4    10.84 )-----------( 620      ( 20 Feb 2020 06:00 )             28.1     02-20    143  |  108  |  6<L>  ----------------------------<  89  4.6   |  22  |  0.74    Ca    8.6      20 Feb 2020 06:00  Phos  3.2     02-20  Mg     1.8     02-20        RADIOLOGY    CT Angio Chest w/ IV Cont (02.13.20 @ 07:54) >  Thin linear filling defects within the bifurcation of the right upper pulmonary artery and the right interlobar pulmonary arteries, extending into the right lower lobar pulmonary artery, which may represent an acute or subacute pulmonary embolus/web.  A large filling defect is present within the left main pulmonary artery extending into the left upper lobar and left interlobar pulmonary arteries with complete opacification of the left lower lobar pulmonary artery, which is age indeterminant.    < end of copied text >      CT Abdomen and Pelvis w/ IV Cont (02.13.20 @ 07:58) >  LIVER: Subcentimeter hypodense focus adjacent tothe gallbladder, too small to characterize.  BILE DUCTS: Normal caliber.  GALLBLADDER: Within normal limits.  SPLEEN: Within normal limits.  PANCREAS: Calcifications within the pancreas, likely related to chronic pancreatitis.  ADRENALS: Indeterminant 0.8 cm right adrenal lesion, not significantly changed. KIDNEYS/URETERS: Bilateral renal cysts. Additional hypodense foci in the bilateral kidneys, too small to characterize.  BLADDER: Evaluation is slightly limited secondary to streak artifact from bilateral hip arthroplasties.  REPRODUCTIVE ORGANS: Visualized portions are within normal limits. Limited evaluation secondary to streak artifact from bilateral hip arthroplasties.  BOWEL: Small hiatal hernia. No bowel obstruction. Postsurgical changes are present. Appendix is normal.  PERITONEUM: No ascites.  VESSELS: Calcific atherosclerosis of the aorta.  RETROPERITONEUM/LYMPH NODES: No lymphadenopathy.    ABDOMINAL WALL: Postsurgical changes.  BONES: Degenerative changes of the spine. Bilateral hip with arthroplasty.    < end of copied text >        Transthoracic Echocardiogram (02.14.20 @ 05:59) >  Conclusions:  1. Normal left atrium.LA volume index = 34 cc/m2.  2. Mild concentric left ventricular hypertrophy.  3. Normal left ventricular systolic function. No segmental wall motion abnormalities.  4. Right ventricular enlargement with normal right ventricular systolic function.  5. Estimated pulmonary artery systolic pressure equals 39 mm Hg, assuming right atrial pressure equals 8 mm Hg, consistent with borderline pulmonary pressures.    < end of copied text >        [facility] Duplex Lower Ext Vein Scan, Bilat (02.13.20 @ 14:57) >    Positive DVT below the right knee in one of the paired right posterior tibial veins and the right soleal vein.    No evidence of deep venous thrombosis in the left lower extremity.     < end of copied text >

## 2020-02-21 NOTE — PROGRESS NOTE ADULT - PROBLEM SELECTOR PLAN 2
Await FNA path results from bronch  Outpatient f/u Dr. Ramy Abad [address] [place] [address] for bx results  Patient states she will follow up at [facility] if results reveal malignancy- deferring eval at this time

## 2020-02-21 NOTE — DISCHARGE NOTE PROVIDER - NSDCCAREPROVSEEN_GEN_ALL_CORE_FT
Yogesh Kolb  [facility] Cardiology, Vascular Service  [facility] Pulmonary, Team  [facility] Pain Service Chronic, Team  [facility] Cardiology, Vascular Service

## 2020-02-21 NOTE — DISCHARGE NOTE PROVIDER - NSDCCPCAREPLAN_GEN_ALL_CORE_FT
PRINCIPAL DISCHARGE DIAGNOSIS  Diagnosis: Pulmonary embolism  Assessment and Plan of Treatment: Take your anticoagulation, Eliquis, as directed.  Follow up with your health care provider within one week. Call for appointment.  If you develop shortness of breath or if your shortness of breath worsens call your Health Care Provider or go to the Emergency Department.        SECONDARY DISCHARGE DIAGNOSES  Diagnosis: DVT (deep venous thrombosis)  Assessment and Plan of Treatment: Take your "blood thinners" as prescribed.  Walking is encouraged, increase activity as tolerated.  If you develop new leg pain, swelling, and/or redness contact your healthcare provider.  If you develop new chest pain with difficulty breathing, a rapid heart rate and/or a feeling of passing out call emergency medical services 911.      Diagnosis: Lymphadenopathy  Assessment and Plan of Treatment: You were seen and evaluated by Pulmonologist, and it was biopsied. At this time, pending pathology. Based on the result, please follow up with Heme/onc for further management. PRINCIPAL DISCHARGE DIAGNOSIS  Diagnosis: Pulmonary embolism  Assessment and Plan of Treatment: Take your anticoagulation, Eliquis, as directed.  Follow up with your health care provider within one week. Call for appointment.  If you develop shortness of breath or if your shortness of breath worsens call your Health Care Provider or go to the Emergency Department.        SECONDARY DISCHARGE DIAGNOSES  Diagnosis: DVT (deep venous thrombosis)  Assessment and Plan of Treatment: Take your "blood thinners" as prescribed.  Walking is encouraged, increase activity as tolerated.  If you develop new leg pain, swelling, and/or redness contact your healthcare provider.  If you develop new chest pain with difficulty breathing, a rapid heart rate and/or a feeling of passing out call emergency medical services 911.      Diagnosis: Lymphadenopathy  Assessment and Plan of Treatment: You were seen and evaluated by Pulmonologist, and it was biopsied. At this time, pending pathology. Please follow up with Dr. Ramy Abrams on 03/02/20 at [address] [place] [address], [address] ([phone]) including biopsy results. Based on the result, you may need to follow up with Heme/onc for further management.

## 2020-02-21 NOTE — PROGRESS NOTE ADULT - SUBJECTIVE AND OBJECTIVE BOX
Vascular Cardiology  Progress note     [facility] [id number]              EMAIL [email or web address]   OFFICE [phone]    INTERVAL HISTORY:   No SOB or pain with ambulation. Eating breakfast.  Started on eliquis yesterday          Allergies    diazepam (Other)  lemon (Other)    Intolerances    	   MEDICATIONS  (STANDING):  acetaminophen   Tablet .. 650 milliGRAM(s) Oral every 6 hours  albuterol/ipratropium for Nebulization 3 milliLiter(s) Nebulizer every 8 hours  amLODIPine   Tablet 10 milliGRAM(s) Oral daily  apixaban 5 milliGRAM(s) Oral every 12 hours  lidocaine   Patch 1 Patch Transdermal daily  melatonin 5 milliGRAM(s) Oral at bedtime  methyl salicylate 15%/menthol 10% Topical Cream 1 Application(s) Topical four times a day  metoprolol succinate  milliGRAM(s) Oral daily  oxyCODONE  ER Tablet 15 milliGRAM(s) Oral two times a day  pancrelipase  (CREON 12,000 Lipase Units) 2 Capsule(s) Oral three times a day with meals  pantoprazole    Tablet 40 milliGRAM(s) Oral before breakfast  polyethylene glycol 3350 17 Gram(s) Oral daily      PAST MEDICAL & SURGICAL HISTORY:  Chronic abdominal pain  Thrombophlebitis: superficial right UE during 4/2019 hospital stay, resolved as per pt  Vocal cord polyp: removal 2018, benign as per pt  History of adrenal adenoma: stable on imaging  ARDS survivor: 2015  Chronic pancreatitis: last episode 4/2019  GERD (gastroesophageal reflux disease)  HTN (hypertension)  History of pancreatic surgery: Jeffery procedure (5/8)  S/P shoulder replacement, left: 2016  History of vocal cord polypectomy: 1/2018  S/P hip replacement, right: May 2016  S/P arthroscopy of shoulder: right - 2014  S/P hip replacement: left - 2010  S/P arthroscopy of shoulder: left in 2009      FAMILY HISTORY:  Family history of alcohol abuse      SOCIAL HISTORY:  unchanged    REVIEW OF SYSTEMS:  CONSTITUTIONAL: No fever, weight loss, or fatigue  EYES: No eye pain, visual disturbances, or discharge  ENMT:  No difficulty hearing, tinnitus, vertigo; No sinus or throat pain  NECK: No pain or stiffness  RESPIRATORY: No cough, wheezing, chills or hemoptysis; No Shortness of Breath  CARDIOVASCULAR: No chest pain, palpitations, passing out, dizziness, or leg swelling  GASTROINTESTINAL: No abdominal or epigastric pain. No nausea, vomiting, or hematemesis; No diarrhea or constipation. No melena or hematochezia.  GENITOURINARY: No dysuria, frequency, hematuria, or incontinence  NEUROLOGICAL: No headaches, memory loss, loss of strength, numbness, or tremors  SKIN: No itching, burning, rashes, or lesions   LYMPH Nodes: No enlarged glands  ENDOCRINE: No heat or cold intolerance; No hair loss  MUSCULOSKELETAL: No joint pain or swelling; No muscle, back, or extremity pain  PSYCHIATRIC: No depression, anxiety, mood swings, or difficulty sleeping  HEME/LYMPH: No easy bruising, or bleeding gums  ALLERY AND IMMUNOLOGIC: No hives or eczema	    [ x] All others negative	  [ ] Unable to obtain     ICU Vital Signs Last 24 Hrs  T(C): 36.9 (21 Feb 2020 04:18), Max: 37.4 (20 Feb 2020 20:56)  T(F): 98.5 (21 Feb 2020 04:18), Max: 99.4 (20 Feb 2020 20:56)  HR: 104 (21 Feb 2020 04:18) (99 - 113)  BP: 108/65 (21 Feb 2020 04:18) (103/58 - 134/71)  BP(mean): --  ABP: --  ABP(mean): --  RR: 18 (21 Feb 2020 04:18) (18 - 18)  SpO2: 93% (21 Feb 2020 04:18) (91% - 99%)        Appearance: Normal	  HEENT:   Normal oral mucosa, PERRL, EOMI	  Carotid:  Right: No bruit    Left:  No bruit  Lymphatic: No lymphadenopathy  Cardiovascular: Normal S1 S2, No JVD, No murmurs, No edema  Respiratory: Lungs clear to auscultation	  Psychiatry: A & O x 3, Mood & affect appropriate  Gastrointestinal:  Soft, Non-tender, + BS	  Skin: No rashes, No ecchymoses, No cyanosis	  Neurologic: Non-focal  Extremities: Normal range of motion, No clubbing, cyanosis.                            8.4    10.84 )-----------( 620      ( 20 Feb 2020 06:00 )             28.1   02-20    143  |  108  |  6<L>  ----------------------------<  89  4.6   |  22  |  0.74    Ca    8.6      20 Feb 2020 06:00  Phos  3.2     02-20  Mg     1.8     02-20          Assessment:  1. PE- unprovoked, overall low risk given no evidence of R heart strain on CT or TTE, normal proBNP and troponin. No other risk factors for PE- no family history, no recent travel or surgery within last few months, former smoker but has not smoked in about a year, is up to date on cancer screenings. On CT chest however, multiple enlarged lymph nodes and LLL opacity seen concerning for malignancy, pulm consulted. Pt s/p EBUS on 2/19/20  2. DVT- below knee in one of the paired right posterior tibial veins and the right soleal vein  3. HTN  4. chronic pancreatitis s/p Jeffery     Plan:  1.  Continue eliquis 5mg BID.   2. F/u biopsy results  3.  followup in the office       [facility] [id number]

## 2020-02-21 NOTE — DISCHARGE NOTE PROVIDER - NSDCFUADDAPPT_GEN_ALL_CORE_FT
Please follow up with your PCP, Dr. Chavira in a week for further management  Please follow up with Pulm, Dr. Ramy Abad on 03/02/20 at [address] [place] [address], [facility] [address] ([phone]) Please follow up with your PCP, Dr. Chavira in a week for further management  Please follow up with Pulm, Dr. Ramy Abad on 03/02/20 at [address] [place] [address], [address] ([phone]) including biopsy results  Please follow up with outpatient pain management, Dr. Gurjit Tompkins  Please follow up with vascular cardiology, Dr. Sourav Leahy

## 2020-02-24 LAB
DRVVT 50/50: 42.4 SEC — SIGNIFICANT CHANGE UP
DRVVT SCREEN TO CONFIRM RATIO: SIGNIFICANT CHANGE UP
LA NT DPL PPP QL: 56.1 SEC — SIGNIFICANT CHANGE UP
NORMALIZED SCT PPP-RTO: 0.71 RATIO — SIGNIFICANT CHANGE UP (ref 0–1.16)
NORMALIZED SCT PPP-RTO: SIGNIFICANT CHANGE UP

## 2020-02-25 LAB
NON-GYNECOLOGICAL CYTOLOGY STUDY: SIGNIFICANT CHANGE UP
NON-GYNECOLOGICAL CYTOLOGY STUDY: SIGNIFICANT CHANGE UP

## 2020-03-02 ENCOUNTER — LABORATORY RESULT (OUTPATIENT)
Age: 65
End: 2020-03-02

## 2020-03-02 ENCOUNTER — APPOINTMENT (OUTPATIENT)
Dept: PULMONOLOGY | Facility: CLINIC | Age: 65
End: 2020-03-02
Payer: MEDICAID

## 2020-03-02 VITALS
RESPIRATION RATE: 17 BRPM | WEIGHT: 130 LBS | HEIGHT: 64 IN | SYSTOLIC BLOOD PRESSURE: 136 MMHG | OXYGEN SATURATION: 98 % | HEART RATE: 107 BPM | TEMPERATURE: 97.9 F | DIASTOLIC BLOOD PRESSURE: 90 MMHG | BODY MASS INDEX: 22.2 KG/M2

## 2020-03-02 DIAGNOSIS — Z87.09 PERSONAL HISTORY OF OTHER DISEASES OF THE RESPIRATORY SYSTEM: ICD-10-CM

## 2020-03-02 DIAGNOSIS — K21.9 GASTRO-ESOPHAGEAL REFLUX DISEASE W/OUT ESOPHAGITIS: ICD-10-CM

## 2020-03-02 PROCEDURE — 99215 OFFICE O/P EST HI 40 MIN: CPT

## 2020-03-02 RX ORDER — NICOTINE 4 MG/1
10 INHALANT RESPIRATORY (INHALATION)
Qty: 1 | Refills: 3 | Status: ACTIVE | COMMUNITY
Start: 2020-03-02 | End: 1900-01-01

## 2020-03-02 RX ORDER — PNV NO.95/FERROUS FUM/FOLIC AC 28MG-0.8MG
TABLET ORAL
Refills: 0 | Status: ACTIVE | COMMUNITY

## 2020-03-02 RX ORDER — MULTIVIT-MIN/FOLIC/VIT K/LYCOP 400-300MCG
25 MCG TABLET ORAL
Refills: 0 | Status: ACTIVE | COMMUNITY

## 2020-03-02 RX ORDER — IPRATROPIUM/ALBUTEROL SULFATE 0.5-3MG/3
0.5-2.5 (3) AMPUL FOR NEBULIZATION (ML) INHALATION
Refills: 0 | Status: DISCONTINUED | COMMUNITY
End: 2020-03-02

## 2020-03-02 RX ORDER — ACETAMINOPHEN 325 MG/1
TABLET, FILM COATED ORAL
Refills: 0 | Status: ACTIVE | COMMUNITY

## 2020-03-02 NOTE — CONSULT LETTER
[Dear  ___] : Dear  [unfilled], [Consult Letter:] : I had the pleasure of evaluating your patient, [unfilled]. [Please see my note below.] : Please see my note below. [Consult Closing:] : Thank you very much for allowing me to participate in the care of this patient.  If you have any questions, please do not hesitate to contact me. [Sincerely,] : Sincerely, [FreeTextEntry2] : Ananda Bullard (MD)\par Fax: [phone] [FreeTextEntry3] : Ramy Abad MD\par Attending Physician in Pulmonary & Critical Care Medicine\par  of Medicine\par [employer] [facility]

## 2020-03-02 NOTE — PHYSICAL EXAM
Consult F.U. Note:  · Requested by Name:	ANN MARIE Salgado	  · Date/Time:	09-Nov-2019 	  · Reason for Referral/Consultation:	Dizziness	  · Reason for Admission	chest pain and dizziness	      · Subjective and Objective: 	    MRN-[id number]    CHIEF COMPLAINT:  Patient is a 49y old  Male who presents with a chief complaint of chest pain and dizziness (07 Nov 2019 04:24)      HISTORY OF PRESENT ILLNESS:  49 M w/ no known PMH initially presented to OSH with complaints of dizziness. EKG there revealed ROXANN in I and AVL w/ troponin I elevation of .08 and patient transferred for further eval. He reports chest pain burning in nature that upon further elucidation was localized to his epigastrium. Denies any palpitations or SOB. Drinks half a pint of vodka frequently although pt unable to further quantify. Lives in a shelter and does not obtain routine medical care    ===========================  Interval Events  - On CIWA   - No reported worsening CP/Palps/SOB\  ===========================      Allergies    No Known Allergies    PAST MEDICAL & SURGICAL HISTORY:  Acid reflux disease  HLD (hyperlipidemia)  HTN (hypertension)  H/O coronary angiogram      FAMILY HISTORY:  FH: heart disease: father @age 70      SOCIAL HISTORY:    [ ] Non-smoker  [ ] Smoker  [ ] Alcohol    REVIEW OF SYSTEMS:  CONSTITUTIONAL: No fever, weight loss, or fatigue  EYES: No eye pain, visual disturbances, or discharge  ENMT:  No difficulty hearing, tinnitus, vertigo; No sinus or throat pain  NECK: No pain or stiffness  RESPIRATORY: No cough, wheezing, chills or hemoptysis; No Shortness of Breath  CARDIOVASCULAR: No chest pain, palpitations, passing out, dizziness, or leg swelling  GASTROINTESTINAL: No abdominal or epigastric pain. No nausea, vomiting, or hematemesis; No diarrhea or constipation. No melena or hematochezia.  GENITOURINARY: No dysuria, frequency, hematuria, or incontinence  NEUROLOGICAL: No headaches, memory loss, loss of strength, numbness, or tremors  SKIN: No itching, burning, rashes, or lesions   LYMPH Nodes: No enlarged glands  ENDOCRINE: No heat or cold intolerance; No hair loss  MUSCULOSKELETAL: No joint pain or swelling; No muscle, back, or extremity pain  PSYCHIATRIC: No depression, anxiety, mood swings, or difficulty sleeping  HEME/LYMPH: No easy bruising, or bleeding gums  ALLERY AND IMMUNOLOGIC: No hives or eczema	    [ ] All others negative	  [ ] Unable to obtain    I&O's Summary      PHYSICAL EXAM:  Vital Signs Last 24 Hrs  T(C): 36.7 (07 Nov 2019 15:19), Max: 37.2 (07 Nov 2019 11:39)  T(F): 98.1 (07 Nov 2019 15:19), Max: 99 (07 Nov 2019 11:39)  HR: 102 (07 Nov 2019 15:19) (83 - 102)  BP: 135/87 (07 Nov 2019 15:19) (113/87 - 146/98)  BP(mean): --  RR: 18 (07 Nov 2019 15:19) (16 - 18)  SpO2: 98% (07 Nov 2019 15:19) (96% - 100%)  Appearance: Normal	  HEENT:   Normal oral mucosa, PERRL, EOMI	  Lymphatic: No lymphadenopathy  Cardiovascular: Normal S1 S2, No JVD, No murmurs, No edema  Respiratory: Lungs clear to auscultation	  Psychiatry: A & O x 3, Mood & affect appropriate  Gastrointestinal:  Soft, Non-tender, + BS	  Skin: No rashes, No ecchymoses, No cyanosis	  Neurologic: Non-focal  Extremities: Normal range of motion, No clubbing, cyanosis or edema  Vascular: Peripheral pulses palpable 2+ bilaterally    MEDICATIONS:  MEDICATIONS  (STANDING):  aspirin enteric coated 81 milliGRAM(s) Oral daily  folic acid 1 milliGRAM(s) Oral daily  multivitamin 1 Tablet(s) Oral daily  pantoprazole    Tablet 40 milliGRAM(s) Oral before breakfast  thiamine 100 milliGRAM(s) Oral daily    MEDICATIONS  (PRN):  aluminum hydroxide/magnesium hydroxide/simethicone Suspension 30 milliLiter(s) Oral every 6 hours PRN Dyspepsia  LORazepam     Tablet 2 milliGRAM(s) Oral every 2 hours PRN Symptom-triggered 2 point increase in CIWA-Ar  LORazepam     Tablet 2 milliGRAM(s) Oral every 1 hour PRN Symptom-triggered: each CIWA -Ar score 8 or GREATER      Home Medications:      LABS:	 	Labs Personally Reviewed 11/9/2019    CBC Full  -  ( 06 Nov 2019 23:52 )  WBC Count : 5.32 K/uL  Hemoglobin : 11.5 g/dL  Hematocrit : 32.6 %  Platelet Count - Automated : 232 K/uL  Mean Cell Volume : 95.3 fl  Mean Cell Hemoglobin : 33.6 pg  Mean Cell Hemoglobin Concentration : 35.3 gm/dL  Auto Neutrophil # : x  Auto Lymphocyte # : x  Auto Monocyte # : x  Auto Eosinophil # : x  Auto Basophil # : x  Auto Neutrophil % : x  Auto Lymphocyte % : x  Auto Monocyte % : x  Auto Eosinophil % : x  Auto Basophil % : x    11-07    137  |  101  |  10  ----------------------------<  106<H>  4.3   |  25  |  1.24  11-07    140  |  100  |  9   ----------------------------<  91  3.3<L>   |  25  |  1.13    Ca    8.8      07 Nov 2019 14:40  Ca    8.9      07 Nov 2019 05:28  Phos  2.3     11-07  Phos  3.3     11-07  Mg     2.1     11-07  Mg     2.1     11-07    TPro  7.2  /  Alb  4.0  /  TBili  0.5  /  DBili  0.1  /  AST  59<H>  /  ALT  28  /  AlkPhos  86  11-07  TPro  7.2  /  Alb  3.8  /  TBili  0.4  /  DBili  x   /  AST  37  /  ALT  22  /  AlkPhos  79  11-06    PT/INR - ( 06 Nov 2019 23:52 )   PT: 11.8 sec;   INR: 1.03 ratio         PTT - ( 06 Nov 2019 23:52 )  PTT:30.4 sec        proBNP:   Lipid Profile:   HgA1c:   TSH:     TELEMETRY: 	    ECG:  	  RADIOLOGY:  OTHER: 	    CARDIAC TESTING/STUDIES:    [ ] Echocardiogram:  [ ]  Catheterization:  [ ] Stress Test:  	  	      Recs  - Normal TTE  - No troponin elevation  - If recurrent CP, please check EKG/Enzymes and reconsult prn [No Acute Distress] : no acute distress [Well Nourished] : well nourished [No Deformities] : no deformities [Well Developed] : well developed [Normal Oropharynx] : normal oropharynx [Normal Appearance] : normal appearance [No Neck Mass] : no neck mass [No JVD] : no jvd [Normal Rate/Rhythm] : normal rate/rhythm [Normal S1, S2] : normal s1, s2 [No Murmurs] : no murmurs [No Resp Distress] : no resp distress [Clear to Auscultation Bilaterally] : clear to auscultation bilaterally [Benign] : benign [Not Tender] : not tender [Soft] : soft [Normal Gait] : normal gait [No Clubbing] : no clubbing [No Cyanosis] : no cyanosis [No Edema] : no edema [FROM] : FROM [Normal Color/ Pigmentation] : normal color/ pigmentation [No Focal Deficits] : no focal deficits [Oriented x3] : oriented x3 [Normal Affect] : normal affect [TextBox_11] : hoarse voice [TextBox_68] : increased AP diameter [TextBox_89] : midline surgical scar is c/d/i

## 2020-03-02 NOTE — ASSESSMENT
[FreeTextEntry1] : Ms. Travis is a 64-year-old female with a history of recently diagnosed non-small cell lung adenocarcinoma of left lung, pulmonary embolism on apixaban, HTN, chronic pancreatitis s/p Jeffery procedure (2019), history of ARDS (2015), GERD, chronic pain on opioids, and s/p vocal cord polypectomy who presents for follow-up after recent hospitalization in February 2020 for bilateral pulmonary emboli. Further found to have left paratracheal, subcarinal, and left hilar lymphadenopathy with wedge-shaped opacity in the posterior LLL, s/p EBUS/TBNA on 2/19 positive for metastatic non-small cell lung adenocarcinoma in R4, L4, and subcarinal lymph nodes.\par \par 1. Bilateral pulmonary embolism:\par - Continue apixaban 5 mg twice daily, monitor for bleeding, check CBC\par - Etiology likely due to newly diagnosed lung cancer\par - CTPA (Feb 2020) with large filling defect in the L main pulmonary artery extending into the left upper lobar and left interlobar pulm arteries with complete opacification of the LLL pulm artery. There are thin linear filling defects in the bifurcation of the right upper pulmonary artery and the right interlobar pulmonary arteries extending to the right lower pulmonary artery, appear chronic\par - 2D-echo (Feb 2020) with normal LV systolic function, mild concentric LVH, normal LA, RV enlargement with normal RV systolic function, and estimated PASP 39 consistent with borderline pulmonary hypertension. \par - Hypercoagulable workup negative, including Factor V Leiden, Prothrombin gene mutation, and lupus anticoagulant. Last colonoscopy in 2018 normal. Mammogram in June 2019 negative. No recent prolonged immobility\par \par 2. Non-small cell lung adenocarcinoma of left lung\par - EBUS/TBNA with contralateral right paratracheal lymph node involvement, so this is at least Stage IIIB\par - PET/CT and MRI Head with & without contrast to complete staging\par - Oncology evaluation to initiate chemotherapy\par - Will need Rad Onc evaluation also\par \par 3. Radiographic emphysema, likely COPD:\par - Clinically with hyperinflation and increased AP diameter, consistent with emphysema\par - Check HIV, alpha-1 antitrypsin, IgE, serum allergy profile\par - Complete PFTs with bronchodilator testing\par - Patient denying symptoms currently\par - Discontinue standing Duonebs, change to prn\par - If she develops dyspnea, cough, or wheezing, she may benefit from LAMA/LABA therapy\par \par 4. Nicotine addiction:\par - Although she does not actively smoke currently, she reports occasional cravings\par - Start Nicotrol inhaler prn\par - Referral to center for tobacco control\par \par 5. HCM:\par - Declining influenza vaccination\par - Follow-up in 1 month

## 2020-03-02 NOTE — REASON FOR VISIT
[Follow-Up - From Hospitalization] : a follow-up visit after a recent hospitalization [Lung Cancer] : lung cancer [COPD] : COPD

## 2020-03-02 NOTE — HISTORY OF PRESENT ILLNESS
[< 30 pack-years] : < 30 pack-years [Former] : former [Never] : never [TextBox_4] : Ms. Travis is a 64-year-old female with a history of recently diagnosed non-small cell lung adenocarcinoma of left lung, pulmonary embolus on apixaban, HTN, chronic pancreatitis s/p Jeffery procedure (2019), history of ARDS (2015), GERD, chronic pain on opioids, and s/p vocal cord polypectomy who presents for follow-up after recent hospitalization for dyspnea, left-sided back pain, and fatigue with loss of appetite. Found on CTPA (February 2020) to have a large filling defect in the L main pulmonary artery extending into the left upper lobar and left interlobar pulm arteries with complete opacification of the LLL pulm artery. There are thin linear filling defects in the bifurcation of the right upper pulmonary artery and the right interlobar pulmonary arteries extending to the right lower pulmonary artery, appears chronic. CT also showed mediastinal adenopathy, including left paratracheal, subcarinal, and left hilar. There is also a wedge shaped opacity in the posterior left lower lobe suspicious for malignancy. There is centrilobular emphysema.\par \par 2D-echo (Feb 2020) with normal LV systolic function, mild concentric LVH, normal LA, RV enlargement with normal RV systolic function, and estimated PASP 39 consistent with borderline pulmonary hypertension. \par \par She was started on heparin infusion and discharged on Apixaban. Hypercoagulable workup negative, including Factor V Leiden, Prothrombin gene mutation, and lupus anticoagulant. \par \par She had EBUS/TBNA on Feb 19, 2020, found to have adenocarcinoma in right and left paratracheal and subcarinal lymph nodes (R4, L4, level 7). She is pending PFTs, PET/CT, brain MRI, and oncology evaluation for likely Stage IIIB non-small cell lung adenocarcinoma.\par \par Currently reports feeling ok. Still does not have a good appetite. Reports no significant weight loss, although in records was 142 lbs in Jan, now down to 130 lb today. No fevers, chills, or chest pain. Reports occasional cough for which she takes benzonatate perle about once daily. Declining influenza vaccination. Takes Duonebs twice daily as prescribed, but denies dyspnea.\par \par Last colonoscopy in 2018 normal. Mammogram in June 2019 negative. No personal or family history of miscarriages or spontaneous abortions. Reports history of smoking but denies any history of COPD or lung cancer. No family history of malignancy, sarcoidosis, or TB. No recent prolonged immobility.\par  [TextBox_11] : 1/4 [TextBox_13] : 20 [TextBox_15] : 2019

## 2020-03-05 LAB
A ALTERNATA IGE QN: <0.1 KUA/L
A FUMIGATUS IGE QN: <0.1 KUA/L
A1AT SERPL-MCNC: 234 MG/DL
BASOPHILS # BLD AUTO: 0.07 K/UL
BASOPHILS NFR BLD AUTO: 0.7 %
BERMUDA GRASS IGE QN: <0.1 KUA/L
BOXELDER IGE QN: <0.1 KUA/L
C HERBARUM IGE QN: <0.1 KUA/L
CALIF WALNUT IGE QN: <0.1 KUA/L
CAT DANDER IGE QN: <0.1 KUA/L
CMN PIGWEED IGE QN: <0.1 KUA/L
COMMON RAGWEED IGE QN: <0.1 KUA/L
COTTONWOOD IGE QN: <0.1 KUA/L
D FARINAE IGE QN: <0.1 KUA/L
D PTERONYSS IGE QN: <0.1 KUA/L
DEPRECATED A ALTERNATA IGE RAST QL: 0
DEPRECATED A FUMIGATUS IGE RAST QL: 0
DEPRECATED BERMUDA GRASS IGE RAST QL: 0
DEPRECATED BOXELDER IGE RAST QL: 0
DEPRECATED C HERBARUM IGE RAST QL: 0
DEPRECATED CAT DANDER IGE RAST QL: 0
DEPRECATED COMMON PIGWEED IGE RAST QL: 0
DEPRECATED COMMON RAGWEED IGE RAST QL: 0
DEPRECATED COTTONWOOD IGE RAST QL: 0
DEPRECATED D FARINAE IGE RAST QL: 0
DEPRECATED D PTERONYSS IGE RAST QL: 0
DEPRECATED DOG DANDER IGE RAST QL: 0
DEPRECATED GOOSEFOOT IGE RAST QL: 0
DEPRECATED LONDON PLANE IGE RAST QL: 0
DEPRECATED MUGWORT IGE RAST QL: 0
DEPRECATED P NOTATUM IGE RAST QL: 0
DEPRECATED RED CEDAR IGE RAST QL: 0
DEPRECATED ROACH IGE RAST QL: 0
DEPRECATED SHEEP SORREL IGE RAST QL: 0
DEPRECATED SILVER BIRCH IGE RAST QL: 0
DEPRECATED TIMOTHY IGE RAST QL: 0
DEPRECATED WHITE ASH IGE RAST QL: 0
DEPRECATED WHITE OAK IGE RAST QL: 0
DOG DANDER IGE QN: <0.1 KUA/L
EOSINOPHIL # BLD AUTO: 0.38 K/UL
EOSINOPHIL NFR BLD AUTO: 3.9 %
GOOSEFOOT IGE QN: <0.1 KUA/L
HCT VFR BLD CALC: 34 %
HGB BLD-MCNC: 9.6 G/DL
HIV1+2 AB SPEC QL IA.RAPID: NONREACTIVE
IMM GRANULOCYTES NFR BLD AUTO: 0.5 %
LONDON PLANE IGE QN: <0.1 KUA/L
LYMPHOCYTES # BLD AUTO: 3.27 K/UL
LYMPHOCYTES NFR BLD AUTO: 33.4 %
MAN DIFF?: NORMAL
MCHC RBC-ENTMCNC: 21.6 PG
MCHC RBC-ENTMCNC: 28.2 GM/DL
MCV RBC AUTO: 76.4 FL
MONOCYTES # BLD AUTO: 0.69 K/UL
MONOCYTES NFR BLD AUTO: 7 %
MUGWORT IGE QN: <0.1 KUA/L
MULBERRY (T70) CLASS: 0
MULBERRY (T70) CONC: <0.1 KUA/L
NEUTROPHILS # BLD AUTO: 5.33 K/UL
NEUTROPHILS NFR BLD AUTO: 54.5 %
P NOTATUM IGE QN: <0.1 KUA/L
PLATELET # BLD AUTO: 994 K/UL
RBC # BLD: 4.45 M/UL
RBC # FLD: 25.6 %
RED CEDAR IGE QN: <0.1 KUA/L
ROACH IGE QN: <0.1 KUA/L
SHEEP SORREL IGE QN: <0.1 KUA/L
SILVER BIRCH IGE QN: <0.1 KUA/L
TIMOTHY IGE QN: <0.1 KUA/L
TOTAL IGE SMQN RAST: 114 KU/L
TREE ALLERG MIX1 IGE QL: 0
WBC # FLD AUTO: 9.79 K/UL
WHITE ASH IGE QN: <0.1 KUA/L
WHITE ELM IGE QN: 0
WHITE ELM IGE QN: <0.1 KUA/L
WHITE OAK IGE QN: <0.1 KUA/L

## 2020-03-09 ENCOUNTER — OUTPATIENT (OUTPATIENT)
Dept: OUTPATIENT SERVICES | Facility: HOSPITAL | Age: 65
LOS: 1 days | Discharge: HOME | End: 2020-03-09

## 2020-03-09 DIAGNOSIS — Z98.890 OTHER SPECIFIED POSTPROCEDURAL STATES: Chronic | ICD-10-CM

## 2020-03-09 DIAGNOSIS — Z96.641 PRESENCE OF RIGHT ARTIFICIAL HIP JOINT: Chronic | ICD-10-CM

## 2020-03-09 DIAGNOSIS — Z98.89 OTHER SPECIFIED POSTPROCEDURAL STATES: Chronic | ICD-10-CM

## 2020-03-09 DIAGNOSIS — Z96.612 PRESENCE OF LEFT ARTIFICIAL SHOULDER JOINT: Chronic | ICD-10-CM

## 2020-03-13 ENCOUNTER — APPOINTMENT (OUTPATIENT)
Dept: HEMATOLOGY ONCOLOGY | Facility: CLINIC | Age: 65
End: 2020-03-13
Payer: MEDICAID

## 2020-03-13 VITALS
SYSTOLIC BLOOD PRESSURE: 132 MMHG | OXYGEN SATURATION: 99 % | BODY MASS INDEX: 22.45 KG/M2 | WEIGHT: 128.31 LBS | RESPIRATION RATE: 16 BRPM | DIASTOLIC BLOOD PRESSURE: 75 MMHG | TEMPERATURE: 98 F | HEIGHT: 63.39 IN | HEART RATE: 99 BPM

## 2020-03-13 DIAGNOSIS — I27.20 PULMONARY HYPERTENSION, UNSPECIFIED: ICD-10-CM

## 2020-03-13 PROCEDURE — 99205 OFFICE O/P NEW HI 60 MIN: CPT

## 2020-03-13 NOTE — ASSESSMENT
[FreeTextEntry1] : 65 yo w with newly diagnosed lung adeno ca\par Staging incomplete. Will get PET/CT and MRI next Monday\par Requested NGS on tumor tissue\par Continue Oxycontin for pain. Seems to be controlling pain well\par Will refer to [facility] for symptom management\par Will start folic acid in preparation for possible tx with pemetrexed\par Discussed overview of lung cancer with patient and explained need for further testing and waiting prior to initiation of tx. \par Will refer to radiation oncology as well \par \par

## 2020-03-13 NOTE — REVIEW OF SYSTEMS
[Fatigue] : fatigue [Recent Change In Weight] : ~T recent weight change [SOB on Exertion] : shortness of breath during exertion [Negative] : Allergic/Immunologic [FreeTextEntry9] : as above

## 2020-03-13 NOTE — CONSULT LETTER
[Dear  ___] : Dear  [unfilled], [Consult Letter:] : I had the pleasure of evaluating your patient, [unfilled]. [Please see my note below.] : Please see my note below. [Consult Closing:] : Thank you very much for allowing me to participate in the care of this patient.  If you have any questions, please do not hesitate to contact me. [Sincerely,] : Sincerely, [FreeTextEntry2] : Dr. Ramy Abad  [FreeTextEntry3] : Maxx Cage MD\par \par  [DrAlma  ___] : Dr. BRADSHAW

## 2020-03-13 NOTE — HISTORY OF PRESENT ILLNESS
[de-identified] : Ms. Travis is a 64-year-old female with a history of recently diagnosed non-small cell lung adenocarcinoma of left lung, pulmonary embolus on apixaban, HTN, chronic pancreatitis s/p Jeffery procedure (2019), history of ARDS (2015), GERD, chronic pain on opioids, and s/p vocal cord polypectomy who presents for consultation visit\par Brief hx: Pt was recently hospitalized in mid-Feb 2020 to [facility] for dyspnea, left-sided back pain, and fatigue with loss of appetite. Found on CTPA (February 2020) to have a large filling defect in the L main pulmonary artery extending into the left upper lobar and left interlobar pulm arteries with complete opacification of the LLL pulm artery. There were thin linear filling defects in the bifurcation of the right upper pulmonary artery and the right interlobar pulmonary arteries extending to the right lower pulmonary artery, which appeared chronic. CT also showed mediastinal adenopathy, including left paratracheal, subcarinal, and left hilar. There is also a wedge shaped opacity in the posterior left lower lobe suspicious for malignancy. There is centrilobular emphysema. 2D-echo (Feb 2020) with normal LV systolic function, mild concentric LVH, normal LA, RV enlargement with normal RV systolic function, and estimated PASP 39 consistent with borderline pulmonary hypertension. She was started on heparin infusion and discharged on Apixaban. Hypercoagulable workup negative, including Factor V Leiden, Prothrombin gene mutation, and lupus anticoagulant. \par \par She had EBUS/TBNA on Feb 19, 2020, found to have adenocarcinoma in right and left paratracheal and subcarinal lymph nodes (R4, L4, level 7). She is pending PFTs, PET/CT, brain MRI. Patient was discharged on Oxycodone ER BID and Tylenol prn. She is not constipated and is on a bowel regimen with polyethylene glycol. Still does not have a good appetite. She has lost 15 lbs over last 3 months. No fevers, chills, or chest pain. Reports occasional cough for which she takes benzonatate perle about once daily. Declining influenza vaccination. Takes Duonebs twice daily as prescribed, but denies dyspnea. \par \par Last colonoscopy in 2018 normal. Mammogram in June 2019 negative. No personal or family history of miscarriages or spontaneous abortions. Reports history of smoking but denies any history of COPD or lung cancer. No family history of malignancy, sarcoidosis, or TB. No recent prolonged immobility.\par \par Of note, active smoker, quit end of last year, on and off 4-6 cigarettes/day. She had CXR for lung cancer screening in the 1980s but nothing recently. Denies etoh use. \par  \par

## 2020-03-29 ENCOUNTER — APPOINTMENT (OUTPATIENT)
Dept: MRI IMAGING | Facility: IMAGING CENTER | Age: 65
End: 2020-03-29

## 2020-03-29 ENCOUNTER — RESULT REVIEW (OUTPATIENT)
Age: 65
End: 2020-03-29

## 2020-03-29 ENCOUNTER — APPOINTMENT (OUTPATIENT)
Dept: NUCLEAR MEDICINE | Facility: IMAGING CENTER | Age: 65
End: 2020-03-29

## 2020-03-29 ENCOUNTER — OUTPATIENT (OUTPATIENT)
Dept: OUTPATIENT SERVICES | Facility: HOSPITAL | Age: 65
LOS: 1 days | End: 2020-03-29
Payer: COMMERCIAL

## 2020-03-29 DIAGNOSIS — C34.92 MALIGNANT NEOPLASM OF UNSPECIFIED PART OF LEFT BRONCHUS OR LUNG: ICD-10-CM

## 2020-03-29 DIAGNOSIS — Z96.641 PRESENCE OF RIGHT ARTIFICIAL HIP JOINT: Chronic | ICD-10-CM

## 2020-03-29 DIAGNOSIS — Z98.89 OTHER SPECIFIED POSTPROCEDURAL STATES: Chronic | ICD-10-CM

## 2020-03-29 DIAGNOSIS — Z96.612 PRESENCE OF LEFT ARTIFICIAL SHOULDER JOINT: Chronic | ICD-10-CM

## 2020-03-29 DIAGNOSIS — Z98.890 OTHER SPECIFIED POSTPROCEDURAL STATES: Chronic | ICD-10-CM

## 2020-03-29 PROCEDURE — 78815 PET IMAGE W/CT SKULL-THIGH: CPT

## 2020-03-29 PROCEDURE — A9552: CPT

## 2020-03-29 PROCEDURE — A9585: CPT

## 2020-03-29 PROCEDURE — 70553 MRI BRAIN STEM W/O & W/DYE: CPT

## 2020-03-29 PROCEDURE — 78815 PET IMAGE W/CT SKULL-THIGH: CPT | Mod: 26,PI

## 2020-03-29 PROCEDURE — 70553 MRI BRAIN STEM W/O & W/DYE: CPT | Mod: 26

## 2020-03-31 ENCOUNTER — APPOINTMENT (OUTPATIENT)
Dept: NUCLEAR MEDICINE | Facility: IMAGING CENTER | Age: 65
End: 2020-03-31

## 2020-04-03 ENCOUNTER — APPOINTMENT (OUTPATIENT)
Dept: HEMATOLOGY ONCOLOGY | Facility: CLINIC | Age: 65
End: 2020-04-03
Payer: MEDICAID

## 2020-04-03 VITALS
WEIGHT: 128.97 LBS | DIASTOLIC BLOOD PRESSURE: 79 MMHG | HEART RATE: 103 BPM | RESPIRATION RATE: 14 BRPM | SYSTOLIC BLOOD PRESSURE: 132 MMHG | TEMPERATURE: 98.8 F | BODY MASS INDEX: 22.57 KG/M2 | OXYGEN SATURATION: 99 %

## 2020-04-03 PROCEDURE — 99215 OFFICE O/P EST HI 40 MIN: CPT

## 2020-04-06 NOTE — CONSULT LETTER
[Dear  ___] : Dear  [unfilled], [Consult Letter:] : I had the pleasure of evaluating your patient, [unfilled]. [Please see my note below.] : Please see my note below. [Consult Closing:] : Thank you very much for allowing me to participate in the care of this patient.  If you have any questions, please do not hesitate to contact me. [Sincerely,] : Sincerely, [DrAlma  ___] : Dr. BRADSHAW [FreeTextEntry2] : Dr. Ramy Abad  [FreeTextEntry3] : Maxx Cage MD\par \par

## 2020-04-06 NOTE — ASSESSMENT
[FreeTextEntry1] : 63 yo w with newly diagnosed lung adeno ca\par Reviewed PET/CT and brain MRI personally- b/l supraclav and b/l mediastinal disease. No distant mets. CLinical staging IIIC\par NGS reveals KRAS G12V mut, low TMB and KIM. PDL1 1%.\par Discussed all this with pt. GIven Stage III disease, will refer to rad onc. \par MRI shows no mets\par If she is a candidate for chemo, prefer carbo/Alimta given Q 3 weeks schedule (to minimise risk for exposure to COVID-19). If not a candidate for RT, will add Keytruda to chemo. \par Discussed all this in detail and obtained consent/ \par Pt is taking folic acid. \par Continue Oxycontin for pain. Seems to be controlling pain well\par Awaiting pall care appointment \par All questions answered\par Educated patient about COVID_19 pandemic. Discussed higher risk for complications in infected with the virus given immunosuppressed state due to chemotherapy. Recommended universal precautions and discouraged attending parties or gatherings. Recommended frequent hand-washing and hand  use.\par \par \par

## 2020-04-06 NOTE — HISTORY OF PRESENT ILLNESS
[de-identified] : Ms. Travis is a 64-year-old female with a history of recently diagnosed non-small cell lung adenocarcinoma of left lung, pulmonary embolus on apixaban, HTN, chronic pancreatitis s/p Jeffery procedure (2019), history of ARDS (2015), GERD, chronic pain on opioids, and s/p vocal cord polypectomy who presents for consultation visit\par Brief hx: Pt was recently hospitalized in mid-Feb 2020 to [facility] for dyspnea, left-sided back pain, and fatigue with loss of appetite. Found on CTPA (February 2020) to have a large filling defect in the L main pulmonary artery extending into the left upper lobar and left interlobar pulm arteries with complete opacification of the LLL pulm artery. There were thin linear filling defects in the bifurcation of the right upper pulmonary artery and the right interlobar pulmonary arteries extending to the right lower pulmonary artery, which appeared chronic. CT also showed mediastinal adenopathy, including left paratracheal, subcarinal, and left hilar. There is also a wedge shaped opacity in the posterior left lower lobe suspicious for malignancy. There is centrilobular emphysema. 2D-echo (Feb 2020) with normal LV systolic function, mild concentric LVH, normal LA, RV enlargement with normal RV systolic function, and estimated PASP 39 consistent with borderline pulmonary hypertension. She was started on heparin infusion and discharged on Apixaban. Hypercoagulable workup negative, including Factor V Leiden, Prothrombin gene mutation, and lupus anticoagulant. \par \par She had EBUS/TBNA on Feb 19, 2020, found to have adenocarcinoma in right and left paratracheal and subcarinal lymph nodes (R4, L4, level 7). She is pending PFTs, PET/CT, brain MRI. Patient was discharged on Oxycodone ER BID and Tylenol prn. She is not constipated and is on a bowel regimen with polyethylene glycol. Still does not have a good appetite. She has lost 15 lbs over last 3 months. No fevers, chills, or chest pain. Reports occasional cough for which she takes benzonatate perle about once daily. Declining influenza vaccination. Takes Duonebs twice daily as prescribed, but denies dyspnea. \par \par Last colonoscopy in 2018 normal. Mammogram in June 2019 negative. No personal or family history of miscarriages or spontaneous abortions. Reports history of smoking but denies any history of COPD or lung cancer. No family history of malignancy, sarcoidosis, or TB. No recent prolonged immobility.\par \par Of note, active smoker, quit end of last year, on and off 4-6 cigarettes/day. She had CXR for lung cancer screening in the 1980s but nothing recently. Denies etoh use. \par \par 4/6/20: No new complaints. Persistent pain in the chest and occasional cough. COntinues to lose weight,. No other constitutional symptoms. \par  \par

## 2020-04-07 ENCOUNTER — APPOINTMENT (OUTPATIENT)
Dept: RADIATION ONCOLOGY | Facility: CLINIC | Age: 65
End: 2020-04-07
Payer: MEDICAID

## 2020-04-07 PROCEDURE — 99443: CPT

## 2020-04-07 NOTE — REVIEW OF SYSTEMS
[Negative] : Heme/Lymph [Constipation: Grade 0] : Constipation: Grade 0 [Diarrhea: Grade 0] : Diarrhea: Grade 0 [Dysphagia: Grade 0] : Dysphagia: Grade 0 [Fatigue: Grade 0] : Fatigue: Grade 0 [Cough: Grade 0] : Cough: Grade 0 [Dyspnea: Grade 0] : Dyspnea: Grade 0

## 2020-04-08 ENCOUNTER — LABORATORY RESULT (OUTPATIENT)
Age: 65
End: 2020-04-08

## 2020-04-08 ENCOUNTER — APPOINTMENT (OUTPATIENT)
Dept: INFUSION THERAPY | Facility: HOSPITAL | Age: 65
End: 2020-04-08

## 2020-04-08 ENCOUNTER — RESULT REVIEW (OUTPATIENT)
Age: 65
End: 2020-04-08

## 2020-04-08 LAB
BASOPHILS # BLD AUTO: 0.09 K/UL — SIGNIFICANT CHANGE UP (ref 0–0.2)
BASOPHILS NFR BLD AUTO: 0.9 % — SIGNIFICANT CHANGE UP (ref 0–2)
EOSINOPHIL # BLD AUTO: 0.4 K/UL — SIGNIFICANT CHANGE UP (ref 0–0.5)
EOSINOPHIL NFR BLD AUTO: 3.8 % — SIGNIFICANT CHANGE UP (ref 0–6)
HCT VFR BLD CALC: 31.4 % — LOW (ref 34.5–45)
HGB BLD-MCNC: 9.7 G/DL — LOW (ref 11.5–15.5)
IMM GRANULOCYTES NFR BLD AUTO: 0.7 % — SIGNIFICANT CHANGE UP (ref 0–1.5)
LYMPHOCYTES # BLD AUTO: 3.3 K/UL — SIGNIFICANT CHANGE UP (ref 1–3.3)
LYMPHOCYTES # BLD AUTO: 31.8 % — SIGNIFICANT CHANGE UP (ref 13–44)
MCHC RBC-ENTMCNC: 21.6 PG — LOW (ref 27–34)
MCHC RBC-ENTMCNC: 30.9 GM/DL — LOW (ref 32–36)
MCV RBC AUTO: 69.8 FL — LOW (ref 80–100)
MONOCYTES # BLD AUTO: 0.83 K/UL — SIGNIFICANT CHANGE UP (ref 0–0.9)
MONOCYTES NFR BLD AUTO: 8 % — SIGNIFICANT CHANGE UP (ref 2–14)
NEUTROPHILS # BLD AUTO: 5.7 K/UL — SIGNIFICANT CHANGE UP (ref 1.8–7.4)
NEUTROPHILS NFR BLD AUTO: 54.8 % — SIGNIFICANT CHANGE UP (ref 43–77)
NRBC # BLD: 0 /100 WBCS — SIGNIFICANT CHANGE UP (ref 0–0)
PLATELET # BLD AUTO: 650 K/UL — HIGH (ref 150–400)
RBC # BLD: 4.5 M/UL — SIGNIFICANT CHANGE UP (ref 3.8–5.2)
RBC # FLD: 22.7 % — HIGH (ref 10.3–14.5)
WBC # BLD: 10.39 K/UL — SIGNIFICANT CHANGE UP (ref 3.8–10.5)
WBC # FLD AUTO: 10.39 K/UL — SIGNIFICANT CHANGE UP (ref 3.8–10.5)

## 2020-04-09 ENCOUNTER — APPOINTMENT (OUTPATIENT)
Dept: HEMATOLOGY ONCOLOGY | Facility: CLINIC | Age: 65
End: 2020-04-09
Payer: MEDICAID

## 2020-04-09 ENCOUNTER — OUTPATIENT (OUTPATIENT)
Dept: OUTPATIENT SERVICES | Facility: HOSPITAL | Age: 65
LOS: 1 days | Discharge: HOME | End: 2020-04-09

## 2020-04-09 DIAGNOSIS — C34.90 MALIGNANT NEOPLASM OF UNSPECIFIED PART OF UNSPECIFIED BRONCHUS OR LUNG: ICD-10-CM

## 2020-04-09 DIAGNOSIS — Z96.612 PRESENCE OF LEFT ARTIFICIAL SHOULDER JOINT: Chronic | ICD-10-CM

## 2020-04-09 DIAGNOSIS — Z96.641 PRESENCE OF RIGHT ARTIFICIAL HIP JOINT: Chronic | ICD-10-CM

## 2020-04-09 DIAGNOSIS — Z51.11 ENCOUNTER FOR ANTINEOPLASTIC CHEMOTHERAPY: ICD-10-CM

## 2020-04-09 DIAGNOSIS — Z98.890 OTHER SPECIFIED POSTPROCEDURAL STATES: Chronic | ICD-10-CM

## 2020-04-09 DIAGNOSIS — E86.0 DEHYDRATION: ICD-10-CM

## 2020-04-09 DIAGNOSIS — R11.2 NAUSEA WITH VOMITING, UNSPECIFIED: ICD-10-CM

## 2020-04-09 DIAGNOSIS — Z98.89 OTHER SPECIFIED POSTPROCEDURAL STATES: Chronic | ICD-10-CM

## 2020-04-09 PROBLEM — I27.20 PULMONARY HYPERTENSION, UNSPECIFIED: Chronic | Status: ACTIVE | Noted: 2020-04-07

## 2020-04-09 PROBLEM — I26.99 OTHER PULMONARY EMBOLISM WITHOUT ACUTE COR PULMONALE: Chronic | Status: ACTIVE | Noted: 2020-04-07

## 2020-04-09 PROBLEM — J44.9 CHRONIC OBSTRUCTIVE PULMONARY DISEASE, UNSPECIFIED: Chronic | Status: ACTIVE | Noted: 2020-04-07

## 2020-04-09 PROCEDURE — 99443: CPT

## 2020-04-09 NOTE — ASSESSMENT
[______] : HCP: [unfilled] [FreeTextEntry1] : 64yoF with:\par \par 1. NSCLC - on Carbo/Pem/Pem\par \par 2. Loss of appetite/weight loss - Medical cannabis certification completed today. Provided cannabis education, overview of state program, and discussed adverse effects in detail. Counseled that vaporized cannabis is not the preferred route of administration due to the fact that both short-term and long-term risks associated with vaporizing oils are not yet fully understood. Recommend starting with 1:1 THC:CBD formulation at low dose of THC (~2mg/dose).\par \par 3. Back pain, likely 2/2 neoplasm - uses heating pad, PRN Tylenol for mild-mod pain, PRN Oxycodone for severe pain. \par \par 4. Chronic pancreatitis - Creon pre-meal.\par \par 5. Encounter for palliative care/supportive care - Began HCP form completion today in office. Will have patient sign when she comes for chemotherapy on 4/29/20. \par \par RTO 1 month, call sooner with questions or issues.

## 2020-04-09 NOTE — HISTORY OF PRESENT ILLNESS
[FreeTextEntry1] : 64yoF with recently diagnosed NSCLC presents for initial palliative care visit, referred by Oncology.\par PMH significant for pulmonary embolus on apixaban, HTN, chronic pancreatitis s/p Jeffery procedure (2019) on Creon,  ARDS (2015), GERD, chronic pain on opioids, and s/p vocal cord polypectomy. \par \par Patient presented with dyspnea, left-sided back pain, and fatigue with loss of appetite in February 2020. CTPA (February 2020) noted to have a large filling defect in the L main pulmonary artery extending into the left upper lobar and left interlobar pulm arteries with complete opacification of the LLL pulm artery.  CT also showed mediastinal adenopathy, including left paratracheal, subcarinal, and left hilar. There is also a wedge shaped opacity in the posterior left lower lobe suspicious for malignancy. There is centrilobular emphysema. 2D-echo (Feb 2020) with normal LV systolic function, mild concentric LVH, normal LA, RV enlargement with normal RV systolic function, and estimated PASP 39 consistent with borderline pulmonary hypertension. She was started on heparin infusion and discharged on Apixaban. Hypercoagulable workup negative, including Factor V Leiden, Prothrombin gene mutation, and lupus anticoagulant. \par \par She had EBUS/TBNA on Feb 19, 2020, found to have adenocarcinoma in right and left paratracheal and subcarinal lymph nodes.  \par \par Main issue for which she presents today is for assistance with appetite gain and weight gain.  She would like to maintain her weight above 125lbs and finds it hard to keep her weight up.  Her weight had been much lower last year (108lbs) before she underwent pancreatic surgery for her calcific pancreatitis.  \par \par She has a low appetite (this is a longtime issue) and eats small portions.  She had been on megace for months, this was helping her to gain weight. Has been discontinued since she had the VTE. \par \par ROS:\par +nausea/vomiting when she gets her pancreatitis flare-ups. This occurs about 3-4 times a year, last time was 1 week ago. The episode lasts about 2 days. \par +L sided back pain along the entire length of the back - this started in Feb which spurred her hospital admission. She had been on PRN Oxycodone 20mg for pancreatitis pain prior to the back pain.  Currently she is taking a 20mg Oxycodone pill approximately twice weekly, when pain is severe. For mild pain she uses a heating pad. \par +trouble sleeping - uses melatonin QHS which helps get her more continuous hours of sleep\par +mood is good, has a good sense of humor\par Denies anxiety\par Denies diarrhea, constipation (uses a 1/2 dose of miralax daily to maintain regularity) \par \par Patient is , lives with her . Has no children She has a good social support system. Has a strong [employer] bj. \par Recently quit smoking tobacco completely after meeting with pulmonologist Dr. Abad. \par \par I-Stop Ref# [id number]\par \par PMD: Dr. Ananda Chavira ([place])\par \par This visit was completed via telephone due to the restrictions of the COVID-19 pandemic. All issues documented here were discussed and addressed but no physical exam was performed. The patient verbally consented to this telephone visit.

## 2020-04-09 NOTE — DATA REVIEWED
[FreeTextEntry1] : PET/CT (3/2020) - \par 1. FDG-avid subpleural posterior left lower lobe pulmonary nodule, compatible with the given diagnosis of left lung adenocarcinoma. Several mildly FDG-avid difficult to delineate left basilar groundglass and \par nodular opacities. \par \par 2. Multiple FDG avid metastatic bilateral supraclavicular, mediastinal and left hilar/perihilar lymph nodes. \par \par 3. Subcentimeter right adrenal gland thickening/nodule, too small to characterize, indeterminate. \par \par 4. Nonspecific minimally asymmetrical right palatine tonsillar activity, possibly inflammatory/infectious. Please correlate with direct visualization. \par \par 5. Nonspecific difficult to delineate FDG avid left aryepiglottic/pyriform sinus focus. Please correlate clinically. \par \par 6. Nonspecific FDG avidity in the proximal ascending colon. Please correlate clinically and with colonoscopy, if indicated.

## 2020-04-16 ENCOUNTER — RESULT REVIEW (OUTPATIENT)
Age: 65
End: 2020-04-16

## 2020-04-16 ENCOUNTER — APPOINTMENT (OUTPATIENT)
Dept: HEMATOLOGY ONCOLOGY | Facility: CLINIC | Age: 65
End: 2020-04-16
Payer: MEDICAID

## 2020-04-16 VITALS
OXYGEN SATURATION: 97 % | WEIGHT: 125.88 LBS | RESPIRATION RATE: 16 BRPM | TEMPERATURE: 98.4 F | BODY MASS INDEX: 22.03 KG/M2 | SYSTOLIC BLOOD PRESSURE: 143 MMHG | DIASTOLIC BLOOD PRESSURE: 80 MMHG | HEART RATE: 111 BPM

## 2020-04-16 LAB
BASOPHILS # BLD AUTO: 0 K/UL — SIGNIFICANT CHANGE UP (ref 0–0.2)
BASOPHILS NFR BLD AUTO: 0 % — SIGNIFICANT CHANGE UP (ref 0–2)
EOSINOPHIL # BLD AUTO: 0.21 K/UL — SIGNIFICANT CHANGE UP (ref 0–0.5)
EOSINOPHIL NFR BLD AUTO: 4 % — SIGNIFICANT CHANGE UP (ref 0–6)
HCT VFR BLD CALC: 27.6 % — LOW (ref 34.5–45)
HGB BLD-MCNC: 8.4 G/DL — LOW (ref 11.5–15.5)
LYMPHOCYTES # BLD AUTO: 2.07 K/UL — SIGNIFICANT CHANGE UP (ref 1–3.3)
LYMPHOCYTES # BLD AUTO: 40 % — SIGNIFICANT CHANGE UP (ref 13–44)
MCHC RBC-ENTMCNC: 21.9 PG — LOW (ref 27–34)
MCHC RBC-ENTMCNC: 30.4 GM/DL — LOW (ref 32–36)
MCV RBC AUTO: 72.1 FL — LOW (ref 80–100)
MONOCYTES # BLD AUTO: 0.16 K/UL — SIGNIFICANT CHANGE UP (ref 0–0.9)
MONOCYTES NFR BLD AUTO: 3 % — SIGNIFICANT CHANGE UP (ref 2–14)
NEUTROPHILS # BLD AUTO: 2.74 K/UL — SIGNIFICANT CHANGE UP (ref 1.8–7.4)
NEUTROPHILS NFR BLD AUTO: 53 % — SIGNIFICANT CHANGE UP (ref 43–77)
NRBC # BLD: 0 /100 — SIGNIFICANT CHANGE UP (ref 0–0)
NRBC # BLD: SIGNIFICANT CHANGE UP /100 WBCS (ref 0–0)
PLAT MORPH BLD: NORMAL — SIGNIFICANT CHANGE UP
PLATELET # BLD AUTO: 417 K/UL — HIGH (ref 150–400)
RBC # BLD: 3.83 M/UL — SIGNIFICANT CHANGE UP (ref 3.8–5.2)
RBC # FLD: 22.5 % — HIGH (ref 10.3–14.5)
RBC BLD AUTO: SIGNIFICANT CHANGE UP
WBC # BLD: 5.17 K/UL — SIGNIFICANT CHANGE UP (ref 3.8–10.5)
WBC # FLD AUTO: 5.17 K/UL — SIGNIFICANT CHANGE UP (ref 3.8–10.5)

## 2020-04-16 PROCEDURE — 99214 OFFICE O/P EST MOD 30 MIN: CPT

## 2020-04-16 NOTE — HISTORY OF PRESENT ILLNESS
[Home] : at home, [unfilled] , at the time of the visit. [Medical Office: ([facility])___] : at ~his/her~ medical office located in [place] [Patient] : the patient [Self] : self [FreeTextEntry1] : Ms. Travis is a 63yo female, active smoker, who presents with LLL adenocarcinoma. \par \par Her oncologic history began when was recently hospitalized in mid-Feb 2020 to [facility] for dyspnea, left-sided back pain, and fatigue with loss of appetite. She was found bilateral PE with pulmonary HTN with pulmonary artery filling defects, but an incidental finding on imaging was mediastinal adenopathy and a wedge shaped opacity in the LLL. She was started on Apixaban\par \par She had EBUS/TBNA on Feb 19, 2020, and was found to have adenocarcinoma in right and left paratracheal and subcarinal lymph nodes (R4, L4, level 7). MRI brain done 3/12/2020 was negative for metastases. \par \par PET/CT done 3/29/20 showed FDG-avid subpleural posterior left lower lobe pulmonary nodule, compatible with the given diagnosis of left lung adenocarcinoma with several mildly FDG-avid difficult to delineate left basilar groundglass and nodular opacities and multiple FDG avid metastatic bilateral supraclavicular, cervical, mediastinal and left hilar/perihilar lymph nodes. There was also an indeterminate right adrenal gland thickening/nodule, too small to characterize.\par \par Foundation testing showed disease to be MS-stabe, PDL-1>1%, TERT promoter+\par \par No PFTs have been done at this time.\par \par Spoke to pt on phone and got updated history.  no complaints of pain,cough or SOB at this time.\par \par \par

## 2020-04-16 NOTE — REVIEW OF SYSTEMS
[Fatigue] : fatigue [Recent Change In Weight] : ~T recent weight change [SOB on Exertion] : shortness of breath during exertion [Vomiting] : vomiting [Negative] : Allergic/Immunologic [FreeTextEntry7] : nausea [FreeTextEntry9] : as above

## 2020-04-16 NOTE — LETTER CLOSING
[Consult Closing:] : Thank you for allowing me to participate in the care of this patient.  If you have any questions, please do not hesitate to contact me. [Sincerely yours,] : Sincerely yours, [FreeTextEntry3] : Chino Reynolds MD\par  and Residency \par Department of Radiation Medicine\par [facility]\par [facility]\par [address] [place] [address]\[place] [address]\par Tel: [phone]\par Fax: [phone]\par \par \par

## 2020-04-16 NOTE — HISTORY OF PRESENT ILLNESS
[Disease: _____________________] : Disease: [unfilled] [N: ___] : N[unfilled] [M: ___] : M[unfilled] [AJCC Stage: ____] : AJCC Stage: [unfilled] [de-identified] : Ms. Travis is a 64-year-old female with a history of recently diagnosed non-small cell lung adenocarcinoma of left lung, pulmonary embolus on apixaban, HTN, chronic pancreatitis s/p Jeffery procedure (2019), history of ARDS (2015), GERD, chronic pain on opioids, and s/p vocal cord polypectomy who presents for consultation visit\par Brief hx: Pt was recently hospitalized in mid-Feb 2020 to [facility] for dyspnea, left-sided back pain, and fatigue with loss of appetite. Found on CTPA (February 2020) to have a large filling defect in the L main pulmonary artery extending into the left upper lobar and left interlobar pulm arteries with complete opacification of the LLL pulm artery. There were thin linear filling defects in the bifurcation of the right upper pulmonary artery and the right interlobar pulmonary arteries extending to the right lower pulmonary artery, which appeared chronic. CT also showed mediastinal adenopathy, including left paratracheal, subcarinal, and left hilar. There is also a wedge shaped opacity in the posterior left lower lobe suspicious for malignancy. There is centrilobular emphysema. 2D-echo (Feb 2020) with normal LV systolic function, mild concentric LVH, normal LA, RV enlargement with normal RV systolic function, and estimated PASP 39 consistent with borderline pulmonary hypertension. She was started on heparin infusion and discharged on Apixaban. Hypercoagulable workup negative, including Factor V Leiden, Prothrombin gene mutation, and lupus anticoagulant. \par \par She had EBUS/TBNA on Feb 19, 2020, found to have adenocarcinoma in right and left paratracheal and subcarinal lymph nodes (R4, L4, level 7). She is pending PFTs, PET/CT, brain MRI. Patient was discharged on Oxycodone ER BID and Tylenol prn. She is not constipated and is on a bowel regimen with polyethylene glycol. Still does not have a good appetite. She has lost 15 lbs over last 3 months. No fevers, chills, or chest pain. Reports occasional cough for which she takes benzonatate perle about once daily. Declining influenza vaccination. Takes Duonebs twice daily as prescribed, but denies dyspnea. \par \par Last colonoscopy in 2018 normal. Mammogram in June 2019 negative. No personal or family history of miscarriages or spontaneous abortions. Reports history of smoking but denies any history of COPD or lung cancer. No family history of malignancy, sarcoidosis, or TB. No recent prolonged immobility.\par \par Of note, active smoker, quit end of last year, on and off 4-6 cigarettes/day. She had CXR for lung cancer screening in the 1980s but nothing recently. Denies etoh use. \par \par 4/6/20: No new complaints. Persistent pain in the chest and occasional cough. COntinues to lose weight,. No other constitutional symptoms. \par \par 4/16/20: Pain in the chest improved. She has been nauseous and c/o abd cramps since starting chemo. She has been taking Zofran with minimal benefit. She has not been using Reglan because she was told not to by nutritionist. She has lost 2 lbs. \par  \par  [de-identified] : adeno ca

## 2020-04-16 NOTE — ASSESSMENT
[FreeTextEntry1] : 63 yo w with newly diagnosed lung adeno ca\par Stage IIIC by imaging. LArge volume of disease and hence, not started on RT\par NGS revealed KRAS G12V mut, low TMB and KIM. PDL1 1%.\par Started carbo/Alimta/Pem given Q 3 weeks schedule \par Pt is taking folic acid. \par Using Oxycodone 20 mg as needed. Stopped Oxycontin\par Advised to take Reglan as needed for nausea\par Pain and symptom management as per Dr. MARIE\par All questions answered\par Ideally would have liked to get a scan after 2 cycles, but due to COVID crisis and in an effort to limit exposure, will get scans after 4 cycles as long as no new symptoms. \par Educated patient about COVID_19 pandemic. Discussed higher risk for complications in infected with the virus given immunosuppressed state due to chemotherapy. Recommended universal precautions and discouraged attending parties or gatherings. Recommended frequent hand-washing and hand  use.\par Headache-tension-like distribution. Can take Tylenol. \par \par

## 2020-04-21 LAB
ALBUMIN SERPL ELPH-MCNC: 3.7 G/DL
ALP BLD-CCNC: 96 U/L
ALT SERPL-CCNC: 12 U/L
ANION GAP SERPL CALC-SCNC: 17 MMOL/L
AST SERPL-CCNC: 17 U/L
BILIRUB SERPL-MCNC: 0.2 MG/DL
BUN SERPL-MCNC: 8 MG/DL
CALCIUM SERPL-MCNC: 9.2 MG/DL
CEA SERPL-MCNC: 4.6 NG/ML
CHLORIDE SERPL-SCNC: 100 MMOL/L
CO2 SERPL-SCNC: 23 MMOL/L
CREAT SERPL-MCNC: 0.85 MG/DL
GLUCOSE SERPL-MCNC: 159 MG/DL
HAV IGM SER QL: NONREACTIVE
HBV CORE IGM SER QL: NONREACTIVE
HBV SURFACE AG SER QL: NONREACTIVE
HCV AB SER QL: NONREACTIVE
HCV S/CO RATIO: 0.21 S/CO
MAGNESIUM SERPL-MCNC: 1.5 MG/DL
POTASSIUM SERPL-SCNC: 3.9 MMOL/L
PROT SERPL-MCNC: 6.5 G/DL
SODIUM SERPL-SCNC: 140 MMOL/L
TSH SERPL-ACNC: 0.97 UIU/ML

## 2020-04-29 ENCOUNTER — LABORATORY RESULT (OUTPATIENT)
Age: 65
End: 2020-04-29

## 2020-04-29 ENCOUNTER — APPOINTMENT (OUTPATIENT)
Dept: INFUSION THERAPY | Facility: HOSPITAL | Age: 65
End: 2020-04-29

## 2020-04-29 ENCOUNTER — RESULT REVIEW (OUTPATIENT)
Age: 65
End: 2020-04-29

## 2020-04-29 ENCOUNTER — APPOINTMENT (OUTPATIENT)
Dept: HEMATOLOGY ONCOLOGY | Facility: CLINIC | Age: 65
End: 2020-04-29
Payer: MEDICAID

## 2020-04-29 LAB
ANISOCYTOSIS BLD QL: SLIGHT — SIGNIFICANT CHANGE UP
BASOPHILS # BLD AUTO: 0 K/UL — SIGNIFICANT CHANGE UP (ref 0–0.2)
BASOPHILS NFR BLD AUTO: 0 % — SIGNIFICANT CHANGE UP (ref 0–2)
ELLIPTOCYTES BLD QL SMEAR: SLIGHT — SIGNIFICANT CHANGE UP
EOSINOPHIL # BLD AUTO: 0.11 K/UL — SIGNIFICANT CHANGE UP (ref 0–0.5)
EOSINOPHIL NFR BLD AUTO: 2 % — SIGNIFICANT CHANGE UP (ref 0–6)
HCT VFR BLD CALC: 29.4 % — LOW (ref 34.5–45)
HGB BLD-MCNC: 9 G/DL — LOW (ref 11.5–15.5)
HYPOCHROMIA BLD QL: SLIGHT — SIGNIFICANT CHANGE UP
LG PLATELETS BLD QL AUTO: SLIGHT — SIGNIFICANT CHANGE UP
LYMPHOCYTES # BLD AUTO: 2.12 K/UL — SIGNIFICANT CHANGE UP (ref 1–3.3)
LYMPHOCYTES # BLD AUTO: 38 % — SIGNIFICANT CHANGE UP (ref 13–44)
MCHC RBC-ENTMCNC: 22.5 PG — LOW (ref 27–34)
MCHC RBC-ENTMCNC: 30.6 GM/DL — LOW (ref 32–36)
MCV RBC AUTO: 73.5 FL — LOW (ref 80–100)
MONOCYTES # BLD AUTO: 0.84 K/UL — SIGNIFICANT CHANGE UP (ref 0–0.9)
MONOCYTES NFR BLD AUTO: 15 % — HIGH (ref 2–14)
NEUTROPHILS # BLD AUTO: 2.52 K/UL — SIGNIFICANT CHANGE UP (ref 1.8–7.4)
NEUTROPHILS NFR BLD AUTO: 45 % — SIGNIFICANT CHANGE UP (ref 43–77)
NRBC # BLD: 0 /100 — SIGNIFICANT CHANGE UP (ref 0–0)
NRBC # BLD: SIGNIFICANT CHANGE UP /100 WBCS (ref 0–0)
PLAT MORPH BLD: NORMAL — SIGNIFICANT CHANGE UP
PLATELET # BLD AUTO: 777 K/UL — HIGH (ref 150–400)
POIKILOCYTOSIS BLD QL AUTO: SLIGHT — SIGNIFICANT CHANGE UP
POLYCHROMASIA BLD QL SMEAR: SLIGHT — SIGNIFICANT CHANGE UP
RBC # BLD: 4 M/UL — SIGNIFICANT CHANGE UP (ref 3.8–5.2)
RBC # FLD: 27.3 % — HIGH (ref 10.3–14.5)
RBC BLD AUTO: ABNORMAL
TARGETS BLD QL SMEAR: SLIGHT — SIGNIFICANT CHANGE UP
WBC # BLD: 5.59 K/UL — SIGNIFICANT CHANGE UP (ref 3.8–10.5)
WBC # FLD AUTO: 5.59 K/UL — SIGNIFICANT CHANGE UP (ref 3.8–10.5)

## 2020-04-29 PROCEDURE — 99214 OFFICE O/P EST MOD 30 MIN: CPT

## 2020-04-30 DIAGNOSIS — E86.0 DEHYDRATION: ICD-10-CM

## 2020-04-30 DIAGNOSIS — R11.2 NAUSEA WITH VOMITING, UNSPECIFIED: ICD-10-CM

## 2020-04-30 DIAGNOSIS — Z51.11 ENCOUNTER FOR ANTINEOPLASTIC CHEMOTHERAPY: ICD-10-CM

## 2020-05-04 NOTE — PHYSICAL EXAM
[General Appearance - Alert] : alert [General Appearance - In No Acute Distress] : in no acute distress [Sclera] : the sclera and conjunctiva were normal [Neck Appearance] : the appearance of the neck was normal [Normal Oral Mucosa] : normal oral mucosa [Skin Color & Pigmentation] : normal skin color and pigmentation [No Focal Deficits] : no focal deficits [Oriented To Time, Place, And Person] : oriented to person, place, and time [Affect] : the affect was normal [Mood] : the mood was normal [FreeTextEntry1] : Physical examination deferred due to contact restrictions during COVID-19 pandemic.

## 2020-05-04 NOTE — HISTORY OF PRESENT ILLNESS
[FreeTextEntry1] : 64yoF with recently diagnosed NSCLC presents for follow-up palliative care visit, referred by Oncology.\par PMH significant for pulmonary embolus on apixaban, HTN, chronic pancreatitis s/p Jeffery procedure (2019) on Creon,  ARDS (2015), GERD, chronic pain on opioids, and s/p vocal cord polypectomy. \par \par Patient presented with dyspnea, left-sided back pain, and fatigue with loss of appetite in February 2020. CTPA (February 2020) noted to have a large filling defect in the L main pulmonary artery extending into the left upper lobar and left interlobar pulm arteries with complete opacification of the LLL pulm artery.  CT also showed mediastinal adenopathy, including left paratracheal, subcarinal, and left hilar. There is also a wedge shaped opacity in the posterior left lower lobe suspicious for malignancy. There is centrilobular emphysema. 2D-echo (Feb 2020) with normal LV systolic function, mild concentric LVH, normal LA, RV enlargement with normal RV systolic function, and estimated PASP 39 consistent with borderline pulmonary hypertension. She was started on heparin infusion and discharged on Apixaban. Hypercoagulable workup negative, including Factor V Leiden, Prothrombin gene mutation, and lupus anticoagulant. \par \par She had EBUS/TBNA on Feb 19, 2020, found to have adenocarcinoma in right and left paratracheal and subcarinal lymph nodes.  \par \par Main issue for initial palliative care visit was for assistance with appetite gain and weight gain.  She would like to maintain her weight above 125lbs and finds it hard to keep her weight up.  Her weight had been much lower last year (108lbs) before she underwent pancreatic surgery for her calcific pancreatitis.  She has a low appetite (this is a longtime issue) and eats small portions.  She had been on megace for months, this was helping her to gain weight. Has been discontinued since she had the VTE. \par \par Interval History:  Patient presents for follow-up, seen in treatment room.  She currently reports 8-9/10 abdominal and back pain. Has experienced this pain before which is relieved by PRN oxycodone 20mg.   She ran out of PRN oxycodone prescribed by pain management.   She recently had a follow-up visit but has not received a phone call from her pharmacy notifying that the RX is ready for pickup.  Endorses treatment related nausea and has been using PRN antiemetics with minimal benefit.  Has obtained medical cannabis 1:1 THC:CBD ~2.5mg.   Has noticed no discernible effect.\par \par ROS:\par +nausea/vomiting when she gets her pancreatitis flare-ups. This occurs about 3-4 times a year, last time was 1 week ago. The episode lasts about 2 days. \par +L sided back pain along the entire length of the back - this started in Feb which spurred her hospital admission. She had been on PRN Oxycodone 20mg for pancreatitis pain prior to the back pain.  Currently she is taking a 20mg Oxycodone pill approximately twice weekly, when pain is severe. For mild pain she uses a heating pad. \par +trouble sleeping - uses melatonin QHS which helps get her more continuous hours of sleep\par +mood is good, has a good sense of humor\par Denies anxiety, diarrhea, constipation (uses a 1/2 dose of miralax daily to maintain regularity) \par All other ROS as outlined or noncontributory. \par \par Patient is , lives with her . Has no children She has a good social support system. Has a strong [employer] bj. Recently quit smoking tobacco completely after meeting with pulmonologist Dr. Abad. \par \par I-Stop Ref# [id number]\par \par PMD: Dr. Ananda Chavira ([place])\par Pain management: Dr. Mccauley

## 2020-05-04 NOTE — ASSESSMENT
[______] : HCP: [unfilled] [FreeTextEntry1] : 64yoF with:\par \par 1. NSCLC - on Carbo/Pem/Pem.  Follow up with Med Onc. \par \par 2. Loss of appetite/weight loss - Discussed increasing medical cannabis in increments of 0.1mL to promote appetite.  \par \par 3.  Nausea - PRN antiemetics.  Medical cannabis as above.  \par \par 3. Back pain, likely 2/2 neoplasm - uses heating pad, PRN Tylenol for mild-mod pain, PRN Oxycodone for severe pain.   I personally called patient's pharmacy to inquire about PRN oxycodone 20mg.    There was an issue with stock but it is now ready for pickup.  Patient made aware.\par \par 4. Chronic pancreatitis - Creon pre-meal.\par \par 5. Encounter for palliative care/supportive care - HCP completed in office today.   Original given to patient and additional copy to be scanned into EMR.   All questions answered.  Empathetic listening and emotional support provided. \par \par RTO 1 month, call sooner with questions or issues.

## 2020-05-05 ENCOUNTER — APPOINTMENT (OUTPATIENT)
Dept: HEMATOLOGY ONCOLOGY | Facility: CLINIC | Age: 65
End: 2020-05-05

## 2020-05-06 ENCOUNTER — RESULT REVIEW (OUTPATIENT)
Age: 65
End: 2020-05-06

## 2020-05-06 ENCOUNTER — APPOINTMENT (OUTPATIENT)
Dept: HEMATOLOGY ONCOLOGY | Facility: CLINIC | Age: 65
End: 2020-05-06
Payer: MEDICAID

## 2020-05-06 ENCOUNTER — APPOINTMENT (OUTPATIENT)
Dept: INFUSION THERAPY | Facility: HOSPITAL | Age: 65
End: 2020-05-06

## 2020-05-06 LAB
ANISOCYTOSIS BLD QL: SLIGHT — SIGNIFICANT CHANGE UP
BASOPHILS # BLD AUTO: 0 K/UL — SIGNIFICANT CHANGE UP (ref 0–0.2)
BASOPHILS NFR BLD AUTO: 0 % — SIGNIFICANT CHANGE UP (ref 0–2)
BLD GP AB SCN SERPL QL: NEGATIVE — SIGNIFICANT CHANGE UP
DACRYOCYTES BLD QL SMEAR: SLIGHT — SIGNIFICANT CHANGE UP
ELLIPTOCYTES BLD QL SMEAR: SLIGHT — SIGNIFICANT CHANGE UP
EOSINOPHIL # BLD AUTO: 0 K/UL — SIGNIFICANT CHANGE UP (ref 0–0.5)
EOSINOPHIL NFR BLD AUTO: 0 % — SIGNIFICANT CHANGE UP (ref 0–6)
HCT VFR BLD CALC: 26.3 % — LOW (ref 34.5–45)
HGB BLD-MCNC: 7.8 G/DL — LOW (ref 11.5–15.5)
HYPOCHROMIA BLD QL: SIGNIFICANT CHANGE UP
LYMPHOCYTES # BLD AUTO: 2.51 K/UL — SIGNIFICANT CHANGE UP (ref 1–3.3)
LYMPHOCYTES # BLD AUTO: 43 % — SIGNIFICANT CHANGE UP (ref 13–44)
MACROCYTES BLD QL: SLIGHT — SIGNIFICANT CHANGE UP
MCHC RBC-ENTMCNC: 22.4 PG — LOW (ref 27–34)
MCHC RBC-ENTMCNC: 29.7 GM/DL — LOW (ref 32–36)
MCV RBC AUTO: 75.6 FL — LOW (ref 80–100)
MICROCYTES BLD QL: SLIGHT — SIGNIFICANT CHANGE UP
MONOCYTES # BLD AUTO: 0.18 K/UL — SIGNIFICANT CHANGE UP (ref 0–0.9)
MONOCYTES NFR BLD AUTO: 3 % — SIGNIFICANT CHANGE UP (ref 2–14)
NEUTROPHILS # BLD AUTO: 3.15 K/UL — SIGNIFICANT CHANGE UP (ref 1.8–7.4)
NEUTROPHILS NFR BLD AUTO: 54 % — SIGNIFICANT CHANGE UP (ref 43–77)
NRBC # BLD: 0 /100 — SIGNIFICANT CHANGE UP (ref 0–0)
NRBC # BLD: SIGNIFICANT CHANGE UP /100 WBCS (ref 0–0)
PLAT MORPH BLD: NORMAL — SIGNIFICANT CHANGE UP
PLATELET # BLD AUTO: 463 K/UL — HIGH (ref 150–400)
POIKILOCYTOSIS BLD QL AUTO: SIGNIFICANT CHANGE UP
POLYCHROMASIA BLD QL SMEAR: SLIGHT — SIGNIFICANT CHANGE UP
RBC # BLD: 3.48 M/UL — LOW (ref 3.8–5.2)
RBC # FLD: 27.6 % — HIGH (ref 10.3–14.5)
RBC BLD AUTO: ABNORMAL
RH IG SCN BLD-IMP: POSITIVE — SIGNIFICANT CHANGE UP
SCHISTOCYTES BLD QL AUTO: SIGNIFICANT CHANGE UP
TARGETS BLD QL SMEAR: SLIGHT — SIGNIFICANT CHANGE UP
WBC # BLD: 5.84 K/UL — SIGNIFICANT CHANGE UP (ref 3.8–10.5)
WBC # FLD AUTO: 5.84 K/UL — SIGNIFICANT CHANGE UP (ref 3.8–10.5)

## 2020-05-06 PROCEDURE — 99214 OFFICE O/P EST MOD 30 MIN: CPT

## 2020-05-06 NOTE — ASSESSMENT
[FreeTextEntry1] : 65 yo w with newly diagnosed lung adeno ca\par Stage IIIC by imaging. LArge volume of disease and hence, not started on RT\par NGS revealed KRAS G12V mut, low TMB and KIM. PDL1 1%.\par Started carbo/Alimta/Pem given Q 3 weeks schedule \par Pt is taking folic acid. \par Using Oxycodone 20 mg as needed. Stopped Oxycontin\par Continue to take /Zofran/Reglan as needed for nausea\par Recommend increase Omeprazole to BID dosing. \par Pain and symptom management as per Dr. MARIE\par HBG 7.6 today. Will transfuse 2 units PRBC's on 5/9/2020. Consent obtained and orders entered. \par All questions answered\par Ideally would have liked to get a scan after 2 cycles, but due to COVID crisis and in an effort to limit exposure, will get scans after 4 cycles as long as no new symptoms. \par Educated patient about COVID_19 pandemic. Discussed higher risk for complications in infected with the virus given immunosuppressed state due to chemotherapy. Recommended universal precautions and discouraged attending parties or gatherings. Recommended frequent hand-washing and hand  use.\par Headache-tension-like distribution. Can take Tylenol. \par \par

## 2020-05-06 NOTE — HISTORY OF PRESENT ILLNESS
[Disease: _____________________] : Disease: [unfilled] [N: ___] : N[unfilled] [M: ___] : M[unfilled] [AJCC Stage: ____] : AJCC Stage: [unfilled] [de-identified] : Ms. Travis is a 64-year-old female with a history of recently diagnosed non-small cell lung adenocarcinoma of left lung, pulmonary embolus on apixaban, HTN, chronic pancreatitis s/p Jeffery procedure (2019), history of ARDS (2015), GERD, chronic pain on opioids, and s/p vocal cord polypectomy who presents for consultation visit\par Brief hx: Pt was recently hospitalized in mid-Feb 2020 to [facility] for dyspnea, left-sided back pain, and fatigue with loss of appetite. Found on CTPA (February 2020) to have a large filling defect in the L main pulmonary artery extending into the left upper lobar and left interlobar pulm arteries with complete opacification of the LLL pulm artery. There were thin linear filling defects in the bifurcation of the right upper pulmonary artery and the right interlobar pulmonary arteries extending to the right lower pulmonary artery, which appeared chronic. CT also showed mediastinal adenopathy, including left paratracheal, subcarinal, and left hilar. There is also a wedge shaped opacity in the posterior left lower lobe suspicious for malignancy. There is centrilobular emphysema. 2D-echo (Feb 2020) with normal LV systolic function, mild concentric LVH, normal LA, RV enlargement with normal RV systolic function, and estimated PASP 39 consistent with borderline pulmonary hypertension. She was started on heparin infusion and discharged on Apixaban. Hypercoagulable workup negative, including Factor V Leiden, Prothrombin gene mutation, and lupus anticoagulant. \par \par She had EBUS/TBNA on Feb 19, 2020, found to have adenocarcinoma in right and left paratracheal and subcarinal lymph nodes (R4, L4, level 7). She is pending PFTs, PET/CT, brain MRI. Patient was discharged on Oxycodone ER BID and Tylenol prn. She is not constipated and is on a bowel regimen with polyethylene glycol. Still does not have a good appetite. She has lost 15 lbs over last 3 months. No fevers, chills, or chest pain. Reports occasional cough for which she takes benzonatate perle about once daily. Declining influenza vaccination. Takes Duonebs twice daily as prescribed, but denies dyspnea. \par \par Last colonoscopy in 2018 normal. Mammogram in June 2019 negative. No personal or family history of miscarriages or spontaneous abortions. Reports history of smoking but denies any history of COPD or lung cancer. No family history of malignancy, sarcoidosis, or TB. No recent prolonged immobility.\par \par Of note, active smoker, quit end of last year, on and off 4-6 cigarettes/day. She had CXR for lung cancer screening in the 1980s but nothing recently. Denies etoh use. \par \par 4/6/20: No new complaints. Persistent pain in the chest and occasional cough. COntinues to lose weight,. No other constitutional symptoms. \par \par 4/16/20: Pain in the chest improved. She has been nauseous and c/o abd cramps since starting chemo. She has been taking Zofran with minimal benefit. She has not been using Reglan because she was told not to by nutritionist. She has lost 2 lbs. \par \par 5/6/2020: Pt being seen in treatment area. Pt reported that she tolerated the 2nd cycle better with the change in premeds and adding dexamethasone following the treatment for 2 days. She still experiences days of severe nausea and vomiting but not continuously. Pt has has normal bowel function. She denies SOB/ slight fatigue. Denies pain\par  \par  [de-identified] : adeno ca

## 2020-05-07 ENCOUNTER — OUTPATIENT (OUTPATIENT)
Dept: OUTPATIENT SERVICES | Facility: HOSPITAL | Age: 65
LOS: 1 days | End: 2020-05-07
Payer: COMMERCIAL

## 2020-05-07 DIAGNOSIS — Z98.89 OTHER SPECIFIED POSTPROCEDURAL STATES: Chronic | ICD-10-CM

## 2020-05-07 DIAGNOSIS — Z96.641 PRESENCE OF RIGHT ARTIFICIAL HIP JOINT: Chronic | ICD-10-CM

## 2020-05-07 DIAGNOSIS — Z98.890 OTHER SPECIFIED POSTPROCEDURAL STATES: Chronic | ICD-10-CM

## 2020-05-07 DIAGNOSIS — C34.90 MALIGNANT NEOPLASM OF UNSPECIFIED PART OF UNSPECIFIED BRONCHUS OR LUNG: ICD-10-CM

## 2020-05-07 DIAGNOSIS — Z96.612 PRESENCE OF LEFT ARTIFICIAL SHOULDER JOINT: Chronic | ICD-10-CM

## 2020-05-08 PROCEDURE — 86923 COMPATIBILITY TEST ELECTRIC: CPT

## 2020-05-08 PROCEDURE — 86850 RBC ANTIBODY SCREEN: CPT

## 2020-05-08 PROCEDURE — 86901 BLOOD TYPING SEROLOGIC RH(D): CPT

## 2020-05-08 PROCEDURE — 86900 BLOOD TYPING SEROLOGIC ABO: CPT

## 2020-05-09 ENCOUNTER — APPOINTMENT (OUTPATIENT)
Dept: INFUSION THERAPY | Facility: HOSPITAL | Age: 65
End: 2020-05-09

## 2020-05-11 DIAGNOSIS — Z51.89 ENCOUNTER FOR OTHER SPECIFIED AFTERCARE: ICD-10-CM

## 2020-05-18 ENCOUNTER — OUTPATIENT (OUTPATIENT)
Dept: OUTPATIENT SERVICES | Facility: HOSPITAL | Age: 65
LOS: 1 days | Discharge: HOME | End: 2020-05-18

## 2020-05-18 DIAGNOSIS — Z96.612 PRESENCE OF LEFT ARTIFICIAL SHOULDER JOINT: Chronic | ICD-10-CM

## 2020-05-18 DIAGNOSIS — Z98.89 OTHER SPECIFIED POSTPROCEDURAL STATES: Chronic | ICD-10-CM

## 2020-05-18 DIAGNOSIS — Z98.890 OTHER SPECIFIED POSTPROCEDURAL STATES: Chronic | ICD-10-CM

## 2020-05-18 DIAGNOSIS — Z96.641 PRESENCE OF RIGHT ARTIFICIAL HIP JOINT: Chronic | ICD-10-CM

## 2020-05-18 DIAGNOSIS — C34.90 MALIGNANT NEOPLASM OF UNSPECIFIED PART OF UNSPECIFIED BRONCHUS OR LUNG: ICD-10-CM

## 2020-05-20 ENCOUNTER — APPOINTMENT (OUTPATIENT)
Dept: HEMATOLOGY ONCOLOGY | Facility: CLINIC | Age: 65
End: 2020-05-20
Payer: MEDICAID

## 2020-05-20 ENCOUNTER — LABORATORY RESULT (OUTPATIENT)
Age: 65
End: 2020-05-20

## 2020-05-20 ENCOUNTER — RESULT REVIEW (OUTPATIENT)
Age: 65
End: 2020-05-20

## 2020-05-20 ENCOUNTER — APPOINTMENT (OUTPATIENT)
Dept: INFUSION THERAPY | Facility: HOSPITAL | Age: 65
End: 2020-05-20

## 2020-05-20 LAB
ANISOCYTOSIS BLD QL: SLIGHT — SIGNIFICANT CHANGE UP
BASOPHILS # BLD AUTO: 0 K/UL — SIGNIFICANT CHANGE UP (ref 0–0.2)
BASOPHILS NFR BLD AUTO: 0 % — SIGNIFICANT CHANGE UP (ref 0–2)
DACRYOCYTES BLD QL SMEAR: SLIGHT — SIGNIFICANT CHANGE UP
ELLIPTOCYTES BLD QL SMEAR: SLIGHT — SIGNIFICANT CHANGE UP
EOSINOPHIL # BLD AUTO: 0 K/UL — SIGNIFICANT CHANGE UP (ref 0–0.5)
EOSINOPHIL NFR BLD AUTO: 0 % — SIGNIFICANT CHANGE UP (ref 0–6)
HCT VFR BLD CALC: 35.5 % — SIGNIFICANT CHANGE UP (ref 34.5–45)
HGB BLD-MCNC: 11.5 G/DL — SIGNIFICANT CHANGE UP (ref 11.5–15.5)
HYPOCHROMIA BLD QL: SLIGHT — SIGNIFICANT CHANGE UP
LYMPHOCYTES # BLD AUTO: 2.81 K/UL — SIGNIFICANT CHANGE UP (ref 1–3.3)
LYMPHOCYTES # BLD AUTO: 28 % — SIGNIFICANT CHANGE UP (ref 13–44)
MACROCYTES BLD QL: SLIGHT — SIGNIFICANT CHANGE UP
MCHC RBC-ENTMCNC: 25.3 PG — LOW (ref 27–34)
MCHC RBC-ENTMCNC: 32.4 GM/DL — SIGNIFICANT CHANGE UP (ref 32–36)
MCV RBC AUTO: 78.2 FL — LOW (ref 80–100)
MICROCYTES BLD QL: SLIGHT — SIGNIFICANT CHANGE UP
MONOCYTES # BLD AUTO: 1.41 K/UL — HIGH (ref 0–0.9)
MONOCYTES NFR BLD AUTO: 14 % — SIGNIFICANT CHANGE UP (ref 2–14)
NEUTROPHILS # BLD AUTO: 5.83 K/UL — SIGNIFICANT CHANGE UP (ref 1.8–7.4)
NEUTROPHILS NFR BLD AUTO: 58 % — SIGNIFICANT CHANGE UP (ref 43–77)
NRBC # BLD: 0 /100 — SIGNIFICANT CHANGE UP (ref 0–0)
NRBC # BLD: SIGNIFICANT CHANGE UP /100 WBCS (ref 0–0)
PLAT MORPH BLD: NORMAL — SIGNIFICANT CHANGE UP
PLATELET # BLD AUTO: 841 K/UL — HIGH (ref 150–400)
POIKILOCYTOSIS BLD QL AUTO: SLIGHT — SIGNIFICANT CHANGE UP
POLYCHROMASIA BLD QL SMEAR: SLIGHT — SIGNIFICANT CHANGE UP
RBC # BLD: 4.54 M/UL — SIGNIFICANT CHANGE UP (ref 3.8–5.2)
RBC # FLD: 27.8 % — HIGH (ref 10.3–14.5)
RBC BLD AUTO: ABNORMAL
SCHISTOCYTES BLD QL AUTO: SLIGHT — SIGNIFICANT CHANGE UP
WBC # BLD: 10.05 K/UL — SIGNIFICANT CHANGE UP (ref 3.8–10.5)
WBC # FLD AUTO: 10.05 K/UL — SIGNIFICANT CHANGE UP (ref 3.8–10.5)

## 2020-05-20 PROCEDURE — 99214 OFFICE O/P EST MOD 30 MIN: CPT

## 2020-05-20 NOTE — ASSESSMENT
[FreeTextEntry1] : 63 yo w with newly diagnosed lung adeno ca\par Stage IIIC by imaging. LArge volume of disease and hence, not started on RT\par NGS revealed KRAS G12V mut, low TMB and KIM. PDL1 1%.\par Started carbo/Alimta/Pem given Q 3 weeks schedule. Currently C3 \par Pt is taking folic acid. B12 shot today\par Using Oxycodone 20 mg as needed. \par Continue to take /Zofran/Reglan as needed for nausea\par Recommend increase Omeprazole to BID dosing. \par S/P 2unit  PRBC's on 5/9/2020. HGB stable. Pt felt improvement in fatigue and resolution of SOB\par get scans after 4th cycle. Will enter scan request\par Will give extra IVF today given 2 days of vomiting.\par F/U electrolytes\par \par

## 2020-05-20 NOTE — HISTORY OF PRESENT ILLNESS
[Disease: _____________________] : Disease: [unfilled] [N: ___] : N[unfilled] [M: ___] : M[unfilled] [AJCC Stage: ____] : AJCC Stage: [unfilled] [de-identified] : Ms. Travis is a 64-year-old female with a history of recently diagnosed non-small cell lung adenocarcinoma of left lung, pulmonary embolus on apixaban, HTN, chronic pancreatitis s/p Jeffery procedure (2019), history of ARDS (2015), GERD, chronic pain on opioids, and s/p vocal cord polypectomy who presents for consultation visit\par Brief hx: Pt was recently hospitalized in mid-Feb 2020 to [facility] for dyspnea, left-sided back pain, and fatigue with loss of appetite. Found on CTPA (February 2020) to have a large filling defect in the L main pulmonary artery extending into the left upper lobar and left interlobar pulm arteries with complete opacification of the LLL pulm artery. There were thin linear filling defects in the bifurcation of the right upper pulmonary artery and the right interlobar pulmonary arteries extending to the right lower pulmonary artery, which appeared chronic. CT also showed mediastinal adenopathy, including left paratracheal, subcarinal, and left hilar. There is also a wedge shaped opacity in the posterior left lower lobe suspicious for malignancy. There is centrilobular emphysema. 2D-echo (Feb 2020) with normal LV systolic function, mild concentric LVH, normal LA, RV enlargement with normal RV systolic function, and estimated PASP 39 consistent with borderline pulmonary hypertension. She was started on heparin infusion and discharged on Apixaban. Hypercoagulable workup negative, including Factor V Leiden, Prothrombin gene mutation, and lupus anticoagulant. \par \par She had EBUS/TBNA on Feb 19, 2020, found to have adenocarcinoma in right and left paratracheal and subcarinal lymph nodes (R4, L4, level 7). She is pending PFTs, PET/CT, brain MRI. Patient was discharged on Oxycodone ER BID and Tylenol prn. She is not constipated and is on a bowel regimen with polyethylene glycol. Still does not have a good appetite. She has lost 15 lbs over last 3 months. No fevers, chills, or chest pain. Reports occasional cough for which she takes benzonatate perle about once daily. Declining influenza vaccination. Takes Duonebs twice daily as prescribed, but denies dyspnea. \par \par Last colonoscopy in 2018 normal. Mammogram in June 2019 negative. No personal or family history of miscarriages or spontaneous abortions. Reports history of smoking but denies any history of COPD or lung cancer. No family history of malignancy, sarcoidosis, or TB. No recent prolonged immobility.\par \par Of note, active smoker, quit end of last year, on and off 4-6 cigarettes/day. She had CXR for lung cancer screening in the 1980s but nothing recently. Denies etoh use. \par \par 4/6/20: No new complaints. Persistent pain in the chest and occasional cough. COntinues to lose weight,. No other constitutional symptoms. \par \par 4/16/20: Pain in the chest improved. She has been nauseous and c/o abd cramps since starting chemo. She has been taking Zofran with minimal benefit. She has not been using Reglan because she was told not to by nutritionist. She has lost 2 lbs. \par \par 5/6/2020: Pt being seen in treatment area. Pt reported that she tolerated the 2nd cycle better with the change in premeds and adding dexamethasone following the treatment for 2 days. She still experiences days of severe nausea and vomiting but not continuously. Pt has has normal bowel function. She denies SOB/ slight fatigue. Denies pain\par \par 5/20/2020: Pt being seen in treatment area. Pt is here today for cycle 3. She states that for the last 2 days she has been unable to keep anything down. She vomited the entire 2 days. This is an ongoing and chronic problem and is unrelated to chemo. She has been evaluated by GI without discovering etiology for it. Initial brain imaging (-) for metastaic disease. No headaches or dizziness. Pt states it always lasts 2 days and spontaneously resolves. Today she states she was able to tolerate broth and mandarin oranges. No fever. No c/o of cough/CP/hemoptysis or SOB. \par  \par  [de-identified] : adeno ca

## 2020-05-21 DIAGNOSIS — E86.0 DEHYDRATION: ICD-10-CM

## 2020-05-21 DIAGNOSIS — Z51.11 ENCOUNTER FOR ANTINEOPLASTIC CHEMOTHERAPY: ICD-10-CM

## 2020-05-21 DIAGNOSIS — R11.2 NAUSEA WITH VOMITING, UNSPECIFIED: ICD-10-CM

## 2020-05-27 ENCOUNTER — RESULT REVIEW (OUTPATIENT)
Age: 65
End: 2020-05-27

## 2020-05-27 ENCOUNTER — APPOINTMENT (OUTPATIENT)
Dept: INFUSION THERAPY | Facility: HOSPITAL | Age: 65
End: 2020-05-27

## 2020-05-27 ENCOUNTER — LABORATORY RESULT (OUTPATIENT)
Age: 65
End: 2020-05-27

## 2020-05-27 LAB
ANISOCYTOSIS BLD QL: SLIGHT — SIGNIFICANT CHANGE UP
BASOPHILS # BLD AUTO: 0 K/UL — SIGNIFICANT CHANGE UP (ref 0–0.2)
BASOPHILS NFR BLD AUTO: 0 % — SIGNIFICANT CHANGE UP (ref 0–2)
DACRYOCYTES BLD QL SMEAR: SLIGHT — SIGNIFICANT CHANGE UP
ELLIPTOCYTES BLD QL SMEAR: SLIGHT — SIGNIFICANT CHANGE UP
EOSINOPHIL # BLD AUTO: 0 K/UL — SIGNIFICANT CHANGE UP (ref 0–0.5)
EOSINOPHIL NFR BLD AUTO: 0 % — SIGNIFICANT CHANGE UP (ref 0–6)
HCT VFR BLD CALC: 33 % — LOW (ref 34.5–45)
HGB BLD-MCNC: 10.4 G/DL — LOW (ref 11.5–15.5)
HYPOCHROMIA BLD QL: SLIGHT — SIGNIFICANT CHANGE UP
LYMPHOCYTES # BLD AUTO: 1.55 K/UL — SIGNIFICANT CHANGE UP (ref 1–3.3)
LYMPHOCYTES # BLD AUTO: 17 % — SIGNIFICANT CHANGE UP (ref 13–44)
MACROCYTES BLD QL: SLIGHT — SIGNIFICANT CHANGE UP
MCHC RBC-ENTMCNC: 25.6 PG — LOW (ref 27–34)
MCHC RBC-ENTMCNC: 31.5 GM/DL — LOW (ref 32–36)
MCV RBC AUTO: 81.3 FL — SIGNIFICANT CHANGE UP (ref 80–100)
MICROCYTES BLD QL: SLIGHT — SIGNIFICANT CHANGE UP
MONOCYTES # BLD AUTO: 1.09 K/UL — HIGH (ref 0–0.9)
MONOCYTES NFR BLD AUTO: 12 % — SIGNIFICANT CHANGE UP (ref 2–14)
NEUTROPHILS # BLD AUTO: 6.48 K/UL — SIGNIFICANT CHANGE UP (ref 1.8–7.4)
NEUTROPHILS NFR BLD AUTO: 71 % — SIGNIFICANT CHANGE UP (ref 43–77)
NRBC # BLD: 0 /100 — SIGNIFICANT CHANGE UP (ref 0–0)
NRBC # BLD: SIGNIFICANT CHANGE UP /100 WBCS (ref 0–0)
OVALOCYTES BLD QL SMEAR: SLIGHT — SIGNIFICANT CHANGE UP
PLAT MORPH BLD: NORMAL — SIGNIFICANT CHANGE UP
PLATELET # BLD AUTO: 448 K/UL — HIGH (ref 150–400)
POIKILOCYTOSIS BLD QL AUTO: SLIGHT — SIGNIFICANT CHANGE UP
POLYCHROMASIA BLD QL SMEAR: SLIGHT — SIGNIFICANT CHANGE UP
RBC # BLD: 4.06 M/UL — SIGNIFICANT CHANGE UP (ref 3.8–5.2)
RBC # FLD: 26.2 % — HIGH (ref 10.3–14.5)
RBC BLD AUTO: ABNORMAL
SCHISTOCYTES BLD QL AUTO: SLIGHT — SIGNIFICANT CHANGE UP
WBC # BLD: 9.12 K/UL — SIGNIFICANT CHANGE UP (ref 3.8–10.5)
WBC # FLD AUTO: 9.12 K/UL — SIGNIFICANT CHANGE UP (ref 3.8–10.5)

## 2020-06-03 ENCOUNTER — OUTPATIENT (OUTPATIENT)
Dept: OUTPATIENT SERVICES | Facility: HOSPITAL | Age: 65
LOS: 1 days | End: 2020-06-03
Payer: COMMERCIAL

## 2020-06-03 ENCOUNTER — APPOINTMENT (OUTPATIENT)
Dept: CT IMAGING | Facility: IMAGING CENTER | Age: 65
End: 2020-06-03
Payer: MEDICAID

## 2020-06-03 ENCOUNTER — RESULT REVIEW (OUTPATIENT)
Age: 65
End: 2020-06-03

## 2020-06-03 DIAGNOSIS — Z98.890 OTHER SPECIFIED POSTPROCEDURAL STATES: Chronic | ICD-10-CM

## 2020-06-03 DIAGNOSIS — Z96.612 PRESENCE OF LEFT ARTIFICIAL SHOULDER JOINT: Chronic | ICD-10-CM

## 2020-06-03 DIAGNOSIS — C34.92 MALIGNANT NEOPLASM OF UNSPECIFIED PART OF LEFT BRONCHUS OR LUNG: ICD-10-CM

## 2020-06-03 DIAGNOSIS — Z98.89 OTHER SPECIFIED POSTPROCEDURAL STATES: Chronic | ICD-10-CM

## 2020-06-03 DIAGNOSIS — Z96.641 PRESENCE OF RIGHT ARTIFICIAL HIP JOINT: Chronic | ICD-10-CM

## 2020-06-03 PROCEDURE — 71260 CT THORAX DX C+: CPT | Mod: 26

## 2020-06-03 PROCEDURE — 74160 CT ABDOMEN W/CONTRAST: CPT

## 2020-06-03 PROCEDURE — 74160 CT ABDOMEN W/CONTRAST: CPT | Mod: 26

## 2020-06-03 PROCEDURE — 71260 CT THORAX DX C+: CPT

## 2020-06-10 ENCOUNTER — LABORATORY RESULT (OUTPATIENT)
Age: 65
End: 2020-06-10

## 2020-06-10 ENCOUNTER — RESULT REVIEW (OUTPATIENT)
Age: 65
End: 2020-06-10

## 2020-06-10 ENCOUNTER — APPOINTMENT (OUTPATIENT)
Dept: INFUSION THERAPY | Facility: HOSPITAL | Age: 65
End: 2020-06-10

## 2020-06-10 ENCOUNTER — APPOINTMENT (OUTPATIENT)
Dept: HEMATOLOGY ONCOLOGY | Facility: CLINIC | Age: 65
End: 2020-06-10
Payer: MEDICAID

## 2020-06-10 VITALS
SYSTOLIC BLOOD PRESSURE: 112 MMHG | RESPIRATION RATE: 17 BRPM | OXYGEN SATURATION: 96 % | TEMPERATURE: 98.8 F | WEIGHT: 114.2 LBS | DIASTOLIC BLOOD PRESSURE: 81 MMHG | HEART RATE: 68 BPM | BODY MASS INDEX: 19.98 KG/M2

## 2020-06-10 LAB
ANISOCYTOSIS BLD QL: SLIGHT — SIGNIFICANT CHANGE UP
BASOPHILS # BLD AUTO: 0 K/UL — SIGNIFICANT CHANGE UP (ref 0–0.2)
BASOPHILS NFR BLD AUTO: 0 % — SIGNIFICANT CHANGE UP (ref 0–2)
DACRYOCYTES BLD QL SMEAR: SLIGHT — SIGNIFICANT CHANGE UP
ELLIPTOCYTES BLD QL SMEAR: SLIGHT — SIGNIFICANT CHANGE UP
EOSINOPHIL # BLD AUTO: 0.24 K/UL — SIGNIFICANT CHANGE UP (ref 0–0.5)
EOSINOPHIL NFR BLD AUTO: 3 % — SIGNIFICANT CHANGE UP (ref 0–6)
HCT VFR BLD CALC: 29.5 % — LOW (ref 34.5–45)
HGB BLD-MCNC: 9.6 G/DL — LOW (ref 11.5–15.5)
HYPOCHROMIA BLD QL: SLIGHT — SIGNIFICANT CHANGE UP
LYMPHOCYTES # BLD AUTO: 2.66 K/UL — SIGNIFICANT CHANGE UP (ref 1–3.3)
LYMPHOCYTES # BLD AUTO: 33 % — SIGNIFICANT CHANGE UP (ref 13–44)
MCHC RBC-ENTMCNC: 27.2 PG — SIGNIFICANT CHANGE UP (ref 27–34)
MCHC RBC-ENTMCNC: 32.5 GM/DL — SIGNIFICANT CHANGE UP (ref 32–36)
MCV RBC AUTO: 83.6 FL — SIGNIFICANT CHANGE UP (ref 80–100)
MICROCYTES BLD QL: SLIGHT — SIGNIFICANT CHANGE UP
MONOCYTES # BLD AUTO: 1.37 K/UL — HIGH (ref 0–0.9)
MONOCYTES NFR BLD AUTO: 17 % — HIGH (ref 2–14)
NEUTROPHILS # BLD AUTO: 3.79 K/UL — SIGNIFICANT CHANGE UP (ref 1.8–7.4)
NEUTROPHILS NFR BLD AUTO: 47 % — SIGNIFICANT CHANGE UP (ref 43–77)
NRBC # BLD: 0 /100 — SIGNIFICANT CHANGE UP (ref 0–0)
NRBC # BLD: SIGNIFICANT CHANGE UP /100 WBCS (ref 0–0)
PLAT MORPH BLD: NORMAL — SIGNIFICANT CHANGE UP
PLATELET # BLD AUTO: 587 K/UL — HIGH (ref 150–400)
POIKILOCYTOSIS BLD QL AUTO: SLIGHT — SIGNIFICANT CHANGE UP
POLYCHROMASIA BLD QL SMEAR: SLIGHT — SIGNIFICANT CHANGE UP
RBC # BLD: 3.53 M/UL — LOW (ref 3.8–5.2)
RBC # FLD: 28 % — HIGH (ref 10.3–14.5)
RBC BLD AUTO: ABNORMAL
SCHISTOCYTES BLD QL AUTO: SLIGHT — SIGNIFICANT CHANGE UP
WBC # BLD: 8.07 K/UL — SIGNIFICANT CHANGE UP (ref 3.8–10.5)
WBC # FLD AUTO: 8.07 K/UL — SIGNIFICANT CHANGE UP (ref 3.8–10.5)

## 2020-06-10 PROCEDURE — 99214 OFFICE O/P EST MOD 30 MIN: CPT

## 2020-06-10 NOTE — HISTORY OF PRESENT ILLNESS
[Disease: _____________________] : Disease: [unfilled] [N: ___] : N[unfilled] [M: ___] : M[unfilled] [AJCC Stage: ____] : AJCC Stage: [unfilled] [de-identified] : adeno ca [de-identified] : Ms. Travis is a 64-year-old female with a history of recently diagnosed non-small cell lung adenocarcinoma of left lung, pulmonary embolus on apixaban, HTN, chronic pancreatitis s/p Jeffery procedure (2019), history of ARDS (2015), GERD, chronic pain on opioids, and s/p vocal cord polypectomy who presents for consultation visit\par Brief hx: Pt was recently hospitalized in mid-Feb 2020 to [facility] for dyspnea, left-sided back pain, and fatigue with loss of appetite. Found on CTPA (February 2020) to have a large filling defect in the L main pulmonary artery extending into the left upper lobar and left interlobar pulm arteries with complete opacification of the LLL pulm artery. There were thin linear filling defects in the bifurcation of the right upper pulmonary artery and the right interlobar pulmonary arteries extending to the right lower pulmonary artery, which appeared chronic. CT also showed mediastinal adenopathy, including left paratracheal, subcarinal, and left hilar. There is also a wedge shaped opacity in the posterior left lower lobe suspicious for malignancy. There is centrilobular emphysema. 2D-echo (Feb 2020) with normal LV systolic function, mild concentric LVH, normal LA, RV enlargement with normal RV systolic function, and estimated PASP 39 consistent with borderline pulmonary hypertension. She was started on heparin infusion and discharged on Apixaban. Hypercoagulable workup negative, including Factor V Leiden, Prothrombin gene mutation, and lupus anticoagulant. \par \par She had EBUS/TBNA on Feb 19, 2020, found to have adenocarcinoma in right and left paratracheal and subcarinal lymph nodes (R4, L4, level 7). She is pending PFTs, PET/CT, brain MRI. Patient was discharged on Oxycodone ER BID and Tylenol prn. She is not constipated and is on a bowel regimen with polyethylene glycol. Still does not have a good appetite. She has lost 15 lbs over last 3 months. No fevers, chills, or chest pain. Reports occasional cough for which she takes benzonatate perle about once daily. Declining influenza vaccination. Takes Duonebs twice daily as prescribed, but denies dyspnea. \par \par Last colonoscopy in 2018 normal. Mammogram in June 2019 negative. No personal or family history of miscarriages or spontaneous abortions. Reports history of smoking but denies any history of COPD or lung cancer. No family history of malignancy, sarcoidosis, or TB. No recent prolonged immobility.\par \par Of note, active smoker, quit end of last year, on and off 4-6 cigarettes/day. She had CXR for lung cancer screening in the 1980s but nothing recently. Denies etoh use. \par \par 4/6/20: No new complaints. Persistent pain in the chest and occasional cough. COntinues to lose weight,. No other constitutional symptoms. \par \par 4/16/20: Pain in the chest improved. She has been nauseous and c/o abd cramps since starting chemo. She has been taking Zofran with minimal benefit. She has not been using Reglan because she was told not to by nutritionist. She has lost 2 lbs. \par \par 5/6/2020: Pt being seen in treatment area. Pt reported that she tolerated the 2nd cycle better with the change in premeds and adding dexamethasone following the treatment for 2 days. She still experiences days of severe nausea and vomiting but not continuously. Pt has has normal bowel function. She denies SOB/ slight fatigue. Denies pain\par \par 5/20/2020: Pt being seen in treatment area. Pt is here today for cycle 3. She states that for the last 2 days she has been unable to keep anything down. She vomited the entire 2 days. This is an ongoing and chronic problem and is unrelated to chemo. She has been evaluated by GI without discovering etiology for it. Initial brain imaging (-) for metastaic disease. No headaches or dizziness. Pt states it always lasts 2 days and spontaneously resolves. Today she states she was able to tolerate broth and mandarin oranges. No fever. No c/o of cough/CP/hemoptysis or SOB. \par \par 6/10/2020: Pt returns for follow up and discussion of findings on recent imaging. She reports she had a bad night last night with her usual pain. She is tired but feeling better after having taken pain medication. She is scheduled for C4 today. No other complaints. \par  \par

## 2020-06-10 NOTE — REVIEW OF SYSTEMS
[Fatigue] : fatigue [Recent Change In Weight] : ~T recent weight change [Negative] : Allergic/Immunologic [SOB on Exertion] : no shortness of breath during exertion [Vomiting] : no vomiting [FreeTextEntry9] : as above

## 2020-06-10 NOTE — ASSESSMENT
[Palliative] : Goals of care discussed with patient: Palliative [FreeTextEntry1] : 63 yo w with newly diagnosed lung adeno ca\par Stage IIIC by imaging. LArge volume of disease and hence, not started on RT\par NGS revealed KRAS G12V mut, low TMB and KIM. PDL1 1%.\par Started carbo/Alimta/Pem given Q 3 weeks schedule. Currently s/p C#3. Scheduled for C#4 today.  \par Continue Folic Acid\par Using Oxycodone 20 mg as needed. \par Imaging personally reviewed with patient. Nice response to treatment. Will drop Carbo after C#4 and continue Pem/Pem maintenance.\par Periodic imaging q3-4m\par OV 6 weeks\par \par

## 2020-06-17 ENCOUNTER — APPOINTMENT (OUTPATIENT)
Dept: HEMATOLOGY ONCOLOGY | Facility: CLINIC | Age: 65
End: 2020-06-17
Payer: MEDICAID

## 2020-06-17 ENCOUNTER — APPOINTMENT (OUTPATIENT)
Dept: INFUSION THERAPY | Facility: HOSPITAL | Age: 65
End: 2020-06-17

## 2020-06-17 PROCEDURE — 99214 OFFICE O/P EST MOD 30 MIN: CPT

## 2020-06-17 RX ORDER — METOCLOPRAMIDE 10 MG/1
10 TABLET ORAL 3 TIMES DAILY
Qty: 90 | Refills: 1 | Status: DISCONTINUED | COMMUNITY
Start: 2019-06-25 | End: 2020-06-17

## 2020-06-17 NOTE — PHYSICAL EXAM
[General Appearance - In No Acute Distress] : in no acute distress [Sclera] : the sclera and conjunctiva were normal [General Appearance - Alert] : alert [Normal Oral Mucosa] : normal oral mucosa [Neck Appearance] : the appearance of the neck was normal [Skin Color & Pigmentation] : normal skin color and pigmentation [Oriented To Time, Place, And Person] : oriented to person, place, and time [No Focal Deficits] : no focal deficits [Mood] : the mood was normal [Affect] : the affect was normal [] : no respiratory distress [Auscultation Breath Sounds / Voice Sounds] : lungs were clear to auscultation bilaterally [Heart Sounds] : normal S1 and S2 [Heart Rate And Rhythm] : heart rate was normal and rhythm regular [Bowel Sounds] : normal bowel sounds [Edema] : there was no peripheral edema [Abdomen Soft] : soft

## 2020-06-18 DIAGNOSIS — R52 PAIN, UNSPECIFIED: ICD-10-CM

## 2020-06-18 NOTE — ASSESSMENT
[______] : HCP: [unfilled] [FreeTextEntry1] : 64yoF with:\par \par 1. NSCLC - on Carbo/Pem/Pem.  Follow up with Med Onc. \par \par 2. Nausea / vomiting - C/w PRN Ondansetron as it does provide some relief.  No longer using PRN relgan as it provides no relief.   Will start PRN compazine.   Instructed to alternate zofran and compazine.\par \par 3.  Acute on chronic pain likely exacerbated by nausea / vomiting- Patient with acute flare of pain that she has experienced before.  Given hydromorphone 1mg IV in treatment room. She will be seeing her pain management MD this week.  Has been on MS IR and hydromorphone in past but these stopped working after some time.  Would recommend initiation of long acting opioid.  Will defer to pain management MD.  Patient and I did discuss potential for transfer of pain management to [place] [facility]  She will discuss with MD.\par \par 4. Loss of appetite/weight loss - Discussed increasing medical cannabis in increments of 0.1mL to promote appetite.  \par \par 5. Chronic pancreatitis - Creon pre-meal.\par \par 6. Encounter for palliative care/supportive care - HCP completed in office today.   Original given to patient and additional copy to be scanned into EMR.   All questions answered.  Empathetic listening and emotional support provided. \par \par RTO 2 weeks, call sooner with questions or issues.

## 2020-06-18 NOTE — HISTORY OF PRESENT ILLNESS
[FreeTextEntry1] : 64yoF with recently diagnosed NSCLC presents for follow-up palliative care visit, referred by Oncology.\par PMH significant for pulmonary embolus on apixaban, HTN, chronic pancreatitis s/p Jeffery procedure (2019) on Creon,  ARDS (2015), GERD, chronic pain on opioids, and s/p vocal cord polypectomy. \par \par Patient presented with dyspnea, left-sided back pain, and fatigue with loss of appetite in February 2020. CTPA (February 2020) noted to have a large filling defect in the L main pulmonary artery extending into the left upper lobar and left interlobar pulm arteries with complete opacification of the LLL pulm artery.  CT also showed mediastinal adenopathy, including left paratracheal, subcarinal, and left hilar. There is also a wedge shaped opacity in the posterior left lower lobe suspicious for malignancy. There is centrilobular emphysema. 2D-echo (Feb 2020) with normal LV systolic function, mild concentric LVH, normal LA, RV enlargement with normal RV systolic function, and estimated PASP 39 consistent with borderline pulmonary hypertension. She was started on heparin infusion and discharged on Apixaban. Hypercoagulable workup negative, including Factor V Leiden, Prothrombin gene mutation, and lupus anticoagulant. \par \par She had EBUS/TBNA on Feb 19, 2020, found to have adenocarcinoma in right and left paratracheal and subcarinal lymph nodes.  \par \par Main issue for initial palliative care visit was for assistance with appetite gain and weight gain.  She would like to maintain her weight above 125lbs and finds it hard to keep her weight up.  Her weight had been much lower last year (108lbs) before she underwent pancreatic surgery for her calcific pancreatitis.  She has a low appetite (this is a longtime issue) and eats small portions.  She had been on megace for months, this was helping her to gain weight. Has been discontinued since she had the VTE. \par \par Interval History:  Patient presents for follow-up, seen in treatment room.  Currently on carbo/pem/pem.  Patient reports flare of epigastric pain with reflux.   Pain gets as high as 15-20/10.   Despite increasing frequency, PRN oxycodone IR 20mg usage brings pain level down to 7, at best. Previously, PRN would provide adequate relief.  Previously, she would use PRN 3 times a day at most, some times not at all.  Continues to use heating pad, as it does provide some relief.  Endorses persistent nausea and vomiting unrelated to treatment.  Has been evaluated by GI without clear etiology.  No longer using medical cannabis as it had no effect and may have caused nausea.\par \par ROS:\par + 26 lb weight loss in 2 months \par + decreased appetite\par + nausea / vomiting - using PRN Ondansetron,  metoclopramide does not work\par .+L sided back pain along the entire length of the back - this started in Feb which spurred her hospital admission. She had been on PRN Oxycodone 20mg for pancreatitis pain prior to the back pain.  For mild pain, she uses heating pad\par +trouble sleeping 2/2 pain - melatonin no longer works\par +mood is good, has a good sense of humor\par Denies anxiety, diarrhea, constipation (uses a 1/2 dose of miralax daily to maintain regularity) \par All other ROS as outlined or noncontributory. \par \par Patient is , lives with her . Has no children She has a good social support system. Has a strong Moravian bj. Recently quit smoking tobacco completely after meeting with pulmonologist Dr. Abad. \par \par I-Stop Ref# [id number]\par \par PMD: Dr. Ananda Chavira ([place])\par Pain management: Dr. Mccauley

## 2020-06-26 ENCOUNTER — OUTPATIENT (OUTPATIENT)
Dept: OUTPATIENT SERVICES | Facility: HOSPITAL | Age: 65
LOS: 1 days | Discharge: HOME | End: 2020-06-26

## 2020-06-26 DIAGNOSIS — Z98.89 OTHER SPECIFIED POSTPROCEDURAL STATES: Chronic | ICD-10-CM

## 2020-06-26 DIAGNOSIS — C34.90 MALIGNANT NEOPLASM OF UNSPECIFIED PART OF UNSPECIFIED BRONCHUS OR LUNG: ICD-10-CM

## 2020-06-26 DIAGNOSIS — Z98.890 OTHER SPECIFIED POSTPROCEDURAL STATES: Chronic | ICD-10-CM

## 2020-06-26 DIAGNOSIS — Z96.641 PRESENCE OF RIGHT ARTIFICIAL HIP JOINT: Chronic | ICD-10-CM

## 2020-06-26 DIAGNOSIS — Z96.612 PRESENCE OF LEFT ARTIFICIAL SHOULDER JOINT: Chronic | ICD-10-CM

## 2020-07-01 ENCOUNTER — APPOINTMENT (OUTPATIENT)
Dept: HEMATOLOGY ONCOLOGY | Facility: CLINIC | Age: 65
End: 2020-07-01
Payer: MEDICAID

## 2020-07-01 ENCOUNTER — LABORATORY RESULT (OUTPATIENT)
Age: 65
End: 2020-07-01

## 2020-07-01 ENCOUNTER — RESULT REVIEW (OUTPATIENT)
Age: 65
End: 2020-07-01

## 2020-07-01 ENCOUNTER — APPOINTMENT (OUTPATIENT)
Dept: INFUSION THERAPY | Facility: HOSPITAL | Age: 65
End: 2020-07-01

## 2020-07-01 LAB
ANISOCYTOSIS BLD QL: SLIGHT — SIGNIFICANT CHANGE UP
BASOPHILS # BLD AUTO: 0 K/UL — SIGNIFICANT CHANGE UP (ref 0–0.2)
BASOPHILS NFR BLD AUTO: 0 % — SIGNIFICANT CHANGE UP (ref 0–2)
DACRYOCYTES BLD QL SMEAR: SLIGHT — SIGNIFICANT CHANGE UP
ELLIPTOCYTES BLD QL SMEAR: SLIGHT — SIGNIFICANT CHANGE UP
EOSINOPHIL # BLD AUTO: 0 K/UL — SIGNIFICANT CHANGE UP (ref 0–0.5)
EOSINOPHIL NFR BLD AUTO: 0 % — SIGNIFICANT CHANGE UP (ref 0–6)
HCT VFR BLD CALC: 25.3 % — LOW (ref 34.5–45)
HGB BLD-MCNC: 8.1 G/DL — LOW (ref 11.5–15.5)
HYPOCHROMIA BLD QL: SLIGHT — SIGNIFICANT CHANGE UP
LYMPHOCYTES # BLD AUTO: 2.5 K/UL — SIGNIFICANT CHANGE UP (ref 1–3.3)
LYMPHOCYTES # BLD AUTO: 33 % — SIGNIFICANT CHANGE UP (ref 13–44)
MCHC RBC-ENTMCNC: 28.8 PG — SIGNIFICANT CHANGE UP (ref 27–34)
MCHC RBC-ENTMCNC: 32 GM/DL — SIGNIFICANT CHANGE UP (ref 32–36)
MCV RBC AUTO: 90 FL — SIGNIFICANT CHANGE UP (ref 80–100)
MONOCYTES # BLD AUTO: 0.38 K/UL — SIGNIFICANT CHANGE UP (ref 0–0.9)
MONOCYTES NFR BLD AUTO: 5 % — SIGNIFICANT CHANGE UP (ref 2–14)
NEUTROPHILS # BLD AUTO: 4.7 K/UL — SIGNIFICANT CHANGE UP (ref 1.8–7.4)
NEUTROPHILS NFR BLD AUTO: 62 % — SIGNIFICANT CHANGE UP (ref 43–77)
NRBC # BLD: 1 /100 — HIGH (ref 0–0)
NRBC # BLD: SIGNIFICANT CHANGE UP /100 WBCS (ref 0–0)
PLAT MORPH BLD: NORMAL — SIGNIFICANT CHANGE UP
PLATELET # BLD AUTO: 169 K/UL — SIGNIFICANT CHANGE UP (ref 150–400)
POIKILOCYTOSIS BLD QL AUTO: SLIGHT — SIGNIFICANT CHANGE UP
POLYCHROMASIA BLD QL SMEAR: SLIGHT — SIGNIFICANT CHANGE UP
RBC # BLD: 2.81 M/UL — LOW (ref 3.8–5.2)
RBC # FLD: 23 % — HIGH (ref 10.3–14.5)
RBC BLD AUTO: ABNORMAL
WBC # BLD: 7.58 K/UL — SIGNIFICANT CHANGE UP (ref 3.8–10.5)
WBC # FLD AUTO: 7.58 K/UL — SIGNIFICANT CHANGE UP (ref 3.8–10.5)

## 2020-07-01 PROCEDURE — 99214 OFFICE O/P EST MOD 30 MIN: CPT

## 2020-07-02 DIAGNOSIS — R11.2 NAUSEA WITH VOMITING, UNSPECIFIED: ICD-10-CM

## 2020-07-02 DIAGNOSIS — Z51.11 ENCOUNTER FOR ANTINEOPLASTIC CHEMOTHERAPY: ICD-10-CM

## 2020-07-02 NOTE — PHYSICAL EXAM
[General Appearance - Alert] : alert [General Appearance - In No Acute Distress] : in no acute distress [Sclera] : the sclera and conjunctiva were normal [Normal Oral Mucosa] : normal oral mucosa [Neck Appearance] : the appearance of the neck was normal [] : no respiratory distress [Auscultation Breath Sounds / Voice Sounds] : lungs were clear to auscultation bilaterally [Heart Rate And Rhythm] : heart rate was normal and rhythm regular [Heart Sounds] : normal S1 and S2 [Edema] : there was no peripheral edema [Bowel Sounds] : normal bowel sounds [Abdomen Soft] : soft [Skin Color & Pigmentation] : normal skin color and pigmentation [Oriented To Time, Place, And Person] : oriented to person, place, and time [No Focal Deficits] : no focal deficits [Affect] : the affect was normal [Mood] : the mood was normal [FreeTextEntry1] : Poor dentition

## 2020-07-02 NOTE — ASSESSMENT
[______] : HCP: [unfilled] [FreeTextEntry1] : 64yoF with:\par \par 1. NSCLC - on Carbo/Pem/Pem.  Follow up with Med Onc. \par \par 2. Nausea / vomiting \par - C/w PRN compazine\par \par 3.  Back pain, likely 2/2 neoplasm\par - Patient will continue to follow with her previous pain management MD.  She is aware that she may transfer care here to us at [facility] at any time. \par - C/w PRN oxycodone 30mg as per pain management MD.\par \par 4. Loss of appetite/weight loss \par - Discussed increasing medical cannabis in increments of 0.1mL to promote appetite.  \par \par 5. Chronic pancreatitis - Creon pre-meal.\par \par 6. Encounter for palliative care/supportive care\par - HCP on file.\par -  All questions answered.  Empathetic listening and emotional support provided. \par \par RTO 2 weeks, call sooner with questions or issues.  Attending Attestation (For Attendings USE Only)...

## 2020-07-02 NOTE — HISTORY OF PRESENT ILLNESS
[FreeTextEntry1] : 64yoF with recently diagnosed NSCLC presents for follow-up palliative care visit, referred by Oncology.\par PMH significant for pulmonary embolus on apixaban, HTN, chronic pancreatitis s/p Jeffery procedure (2019) on Creon,  ARDS (2015), GERD, chronic pain on opioids, and s/p vocal cord polypectomy. \par \par Patient presented with dyspnea, left-sided back pain, and fatigue with loss of appetite in February 2020. CTPA (February 2020) noted to have a large filling defect in the L main pulmonary artery extending into the left upper lobar and left interlobar pulm arteries with complete opacification of the LLL pulm artery.  CT also showed mediastinal adenopathy, including left paratracheal, subcarinal, and left hilar. There is also a wedge shaped opacity in the posterior left lower lobe suspicious for malignancy. There is centrilobular emphysema. 2D-echo (Feb 2020) with normal LV systolic function, mild concentric LVH, normal LA, RV enlargement with normal RV systolic function, and estimated PASP 39 consistent with borderline pulmonary hypertension. She was started on heparin infusion and discharged on Apixaban. Hypercoagulable workup negative, including Factor V Leiden, Prothrombin gene mutation, and lupus anticoagulant. \par \par She had EBUS/TBNA on Feb 19, 2020, found to have adenocarcinoma in right and left paratracheal and subcarinal lymph nodes.  \par \par Main issue for initial palliative care visit was for assistance with appetite gain and weight gain.  She would like to maintain her weight above 125lbs and finds it hard to keep her weight up.  Her weight had been much lower last year (108lbs) before she underwent pancreatic surgery for her calcific pancreatitis.  She has a low appetite (this is a longtime issue) and eats small portions.  She had been on megace for months, this was helping her to gain weight. Has been discontinued since she had the VTE. \par \par Interval History:  Patient presents for follow-up, seen in treatment room.  Currently on carbo/pem/pem.  \par Patient is feeling much better since last visit.   Nausea subsided with PRN compazine.   PRN oxycodone IR was increased to 30mg by pain management MD.  Reports that pain is now well controlled.   \par Pain gets as high as 7/10.   PRN oxycodone IR  brings pain level down to 0. Is using PRN once per day.\par \par ROS:\par + 26 lb weight loss in 2 months \par + decreased appetite\par + nausea / vomiting - using PRN compazine\par .+L sided back pain along the entire length of the back - this started in Feb which spurred her hospital admission. Using PRN Oxycodone 30mg for pancreatitis pain prior to the back pain.  For mild pain, she uses heating pad\par +trouble sleeping (improved)\par +mood is good, has a good sense of humor\par Denies anxiety, diarrhea, constipation (uses a 1/2 dose of miralax daily to maintain regularity) \par All other ROS as outlined or noncontributory. \par \par Patient is , lives with her . Has no children She has a good social support system. Has a strong Baptism bj. Recently quit smoking tobacco completely after meeting with pulmonologist Dr. Abad. \par \par I-Stop Ref# [id number]\par \par PMD: Dr. Ananda Chavira ([place])\par Pain management: Dr. Mccauley

## 2020-07-07 ENCOUNTER — APPOINTMENT (OUTPATIENT)
Dept: SURGERY | Facility: CLINIC | Age: 65
End: 2020-07-07
Payer: MEDICAID

## 2020-07-07 VITALS
HEIGHT: 63 IN | WEIGHT: 114 LBS | BODY MASS INDEX: 20.2 KG/M2 | DIASTOLIC BLOOD PRESSURE: 78 MMHG | HEART RATE: 98 BPM | OXYGEN SATURATION: 97 % | SYSTOLIC BLOOD PRESSURE: 133 MMHG | RESPIRATION RATE: 15 BRPM

## 2020-07-07 VITALS — TEMPERATURE: 97.7 F

## 2020-07-07 PROCEDURE — 99213 OFFICE O/P EST LOW 20 MIN: CPT

## 2020-07-13 NOTE — ASSESSMENT
[FreeTextEntry1] : s/p Jeffery 5/2019 for chronic pancreatitis. Takes Creon, denies steatorrhea. Newly diagnosed with non-small cell lung cancer a few months ago, on chemo.  Experienced weight loss over the last few months, unable to take megace due to interaction with her chemo.  \par \par -Remeron 15 mg QHS for appetite/weight loss\par -Continue Creon \par -RTC in 6 months

## 2020-07-13 NOTE — HISTORY OF PRESENT ILLNESS
[de-identified] : 63 y/o female who is well known to me with history of chronic calcific pancreatitis, and is s/p Jeffery procedure on 5/8/19. I saw her in office about 6 months ago for follow-up and she reported doing great and had gained weight back. \par Unfortunately she was diagnosed with non-small cell lung cancer stage IIIB, in February 2020. She now has reported unintentional weight loss of 15 lbs over the last 3 months. She is on chemotherapy treatment for lung cancer.

## 2020-07-17 ENCOUNTER — APPOINTMENT (OUTPATIENT)
Dept: INFUSION THERAPY | Facility: HOSPITAL | Age: 65
End: 2020-07-17

## 2020-07-22 ENCOUNTER — APPOINTMENT (OUTPATIENT)
Dept: INFUSION THERAPY | Facility: HOSPITAL | Age: 65
End: 2020-07-22

## 2020-07-22 ENCOUNTER — LABORATORY RESULT (OUTPATIENT)
Age: 65
End: 2020-07-22

## 2020-07-22 ENCOUNTER — RESULT REVIEW (OUTPATIENT)
Age: 65
End: 2020-07-22

## 2020-07-22 LAB
ANISOCYTOSIS BLD QL: SLIGHT — SIGNIFICANT CHANGE UP
BASOPHILS # BLD AUTO: 0 K/UL — SIGNIFICANT CHANGE UP (ref 0–0.2)
BASOPHILS NFR BLD AUTO: 0 % — SIGNIFICANT CHANGE UP (ref 0–2)
BLD GP AB SCN SERPL QL: NEGATIVE — SIGNIFICANT CHANGE UP
BURR CELLS BLD QL SMEAR: PRESENT — SIGNIFICANT CHANGE UP
DACRYOCYTES BLD QL SMEAR: SLIGHT — SIGNIFICANT CHANGE UP
EOSINOPHIL # BLD AUTO: 0 K/UL — SIGNIFICANT CHANGE UP (ref 0–0.5)
EOSINOPHIL NFR BLD AUTO: 0 % — SIGNIFICANT CHANGE UP (ref 0–6)
HCT VFR BLD CALC: 25.3 % — LOW (ref 34.5–45)
HGB BLD-MCNC: 8 G/DL — LOW (ref 11.5–15.5)
LYMPHOCYTES # BLD AUTO: 1.35 K/UL — SIGNIFICANT CHANGE UP (ref 1–3.3)
LYMPHOCYTES # BLD AUTO: 13 % — SIGNIFICANT CHANGE UP (ref 13–44)
MCHC RBC-ENTMCNC: 29.9 PG — SIGNIFICANT CHANGE UP (ref 27–34)
MCHC RBC-ENTMCNC: 31.6 GM/DL — LOW (ref 32–36)
MCV RBC AUTO: 94.4 FL — SIGNIFICANT CHANGE UP (ref 80–100)
MONOCYTES # BLD AUTO: 1.04 K/UL — HIGH (ref 0–0.9)
MONOCYTES NFR BLD AUTO: 10 % — SIGNIFICANT CHANGE UP (ref 2–14)
NEUTROPHILS # BLD AUTO: 8.02 K/UL — HIGH (ref 1.8–7.4)
NEUTROPHILS NFR BLD AUTO: 77 % — SIGNIFICANT CHANGE UP (ref 43–77)
NRBC # BLD: 0 /100 — SIGNIFICANT CHANGE UP (ref 0–0)
NRBC # BLD: SIGNIFICANT CHANGE UP /100 WBCS (ref 0–0)
OVALOCYTES BLD QL SMEAR: SIGNIFICANT CHANGE UP
PLAT MORPH BLD: NORMAL — SIGNIFICANT CHANGE UP
PLATELET # BLD AUTO: 906 K/UL — HIGH (ref 150–400)
POIKILOCYTOSIS BLD QL AUTO: SLIGHT — SIGNIFICANT CHANGE UP
RBC # BLD: 2.68 M/UL — LOW (ref 3.8–5.2)
RBC # FLD: 23.8 % — HIGH (ref 10.3–14.5)
RBC BLD AUTO: ABNORMAL
RH IG SCN BLD-IMP: POSITIVE — SIGNIFICANT CHANGE UP
SCHISTOCYTES BLD QL AUTO: SLIGHT — SIGNIFICANT CHANGE UP
TARGETS BLD QL SMEAR: SLIGHT — SIGNIFICANT CHANGE UP
WBC # BLD: 10.41 K/UL — SIGNIFICANT CHANGE UP (ref 3.8–10.5)
WBC # FLD AUTO: 10.41 K/UL — SIGNIFICANT CHANGE UP (ref 3.8–10.5)

## 2020-07-22 RX ORDER — OMEPRAZOLE 10 MG/1
1 CAPSULE, DELAYED RELEASE ORAL
Qty: 0 | Refills: 0 | DISCHARGE

## 2020-07-22 RX ORDER — HYDROXYCHLOROQUINE SULFATE 200 MG
1 TABLET ORAL
Qty: 0 | Refills: 0 | DISCHARGE

## 2020-07-23 ENCOUNTER — OUTPATIENT (OUTPATIENT)
Dept: OUTPATIENT SERVICES | Facility: HOSPITAL | Age: 65
LOS: 1 days | End: 2020-07-23
Payer: COMMERCIAL

## 2020-07-23 DIAGNOSIS — Z96.641 PRESENCE OF RIGHT ARTIFICIAL HIP JOINT: Chronic | ICD-10-CM

## 2020-07-23 DIAGNOSIS — C34.90 MALIGNANT NEOPLASM OF UNSPECIFIED PART OF UNSPECIFIED BRONCHUS OR LUNG: ICD-10-CM

## 2020-07-23 DIAGNOSIS — Z98.89 OTHER SPECIFIED POSTPROCEDURAL STATES: Chronic | ICD-10-CM

## 2020-07-23 DIAGNOSIS — Z98.890 OTHER SPECIFIED POSTPROCEDURAL STATES: Chronic | ICD-10-CM

## 2020-07-23 DIAGNOSIS — Z96.612 PRESENCE OF LEFT ARTIFICIAL SHOULDER JOINT: Chronic | ICD-10-CM

## 2020-07-24 PROCEDURE — 86901 BLOOD TYPING SEROLOGIC RH(D): CPT

## 2020-07-24 PROCEDURE — 86923 COMPATIBILITY TEST ELECTRIC: CPT

## 2020-07-24 PROCEDURE — 86900 BLOOD TYPING SEROLOGIC ABO: CPT

## 2020-07-24 PROCEDURE — 86850 RBC ANTIBODY SCREEN: CPT

## 2020-07-25 ENCOUNTER — APPOINTMENT (OUTPATIENT)
Dept: INFUSION THERAPY | Facility: HOSPITAL | Age: 65
End: 2020-07-25

## 2020-07-28 DIAGNOSIS — Z01.818 ENCOUNTER FOR OTHER PREPROCEDURAL EXAMINATION: ICD-10-CM

## 2020-07-29 ENCOUNTER — APPOINTMENT (OUTPATIENT)
Dept: DISASTER EMERGENCY | Facility: CLINIC | Age: 65
End: 2020-07-29

## 2020-07-29 LAB
SARS-COV-2 N GENE NPH QL NAA+PROBE: NOT DETECTED
SARS-COV-2 N GENE NPH QL NAA+PROBE: NOT DETECTED

## 2020-08-03 ENCOUNTER — OUTPATIENT (OUTPATIENT)
Dept: OUTPATIENT SERVICES | Facility: HOSPITAL | Age: 65
LOS: 1 days | End: 2020-08-03
Payer: COMMERCIAL

## 2020-08-03 DIAGNOSIS — Z98.890 OTHER SPECIFIED POSTPROCEDURAL STATES: Chronic | ICD-10-CM

## 2020-08-03 DIAGNOSIS — Z96.641 PRESENCE OF RIGHT ARTIFICIAL HIP JOINT: Chronic | ICD-10-CM

## 2020-08-03 DIAGNOSIS — Z98.89 OTHER SPECIFIED POSTPROCEDURAL STATES: Chronic | ICD-10-CM

## 2020-08-03 DIAGNOSIS — Z11.59 ENCOUNTER FOR SCREENING FOR OTHER VIRAL DISEASES: ICD-10-CM

## 2020-08-03 DIAGNOSIS — Z96.612 PRESENCE OF LEFT ARTIFICIAL SHOULDER JOINT: Chronic | ICD-10-CM

## 2020-08-03 PROCEDURE — U0003: CPT

## 2020-08-04 LAB — SARS-COV-2 RNA SPEC QL NAA+PROBE: SIGNIFICANT CHANGE UP

## 2020-08-05 ENCOUNTER — RESULT REVIEW (OUTPATIENT)
Age: 65
End: 2020-08-05

## 2020-08-05 ENCOUNTER — OUTPATIENT (OUTPATIENT)
Dept: OUTPATIENT SERVICES | Facility: HOSPITAL | Age: 65
LOS: 1 days | End: 2020-08-05
Payer: COMMERCIAL

## 2020-08-05 DIAGNOSIS — Z98.890 OTHER SPECIFIED POSTPROCEDURAL STATES: Chronic | ICD-10-CM

## 2020-08-05 DIAGNOSIS — Z96.641 PRESENCE OF RIGHT ARTIFICIAL HIP JOINT: Chronic | ICD-10-CM

## 2020-08-05 DIAGNOSIS — Z98.89 OTHER SPECIFIED POSTPROCEDURAL STATES: Chronic | ICD-10-CM

## 2020-08-05 DIAGNOSIS — Z96.612 PRESENCE OF LEFT ARTIFICIAL SHOULDER JOINT: Chronic | ICD-10-CM

## 2020-08-05 DIAGNOSIS — C34.92 MALIGNANT NEOPLASM OF UNSPECIFIED PART OF LEFT BRONCHUS OR LUNG: ICD-10-CM

## 2020-08-05 PROCEDURE — 76937 US GUIDE VASCULAR ACCESS: CPT

## 2020-08-05 PROCEDURE — 76937 US GUIDE VASCULAR ACCESS: CPT | Mod: 26

## 2020-08-05 PROCEDURE — C1788: CPT

## 2020-08-05 PROCEDURE — C1769: CPT

## 2020-08-05 PROCEDURE — C1894: CPT

## 2020-08-05 PROCEDURE — 36561 INSERT TUNNELED CV CATH: CPT

## 2020-08-05 PROCEDURE — 77001 FLUOROGUIDE FOR VEIN DEVICE: CPT

## 2020-08-05 PROCEDURE — 77001 FLUOROGUIDE FOR VEIN DEVICE: CPT | Mod: 26

## 2020-08-07 ENCOUNTER — OUTPATIENT (OUTPATIENT)
Dept: OUTPATIENT SERVICES | Facility: HOSPITAL | Age: 65
LOS: 1 days | Discharge: HOME | End: 2020-08-07

## 2020-08-07 DIAGNOSIS — C34.90 MALIGNANT NEOPLASM OF UNSPECIFIED PART OF UNSPECIFIED BRONCHUS OR LUNG: ICD-10-CM

## 2020-08-07 DIAGNOSIS — Z98.890 OTHER SPECIFIED POSTPROCEDURAL STATES: Chronic | ICD-10-CM

## 2020-08-07 DIAGNOSIS — Z96.612 PRESENCE OF LEFT ARTIFICIAL SHOULDER JOINT: Chronic | ICD-10-CM

## 2020-08-07 DIAGNOSIS — Z98.89 OTHER SPECIFIED POSTPROCEDURAL STATES: Chronic | ICD-10-CM

## 2020-08-07 DIAGNOSIS — Z96.641 PRESENCE OF RIGHT ARTIFICIAL HIP JOINT: Chronic | ICD-10-CM

## 2020-08-12 ENCOUNTER — LABORATORY RESULT (OUTPATIENT)
Age: 65
End: 2020-08-12

## 2020-08-12 ENCOUNTER — RESULT REVIEW (OUTPATIENT)
Age: 65
End: 2020-08-12

## 2020-08-12 ENCOUNTER — APPOINTMENT (OUTPATIENT)
Dept: INFUSION THERAPY | Facility: HOSPITAL | Age: 65
End: 2020-08-12

## 2020-08-12 ENCOUNTER — APPOINTMENT (OUTPATIENT)
Dept: HEMATOLOGY ONCOLOGY | Facility: CLINIC | Age: 65
End: 2020-08-12
Payer: MEDICAID

## 2020-08-12 LAB
BASOPHILS # BLD AUTO: 0.04 K/UL — SIGNIFICANT CHANGE UP (ref 0–0.2)
BASOPHILS NFR BLD AUTO: 0.4 % — SIGNIFICANT CHANGE UP (ref 0–2)
EOSINOPHIL # BLD AUTO: 0.09 K/UL — SIGNIFICANT CHANGE UP (ref 0–0.5)
EOSINOPHIL NFR BLD AUTO: 0.8 % — SIGNIFICANT CHANGE UP (ref 0–6)
HCT VFR BLD CALC: 31.3 % — LOW (ref 34.5–45)
HGB BLD-MCNC: 10.1 G/DL — LOW (ref 11.5–15.5)
IMM GRANULOCYTES NFR BLD AUTO: 1 % — SIGNIFICANT CHANGE UP (ref 0–1.5)
LYMPHOCYTES # BLD AUTO: 2.57 K/UL — SIGNIFICANT CHANGE UP (ref 1–3.3)
LYMPHOCYTES # BLD AUTO: 23.4 % — SIGNIFICANT CHANGE UP (ref 13–44)
MCHC RBC-ENTMCNC: 30.2 PG — SIGNIFICANT CHANGE UP (ref 27–34)
MCHC RBC-ENTMCNC: 32.3 GM/DL — SIGNIFICANT CHANGE UP (ref 32–36)
MCV RBC AUTO: 93.7 FL — SIGNIFICANT CHANGE UP (ref 80–100)
MONOCYTES # BLD AUTO: 1.65 K/UL — HIGH (ref 0–0.9)
MONOCYTES NFR BLD AUTO: 15.1 % — HIGH (ref 2–14)
NEUTROPHILS # BLD AUTO: 6.5 K/UL — SIGNIFICANT CHANGE UP (ref 1.8–7.4)
NEUTROPHILS NFR BLD AUTO: 59.3 % — SIGNIFICANT CHANGE UP (ref 43–77)
NRBC # BLD: 0 /100 WBCS — SIGNIFICANT CHANGE UP (ref 0–0)
PLATELET # BLD AUTO: 528 K/UL — HIGH (ref 150–400)
RBC # BLD: 3.34 M/UL — LOW (ref 3.8–5.2)
RBC # FLD: 19.1 % — HIGH (ref 10.3–14.5)
WBC # BLD: 10.96 K/UL — HIGH (ref 3.8–10.5)
WBC # FLD AUTO: 10.96 K/UL — HIGH (ref 3.8–10.5)

## 2020-08-12 PROCEDURE — 99213 OFFICE O/P EST LOW 20 MIN: CPT

## 2020-08-12 PROCEDURE — 99214 OFFICE O/P EST MOD 30 MIN: CPT

## 2020-08-12 NOTE — ASSESSMENT
[FreeTextEntry1] : 65 yo w with newly diagnosed lung adeno ca\par Stage IIIC by imaging. LArge volume of disease and hence, not started on RT\par NGS revealed KRAS G12V mut, low TMB and KIM. PDL1 1%.\par Started carbo/Alimta/Pem given Q 3 weeks schedule. Currently C3 \par Pt is taking folic acid. B12 shot today\par Using Oxycodone 20 mg as needed. \par Continue to take /Zofran/Reglan as needed for nausea\par Recommend increase Omeprazole to BID dosing. \par S/P 2unit  PRBC's on 5/9/2020. HGB stable. Pt felt improvement in fatigue and resolution of SOB\par get scans after 4th cycle. Will enter scan request\par Will give extra IVF today given 2 days of vomiting.\par F/U electrolytes\par \par

## 2020-08-12 NOTE — REVIEW OF SYSTEMS
[Fatigue] : fatigue [Recent Change In Weight] : ~T recent weight change [SOB on Exertion] : shortness of breath during exertion [Vomiting] : vomiting [FreeTextEntry7] : nausea [FreeTextEntry9] : as above [Negative] : Allergic/Immunologic

## 2020-08-12 NOTE — HISTORY OF PRESENT ILLNESS
[Disease: _____________________] : Disease: [unfilled] [N: ___] : N[unfilled] [M: ___] : M[unfilled] [de-identified] : Ms. Travis is a 64-year-old female with a history of recently diagnosed non-small cell lung adenocarcinoma of left lung, pulmonary embolus on apixaban, HTN, chronic pancreatitis s/p Jeffery procedure (2019), history of ARDS (2015), GERD, chronic pain on opioids, and s/p vocal cord polypectomy who presents for consultation visit\par Brief hx: Pt was recently hospitalized in mid-Feb 2020 to [facility] for dyspnea, left-sided back pain, and fatigue with loss of appetite. Found on CTPA (February 2020) to have a large filling defect in the L main pulmonary artery extending into the left upper lobar and left interlobar pulm arteries with complete opacification of the LLL pulm artery. There were thin linear filling defects in the bifurcation of the right upper pulmonary artery and the right interlobar pulmonary arteries extending to the right lower pulmonary artery, which appeared chronic. CT also showed mediastinal adenopathy, including left paratracheal, subcarinal, and left hilar. There is also a wedge shaped opacity in the posterior left lower lobe suspicious for malignancy. There is centrilobular emphysema. 2D-echo (Feb 2020) with normal LV systolic function, mild concentric LVH, normal LA, RV enlargement with normal RV systolic function, and estimated PASP 39 consistent with borderline pulmonary hypertension. She was started on heparin infusion and discharged on Apixaban. Hypercoagulable workup negative, including Factor V Leiden, Prothrombin gene mutation, and lupus anticoagulant. \par \par She had EBUS/TBNA on Feb 19, 2020, found to have adenocarcinoma in right and left paratracheal and subcarinal lymph nodes (R4, L4, level 7). She is pending PFTs, PET/CT, brain MRI. Patient was discharged on Oxycodone ER BID and Tylenol prn. She is not constipated and is on a bowel regimen with polyethylene glycol. Still does not have a good appetite. She has lost 15 lbs over last 3 months. No fevers, chills, or chest pain. Reports occasional cough for which she takes benzonatate perle about once daily. Declining influenza vaccination. Takes Duonebs twice daily as prescribed, but denies dyspnea. \par \par Last colonoscopy in 2018 normal. Mammogram in June 2019 negative. No personal or family history of miscarriages or spontaneous abortions. Reports history of smoking but denies any history of COPD or lung cancer. No family history of malignancy, sarcoidosis, or TB. No recent prolonged immobility.\par \par Of note, active smoker, quit end of last year, on and off 4-6 cigarettes/day. She had CXR for lung cancer screening in the 1980s but nothing recently. Denies etoh use. \par \par 4/6/20: No new complaints. Persistent pain in the chest and occasional cough. COntinues to lose weight,. No other constitutional symptoms. \par \par 4/16/20: Pain in the chest improved. She has been nauseous and c/o abd cramps since starting chemo. She has been taking Zofran with minimal benefit. She has not been using Reglan because she was told not to by nutritionist. She has lost 2 lbs. \par \par 5/6/2020: Pt being seen in treatment area. Pt reported that she tolerated the 2nd cycle better with the change in premeds and adding dexamethasone following the treatment for 2 days. She still experiences days of severe nausea and vomiting but not continuously. Pt has has normal bowel function. She denies SOB/ slight fatigue. Denies pain\par \par 5/20/2020: Pt being seen in treatment area. Pt is here today for cycle 3. She states that for the last 2 days she has been unable to keep anything down. She vomited the entire 2 days. This is an ongoing and chronic problem and is unrelated to chemo. She has been evaluated by GI without discovering etiology for it. Initial brain imaging (-) for metastaic disease. No headaches or dizziness. Pt states it always lasts 2 days and spontaneously resolves. Today she states she was able to tolerate broth and mandarin oranges. No fever. No c/o of cough/CP/hemoptysis or SOB. \par  \par \par 8/12/20: On maintenance now. Scans in June 2020 shows \par  [AJCC Stage: ____] : AJCC Stage: [unfilled] [de-identified] : adeno ca

## 2020-08-12 NOTE — PHYSICAL EXAM
[Thin] : thin [Restricted in physically strenuous activity but ambulatory and able to carry out work of a light or sedentary nature] : Status 1- Restricted in physically strenuous activity but ambulatory and able to carry out work of a light or sedentary nature, e.g., light house work, office work [Normal] : affect appropriate

## 2020-08-13 DIAGNOSIS — Z45.2 ENCOUNTER FOR ADJUSTMENT AND MANAGEMENT OF VASCULAR ACCESS DEVICE: ICD-10-CM

## 2020-08-13 DIAGNOSIS — R11.2 NAUSEA WITH VOMITING, UNSPECIFIED: ICD-10-CM

## 2020-08-13 DIAGNOSIS — Z51.11 ENCOUNTER FOR ANTINEOPLASTIC CHEMOTHERAPY: ICD-10-CM

## 2020-08-14 NOTE — PHYSICAL EXAM
[General Appearance - Alert] : alert [General Appearance - In No Acute Distress] : in no acute distress [Sclera] : the sclera and conjunctiva were normal [Normal Oral Mucosa] : normal oral mucosa [] : no respiratory distress [Neck Appearance] : the appearance of the neck was normal [Heart Sounds] : normal S1 and S2 [Heart Rate And Rhythm] : heart rate was normal and rhythm regular [Auscultation Breath Sounds / Voice Sounds] : lungs were clear to auscultation bilaterally [Edema] : there was no peripheral edema [Abdomen Soft] : soft [Bowel Sounds] : normal bowel sounds [Skin Color & Pigmentation] : normal skin color and pigmentation [No Focal Deficits] : no focal deficits [Oriented To Time, Place, And Person] : oriented to person, place, and time [Affect] : the affect was normal [Mood] : the mood was normal [FreeTextEntry1] : Poor dentition

## 2020-08-14 NOTE — ASSESSMENT
[______] : HCP: [unfilled] [FreeTextEntry1] : 64yoF with:\par \par 1. NSCLC - on Carbo/Pem/Pem.  Follow up with Med Onc. \par \par 2. Nausea / vomiting \par - C/w PRN compazine\par \par 3.  Chronic back pain \par - C/w PRN oxycodone 30mg as per pain management MD.\par \par 4. Loss of appetite/weight loss \par - C/w  mirtazapine 15mg QHS\par \par 5. Chronic pancreatitis - Creon pre-meal.\par \par 6. Encounter for palliative care/supportive care\par - HCP on file.\par -  All questions answered.  Empathetic listening and emotional support provided. \par \par RTO 6 weeks, call sooner with questions or issues.

## 2020-08-14 NOTE — HISTORY OF PRESENT ILLNESS
[FreeTextEntry1] : 64yoF with recently diagnosed NSCLC presents for follow-up palliative care visit, referred by Oncology.\par PMH significant for pulmonary embolus on apixaban, HTN, chronic pancreatitis s/p Jeffery procedure (2019) on Creon,  ARDS (2015), GERD, chronic pain on opioids, and s/p vocal cord polypectomy. \par \par Patient presented with dyspnea, left-sided back pain, and fatigue with loss of appetite in February 2020. CTPA (February 2020) noted to have a large filling defect in the L main pulmonary artery extending into the left upper lobar and left interlobar pulm arteries with complete opacification of the LLL pulm artery.  CT also showed mediastinal adenopathy, including left paratracheal, subcarinal, and left hilar. There is also a wedge shaped opacity in the posterior left lower lobe suspicious for malignancy. There is centrilobular emphysema. 2D-echo (Feb 2020) with normal LV systolic function, mild concentric LVH, normal LA, RV enlargement with normal RV systolic function, and estimated PASP 39 consistent with borderline pulmonary hypertension. She was started on heparin infusion and discharged on Apixaban. Hypercoagulable workup negative, including Factor V Leiden, Prothrombin gene mutation, and lupus anticoagulant. \par \par She had EBUS/TBNA on Feb 19, 2020, found to have adenocarcinoma in right and left paratracheal and subcarinal lymph nodes.  \par \par Main issue for initial palliative care visit was for assistance with appetite gain and weight gain.  She would like to maintain her weight above 125lbs and finds it hard to keep her weight up.  Her weight had been much lower last year (108lbs) before she underwent pancreatic surgery for her calcific pancreatitis.  She has a low appetite (this is a longtime issue) and eats small portions.  She had been on megace for months, this was helping her to gain weight. Has been discontinued since she had the VTE. She obtained medical cannabis 1:1 THC:CBD ~2.5mg but found it ineffective. \par \par Interval History:  Patient presents for follow-up, seen in treatment room.  Currently on carbo/pem/pem.  \par She is tolerating treatment well.   She was started on mirtazapine 15mg QHS last month by Dr. Molina for appetite stimulation.  Appetite has improved.  Has intermittent nausea which is controlled with PRN compazine. Chronic pain is controlled with PRN oxycodone 30mg once a day.\par \par ROS:\par + stable weight (26 lb weight loss in 2 months)\par + decreased appetite - improved with mirtazapine\par + nausea / vomiting - using PRN compazine\par .+L sided back pain along the entire length of the back - this started in Feb which spurred her hospital admission. Using PRN Oxycodone 30mg for pancreatitis pain prior to the back pain.  For mild pain, she uses heating pad\par +trouble sleeping\par +mood is good, has a good sense of humor\par Denies anxiety, diarrhea, constipation (uses a 1/2 dose of miralax daily to maintain regularity) \par All other ROS as outlined or noncontributory. \par \par Patient is , lives with her . Has no children She has a good social support system. Has a strong Oriental orthodox bj. Recently quit smoking tobacco completely after meeting with pulmonologist Dr. Abad. \par \par I-Stop Ref# [id number]\par \par PMD: Dr. Ananda Chavira ([place])\par Pain management: Dr. Mccauley

## 2020-09-02 ENCOUNTER — LABORATORY RESULT (OUTPATIENT)
Age: 65
End: 2020-09-02

## 2020-09-02 ENCOUNTER — APPOINTMENT (OUTPATIENT)
Dept: HEMATOLOGY ONCOLOGY | Facility: CLINIC | Age: 65
End: 2020-09-02
Payer: MEDICAID

## 2020-09-02 ENCOUNTER — RESULT REVIEW (OUTPATIENT)
Age: 65
End: 2020-09-02

## 2020-09-02 ENCOUNTER — APPOINTMENT (OUTPATIENT)
Dept: INFUSION THERAPY | Facility: HOSPITAL | Age: 65
End: 2020-09-02

## 2020-09-02 LAB
BASOPHILS # BLD AUTO: 0 K/UL — SIGNIFICANT CHANGE UP (ref 0–0.2)
BASOPHILS NFR BLD AUTO: 0 % — SIGNIFICANT CHANGE UP (ref 0–2)
EOSINOPHIL # BLD AUTO: 0.09 K/UL — SIGNIFICANT CHANGE UP (ref 0–0.5)
EOSINOPHIL NFR BLD AUTO: 1 % — SIGNIFICANT CHANGE UP (ref 0–6)
HCT VFR BLD CALC: 30.4 % — LOW (ref 34.5–45)
HGB BLD-MCNC: 9.6 G/DL — LOW (ref 11.5–15.5)
LYMPHOCYTES # BLD AUTO: 2.15 K/UL — SIGNIFICANT CHANGE UP (ref 1–3.3)
LYMPHOCYTES # BLD AUTO: 24 % — SIGNIFICANT CHANGE UP (ref 13–44)
MCHC RBC-ENTMCNC: 31 PG — SIGNIFICANT CHANGE UP (ref 27–34)
MCHC RBC-ENTMCNC: 31.6 GM/DL — LOW (ref 32–36)
MCV RBC AUTO: 98.1 FL — SIGNIFICANT CHANGE UP (ref 80–100)
MONOCYTES # BLD AUTO: 1.34 K/UL — HIGH (ref 0–0.9)
MONOCYTES NFR BLD AUTO: 15 % — HIGH (ref 2–14)
NEUTROPHILS # BLD AUTO: 5.27 K/UL — SIGNIFICANT CHANGE UP (ref 1.8–7.4)
NEUTROPHILS NFR BLD AUTO: 59 % — SIGNIFICANT CHANGE UP (ref 43–77)
NRBC # BLD: 0 /100 — SIGNIFICANT CHANGE UP (ref 0–0)
NRBC # BLD: SIGNIFICANT CHANGE UP /100 WBCS (ref 0–0)
PLAT MORPH BLD: NORMAL — SIGNIFICANT CHANGE UP
PLATELET # BLD AUTO: 492 K/UL — HIGH (ref 150–400)
PROMYELOCYTES # FLD: 1 % — HIGH (ref 0–0)
RBC # BLD: 3.1 M/UL — LOW (ref 3.8–5.2)
RBC # FLD: 19.9 % — HIGH (ref 10.3–14.5)
RBC BLD AUTO: SIGNIFICANT CHANGE UP
WBC # BLD: 8.94 K/UL — SIGNIFICANT CHANGE UP (ref 3.8–10.5)
WBC # FLD AUTO: 8.94 K/UL — SIGNIFICANT CHANGE UP (ref 3.8–10.5)

## 2020-09-02 PROCEDURE — 99214 OFFICE O/P EST MOD 30 MIN: CPT

## 2020-09-02 NOTE — REVIEW OF SYSTEMS
[Fatigue] : fatigue [Vomiting] : no vomiting [Recent Change In Weight] : ~T recent weight change [SOB on Exertion] : shortness of breath during exertion [FreeTextEntry7] : nausea Resolved [Negative] : Allergic/Immunologic [FreeTextEntry9] : as above

## 2020-09-02 NOTE — ASSESSMENT
[FreeTextEntry1] : 65 yo w with newly diagnosed lung adeno ca\par Stage IIIC by imaging. LArge volume of disease and hence, not started on RT\par NGS revealed KRAS G12V mut, low TMB and KIM. PDL1 1%.\par Started carbo/Alimta/Pem given Q 3 weeks schedule. Currently on maintenance alimta/keytruda\par Pt is taking folic acid. B12 shot q6w\par Using Oxycodone 20 mg as needed. \par Continue to take /Zofran/Reglan as needed for nausea\par Continue Omeprazole to BID dosing. \par Due for scans...will request\par F/U electrolytes\par EMLA cream for port.....sent to [facility]\par OV following.\par

## 2020-09-02 NOTE — HISTORY OF PRESENT ILLNESS
[Disease: _____________________] : Disease: [unfilled] [N: ___] : N[unfilled] [M: ___] : M[unfilled] [de-identified] : Ms. 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 Found on CTPA (February 2020) to have a large filling defect in the L main pulmonary artery extending into the left upper lobar and left interlobar pulm arteries with complete opacification of the LLL pulm artery. There were thin linear filling defects in the bifurcation of the right upper pulmonary artery and the right interlobar pulmonary arteries extending to the right lower pulmonary artery, which appeared chronic. CT also showed mediastinal adenopathy, including left paratracheal, subcarinal, and left hilar. There is also a wedge shaped opacity in the posterior left lower lobe suspicious for malignancy. There is centrilobular emphysema. 2D-echo (Feb 2020) with normal LV systolic function, mild concentric LVH, normal LA, RV enlargement with normal RV systolic function, and estimated PASP 39 consistent with borderline pulmonary hypertension. She was started on heparin infusion and discharged on Apixaban. Hypercoagulable workup negative, including Factor V Leiden, Prothrombin gene mutation, and lupus anticoagulant. \par \par She had EBUS/TBNA on Feb 19, 2020, found to have adenocarcinoma in right and left paratracheal and subcarinal lymph nodes (R4, L4, level 7). She is pending PFTs, PET/CT, brain MRI. Patient was discharged on Oxycodone ER BID and Tylenol prn. She is not constipated and is on a bowel regimen with polyethylene glycol. Still does not have a good appetite. She has lost 15 lbs over last 3 months. No fevers, chills, or chest pain. Reports occasional cough for which she takes benzonatate perle about once daily. Declining influenza vaccination. Takes Duonebs twice daily as prescribed, but denies dyspnea. \par \par Last colonoscopy in 2018 normal. Mammogram in June 2019 negative. No personal or family history of miscarriages or spontaneous abortions. Reports history of smoking but denies any history of COPD or lung cancer. No family history of malignancy, sarcoidosis, or TB. No recent prolonged immobility.\par \par Of note, active smoker, quit end of last year, on and off 4-6 cigarettes/day. She had CXR for lung cancer screening in the 1980s but nothing recently. Denies etoh use. \par \par 4/6/20: No new complaints. Persistent pain in the chest and occasional cough. COntinues to lose weight,. No other constitutional symptoms. \par \par 4/16/20: Pain in the chest improved. She has been nauseous and c/o abd cramps since starting chemo. She has been taking Zofran with minimal benefit. She has not been using Reglan because she was told not to by nutritionist. She has lost 2 lbs. \par \par 5/6/2020: Pt being seen in treatment area. Pt reported that she tolerated the 2nd cycle better with the change in premeds and adding dexamethasone following the treatment for 2 days. She still experiences days of severe nausea and vomiting but not continuously. Pt has has normal bowel function. She denies SOB/ slight fatigue. Denies pain\par \par 5/20/2020: Pt being seen in treatment area. Pt is here today for cycle 3. She states that for the last 2 days she has been unable to keep anything down. She vomited the entire 2 days. This is an ongoing and chronic problem and is unrelated to chemo. She has been evaluated by GI without discovering etiology for it. Initial brain imaging (-) for metastaic disease. No headaches or dizziness. Pt states it always lasts 2 days and spontaneously resolves. Today she states she was able to tolerate broth and mandarin oranges. No fever. No c/o of cough/CP/hemoptysis or SOB. \par  \par \par 8/12/20: On maintenance now. Scans in June 2020 shows \par \par 9/2/2020: Pt seen in treatment room. Feeling well. Previously experiencing significant nausea and vomiting. Resolved. Now using omeprazole on BID dosing. No new complaints\par  [AJCC Stage: ____] : AJCC Stage: [unfilled] [de-identified] : adeno ca

## 2020-09-11 NOTE — REVIEW OF SYSTEMS
[SOB on Exertion] : shortness of breath during exertion [Recent Change In Weight] : ~T recent weight change [Fatigue] : fatigue [Vomiting] : vomiting [Negative] : Allergic/Immunologic [FreeTextEntry7] : nausea [FreeTextEntry9] : as above

## 2020-09-11 NOTE — HISTORY OF PRESENT ILLNESS
[N: ___] : N[unfilled] [Disease: _____________________] : Disease: [unfilled] [AJCC Stage: ____] : AJCC Stage: [unfilled] [M: ___] : M[unfilled] [de-identified] : Ms. Travis is a 64-year-old female with a history of recently diagnosed non-small cell lung adenocarcinoma of left lung, pulmonary embolus on apixaban, HTN, chronic pancreatitis s/p Jeffery procedure (2019), history of ARDS (2015), GERD, chronic pain on opioids, and s/p vocal cord polypectomy who presents for consultation visit\par Brief hx: Pt was recently hospitalized in mid-Feb 2020 to [facility] for dyspnea, left-sided back pain, and fatigue with loss of appetite. Found on CTPA (February 2020) to have a large filling defect in the L main pulmonary artery extending into the left upper lobar and left interlobar pulm arteries with complete opacification of the LLL pulm artery. There were thin linear filling defects in the bifurcation of the right upper pulmonary artery and the right interlobar pulmonary arteries extending to the right lower pulmonary artery, which appeared chronic. CT also showed mediastinal adenopathy, including left paratracheal, subcarinal, and left hilar. There is also a wedge shaped opacity in the posterior left lower lobe suspicious for malignancy. There is centrilobular emphysema. 2D-echo (Feb 2020) with normal LV systolic function, mild concentric LVH, normal LA, RV enlargement with normal RV systolic function, and estimated PASP 39 consistent with borderline pulmonary hypertension. She was started on heparin infusion and discharged on Apixaban. Hypercoagulable workup negative, including Factor V Leiden, Prothrombin gene mutation, and lupus anticoagulant. \par \par She had EBUS/TBNA on Feb 19, 2020, found to have adenocarcinoma in right and left paratracheal and subcarinal lymph nodes (R4, L4, level 7). She is pending PFTs, PET/CT, brain MRI. Patient was discharged on Oxycodone ER BID and Tylenol prn. She is not constipated and is on a bowel regimen with polyethylene glycol. Still does not have a good appetite. She has lost 15 lbs over last 3 months. No fevers, chills, or chest pain. Reports occasional cough for which she takes benzonatate perle about once daily. Declining influenza vaccination. Takes Duonebs twice daily as prescribed, but denies dyspnea. \par \par Last colonoscopy in 2018 normal. Mammogram in June 2019 negative. No personal or family history of miscarriages or spontaneous abortions. Reports history of smoking but denies any history of COPD or lung cancer. No family history of malignancy, sarcoidosis, or TB. No recent prolonged immobility.\par \par Of note, active smoker, quit end of last year, on and off 4-6 cigarettes/day. She had CXR for lung cancer screening in the 1980s but nothing recently. Denies etoh use. \par \par 4/6/20: No new complaints. Persistent pain in the chest and occasional cough. COntinues to lose weight,. No other constitutional symptoms. \par \par 4/16/20: Pain in the chest improved. She has been nauseous and c/o abd cramps since starting chemo. She has been taking Zofran with minimal benefit. She has not been using Reglan because she was told not to by nutritionist. She has lost 2 lbs. \par \par 5/6/2020: Pt being seen in treatment area. Pt reported that she tolerated the 2nd cycle better with the change in premeds and adding dexamethasone following the treatment for 2 days. She still experiences days of severe nausea and vomiting but not continuously. Pt has has normal bowel function. She denies SOB/ slight fatigue. Denies pain\par \par 5/20/2020: Pt being seen in treatment area. Pt is here today for cycle 3. She states that for the last 2 days she has been unable to keep anything down. She vomited the entire 2 days. This is an ongoing and chronic problem and is unrelated to chemo. She has been evaluated by GI without discovering etiology for it. Initial brain imaging (-) for metastaic disease. No headaches or dizziness. Pt states it always lasts 2 days and spontaneously resolves. Today she states she was able to tolerate broth and mandarin oranges. No fever. No c/o of cough/CP/hemoptysis or SOB. \par  \par \par 8/12/20: On maintenance now. Scans in June 2020 shows \par  [de-identified] : adeno ca

## 2020-09-15 ENCOUNTER — APPOINTMENT (OUTPATIENT)
Dept: CT IMAGING | Facility: IMAGING CENTER | Age: 65
End: 2020-09-15
Payer: MEDICAID

## 2020-09-15 ENCOUNTER — RESULT REVIEW (OUTPATIENT)
Age: 65
End: 2020-09-15

## 2020-09-15 ENCOUNTER — OUTPATIENT (OUTPATIENT)
Dept: OUTPATIENT SERVICES | Facility: HOSPITAL | Age: 65
LOS: 1 days | End: 2020-09-15
Payer: COMMERCIAL

## 2020-09-15 DIAGNOSIS — Z98.89 OTHER SPECIFIED POSTPROCEDURAL STATES: Chronic | ICD-10-CM

## 2020-09-15 DIAGNOSIS — Z96.612 PRESENCE OF LEFT ARTIFICIAL SHOULDER JOINT: Chronic | ICD-10-CM

## 2020-09-15 DIAGNOSIS — Z98.890 OTHER SPECIFIED POSTPROCEDURAL STATES: Chronic | ICD-10-CM

## 2020-09-15 DIAGNOSIS — Z96.641 PRESENCE OF RIGHT ARTIFICIAL HIP JOINT: Chronic | ICD-10-CM

## 2020-09-15 DIAGNOSIS — C34.92 MALIGNANT NEOPLASM OF UNSPECIFIED PART OF LEFT BRONCHUS OR LUNG: ICD-10-CM

## 2020-09-15 DIAGNOSIS — Z00.8 ENCOUNTER FOR OTHER GENERAL EXAMINATION: ICD-10-CM

## 2020-09-15 PROCEDURE — 74160 CT ABDOMEN W/CONTRAST: CPT | Mod: 26

## 2020-09-15 PROCEDURE — 74160 CT ABDOMEN W/CONTRAST: CPT

## 2020-09-15 PROCEDURE — 71260 CT THORAX DX C+: CPT | Mod: 26

## 2020-09-15 PROCEDURE — 71260 CT THORAX DX C+: CPT

## 2020-09-18 ENCOUNTER — OUTPATIENT (OUTPATIENT)
Dept: OUTPATIENT SERVICES | Facility: HOSPITAL | Age: 65
LOS: 1 days | Discharge: HOME | End: 2020-09-18

## 2020-09-18 DIAGNOSIS — Z96.612 PRESENCE OF LEFT ARTIFICIAL SHOULDER JOINT: Chronic | ICD-10-CM

## 2020-09-18 DIAGNOSIS — C34.90 MALIGNANT NEOPLASM OF UNSPECIFIED PART OF UNSPECIFIED BRONCHUS OR LUNG: ICD-10-CM

## 2020-09-18 DIAGNOSIS — Z98.890 OTHER SPECIFIED POSTPROCEDURAL STATES: Chronic | ICD-10-CM

## 2020-09-18 DIAGNOSIS — Z96.641 PRESENCE OF RIGHT ARTIFICIAL HIP JOINT: Chronic | ICD-10-CM

## 2020-09-18 DIAGNOSIS — Z98.89 OTHER SPECIFIED POSTPROCEDURAL STATES: Chronic | ICD-10-CM

## 2020-09-23 ENCOUNTER — LABORATORY RESULT (OUTPATIENT)
Age: 65
End: 2020-09-23

## 2020-09-23 ENCOUNTER — APPOINTMENT (OUTPATIENT)
Dept: HEMATOLOGY ONCOLOGY | Facility: CLINIC | Age: 65
End: 2020-09-23
Payer: MEDICAID

## 2020-09-23 ENCOUNTER — RESULT REVIEW (OUTPATIENT)
Age: 65
End: 2020-09-23

## 2020-09-23 ENCOUNTER — APPOINTMENT (OUTPATIENT)
Dept: INFUSION THERAPY | Facility: HOSPITAL | Age: 65
End: 2020-09-23

## 2020-09-23 DIAGNOSIS — Z51.5 ENCOUNTER FOR PALLIATIVE CARE: ICD-10-CM

## 2020-09-23 DIAGNOSIS — E86.0 DEHYDRATION: ICD-10-CM

## 2020-09-23 LAB
BASOPHILS # BLD AUTO: 0 K/UL — SIGNIFICANT CHANGE UP (ref 0–0.2)
BASOPHILS NFR BLD AUTO: 0 % — SIGNIFICANT CHANGE UP (ref 0–2)
BUN SERPL-MCNC: 18 MG/DL — SIGNIFICANT CHANGE UP (ref 7–23)
CA-I BLDA-SCNC: 1.05 MMOL/L — LOW (ref 1.12–1.3)
CHLORIDE SERPL-SCNC: 107 MMOL/L — SIGNIFICANT CHANGE UP (ref 96–108)
CO2 SERPL-SCNC: 18 MMOL/L — LOW (ref 22–31)
CREAT SERPL-MCNC: 1.5 MG/DL — HIGH (ref 0.5–1.3)
EOSINOPHIL # BLD AUTO: 0 K/UL — SIGNIFICANT CHANGE UP (ref 0–0.5)
EOSINOPHIL NFR BLD AUTO: 0 % — SIGNIFICANT CHANGE UP (ref 0–6)
GLUCOSE SERPL-MCNC: 166 MG/DL — HIGH (ref 70–99)
HCT VFR BLD CALC: 34.7 % — SIGNIFICANT CHANGE UP (ref 34.5–45)
HGB BLD-MCNC: 11.5 G/DL — SIGNIFICANT CHANGE UP (ref 11.5–15.5)
LYMPHOCYTES # BLD AUTO: 16 % — SIGNIFICANT CHANGE UP (ref 13–44)
LYMPHOCYTES # BLD AUTO: 2.5 K/UL — SIGNIFICANT CHANGE UP (ref 1–3.3)
MCHC RBC-ENTMCNC: 31.7 PG — SIGNIFICANT CHANGE UP (ref 27–34)
MCHC RBC-ENTMCNC: 33.1 G/DL — SIGNIFICANT CHANGE UP (ref 32–36)
MCV RBC AUTO: 95.6 FL — SIGNIFICANT CHANGE UP (ref 80–100)
MONOCYTES # BLD AUTO: 1.72 K/UL — HIGH (ref 0–0.9)
MONOCYTES NFR BLD AUTO: 11 % — SIGNIFICANT CHANGE UP (ref 2–14)
NEUTROPHILS # BLD AUTO: 11.42 K/UL — HIGH (ref 1.8–7.4)
NEUTROPHILS NFR BLD AUTO: 73 % — SIGNIFICANT CHANGE UP (ref 43–77)
NRBC # BLD: 0 /100 — SIGNIFICANT CHANGE UP (ref 0–0)
NRBC # BLD: SIGNIFICANT CHANGE UP /100 WBCS (ref 0–0)
PLAT MORPH BLD: NORMAL — SIGNIFICANT CHANGE UP
PLATELET # BLD AUTO: 524 K/UL — HIGH (ref 150–400)
POTASSIUM SERPL-MCNC: 3.3 MMOL/L — LOW (ref 3.5–5.3)
POTASSIUM SERPL-SCNC: 3.3 MMOL/L — LOW (ref 3.5–5.3)
RBC # BLD: 3.63 M/UL — LOW (ref 3.8–5.2)
RBC # FLD: 17.9 % — HIGH (ref 10.3–14.5)
RBC BLD AUTO: SIGNIFICANT CHANGE UP
SODIUM SERPL-SCNC: 139 MMOL/L — SIGNIFICANT CHANGE UP (ref 135–145)
WBC # BLD: 15.65 K/UL — HIGH (ref 3.8–10.5)
WBC # FLD AUTO: 15.65 K/UL — HIGH (ref 3.8–10.5)

## 2020-09-23 PROCEDURE — 99215 OFFICE O/P EST HI 40 MIN: CPT

## 2020-09-23 PROCEDURE — 99213 OFFICE O/P EST LOW 20 MIN: CPT

## 2020-09-23 NOTE — ASSESSMENT
[FreeTextEntry1] : 65 yo w  diagnosed lung adeno ca\par Stage IIIC by imaging. LArge volume of disease and hence, not started on RT\par NGS revealed KRAS G12V mut, low TMB and KIM. PDL1 1%.\par Started carbo/Alimta/Pem given Q 3 weeks schedule. Currently on maintenance alimta/keytruda\par Pt is taking folic acid. B12 shot q6w\par Using Oxycodone 20 mg as needed. \par Scans personally reviewed with patient. Good response to therapy. New area of opacification likely infectious given recent symptoms and elevated white count. Will give course of Levaquin for likely pneumonia. \par Diarrhea is likely immunotherapy related. Prednisone 40mg daily for one week. WIll taper if diarrhea improves/resolves. Will send stool for culture and C-diff if patient able to give specimen while here\par Creatinine significantly increased today 1.5 (previously 0.68)\par Supplement potassium (3.3)\par IV hydration today and will try to arrange for again later this week\par All treatment held today\par Given new opacification/,malaise and white count will swab for covid though unlikely\par

## 2020-09-23 NOTE — REVIEW OF SYSTEMS
[Fatigue] : fatigue [Recent Change In Weight] : ~T recent weight change [Cough] : cough [SOB on Exertion] : shortness of breath during exertion [Vomiting] : no vomiting [Diarrhea] : diarrhea [Negative] : Allergic/Immunologic [FreeTextEntry7] : as above [FreeTextEntry9] : as above

## 2020-09-23 NOTE — HISTORY OF PRESENT ILLNESS
[Disease: _____________________] : Disease: [unfilled] [N: ___] : N[unfilled] [M: ___] : M[unfilled] [AJCC Stage: ____] : AJCC Stage: [unfilled] [de-identified] : Ms. Travis is a 64-year-old female with a history of recently diagnosed non-small cell lung adenocarcinoma of left lung, pulmonary embolus on apixaban, HTN, chronic pancreatitis s/p Jeffery procedure (2019), history of ARDS (2015), GERD, chronic pain on opioids, and s/p vocal cord polypectomy who presents for consultation visit\par Brief hx: Pt was recently hospitalized in mid-Feb 2020 to [facility] for dyspnea, left-sided back pain, and fatigue with loss of appetite. Found on CTPA (February 2020) to have a large filling defect in the L main pulmonary artery extending into the left upper lobar and left interlobar pulm arteries with complete opacification of the LLL pulm artery. There were thin linear filling defects in the bifurcation of the right upper pulmonary artery and the right interlobar pulmonary arteries extending to the right lower pulmonary artery, which appeared chronic. CT also showed mediastinal adenopathy, including left paratracheal, subcarinal, and left hilar. There is also a wedge shaped opacity in the posterior left lower lobe suspicious for malignancy. There is centrilobular emphysema. 2D-echo (Feb 2020) with normal LV systolic function, mild concentric LVH, normal LA, RV enlargement with normal RV systolic function, and estimated PASP 39 consistent with borderline pulmonary hypertension. She was started on heparin infusion and discharged on Apixaban. Hypercoagulable workup negative, including Factor V Leiden, Prothrombin gene mutation, and lupus anticoagulant. \par \par She had EBUS/TBNA on Feb 19, 2020, found to have adenocarcinoma in right and left paratracheal and subcarinal lymph nodes (R4, L4, level 7). She is pending PFTs, PET/CT, brain MRI. Patient was discharged on Oxycodone ER BID and Tylenol prn. She is not constipated and is on a bowel regimen with polyethylene glycol. Still does not have a good appetite. She has lost 15 lbs over last 3 months. No fevers, chills, or chest pain. Reports occasional cough for which she takes benzonatate perle about once daily. Declining influenza vaccination. Takes Duonebs twice daily as prescribed, but denies dyspnea. \par \par Last colonoscopy in 2018 normal. Mammogram in June 2019 negative. No personal or family history of miscarriages or spontaneous abortions. Reports history of smoking but denies any history of COPD or lung cancer. No family history of malignancy, sarcoidosis, or TB. No recent prolonged immobility.\par \par Of note, active smoker, quit end of last year, on and off 4-6 cigarettes/day. She had CXR for lung cancer screening in the 1980s but nothing recently. Denies etoh use. \par \par 4/6/20: No new complaints. Persistent pain in the chest and occasional cough. COntinues to lose weight,. No other constitutional symptoms. \par \par 4/16/20: Pain in the chest improved. She has been nauseous and c/o abd cramps since starting chemo. She has been taking Zofran with minimal benefit. She has not been using Reglan because she was told not to by nutritionist. She has lost 2 lbs. \par \par 5/6/2020: Pt being seen in treatment area. Pt reported that she tolerated the 2nd cycle better with the change in premeds and adding dexamethasone following the treatment for 2 days. She still experiences days of severe nausea and vomiting but not continuously. Pt has has normal bowel function. She denies SOB/ slight fatigue. Denies pain\par \par 5/20/2020: Pt being seen in treatment area. Pt is here today for cycle 3. She states that for the last 2 days she has been unable to keep anything down. She vomited the entire 2 days. This is an ongoing and chronic problem and is unrelated to chemo. She has been evaluated by GI without discovering etiology for it. Initial brain imaging (-) for metastaic disease. No headaches or dizziness. Pt states it always lasts 2 days and spontaneously resolves. Today she states she was able to tolerate broth and mandarin oranges. No fever. No c/o of cough/CP/hemoptysis or SOB. \par  \par \par 8/12/20: On maintenance now. Scans in June 2020 shows \par \par 9/2/2020: Pt seen in treatment room. Feeling well. Previously experiencing significant nausea and vomiting. Resolved. Now using omeprazole on BID dosing. No new complaints\par \par 9/23/2020: Pt seen in treatment room. SHe has been experiencing malaise and significant diarrhea since friday. Appetite poor. (+) weight loss. No fever but feels very washed out. Had scans last friday as well. \par  [de-identified] : adeno ca

## 2020-09-24 PROBLEM — Z51.5 ENCOUNTER FOR PALLIATIVE CARE: Status: ACTIVE | Noted: 2020-04-09

## 2020-09-24 NOTE — PHYSICAL EXAM
[General Appearance - Alert] : alert [General Appearance - In No Acute Distress] : in no acute distress [Sclera] : the sclera and conjunctiva were normal [Normal Oral Mucosa] : normal oral mucosa [Neck Appearance] : the appearance of the neck was normal [] : no respiratory distress [Auscultation Breath Sounds / Voice Sounds] : lungs were clear to auscultation bilaterally [Heart Rate And Rhythm] : heart rate was normal and rhythm regular [Heart Sounds] : normal S1 and S2 [Edema] : there was no peripheral edema [Bowel Sounds] : normal bowel sounds [Abdomen Soft] : soft [Skin Color & Pigmentation] : normal skin color and pigmentation [No Focal Deficits] : no focal deficits [Oriented To Time, Place, And Person] : oriented to person, place, and time [Affect] : the affect was normal [Mood] : the mood was normal [FreeTextEntry1] : Poor dentition

## 2020-09-24 NOTE — HISTORY OF PRESENT ILLNESS
[FreeTextEntry1] : 64yoF with recently diagnosed NSCLC presents for follow-up palliative care visit, referred by Oncology.\par PMH significant for pulmonary embolus on apixaban, HTN, chronic pancreatitis s/p Jeffery procedure (2019) on Creon,  ARDS (2015), GERD, chronic pain on opioids, and s/p vocal cord polypectomy. \par \par Patient presented with dyspnea, left-sided back pain, and fatigue with loss of appetite in February 2020. CTPA (February 2020) noted to have a large filling defect in the L main pulmonary artery extending into the left upper lobar and left interlobar pulm arteries with complete opacification of the LLL pulm artery.  CT also showed mediastinal adenopathy, including left paratracheal, subcarinal, and left hilar. There is also a wedge shaped opacity in the posterior left lower lobe suspicious for malignancy. There is centrilobular emphysema. 2D-echo (Feb 2020) with normal LV systolic function, mild concentric LVH, normal LA, RV enlargement with normal RV systolic function, and estimated PASP 39 consistent with borderline pulmonary hypertension. She was started on heparin infusion and discharged on Apixaban. Hypercoagulable workup negative, including Factor V Leiden, Prothrombin gene mutation, and lupus anticoagulant. \par \par She had EBUS/TBNA on Feb 19, 2020, found to have adenocarcinoma in right and left paratracheal and subcarinal lymph nodes.  \par \par Main issue for initial palliative care visit was for assistance with appetite gain and weight gain.  She would like to maintain her weight above 125lbs and finds it hard to keep her weight up.  Her weight had been much lower last year (108lbs) before she underwent pancreatic surgery for her calcific pancreatitis.  She has a low appetite (this is a longtime issue) and eats small portions.  She had been on megace for months, this was helping her to gain weight. Has been discontinued since she had the VTE. She obtained medical cannabis 1:1 THC:CBD ~2.5mg but found it ineffective. \par \par Interval History:  Patient presents for follow-up, seen in treatment room.  Reports significant diarrhea and malaise x5 days.  PO intake significantly decreased as a result.   Has intermittent nausea which is controlled with PRN compazine. She is currently in pain 8/10, as she was unsure if she should take her PRN oxycodone IR 30mg.\par \par ROS:\par + ~10 lb weight loss as per patient\par + chronic left sided back pain/pancreatitis pain - controlled with PRN oxycodone IR 30mg. For mild pain, she uses heating pad.\par + decreased appetite - improved with mirtazapine\par + nausea / vomiting - using PRN compazine\par +trouble sleeping\par +mood is good, has a good sense of humor\par Denies anxiety, diarrhea, constipation (uses a 1/2 dose of miralax daily to maintain regularity) \par All other ROS as outlined or noncontributory. \par \par Patient is , lives with her . Has no children She has a good social support system. Has a strong [employer] bj. Recently quit smoking tobacco completely after meeting with pulmonologist Dr. Abad. \par \par I-Stop Ref# [id number]\par \par PMD: Dr. Ananda Chavira ([facility])\par Pain management: Dr. Mccauley

## 2020-09-24 NOTE — ASSESSMENT
[______] : HCP: [unfilled] [FreeTextEntry1] : 64yoF with:\par \par 1. NSCLC - on Carbo/Pem/Pem.  Treatment held today.  Follow up with Med Onc. \par \par 2. Diarrhea 2/2 immunotherapy \par - Patient receiving IVF in treatment room now. \par - Recommend obtaining stool for culture and c. diff.\par -Will start prednisone as per Oncology. Recommend diligent fluid intake.  \par \par 3. Nausea / vomiting - C/w PRN compazine\par \par 4.  Chronic back pain - C/w PRN oxycodone 30mg as per pain management MD.\par \par 5. Loss of appetite/weight loss - C/w  mirtazapine 15mg QHS\par \par 6. Chronic pancreatitis - Creon pre-meal.\par \par 7. Encounter for palliative care/supportive care\par - HCP on file.\par - Counseled patient on importance of calling office asap if new symptoms develop.  She expressed understanding.\par -  All questions answered.  Empathetic listening and emotional support provided. \par \par RTO PRN, call sooner with questions or issues.

## 2020-09-25 DIAGNOSIS — A04.72 ENTEROCOLITIS DUE TO CLOSTRIDIUM DIFFICILE, NOT SPECIFIED AS RECURRENT: ICD-10-CM

## 2020-09-26 ENCOUNTER — APPOINTMENT (OUTPATIENT)
Dept: INFUSION THERAPY | Facility: HOSPITAL | Age: 65
End: 2020-09-26

## 2020-10-14 ENCOUNTER — LABORATORY RESULT (OUTPATIENT)
Age: 65
End: 2020-10-14

## 2020-10-14 ENCOUNTER — RESULT REVIEW (OUTPATIENT)
Age: 65
End: 2020-10-14

## 2020-10-14 ENCOUNTER — APPOINTMENT (OUTPATIENT)
Dept: INFUSION THERAPY | Facility: HOSPITAL | Age: 65
End: 2020-10-14

## 2020-10-14 LAB
BASOPHILS # BLD AUTO: 0.04 K/UL — SIGNIFICANT CHANGE UP (ref 0–0.2)
BASOPHILS NFR BLD AUTO: 0.4 % — SIGNIFICANT CHANGE UP (ref 0–2)
BUN SERPL-MCNC: 7 MG/DL — SIGNIFICANT CHANGE UP (ref 7–23)
CA-I BLDA-SCNC: 1.21 MMOL/L — SIGNIFICANT CHANGE UP (ref 1.12–1.3)
CHLORIDE SERPL-SCNC: 109 MMOL/L — HIGH (ref 96–108)
CO2 SERPL-SCNC: 25 MMOL/L — SIGNIFICANT CHANGE UP (ref 22–31)
CREAT SERPL-MCNC: 0.6 MG/DL — SIGNIFICANT CHANGE UP (ref 0.5–1.3)
EOSINOPHIL # BLD AUTO: 0.08 K/UL — SIGNIFICANT CHANGE UP (ref 0–0.5)
EOSINOPHIL NFR BLD AUTO: 0.7 % — SIGNIFICANT CHANGE UP (ref 0–6)
GLUCOSE SERPL-MCNC: 91 MG/DL — SIGNIFICANT CHANGE UP (ref 70–99)
HCT VFR BLD CALC: 30 % — LOW (ref 34.5–45)
HGB BLD-MCNC: 9.2 G/DL — LOW (ref 11.5–15.5)
IMM GRANULOCYTES NFR BLD AUTO: 2.3 % — HIGH (ref 0–1.5)
LYMPHOCYTES # BLD AUTO: 2.25 K/UL — SIGNIFICANT CHANGE UP (ref 1–3.3)
LYMPHOCYTES # BLD AUTO: 20.6 % — SIGNIFICANT CHANGE UP (ref 13–44)
MCHC RBC-ENTMCNC: 30.7 G/DL — LOW (ref 32–36)
MCHC RBC-ENTMCNC: 31.2 PG — SIGNIFICANT CHANGE UP (ref 27–34)
MCV RBC AUTO: 101.7 FL — HIGH (ref 80–100)
MONOCYTES # BLD AUTO: 0.81 K/UL — SIGNIFICANT CHANGE UP (ref 0–0.9)
MONOCYTES NFR BLD AUTO: 7.4 % — SIGNIFICANT CHANGE UP (ref 2–14)
NEUTROPHILS # BLD AUTO: 7.49 K/UL — HIGH (ref 1.8–7.4)
NEUTROPHILS NFR BLD AUTO: 68.6 % — SIGNIFICANT CHANGE UP (ref 43–77)
NRBC # BLD: 0 /100 WBCS — SIGNIFICANT CHANGE UP (ref 0–0)
PLATELET # BLD AUTO: 302 K/UL — SIGNIFICANT CHANGE UP (ref 150–400)
POTASSIUM SERPL-MCNC: 4.5 MMOL/L — SIGNIFICANT CHANGE UP (ref 3.5–5.3)
POTASSIUM SERPL-SCNC: 4.5 MMOL/L — SIGNIFICANT CHANGE UP (ref 3.5–5.3)
RBC # BLD: 2.95 M/UL — LOW (ref 3.8–5.2)
RBC # FLD: 16.9 % — HIGH (ref 10.3–14.5)
SARS-COV-2 N GENE NPH QL NAA+PROBE: NOT DETECTED
SODIUM SERPL-SCNC: 144 MMOL/L — SIGNIFICANT CHANGE UP (ref 135–145)
WBC # BLD: 10.92 K/UL — HIGH (ref 3.8–10.5)
WBC # FLD AUTO: 10.92 K/UL — HIGH (ref 3.8–10.5)

## 2020-10-28 ENCOUNTER — OUTPATIENT (OUTPATIENT)
Dept: OUTPATIENT SERVICES | Facility: HOSPITAL | Age: 65
LOS: 1 days | Discharge: HOME | End: 2020-10-28

## 2020-10-28 DIAGNOSIS — Z96.612 PRESENCE OF LEFT ARTIFICIAL SHOULDER JOINT: Chronic | ICD-10-CM

## 2020-10-28 DIAGNOSIS — Z98.890 OTHER SPECIFIED POSTPROCEDURAL STATES: Chronic | ICD-10-CM

## 2020-10-28 DIAGNOSIS — C34.90 MALIGNANT NEOPLASM OF UNSPECIFIED PART OF UNSPECIFIED BRONCHUS OR LUNG: ICD-10-CM

## 2020-10-28 DIAGNOSIS — Z98.89 OTHER SPECIFIED POSTPROCEDURAL STATES: Chronic | ICD-10-CM

## 2020-10-28 DIAGNOSIS — Z96.641 PRESENCE OF RIGHT ARTIFICIAL HIP JOINT: Chronic | ICD-10-CM

## 2020-11-04 ENCOUNTER — LABORATORY RESULT (OUTPATIENT)
Age: 65
End: 2020-11-04

## 2020-11-04 ENCOUNTER — NON-APPOINTMENT (OUTPATIENT)
Age: 65
End: 2020-11-04

## 2020-11-04 ENCOUNTER — APPOINTMENT (OUTPATIENT)
Dept: HEMATOLOGY ONCOLOGY | Facility: CLINIC | Age: 65
End: 2020-11-04

## 2020-11-04 ENCOUNTER — APPOINTMENT (OUTPATIENT)
Dept: INFUSION THERAPY | Facility: HOSPITAL | Age: 65
End: 2020-11-04

## 2020-11-11 ENCOUNTER — LABORATORY RESULT (OUTPATIENT)
Age: 65
End: 2020-11-11

## 2020-11-11 ENCOUNTER — RESULT REVIEW (OUTPATIENT)
Age: 65
End: 2020-11-11

## 2020-11-11 ENCOUNTER — APPOINTMENT (OUTPATIENT)
Dept: HEMATOLOGY ONCOLOGY | Facility: CLINIC | Age: 65
End: 2020-11-11
Payer: MEDICAID

## 2020-11-11 ENCOUNTER — APPOINTMENT (OUTPATIENT)
Dept: INFUSION THERAPY | Facility: HOSPITAL | Age: 65
End: 2020-11-11

## 2020-11-11 DIAGNOSIS — Z51.11 ENCOUNTER FOR ANTINEOPLASTIC CHEMOTHERAPY: ICD-10-CM

## 2020-11-11 DIAGNOSIS — R11.2 NAUSEA WITH VOMITING, UNSPECIFIED: ICD-10-CM

## 2020-11-11 LAB
BASOPHILS # BLD AUTO: 0 K/UL — SIGNIFICANT CHANGE UP (ref 0–0.2)
BASOPHILS NFR BLD AUTO: 0 % — SIGNIFICANT CHANGE UP (ref 0–2)
EOSINOPHIL # BLD AUTO: 0.14 K/UL — SIGNIFICANT CHANGE UP (ref 0–0.5)
EOSINOPHIL NFR BLD AUTO: 2 % — SIGNIFICANT CHANGE UP (ref 0–6)
HCT VFR BLD CALC: 32.7 % — LOW (ref 34.5–45)
HGB BLD-MCNC: 10.2 G/DL — LOW (ref 11.5–15.5)
LYMPHOCYTES # BLD AUTO: 2.34 K/UL — SIGNIFICANT CHANGE UP (ref 1–3.3)
LYMPHOCYTES # BLD AUTO: 34 % — SIGNIFICANT CHANGE UP (ref 13–44)
MCHC RBC-ENTMCNC: 30.4 PG — SIGNIFICANT CHANGE UP (ref 27–34)
MCHC RBC-ENTMCNC: 31.2 G/DL — LOW (ref 32–36)
MCV RBC AUTO: 97.6 FL — SIGNIFICANT CHANGE UP (ref 80–100)
METAMYELOCYTES # FLD: 1 % — HIGH (ref 0–0)
MONOCYTES # BLD AUTO: 0.62 K/UL — SIGNIFICANT CHANGE UP (ref 0–0.9)
MONOCYTES NFR BLD AUTO: 9 % — SIGNIFICANT CHANGE UP (ref 2–14)
NEUTROPHILS # BLD AUTO: 3.72 K/UL — SIGNIFICANT CHANGE UP (ref 1.8–7.4)
NEUTROPHILS NFR BLD AUTO: 54 % — SIGNIFICANT CHANGE UP (ref 43–77)
NRBC # BLD: 0 /100 — SIGNIFICANT CHANGE UP (ref 0–0)
NRBC # BLD: SIGNIFICANT CHANGE UP /100 WBCS (ref 0–0)
PLAT MORPH BLD: NORMAL — SIGNIFICANT CHANGE UP
PLATELET # BLD AUTO: 326 K/UL — SIGNIFICANT CHANGE UP (ref 150–400)
RBC # BLD: 3.35 M/UL — LOW (ref 3.8–5.2)
RBC # FLD: 14.3 % — SIGNIFICANT CHANGE UP (ref 10.3–14.5)
RBC BLD AUTO: SIGNIFICANT CHANGE UP
WBC # BLD: 6.89 K/UL — SIGNIFICANT CHANGE UP (ref 3.8–10.5)
WBC # FLD AUTO: 6.89 K/UL — SIGNIFICANT CHANGE UP (ref 3.8–10.5)

## 2020-11-11 PROCEDURE — 99072 ADDL SUPL MATRL&STAF TM PHE: CPT

## 2020-11-11 PROCEDURE — 99214 OFFICE O/P EST MOD 30 MIN: CPT

## 2020-11-11 NOTE — ASSESSMENT
[FreeTextEntry1] : 63 yo w  diagnosed lung adeno ca\par Stage IIIC by imaging. LArge volume of disease and hence, not started on RT\par NGS revealed KRAS G12V mut, low TMB and KIM. PDL1 1%.\par Started carbo/Alimta/Pem given Q 3 weeks schedule. Currently on maintenance alimta/keytruda\par Pt is taking folic acid. B12 shot q6w\par Using Oxycodone 20 mg as needed. Pt follows with pain md. \par Pt appears very agitated (manic) today. When questioned, she states she behaves this way when she is hungry (??hypoglycemic) and that she feels very hungry. Will f/u bw today. Pt reassured me she is fine. No changes in pain meds per patient. Treatment RN was asked to monitor and will call me for any changes.\par Imaging due middle of December. Will enter orders now.\par Dex and omeprazole renewed as requested by patient\par OV following imaging. \par \par \par

## 2020-11-11 NOTE — HISTORY OF PRESENT ILLNESS
[Disease: _____________________] : Disease: [unfilled] [N: ___] : N[unfilled] [M: ___] : M[unfilled] [AJCC Stage: ____] : AJCC Stage: [unfilled] [de-identified] : Ms. Travis is a 64-year-old female with a history of recently diagnosed non-small cell lung adenocarcinoma of left lung, pulmonary embolus on apixaban, HTN, chronic pancreatitis s/p Jeffery procedure (2019), history of ARDS (2015), GERD, chronic pain on opioids, and s/p vocal cord polypectomy who presents for consultation visit\par Brief hx: Pt was recently hospitalized in mid-Feb 2020 to [facility] for dyspnea, left-sided back pain, and fatigue with loss of appetite. Found on CTPA (February 2020) to have a large filling defect in the L main pulmonary artery extending into the left upper lobar and left interlobar pulm arteries with complete opacification of the LLL pulm artery. There were thin linear filling defects in the bifurcation of the right upper pulmonary artery and the right interlobar pulmonary arteries extending to the right lower pulmonary artery, which appeared chronic. CT also showed mediastinal adenopathy, including left paratracheal, subcarinal, and left hilar. There is also a wedge shaped opacity in the posterior left lower lobe suspicious for malignancy. There is centrilobular emphysema. 2D-echo (Feb 2020) with normal LV systolic function, mild concentric LVH, normal LA, RV enlargement with normal RV systolic function, and estimated PASP 39 consistent with borderline pulmonary hypertension. She was started on heparin infusion and discharged on Apixaban. Hypercoagulable workup negative, including Factor V Leiden, Prothrombin gene mutation, and lupus anticoagulant. \par \par She had EBUS/TBNA on Feb 19, 2020, found to have adenocarcinoma in right and left paratracheal and subcarinal lymph nodes (R4, L4, level 7). She is pending PFTs, PET/CT, brain MRI. Patient was discharged on Oxycodone ER BID and Tylenol prn. She is not constipated and is on a bowel regimen with polyethylene glycol. Still does not have a good appetite. She has lost 15 lbs over last 3 months. No fevers, chills, or chest pain. Reports occasional cough for which she takes benzonatate perle about once daily. Declining influenza vaccination. Takes Duonebs twice daily as prescribed, but denies dyspnea. \par \par Last colonoscopy in 2018 normal. Mammogram in June 2019 negative. No personal or family history of miscarriages or spontaneous abortions. Reports history of smoking but denies any history of COPD or lung cancer. No family history of malignancy, sarcoidosis, or TB. No recent prolonged immobility.\par \par Of note, active smoker, quit end of last year, on and off 4-6 cigarettes/day. She had CXR for lung cancer screening in the 1980s but nothing recently. Denies etoh use. \par \par 4/6/20: No new complaints. Persistent pain in the chest and occasional cough. COntinues to lose weight,. No other constitutional symptoms. \par \par 4/16/20: Pain in the chest improved. She has been nauseous and c/o abd cramps since starting chemo. She has been taking Zofran with minimal benefit. She has not been using Reglan because she was told not to by nutritionist. She has lost 2 lbs. \par \par 5/6/2020: Pt being seen in treatment area. Pt reported that she tolerated the 2nd cycle better with the change in premeds and adding dexamethasone following the treatment for 2 days. She still experiences days of severe nausea and vomiting but not continuously. Pt has has normal bowel function. She denies SOB/ slight fatigue. Denies pain\par \par 5/20/2020: Pt being seen in treatment area. Pt is here today for cycle 3. She states that for the last 2 days she has been unable to keep anything down. She vomited the entire 2 days. This is an ongoing and chronic problem and is unrelated to chemo. She has been evaluated by GI without discovering etiology for it. Initial brain imaging (-) for metastaic disease. No headaches or dizziness. Pt states it always lasts 2 days and spontaneously resolves. Today she states she was able to tolerate broth and mandarin oranges. No fever. No c/o of cough/CP/hemoptysis or SOB. \par  \par \par 8/12/20: On maintenance now. Scans in June 2020 shows \par \par 9/2/2020: Pt seen in treatment room. Feeling well. Previously experiencing significant nausea and vomiting. Resolved. Now using omeprazole on BID dosing. No new complaints\par \par 9/23/2020: Pt seen in treatment room. SHe has been experiencing malaise and significant diarrhea since friday. Appetite poor. (+) weight loss. No fever but feels very washed out. Had scans last friday as well. \par \par 11/11/2020: Pt returns for follow up. Patient had called last week to say she was canceling her treatment and office visit . Dr. Cage spoke with the patient who said that she was having pain and that her pancreas is acting up. Patient has a history of chronic pancreatitis. As per patient she has been in touch with her PCP and GI doctor. She was rescheduled for this week. She states that as suddenly as the pain came on, it left the same way. She has not had any problems since. SHe reports she had follow up with pain management doctor as well. No other complaints\par  \par \par  [de-identified] : adeno ca

## 2020-11-11 NOTE — PHYSICAL EXAM
[Restricted in physically strenuous activity but ambulatory and able to carry out work of a light or sedentary nature] : Status 1- Restricted in physically strenuous activity but ambulatory and able to carry out work of a light or sedentary nature, e.g., light house work, office work [Thin] : thin [Normal] : grossly intact [de-identified] : Appears manic

## 2020-11-11 NOTE — REVIEW OF SYSTEMS
[Fatigue] : fatigue [Recent Change In Weight] : ~T recent weight change [Abdominal Pain] : abdominal pain [Negative] : Allergic/Immunologic [Cough] : no cough [SOB on Exertion] : no shortness of breath during exertion [Vomiting] : no vomiting [Diarrhea] : no diarrhea [FreeTextEntry7] : as above [FreeTextEntry9] : as above

## 2020-11-19 NOTE — H&P ADULT - NSHPPHYSICALEXAM_GEN_ALL_CORE
Vital Signs Last 24 Hrs  T(C): 37.1 (05 Apr 2019 02:24), Max: 37.3 (04 Apr 2019 21:26)  T(F): 98.7 (05 Apr 2019 02:24), Max: 99.2 (04 Apr 2019 21:26)  HR: 106 (05 Apr 2019 02:24) (106 - 138)  BP: 150/95 (05 Apr 2019 02:24) (131/91 - 150/95)  BP(mean): --  RR: 18 (05 Apr 2019 02:24) (17 - 18)  SpO2: 97% (05 Apr 2019 02:24) (97% - 98%)      GENERAL: NAD  HEAD:  Atraumatic, Normocephalic  EYES: conjunctiva and sclera clear  CHEST/LUNG: Clear to auscultation bilaterally; Mild upper airway congestion (pt states she has mucus in airway unable to clear)  HEART: Regular rate and rhythm; No murmurs, rubs, or gallops  ABDOMEN: Soft, Nontender, Nondistended; Bowel sounds present  EXTREMITIES:  2+ Peripheral Pulses, No clubbing, cyanosis, or edema  PSYCH: AAOx3  NEUROLOGY: non-focal  SKIN: No rashes or lesions normal sinus rhythm

## 2020-11-23 NOTE — H&P PST ADULT - PRO TOBACCO CESSATION INFO YN
Pt here w/ c/o pain onset 3 weeks ago. States pain was at the umbilicus, w/ some swelling at belly button. States last week woke up and pain was just to the right of the abd and felt a hard lump that ran up abd. Some pain w/ lifting. Hx of inguinal hernia. yes

## 2020-11-25 ENCOUNTER — APPOINTMENT (OUTPATIENT)
Dept: INFUSION THERAPY | Facility: HOSPITAL | Age: 65
End: 2020-11-25

## 2020-11-27 ENCOUNTER — OUTPATIENT (OUTPATIENT)
Dept: OUTPATIENT SERVICES | Facility: HOSPITAL | Age: 65
LOS: 1 days | Discharge: HOME | End: 2020-11-27

## 2020-11-27 DIAGNOSIS — C34.90 MALIGNANT NEOPLASM OF UNSPECIFIED PART OF UNSPECIFIED BRONCHUS OR LUNG: ICD-10-CM

## 2020-11-27 DIAGNOSIS — Z96.612 PRESENCE OF LEFT ARTIFICIAL SHOULDER JOINT: Chronic | ICD-10-CM

## 2020-11-27 DIAGNOSIS — Z98.890 OTHER SPECIFIED POSTPROCEDURAL STATES: Chronic | ICD-10-CM

## 2020-11-27 DIAGNOSIS — Z96.641 PRESENCE OF RIGHT ARTIFICIAL HIP JOINT: Chronic | ICD-10-CM

## 2020-11-27 DIAGNOSIS — Z98.89 OTHER SPECIFIED POSTPROCEDURAL STATES: Chronic | ICD-10-CM

## 2020-11-30 ENCOUNTER — LABORATORY RESULT (OUTPATIENT)
Age: 65
End: 2020-11-30

## 2020-11-30 ENCOUNTER — APPOINTMENT (OUTPATIENT)
Dept: INFUSION THERAPY | Facility: HOSPITAL | Age: 65
End: 2020-11-30

## 2020-12-02 ENCOUNTER — RESULT REVIEW (OUTPATIENT)
Age: 65
End: 2020-12-02

## 2020-12-02 ENCOUNTER — LABORATORY RESULT (OUTPATIENT)
Age: 65
End: 2020-12-02

## 2020-12-02 ENCOUNTER — APPOINTMENT (OUTPATIENT)
Dept: INFUSION THERAPY | Facility: HOSPITAL | Age: 65
End: 2020-12-02

## 2020-12-02 DIAGNOSIS — R11.2 NAUSEA WITH VOMITING, UNSPECIFIED: ICD-10-CM

## 2020-12-02 DIAGNOSIS — Z51.11 ENCOUNTER FOR ANTINEOPLASTIC CHEMOTHERAPY: ICD-10-CM

## 2020-12-02 LAB
BASOPHILS # BLD AUTO: 0.03 K/UL — SIGNIFICANT CHANGE UP (ref 0–0.2)
BASOPHILS NFR BLD AUTO: 0.4 % — SIGNIFICANT CHANGE UP (ref 0–2)
EOSINOPHIL # BLD AUTO: 0.04 K/UL — SIGNIFICANT CHANGE UP (ref 0–0.5)
EOSINOPHIL NFR BLD AUTO: 0.6 % — SIGNIFICANT CHANGE UP (ref 0–6)
HCT VFR BLD CALC: 32.9 % — LOW (ref 34.5–45)
HGB BLD-MCNC: 10.4 G/DL — LOW (ref 11.5–15.5)
IMM GRANULOCYTES NFR BLD AUTO: 0.6 % — SIGNIFICANT CHANGE UP (ref 0–1.5)
LYMPHOCYTES # BLD AUTO: 2.29 K/UL — SIGNIFICANT CHANGE UP (ref 1–3.3)
LYMPHOCYTES # BLD AUTO: 33.7 % — SIGNIFICANT CHANGE UP (ref 13–44)
MCHC RBC-ENTMCNC: 30.1 PG — SIGNIFICANT CHANGE UP (ref 27–34)
MCHC RBC-ENTMCNC: 31.6 G/DL — LOW (ref 32–36)
MCV RBC AUTO: 95.4 FL — SIGNIFICANT CHANGE UP (ref 80–100)
MONOCYTES # BLD AUTO: 0.77 K/UL — SIGNIFICANT CHANGE UP (ref 0–0.9)
MONOCYTES NFR BLD AUTO: 11.3 % — SIGNIFICANT CHANGE UP (ref 2–14)
NEUTROPHILS # BLD AUTO: 3.63 K/UL — SIGNIFICANT CHANGE UP (ref 1.8–7.4)
NEUTROPHILS NFR BLD AUTO: 53.4 % — SIGNIFICANT CHANGE UP (ref 43–77)
NRBC # BLD: 0 /100 WBCS — SIGNIFICANT CHANGE UP (ref 0–0)
PLATELET # BLD AUTO: 596 K/UL — HIGH (ref 150–400)
RBC # BLD: 3.45 M/UL — LOW (ref 3.8–5.2)
RBC # FLD: 16.3 % — HIGH (ref 10.3–14.5)
WBC # BLD: 6.8 K/UL — SIGNIFICANT CHANGE UP (ref 3.8–10.5)
WBC # FLD AUTO: 6.8 K/UL — SIGNIFICANT CHANGE UP (ref 3.8–10.5)

## 2020-12-14 ENCOUNTER — OUTPATIENT (OUTPATIENT)
Dept: OUTPATIENT SERVICES | Facility: HOSPITAL | Age: 65
LOS: 1 days | End: 2020-12-14
Payer: COMMERCIAL

## 2020-12-14 ENCOUNTER — APPOINTMENT (OUTPATIENT)
Dept: CT IMAGING | Facility: IMAGING CENTER | Age: 65
End: 2020-12-14
Payer: MEDICAID

## 2020-12-14 DIAGNOSIS — C34.92 MALIGNANT NEOPLASM OF UNSPECIFIED PART OF LEFT BRONCHUS OR LUNG: ICD-10-CM

## 2020-12-14 DIAGNOSIS — Z98.890 OTHER SPECIFIED POSTPROCEDURAL STATES: Chronic | ICD-10-CM

## 2020-12-14 DIAGNOSIS — Z96.641 PRESENCE OF RIGHT ARTIFICIAL HIP JOINT: Chronic | ICD-10-CM

## 2020-12-14 DIAGNOSIS — Z96.612 PRESENCE OF LEFT ARTIFICIAL SHOULDER JOINT: Chronic | ICD-10-CM

## 2020-12-14 DIAGNOSIS — Z98.89 OTHER SPECIFIED POSTPROCEDURAL STATES: Chronic | ICD-10-CM

## 2020-12-14 PROCEDURE — 74177 CT ABD & PELVIS W/CONTRAST: CPT | Mod: 26

## 2020-12-14 PROCEDURE — 74177 CT ABD & PELVIS W/CONTRAST: CPT

## 2020-12-14 PROCEDURE — 71260 CT THORAX DX C+: CPT | Mod: 26

## 2020-12-14 PROCEDURE — 71260 CT THORAX DX C+: CPT

## 2020-12-17 ENCOUNTER — APPOINTMENT (OUTPATIENT)
Dept: INFUSION THERAPY | Facility: HOSPITAL | Age: 65
End: 2020-12-17

## 2020-12-24 ENCOUNTER — RESULT REVIEW (OUTPATIENT)
Age: 65
End: 2020-12-24

## 2020-12-24 ENCOUNTER — LABORATORY RESULT (OUTPATIENT)
Age: 65
End: 2020-12-24

## 2020-12-24 ENCOUNTER — APPOINTMENT (OUTPATIENT)
Dept: INFUSION THERAPY | Facility: HOSPITAL | Age: 65
End: 2020-12-24

## 2020-12-24 ENCOUNTER — APPOINTMENT (OUTPATIENT)
Dept: HEMATOLOGY ONCOLOGY | Facility: CLINIC | Age: 65
End: 2020-12-24
Payer: MEDICAID

## 2020-12-24 DIAGNOSIS — K05.30 CHRONIC PERIODONTITIS, UNSPECIFIED: ICD-10-CM

## 2020-12-24 LAB
BASOPHILS # BLD AUTO: 0 K/UL — SIGNIFICANT CHANGE UP (ref 0–0.2)
BASOPHILS NFR BLD AUTO: 0 % — SIGNIFICANT CHANGE UP (ref 0–2)
ELLIPTOCYTES BLD QL SMEAR: SLIGHT — SIGNIFICANT CHANGE UP
EOSINOPHIL # BLD AUTO: 0 K/UL — SIGNIFICANT CHANGE UP (ref 0–0.5)
EOSINOPHIL NFR BLD AUTO: 0 % — SIGNIFICANT CHANGE UP (ref 0–6)
HCT VFR BLD CALC: 30.8 % — LOW (ref 34.5–45)
HGB BLD-MCNC: 9.7 G/DL — LOW (ref 11.5–15.5)
HYPOCHROMIA BLD QL: SLIGHT — SIGNIFICANT CHANGE UP
LYMPHOCYTES # BLD AUTO: 2.55 K/UL — SIGNIFICANT CHANGE UP (ref 1–3.3)
LYMPHOCYTES # BLD AUTO: 26 % — SIGNIFICANT CHANGE UP (ref 13–44)
MCHC RBC-ENTMCNC: 29.9 PG — SIGNIFICANT CHANGE UP (ref 27–34)
MCHC RBC-ENTMCNC: 31.5 G/DL — LOW (ref 32–36)
MCV RBC AUTO: 95.1 FL — SIGNIFICANT CHANGE UP (ref 80–100)
MONOCYTES # BLD AUTO: 1.47 K/UL — HIGH (ref 0–0.9)
MONOCYTES NFR BLD AUTO: 15 % — HIGH (ref 2–14)
NEUTROPHILS # BLD AUTO: 5.78 K/UL — SIGNIFICANT CHANGE UP (ref 1.8–7.4)
NEUTROPHILS NFR BLD AUTO: 59 % — SIGNIFICANT CHANGE UP (ref 43–77)
NRBC # BLD: 0 /100 — SIGNIFICANT CHANGE UP (ref 0–0)
NRBC # BLD: SIGNIFICANT CHANGE UP /100 WBCS (ref 0–0)
OVALOCYTES BLD QL SMEAR: SLIGHT — SIGNIFICANT CHANGE UP
PLAT MORPH BLD: NORMAL — SIGNIFICANT CHANGE UP
PLATELET # BLD AUTO: 683 K/UL — HIGH (ref 150–400)
POIKILOCYTOSIS BLD QL AUTO: SLIGHT — SIGNIFICANT CHANGE UP
RBC # BLD: 3.24 M/UL — LOW (ref 3.8–5.2)
RBC # FLD: 18.6 % — HIGH (ref 10.3–14.5)
RBC BLD AUTO: ABNORMAL
SCHISTOCYTES BLD QL AUTO: SLIGHT — SIGNIFICANT CHANGE UP
WBC # BLD: 9.8 K/UL — SIGNIFICANT CHANGE UP (ref 3.8–10.5)
WBC # FLD AUTO: 9.8 K/UL — SIGNIFICANT CHANGE UP (ref 3.8–10.5)

## 2020-12-24 PROCEDURE — 99072 ADDL SUPL MATRL&STAF TM PHE: CPT

## 2020-12-24 PROCEDURE — 99215 OFFICE O/P EST HI 40 MIN: CPT

## 2020-12-24 NOTE — REVIEW OF SYSTEMS
[Fatigue] : fatigue [Recent Change In Weight] : ~T recent weight change [Abdominal Pain] : abdominal pain [Negative] : Allergic/Immunologic [Cough] : no cough [SOB on Exertion] : no shortness of breath during exertion [Vomiting] : no vomiting [Diarrhea] : no diarrhea [FreeTextEntry7] : resolved [FreeTextEntry9] : as above

## 2020-12-24 NOTE — PHYSICAL EXAM
[Restricted in physically strenuous activity but ambulatory and able to carry out work of a light or sedentary nature] : Status 1- Restricted in physically strenuous activity but ambulatory and able to carry out work of a light or sedentary nature, e.g., light house work, office work [Thin] : thin [Normal] : grossly intact [de-identified] : Appears manic

## 2020-12-24 NOTE — ASSESSMENT
[FreeTextEntry1] : 64 yo w  diagnosed lung adeno ca\par Stage IIIC by imaging. Large volume of disease and hence, not started on RT\par Started carbo/Alimta/Pem given Q 3 weeks schedule. Currently on maintenance alimta/keytruda\par Excellent response to treatment based on recent scan. Will present at TB. Sequential RT?\par NGS revealed KRAS G12V mut, low TMB and KIM. PDL1 1%.\par Pt is taking folic acid. B12 shot q6w\par Using Oxycodone 20 mg as needed. Pt follows with pain md. \par Left jaw swelling and redness. Oral exam show partially edentulous mouth, with redness and tenderness of left lower jaw. Follow up with oral surgery ASAP. Amoxicillin TID x 7 days. \par OV following imaging. \par \par \par

## 2020-12-24 NOTE — HISTORY OF PRESENT ILLNESS
[Disease: _____________________] : Disease: [unfilled] [N: ___] : N[unfilled] [M: ___] : M[unfilled] [AJCC Stage: ____] : AJCC Stage: [unfilled] [de-identified] : Ms. Travis is a 64-year-old female with a history of recently diagnosed non-small cell lung adenocarcinoma of left lung, pulmonary embolus on apixaban, HTN, chronic pancreatitis s/p Jeffery procedure (2019), history of ARDS (2015), GERD, chronic pain on opioids, and s/p vocal cord polypectomy who presents for consultation visit\par Brief hx: Pt was recently hospitalized in mid-Feb 2020 to [facility] for dyspnea, left-sided back pain, and fatigue with loss of appetite. Found on CTPA (February 2020) to have a large filling defect in the L main pulmonary artery extending into the left upper lobar and left interlobar pulm arteries with complete opacification of the LLL pulm artery. There were thin linear filling defects in the bifurcation of the right upper pulmonary artery and the right interlobar pulmonary arteries extending to the right lower pulmonary artery, which appeared chronic. CT also showed mediastinal adenopathy, including left paratracheal, subcarinal, and left hilar. There is also a wedge shaped opacity in the posterior left lower lobe suspicious for malignancy. There is centrilobular emphysema. 2D-echo (Feb 2020) with normal LV systolic function, mild concentric LVH, normal LA, RV enlargement with normal RV systolic function, and estimated PASP 39 consistent with borderline pulmonary hypertension. She was started on heparin infusion and discharged on Apixaban. Hypercoagulable workup negative, including Factor V Leiden, Prothrombin gene mutation, and lupus anticoagulant. \par \par She had EBUS/TBNA on Feb 19, 2020, found to have adenocarcinoma in right and left paratracheal and subcarinal lymph nodes (R4, L4, level 7). She is pending PFTs, PET/CT, brain MRI. Patient was discharged on Oxycodone ER BID and Tylenol prn. She is not constipated and is on a bowel regimen with polyethylene glycol. Still does not have a good appetite. She has lost 15 lbs over last 3 months. No fevers, chills, or chest pain. Reports occasional cough for which she takes benzonatate perle about once daily. Declining influenza vaccination. Takes Duonebs twice daily as prescribed, but denies dyspnea. \par \par Last colonoscopy in 2018 normal. Mammogram in June 2019 negative. No personal or family history of miscarriages or spontaneous abortions. Reports history of smoking but denies any history of COPD or lung cancer. No family history of malignancy, sarcoidosis, or TB. No recent prolonged immobility.\par \par Of note, active smoker, quit end of last year, on and off 4-6 cigarettes/day. She had CXR for lung cancer screening in the 1980s but nothing recently. Denies etoh use. \par \par 4/6/20: No new complaints. Persistent pain in the chest and occasional cough. COntinues to lose weight,. No other constitutional symptoms. \par \par 4/16/20: Pain in the chest improved. She has been nauseous and c/o abd cramps since starting chemo. She has been taking Zofran with minimal benefit. She has not been using Reglan because she was told not to by nutritionist. She has lost 2 lbs. \par \par 5/6/2020: Pt being seen in treatment area. Pt reported that she tolerated the 2nd cycle better with the change in premeds and adding dexamethasone following the treatment for 2 days. She still experiences days of severe nausea and vomiting but not continuously. Pt has has normal bowel function. She denies SOB/ slight fatigue. Denies pain\par \par 5/20/2020: Pt being seen in treatment area. Pt is here today for cycle 3. She states that for the last 2 days she has been unable to keep anything down. She vomited the entire 2 days. This is an ongoing and chronic problem and is unrelated to chemo. She has been evaluated by GI without discovering etiology for it. Initial brain imaging (-) for metastaic disease. No headaches or dizziness. Pt states it always lasts 2 days and spontaneously resolves. Today she states she was able to tolerate broth and mandarin oranges. No fever. No c/o of cough/CP/hemoptysis or SOB. \par  \par \par 8/12/20: On maintenance now. Scans in June 2020 shows \par \par 9/2/2020: Pt seen in treatment room. Feeling well. Previously experiencing significant nausea and vomiting. Resolved. Now using omeprazole on BID dosing. No new complaints\par \par 9/23/2020: Pt seen in treatment room. SHe has been experiencing malaise and significant diarrhea since friday. Appetite poor. (+) weight loss. No fever but feels very washed out. Had scans last friday as well. \par \par 11/11/2020: Pt returns for follow up. Patient had called last week to say she was canceling her treatment and office visit . Dr. Cage spoke with the patient who said that she was having pain and that her pancreas is acting up. Patient has a history of chronic pancreatitis. As per patient she has been in touch with her PCP and GI doctor. She was rescheduled for this week. She states that as suddenly as the pain came on, it left the same way. She has not had any problems since. SHe reports she had follow up with pain management doctor as well. No other complaints\par \par 12/24/20: Left oral swelling. Has lost multiple teeth spontaneously. Has appt with oral surgeon. Has gained some weight. Has stopped smoking.\par  \par \par  [de-identified] : adeno ca

## 2021-01-05 ENCOUNTER — APPOINTMENT (OUTPATIENT)
Dept: SURGERY | Facility: CLINIC | Age: 66
End: 2021-01-05
Payer: MEDICAID

## 2021-01-09 ENCOUNTER — OUTPATIENT (OUTPATIENT)
Dept: OUTPATIENT SERVICES | Facility: HOSPITAL | Age: 66
LOS: 1 days | Discharge: HOME | End: 2021-01-09

## 2021-01-09 DIAGNOSIS — Z98.890 OTHER SPECIFIED POSTPROCEDURAL STATES: Chronic | ICD-10-CM

## 2021-01-09 DIAGNOSIS — Z96.641 PRESENCE OF RIGHT ARTIFICIAL HIP JOINT: Chronic | ICD-10-CM

## 2021-01-09 DIAGNOSIS — Z96.612 PRESENCE OF LEFT ARTIFICIAL SHOULDER JOINT: Chronic | ICD-10-CM

## 2021-01-09 DIAGNOSIS — C34.90 MALIGNANT NEOPLASM OF UNSPECIFIED PART OF UNSPECIFIED BRONCHUS OR LUNG: ICD-10-CM

## 2021-01-09 DIAGNOSIS — Z98.89 OTHER SPECIFIED POSTPROCEDURAL STATES: Chronic | ICD-10-CM

## 2021-01-11 ENCOUNTER — APPOINTMENT (OUTPATIENT)
Dept: INFUSION THERAPY | Facility: HOSPITAL | Age: 66
End: 2021-01-11

## 2021-01-12 ENCOUNTER — APPOINTMENT (OUTPATIENT)
Dept: SURGERY | Facility: CLINIC | Age: 66
End: 2021-01-12
Payer: MEDICAID

## 2021-01-12 VITALS
BODY MASS INDEX: 20.38 KG/M2 | OXYGEN SATURATION: 97 % | SYSTOLIC BLOOD PRESSURE: 132 MMHG | WEIGHT: 115 LBS | HEART RATE: 105 BPM | HEIGHT: 63 IN | DIASTOLIC BLOOD PRESSURE: 75 MMHG | RESPIRATION RATE: 15 BRPM

## 2021-01-12 DIAGNOSIS — Z98.890 OTHER SPECIFIED POSTPROCEDURAL STATES: ICD-10-CM

## 2021-01-12 PROCEDURE — 99213 OFFICE O/P EST LOW 20 MIN: CPT

## 2021-01-12 PROCEDURE — 99072 ADDL SUPL MATRL&STAF TM PHE: CPT

## 2021-01-13 ENCOUNTER — RESULT REVIEW (OUTPATIENT)
Age: 66
End: 2021-01-13

## 2021-01-13 ENCOUNTER — APPOINTMENT (OUTPATIENT)
Dept: INFUSION THERAPY | Facility: HOSPITAL | Age: 66
End: 2021-01-13

## 2021-01-13 ENCOUNTER — LABORATORY RESULT (OUTPATIENT)
Age: 66
End: 2021-01-13

## 2021-01-13 DIAGNOSIS — R11.2 NAUSEA WITH VOMITING, UNSPECIFIED: ICD-10-CM

## 2021-01-13 DIAGNOSIS — Z51.11 ENCOUNTER FOR ANTINEOPLASTIC CHEMOTHERAPY: ICD-10-CM

## 2021-01-13 LAB
BASOPHILS # BLD AUTO: 0.07 K/UL — SIGNIFICANT CHANGE UP (ref 0–0.2)
BASOPHILS NFR BLD AUTO: 1 % — SIGNIFICANT CHANGE UP (ref 0–2)
ELLIPTOCYTES BLD QL SMEAR: SLIGHT — SIGNIFICANT CHANGE UP
EOSINOPHIL # BLD AUTO: 0.13 K/UL — SIGNIFICANT CHANGE UP (ref 0–0.5)
EOSINOPHIL NFR BLD AUTO: 2 % — SIGNIFICANT CHANGE UP (ref 0–6)
HCT VFR BLD CALC: 27.7 % — LOW (ref 34.5–45)
HGB BLD-MCNC: 8.6 G/DL — LOW (ref 11.5–15.5)
HYPOCHROMIA BLD QL: SLIGHT — SIGNIFICANT CHANGE UP
LYMPHOCYTES # BLD AUTO: 2.05 K/UL — SIGNIFICANT CHANGE UP (ref 1–3.3)
LYMPHOCYTES # BLD AUTO: 31 % — SIGNIFICANT CHANGE UP (ref 13–44)
MCHC RBC-ENTMCNC: 30.2 PG — SIGNIFICANT CHANGE UP (ref 27–34)
MCHC RBC-ENTMCNC: 31 G/DL — LOW (ref 32–36)
MCV RBC AUTO: 97.2 FL — SIGNIFICANT CHANGE UP (ref 80–100)
MONOCYTES # BLD AUTO: 1.06 K/UL — HIGH (ref 0–0.9)
MONOCYTES NFR BLD AUTO: 16 % — HIGH (ref 2–14)
NEUTROPHILS # BLD AUTO: 3.3 K/UL — SIGNIFICANT CHANGE UP (ref 1.8–7.4)
NEUTROPHILS NFR BLD AUTO: 50 % — SIGNIFICANT CHANGE UP (ref 43–77)
NRBC # BLD: 0 /100 — SIGNIFICANT CHANGE UP (ref 0–0)
NRBC # BLD: SIGNIFICANT CHANGE UP /100 WBCS (ref 0–0)
OVALOCYTES BLD QL SMEAR: SLIGHT — SIGNIFICANT CHANGE UP
PLAT MORPH BLD: NORMAL — SIGNIFICANT CHANGE UP
PLATELET # BLD AUTO: 546 K/UL — HIGH (ref 150–400)
POIKILOCYTOSIS BLD QL AUTO: SLIGHT — SIGNIFICANT CHANGE UP
RBC # BLD: 2.85 M/UL — LOW (ref 3.8–5.2)
RBC # FLD: 21.3 % — HIGH (ref 10.3–14.5)
RBC BLD AUTO: ABNORMAL
SCHISTOCYTES BLD QL AUTO: SLIGHT — SIGNIFICANT CHANGE UP
WBC # BLD: 6.6 K/UL — SIGNIFICANT CHANGE UP (ref 3.8–10.5)
WBC # FLD AUTO: 6.6 K/UL — SIGNIFICANT CHANGE UP (ref 3.8–10.5)

## 2021-01-14 ENCOUNTER — NON-APPOINTMENT (OUTPATIENT)
Age: 66
End: 2021-01-14

## 2021-01-19 PROBLEM — Z98.890 HISTORY OF PANCREATIC SURGERY: Status: ACTIVE | Noted: 2019-06-12

## 2021-01-19 NOTE — HISTORY OF PRESENT ILLNESS
[de-identified] : Ms. HIMANSHU VILLEGAS is a 65 year old female who is well known to me with history of chronic calcific pancreatitis, and is s/p Karyna procedure on 5/8/19. She did great post-operatively and no longer required recurrent hospital admissions for pancreatitis/pain control after surgery. Unfortunately she was diagnosed with non-small cell lung cancer stage IIIB, in February 2020. When I saw her in office 6 months ago, she reported unintentional weight loss of 15 lbs over 3 months time. She was started on remeron for appetite. She is followed medical oncologist and is on chemotherapy for lung cancer treatment. \par \par 1/12/21 - Pt presents for Telehealth visit for follow up s/p KARYNA procedure. Pt Currently undergroing treatment for Lung CA which she reports " is going well". Pt report stable weight but eating is " touch and go depending on when she has cancer treatment" patient remains on megace. Pt Reports morning epigastric pain which is relieved with oxycodone which she takes on tablet daily. Pt reports normal bowel movement. Denies back pain.

## 2021-01-19 NOTE — REVIEW OF SYSTEMS
[Negative] : Neurological [FreeTextEntry6] : pt denies [FreeTextEntry7] : undergoing treatment for lung cancer [FreeTextEntry8] : see note above hpi

## 2021-01-19 NOTE — ASSESSMENT
[FreeTextEntry1] : s/p Jeffery 5/2019 for chronic pancreatitis. Was diagnosed with non-small cell lung cancer in Feb 2020 s/p chemo now on immunotherapy. Pt report touch and go appetite regains on megace. Pt reports stable weight. Pt report nausea only with treatment for lung CA. Pt reports epigastric pain in the morning for which she take oxycodone daily. \par \par Plan\par - Pt will continue to follow up with pain management clinic\par - RTc Follow up in 6 months or sooner if symptoms return\par -

## 2021-01-19 NOTE — PHYSICAL EXAM
[Normal] : grossly intact [de-identified] : anicteric  [de-identified] : Respiration unlabored

## 2021-01-22 ENCOUNTER — APPOINTMENT (OUTPATIENT)
Dept: CARDIOLOGY | Facility: CLINIC | Age: 66
End: 2021-01-22
Payer: MEDICAID

## 2021-01-22 VITALS
DIASTOLIC BLOOD PRESSURE: 73 MMHG | HEIGHT: 63 IN | OXYGEN SATURATION: 100 % | BODY MASS INDEX: 20.38 KG/M2 | SYSTOLIC BLOOD PRESSURE: 138 MMHG | WEIGHT: 115 LBS | HEART RATE: 73 BPM

## 2021-01-22 PROCEDURE — 99072 ADDL SUPL MATRL&STAF TM PHE: CPT

## 2021-01-22 PROCEDURE — 99215 OFFICE O/P EST HI 40 MIN: CPT

## 2021-01-22 NOTE — PHYSICAL EXAM
[General Appearance - Well Developed] : well developed [Normal Appearance] : normal appearance [General Appearance - Well Nourished] : well nourished [Normal Conjunctiva] : the conjunctiva exhibited no abnormalities [Normal Oral Mucosa] : normal oral mucosa [Heart Rate And Rhythm] : heart rate and rhythm were normal [Heart Sounds] : normal S1 and S2 [Murmurs] : no murmurs present [Edema] : no peripheral edema present [Auscultation Breath Sounds / Voice Sounds] : lungs were clear to auscultation bilaterally [Abdomen Soft] : soft [Abnormal Walk] : normal gait [Nail Clubbing] : no clubbing of the fingernails [Cyanosis, Localized] : no localized cyanosis [] : no rash [Oriented To Time, Place, And Person] : oriented to person, place, and time [FreeTextEntry1] : bilateral DP 2+

## 2021-01-22 NOTE — ASSESSMENT
[FreeTextEntry1] : - Continue Eliquis 5mg BID while receiving active therapy for cancer\par - Can hold anticoagulation 2-3 days for procedure\par - Will repeat TTE and lower extremity dopplers\par \par f/u in 6 months

## 2021-01-22 NOTE — HISTORY OF PRESENT ILLNESS
[FreeTextEntry1] : Ms. Travis is a 66 yo F withi PMH of chronic pancreatitis s/p Fey procedure 5/2019, non small-cell lung cancer diagnosed 2/2020 on immunotherapy with good response, and history of R below knee DVT and bilateral PE diagnosed 2/2020 on Eliquis 5mg BID who presents to vascular clinic for follow up after being seen inpatient a year ago.\par Patient initially presented to hospital 2/2020 with dyspnea and found to have large filling defect of L main pulmonary artery extending into the left upper lobar and left interlobar pulm arteries with complete opacification of the LLL pulm artery, along with thin linear filling defects in the bifurcation of the right upper pulmonary artery and the right interlobar pulmonary arteries extending to the right lower pulmonary artery, which appeared chronic. TTE with normal LV systolic function, RV enlargement with normal RV systolic function, and estimated PASP 39 consistent with borderline pulmonary hypertension. US dopplers noted with R soleal DVT, discharged on Eliquis 5mg BID. Hypercoagulable workup negative, including Factor V Leiden, Prothrombin gene mutation, and lupus anticoagulant. Has not followed up with vascular since discharge, continues to take full dose AC without issues.\par Denies dyspnea with exertion, leg pain, ambulates and is active around house without issues.

## 2021-01-28 ENCOUNTER — OUTPATIENT (OUTPATIENT)
Dept: OUTPATIENT SERVICES | Facility: HOSPITAL | Age: 66
LOS: 1 days | End: 2021-01-28
Payer: COMMERCIAL

## 2021-01-28 ENCOUNTER — APPOINTMENT (OUTPATIENT)
Dept: ULTRASOUND IMAGING | Facility: CLINIC | Age: 66
End: 2021-01-28
Payer: MEDICAID

## 2021-01-28 DIAGNOSIS — Z98.890 OTHER SPECIFIED POSTPROCEDURAL STATES: Chronic | ICD-10-CM

## 2021-01-28 DIAGNOSIS — Z98.89 OTHER SPECIFIED POSTPROCEDURAL STATES: Chronic | ICD-10-CM

## 2021-01-28 DIAGNOSIS — Z96.612 PRESENCE OF LEFT ARTIFICIAL SHOULDER JOINT: Chronic | ICD-10-CM

## 2021-01-28 DIAGNOSIS — Z96.641 PRESENCE OF RIGHT ARTIFICIAL HIP JOINT: Chronic | ICD-10-CM

## 2021-01-28 DIAGNOSIS — Z86.711 PERSONAL HISTORY OF PULMONARY EMBOLISM: ICD-10-CM

## 2021-01-28 PROCEDURE — 93970 EXTREMITY STUDY: CPT | Mod: 26

## 2021-02-03 ENCOUNTER — LABORATORY RESULT (OUTPATIENT)
Age: 66
End: 2021-02-03

## 2021-02-03 ENCOUNTER — RESULT REVIEW (OUTPATIENT)
Age: 66
End: 2021-02-03

## 2021-02-03 ENCOUNTER — APPOINTMENT (OUTPATIENT)
Dept: HEMATOLOGY ONCOLOGY | Facility: CLINIC | Age: 66
End: 2021-02-03

## 2021-02-03 ENCOUNTER — APPOINTMENT (OUTPATIENT)
Dept: HEMATOLOGY ONCOLOGY | Facility: CLINIC | Age: 66
End: 2021-02-03
Payer: MEDICAID

## 2021-02-03 ENCOUNTER — APPOINTMENT (OUTPATIENT)
Dept: INFUSION THERAPY | Facility: HOSPITAL | Age: 66
End: 2021-02-03

## 2021-02-03 VITALS
OXYGEN SATURATION: 99 % | DIASTOLIC BLOOD PRESSURE: 81 MMHG | BODY MASS INDEX: 21.09 KG/M2 | SYSTOLIC BLOOD PRESSURE: 138 MMHG | TEMPERATURE: 98 F | HEART RATE: 105 BPM | WEIGHT: 119.05 LBS | RESPIRATION RATE: 16 BRPM

## 2021-02-03 DIAGNOSIS — D64.9 ANEMIA, UNSPECIFIED: ICD-10-CM

## 2021-02-03 LAB
BASOPHILS # BLD AUTO: 0.03 K/UL — SIGNIFICANT CHANGE UP (ref 0–0.2)
BASOPHILS NFR BLD AUTO: 0.5 % — SIGNIFICANT CHANGE UP (ref 0–2)
EOSINOPHIL # BLD AUTO: 0.09 K/UL — SIGNIFICANT CHANGE UP (ref 0–0.5)
EOSINOPHIL NFR BLD AUTO: 1.4 % — SIGNIFICANT CHANGE UP (ref 0–6)
HCT VFR BLD CALC: 27.1 % — LOW (ref 34.5–45)
HGB BLD-MCNC: 8.5 G/DL — LOW (ref 11.5–15.5)
IMM GRANULOCYTES NFR BLD AUTO: 1.6 % — HIGH (ref 0–1.5)
LYMPHOCYTES # BLD AUTO: 1.81 K/UL — SIGNIFICANT CHANGE UP (ref 1–3.3)
LYMPHOCYTES # BLD AUTO: 28.6 % — SIGNIFICANT CHANGE UP (ref 13–44)
MCHC RBC-ENTMCNC: 31.4 G/DL — LOW (ref 32–36)
MCHC RBC-ENTMCNC: 32 PG — SIGNIFICANT CHANGE UP (ref 27–34)
MCV RBC AUTO: 101.9 FL — HIGH (ref 80–100)
MONOCYTES # BLD AUTO: 1.26 K/UL — HIGH (ref 0–0.9)
MONOCYTES NFR BLD AUTO: 19.9 % — HIGH (ref 2–14)
NEUTROPHILS # BLD AUTO: 3.03 K/UL — SIGNIFICANT CHANGE UP (ref 1.8–7.4)
NEUTROPHILS NFR BLD AUTO: 48 % — SIGNIFICANT CHANGE UP (ref 43–77)
NRBC # BLD: 0 /100 WBCS — SIGNIFICANT CHANGE UP (ref 0–0)
PLATELET # BLD AUTO: 437 K/UL — HIGH (ref 150–400)
RBC # BLD: 2.66 M/UL — LOW (ref 3.8–5.2)
RBC # FLD: 23.2 % — HIGH (ref 10.3–14.5)
WBC # BLD: 6.32 K/UL — SIGNIFICANT CHANGE UP (ref 3.8–10.5)
WBC # FLD AUTO: 6.32 K/UL — SIGNIFICANT CHANGE UP (ref 3.8–10.5)

## 2021-02-03 PROCEDURE — 86850 RBC ANTIBODY SCREEN: CPT

## 2021-02-03 PROCEDURE — 86900 BLOOD TYPING SEROLOGIC ABO: CPT

## 2021-02-03 PROCEDURE — 99214 OFFICE O/P EST MOD 30 MIN: CPT

## 2021-02-03 PROCEDURE — 86901 BLOOD TYPING SEROLOGIC RH(D): CPT

## 2021-02-03 PROCEDURE — 93970 EXTREMITY STUDY: CPT

## 2021-02-03 PROCEDURE — 99072 ADDL SUPL MATRL&STAF TM PHE: CPT

## 2021-02-03 NOTE — ASSESSMENT
[FreeTextEntry1] : 64 yo w  diagnosed lung adeno ca\par Stage IIIC by imaging. Large volume of disease and hence, not started on RT\par Started carbo/Alimta/Pem given Q 3 weeks schedule. Currently on maintenance alimta/keytruda\par Excellent response to treatment based on recent scan. \par NGS revealed KRAS G12V mut, low TMB and KIM. PDL1 1%.\par Pt is taking folic acid. B12 shot q6w\par Pt is anemic, Hgb at baseline is in the 10s. Pt states she has been anemic all her life. However, given her fatigue- will transfuse 1 Unit of PRBC. \par Using Oxycodone 20 mg as needed. Pt follows with pain md. \par Left jaw swelling and redness last visit. Improved with abx\par DVT on Eliquis. Repeat doppler shows no DVT. \par Repeat CTs ordered. LAst one was in December. \par OV after scans. \par CEA stable\par Will present at TB after scans to see if any benefit from sequential RT\par \par \par

## 2021-02-03 NOTE — HISTORY OF PRESENT ILLNESS
[Disease: _____________________] : Disease: [unfilled] [N: ___] : N[unfilled] [M: ___] : M[unfilled] [AJCC Stage: ____] : AJCC Stage: [unfilled] [de-identified] : Ms. Travis is a 65-year-old female with a history of recently diagnosed non-small cell lung adenocarcinoma of left lung, pulmonary embolus on apixaban, HTN, chronic pancreatitis s/p Jeffery procedure (2019), history of ARDS (2015), GERD, chronic pain on opioids, and s/p vocal cord polypectomy who presents for consultation visit\par Brief hx: Pt was recently hospitalized in mid-Feb 2020 to [facility] for dyspnea, left-sided back pain, and fatigue with loss of appetite. Found on CTPA (February 2020) to have a large filling defect in the L main pulmonary artery extending into the left upper lobar and left interlobar pulm arteries with complete opacification of the LLL pulm artery. There were thin linear filling defects in the bifurcation of the right upper pulmonary artery and the right interlobar pulmonary arteries extending to the right lower pulmonary artery, which appeared chronic. CT also showed mediastinal adenopathy, including left paratracheal, subcarinal, and left hilar. There is also a wedge shaped opacity in the posterior left lower lobe suspicious for malignancy. There is centrilobular emphysema. 2D-echo (Feb 2020) with normal LV systolic function, mild concentric LVH, normal LA, RV enlargement with normal RV systolic function, and estimated PASP 39 consistent with borderline pulmonary hypertension. She was started on heparin infusion and discharged on Apixaban. Hypercoagulable workup negative, including Factor V Leiden, Prothrombin gene mutation, and lupus anticoagulant. \par \par She had EBUS/TBNA on Feb 19, 2020, found to have adenocarcinoma in right and left paratracheal and subcarinal lymph nodes (R4, L4, level 7). She is pending PFTs, PET/CT, brain MRI. Patient was discharged on Oxycodone ER BID and Tylenol prn. She is not constipated and is on a bowel regimen with polyethylene glycol. Still does not have a good appetite. She has lost 15 lbs over last 3 months. No fevers, chills, or chest pain. Reports occasional cough for which she takes benzonatate perle about once daily. Declining influenza vaccination. Takes Duonebs twice daily as prescribed, but denies dyspnea. \par \par Last colonoscopy in 2018 normal. Mammogram in June 2019 negative. No personal or family history of miscarriages or spontaneous abortions. Reports history of smoking but denies any history of COPD or lung cancer. No family history of malignancy, sarcoidosis, or TB. No recent prolonged immobility.\par \par Of note, active smoker, quit end of last year, on and off 4-6 cigarettes/day. She had CXR for lung cancer screening in the 1980s but nothing recently. Denies etoh use. \par \par 4/6/20: No new complaints. Persistent pain in the chest and occasional cough. COntinues to lose weight,. No other constitutional symptoms. \par \par 4/16/20: Pain in the chest improved. She has been nauseous and c/o abd cramps since starting chemo. She has been taking Zofran with minimal benefit. She has not been using Reglan because she was told not to by nutritionist. She has lost 2 lbs. \par \par 5/6/2020: Pt being seen in treatment area. Pt reported that she tolerated the 2nd cycle better with the change in premeds and adding dexamethasone following the treatment for 2 days. She still experiences days of severe nausea and vomiting but not continuously. Pt has has normal bowel function. She denies SOB/ slight fatigue. Denies pain\par \par 5/20/2020: Pt being seen in treatment area. Pt is here today for cycle 3. She states that for the last 2 days she has been unable to keep anything down. She vomited the entire 2 days. This is an ongoing and chronic problem and is unrelated to chemo. She has been evaluated by GI without discovering etiology for it. Initial brain imaging (-) for metastaic disease. No headaches or dizziness. Pt states it always lasts 2 days and spontaneously resolves. Today she states she was able to tolerate broth and mandarin oranges. No fever. No c/o of cough/CP/hemoptysis or SOB. \par  \par \par 8/12/20: On maintenance now. Scans in June 2020 shows \par \par 9/2/2020: Pt seen in treatment room. Feeling well. Previously experiencing significant nausea and vomiting. Resolved. Now using omeprazole on BID dosing. No new complaints\par \par 9/23/2020: Pt seen in treatment room. SHe has been experiencing malaise and significant diarrhea since friday. Appetite poor. (+) weight loss. No fever but feels very washed out. Had scans last friday as well. \par \par 11/11/2020: Pt returns for follow up. Patient had called last week to say she was canceling her treatment and office visit . Dr. Cage spoke with the patient who said that she was having pain and that her pancreas is acting up. Patient has a history of chronic pancreatitis. As per patient she has been in touch with her PCP and GI doctor. She was rescheduled for this week. She states that as suddenly as the pain came on, it left the same way. She has not had any problems since. SHe reports she had follow up with pain management doctor as well. No other complaints\par \par 12/24/20: Left oral swelling. Has lost multiple teeth spontaneously. Has appt with oral surgeon. Has gained some weight. Has stopped smoking.\par \par 2/3/21: Pt has been fatigued, she is stressed because her friends (who she has had no contact with recently) are sick with COVID. \par  \par \par  [de-identified] : adeno ca

## 2021-02-03 NOTE — PHYSICAL EXAM
[Restricted in physically strenuous activity but ambulatory and able to carry out work of a light or sedentary nature] : Status 1- Restricted in physically strenuous activity but ambulatory and able to carry out work of a light or sedentary nature, e.g., light house work, office work [Thin] : thin [Normal] : grossly intact [de-identified] : Appears manic

## 2021-02-03 NOTE — REVIEW OF SYSTEMS
[Fatigue] : fatigue [Recent Change In Weight] : ~T recent weight change [Cough] : no cough [SOB on Exertion] : no shortness of breath during exertion [Abdominal Pain] : abdominal pain [Vomiting] : no vomiting [Diarrhea] : no diarrhea [Negative] : Allergic/Immunologic [FreeTextEntry2] : gained some weight [FreeTextEntry7] : resolved [FreeTextEntry9] : as above

## 2021-02-05 ENCOUNTER — APPOINTMENT (OUTPATIENT)
Dept: INFUSION THERAPY | Facility: HOSPITAL | Age: 66
End: 2021-02-05

## 2021-02-09 ENCOUNTER — OUTPATIENT (OUTPATIENT)
Dept: OUTPATIENT SERVICES | Facility: HOSPITAL | Age: 66
LOS: 1 days | End: 2021-02-09
Payer: COMMERCIAL

## 2021-02-09 ENCOUNTER — APPOINTMENT (OUTPATIENT)
Dept: CV DIAGNOSITCS | Facility: HOSPITAL | Age: 66
End: 2021-02-09

## 2021-02-09 DIAGNOSIS — Z98.890 OTHER SPECIFIED POSTPROCEDURAL STATES: Chronic | ICD-10-CM

## 2021-02-09 DIAGNOSIS — Z96.641 PRESENCE OF RIGHT ARTIFICIAL HIP JOINT: Chronic | ICD-10-CM

## 2021-02-09 DIAGNOSIS — Z98.89 OTHER SPECIFIED POSTPROCEDURAL STATES: Chronic | ICD-10-CM

## 2021-02-09 DIAGNOSIS — Z86.711 PERSONAL HISTORY OF PULMONARY EMBOLISM: ICD-10-CM

## 2021-02-09 DIAGNOSIS — Z96.612 PRESENCE OF LEFT ARTIFICIAL SHOULDER JOINT: Chronic | ICD-10-CM

## 2021-02-09 PROCEDURE — 93306 TTE W/DOPPLER COMPLETE: CPT | Mod: 26

## 2021-02-09 PROCEDURE — 93306 TTE W/DOPPLER COMPLETE: CPT

## 2021-02-16 PROBLEM — K63.89 MASS OF COLON: Status: ACTIVE | Noted: 2021-02-16

## 2021-02-20 ENCOUNTER — OUTPATIENT (OUTPATIENT)
Dept: OUTPATIENT SERVICES | Facility: HOSPITAL | Age: 66
LOS: 1 days | Discharge: HOME | End: 2021-02-20

## 2021-02-20 DIAGNOSIS — Z96.612 PRESENCE OF LEFT ARTIFICIAL SHOULDER JOINT: Chronic | ICD-10-CM

## 2021-02-20 DIAGNOSIS — Z98.890 OTHER SPECIFIED POSTPROCEDURAL STATES: Chronic | ICD-10-CM

## 2021-02-20 DIAGNOSIS — Z98.89 OTHER SPECIFIED POSTPROCEDURAL STATES: Chronic | ICD-10-CM

## 2021-02-20 DIAGNOSIS — C34.90 MALIGNANT NEOPLASM OF UNSPECIFIED PART OF UNSPECIFIED BRONCHUS OR LUNG: ICD-10-CM

## 2021-02-20 DIAGNOSIS — Z96.641 PRESENCE OF RIGHT ARTIFICIAL HIP JOINT: Chronic | ICD-10-CM

## 2021-02-22 ENCOUNTER — APPOINTMENT (OUTPATIENT)
Dept: SURGICAL ONCOLOGY | Facility: CLINIC | Age: 66
End: 2021-02-22
Payer: MEDICAID

## 2021-02-22 VITALS
BODY MASS INDEX: 21.26 KG/M2 | SYSTOLIC BLOOD PRESSURE: 137 MMHG | RESPIRATION RATE: 15 BRPM | WEIGHT: 120 LBS | HEART RATE: 88 BPM | DIASTOLIC BLOOD PRESSURE: 86 MMHG | HEIGHT: 63 IN | OXYGEN SATURATION: 96 %

## 2021-02-22 DIAGNOSIS — K63.89 OTHER SPECIFIED DISEASES OF INTESTINE: ICD-10-CM

## 2021-02-22 PROCEDURE — 99215 OFFICE O/P EST HI 40 MIN: CPT

## 2021-02-22 PROCEDURE — 99072 ADDL SUPL MATRL&STAF TM PHE: CPT

## 2021-02-22 NOTE — ASSESSMENT
[FreeTextEntry1] : 65 year-old female presents for an initial consultation.  Her past medical history includes HTN, chronic pancreatitis (s/p Jeffery procedure 2019, on Creon), ARDS (2015), GERD, chronic pain, s/p vocal cord polypectomy, chronic pain (on opioids).  She was hospitalized in February 2020 for dyspnea.  CTA was notable for bilateral PE and concerning for malignancy.  She was treated with a heparin drip and discharged home on Eliquis which she continues.  Hypercoagulable work up was negative.  She was ultimately diagnosed with non small cell stage III lung cancer.  She began chemotherapy under the care of Dr. Maxx Cage which she continues.\par \par Most recent CT of the chest, abdomen and pelvis performed in December 2020 showed interval improvement of bilateral pleural-based nodularity with no new lung nodules.  Interval improvement of mediastinal lymphadenopathy, re demonstration of moderately severe pulmonary emphysema, no evidence of metastatic disease within the abdomen and pelvis, hepatic steatosis, chronic calcific pancreatitis and prior pancreaticojejunostomy.   \par \par She underwent a routine colonoscopy with Dr. Juan Messer on 12/15/20.  A pedunculated mass-like polyp was seen in the proximal ascending colon, opposite the IC valve, measuring 2 cm.  Polypectomy was performed.  Pathology demonstrated small fragments of tubulovillous adenoma with focal high grade dysplasia.  \par \par CEA 2/3/21- 6.0 ng/ml (elevated).\par \par She is feeling well and denies any hematochezia, bowel habit changes, or constitutional symptoms.  She states that she wants her polyp and hemorrhoids removed. \par \par A&P:\par - 2 cm polyp in the proximal ascending colon, s/p polypectomy 12/15/20\par - Pathology consistent with small fragments of tubulovillous adenoma with focal high grade dysplasia\par -Patient actively being treated for lung cancer\par - Will have slides reviewed at [facility] prior to to determining need for surgery\par - Will refer patient to Dr. Avi Stewart as well\par - I have reviewed the pertinent imaging, blood work and pathology.\par \par \par

## 2021-02-22 NOTE — CONSULT LETTER
[Dear  ___] : Dear  [unfilled], [Consult Letter:] : I had the pleasure of evaluating your patient, [unfilled]. [Consult Closing:] : Thank you very much for allowing me to participate in the care of this patient.  If you have any questions, please do not hesitate to contact me. [Sincerely,] : Sincerely, [DrAlma  ___] : Dr. BRADSHAW [DrAlma ___] : Dr. BRADSHAW [FreeTextEntry2] : Juan Messer MD [FreeTextEntry1] : 65 year-old female presents for an initial consultation.  Her past medical history includes HTN, chronic pancreatitis (s/p Jeffery procedure 2019, on Creon), ARDS (2015), GERD, chronic pain, s/p vocal cord polypectomy, chronic pain (on opioids).  She was hospitalized in February 2020 for dyspnea.  CTA was notable for bilateral PE and concerning for malignancy.  She was treated with a heparin drip and discharged home on Eliquis which she continues.  Hypercoagulable work up was negative.  She was ultimately diagnosed with non small cell stage III lung cancer.  She began chemotherapy under the care of Dr. Maxx Cage which she continues.\par \par Most recent CT of the chest, abdomen and pelvis performed in December 2020 showed interval improvement of bilateral pleural-based nodularity with no new lung nodules.  Interval improvement of mediastinal lymphadenopathy, re demonstration of moderately severe pulmonary emphysema, no evidence of metastatic disease within the abdomen and pelvis, hepatic steatosis, chronic calcific pancreatitis and prior pancreaticojejunostomy.   \par \par She underwent a routine colonoscopy with Dr. Juan Messer on 12/15/20.  A pedunculated mass-like polyp was seen in the proximal ascending colon, opposite the IC valve, measuring 2 cm.  Polypectomy was performed.  Pathology demonstrated small fragments of tubulovillous adenoma with focal high grade dysplasia.  \par \par CEA 2/3/21- 6.0 ng/ml (elevated).\par \par She is feeling well and denies any hematochezia, bowel habit changes, or constitutional symptoms.  She states that she wants her polyp and hemorrhoids removed. \par \par A&P:\par - 2 cm polyp in the proximal ascending colon, s/p polypectomy 12/15/20\par - Pathology consistent with small fragments of tubulovillous adenoma with focal high grade dysplasia\par -Patient actively being treated for lung cancer\par - Will have slides reviewed at [facility] prior to to determining need for surgery\par - Will refer patient to Dr. Avi Stewart as well\par - I have reviewed the pertinent imaging, blood work and pathology.\par \par PCP: Dr. Ananda Chavira\par Med Onc: Dr. Maxx Cage\par GI/Referring MD: Dr. Juan Messer [FreeTextEntry3] : Jordan Rios MD, FACS, FASCRS\par , Department of Surgery\par Director of the [facility]\par , Minimally Invasive/Robotic Cancer Surgery, Central & Eastern Divisions\par Division of Surgical Oncology

## 2021-02-22 NOTE — REASON FOR VISIT
[Initial Consultation] : an initial consultation for [FreeTextEntry2] : Ascending colon mass with high grade dysplasia

## 2021-02-22 NOTE — HISTORY OF PRESENT ILLNESS
[de-identified] : 65 year-old female presents for an initial consultation.  Her past medical history includes HTN, chronic pancreatitis (s/p Jeffery procedure 2019, on Creon), ARDS (2015), GERD, chronic pain, s/p vocal cord polypectomy, chronic pain (on opioids).  She was hospitalized in February 2020 for dyspnea.  CTA was notable for bilateral PE and concerning for malignancy.  She was treated with a heparin drip and discharged home on Eliquis which she continues.  Hypercoagulable work up was negative.  She was ultimately diagnosed with non small cell stage III lung cancer.  She began chemotherapy under the care of Dr. Maxx Cage which she continues.\par \par Most recent CT of the chest, abdomen and pelvis performed in December 2020 showed interval improvement of bilateral pleural-based nodularity with no new lung nodules.  Interval improvement of mediastinal lymphadenopathy, re demonstration of moderately severe pulmonary emphysema, no evidence of metastatic disease within the abdomen and pelvis, hepatic steatosis, chronic calcific pancreatitis and prior pancreaticojejunostomy.   \par \par She underwent a routine colonoscopy with Dr. Juan Messer on 12/15/20.  A pedunculated mass-like polyp was seen in the proximal ascending colon, opposite the IC valve, measuring 2 cm.  Polypectomy was performed.  Pathology demonstrated small fragments of tubulovillous adenoma with focal high grade dysplasia.  \par \par CEA 2/3/21- 6.0 ng/ml (elevated).\par \par She is feeling well and denies any hematochezia, bowel habit changes, or constitutional symptoms.  She states that she wants her polyp and hemorrhoids removed. \par \par PCP: Dr. Ananda Chavira\par Med Onc: Dr. Maxx Cage\par GI/Referring MD: Dr. Juan Messer

## 2021-02-24 ENCOUNTER — RESULT REVIEW (OUTPATIENT)
Age: 66
End: 2021-02-24

## 2021-02-24 ENCOUNTER — LABORATORY RESULT (OUTPATIENT)
Age: 66
End: 2021-02-24

## 2021-02-24 ENCOUNTER — APPOINTMENT (OUTPATIENT)
Dept: INFUSION THERAPY | Facility: HOSPITAL | Age: 66
End: 2021-02-24

## 2021-02-24 ENCOUNTER — APPOINTMENT (OUTPATIENT)
Dept: COLORECTAL SURGERY | Facility: CLINIC | Age: 66
End: 2021-02-24
Payer: MEDICAID

## 2021-02-24 VITALS
BODY MASS INDEX: 20.49 KG/M2 | SYSTOLIC BLOOD PRESSURE: 126 MMHG | OXYGEN SATURATION: 97 % | DIASTOLIC BLOOD PRESSURE: 82 MMHG | RESPIRATION RATE: 15 BRPM | WEIGHT: 120 LBS | HEIGHT: 64 IN | HEART RATE: 90 BPM | TEMPERATURE: 98.4 F

## 2021-02-24 DIAGNOSIS — R11.2 NAUSEA WITH VOMITING, UNSPECIFIED: ICD-10-CM

## 2021-02-24 DIAGNOSIS — Z51.11 ENCOUNTER FOR ANTINEOPLASTIC CHEMOTHERAPY: ICD-10-CM

## 2021-02-24 LAB
BASOPHILS # BLD AUTO: 0.03 K/UL — SIGNIFICANT CHANGE UP (ref 0–0.2)
BASOPHILS NFR BLD AUTO: 0.4 % — SIGNIFICANT CHANGE UP (ref 0–2)
EOSINOPHIL # BLD AUTO: 0.06 K/UL — SIGNIFICANT CHANGE UP (ref 0–0.5)
EOSINOPHIL NFR BLD AUTO: 0.8 % — SIGNIFICANT CHANGE UP (ref 0–6)
HCT VFR BLD CALC: 31 % — LOW (ref 34.5–45)
HGB BLD-MCNC: 9.8 G/DL — LOW (ref 11.5–15.5)
IMM GRANULOCYTES NFR BLD AUTO: 0.9 % — SIGNIFICANT CHANGE UP (ref 0–1.5)
LYMPHOCYTES # BLD AUTO: 2.27 K/UL — SIGNIFICANT CHANGE UP (ref 1–3.3)
LYMPHOCYTES # BLD AUTO: 28.6 % — SIGNIFICANT CHANGE UP (ref 13–44)
MCHC RBC-ENTMCNC: 31 PG — SIGNIFICANT CHANGE UP (ref 27–34)
MCHC RBC-ENTMCNC: 31.6 G/DL — LOW (ref 32–36)
MCV RBC AUTO: 98.1 FL — SIGNIFICANT CHANGE UP (ref 80–100)
MONOCYTES # BLD AUTO: 0.92 K/UL — HIGH (ref 0–0.9)
MONOCYTES NFR BLD AUTO: 11.6 % — SIGNIFICANT CHANGE UP (ref 2–14)
NEUTROPHILS # BLD AUTO: 4.6 K/UL — SIGNIFICANT CHANGE UP (ref 1.8–7.4)
NEUTROPHILS NFR BLD AUTO: 57.7 % — SIGNIFICANT CHANGE UP (ref 43–77)
NRBC # BLD: 0 /100 WBCS — SIGNIFICANT CHANGE UP (ref 0–0)
PLATELET # BLD AUTO: 468 K/UL — HIGH (ref 150–400)
RBC # BLD: 3.16 M/UL — LOW (ref 3.8–5.2)
RBC # FLD: 20.1 % — HIGH (ref 10.3–14.5)
WBC # BLD: 7.95 K/UL — SIGNIFICANT CHANGE UP (ref 3.8–10.5)
WBC # FLD AUTO: 7.95 K/UL — SIGNIFICANT CHANGE UP (ref 3.8–10.5)

## 2021-02-24 PROCEDURE — 99214 OFFICE O/P EST MOD 30 MIN: CPT

## 2021-02-24 PROCEDURE — 99072 ADDL SUPL MATRL&STAF TM PHE: CPT

## 2021-02-24 PROCEDURE — 99244 OFF/OP CNSLTJ NEW/EST MOD 40: CPT

## 2021-02-24 RX ORDER — LEVOFLOXACIN 500 MG/1
500 TABLET, FILM COATED ORAL DAILY
Qty: 10 | Refills: 0 | Status: DISCONTINUED | COMMUNITY
Start: 2020-09-23 | End: 2021-02-24

## 2021-02-24 RX ORDER — OXYCODONE HYDROCHLORIDE 30 MG/1
30 TABLET ORAL
Refills: 0 | Status: ACTIVE | COMMUNITY

## 2021-02-24 RX ORDER — PROCHLORPERAZINE MALEATE 10 MG/1
10 TABLET ORAL
Qty: 50 | Refills: 0 | Status: DISCONTINUED | COMMUNITY
Start: 2020-06-17 | End: 2021-02-24

## 2021-02-24 RX ORDER — VANCOMYCIN HYDROCHLORIDE 125 MG/1
125 CAPSULE ORAL 4 TIMES DAILY
Qty: 84 | Refills: 0 | Status: DISCONTINUED | COMMUNITY
Start: 2020-09-25 | End: 2021-02-24

## 2021-02-24 RX ORDER — PREDNISONE 20 MG/1
20 TABLET ORAL
Qty: 21 | Refills: 0 | Status: DISCONTINUED | COMMUNITY
Start: 2020-09-23 | End: 2021-02-24

## 2021-02-24 RX ORDER — BENZONATATE 100 MG/1
100 CAPSULE ORAL
Refills: 0 | Status: DISCONTINUED | COMMUNITY
End: 2021-02-24

## 2021-02-24 RX ORDER — POTASSIUM CHLORIDE 1500 MG/1
20 TABLET, FILM COATED, EXTENDED RELEASE ORAL DAILY
Qty: 10 | Refills: 0 | Status: DISCONTINUED | COMMUNITY
Start: 2020-09-23 | End: 2021-02-24

## 2021-02-24 RX ORDER — IPRATROPIUM BROMIDE AND ALBUTEROL SULFATE 2.5; .5 MG/3ML; MG/3ML
0.5-2.5 (3) SOLUTION RESPIRATORY (INHALATION)
Refills: 0 | Status: ACTIVE | COMMUNITY

## 2021-02-24 RX ORDER — AMOXICILLIN 500 MG/1
500 TABLET, FILM COATED ORAL 3 TIMES DAILY
Qty: 21 | Refills: 0 | Status: DISCONTINUED | COMMUNITY
Start: 2020-12-24 | End: 2021-02-24

## 2021-02-24 RX ORDER — HYDROXYCHLOROQUINE SULFATE 200 MG/1
200 TABLET ORAL
Refills: 0 | Status: DISCONTINUED | COMMUNITY
End: 2021-02-24

## 2021-02-24 NOTE — HISTORY OF PRESENT ILLNESS
[FreeTextEntry1] : Patient is a 66 yo female here with c/o painful hemorrhoids.  She states that she is currently on chemo for lung cancer and is also on eliquis for a DVT/PE.  She complains of swollen external tissue that rubs and there is occasional bleeding when wiping.  She is passing stool daily and states it is regular.  She states that her symptoms started last summer and has gotten progressively worse. She has daily pain and irritation.

## 2021-02-24 NOTE — REVIEW OF SYSTEMS
[Hoarseness] : hoarseness [Vomiting] : vomiting [Negative] : Heme/Lymph [FreeTextEntry5] : HTN [FreeTextEntry6] : lung cancer [FreeTextEntry7] : nausea

## 2021-02-24 NOTE — ASSESSMENT
[FreeTextEntry1] : Pt offered excision in the OR as topical therapy is unlikely to help this very large hemorrhoid. \par R/B/A d/w her including but not limited to pain, bleeding, infection and poor healing due to chemo. \par \par Will check with Onc prior to proceeding. \par

## 2021-02-24 NOTE — PHYSICAL EXAM
[Normal Breath Sounds] : Normal breath sounds [Normal Heart Sounds] : normal heart sounds [Normal Rate and Rhythm] : normal rate and rhythm [No Rash or Lesion] : No rash or lesion [Alert] : alert [Oriented to Person] : oriented to person [Oriented to Place] : oriented to place [Oriented to Time] : oriented to time [Calm] : calm [de-identified] : round, NT/ND, +BS [de-identified] : very large right lateral external hemorrhoid. area tender therefore EHSAN is deferred [de-identified] : Normal female [de-identified] : NC/AT [de-identified] : TERESE/+ROM [de-identified] : Intact

## 2021-02-25 ENCOUNTER — NON-APPOINTMENT (OUTPATIENT)
Age: 66
End: 2021-02-25

## 2021-03-04 ENCOUNTER — RESULT REVIEW (OUTPATIENT)
Age: 66
End: 2021-03-04

## 2021-03-04 ENCOUNTER — OUTPATIENT (OUTPATIENT)
Dept: OUTPATIENT SERVICES | Facility: HOSPITAL | Age: 66
LOS: 1 days | End: 2021-03-04
Payer: COMMERCIAL

## 2021-03-04 DIAGNOSIS — Z98.890 OTHER SPECIFIED POSTPROCEDURAL STATES: Chronic | ICD-10-CM

## 2021-03-04 DIAGNOSIS — K64.0 FIRST DEGREE HEMORRHOIDS: ICD-10-CM

## 2021-03-04 DIAGNOSIS — Z98.89 OTHER SPECIFIED POSTPROCEDURAL STATES: Chronic | ICD-10-CM

## 2021-03-04 DIAGNOSIS — Z96.641 PRESENCE OF RIGHT ARTIFICIAL HIP JOINT: Chronic | ICD-10-CM

## 2021-03-04 DIAGNOSIS — Z96.612 PRESENCE OF LEFT ARTIFICIAL SHOULDER JOINT: Chronic | ICD-10-CM

## 2021-03-04 PROCEDURE — 88321 CONSLTJ&REPRT SLD PREP ELSWR: CPT

## 2021-03-05 ENCOUNTER — OUTPATIENT (OUTPATIENT)
Dept: OUTPATIENT SERVICES | Facility: HOSPITAL | Age: 66
LOS: 1 days | End: 2021-03-05
Payer: COMMERCIAL

## 2021-03-05 VITALS
RESPIRATION RATE: 16 BRPM | OXYGEN SATURATION: 97 % | HEART RATE: 99 BPM | HEIGHT: 63 IN | DIASTOLIC BLOOD PRESSURE: 85 MMHG | SYSTOLIC BLOOD PRESSURE: 137 MMHG | WEIGHT: 119.93 LBS | TEMPERATURE: 100 F

## 2021-03-05 DIAGNOSIS — Z98.890 OTHER SPECIFIED POSTPROCEDURAL STATES: Chronic | ICD-10-CM

## 2021-03-05 DIAGNOSIS — Z96.612 PRESENCE OF LEFT ARTIFICIAL SHOULDER JOINT: Chronic | ICD-10-CM

## 2021-03-05 DIAGNOSIS — Z96.641 PRESENCE OF RIGHT ARTIFICIAL HIP JOINT: Chronic | ICD-10-CM

## 2021-03-05 DIAGNOSIS — Z98.89 OTHER SPECIFIED POSTPROCEDURAL STATES: Chronic | ICD-10-CM

## 2021-03-05 DIAGNOSIS — K64.4 RESIDUAL HEMORRHOIDAL SKIN TAGS: ICD-10-CM

## 2021-03-05 DIAGNOSIS — I26.99 OTHER PULMONARY EMBOLISM WITHOUT ACUTE COR PULMONALE: ICD-10-CM

## 2021-03-05 DIAGNOSIS — Z01.818 ENCOUNTER FOR OTHER PREPROCEDURAL EXAMINATION: ICD-10-CM

## 2021-03-05 DIAGNOSIS — Z11.52 ENCOUNTER FOR SCREENING FOR COVID-19: ICD-10-CM

## 2021-03-05 LAB
SARS-COV-2 RNA SPEC QL NAA+PROBE: SIGNIFICANT CHANGE UP
SURGICAL PATHOLOGY STUDY: SIGNIFICANT CHANGE UP

## 2021-03-05 PROCEDURE — U0005: CPT

## 2021-03-05 PROCEDURE — U0003: CPT

## 2021-03-05 PROCEDURE — C9803: CPT

## 2021-03-05 PROCEDURE — G0463: CPT

## 2021-03-05 PROCEDURE — 82150 ASSAY OF AMYLASE: CPT

## 2021-03-05 PROCEDURE — 85027 COMPLETE CBC AUTOMATED: CPT

## 2021-03-05 PROCEDURE — 80053 COMPREHEN METABOLIC PANEL: CPT

## 2021-03-05 RX ORDER — LIDOCAINE HCL 20 MG/ML
0.2 VIAL (ML) INJECTION ONCE
Refills: 0 | Status: DISCONTINUED | OUTPATIENT
Start: 2021-03-08 | End: 2021-03-22

## 2021-03-05 RX ORDER — PREGABALIN 225 MG/1
0 CAPSULE ORAL
Qty: 0 | Refills: 0 | DISCHARGE

## 2021-03-05 RX ORDER — CHOLECALCIFEROL (VITAMIN D3) 125 MCG
0 CAPSULE ORAL
Qty: 0 | Refills: 0 | DISCHARGE

## 2021-03-05 RX ORDER — SODIUM CHLORIDE 9 MG/ML
3 INJECTION INTRAMUSCULAR; INTRAVENOUS; SUBCUTANEOUS EVERY 8 HOURS
Refills: 0 | Status: DISCONTINUED | OUTPATIENT
Start: 2021-03-08 | End: 2021-03-22

## 2021-03-05 NOTE — H&P PST ADULT - NSICDXPASTSURGICALHX_GEN_ALL_CORE_FT
PAST SURGICAL HISTORY:  History of pancreatic surgery Jeffery procedure (5/8/2019)    History of vocal cord polypectomy 1/2018    S/P arthroscopy of shoulder left in 2009    S/P arthroscopy of shoulder right - 2014    S/P hip replacement left - 2010    S/P hip replacement, right May 2016    S/P shoulder replacement, left 2016

## 2021-03-05 NOTE — H&P PST ADULT - NSICDXPASTMEDICALHX_GEN_ALL_CORE_FT
PAST MEDICAL HISTORY:  Anemia transfusion 2/5/21    ARDS survivor 2015    Chronic abdominal pain     Chronic pancreatitis last episode 4/2019    COPD (chronic obstructive pulmonary disease)     GERD (gastroesophageal reflux disease)     History of adrenal adenoma stable on imaging    HTN (hypertension)     Non-small cell lung cancer (NSCLC) chemo: Alimta/ Keytruda 2/24/2021    Pulmonary embolism     Pulmonary hypertension     Thrombophlebitis superficial right UE during 4/2019 hospital stay, resolved as per pt    Vocal cord polyp removal 2018, benign as per pt

## 2021-03-05 NOTE — H&P PST ADULT - HISTORY OF PRESENT ILLNESS
***COVID  denies recent travel outside [place]  denies s/s  denies known exposure  swab 3/5 [facility]  65 year old female with hx of HTN, chronic pancreatitis  ARDS (2015), GERD,  Pulmonary Emboli, Non Small Cell stage 3 Lung Cancer (on chemotherapy Note pain , hemorrhoids  had colonoscopy dx with polyp for surgery    **Hx of PE on Eliquis last 3/5 am obtain most recent office note from Dr. Leahy [phone] called, office to fax     ***COVID  denies recent travel outside [place]  denies s/s  denies known exposure  swab 3/5 [facility]  65 year old female with hx of HTN, chronic pancreatitis  ARDS (2015), GERD,  Pulmonary Emboli, Non Small Cell stage 3 Lung Cancer (on chemotherapy0 Note pain anal /rectal area , hemorrhoids  had colonoscopy dx with polyp for surgery    **Hx of PE on Eliquis last 3/5 am obtain most recent office note from Dr. Leahy [phone] called, office to fax     ***COVID  denies recent travel outside [place]  denies s/s  denies known exposure  swab 3/5 [facility]  65 year old female with hx of HTN, chronic pancreatitis  ARDS (2015), GERD,  Pulmonary Emboli, Non Small Cell stage 3 Lung Cancer (on chemotherapy). Noted pain anal /rectal area , hemorrhoids  had colonoscopy dx with polyp for surgery    **Hx of PE on Eliquis last 3/5 am obtain most recent office note from Dr. Leahy [phone] called, office to fax     ***COVID  denies recent travel outside [place]  denies s/s  denies known exposure  swab 3/5 [facility]

## 2021-03-05 NOTE — H&P PST ADULT - VENOUS THROMBOEMBOLISM CURRENT STATUS
(1) abnormal pulmonary function (COPD)/(2) central venous access/(2) malignancy (present or previous)

## 2021-03-05 NOTE — H&P PST ADULT - NSICDXPROBLEM_GEN_ALL_CORE_FT
PROBLEM DIAGNOSES  Problem: Pulmonary emboli  Assessment and Plan: Eliquis last 3/5 am     Problem: Residual hemorrhoidal skin tags  Assessment and Plan: hemorrhoidectomy

## 2021-03-07 ENCOUNTER — TRANSCRIPTION ENCOUNTER (OUTPATIENT)
Age: 66
End: 2021-03-07

## 2021-03-08 ENCOUNTER — APPOINTMENT (OUTPATIENT)
Dept: COLORECTAL SURGERY | Facility: HOSPITAL | Age: 66
End: 2021-03-08
Payer: MEDICAID

## 2021-03-08 ENCOUNTER — OUTPATIENT (OUTPATIENT)
Dept: OUTPATIENT SERVICES | Facility: HOSPITAL | Age: 66
LOS: 1 days | End: 2021-03-08
Payer: COMMERCIAL

## 2021-03-08 ENCOUNTER — RESULT REVIEW (OUTPATIENT)
Age: 66
End: 2021-03-08

## 2021-03-08 VITALS
RESPIRATION RATE: 16 BRPM | HEIGHT: 63 IN | DIASTOLIC BLOOD PRESSURE: 77 MMHG | TEMPERATURE: 98 F | WEIGHT: 119.93 LBS | OXYGEN SATURATION: 97 % | SYSTOLIC BLOOD PRESSURE: 122 MMHG | HEART RATE: 96 BPM

## 2021-03-08 VITALS
DIASTOLIC BLOOD PRESSURE: 88 MMHG | RESPIRATION RATE: 16 BRPM | HEART RATE: 88 BPM | OXYGEN SATURATION: 100 % | SYSTOLIC BLOOD PRESSURE: 131 MMHG

## 2021-03-08 DIAGNOSIS — Z98.890 OTHER SPECIFIED POSTPROCEDURAL STATES: Chronic | ICD-10-CM

## 2021-03-08 DIAGNOSIS — K64.3 FOURTH DEGREE HEMORRHOIDS: ICD-10-CM

## 2021-03-08 DIAGNOSIS — Z96.612 PRESENCE OF LEFT ARTIFICIAL SHOULDER JOINT: Chronic | ICD-10-CM

## 2021-03-08 DIAGNOSIS — Z01.818 ENCOUNTER FOR OTHER PREPROCEDURAL EXAMINATION: ICD-10-CM

## 2021-03-08 DIAGNOSIS — K64.4 RESIDUAL HEMORRHOIDAL SKIN TAGS: ICD-10-CM

## 2021-03-08 DIAGNOSIS — Z98.89 OTHER SPECIFIED POSTPROCEDURAL STATES: Chronic | ICD-10-CM

## 2021-03-08 DIAGNOSIS — Z96.641 PRESENCE OF RIGHT ARTIFICIAL HIP JOINT: Chronic | ICD-10-CM

## 2021-03-08 PROCEDURE — 88304 TISSUE EXAM BY PATHOLOGIST: CPT | Mod: 26

## 2021-03-08 PROCEDURE — 88304 TISSUE EXAM BY PATHOLOGIST: CPT

## 2021-03-08 PROCEDURE — C1889: CPT

## 2021-03-08 PROCEDURE — 46255 REMOVE INT/EXT HEM 1 GROUP: CPT

## 2021-03-08 RX ORDER — ONDANSETRON 8 MG/1
4 TABLET, FILM COATED ORAL ONCE
Refills: 0 | Status: DISCONTINUED | OUTPATIENT
Start: 2021-03-08 | End: 2021-03-22

## 2021-03-08 RX ORDER — HYDROMORPHONE HYDROCHLORIDE 2 MG/ML
0.25 INJECTION INTRAMUSCULAR; INTRAVENOUS; SUBCUTANEOUS
Refills: 0 | Status: DISCONTINUED | OUTPATIENT
Start: 2021-03-08 | End: 2021-03-08

## 2021-03-08 RX ORDER — OXYCODONE HYDROCHLORIDE 5 MG/1
1 TABLET ORAL
Qty: 20 | Refills: 0
Start: 2021-03-08 | End: 2021-03-12

## 2021-03-08 RX ORDER — SODIUM CHLORIDE 9 MG/ML
1000 INJECTION, SOLUTION INTRAVENOUS
Refills: 0 | Status: DISCONTINUED | OUTPATIENT
Start: 2021-03-08 | End: 2021-03-22

## 2021-03-08 RX ORDER — OXYCODONE HYDROCHLORIDE 5 MG/1
5 TABLET ORAL ONCE
Refills: 0 | Status: DISCONTINUED | OUTPATIENT
Start: 2021-03-08 | End: 2021-03-08

## 2021-03-08 NOTE — ASU DISCHARGE PLAN (ADULT/PEDIATRIC) - CARE PROVIDER_API CALL
Avi Stewart)  ColonRectal Surgery; Surgery  [address], [address]  [place] [address]  Phone: [phone]  Fax: [phone]  Follow Up Time: 2 weeks

## 2021-03-08 NOTE — ASU DISCHARGE PLAN (ADULT/PEDIATRIC) - ASU DC SPECIAL INSTRUCTIONSFT
Please follow printed instructions. Please make an appointment to follow up with one of Dr. Stewart's partners in 2 weeks. PLEASE RESTART ELIQUIS ON THURSDAY 3/8/21. Please follow printed instructions. Please make an appointment to follow up with one of Dr. Stewart's partners in 2 weeks. PLEASE RESTART ELIQUIS ON THURSDAY 3/8/21.    Tylenol 1,000 mg given at 130pm.  Next dose if needed 730pm

## 2021-03-08 NOTE — BRIEF OPERATIVE NOTE - OPERATION/FINDINGS
Operative site prepped and draped in the usual sterile fashion. Large grade 4 hemorrhoid in right lateral position. Local anesthetic injected circumferentially around the anus. Base of the hemorrhoid was suture ligated and the hemorrhoid was excised with electrocautery. Hemostasis achieved. Wound closed with running stitch. Gelfoam packing left in rectum.

## 2021-03-08 NOTE — ASU PATIENT PROFILE, ADULT - PMH
Anemia  transfusion 2/5/21  ARDS survivor  2015  Chronic abdominal pain    Chronic pancreatitis  last episode 4/2019  COPD (chronic obstructive pulmonary disease)    GERD (gastroesophageal reflux disease)    History of adrenal adenoma  stable on imaging  HTN (hypertension)    Non-small cell lung cancer (NSCLC)  chemo: Alimta/ Keytruda 2/24/2021  Pulmonary embolism    Pulmonary hypertension    Thrombophlebitis  superficial right UE during 4/2019 hospital stay, resolved as per pt  Vocal cord polyp  removal 2018, benign as per pt

## 2021-03-08 NOTE — ASU PATIENT PROFILE, ADULT - MEDICATION HERBAL REMEDIES, PROFILE
Patient seen and examined;  chart reviewed.  G tube pulled out upon admission.  C/F antibiotics still in place.  Tolerates regular diet.  Last chemo was 2 weeks ago.  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  No RT to Date.  Swallows fine.  PEG no longer needed.    Still with modest right sided abdominal pain/ tenderness.  no fever.     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  No dyspepsia  No abdominal pain.    No Nausea, vomiting.  No Bleeding.  No hematemesis.   No diarrhea.    No hematochesia.  No weight loss, anorexia.  No edema.      MEDICATIONS  (STANDING):  amphetamine/dextroamphetamine 30 milliGRAM(s) Oral two times a day  aspirin  chewable 81 milliGRAM(s) Oral daily  carvedilol 6.25 milliGRAM(s) Oral every 12 hours  ceFAZolin   IVPB      ceFAZolin   IVPB 1000 milliGRAM(s) IV Intermittent every 8 hours  morphine ER Tablet 30 milliGRAM(s) Oral three times a day  OLANZapine 5 milliGRAM(s) Oral daily  pantoprazole    Tablet 40 milliGRAM(s) Oral before breakfast  pregabalin 75 milliGRAM(s) Oral two times a day  sodium chloride 0.9% 1000 milliLiter(s) (80 mL/Hr) IV Continuous <Continuous>    MEDICATIONS  (PRN):  ALPRAZolam 0.5 milliGRAM(s) Oral at bedtime PRN anxiety  HYDROmorphone   Tablet 4 milliGRAM(s) Oral every 4 hours PRN Severe Pain (7 - 10)  HYDROmorphone  Injectable 1 milliGRAM(s) IV Push every 4 hours PRN Severe Pain (7 - 10)      Allergies    hospital socks (Rash)  lisinopril (Anaphylaxis)  statins (Anaphylaxis)    Intolerances        Vital Signs Last 24 Hrs  T(C): 37.2 (25 Aug 2018 16:33), Max: 37.2 (25 Aug 2018 16:33)  T(F): 98.9 (25 Aug 2018 16:33), Max: 98.9 (25 Aug 2018 16:33)  HR: 91 (25 Aug 2018 16:33) (74 - 102)  BP: 110/64 (25 Aug 2018 16:33) (84/60 - 113/72)  BP(mean): --  RR: 18 (25 Aug 2018 16:33) (16 - 20)  SpO2: 98% (25 Aug 2018 09:15) (96% - 98%)    PHYSICAL EXAM:    GENERAL: NAD, well-groomed, well-developed  HEAD:  Atraumatic, Normocephalic  EYES: EOMI, PERRLA, conjunctiva and sclera clear  ENMT: No tonsillar erythema, exudates, or enlargement; Moist mucous membranes, Good dentition, No lesions  NECK: Supple, No JVD, Normal thyroid  CHEST/LUNG: Clear to percussion bilaterally; No rales, rhonchi, wheezing, or rubs  HEART: Regular rate and rhythm; No murmurs, rubs, or gallops  ABDOMEN: Soft, Minor right sided tenderness, Nondistended; Bowel sounds present; G tube site clean/ dry.    EXTREMITIES:  2+ Peripheral Pulses, No clubbing, cyanosis, or edema  LYMPH: No lymphadenopathy noted  SKIN: No rashes or lesions      LABS:                        8.9    13.6  )-----------( 264      ( 25 Aug 2018 11:55 )             28.1     08-25    138  |  104  |  4.0<L>  ----------------------------<  99  3.8   |  24.0  |  0.66    Ca    7.9<L>      25 Aug 2018 11:53               RADIOLOGY & ADDITIONAL STUDIES: no

## 2021-03-08 NOTE — ASU PATIENT PROFILE, ADULT - PSH
History of pancreatic surgery  Jeffery procedure (5/8/2019)  History of vocal cord polypectomy  1/2018  S/P arthroscopy of shoulder  right - 2014  S/P arthroscopy of shoulder  left in 2009  S/P hip replacement  left - 2010  S/P hip replacement, right  May 2016  S/P shoulder replacement, left  2016

## 2021-03-08 NOTE — ASU PATIENT PROFILE, ADULT - FALL HARM RISK CONCLUSION
Past Medical History:   Diagnosis Date    Arthritis     CHF (congestive heart failure)     Diabetes     Hypertension     Renal arteriosclerosis        Past Surgical History:   Procedure Laterality Date    BYPASS GRAFT            GALLBLADDER SURGERY      HYSTERECTOMY         Review of patient's allergies indicates:  No Known Allergies    Family History     Problem Relation (Age of Onset)    Cancer Mother, Father        Tobacco Use    Smoking status: Never Smoker    Smokeless tobacco: Never Used   Substance and Sexual Activity    Alcohol use: Not Currently     Frequency: Never    Drug use: Never    Sexual activity: Not on file         Review of Systems   Constitutional: Positive for fatigue. Negative for appetite change, chills, diaphoresis and fever.   HENT: Negative for congestion.    Respiratory: Positive for cough. Negative for apnea, shortness of breath and wheezing.    Cardiovascular: Negative for chest pain and leg swelling.   Gastrointestinal: Negative for abdominal pain, diarrhea, nausea and vomiting.   Endocrine: Negative for polydipsia.   Genitourinary: Negative for difficulty urinating.   Musculoskeletal: Negative for myalgias.   Skin: Negative for color change and wound.   Allergic/Immunologic: Negative for immunocompromised state.   Neurological: Negative for dizziness, syncope and weakness.   Hematological: Does not bruise/bleed easily.   Psychiatric/Behavioral: Negative for agitation, confusion and hallucinations. The patient is not nervous/anxious.      Objective:     Vital Signs (Most Recent):  Temp: 99 °F (37.2 °C) (19 1253)  Pulse: 60 (19 1714)  Resp: 20 (19 171)  BP: (!) 129/45 (19 1523)  SpO2: (!) 94 % (19 171) Vital Signs (24h Range):  Temp:  [99 °F (37.2 °C)-99.1 °F (37.3 °C)] 99 °F (37.2 °C)  Pulse:  [52-60] 60  Resp:  [17-28] 20  SpO2:  [82 %-99 %] 94 %  BP: ()/(42-92) 129/45     Weight: 57.2 kg (126 lb 1.7 oz)  Body mass index is  26.36 kg/m².      Intake/Output Summary (Last 24 hours) at 8/22/2019 1720  Last data filed at 8/22/2019 1300  Gross per 24 hour   Intake --   Output 800 ml   Net -800 ml       Physical Exam   Constitutional: She is oriented to person, place, and time. She appears well-developed and well-nourished. She is cooperative.  Non-toxic appearance. She does not have a sickly appearance. She does not appear ill. No distress. She is not intubated. Nasal cannula in place.   HENT:   Head: Normocephalic and atraumatic.   Mouth/Throat: Oropharynx is clear and moist and mucous membranes are normal.   Eyes: Pupils are equal, round, and reactive to light. EOM and lids are normal.   Neck: Trachea normal and full passive range of motion without pain. Carotid bruit is not present.   Cardiovascular: Regular rhythm and normal heart sounds. Bradycardia present.   Pulses:       Radial pulses are 2+ on the right side, and 2+ on the left side.        Dorsalis pedis pulses are 1+ on the right side, and 1+ on the left side.   Pulmonary/Chest: Effort normal and breath sounds normal. No accessory muscle usage. No tachypnea. She is not intubated. No respiratory distress.   Abdominal: Soft. Normal appearance. She exhibits no distension. Bowel sounds are decreased. There is no tenderness.   Musculoskeletal: Normal range of motion.        Right foot: There is no deformity.        Left foot: There is no deformity.   No edema   Lymphadenopathy:     She has no cervical adenopathy.   Neurological: She is alert and oriented to person, place, and time.   Skin: Skin is warm, dry and intact. Capillary refill takes less than 2 seconds. No rash noted. No cyanosis.   Psychiatric: She has a normal mood and affect. Her speech is normal and behavior is normal. Judgment and thought content normal. Cognition and memory are normal.       Vents:  Oxygen Concentration (%): 30 (08/22/19 1714)    Lines/Drains/Airways     Peripheral Intravenous Line                  Peripheral IV - Single Lumen 08/22/19 1135 20 G Right Antecubital less than 1 day                Significant Labs:    CBC/Anemia Profile:  Recent Labs   Lab 08/22/19  1500   WBC 10.49   HGB 10.8*   HCT 33.7*   *   MCV 94   RDW 15.2*        Chemistries:  Recent Labs   Lab 08/22/19  1500      K 3.8   CL 99   CO2 29   BUN 38*   CREATININE 1.2   CALCIUM 9.7   ALBUMIN 3.4*   PROT 7.2   BILITOT 0.5   ALKPHOS 92   ALT 27   AST 19       Troponin:   Recent Labs   Lab 08/22/19  1500   TROPONINI <0.006     All pertinent labs within the past 24 hours have been reviewed.    Significant Imaging:   CXR: I have reviewed all pertinent results/findings within the past 24 hours and my personal findings are:  No pneumothorax status post thoracentesis.  Reduction in the degree of congestion and effusions.  Near-total resolution of the right pleural effusion.  No pneumothorax status post thoracentesis.  No change in heart size.   Universal Safety Interventions

## 2021-03-10 ENCOUNTER — APPOINTMENT (OUTPATIENT)
Dept: CT IMAGING | Facility: IMAGING CENTER | Age: 66
End: 2021-03-10
Payer: MEDICAID

## 2021-03-10 ENCOUNTER — OUTPATIENT (OUTPATIENT)
Dept: OUTPATIENT SERVICES | Facility: HOSPITAL | Age: 66
LOS: 1 days | End: 2021-03-10
Payer: COMMERCIAL

## 2021-03-10 ENCOUNTER — RESULT REVIEW (OUTPATIENT)
Age: 66
End: 2021-03-10

## 2021-03-10 DIAGNOSIS — Z98.890 OTHER SPECIFIED POSTPROCEDURAL STATES: Chronic | ICD-10-CM

## 2021-03-10 DIAGNOSIS — Z96.612 PRESENCE OF LEFT ARTIFICIAL SHOULDER JOINT: Chronic | ICD-10-CM

## 2021-03-10 DIAGNOSIS — C34.92 MALIGNANT NEOPLASM OF UNSPECIFIED PART OF LEFT BRONCHUS OR LUNG: ICD-10-CM

## 2021-03-10 DIAGNOSIS — Z96.641 PRESENCE OF RIGHT ARTIFICIAL HIP JOINT: Chronic | ICD-10-CM

## 2021-03-10 DIAGNOSIS — Z98.89 OTHER SPECIFIED POSTPROCEDURAL STATES: Chronic | ICD-10-CM

## 2021-03-10 PROBLEM — C34.90 MALIGNANT NEOPLASM OF UNSPECIFIED PART OF UNSPECIFIED BRONCHUS OR LUNG: Chronic | Status: ACTIVE | Noted: 2020-04-07

## 2021-03-10 PROBLEM — D64.9 ANEMIA, UNSPECIFIED: Chronic | Status: ACTIVE | Noted: 2021-03-05

## 2021-03-10 PROCEDURE — 71260 CT THORAX DX C+: CPT

## 2021-03-10 PROCEDURE — 74160 CT ABDOMEN W/CONTRAST: CPT | Mod: 26

## 2021-03-10 PROCEDURE — 74160 CT ABDOMEN W/CONTRAST: CPT

## 2021-03-10 PROCEDURE — 71260 CT THORAX DX C+: CPT | Mod: 26

## 2021-03-11 LAB — SURGICAL PATHOLOGY STUDY: SIGNIFICANT CHANGE UP

## 2021-03-17 ENCOUNTER — RESULT REVIEW (OUTPATIENT)
Age: 66
End: 2021-03-17

## 2021-03-17 ENCOUNTER — APPOINTMENT (OUTPATIENT)
Dept: HEMATOLOGY ONCOLOGY | Facility: CLINIC | Age: 66
End: 2021-03-17
Payer: MEDICAID

## 2021-03-17 ENCOUNTER — LABORATORY RESULT (OUTPATIENT)
Age: 66
End: 2021-03-17

## 2021-03-17 ENCOUNTER — APPOINTMENT (OUTPATIENT)
Dept: INFUSION THERAPY | Facility: HOSPITAL | Age: 66
End: 2021-03-17

## 2021-03-17 LAB
BASOPHILS # BLD AUTO: 0 K/UL — SIGNIFICANT CHANGE UP (ref 0–0.2)
BASOPHILS NFR BLD AUTO: 0 % — SIGNIFICANT CHANGE UP (ref 0–2)
EOSINOPHIL # BLD AUTO: 0.09 K/UL — SIGNIFICANT CHANGE UP (ref 0–0.5)
EOSINOPHIL NFR BLD AUTO: 1 % — SIGNIFICANT CHANGE UP (ref 0–6)
HCT VFR BLD CALC: 30.5 % — LOW (ref 34.5–45)
HGB BLD-MCNC: 9.7 G/DL — LOW (ref 11.5–15.5)
LYMPHOCYTES # BLD AUTO: 3.42 K/UL — HIGH (ref 1–3.3)
LYMPHOCYTES # BLD AUTO: 40 % — SIGNIFICANT CHANGE UP (ref 13–44)
MCHC RBC-ENTMCNC: 30.4 PG — SIGNIFICANT CHANGE UP (ref 27–34)
MCHC RBC-ENTMCNC: 31.8 G/DL — LOW (ref 32–36)
MCV RBC AUTO: 95.6 FL — SIGNIFICANT CHANGE UP (ref 80–100)
MONOCYTES # BLD AUTO: 1.03 K/UL — HIGH (ref 0–0.9)
MONOCYTES NFR BLD AUTO: 12 % — SIGNIFICANT CHANGE UP (ref 2–14)
NEUTROPHILS # BLD AUTO: 4.02 K/UL — SIGNIFICANT CHANGE UP (ref 1.8–7.4)
NEUTROPHILS NFR BLD AUTO: 47 % — SIGNIFICANT CHANGE UP (ref 43–77)
NRBC # BLD: 0 /100 — SIGNIFICANT CHANGE UP (ref 0–0)
NRBC # BLD: SIGNIFICANT CHANGE UP /100 WBCS (ref 0–0)
PLAT MORPH BLD: NORMAL — SIGNIFICANT CHANGE UP
PLATELET # BLD AUTO: 477 K/UL — HIGH (ref 150–400)
RBC # BLD: 3.19 M/UL — LOW (ref 3.8–5.2)
RBC # FLD: 21.2 % — HIGH (ref 10.3–14.5)
RBC BLD AUTO: SIGNIFICANT CHANGE UP
WBC # BLD: 8.56 K/UL — SIGNIFICANT CHANGE UP (ref 3.8–10.5)
WBC # FLD AUTO: 8.56 K/UL — SIGNIFICANT CHANGE UP (ref 3.8–10.5)

## 2021-03-17 PROCEDURE — 99072 ADDL SUPL MATRL&STAF TM PHE: CPT

## 2021-03-17 PROCEDURE — 99214 OFFICE O/P EST MOD 30 MIN: CPT

## 2021-03-22 ENCOUNTER — APPOINTMENT (OUTPATIENT)
Dept: COLORECTAL SURGERY | Facility: CLINIC | Age: 66
End: 2021-03-22

## 2021-03-22 NOTE — REVIEW OF SYSTEMS
[Fatigue] : fatigue [Recent Change In Weight] : ~T recent weight change [Cough] : no cough [SOB on Exertion] : no shortness of breath during exertion [Abdominal Pain] : no abdominal pain [Vomiting] : no vomiting [Diarrhea] : no diarrhea [Negative] : Gastrointestinal [FreeTextEntry2] : gained some weight

## 2021-03-22 NOTE — ASSESSMENT
[FreeTextEntry1] : 66 yo w  diagnosed lung adeno ca\par Stage IIIC by imaging. Large volume of disease and hence, not started on RT\par Started carbo/Alimta/Pem given Q 3 weeks schedule. Currently on maintenance alimta/keytruda\par Imaging personally reviewed CT imaging. Disease is stable but new left retroperitoneal gas was identified. Pt s/p recent hemorrhoidectomy. Has f/u scheduled with surgery on Monday. \par NGS revealed KRAS G12V mut, low TMB and KIM. PDL1 1%.\par Continue folic acid daily and  B12 shot q6w\par Using Oxycodone 20 mg as needed. Pt follows with pain md. \par DVT on Eliquis. Repeat doppler shows no DVT. \par CEA stable\par f/u bw from today.\par OV 6 weeks\par \par \par

## 2021-03-22 NOTE — HISTORY OF PRESENT ILLNESS
[Disease: _____________________] : Disease: [unfilled] [N: ___] : N[unfilled] [M: ___] : M[unfilled] [AJCC Stage: ____] : AJCC Stage: [unfilled] [de-identified] : Ms. Travis is a 65-year-old female with a history of recently diagnosed non-small cell lung adenocarcinoma of left lung, pulmonary embolus on apixaban, HTN, chronic pancreatitis s/p Jeffery procedure (2019), history of ARDS (2015), GERD, chronic pain on opioids, and s/p vocal cord polypectomy who presents for consultation visit\par Brief hx: Pt was recently hospitalized in mid-Feb 2020 to [facility] for dyspnea, left-sided back pain, and fatigue with loss of appetite. Found on CTPA (February 2020) to have a large filling defect in the L main pulmonary artery extending into the left upper lobar and left interlobar pulm arteries with complete opacification of the LLL pulm artery. There were thin linear filling defects in the bifurcation of the right upper pulmonary artery and the right interlobar pulmonary arteries extending to the right lower pulmonary artery, which appeared chronic. CT also showed mediastinal adenopathy, including left paratracheal, subcarinal, and left hilar. There is also a wedge shaped opacity in the posterior left lower lobe suspicious for malignancy. There is centrilobular emphysema. 2D-echo (Feb 2020) with normal LV systolic function, mild concentric LVH, normal LA, RV enlargement with normal RV systolic function, and estimated PASP 39 consistent with borderline pulmonary hypertension. She was started on heparin infusion and discharged on Apixaban. Hypercoagulable workup negative, including Factor V Leiden, Prothrombin gene mutation, and lupus anticoagulant. \par \par She had EBUS/TBNA on Feb 19, 2020, found to have adenocarcinoma in right and left paratracheal and subcarinal lymph nodes (R4, L4, level 7). She is pending PFTs, PET/CT, brain MRI. Patient was discharged on Oxycodone ER BID and Tylenol prn. She is not constipated and is on a bowel regimen with polyethylene glycol. Still does not have a good appetite. She has lost 15 lbs over last 3 months. No fevers, chills, or chest pain. Reports occasional cough for which she takes benzonatate perle about once daily. Declining influenza vaccination. Takes Duonebs twice daily as prescribed, but denies dyspnea. \par \par Last colonoscopy in 2018 normal. Mammogram in June 2019 negative. No personal or family history of miscarriages or spontaneous abortions. Reports history of smoking but denies any history of COPD or lung cancer. No family history of malignancy, sarcoidosis, or TB. No recent prolonged immobility.\par \par Of note, active smoker, quit end of last year, on and off 4-6 cigarettes/day. She had CXR for lung cancer screening in the 1980s but nothing recently. Denies etoh use. \par \par 4/6/20: No new complaints. Persistent pain in the chest and occasional cough. COntinues to lose weight,. No other constitutional symptoms. \par \par 4/16/20: Pain in the chest improved. She has been nauseous and c/o abd cramps since starting chemo. She has been taking Zofran with minimal benefit. She has not been using Reglan because she was told not to by nutritionist. She has lost 2 lbs. \par \par 5/6/2020: Pt being seen in treatment area. Pt reported that she tolerated the 2nd cycle better with the change in premeds and adding dexamethasone following the treatment for 2 days. She still experiences days of severe nausea and vomiting but not continuously. Pt has has normal bowel function. She denies SOB/ slight fatigue. Denies pain\par \par 5/20/2020: Pt being seen in treatment area. Pt is here today for cycle 3. She states that for the last 2 days she has been unable to keep anything down. She vomited the entire 2 days. This is an ongoing and chronic problem and is unrelated to chemo. She has been evaluated by GI without discovering etiology for it. Initial brain imaging (-) for metastaic disease. No headaches or dizziness. Pt states it always lasts 2 days and spontaneously resolves. Today she states she was able to tolerate broth and mandarin oranges. No fever. No c/o of cough/CP/hemoptysis or SOB. \par  \par \par 8/12/20: On maintenance now. Scans in June 2020 shows \par \par 9/2/2020: Pt seen in treatment room. Feeling well. Previously experiencing significant nausea and vomiting. Resolved. Now using omeprazole on BID dosing. No new complaints\par \par 9/23/2020: Pt seen in treatment room. SHe has been experiencing malaise and significant diarrhea since friday. Appetite poor. (+) weight loss. No fever but feels very washed out. Had scans last friday as well. \par \par 11/11/2020: Pt returns for follow up. Patient had called last week to say she was canceling her treatment and office visit . Dr. Cage spoke with the patient who said that she was having pain and that her pancreas is acting up. Patient has a history of chronic pancreatitis. As per patient she has been in touch with her PCP and GI doctor. She was rescheduled for this week. She states that as suddenly as the pain came on, it left the same way. She has not had any problems since. SHe reports she had follow up with pain management doctor as well. No other complaints\par \par 12/24/20: Left oral swelling. Has lost multiple teeth spontaneously. Has appt with oral surgeon. Has gained some weight. Has stopped smoking.\par \par 2/3/21: Pt has been fatigued, she is stressed because her friends (who she has had no contact with recently) are sick with COVID. \par \par 3/17/21: Pt seen in treatment room. Pt recently underwent a hemorrhoidectomy in the OR. She is still recovering and reports continued post op pain. Received call from Radiologist following recent CT imaging. New left retroperitoneal gas of uncertain etiology. These findings were reported to the patient and emailed to surgical team by me . Pt states that she has follow up scheduled next week but it is with covering physician as her doctor is now on maternity leave. She did have some back pain but it has since resolved. No other new complaints. \par \par \par  \par \par  [de-identified] : adeno ca

## 2021-03-23 ENCOUNTER — OUTPATIENT (OUTPATIENT)
Dept: OUTPATIENT SERVICES | Facility: HOSPITAL | Age: 66
LOS: 1 days | End: 2021-03-23
Payer: COMMERCIAL

## 2021-03-23 ENCOUNTER — NON-APPOINTMENT (OUTPATIENT)
Age: 66
End: 2021-03-23

## 2021-03-23 ENCOUNTER — APPOINTMENT (OUTPATIENT)
Dept: CARDIOLOGY | Facility: CLINIC | Age: 66
End: 2021-03-23
Payer: MEDICAID

## 2021-03-23 VITALS — DIASTOLIC BLOOD PRESSURE: 79 MMHG | OXYGEN SATURATION: 99 % | SYSTOLIC BLOOD PRESSURE: 133 MMHG | HEART RATE: 106 BPM

## 2021-03-23 DIAGNOSIS — Z98.890 OTHER SPECIFIED POSTPROCEDURAL STATES: Chronic | ICD-10-CM

## 2021-03-23 DIAGNOSIS — I82.401 ACUTE EMBOLISM AND THROMBOSIS OF UNSPECIFIED DEEP VEINS OF RIGHT LOWER EXTREMITY: ICD-10-CM

## 2021-03-23 DIAGNOSIS — Z98.89 OTHER SPECIFIED POSTPROCEDURAL STATES: Chronic | ICD-10-CM

## 2021-03-23 DIAGNOSIS — Z96.641 PRESENCE OF RIGHT ARTIFICIAL HIP JOINT: Chronic | ICD-10-CM

## 2021-03-23 DIAGNOSIS — Z96.612 PRESENCE OF LEFT ARTIFICIAL SHOULDER JOINT: Chronic | ICD-10-CM

## 2021-03-23 PROCEDURE — 93970 EXTREMITY STUDY: CPT

## 2021-03-23 PROCEDURE — 99072 ADDL SUPL MATRL&STAF TM PHE: CPT

## 2021-03-23 PROCEDURE — 99214 OFFICE O/P EST MOD 30 MIN: CPT

## 2021-03-23 PROCEDURE — 93970 EXTREMITY STUDY: CPT | Mod: 26

## 2021-03-24 ENCOUNTER — OUTPATIENT (OUTPATIENT)
Dept: OUTPATIENT SERVICES | Facility: HOSPITAL | Age: 66
LOS: 1 days | Discharge: HOME | End: 2021-03-24

## 2021-03-24 DIAGNOSIS — Z98.890 OTHER SPECIFIED POSTPROCEDURAL STATES: Chronic | ICD-10-CM

## 2021-03-24 DIAGNOSIS — Z96.612 PRESENCE OF LEFT ARTIFICIAL SHOULDER JOINT: Chronic | ICD-10-CM

## 2021-03-24 DIAGNOSIS — C34.90 MALIGNANT NEOPLASM OF UNSPECIFIED PART OF UNSPECIFIED BRONCHUS OR LUNG: ICD-10-CM

## 2021-03-24 DIAGNOSIS — Z96.641 PRESENCE OF RIGHT ARTIFICIAL HIP JOINT: Chronic | ICD-10-CM

## 2021-03-24 DIAGNOSIS — Z98.89 OTHER SPECIFIED POSTPROCEDURAL STATES: Chronic | ICD-10-CM

## 2021-03-25 NOTE — PHYSICAL EXAM
[General Appearance - Well Developed] : well developed [Normal Appearance] : normal appearance [General Appearance - Well Nourished] : well nourished [Auscultation Breath Sounds / Voice Sounds] : lungs were clear to auscultation bilaterally [Heart Rate And Rhythm] : heart rate and rhythm were normal [Heart Sounds] : normal S1 and S2 [Murmurs] : no murmurs present [Abdomen Soft] : soft [Abnormal Walk] : normal gait [Nail Clubbing] : no clubbing of the fingernails [Cyanosis, Localized] : no localized cyanosis [Oriented To Time, Place, And Person] : oriented to person, place, and time [Bowel Sounds] : normal bowel sounds [Abdomen Tenderness] : non-tender [] : no ischemic changes [No Skin Ulcers] : no skin ulcer [FreeTextEntry1] : Palpable DP and PT B/L

## 2021-03-25 NOTE — ASSESSMENT
[FreeTextEntry1] : Assessment:\par 1. History of PE\par 2. History of Below knee DVT\par 3. Non-small cell lung CA\par 4. Recent Hemorrhoid Surgery 3/8\par 5. Leg Swelling\par 6. HTN\par \par Plan:\par 1. Repeat LE Venous Duplex Now\par 2. Currently on Eliquis 5 mg BID\par     (If new DVT noted, will consider run-in dose)\par 3. Recommend compression stockings if no DVT noted\par 4. If no DVT noted, will re-evaluate on follow-up if Norvasc should be changed to another medication\par 5. Recent CTA Chest 3/10 showed no PE\par 6. Follow-up in 1 month, will call with test results.\par \par \par

## 2021-03-25 NOTE — HISTORY OF PRESENT ILLNESS
[FreeTextEntry1] : 3/23/21\par \par Since last visit 2/9 TTE showed normal LV/RV function. Mild Pulm HTN.\par 1/28 LE Venous Duplex showed no DVT.\par \par Had hemorrhoid surgery 3/8. Was off Eliquis for a total of one week.\par Reports before resuming Eliquis noting LE swelling and foot / calf pain.\par \par 3/10 CTA showed no PE.\par \par Intermittent smoker.\par \par Denies C/P or SOB. \par \par Since last visit has continued chemotherapy treatment every 3 weeks. \par \par Medications:\par Eliquis 5mg BID\par Norvasc 10mg daily\par Creon\par Toprol XL 100mg daily\par Remeron\par Omeprazole\par Oxycodone PRN\par Reglan PRN\par Oxycodone PRN\par \par \par 1/22/21\par \par Ms. Travis is a 64 yo F withi PMH of chronic pancreatitis s/p Fey procedure 5/2019, non small-cell lung cancer diagnosed 2/2020 on immunotherapy with good response, and history of R below knee DVT and bilateral PE diagnosed 2/2020 on Eliquis 5mg BID who presents to vascular clinic for follow up after being seen inpatient a year ago.\par Patient initially presented to hospital 2/2020 with dyspnea and found to have large filling defect of L main pulmonary artery extending into the left upper lobar and left interlobar pulm arteries with complete opacification of the LLL pulm artery, along with thin linear filling defects in the bifurcation of the right upper pulmonary artery and the right interlobar pulmonary arteries extending to the right lower pulmonary artery, which appeared chronic. TTE with normal LV systolic function, RV enlargement with normal RV systolic function, and estimated PASP 39 consistent with borderline pulmonary hypertension. US dopplers noted with R soleal DVT, discharged on Eliquis 5mg BID. Hypercoagulable workup negative, including Factor V Leiden, Prothrombin gene mutation, and lupus anticoagulant. Has not followed up with vascular since discharge, continues to take full dose AC without issues.\par Denies dyspnea with exertion, leg pain, ambulates and is active around house without issues.

## 2021-03-27 ENCOUNTER — LABORATORY RESULT (OUTPATIENT)
Age: 66
End: 2021-03-27

## 2021-03-27 ENCOUNTER — APPOINTMENT (OUTPATIENT)
Dept: INFUSION THERAPY | Facility: HOSPITAL | Age: 66
End: 2021-03-27

## 2021-03-29 ENCOUNTER — NON-APPOINTMENT (OUTPATIENT)
Age: 66
End: 2021-03-29

## 2021-03-29 ENCOUNTER — EMERGENCY (EMERGENCY)
Facility: HOSPITAL | Age: 66
LOS: 1 days | Discharge: HOME | End: 2021-03-29
Attending: EMERGENCY MEDICINE
Payer: COMMERCIAL

## 2021-03-29 VITALS
DIASTOLIC BLOOD PRESSURE: 93 MMHG | WEIGHT: 115.08 LBS | TEMPERATURE: 98 F | OXYGEN SATURATION: 97 % | SYSTOLIC BLOOD PRESSURE: 146 MMHG | HEIGHT: 64 IN | HEART RATE: 87 BPM

## 2021-03-29 VITALS
HEART RATE: 83 BPM | RESPIRATION RATE: 19 BRPM | DIASTOLIC BLOOD PRESSURE: 72 MMHG | TEMPERATURE: 98 F | SYSTOLIC BLOOD PRESSURE: 118 MMHG | OXYGEN SATURATION: 99 %

## 2021-03-29 DIAGNOSIS — Z98.890 OTHER SPECIFIED POSTPROCEDURAL STATES: Chronic | ICD-10-CM

## 2021-03-29 DIAGNOSIS — Z96.612 PRESENCE OF LEFT ARTIFICIAL SHOULDER JOINT: Chronic | ICD-10-CM

## 2021-03-29 DIAGNOSIS — Z96.641 PRESENCE OF RIGHT ARTIFICIAL HIP JOINT: Chronic | ICD-10-CM

## 2021-03-29 DIAGNOSIS — Z98.89 OTHER SPECIFIED POSTPROCEDURAL STATES: Chronic | ICD-10-CM

## 2021-03-29 LAB
ALBUMIN SERPL ELPH-MCNC: 2.2 G/DL — LOW (ref 3.3–5)
ALP SERPL-CCNC: 159 U/L — HIGH (ref 40–120)
ALT FLD-CCNC: 9 U/L — LOW (ref 10–45)
ANION GAP SERPL CALC-SCNC: 10 MMOL/L — SIGNIFICANT CHANGE UP (ref 5–17)
ANISOCYTOSIS BLD QL: SLIGHT — SIGNIFICANT CHANGE UP
APTT BLD: 37.6 SEC — HIGH (ref 27.5–35.5)
AST SERPL-CCNC: 30 U/L — SIGNIFICANT CHANGE UP (ref 10–40)
BASOPHILS # BLD AUTO: 0 K/UL — SIGNIFICANT CHANGE UP (ref 0–0.2)
BASOPHILS NFR BLD AUTO: 0 % — SIGNIFICANT CHANGE UP (ref 0–2)
BILIRUB SERPL-MCNC: 0.4 MG/DL — SIGNIFICANT CHANGE UP (ref 0.2–1.2)
BLD GP AB SCN SERPL QL: NEGATIVE — SIGNIFICANT CHANGE UP
BUN SERPL-MCNC: 6 MG/DL — LOW (ref 7–23)
CALCIUM SERPL-MCNC: 8.1 MG/DL — LOW (ref 8.4–10.5)
CHLORIDE SERPL-SCNC: 110 MMOL/L — HIGH (ref 96–108)
CO2 SERPL-SCNC: 24 MMOL/L — SIGNIFICANT CHANGE UP (ref 22–31)
CREAT SERPL-MCNC: 0.83 MG/DL — SIGNIFICANT CHANGE UP (ref 0.5–1.3)
ELLIPTOCYTES BLD QL SMEAR: SLIGHT — SIGNIFICANT CHANGE UP
EOSINOPHIL # BLD AUTO: 0 K/UL — SIGNIFICANT CHANGE UP (ref 0–0.5)
EOSINOPHIL NFR BLD AUTO: 0 % — SIGNIFICANT CHANGE UP (ref 0–6)
GLUCOSE SERPL-MCNC: 86 MG/DL — SIGNIFICANT CHANGE UP (ref 70–99)
HAPTOGLOB SERPL-MCNC: 75 MG/DL — SIGNIFICANT CHANGE UP (ref 34–200)
HCT VFR BLD CALC: 25.7 % — LOW (ref 34.5–45)
HGB BLD-MCNC: 8.1 G/DL — LOW (ref 11.5–15.5)
HYPOCHROMIA BLD QL: SLIGHT — SIGNIFICANT CHANGE UP
INR BLD: 1.52 RATIO — HIGH (ref 0.88–1.16)
LDH SERPL L TO P-CCNC: 364 U/L — HIGH (ref 50–242)
LYMPHOCYTES # BLD AUTO: 1.5 K/UL — SIGNIFICANT CHANGE UP (ref 1–3.3)
LYMPHOCYTES # BLD AUTO: 39 % — SIGNIFICANT CHANGE UP (ref 13–44)
MAGNESIUM SERPL-MCNC: 1.6 MG/DL — SIGNIFICANT CHANGE UP (ref 1.6–2.6)
MANUAL SMEAR VERIFICATION: SIGNIFICANT CHANGE UP
MCHC RBC-ENTMCNC: 29.9 PG — SIGNIFICANT CHANGE UP (ref 27–34)
MCHC RBC-ENTMCNC: 31.5 GM/DL — LOW (ref 32–36)
MCV RBC AUTO: 94.8 FL — SIGNIFICANT CHANGE UP (ref 80–100)
MONOCYTES # BLD AUTO: 0.58 K/UL — SIGNIFICANT CHANGE UP (ref 0–0.9)
MONOCYTES NFR BLD AUTO: 15 % — HIGH (ref 2–14)
NEUTROPHILS # BLD AUTO: 1.77 K/UL — LOW (ref 1.8–7.4)
NEUTROPHILS NFR BLD AUTO: 46 % — SIGNIFICANT CHANGE UP (ref 43–77)
NRBC # BLD: 6 /100 — HIGH (ref 0–0)
PHOSPHATE SERPL-MCNC: 2.9 MG/DL — SIGNIFICANT CHANGE UP (ref 2.5–4.5)
PLAT MORPH BLD: NORMAL — SIGNIFICANT CHANGE UP
PLATELET # BLD AUTO: SIGNIFICANT CHANGE UP K/UL (ref 150–400)
POIKILOCYTOSIS BLD QL AUTO: SLIGHT — SIGNIFICANT CHANGE UP
POLYCHROMASIA BLD QL SMEAR: SLIGHT — SIGNIFICANT CHANGE UP
POTASSIUM SERPL-MCNC: 3.9 MMOL/L — SIGNIFICANT CHANGE UP (ref 3.5–5.3)
POTASSIUM SERPL-SCNC: 3.9 MMOL/L — SIGNIFICANT CHANGE UP (ref 3.5–5.3)
PROT SERPL-MCNC: 5.8 G/DL — LOW (ref 6–8.3)
PROTHROM AB SERPL-ACNC: 17.9 SEC — HIGH (ref 10.6–13.6)
RBC # BLD: 2.71 M/UL — LOW (ref 3.8–5.2)
RBC # FLD: 18.6 % — HIGH (ref 10.3–14.5)
RBC BLD AUTO: ABNORMAL
RH IG SCN BLD-IMP: POSITIVE — SIGNIFICANT CHANGE UP
SARS-COV-2 RNA SPEC QL NAA+PROBE: SIGNIFICANT CHANGE UP
SODIUM SERPL-SCNC: 144 MMOL/L — SIGNIFICANT CHANGE UP (ref 135–145)
URATE SERPL-MCNC: 5.7 MG/DL — SIGNIFICANT CHANGE UP (ref 2.5–7)
WBC # BLD: 3.84 K/UL — SIGNIFICANT CHANGE UP (ref 3.8–10.5)
WBC # FLD AUTO: 3.84 K/UL — SIGNIFICANT CHANGE UP (ref 3.8–10.5)

## 2021-03-29 PROCEDURE — 71045 X-RAY EXAM CHEST 1 VIEW: CPT

## 2021-03-29 PROCEDURE — 74177 CT ABD & PELVIS W/CONTRAST: CPT

## 2021-03-29 PROCEDURE — 85730 THROMBOPLASTIN TIME PARTIAL: CPT

## 2021-03-29 PROCEDURE — 83010 ASSAY OF HAPTOGLOBIN QUANT: CPT

## 2021-03-29 PROCEDURE — 74177 CT ABD & PELVIS W/CONTRAST: CPT | Mod: 26,MA

## 2021-03-29 PROCEDURE — 86901 BLOOD TYPING SEROLOGIC RH(D): CPT

## 2021-03-29 PROCEDURE — 80053 COMPREHEN METABOLIC PANEL: CPT

## 2021-03-29 PROCEDURE — 86900 BLOOD TYPING SEROLOGIC ABO: CPT

## 2021-03-29 PROCEDURE — 85025 COMPLETE CBC W/AUTO DIFF WBC: CPT

## 2021-03-29 PROCEDURE — 71045 X-RAY EXAM CHEST 1 VIEW: CPT | Mod: 26

## 2021-03-29 PROCEDURE — 96361 HYDRATE IV INFUSION ADD-ON: CPT

## 2021-03-29 PROCEDURE — 83735 ASSAY OF MAGNESIUM: CPT

## 2021-03-29 PROCEDURE — 84100 ASSAY OF PHOSPHORUS: CPT

## 2021-03-29 PROCEDURE — U0003: CPT

## 2021-03-29 PROCEDURE — 99285 EMERGENCY DEPT VISIT HI MDM: CPT

## 2021-03-29 PROCEDURE — 85610 PROTHROMBIN TIME: CPT

## 2021-03-29 PROCEDURE — 99284 EMERGENCY DEPT VISIT MOD MDM: CPT | Mod: 25

## 2021-03-29 PROCEDURE — U0005: CPT

## 2021-03-29 PROCEDURE — 86850 RBC ANTIBODY SCREEN: CPT

## 2021-03-29 PROCEDURE — 96375 TX/PRO/DX INJ NEW DRUG ADDON: CPT | Mod: XU

## 2021-03-29 PROCEDURE — 83615 LACTATE (LD) (LDH) ENZYME: CPT

## 2021-03-29 PROCEDURE — 84550 ASSAY OF BLOOD/URIC ACID: CPT

## 2021-03-29 RX ORDER — SODIUM CHLORIDE 9 MG/ML
1000 INJECTION, SOLUTION INTRAVENOUS ONCE
Refills: 0 | Status: COMPLETED | OUTPATIENT
Start: 2021-03-29 | End: 2021-03-29

## 2021-03-29 RX ORDER — HYDROMORPHONE HYDROCHLORIDE 2 MG/ML
2 INJECTION INTRAMUSCULAR; INTRAVENOUS; SUBCUTANEOUS ONCE
Refills: 0 | Status: DISCONTINUED | OUTPATIENT
Start: 2021-03-29 | End: 2021-03-29

## 2021-03-29 RX ORDER — ONDANSETRON 8 MG/1
4 TABLET, FILM COATED ORAL ONCE
Refills: 0 | Status: COMPLETED | OUTPATIENT
Start: 2021-03-29 | End: 2021-03-29

## 2021-03-29 RX ADMIN — HYDROMORPHONE HYDROCHLORIDE 2 MILLIGRAM(S): 2 INJECTION INTRAMUSCULAR; INTRAVENOUS; SUBCUTANEOUS at 14:24

## 2021-03-29 RX ADMIN — HYDROMORPHONE HYDROCHLORIDE 2 MILLIGRAM(S): 2 INJECTION INTRAMUSCULAR; INTRAVENOUS; SUBCUTANEOUS at 15:58

## 2021-03-29 RX ADMIN — ONDANSETRON 4 MILLIGRAM(S): 8 TABLET, FILM COATED ORAL at 14:23

## 2021-03-29 RX ADMIN — SODIUM CHLORIDE 1000 MILLILITER(S): 9 INJECTION, SOLUTION INTRAVENOUS at 14:23

## 2021-03-29 RX ADMIN — SODIUM CHLORIDE 1000 MILLILITER(S): 9 INJECTION, SOLUTION INTRAVENOUS at 15:58

## 2021-03-29 NOTE — ED PROVIDER NOTE - IV ALTEPLASE EXCL REL HIDDEN
show Received pt in [facility] as notification. Pt c/o upper abd pain with two episodes of vomiting tonight. Denies diarrhea. As per EMS pt manual bp was 50/palp which prompted notification. Upon arrival to  pt BP normal. VS as noted. IV placed, labs sent. Pt appears to be in NAD. NSR on cardiac monitor. Will continue to monitor.

## 2021-03-29 NOTE — ED ADULT NURSE NOTE - OBJECTIVE STATEMENT
Patient  is  alert  and  oriented  x3.  Color  is  good  and  skin  w arm to touch.  She  is  c/o  generalized  body  pain,  nausea  and  vomiting.   Patient  has  been afebrile.  Dry  cough noted.

## 2021-03-29 NOTE — ED PROVIDER NOTE - PATIENT PORTAL LINK FT
You can access the FollowMyHealth Patient Portal offered by [facility] by registering at the following website: [email or web address]. By joining [facility]’s FollowMyHealth portal, you will also be able to view your health information using other applications (apps) compatible with our system.

## 2021-03-29 NOTE — ED PROVIDER NOTE - CLINICAL SUMMARY MEDICAL DECISION MAKING FREE TEXT BOX
ATTG: : Sent by onc team for weakness check labs, check ekg, check cardiac work up, check xray chest, re eval for dispo

## 2021-03-29 NOTE — ED PROVIDER NOTE - OBJECTIVE STATEMENT
66 y/o F w/ hx Lung CA, HTN sent in by Rojelio for dec PO intake, nausea/vomiting, abd pain, weakness. Had chemotherapy wednesday, missed f/u appointment on Saturday due to weakness, was sent in by Rojelio WILSON for further evaluation. denies melena, hematuria. Had BM today, passing gas, no known hx of SBO. Had recent CT scan 3/10.

## 2021-03-29 NOTE — ED PROVIDER NOTE - PHYSICAL EXAMINATION
[Const] pt frail, tired/fatigued, no acute distress  [HEENT] PERRL, EOMI, moist mucus membranes  [Neck] Supple, trachea midline  [CV] +S1/S2, no m/r/g appreciated  [Lungs] Clear to auscultations bilaterally, no adventitious lung sounds  [Abd] diffuse abd TTP, no peritoneal signs, no rebound/guarding  [MSK] 5/5 upper extremity and lower extremity str bilaterally  [Skin] warm, dry, well-perfused  [Neuro] A&Ox3, Cranial Nerves II-XII intact

## 2021-03-29 NOTE — ED PROVIDER NOTE - ATTENDING CONTRIBUTION TO CARE
66 y/o f with pmhx GERD, HTN, PE, chronic pancreatitis  ARDS (2015), Non Small Cell stage 3 Lung Cancer (on chemotherapy) anemia, presents for missing appointment for heme onc and not feeling well. arrived by EMS and per paramedic, patient missed her appointment ton Sat bc she had diminished energy. also patient had increasing pain on her lower ext. no fever or cough. no heart palp. no diarrhea. no chest pain. also fell 5 days ago and broke her tooth.  Gen.  no resp distress  HEENT:  andrew eomi  Lungs:  b/l bs  CVS: S1S2   Abd;  soft non tender no distention  Ext: mild pitting edema. no erythema but diffusely tender from mid tib down to foot. pulses 2+ DP b/l.  Neuro: aaxo3 no focal deficits  MSK: strength 5/5 b/l upper and lower ext.

## 2021-03-30 ENCOUNTER — RX RENEWAL (OUTPATIENT)
Age: 66
End: 2021-03-30

## 2021-04-05 ENCOUNTER — APPOINTMENT (OUTPATIENT)
Dept: COLORECTAL SURGERY | Facility: CLINIC | Age: 66
End: 2021-04-05
Payer: MEDICAID

## 2021-04-05 PROCEDURE — 99024 POSTOP FOLLOW-UP VISIT: CPT

## 2021-04-05 RX ORDER — SODIUM SULFATE, POTASSIUM SULFATE, MAGNESIUM SULFATE 17.5; 3.13; 1.6 G/ML; G/ML; G/ML
17.5-3.13-1.6 SOLUTION, CONCENTRATE ORAL
Qty: 354 | Refills: 0 | Status: DISCONTINUED | COMMUNITY
Start: 2020-12-07

## 2021-04-05 NOTE — HISTORY OF PRESENT ILLNESS
[FreeTextEntry1] : 65-year-old female status post single column hemorrhoidectomy recovering well. She has excellent continence stool and gas

## 2021-04-07 ENCOUNTER — RESULT REVIEW (OUTPATIENT)
Age: 66
End: 2021-04-07

## 2021-04-07 ENCOUNTER — LABORATORY RESULT (OUTPATIENT)
Age: 66
End: 2021-04-07

## 2021-04-07 ENCOUNTER — APPOINTMENT (OUTPATIENT)
Dept: INFUSION THERAPY | Facility: HOSPITAL | Age: 66
End: 2021-04-07

## 2021-04-07 DIAGNOSIS — R11.2 NAUSEA WITH VOMITING, UNSPECIFIED: ICD-10-CM

## 2021-04-07 DIAGNOSIS — Z51.11 ENCOUNTER FOR ANTINEOPLASTIC CHEMOTHERAPY: ICD-10-CM

## 2021-04-07 LAB
BASOPHILS # BLD AUTO: 0 K/UL — SIGNIFICANT CHANGE UP (ref 0–0.2)
BASOPHILS NFR BLD AUTO: 0 % — SIGNIFICANT CHANGE UP (ref 0–2)
EOSINOPHIL # BLD AUTO: 0.08 K/UL — SIGNIFICANT CHANGE UP (ref 0–0.5)
EOSINOPHIL NFR BLD AUTO: 1 % — SIGNIFICANT CHANGE UP (ref 0–6)
HCT VFR BLD CALC: 31 % — LOW (ref 34.5–45)
HGB BLD-MCNC: 9.7 G/DL — LOW (ref 11.5–15.5)
LYMPHOCYTES # BLD AUTO: 3.02 K/UL — SIGNIFICANT CHANGE UP (ref 1–3.3)
LYMPHOCYTES # BLD AUTO: 37 % — SIGNIFICANT CHANGE UP (ref 13–44)
MCHC RBC-ENTMCNC: 30.3 PG — SIGNIFICANT CHANGE UP (ref 27–34)
MCHC RBC-ENTMCNC: 31.3 G/DL — LOW (ref 32–36)
MCV RBC AUTO: 96.9 FL — SIGNIFICANT CHANGE UP (ref 80–100)
MONOCYTES # BLD AUTO: 1.39 K/UL — HIGH (ref 0–0.9)
MONOCYTES NFR BLD AUTO: 17 % — HIGH (ref 2–14)
NEUTROPHILS # BLD AUTO: 3.67 K/UL — SIGNIFICANT CHANGE UP (ref 1.8–7.4)
NEUTROPHILS NFR BLD AUTO: 45 % — SIGNIFICANT CHANGE UP (ref 43–77)
NRBC # BLD: 0 /100 — SIGNIFICANT CHANGE UP (ref 0–0)
NRBC # BLD: SIGNIFICANT CHANGE UP /100 WBCS (ref 0–0)
PLAT MORPH BLD: NORMAL — SIGNIFICANT CHANGE UP
PLATELET # BLD AUTO: 552 K/UL — HIGH (ref 150–400)
RBC # BLD: 3.2 M/UL — LOW (ref 3.8–5.2)
RBC # FLD: 21.8 % — HIGH (ref 10.3–14.5)
RBC BLD AUTO: SIGNIFICANT CHANGE UP
WBC # BLD: 8.16 K/UL — SIGNIFICANT CHANGE UP (ref 3.8–10.5)
WBC # FLD AUTO: 8.16 K/UL — SIGNIFICANT CHANGE UP (ref 3.8–10.5)

## 2021-04-08 ENCOUNTER — NON-APPOINTMENT (OUTPATIENT)
Age: 66
End: 2021-04-08

## 2021-04-13 RX ORDER — LIDOCAINE AND PRILOCAINE 25; 25 MG/G; MG/G
2.5-2.5 CREAM TOPICAL
Qty: 60 | Refills: 3 | Status: ACTIVE | COMMUNITY
Start: 2020-09-02 | End: 1900-01-01

## 2021-04-23 ENCOUNTER — APPOINTMENT (OUTPATIENT)
Dept: CARDIOLOGY | Facility: CLINIC | Age: 66
End: 2021-04-23
Payer: MEDICAID

## 2021-04-23 VITALS
BODY MASS INDEX: 21 KG/M2 | OXYGEN SATURATION: 95 % | DIASTOLIC BLOOD PRESSURE: 74 MMHG | HEIGHT: 64 IN | WEIGHT: 123 LBS | SYSTOLIC BLOOD PRESSURE: 134 MMHG | HEART RATE: 85 BPM

## 2021-04-23 DIAGNOSIS — Z78.9 OTHER SPECIFIED HEALTH STATUS: ICD-10-CM

## 2021-04-23 DIAGNOSIS — R60.0 LOCALIZED EDEMA: ICD-10-CM

## 2021-04-23 PROCEDURE — 99215 OFFICE O/P EST HI 40 MIN: CPT

## 2021-04-23 PROCEDURE — 99072 ADDL SUPL MATRL&STAF TM PHE: CPT

## 2021-04-25 ENCOUNTER — RX RENEWAL (OUTPATIENT)
Age: 66
End: 2021-04-25

## 2021-04-26 ENCOUNTER — OUTPATIENT (OUTPATIENT)
Dept: OUTPATIENT SERVICES | Facility: HOSPITAL | Age: 66
LOS: 1 days | Discharge: HOME | End: 2021-04-26

## 2021-04-26 DIAGNOSIS — C34.90 MALIGNANT NEOPLASM OF UNSPECIFIED PART OF UNSPECIFIED BRONCHUS OR LUNG: ICD-10-CM

## 2021-04-26 DIAGNOSIS — Z98.89 OTHER SPECIFIED POSTPROCEDURAL STATES: Chronic | ICD-10-CM

## 2021-04-26 DIAGNOSIS — Z96.641 PRESENCE OF RIGHT ARTIFICIAL HIP JOINT: Chronic | ICD-10-CM

## 2021-04-26 DIAGNOSIS — Z98.890 OTHER SPECIFIED POSTPROCEDURAL STATES: Chronic | ICD-10-CM

## 2021-04-26 DIAGNOSIS — Z96.612 PRESENCE OF LEFT ARTIFICIAL SHOULDER JOINT: Chronic | ICD-10-CM

## 2021-04-26 NOTE — PHYSICAL EXAM
[General Appearance - Well Developed] : well developed [Normal Appearance] : normal appearance [General Appearance - Well Nourished] : well nourished [Heart Rate And Rhythm] : heart rate and rhythm were normal [Heart Sounds] : normal S1 and S2 [Murmurs] : no murmurs present [Bowel Sounds] : normal bowel sounds [Abdomen Soft] : soft [Abdomen Tenderness] : non-tender [Abnormal Walk] : normal gait [Nail Clubbing] : no clubbing of the fingernails [Cyanosis, Localized] : no localized cyanosis [] : no rash [No Skin Ulcers] : no skin ulcer [Oriented To Time, Place, And Person] : oriented to person, place, and time [FreeTextEntry1] : Palpable DP and PT B/L

## 2021-04-26 NOTE — ASSESSMENT
[FreeTextEntry1] : Assessment:\par 1. History of PE\par 2. History of Below knee DVT\par 3. Non-small cell lung CA\par 4. Recent Hemorrhoid Surgery 3/8\par 5. Leg Swelling above the Knee\par 6. HTN\par 7. Mild Pulm HTN\par \par Plan:\par 1. Start Lasix 20 mg daily.\par 2. Home BP monitoring. \par 3. Labs next visit in 1 month.\par 4. Low salt diet.\par 5. Daily weights.\par 6. Repeat LE Venous Duplex negative for DVT\par 7. Continue Eliquis 5 mg BID\par 8. Continue compression stockings.\par 9. Follow-up in 1 month, call with questions.\par \par \par

## 2021-04-26 NOTE — HISTORY OF PRESENT ILLNESS
[FreeTextEntry1] : 4/23/21\par \par Repeat LE Venous Duplex showed no DVT.\par Wearing compression stockings properly, 10-15 mm Hg knee high bilaterally.\par Still reports LE edema, bothersome.\par No C/P or SOB. \par Eats a lot of cheese. Tries for a low salt diet.\par Echo in 2/2021: Mild Pulm HTN, Normal LV and RV function\par \par Medications:\par Eliquis 5mg BID\par Norvasc 10mg daily\par Creon\par Toprol XL 100mg daily\par Remeron\par Omeprazole\par Oxycodone PRN\par Reglan PRN\par Oxycodone PRN\par \par \par 3/23/21\par \par Since last visit 2/9 TTE showed normal LV/RV function. Mild Pulm HTN.\par 1/28 LE Venous Duplex showed no DVT.\par \par Had hemorrhoid surgery 3/8. Was off Eliquis for a total of one week.\par Reports before resuming Eliquis noting LE swelling and foot / calf pain.\par \par 3/10 CTA showed no PE.\par \par Intermittent smoker.\par \par Denies C/P or SOB. \par \par Since last visit has continued chemotherapy treatment every 3 weeks. \par \par Medications:\par Eliquis 5mg BID\par Norvasc 10mg daily\par Creon\par Toprol XL 100mg daily\par Remeron\par Omeprazole\par Oxycodone PRN\par Reglan PRN\par Oxycodone PRN\par \par \par 1/22/21\par \par Ms. Travis is a 64 yo F withi PMH of chronic pancreatitis s/p Fey procedure 5/2019, non small-cell lung cancer diagnosed 2/2020 on immunotherapy with good response, and history of R below knee DVT and bilateral PE diagnosed 2/2020 on Eliquis 5mg BID who presents to vascular clinic for follow up after being seen inpatient a year ago.\par Patient initially presented to hospital 2/2020 with dyspnea and found to have large filling defect of L main pulmonary artery extending into the left upper lobar and left interlobar pulm arteries with complete opacification of the LLL pulm artery, along with thin linear filling defects in the bifurcation of the right upper pulmonary artery and the right interlobar pulmonary arteries extending to the right lower pulmonary artery, which appeared chronic. TTE with normal LV systolic function, RV enlargement with normal RV systolic function, and estimated PASP 39 consistent with borderline pulmonary hypertension. US dopplers noted with R soleal DVT, discharged on Eliquis 5mg BID. Hypercoagulable workup negative, including Factor V Leiden, Prothrombin gene mutation, and lupus anticoagulant. Has not followed up with vascular since discharge, continues to take full dose AC without issues.\par Denies dyspnea with exertion, leg pain, ambulates and is active around house without issues.

## 2021-04-27 ENCOUNTER — NON-APPOINTMENT (OUTPATIENT)
Age: 66
End: 2021-04-27

## 2021-04-29 ENCOUNTER — RESULT REVIEW (OUTPATIENT)
Age: 66
End: 2021-04-29

## 2021-04-29 ENCOUNTER — LABORATORY RESULT (OUTPATIENT)
Age: 66
End: 2021-04-29

## 2021-04-29 ENCOUNTER — APPOINTMENT (OUTPATIENT)
Dept: HEMATOLOGY ONCOLOGY | Facility: CLINIC | Age: 66
End: 2021-04-29
Payer: MEDICAID

## 2021-04-29 ENCOUNTER — APPOINTMENT (OUTPATIENT)
Dept: INFUSION THERAPY | Facility: HOSPITAL | Age: 66
End: 2021-04-29

## 2021-04-29 VITALS
DIASTOLIC BLOOD PRESSURE: 70 MMHG | WEIGHT: 119.05 LBS | OXYGEN SATURATION: 94 % | RESPIRATION RATE: 14 BRPM | SYSTOLIC BLOOD PRESSURE: 109 MMHG | HEART RATE: 96 BPM | TEMPERATURE: 98 F | BODY MASS INDEX: 20.43 KG/M2

## 2021-04-29 DIAGNOSIS — R11.2 NAUSEA WITH VOMITING, UNSPECIFIED: ICD-10-CM

## 2021-04-29 DIAGNOSIS — Z51.11 ENCOUNTER FOR ANTINEOPLASTIC CHEMOTHERAPY: ICD-10-CM

## 2021-04-29 LAB
BASOPHILS # BLD AUTO: 0 K/UL — SIGNIFICANT CHANGE UP (ref 0–0.2)
BASOPHILS NFR BLD AUTO: 0 % — SIGNIFICANT CHANGE UP (ref 0–2)
EOSINOPHIL # BLD AUTO: 0.09 K/UL — SIGNIFICANT CHANGE UP (ref 0–0.5)
EOSINOPHIL NFR BLD AUTO: 1 % — SIGNIFICANT CHANGE UP (ref 0–6)
HCT VFR BLD CALC: 30.4 % — LOW (ref 34.5–45)
HGB BLD-MCNC: 9.6 G/DL — LOW (ref 11.5–15.5)
LYMPHOCYTES # BLD AUTO: 3.32 K/UL — HIGH (ref 1–3.3)
LYMPHOCYTES # BLD AUTO: 36 % — SIGNIFICANT CHANGE UP (ref 13–44)
MCHC RBC-ENTMCNC: 31.3 PG — SIGNIFICANT CHANGE UP (ref 27–34)
MCHC RBC-ENTMCNC: 31.6 G/DL — LOW (ref 32–36)
MCV RBC AUTO: 99 FL — SIGNIFICANT CHANGE UP (ref 80–100)
MONOCYTES # BLD AUTO: 1.84 K/UL — HIGH (ref 0–0.9)
MONOCYTES NFR BLD AUTO: 20 % — HIGH (ref 2–14)
NEUTROPHILS # BLD AUTO: 3.96 K/UL — SIGNIFICANT CHANGE UP (ref 1.8–7.4)
NEUTROPHILS NFR BLD AUTO: 43 % — SIGNIFICANT CHANGE UP (ref 43–77)
NRBC # BLD: 0 /100 — SIGNIFICANT CHANGE UP (ref 0–0)
NRBC # BLD: SIGNIFICANT CHANGE UP /100 WBCS (ref 0–0)
PLAT MORPH BLD: NORMAL — SIGNIFICANT CHANGE UP
PLATELET # BLD AUTO: 387 K/UL — SIGNIFICANT CHANGE UP (ref 150–400)
RBC # BLD: 3.07 M/UL — LOW (ref 3.8–5.2)
RBC # FLD: 22 % — HIGH (ref 10.3–14.5)
RBC BLD AUTO: SIGNIFICANT CHANGE UP
WBC # BLD: 9.22 K/UL — SIGNIFICANT CHANGE UP (ref 3.8–10.5)
WBC # FLD AUTO: 9.22 K/UL — SIGNIFICANT CHANGE UP (ref 3.8–10.5)

## 2021-04-29 PROCEDURE — 99072 ADDL SUPL MATRL&STAF TM PHE: CPT

## 2021-04-29 PROCEDURE — 99214 OFFICE O/P EST MOD 30 MIN: CPT

## 2021-04-29 NOTE — REVIEW OF SYSTEMS
[Fatigue] : fatigue [Recent Change In Weight] : ~T recent weight change [Negative] : Allergic/Immunologic [Cough] : no cough [SOB on Exertion] : no shortness of breath during exertion [Abdominal Pain] : no abdominal pain [Vomiting] : no vomiting [Diarrhea] : no diarrhea [FreeTextEntry2] : gained some weight

## 2021-04-29 NOTE — PHYSICAL EXAM
[Restricted in physically strenuous activity but ambulatory and able to carry out work of a light or sedentary nature] : Status 1- Restricted in physically strenuous activity but ambulatory and able to carry out work of a light or sedentary nature, e.g., light house work, office work [Thin] : thin [Normal] : RRR, normal S1S2, no murmurs, rubs, gallops [de-identified] : 3+ edema

## 2021-04-29 NOTE — ASSESSMENT
[FreeTextEntry1] : 66 yo w  diagnosed lung adeno ca\par Stage IIIC by imaging. Large volume of disease and hence, not started on RT\par Started carbo/Alimta/Pem given Q 3 weeks schedule. Currently on maintenance alimta/keytruda\par Imaging personally reviewed CT imaging. Disease is stable \par Abd AEs after hemorrhoids procedure as detailed above. Repeat CT abd normal. \par NGS revealed KRAS G12V mut, low TMB and KIM. PDL1 1%.\par Continue folic acid daily and  B12 shot q6w\par Using Oxycodone 20 mg as needed. Pt follows with pain md. \par DVT on Eliquis. Repeat doppler shows no DVT. \par CEA stable\par Smoking cessation reiterated. \par LE edema from hypoalbuminemia. \par Not yet received the vaccine which I highly recommended. \par OV in 6 weeks\par \par

## 2021-04-29 NOTE — HISTORY OF PRESENT ILLNESS
[Disease: _____________________] : Disease: [unfilled] [N: ___] : N[unfilled] [M: ___] : M[unfilled] [AJCC Stage: ____] : AJCC Stage: [unfilled] [de-identified] : Ms. Travis is a 65-year-old female with a history of recently diagnosed non-small cell lung adenocarcinoma of left lung, pulmonary embolus on apixaban, HTN, chronic pancreatitis s/p Jeffery procedure (2019), history of ARDS (2015), GERD, chronic pain on opioids, and s/p vocal cord polypectomy who presents for consultation visit\par Brief hx: Pt was recently hospitalized in mid-Feb 2020 to [facility] for dyspnea, left-sided back pain, and fatigue with loss of appetite. Found on CTPA (February 2020) to have a large filling defect in the L main pulmonary artery extending into the left upper lobar and left interlobar pulm arteries with complete opacification of the LLL pulm artery. There were thin linear filling defects in the bifurcation of the right upper pulmonary artery and the right interlobar pulmonary arteries extending to the right lower pulmonary artery, which appeared chronic. CT also showed mediastinal adenopathy, including left paratracheal, subcarinal, and left hilar. There is also a wedge shaped opacity in the posterior left lower lobe suspicious for malignancy. There is centrilobular emphysema. 2D-echo (Feb 2020) with normal LV systolic function, mild concentric LVH, normal LA, RV enlargement with normal RV systolic function, and estimated PASP 39 consistent with borderline pulmonary hypertension. She was started on heparin infusion and discharged on Apixaban. Hypercoagulable workup negative, including Factor V Leiden, Prothrombin gene mutation, and lupus anticoagulant. \par \par She had EBUS/TBNA on Feb 19, 2020, found to have adenocarcinoma in right and left paratracheal and subcarinal lymph nodes (R4, L4, level 7). She is pending PFTs, PET/CT, brain MRI. Patient was discharged on Oxycodone ER BID and Tylenol prn. She is not constipated and is on a bowel regimen with polyethylene glycol. Still does not have a good appetite. She has lost 15 lbs over last 3 months. No fevers, chills, or chest pain. Reports occasional cough for which she takes benzonatate perle about once daily. Declining influenza vaccination. Takes Duonebs twice daily as prescribed, but denies dyspnea. \par \par Last colonoscopy in 2018 normal. Mammogram in June 2019 negative. No personal or family history of miscarriages or spontaneous abortions. Reports history of smoking but denies any history of COPD or lung cancer. No family history of malignancy, sarcoidosis, or TB. No recent prolonged immobility.\par \par Of note, active smoker, quit end of last year, on and off 4-6 cigarettes/day. She had CXR for lung cancer screening in the 1980s but nothing recently. Denies etoh use. \par \par 4/6/20: No new complaints. Persistent pain in the chest and occasional cough. COntinues to lose weight,. No other constitutional symptoms. \par \par 4/16/20: Pain in the chest improved. She has been nauseous and c/o abd cramps since starting chemo. She has been taking Zofran with minimal benefit. She has not been using Reglan because she was told not to by nutritionist. She has lost 2 lbs. \par \par 5/6/2020: Pt being seen in treatment area. Pt reported that she tolerated the 2nd cycle better with the change in premeds and adding dexamethasone following the treatment for 2 days. She still experiences days of severe nausea and vomiting but not continuously. Pt has has normal bowel function. She denies SOB/ slight fatigue. Denies pain\par \par 5/20/2020: Pt being seen in treatment area. Pt is here today for cycle 3. She states that for the last 2 days she has been unable to keep anything down. She vomited the entire 2 days. This is an ongoing and chronic problem and is unrelated to chemo. She has been evaluated by GI without discovering etiology for it. Initial brain imaging (-) for metastaic disease. No headaches or dizziness. Pt states it always lasts 2 days and spontaneously resolves. Today she states she was able to tolerate broth and mandarin oranges. No fever. No c/o of cough/CP/hemoptysis or SOB. \par  \par \par 8/12/20: On maintenance now. Scans in June 2020 shows \par \par 9/2/2020: Pt seen in treatment room. Feeling well. Previously experiencing significant nausea and vomiting. Resolved. Now using omeprazole on BID dosing. No new complaints\par \par 9/23/2020: Pt seen in treatment room. SHe has been experiencing malaise and significant diarrhea since friday. Appetite poor. (+) weight loss. No fever but feels very washed out. Had scans last friday as well. \par \par 11/11/2020: Pt returns for follow up. Patient had called last week to say she was canceling her treatment and office visit . Dr. Cage spoke with the patient who said that she was having pain and that her pancreas is acting up. Patient has a history of chronic pancreatitis. As per patient she has been in touch with her PCP and GI doctor. She was rescheduled for this week. She states that as suddenly as the pain came on, it left the same way. She has not had any problems since. SHe reports she had follow up with pain management doctor as well. No other complaints\par \par 12/24/20: Left oral swelling. Has lost multiple teeth spontaneously. Has appt with oral surgeon. Has gained some weight. Has stopped smoking.\par \par 2/3/21: Pt has been fatigued, she is stressed because her friends (who she has had no contact with recently) are sick with COVID. \par \par 3/17/21: Pt seen in treatment room. Pt recently underwent a hemorrhoidectomy in the OR. She is still recovering and reports continued post op pain. Received call from Radiologist following recent CT imaging. New left retroperitoneal gas of uncertain etiology. These findings were reported to the patient and emailed to surgical team by me . Pt states that she has follow up scheduled next week but it is with covering physician as her doctor is now on maternity leave. She did have some back pain but it has since resolved. No other new complaints. \par \par 4/29/21: Doing better. No abd pain anymore. Repeat CT abd done on 3/29 showed no intra-abd pathology. LE swelling. Had repeat doppler and echo which were normal. \par \par  \par \par  [de-identified] : adeno ca

## 2021-05-04 ENCOUNTER — NON-APPOINTMENT (OUTPATIENT)
Age: 66
End: 2021-05-04

## 2021-05-12 ENCOUNTER — NON-APPOINTMENT (OUTPATIENT)
Age: 66
End: 2021-05-12

## 2021-05-17 ENCOUNTER — APPOINTMENT (OUTPATIENT)
Dept: COLORECTAL SURGERY | Facility: CLINIC | Age: 66
End: 2021-05-17
Payer: MEDICAID

## 2021-05-17 DIAGNOSIS — K64.4 RESIDUAL HEMORRHOIDAL SKIN TAGS: ICD-10-CM

## 2021-05-17 PROCEDURE — 99024 POSTOP FOLLOW-UP VISIT: CPT

## 2021-05-17 NOTE — ASSESSMENT
[FreeTextEntry1] : 65-year-old female status post hemorrhoidectomy now fully recovered with excellent results.

## 2021-05-17 NOTE — HISTORY OF PRESENT ILLNESS
[FreeTextEntry1] : 65-year-old female status post hemorrhoidectomy recovering well. She has excellent control stool and gas.

## 2021-05-19 ENCOUNTER — LABORATORY RESULT (OUTPATIENT)
Age: 66
End: 2021-05-19

## 2021-05-19 ENCOUNTER — RESULT REVIEW (OUTPATIENT)
Age: 66
End: 2021-05-19

## 2021-05-19 ENCOUNTER — APPOINTMENT (OUTPATIENT)
Dept: INFUSION THERAPY | Facility: HOSPITAL | Age: 66
End: 2021-05-19

## 2021-05-19 LAB
BASOPHILS # BLD AUTO: 0 K/UL — SIGNIFICANT CHANGE UP (ref 0–0.2)
BASOPHILS NFR BLD AUTO: 0 % — SIGNIFICANT CHANGE UP (ref 0–2)
EOSINOPHIL # BLD AUTO: 0.08 K/UL — SIGNIFICANT CHANGE UP (ref 0–0.5)
EOSINOPHIL NFR BLD AUTO: 1 % — SIGNIFICANT CHANGE UP (ref 0–6)
HCT VFR BLD CALC: 27.6 % — LOW (ref 34.5–45)
HGB BLD-MCNC: 8.8 G/DL — LOW (ref 11.5–15.5)
LYMPHOCYTES # BLD AUTO: 3.78 K/UL — HIGH (ref 1–3.3)
LYMPHOCYTES # BLD AUTO: 48 % — HIGH (ref 13–44)
MCHC RBC-ENTMCNC: 31.9 G/DL — LOW (ref 32–36)
MCHC RBC-ENTMCNC: 32.1 PG — SIGNIFICANT CHANGE UP (ref 27–34)
MCV RBC AUTO: 100.7 FL — HIGH (ref 80–100)
MONOCYTES # BLD AUTO: 1.42 K/UL — HIGH (ref 0–0.9)
MONOCYTES NFR BLD AUTO: 18 % — HIGH (ref 2–14)
NEUTROPHILS # BLD AUTO: 2.6 K/UL — SIGNIFICANT CHANGE UP (ref 1.8–7.4)
NEUTROPHILS NFR BLD AUTO: 33 % — LOW (ref 43–77)
NRBC # BLD: 0 /100 — SIGNIFICANT CHANGE UP (ref 0–0)
NRBC # BLD: SIGNIFICANT CHANGE UP /100 WBCS (ref 0–0)
OVALOCYTES BLD QL SMEAR: SLIGHT — SIGNIFICANT CHANGE UP
PLAT MORPH BLD: NORMAL — SIGNIFICANT CHANGE UP
PLATELET # BLD AUTO: 408 K/UL — HIGH (ref 150–400)
POIKILOCYTOSIS BLD QL AUTO: SLIGHT — SIGNIFICANT CHANGE UP
RBC # BLD: 2.74 M/UL — LOW (ref 3.8–5.2)
RBC # FLD: 21 % — HIGH (ref 10.3–14.5)
RBC BLD AUTO: ABNORMAL
WBC # BLD: 7.87 K/UL — SIGNIFICANT CHANGE UP (ref 3.8–10.5)
WBC # FLD AUTO: 7.87 K/UL — SIGNIFICANT CHANGE UP (ref 3.8–10.5)

## 2021-05-21 ENCOUNTER — APPOINTMENT (OUTPATIENT)
Dept: CARDIOLOGY | Facility: CLINIC | Age: 66
End: 2021-05-21
Payer: MEDICAID

## 2021-05-21 VITALS — OXYGEN SATURATION: 96 % | SYSTOLIC BLOOD PRESSURE: 135 MMHG | DIASTOLIC BLOOD PRESSURE: 73 MMHG

## 2021-05-21 DIAGNOSIS — I10 ESSENTIAL (PRIMARY) HYPERTENSION: ICD-10-CM

## 2021-05-21 PROCEDURE — 99072 ADDL SUPL MATRL&STAF TM PHE: CPT

## 2021-05-21 PROCEDURE — 99214 OFFICE O/P EST MOD 30 MIN: CPT

## 2021-05-21 NOTE — PHYSICAL EXAM
[General Appearance - Well Developed] : well developed [Normal Appearance] : normal appearance [General Appearance - Well Nourished] : well nourished [Heart Rate And Rhythm] : heart rate and rhythm were normal [Heart Sounds] : normal S1 and S2 [Murmurs] : no murmurs present [Bowel Sounds] : normal bowel sounds [Abdomen Soft] : soft [Abdomen Tenderness] : non-tender [Abnormal Walk] : normal gait [Nail Clubbing] : no clubbing of the fingernails [Cyanosis, Localized] : no localized cyanosis [FreeTextEntry1] : Palpable DP and PT B/L [] : no rash [No Skin Ulcers] : no skin ulcer [Oriented To Time, Place, And Person] : oriented to person, place, and time

## 2021-05-21 NOTE — PROGRESS NOTE ADULT - SUBJECTIVE AND OBJECTIVE BOX
Patient is a 61y old  Female who presents with a chief complaint of vomiting (20 Nov 2017 05:18)      SUBJECTIVE / OVERNIGHT EVENTS: No acute events overnight. Pt reports approx a dozen episodes of emesis which has improved since yesterday. 1 formed stool overnight. Improved epigastric pain w current pain regiment. Sore throat/esophagitis improved w Cepacol. Denies fevers, chills, chest pain.     MEDICATIONS  (STANDING):  amLODIPine   Tablet 10 milliGRAM(s) Oral daily  amylase/lipase/protease  (CREON 12,000 Units) 2 Capsule(s) Oral two times a day with meals  docusate sodium 100 milliGRAM(s) Oral three times a day  lactated ringers. 1000 milliLiter(s) (150 mL/Hr) IV Continuous <Continuous>  metoprolol succinate  milliGRAM(s) Oral daily  pantoprazole  Injectable 40 milliGRAM(s) IV Push two times a day    MEDICATIONS  (PRN):  acetaminophen   Tablet 650 milliGRAM(s) Oral every 6 hours PRN For Temp greater than 38 C (100.4 F)  benzocaine 15 mG/menthol 3.6 mG Lozenge 1 Lozenge Oral every 6 hours PRN Sore Throat  HYDROmorphone  Injectable 1 milliGRAM(s) IV Push every 6 hours PRN Severe Pain (7 - 10)  HYDROmorphone  Injectable 0.5 milliGRAM(s) IV Push every 3 hours PRN Breakthrough severe pain  ondansetron Injectable 8 milliGRAM(s) IV Push every 6 hours PRN Nausea and/or Vomiting  senna 2 Tablet(s) Oral at bedtime PRN Constipation      T(C): 37.2 (11-21-17 @ 04:49), Max: 37.5 (11-20-17 @ 11:43)  HR: 73 (11-21-17 @ 04:49) (64 - 93)  BP: 165/83 (11-21-17 @ 04:49) (137/68 - 165/83)  RR: 18 (11-21-17 @ 04:49) (18 - 19)  SpO2: 100% (11-21-17 @ 04:49) (95% - 100%)  CAPILLARY BLOOD GLUCOSE        I&O's Summary    20 Nov 2017 07:01  -  21 Nov 2017 07:00  --------------------------------------------------------  IN: 420 mL / OUT: 0 mL / NET: 420 mL        PHYSICAL Anxious appearing middle age woman, resting in bed   HEAD:  Atraumatic, Normocephalic  EYES: EOMI, conjunctiva and sclera clear  NECK: Supple, No JVD  CHEST/LUNG: Clear to auscultation bilaterally; No wheeze  HEART: Regular rate and rhythm; No murmurs, rubs, or gallops  ABDOMEN: Reproducible epigastric pain. Remaining abd soft, NT, ND  EXTREMITIES:  2+ Peripheral Pulses, No clubbing, cyanosis, or edema  PSYCH: AAOx3  NEUROLOGY: non-focal  SKIN: No rashes or lesions    LABS:    WBC Trend: 10.08<--, 12.2<--  Hb Trend: 13.2<--, 15.2<--  Plt Trend: 377<--, 416<--  11-20    144  |  105  |  5<L>  ----------------------------<  102<H>  3.6   |  26  |  0.77    Ca    8.6      20 Nov 2017 20:55  Phos  2.9     11-20  Mg     1.5     11-20    TPro  9.4<H>  /  Alb  4.9  /  TBili  0.5  /  DBili  x   /  AST  18  /  ALT  15  /  AlkPhos  101  11-19    Creatinine Trend: 0.77<--, 0.68<--, 0.88<--  ( 19 Nov 2017 23:48 )   PT: 11.9 sec;   INR: 1.10 ratio;       PTT:31.2 sec  CARDIAC MARKERS ( 19 Nov 2017 23:48 )  Trop <0.01 ng/mL / CK 47 U/L / CKMB x           Urinalysis Basic - ( 20 Nov 2017 00:48 )    Color: PL Yellow / Appearance: Clear / SG: >1.030 / pH: x  Gluc: x / Ketone: Trace  / Bili: Negative / Urobili: Negative   Blood: x / Protein: 100 mg/dL / Nitrite: Negative   Leuk Esterase: Negative / RBC: 3-5 /HPF / WBC 3-5 /HPF   Sq Epi: x / Non Sq Epi: OCC /HPF / Bacteria: Few /HPF        Microbiology:  Urine Cx:  Blood Cx:    RADIOLOGY & ADDITIONAL TESTS:  X- Ray:  CT:  Ultrasound:  [ ] imaging personally reviewed and interpreted by me    Consultant(s) Notes Reviewed:      Care Discussed with Consultants/Other Providers: no

## 2021-05-21 NOTE — HISTORY OF PRESENT ILLNESS
[FreeTextEntry1] : 5/21/2021\par She is feeling better with her legs.  there is less swelling\par happy with her progress. wearing compression\par she is on lasix 40mg daily \par Potassium was 3.4\par BP is well controlled at night 947-122 systolic.\par Breathing ok\par Creatinine may 19th was 1.03\par \par 4/23/21\par \par Repeat LE Venous Duplex showed no DVT.\par Wearing compression stockings properly, 10-15 mm Hg knee high bilaterally.\par Still reports LE edema, bothersome.\par No C/P or SOB. \par Eats a lot of cheese. Tries for a low salt diet.\par Echo in 2/2021: Mild Pulm HTN, Normal LV and RV function\par \par Medications:\par Eliquis 5mg BID\par Norvasc 10mg daily\par Creon\par Toprol XL 100mg daily\par Remeron\par Omeprazole\par Oxycodone PRN\par Reglan PRN\par Oxycodone PRN\par \par \par 3/23/21\par \par Since last visit 2/9 TTE showed normal LV/RV function. Mild Pulm HTN.\par 1/28 LE Venous Duplex showed no DVT.\par \par Had hemorrhoid surgery 3/8. Was off Eliquis for a total of one week.\par Reports before resuming Eliquis noting LE swelling and foot / calf pain.\par \par 3/10 CTA showed no PE.\par \par Intermittent smoker.\par \par Denies C/P or SOB. \par \par Since last visit has continued chemotherapy treatment every 3 weeks. \par \par Medications:\par Eliquis 5mg BID\par Norvasc 10mg daily\par Creon\par Toprol XL 100mg daily\par Remeron\par Omeprazole\par Oxycodone PRN\par Reglan PRN\par Oxycodone PRN\par \par \par 1/22/21\par \par Ms. Travis is a 66 yo F withi PMH of chronic pancreatitis s/p Fey procedure 5/2019, non small-cell lung cancer diagnosed 2/2020 on immunotherapy with good response, and history of R below knee DVT and bilateral PE diagnosed 2/2020 on Eliquis 5mg BID who presents to vascular clinic for follow up after being seen inpatient a year ago.\par Patient initially presented to hospital 2/2020 with dyspnea and found to have large filling defect of L main pulmonary artery extending into the left upper lobar and left interlobar pulm arteries with complete opacification of the LLL pulm artery, along with thin linear filling defects in the bifurcation of the right upper pulmonary artery and the right interlobar pulmonary arteries extending to the right lower pulmonary artery, which appeared chronic. TTE with normal LV systolic function, RV enlargement with normal RV systolic function, and estimated PASP 39 consistent with borderline pulmonary hypertension. US dopplers noted with R soleal DVT, discharged on Eliquis 5mg BID. Hypercoagulable workup negative, including Factor V Leiden, Prothrombin gene mutation, and lupus anticoagulant. Has not followed up with vascular since discharge, continues to take full dose AC without issues.\fauzia Denies dyspnea with exertion, leg pain, ambulates and is active around house without issues.

## 2021-05-21 NOTE — ASSESSMENT
[FreeTextEntry1] : Assessment:\par 1. History of PE\par 2. History of Below knee DVT\par 3. Non-small cell lung CA\par 4. Recent Hemorrhoid Surgery 3/8\par 5. Leg Swelling above the Knee\par 6. HTN\par 7. Mild Pulm HTN\par \par Plan:\par 1.  Rx sent for lasix 40mg daily \par 2.  Rx sent for lower dose of amlodipine 5mg daily \par 3. labs reviewed.  K is 3.4.  Will start potassium supplement. 10meq daily \par 4. Low salt diet.\par 5. Daily weights.\par 6.  Continue Eliquis 5 mg BID\par 7. Continue compression stockings.\par 8. Follow-up in 3 month, call with questions.\par \par \par

## 2021-05-23 ENCOUNTER — RX RENEWAL (OUTPATIENT)
Age: 66
End: 2021-05-23

## 2021-06-07 ENCOUNTER — OUTPATIENT (OUTPATIENT)
Dept: OUTPATIENT SERVICES | Facility: HOSPITAL | Age: 66
LOS: 1 days | Discharge: HOME | End: 2021-06-07

## 2021-06-07 ENCOUNTER — OUTPATIENT (OUTPATIENT)
Dept: OUTPATIENT SERVICES | Facility: HOSPITAL | Age: 66
LOS: 1 days | End: 2021-06-07
Payer: COMMERCIAL

## 2021-06-07 DIAGNOSIS — Z98.890 OTHER SPECIFIED POSTPROCEDURAL STATES: Chronic | ICD-10-CM

## 2021-06-07 DIAGNOSIS — Z98.89 OTHER SPECIFIED POSTPROCEDURAL STATES: Chronic | ICD-10-CM

## 2021-06-07 DIAGNOSIS — Z96.612 PRESENCE OF LEFT ARTIFICIAL SHOULDER JOINT: Chronic | ICD-10-CM

## 2021-06-07 DIAGNOSIS — Z96.641 PRESENCE OF RIGHT ARTIFICIAL HIP JOINT: Chronic | ICD-10-CM

## 2021-06-07 DIAGNOSIS — C34.90 MALIGNANT NEOPLASM OF UNSPECIFIED PART OF UNSPECIFIED BRONCHUS OR LUNG: ICD-10-CM

## 2021-06-09 ENCOUNTER — APPOINTMENT (OUTPATIENT)
Dept: HEMATOLOGY ONCOLOGY | Facility: CLINIC | Age: 66
End: 2021-06-09
Payer: MEDICAID

## 2021-06-09 ENCOUNTER — LABORATORY RESULT (OUTPATIENT)
Age: 66
End: 2021-06-09

## 2021-06-09 ENCOUNTER — APPOINTMENT (OUTPATIENT)
Dept: INFUSION THERAPY | Facility: HOSPITAL | Age: 66
End: 2021-06-09

## 2021-06-09 ENCOUNTER — RESULT REVIEW (OUTPATIENT)
Age: 66
End: 2021-06-09

## 2021-06-09 VITALS
BODY MASS INDEX: 20.43 KG/M2 | OXYGEN SATURATION: 99 % | WEIGHT: 119.05 LBS | RESPIRATION RATE: 14 BRPM | SYSTOLIC BLOOD PRESSURE: 111 MMHG | DIASTOLIC BLOOD PRESSURE: 74 MMHG | HEART RATE: 96 BPM | TEMPERATURE: 98 F

## 2021-06-09 DIAGNOSIS — Z51.11 ENCOUNTER FOR ANTINEOPLASTIC CHEMOTHERAPY: ICD-10-CM

## 2021-06-09 DIAGNOSIS — E86.0 DEHYDRATION: ICD-10-CM

## 2021-06-09 DIAGNOSIS — R11.2 NAUSEA WITH VOMITING, UNSPECIFIED: ICD-10-CM

## 2021-06-09 LAB
BASOPHILS # BLD AUTO: 0.02 K/UL — SIGNIFICANT CHANGE UP (ref 0–0.2)
BASOPHILS NFR BLD AUTO: 0.2 % — SIGNIFICANT CHANGE UP (ref 0–2)
BUN SERPL-MCNC: 14 MG/DL — SIGNIFICANT CHANGE UP (ref 7–23)
CA-I BLDA-SCNC: 1.07 MMOL/L — LOW (ref 1.12–1.3)
CHLORIDE SERPL-SCNC: 102 MMOL/L — SIGNIFICANT CHANGE UP (ref 96–108)
CO2 SERPL-SCNC: 27 MMOL/L — SIGNIFICANT CHANGE UP (ref 22–31)
CREAT SERPL-MCNC: 1.4 MG/DL — HIGH (ref 0.5–1.3)
EOSINOPHIL # BLD AUTO: 0.03 K/UL — SIGNIFICANT CHANGE UP (ref 0–0.5)
EOSINOPHIL NFR BLD AUTO: 0.4 % — SIGNIFICANT CHANGE UP (ref 0–6)
GLUCOSE SERPL-MCNC: 84 MG/DL — SIGNIFICANT CHANGE UP (ref 70–99)
HCT VFR BLD CALC: 26.8 % — LOW (ref 34.5–45)
HGB BLD-MCNC: 8.7 G/DL — LOW (ref 11.5–15.5)
IMM GRANULOCYTES NFR BLD AUTO: 1.1 % — SIGNIFICANT CHANGE UP (ref 0–1.5)
LYMPHOCYTES # BLD AUTO: 1.89 K/UL — SIGNIFICANT CHANGE UP (ref 1–3.3)
LYMPHOCYTES # BLD AUTO: 22.5 % — SIGNIFICANT CHANGE UP (ref 13–44)
MCHC RBC-ENTMCNC: 31.9 PG — SIGNIFICANT CHANGE UP (ref 27–34)
MCHC RBC-ENTMCNC: 32.5 G/DL — SIGNIFICANT CHANGE UP (ref 32–36)
MCV RBC AUTO: 98.2 FL — SIGNIFICANT CHANGE UP (ref 80–100)
MONOCYTES # BLD AUTO: 1.56 K/UL — HIGH (ref 0–0.9)
MONOCYTES NFR BLD AUTO: 18.6 % — HIGH (ref 2–14)
NEUTROPHILS # BLD AUTO: 4.81 K/UL — SIGNIFICANT CHANGE UP (ref 1.8–7.4)
NEUTROPHILS NFR BLD AUTO: 57.2 % — SIGNIFICANT CHANGE UP (ref 43–77)
NRBC # BLD: 0 /100 WBCS — SIGNIFICANT CHANGE UP (ref 0–0)
PLATELET # BLD AUTO: 478 K/UL — HIGH (ref 150–400)
POTASSIUM SERPL-MCNC: 4.4 MMOL/L — SIGNIFICANT CHANGE UP (ref 3.5–5.3)
POTASSIUM SERPL-SCNC: 4.4 MMOL/L — SIGNIFICANT CHANGE UP (ref 3.5–5.3)
RBC # BLD: 2.73 M/UL — LOW (ref 3.8–5.2)
RBC # FLD: 21.1 % — HIGH (ref 10.3–14.5)
SODIUM SERPL-SCNC: 136 MMOL/L — SIGNIFICANT CHANGE UP (ref 135–145)
WBC # BLD: 8.4 K/UL — SIGNIFICANT CHANGE UP (ref 3.8–10.5)
WBC # FLD AUTO: 8.4 K/UL — SIGNIFICANT CHANGE UP (ref 3.8–10.5)

## 2021-06-09 PROCEDURE — 86850 RBC ANTIBODY SCREEN: CPT

## 2021-06-09 PROCEDURE — 86901 BLOOD TYPING SEROLOGIC RH(D): CPT

## 2021-06-09 PROCEDURE — 99072 ADDL SUPL MATRL&STAF TM PHE: CPT

## 2021-06-09 PROCEDURE — 86900 BLOOD TYPING SEROLOGIC ABO: CPT

## 2021-06-09 PROCEDURE — 99214 OFFICE O/P EST MOD 30 MIN: CPT

## 2021-06-10 ENCOUNTER — NON-APPOINTMENT (OUTPATIENT)
Age: 66
End: 2021-06-10

## 2021-06-10 ENCOUNTER — EMERGENCY (EMERGENCY)
Facility: HOSPITAL | Age: 66
LOS: 1 days | Discharge: LEFT BEFORE DISCHARGE | End: 2021-06-10
Attending: EMERGENCY MEDICINE
Payer: COMMERCIAL

## 2021-06-10 ENCOUNTER — EMERGENCY (EMERGENCY)
Facility: HOSPITAL | Age: 66
LOS: 1 days | Discharge: HOME | End: 2021-06-10
Attending: EMERGENCY MEDICINE | Admitting: EMERGENCY MEDICINE
Payer: COMMERCIAL

## 2021-06-10 VITALS
TEMPERATURE: 98 F | HEIGHT: 64 IN | SYSTOLIC BLOOD PRESSURE: 125 MMHG | DIASTOLIC BLOOD PRESSURE: 69 MMHG | OXYGEN SATURATION: 97 % | HEART RATE: 90 BPM | RESPIRATION RATE: 16 BRPM

## 2021-06-10 VITALS
OXYGEN SATURATION: 97 % | HEIGHT: 64 IN | WEIGHT: 119.93 LBS | HEART RATE: 84 BPM | DIASTOLIC BLOOD PRESSURE: 64 MMHG | SYSTOLIC BLOOD PRESSURE: 115 MMHG | TEMPERATURE: 98 F | RESPIRATION RATE: 18 BRPM

## 2021-06-10 VITALS
DIASTOLIC BLOOD PRESSURE: 79 MMHG | RESPIRATION RATE: 18 BRPM | TEMPERATURE: 99 F | SYSTOLIC BLOOD PRESSURE: 124 MMHG | HEART RATE: 89 BPM | OXYGEN SATURATION: 96 %

## 2021-06-10 DIAGNOSIS — Z98.890 OTHER SPECIFIED POSTPROCEDURAL STATES: Chronic | ICD-10-CM

## 2021-06-10 DIAGNOSIS — Z96.641 PRESENCE OF RIGHT ARTIFICIAL HIP JOINT: Chronic | ICD-10-CM

## 2021-06-10 DIAGNOSIS — Z96.612 PRESENCE OF LEFT ARTIFICIAL SHOULDER JOINT: Chronic | ICD-10-CM

## 2021-06-10 DIAGNOSIS — Z98.89 OTHER SPECIFIED POSTPROCEDURAL STATES: Chronic | ICD-10-CM

## 2021-06-10 DIAGNOSIS — C34.90 MALIGNANT NEOPLASM OF UNSPECIFIED PART OF UNSPECIFIED BRONCHUS OR LUNG: ICD-10-CM

## 2021-06-10 PROCEDURE — 70450 CT HEAD/BRAIN W/O DYE: CPT

## 2021-06-10 PROCEDURE — 99284 EMERGENCY DEPT VISIT MOD MDM: CPT | Mod: 25

## 2021-06-10 PROCEDURE — 70486 CT MAXILLOFACIAL W/O DYE: CPT | Mod: 26,MA

## 2021-06-10 PROCEDURE — 76377 3D RENDER W/INTRP POSTPROCES: CPT | Mod: 26

## 2021-06-10 PROCEDURE — 70486 CT MAXILLOFACIAL W/O DYE: CPT

## 2021-06-10 PROCEDURE — 70450 CT HEAD/BRAIN W/O DYE: CPT | Mod: 26,MA

## 2021-06-10 PROCEDURE — 76377 3D RENDER W/INTRP POSTPROCES: CPT

## 2021-06-10 PROCEDURE — 99284 EMERGENCY DEPT VISIT MOD MDM: CPT

## 2021-06-10 RX ORDER — ACETAMINOPHEN 500 MG
975 TABLET ORAL ONCE
Refills: 0 | Status: COMPLETED | OUTPATIENT
Start: 2021-06-10 | End: 2021-06-10

## 2021-06-10 RX ADMIN — Medication 975 MILLIGRAM(S): at 17:51

## 2021-06-10 NOTE — ED PROVIDER NOTE - PHYSICAL EXAMINATION
I have reviewed the triage vital signs.  Const: AAOx3, in NAD  Eyes: no conjunctival injection, PERRL, EOMI   HENT: NCAT, Neck supple, oral mucosa dry, poor dentition, mild R cheek swelling, zygomatic TTP  CV: RRR, +S1, S2  Resp: CTAB, no respiratory distress  GI: Abdomen soft, NTND, no guarding, no CVA tenderness  Extremities: No peripheral edema,  2+ radial and DP pulses  Skin: very superficial, small lower lip laceration  MSK: No gross deformities appreciated  Neuro: No focal sensory or motor deficits  Psych: Appropriate mood and affect

## 2021-06-10 NOTE — ED PROVIDER NOTE - ATTENDING CONTRIBUTION TO CARE
attending Pollack: 65yF h/o PE on Eliquis p/w R facial pain and swelling s/p mechanical fall. Pt Denies LOC,  neck pain, vision changes. Ambulatory at scene. Will obtain CT imaging eval traumatic injury, pain control, reassess

## 2021-06-10 NOTE — ED PROVIDER NOTE - NS ED ROS FT
General: Denies fevers  Eyes: Denies vision changes  ENMT: +R facial pain and swelling. Denies sore throat  CV: Denies chest pain  Resp: Denies SOB  GI: Denies abdominal pain, nausea, vomiting, diarrhea  : Denies painful urination  Skin: Denies new rashes  Neuro: Denies headache, focal weakness  MSK: Denies joint pain

## 2021-06-10 NOTE — ED ADULT NURSE NOTE - NSIMPLEMENTINTERV_GEN_ALL_ED
Implemented All Fall Risk Interventions:  [place] to call system. Call bell, personal items and telephone within reach. Instruct patient to call for assistance. Room bathroom lighting operational. Non-slip footwear when patient is off stretcher. Physically safe environment: no spills, clutter or unnecessary equipment. Stretcher in lowest position, wheels locked, appropriate side rails in place. Provide visual cue, wrist band, yellow gown, etc. Monitor gait and stability. Monitor for mental status changes and reorient to person, place, and time. Review medications for side effects contributing to fall risk. Reinforce activity limits and safety measures with patient and family.

## 2021-06-10 NOTE — ED PROVIDER NOTE - OBJECTIVE STATEMENT
65F PMH includes PE on Eliquis p/w R facial pain and swelling s/p fall. Pt states she was walking on the sidewalk, stepped into a hole, causing her to fall onto the R side of her face. Denies LOC. No headache, visual changes, numbness, tingling, weakness. No CP, SOB, abd pain. States EMS would not allow her to walk but believes she could. Denies preceding lightheadedness, CP, SOB.

## 2021-06-10 NOTE — ED PROVIDER NOTE - PROGRESS NOTE DETAILS
attending Andrzej: pt eloped from ED prior to CT reads. CT showing possible entrapment of inferior rectus muscle. Pt called to come back, voicemail left. Pt informed of findings and need to return immediately to ED. attending Andrzej: called patient again, spoke with her about CT findings and need to return to the ER immediately. Pt understands, states she will come back now.

## 2021-06-10 NOTE — ED PROVIDER NOTE - CLINICAL SUMMARY MEDICAL DECISION MAKING FREE TEXT BOX
Iain, PGY2 - 65F on Eliquis p/w R facial pain/swelling s/p mechanical fall. Neuro exam normal. No e/o entrapment. Will eval for ICH, facial fx. Plan: CTH/maxillofacial, pain control, reeval

## 2021-06-10 NOTE — ED ADULT NURSE NOTE - OBJECTIVE STATEMENT
no Female 65 years old with past medical history of Lung CAncer on chemo Female 65 years old with past medical history of Lung Cancer on chemo and bilateral DVT on Eliquis was brought in by EMS s/p fall.. Pt reports she tripped on the hole in the side walk and fell on her left side. Right face and lower eyelid swollen. With small laceration at her lower lip. Denies LOC, dizziness, N/V or vision change. Pt is fully awake, alert and orientedx4. Evaluated by MD. Safety maintained.

## 2021-06-11 ENCOUNTER — APPOINTMENT (OUTPATIENT)
Dept: OPHTHALMOLOGY | Facility: CLINIC | Age: 66
End: 2021-06-11

## 2021-06-11 VITALS
TEMPERATURE: 98 F | OXYGEN SATURATION: 94 % | RESPIRATION RATE: 18 BRPM | HEART RATE: 71 BPM | DIASTOLIC BLOOD PRESSURE: 72 MMHG | SYSTOLIC BLOOD PRESSURE: 117 MMHG

## 2021-06-11 PROCEDURE — 99284 EMERGENCY DEPT VISIT MOD MDM: CPT

## 2021-06-11 RX ORDER — HYDROMORPHONE HYDROCHLORIDE 2 MG/ML
4 INJECTION INTRAMUSCULAR; INTRAVENOUS; SUBCUTANEOUS ONCE
Refills: 0 | Status: DISCONTINUED | OUTPATIENT
Start: 2021-06-11 | End: 2021-06-11

## 2021-06-11 RX ADMIN — Medication 1 TABLET(S): at 03:53

## 2021-06-11 RX ADMIN — HYDROMORPHONE HYDROCHLORIDE 4 MILLIGRAM(S): 2 INJECTION INTRAMUSCULAR; INTRAVENOUS; SUBCUTANEOUS at 02:13

## 2021-06-11 NOTE — ED PROVIDER NOTE - PROGRESS NOTE DETAILS
Billy, PGY2: spoke with optho about pts CT finding of "bulbous appearance of optic nerve", given pts visual acuity intact, optho states pt can f/u in office this morning where they have a more comprehensive set of testing equipment for her  OMFS face saw pt, they think pt needs surgery though not emergently, recs to f/u outpt, augmentin 1 week,  and sinus precautions for nasal fx.

## 2021-06-11 NOTE — ED PROVIDER NOTE - NSFOLLOWUPCLINICS_GEN_ALL_ED_FT
[facility] - Ophthalmology  Ophthalmology  [address] [place] [address], [address]  [place] [address]  Phone: [phone]  Fax:   Scheduled Appointment: 6/11/2021 9:00 AM

## 2021-06-11 NOTE — ED PROVIDER NOTE - PATIENT PORTAL LINK FT
You can access the FollowMyHealth Patient Portal offered by [facility] by registering at the following website: [email or web address]. By joining [employer]’s FollowMyHealth portal, you will also be able to view your health information using other applications (apps) compatible with our system.

## 2021-06-11 NOTE — ED PROVIDER NOTE - CLINICAL SUMMARY MEDICAL DECISION MAKING FREE TEXT BOX
Head trauma, orbital floor fx with concern for entrapment based on imaging and pts sxs of diploplia, will do analgesia, face, optho. Reassess. Head trauma, orbital floor fx with concern for entrapment based on imaging and pts sxs of diploplia, will do analgesia, face, optho. Reassess.    Candy: 65 year old female called back to return to the ER for CT results. ct showing oribtal floor fracture on right with signs of entrapment.  mild pain in the eye.  + diplopia. will consult omfs, optho, reassess

## 2021-06-11 NOTE — CONSULT NOTE ADULT - ASSESSMENT
ASSESSMENT: 66yo F s/p fall sustaining R orbital floor fx on 6/10/2021 @ 3PM    PLAN:  - No Acute OMFS intervention at this time. Follow up in OMFS clinic in 1wk. Call [phone] to schedule an appointment.  - Rec Abx Augmentin bid  - Rec Pain Control as per ED  - No pressure to face, ice to face  - Rec soft diet  - Rec Sinus precautions (no nose blowing, no sucking on straws, sneeze with mouth open)    [id number] [facility]

## 2021-06-11 NOTE — ED PROVIDER NOTE - OBJECTIVE STATEMENT
65F PMH includes PE on Eliquis, Lung Ca on chemo (last dose yesterday, follows at [facility]) p/w R facial pain and swelling s/p fall. Pt states she was walking on the sidewalk, stepped into a hole, causing her to fall onto the R side of her face. Denies LOC. Pt initially seen in the ED earlier today and eloped prior to CT results. Pt called back to be evaluated by facial fx as pt with CT showing orbital floor fx with signs entrapment of Inferior rectus muscle. Pt states that she has pain in the eye and does state that she has some double vision. Otherwise denies any headache, weakness in extremities, n/v. Denies any chest pain, sob, abd pain.

## 2021-06-11 NOTE — CONSULT NOTE ADULT - SUBJECTIVE AND OBJECTIVE BOX
OMFS CONSULT NOTE    HPI:  Patient is a 65y old  Female who presents with a chief complaint of pain, swelling of R eye, s/p fall sustaining R orbital floor fx on 6/10/2021 @ 3PM.    PMH:  PAST MEDICAL & SURGICAL HISTORY:  HTN (hypertension)  GERD (gastroesophageal reflux disease)  Chronic pancreatitis  last episode 4/2019  ARDS survivor  2015    History of adrenal adenoma  stable on imaging    Vocal cord polyp  removal 2018, benign as per pt    Thrombophlebitis  superficial right UE during 4/2019 hospital stay, resolved as per pt    Chronic abdominal pain    Non-small cell lung cancer (NSCLC)  chemo: Alimta/ Keytruda 2/24/2021    Pulmonary embolism    COPD (chronic obstructive pulmonary disease)    Pulmonary hypertension    Anemia  transfusion 2/5/21    S/P arthroscopy of shoulder  left in 2009    S/P hip replacement  left - 2010    S/P arthroscopy of shoulder  right - 2014    S/P hip replacement, right  May 2016    History of vocal cord polypectomy  1/2018    S/P shoulder replacement, left  2016    History of pancreatic surgery  Jeffery procedure (5/8/2019)      MEDICATIONS  (STANDING):    MEDICATIONS  (PRN):    Allergies    diazepam (Other)  lemon (Other)    Intolerances      FAMILY HISTORY:    *SOCIAL HISTORY: Denies ETOH use, Tobacco, drugs    * Review of Systems: (-) fever, chills,  (-) LOC,  (-) Nausea/vomiting/headache.   (-), SOB, cough.  (-) palpitations. (+) blurred vision/double vision.  (-) dysphagia, dyspnea.  (-) paresthesia.     Vital Signs Last 24 Hrs  T(C): 36.7 (11 Jun 2021 02:04), Max: 37.4 (10 Ameya 2021 18:04)  T(F): 98 (11 Jun 2021 02:04), Max: 99.3 (10 Ameya 2021 18:04)  HR: 84 (10 Ameya 2021 23:49) (84 - 90)  BP: 121/78 (11 Jun 2021 02:04) (115/64 - 125/69)  BP(mean): --  RR: 18 (11 Jun 2021 02:04) (16 - 18)  SpO2: 97% (11 Jun 2021 02:04) (96% - 97%)    PHYSICAL EXAM:  Gen: AAox3, NAD, NC/AT  HEENT: ( + ) edema/tenderness to palpation R eye, ( + ) asymmetry, ( - ) signs of scalp infection, EOMI, VAI, ( + ) diplopia, ( + ) supra/infra orbital rims intact, ( + ) subconjunctival heme, ( - ) telecanthus, ( - ) exophthalmos, ( - )crepitis/septal hematoma/asymmetry, ( - ) malar depression, ( - ) CN V-2 paresthesia  Oral: MARTINA 40, partial edentulism, ( + ) occlusion stable and reproducible, ( - ) mobility of maxilla/crepitis. TMJ: ( - ) clicking/popping  CN II-XII intact    LABS:                        8.7    8.40  )-----------( 478      ( 09 Jun 2021 10:07 )             26.8     06-09    136  |  102  |  14  ----------------------------<  84  4.4   |  27  |  1.40<H>    IMAGING:    EXAM: CT MAXILLOFACIAL    EXAM: CT 3D RECONSTRUCT MART HERNANDEZ    EXAM: CT BRAIN      PROCEDURE DATE: 06/10/2021          INTERPRETATION: CLINICAL INDICATION: Status post fall, injury.    TECHNIQUE: CT of the head and facial bones was performed without the administration of intravenous contrast. 3-D reconstructions were performed on a separate workstation and reviewed.    COMPARISON: None available.    FINDINGS:    CT HEAD:    There is no evidence of acute infarction, intracranial hemorrhage or mass lesion. There is hypoattenuation of the subcortical and periventricular white matter, which is nonspecific finding, but most likely represents sequela of moderate chronic microvascular ischemic disease. There are atherosclerotic calcifications of the cavernous and supraclinoid internal carotid arteries bilaterally. There is prominence of the cortical sulci related to underlying brain parenchymal volume loss. Incidentally noted is enlarged, partially empty sella turcica.    There is no evidence of hydrocephalus. There are no extra-axial fluid collections.      CT FACIAL BONES:    3-D reconstructions were performed on a separate workstation and reviewed.    SKIN AND SUBCUTANEOUS SOFT TISSUES: There is soft tissue swelling/hematoma of the right periorbital and premedullary region.    OSSEOUS STRUCTURES: There is a comminuted acute fracture of right orbital floor with prominent herniation of the intraorbital fat into the right maxillary sinus. There is also vertical orientation and intrasinus herniation of the right inferior rectus muscle, concerning for muscular entrapment. There are tiny foci of retrobulbar air along the orbital floor.    There is an acute fracture of the right nasal bone (series 7, image 98) and question subtle acute fracture of the right lamina papyracea.    No evidence of destructive osseous lesion.    ORBITS: Please see right orbital findings, as detailed above. There is suggestion of unusual bulbous appearance to the distal right optic nerve (series 5, image 93 and series 2 image 122), correlation with ophthalmologic examination is advised. The left globe, retrobulbar fat, extraocular muscles, and optic nerve sheath complex are intact and normal in morphology.    PARANASAL SINUSES: There is dependently layering hemorrhagic fluid within the right maxillary sinus and an opacified right ethmoid air cell with hemorrhagic fluid. There is also a few opacified left ethmoid air cells and prominent polypoid mucosal thickening in the left maxillary sinus.    MASTOID AIR CELLS: Clear.      IMPRESSION:    CT HEAD: No acute intracranial findings.    CT FACIAL BONES: Comminuted acute fracture of the right orbital floor with prominent herniation of the intraorbital fat into the right maxillary sinus, and vertical orientation and intrasinus herniation of the right inferior rectus muscle, concerning for muscular trapment. Suggestion of unusual bulbous appearance to the distal right optic nerve. Correlation with ophthalmologic examination is advised.    Acute fracture of the right nasal bone and question subtle acute fracture of the right lamina papyracea.    TREY IRBY MD; Attending Radiologist  This document has been electronically signed. Ameya 10 2021 7:15PM

## 2021-06-11 NOTE — ED PROVIDER NOTE - NSFOLLOWUPINSTRUCTIONS_ED_ALL_ED_FT
- Please follow up with your Primary Care Doctor within 48 hours. Bring your results from today.    - Please follow up with the [facility] in 1 week.    - Please follow up with Ophthalmology in their clinic today (they are aware of you and they are aware you will be seeing them today).    - Please don't blow your nose, don't apply pressure to your face.     - Tylenol up to 650 mg every 6 hours as needed for pain.    - Please take prescribed medication as indicated: Augmentin    - Be sure to return to the ED if you develop new or worsening symptoms. Specific signs and symptoms to be vigilant of: fever or chills, chest pain, difficulty breathing, palpitations, lightheadedness or loss of consciousness, headache, vision changes, slurred speech, difficulty swallowing or drooling, facial droop, weakness in the arms or legs, numbness or tingling, vision changes, eye swelling, worsening eye pain, or any other distressing symptoms. - Please follow up with your Primary Care Doctor within 48 hours. Bring your results from today.    - Please follow up with the [facility] in 1 week. Call [phone] to schedule an appointment.    - Please follow up with Ophthalmology in their clinic today (they are aware of you and they are aware you will be seeing them today).    - Please don't blow your nose, don't apply pressure to your face.     - Tylenol up to 650 mg every 6 hours as needed for pain.    - Please take prescribed medication as indicated: Augmentin    - Be sure to return to the ED if you develop new or worsening symptoms. Specific signs and symptoms to be vigilant of: fever or chills, chest pain, difficulty breathing, palpitations, lightheadedness or loss of consciousness, headache, vision changes, slurred speech, difficulty swallowing or drooling, facial droop, weakness in the arms or legs, numbness or tingling, vision changes, eye swelling, worsening eye pain, or any other distressing symptoms.

## 2021-06-11 NOTE — ED PROVIDER NOTE - PHYSICAL EXAMINATION
GENERAL: no acute distress, non-toxic appearing  HEENT: slightly erythematous conjuctiva of R eye, R periorbital swelling without proptosis, LION, VA OD 25/20 OS 25/20, pts EOMI grossly intact with some pain with inferior gaze of R eye, no c spine tenderness  CARDIAC: regular rate and regular rhythm, 2+ distal pulses   PULM: bilat breath sounds  GI: abdomen nondistended, soft, nontender  : no suprapubic tenderness  NEURO: alert and oriented x 3, normal speech, moving all extremities without lateralization  MSK: no visible deformities, no midline spine tenderness, no pelvic pain to palp  SKIN: no visible lacs  PSYCH: appropriate mood and affect

## 2021-06-14 ENCOUNTER — INPATIENT (INPATIENT)
Facility: HOSPITAL | Age: 66
LOS: 13 days | Discharge: HOME HEALTH | DRG: 193 | End: 2021-06-28
Attending: HOSPITALIST | Admitting: HOSPITALIST
Payer: COMMERCIAL

## 2021-06-14 VITALS
RESPIRATION RATE: 26 BRPM | DIASTOLIC BLOOD PRESSURE: 86 MMHG | HEART RATE: 112 BPM | SYSTOLIC BLOOD PRESSURE: 123 MMHG | HEIGHT: 64 IN | OXYGEN SATURATION: 90 % | WEIGHT: 130.07 LBS

## 2021-06-14 DIAGNOSIS — Z98.890 OTHER SPECIFIED POSTPROCEDURAL STATES: Chronic | ICD-10-CM

## 2021-06-14 DIAGNOSIS — R09.89 OTHER SPECIFIED SYMPTOMS AND SIGNS INVOLVING THE CIRCULATORY AND RESPIRATORY SYSTEMS: ICD-10-CM

## 2021-06-14 DIAGNOSIS — J96.01 ACUTE RESPIRATORY FAILURE WITH HYPOXIA: ICD-10-CM

## 2021-06-14 DIAGNOSIS — R94.31 ABNORMAL ELECTROCARDIOGRAM [ECG] [EKG]: ICD-10-CM

## 2021-06-14 DIAGNOSIS — K86.1 OTHER CHRONIC PANCREATITIS: ICD-10-CM

## 2021-06-14 DIAGNOSIS — S02.30XA FRACTURE OF ORBITAL FLOOR, UNSPECIFIED SIDE, INITIAL ENCOUNTER FOR CLOSED FRACTURE: ICD-10-CM

## 2021-06-14 DIAGNOSIS — C34.90 MALIGNANT NEOPLASM OF UNSPECIFIED PART OF UNSPECIFIED BRONCHUS OR LUNG: ICD-10-CM

## 2021-06-14 DIAGNOSIS — I26.99 OTHER PULMONARY EMBOLISM WITHOUT ACUTE COR PULMONALE: ICD-10-CM

## 2021-06-14 DIAGNOSIS — Z96.612 PRESENCE OF LEFT ARTIFICIAL SHOULDER JOINT: Chronic | ICD-10-CM

## 2021-06-14 DIAGNOSIS — Z96.641 PRESENCE OF RIGHT ARTIFICIAL HIP JOINT: Chronic | ICD-10-CM

## 2021-06-14 DIAGNOSIS — D64.9 ANEMIA, UNSPECIFIED: ICD-10-CM

## 2021-06-14 DIAGNOSIS — Z98.89 OTHER SPECIFIED POSTPROCEDURAL STATES: Chronic | ICD-10-CM

## 2021-06-14 DIAGNOSIS — J44.1 CHRONIC OBSTRUCTIVE PULMONARY DISEASE WITH (ACUTE) EXACERBATION: ICD-10-CM

## 2021-06-14 DIAGNOSIS — Z29.9 ENCOUNTER FOR PROPHYLACTIC MEASURES, UNSPECIFIED: ICD-10-CM

## 2021-06-14 DIAGNOSIS — I10 ESSENTIAL (PRIMARY) HYPERTENSION: ICD-10-CM

## 2021-06-14 LAB
ALBUMIN SERPL ELPH-MCNC: 2.2 G/DL — LOW (ref 3.3–5)
ALP SERPL-CCNC: 155 U/L — HIGH (ref 40–120)
ALT FLD-CCNC: 12 U/L — SIGNIFICANT CHANGE UP (ref 10–45)
ANION GAP SERPL CALC-SCNC: 13 MMOL/L — SIGNIFICANT CHANGE UP (ref 5–17)
ANISOCYTOSIS BLD QL: SIGNIFICANT CHANGE UP
APTT BLD: 25.9 SEC — LOW (ref 27.5–35.5)
AST SERPL-CCNC: 30 U/L — SIGNIFICANT CHANGE UP (ref 10–40)
BASE EXCESS BLDV CALC-SCNC: 2.4 MMOL/L — HIGH (ref -2–2)
BASOPHILS # BLD AUTO: 0 K/UL — SIGNIFICANT CHANGE UP (ref 0–0.2)
BASOPHILS NFR BLD AUTO: 0 % — SIGNIFICANT CHANGE UP (ref 0–2)
BILIRUB SERPL-MCNC: 0.8 MG/DL — SIGNIFICANT CHANGE UP (ref 0.2–1.2)
BUN SERPL-MCNC: 15 MG/DL — SIGNIFICANT CHANGE UP (ref 7–23)
CA-I SERPL-SCNC: 1.1 MMOL/L — LOW (ref 1.12–1.3)
CALCIUM SERPL-MCNC: 7.9 MG/DL — LOW (ref 8.4–10.5)
CHLORIDE BLDV-SCNC: 110 MMOL/L — HIGH (ref 96–108)
CHLORIDE SERPL-SCNC: 107 MMOL/L — SIGNIFICANT CHANGE UP (ref 96–108)
CO2 BLDV-SCNC: 29 MMOL/L — SIGNIFICANT CHANGE UP (ref 22–30)
CO2 SERPL-SCNC: 23 MMOL/L — SIGNIFICANT CHANGE UP (ref 22–31)
CREAT SERPL-MCNC: 1.2 MG/DL — SIGNIFICANT CHANGE UP (ref 0.5–1.3)
ELLIPTOCYTES BLD QL SMEAR: SLIGHT — SIGNIFICANT CHANGE UP
EOSINOPHIL # BLD AUTO: 0 K/UL — SIGNIFICANT CHANGE UP (ref 0–0.5)
EOSINOPHIL NFR BLD AUTO: 0 % — SIGNIFICANT CHANGE UP (ref 0–6)
GAS PNL BLDV: 141 MMOL/L — SIGNIFICANT CHANGE UP (ref 135–145)
GAS PNL BLDV: SIGNIFICANT CHANGE UP
GAS PNL BLDV: SIGNIFICANT CHANGE UP
GLUCOSE BLDV-MCNC: 103 MG/DL — HIGH (ref 70–99)
GLUCOSE SERPL-MCNC: 96 MG/DL — SIGNIFICANT CHANGE UP (ref 70–99)
HCO3 BLDV-SCNC: 27 MMOL/L — SIGNIFICANT CHANGE UP (ref 21–29)
HCT VFR BLD CALC: 25.7 % — LOW (ref 34.5–45)
HCT VFR BLDA CALC: 26 % — LOW (ref 39–50)
HGB BLD CALC-MCNC: 8.5 G/DL — LOW (ref 11.5–15.5)
HGB BLD-MCNC: 8.1 G/DL — LOW (ref 11.5–15.5)
INR BLD: 1.27 RATIO — HIGH (ref 0.88–1.16)
LACTATE BLDV-MCNC: 2.1 MMOL/L — HIGH (ref 0.7–2)
LACTATE BLDV-MCNC: 4.1 MMOL/L — CRITICAL HIGH (ref 0.7–2)
LYMPHOCYTES # BLD AUTO: 0.18 K/UL — LOW (ref 1–3.3)
LYMPHOCYTES # BLD AUTO: 2.6 % — LOW (ref 13–44)
MACROCYTES BLD QL: SIGNIFICANT CHANGE UP
MAGNESIUM SERPL-MCNC: 1.5 MG/DL — LOW (ref 1.6–2.6)
MANUAL SMEAR VERIFICATION: SIGNIFICANT CHANGE UP
MCHC RBC-ENTMCNC: 31.2 PG — SIGNIFICANT CHANGE UP (ref 27–34)
MCHC RBC-ENTMCNC: 31.5 GM/DL — LOW (ref 32–36)
MCV RBC AUTO: 98.8 FL — SIGNIFICANT CHANGE UP (ref 80–100)
MONOCYTES # BLD AUTO: 0 K/UL — SIGNIFICANT CHANGE UP (ref 0–0.9)
MONOCYTES NFR BLD AUTO: 0 % — LOW (ref 2–14)
NEUTROPHILS # BLD AUTO: 6.66 K/UL — SIGNIFICANT CHANGE UP (ref 1.8–7.4)
NEUTROPHILS NFR BLD AUTO: 97.4 % — HIGH (ref 43–77)
NRBC # BLD: 1 /100 — HIGH (ref 0–0)
NT-PROBNP SERPL-SCNC: 1957 PG/ML — HIGH (ref 0–300)
PCO2 BLDV: 47 MMHG — SIGNIFICANT CHANGE UP (ref 35–50)
PH BLDV: 7.38 — SIGNIFICANT CHANGE UP (ref 7.35–7.45)
PHOSPHATE SERPL-MCNC: 3.6 MG/DL — SIGNIFICANT CHANGE UP (ref 2.5–4.5)
PLAT MORPH BLD: NORMAL — SIGNIFICANT CHANGE UP
PLATELET # BLD AUTO: 186 K/UL — SIGNIFICANT CHANGE UP (ref 150–400)
PO2 BLDV: 28 MMHG — SIGNIFICANT CHANGE UP (ref 25–45)
POIKILOCYTOSIS BLD QL AUTO: SLIGHT — SIGNIFICANT CHANGE UP
POTASSIUM BLDV-SCNC: 3.9 MMOL/L — SIGNIFICANT CHANGE UP (ref 3.5–5.3)
POTASSIUM SERPL-MCNC: 4.1 MMOL/L — SIGNIFICANT CHANGE UP (ref 3.5–5.3)
POTASSIUM SERPL-SCNC: 4.1 MMOL/L — SIGNIFICANT CHANGE UP (ref 3.5–5.3)
PROT SERPL-MCNC: 5.3 G/DL — LOW (ref 6–8.3)
PROTHROM AB SERPL-ACNC: 15.1 SEC — HIGH (ref 10.6–13.6)
RBC # BLD: 2.6 M/UL — LOW (ref 3.8–5.2)
RBC # FLD: 20.7 % — HIGH (ref 10.3–14.5)
RBC BLD AUTO: ABNORMAL
SAO2 % BLDV: 38 % — LOW (ref 67–88)
SARS-COV-2 RNA SPEC QL NAA+PROBE: SIGNIFICANT CHANGE UP
SCHISTOCYTES BLD QL AUTO: SLIGHT — SIGNIFICANT CHANGE UP
SODIUM SERPL-SCNC: 143 MMOL/L — SIGNIFICANT CHANGE UP (ref 135–145)
TARGETS BLD QL SMEAR: SLIGHT — SIGNIFICANT CHANGE UP
TROPONIN T, HIGH SENSITIVITY RESULT: 18 NG/L — SIGNIFICANT CHANGE UP (ref 0–51)
TROPONIN T, HIGH SENSITIVITY RESULT: 20 NG/L — SIGNIFICANT CHANGE UP (ref 0–51)
WBC # BLD: 6.84 K/UL — SIGNIFICANT CHANGE UP (ref 3.8–10.5)
WBC # FLD AUTO: 6.84 K/UL — SIGNIFICANT CHANGE UP (ref 3.8–10.5)

## 2021-06-14 PROCEDURE — 99291 CRITICAL CARE FIRST HOUR: CPT

## 2021-06-14 PROCEDURE — 71275 CT ANGIOGRAPHY CHEST: CPT | Mod: 26

## 2021-06-14 PROCEDURE — 99223 1ST HOSP IP/OBS HIGH 75: CPT | Mod: GC

## 2021-06-14 PROCEDURE — 93010 ELECTROCARDIOGRAM REPORT: CPT

## 2021-06-14 PROCEDURE — 71045 X-RAY EXAM CHEST 1 VIEW: CPT | Mod: 26

## 2021-06-14 RX ORDER — ONDANSETRON 8 MG/1
4 TABLET, FILM COATED ORAL
Refills: 0 | Status: DISCONTINUED | OUTPATIENT
Start: 2021-06-14 | End: 2021-06-28

## 2021-06-14 RX ORDER — LIPASE/PROTEASE/AMYLASE 16-48-48K
2 CAPSULE,DELAYED RELEASE (ENTERIC COATED) ORAL
Refills: 0 | Status: DISCONTINUED | OUTPATIENT
Start: 2021-06-14 | End: 2021-06-28

## 2021-06-14 RX ORDER — OXYCODONE HYDROCHLORIDE 5 MG/1
30 TABLET ORAL EVERY 6 HOURS
Refills: 0 | Status: DISCONTINUED | OUTPATIENT
Start: 2021-06-14 | End: 2021-06-21

## 2021-06-14 RX ORDER — AMLODIPINE BESYLATE 2.5 MG/1
10 TABLET ORAL DAILY
Refills: 0 | Status: DISCONTINUED | OUTPATIENT
Start: 2021-06-14 | End: 2021-06-28

## 2021-06-14 RX ORDER — HYDROMORPHONE HYDROCHLORIDE 2 MG/ML
1 INJECTION INTRAMUSCULAR; INTRAVENOUS; SUBCUTANEOUS ONCE
Refills: 0 | Status: DISCONTINUED | OUTPATIENT
Start: 2021-06-14 | End: 2021-06-14

## 2021-06-14 RX ORDER — HEPARIN SODIUM 5000 [USP'U]/ML
4500 INJECTION INTRAVENOUS; SUBCUTANEOUS ONCE
Refills: 0 | Status: COMPLETED | OUTPATIENT
Start: 2021-06-14 | End: 2021-06-14

## 2021-06-14 RX ORDER — PREGABALIN 225 MG/1
1000 CAPSULE ORAL DAILY
Refills: 0 | Status: DISCONTINUED | OUTPATIENT
Start: 2021-06-14 | End: 2021-06-28

## 2021-06-14 RX ORDER — CHOLECALCIFEROL (VITAMIN D3) 125 MCG
1000 CAPSULE ORAL DAILY
Refills: 0 | Status: DISCONTINUED | OUTPATIENT
Start: 2021-06-14 | End: 2021-06-28

## 2021-06-14 RX ORDER — IPRATROPIUM/ALBUTEROL SULFATE 18-103MCG
3 AEROSOL WITH ADAPTER (GRAM) INHALATION
Refills: 0 | Status: DISCONTINUED | OUTPATIENT
Start: 2021-06-14 | End: 2021-06-28

## 2021-06-14 RX ORDER — PANTOPRAZOLE SODIUM 20 MG/1
40 TABLET, DELAYED RELEASE ORAL
Refills: 0 | Status: DISCONTINUED | OUTPATIENT
Start: 2021-06-14 | End: 2021-06-28

## 2021-06-14 RX ORDER — AZITHROMYCIN 500 MG/1
500 TABLET, FILM COATED ORAL ONCE
Refills: 0 | Status: COMPLETED | OUTPATIENT
Start: 2021-06-14 | End: 2021-06-14

## 2021-06-14 RX ORDER — MAGNESIUM SULFATE 500 MG/ML
2 VIAL (ML) INJECTION ONCE
Refills: 0 | Status: COMPLETED | OUTPATIENT
Start: 2021-06-14 | End: 2021-06-14

## 2021-06-14 RX ORDER — IPRATROPIUM/ALBUTEROL SULFATE 18-103MCG
3 AEROSOL WITH ADAPTER (GRAM) INHALATION EVERY 6 HOURS
Refills: 0 | Status: DISCONTINUED | OUTPATIENT
Start: 2021-06-14 | End: 2021-06-28

## 2021-06-14 RX ORDER — HEPARIN SODIUM 5000 [USP'U]/ML
4500 INJECTION INTRAVENOUS; SUBCUTANEOUS EVERY 6 HOURS
Refills: 0 | Status: DISCONTINUED | OUTPATIENT
Start: 2021-06-14 | End: 2021-06-15

## 2021-06-14 RX ORDER — MIRTAZAPINE 45 MG/1
15 TABLET, ORALLY DISINTEGRATING ORAL AT BEDTIME
Refills: 0 | Status: DISCONTINUED | OUTPATIENT
Start: 2021-06-14 | End: 2021-06-28

## 2021-06-14 RX ORDER — HEPARIN SODIUM 5000 [USP'U]/ML
INJECTION INTRAVENOUS; SUBCUTANEOUS
Qty: 25000 | Refills: 0 | Status: DISCONTINUED | OUTPATIENT
Start: 2021-06-14 | End: 2021-06-15

## 2021-06-14 RX ORDER — IPRATROPIUM/ALBUTEROL SULFATE 18-103MCG
3 AEROSOL WITH ADAPTER (GRAM) INHALATION
Refills: 0 | Status: COMPLETED | OUTPATIENT
Start: 2021-06-14 | End: 2021-06-14

## 2021-06-14 RX ORDER — HEPARIN SODIUM 5000 [USP'U]/ML
2000 INJECTION INTRAVENOUS; SUBCUTANEOUS EVERY 6 HOURS
Refills: 0 | Status: DISCONTINUED | OUTPATIENT
Start: 2021-06-14 | End: 2021-06-15

## 2021-06-14 RX ORDER — FOLIC ACID 0.8 MG
1 TABLET ORAL DAILY
Refills: 0 | Status: DISCONTINUED | OUTPATIENT
Start: 2021-06-14 | End: 2021-06-28

## 2021-06-14 RX ADMIN — HEPARIN SODIUM 1100 UNIT(S)/HR: 5000 INJECTION INTRAVENOUS; SUBCUTANEOUS at 18:10

## 2021-06-14 RX ADMIN — Medication 50 GRAM(S): at 13:02

## 2021-06-14 RX ADMIN — AZITHROMYCIN 250 MILLIGRAM(S): 500 TABLET, FILM COATED ORAL at 11:05

## 2021-06-14 RX ADMIN — Medication 1000 UNIT(S): at 18:45

## 2021-06-14 RX ADMIN — Medication 2 CAPSULE(S): at 18:45

## 2021-06-14 RX ADMIN — Medication 3 MILLILITER(S): at 11:04

## 2021-06-14 RX ADMIN — OXYCODONE HYDROCHLORIDE 30 MILLIGRAM(S): 5 TABLET ORAL at 17:48

## 2021-06-14 RX ADMIN — Medication 3 MILLILITER(S): at 11:12

## 2021-06-14 RX ADMIN — Medication 1 TABLET(S): at 18:45

## 2021-06-14 RX ADMIN — Medication 1 MILLIGRAM(S): at 18:45

## 2021-06-14 RX ADMIN — HYDROMORPHONE HYDROCHLORIDE 1 MILLIGRAM(S): 2 INJECTION INTRAMUSCULAR; INTRAVENOUS; SUBCUTANEOUS at 12:45

## 2021-06-14 RX ADMIN — Medication 125 MILLIGRAM(S): at 09:21

## 2021-06-14 RX ADMIN — PANTOPRAZOLE SODIUM 40 MILLIGRAM(S): 20 TABLET, DELAYED RELEASE ORAL at 18:45

## 2021-06-14 RX ADMIN — Medication 3 MILLILITER(S): at 18:45

## 2021-06-14 RX ADMIN — MIRTAZAPINE 15 MILLIGRAM(S): 45 TABLET, ORALLY DISINTEGRATING ORAL at 21:42

## 2021-06-14 RX ADMIN — Medication 3 MILLILITER(S): at 09:42

## 2021-06-14 RX ADMIN — Medication 3 MILLILITER(S): at 11:45

## 2021-06-14 RX ADMIN — PREGABALIN 1000 MICROGRAM(S): 225 CAPSULE ORAL at 18:45

## 2021-06-14 RX ADMIN — Medication 3 MILLILITER(S): at 09:50

## 2021-06-14 RX ADMIN — HEPARIN SODIUM 4500 UNIT(S): 5000 INJECTION INTRAVENOUS; SUBCUTANEOUS at 18:10

## 2021-06-14 NOTE — CHART NOTE - NSCHARTNOTEFT_GEN_A_CORE
This report was requested by: Phuc Hall | Reference #: [id number]    Patient Name: Sakshi Shaw Date: 1955  Address: [address] [place] [id number]: Female  Rx Written	Rx Dispensed	Drug	Quantity	Days Supply	Prescriber Name	Prescriber Alee #	Payment Method	Dispenser  05/12/2021	05/21/2021	methadone hcl 10 mg tablet	60	30	Gurjit [id number]	Medicaid	[id number]  05/12/2021	05/13/2021	oxycodone hcl 30 mg tablet	120	30	Gurjit [id number]	Medicaid	[id number]  04/09/2021	04/12/2021	oxycodone hcl 30 mg tablet	120	20	Gurjit [id number]	Medicaid	[id number]  04/09/2021	04/12/2021	pregabalin 50 mg capsule	90	30	Gurjit Mccauley	[id number]	Medicaid	[id number]  03/12/2021	03/18/2021	oxycodone hcl 30 mg tablet	120	20	Gurjit [id number]	Medicaid	[id number]  03/08/2021	03/08/2021	oxycodone hcl 5 mg tablet	20	5	[facility]	[id number]	Medicaid	[id number]  12/04/2020	12/09/2020	oxycodone hcl 30 mg tablet	120	20	Gurjit Mccauley	[id number]	Medicaid	[id number]  11/06/2020	11/21/2020	oxycodone hcl 30 mg tablet	120	20	Gurjit [id number]	Medicaid	[id number]  10/09/2020	10/30/2020	oxycodone hcl 30 mg tablet	120	20	GarrickGurjit [id number]	Medicaid	[id number]  09/11/2020	10/05/2020	oxycodone hcl 30 mg tablet	120	20	Gurjit Mccauley	[id number]	Medicaid	[id number]  08/14/2020	08/15/2020	oxycodone hcl 30 mg tablet	120	30	Gurjit [id number]	Medicaid	[id number]  07/17/2020	07/20/2020	oxycodone hcl 30 mg tablet	120	20	Gurjit [id number]	Medicaid	[id number]  06/19/2020	06/26/2020	oxycodone hcl 30 mg tablet	120	20	Gurjit Mccauley	[id number]	Medicaid	[id number]

## 2021-06-14 NOTE — H&P ADULT - ASSESSMENT
64 y/o F with PMHx HTN, COPD, non-small cell lung cancer on chemotherapy,GERD, chronic pancreatitis, hx of DVT/PE on Eliquis but recently held in s/o fall w/ sustained R orbital floor fx, admitted for acute shortness of breath and hypoxia. Pt w/ episodic desaturation but currently stable on NC 64 y/o F with PMHx HTN, COPD, non-small cell lung cancer on chemotherapy,GERD, chronic pancreatitis, hx of DVT/PE on Eliquis but recently held in s/o fall w/ sustained R orbital floor fx, admitted for acute shortness of breath and hypoxia. Pt tachycardic, tachypneic and hypoxic on arrival but clinical improvement following administration of nebulizers and oral steroids in ED. Pt w/ episodic desaturation but currently stable on NC. Concern for COPD exacerbation given improvement following initial treatment but must also r/o other etiologies of acute hypoxia including PE, heart failure, infection.  66 y/o F with PMHx HTN, COPD, non-small cell lung cancer on chemotherapy,GERD, chronic pancreatitis, hx of DVT/PE on Eliquis but recently held in s/o fall w/ sustained R orbital floor fx, admitted for acute shortness of breath and hypoxia likely in s/o COPD exacerbation. Currently stable on NC.

## 2021-06-14 NOTE — ED PROVIDER NOTE - ATTENDING CONTRIBUTION TO CARE
Patient with COPD, lung cancer, still occasional smoker presenting complaining of difficulty breathing.  Found to have room air sat of 60's on arrival.  Came up to 90's on NRB.  In room patient tachypneic, tripoding, diffusely coarse breath sounds.  Started on nebs immediately, steroids ordered, BiPAP ordered.  Suspect COPD exacerbation.  Will re-evalute response to treatment, will require admission.

## 2021-06-14 NOTE — H&P ADULT - PROBLEM SELECTOR PLAN 3
Pt w/ history of chronic pancreatitis, now with epigastric pain since last night c/w flare.    Plan:  -Will check lipase here  -Pain control per pt's home regimen w/ oxycodone prn   -C/w pancrelipase  -Zofran prn nausea Pt w/ history of chronic pancreatitis, now with epigastric pain since last night c/w flare.    Plan:  -Pain control per pt's home regimen w/ oxycodone prn   -C/w pancrelipase  -Zofran prn nausea Pt w/ history of chronic pancreatitis, now with epigastric pain since last night c/w pt's chronic pain    Plan:  -Pain control per pt's home regimen w/ oxycodone prn   -C/w pancrelipase  -Zofran prn nausea Recent ED visit for R orbital floor fx sustained after fall, seen by OMFS here    Plan:  -Appreciate OMFS recs from prior visit  -Continue Augmentin BID  -No pressure to face, ice as needed  -Sinus precautions (no nose blowing, no sucking on straws, sneeze w/ mouth open)

## 2021-06-14 NOTE — PHYSICAL EXAM
[Restricted in physically strenuous activity but ambulatory and able to carry out work of a light or sedentary nature] : Status 1- Restricted in physically strenuous activity but ambulatory and able to carry out work of a light or sedentary nature, e.g., light house work, office work [Thin] : thin [Normal] : grossly intact [de-identified] : 3+ edema

## 2021-06-14 NOTE — H&P ADULT - NSHPLABSRESULTS_GEN_ALL_CORE
LABS:    06-14                        8.1    6.84  )-----------( 186      ( 14 Jun 2021 09:53 )             25.7       143  |  107  |  15  ----------------------------<  96  4.1   |  23  |  1.20    Ca    7.9<L>      14 Jun 2021 09:53  Phos  3.6     06-14  Mg     1.5     06-14    TPro  5.3<L>  /  Alb  2.2<L>  /  TBili  0.8  /  DBili  x   /  AST  30  /  ALT  12  /  AlkPhos  155<H>  06-14    Serum Pro-Brain Natriuretic Peptide: 1957 pg/mL (06.14.21 @ 09:53)  Troponin T, High Sensitivity Result: 20      IMAGING:     < from: Xray Chest 1 View- PORTABLE-Urgent (Xray Chest 1 View- PORTABLE-Urgent .) (06.14.21 @ 09:36) >    Impression:  The heart is normal in size. Multiple small nodules are seen in the right lung especially right lower lobe compatible with an infectious process. The left lung appears to be clear. A MediPort is seen on the right and the tip is in the superior vena cava. If clinically warranted CT scan should be obtained.      < end of copied text >

## 2021-06-14 NOTE — H&P ADULT - PROBLEM SELECTOR PLAN 5
Pt w/ anemia here appears near pt's baseline Hgb 8-9 per chart review. Normocytic anemia w/ elevated RDW, ? mixed microcytic (TOM) + macrocytic.    Plan:  -Daily CBC  -Trend H+H  -C/w home folic acid, B12 EKG today sinus tachycardia with TWI in V1-V4, changed from prior ECG in 2020 w/ TWI in V1 and V2. Pt w/ out chest pain. Lower suspicion for acute ischemic event as etiology for pt's hypoxia but will r/o w/ serial troponin    Plan:  -Troponin x 1 negative, trend troponin  -F/u TTE as above

## 2021-06-14 NOTE — H&P ADULT - HISTORY OF PRESENT ILLNESS
64 y/o F with PMHx HTN, COPD, non-small cell lung cancer on chemotherapy (last tx 6/10), GERD, chronic pancreatitis, hx of DVT/PE on Eliquis but recently held in s/o fall w/ sustained R orbital floor fx, now presents for acute shortness of breath last night. Pt states she was at home when she began to feel suddenly short of breath, progressively worsening until this morning when she called EMS. Denies chest pain, palpitations, orthopnea. Does report some increased leg swelling over past 2 weeks and also states she has had increased sputum production over the past few days but denies true cough or other upper respiratory symptoms. Denies fever, chills. No sick contacts. Pt with reported history of COPD but on no home medications or oxygen, states she normally can tolerate walking half a mile without issues. In addition, the pt is also endorsing epigastric since last night, feels like her chronic pancreatitis pain, sharp and radiating to her back. Pt states similar episodes usually occur 2-3x per month, usually managed at home with home pain regimen w/ oxycodone. Pt reports decreased PO intake over past 2 days, states this is often a trigger for her pain. Denies nausea, vomiting.    ED Course: Pt afebrile, , /86, RR 26, SpO2 90% on NRB on arrival to ED, reportedly 60s on RA per EMS. Covid (-). BNP 1957, Troponin 20. VBG pCO2 47, lactate 4.1. CXR w/ RLL nodularity c/f infectious process. Pt started on nebs, steroids w/ symptomatic improvement, weaned back to NC.

## 2021-06-14 NOTE — H&P ADULT - NSHPPHYSICALEXAM_GEN_ALL_CORE
GEN: Awake and alert, sitting up in bed. .  HEENT: Mild periorbital swelling w/ ecchymosis of R eye. Mild conjunctival injection R eye. EOMI but pain elicited w/ downward and medial gaze of of R eye. PERRL b/l. Poor dentition. Moist oral mucosa.   NECK: Supple. No masses or lymphadenopathy.  CHEST: Tachypneic, speaking in full sentences. O2 saturation ~70s when speaking, improves to 90s with rest. Minimally coarse breath sounds b/l. (+) port R chest  CV: Tachycardic, regular rhythm. (+) S1/S2. No audible S3/S4. R No murmurs, rubs, or gallops.    GI: Soft, mild epigastric tenderness to palpation, nondistended. +BS.  EXT: Warm and well perfused. 1+ pitting edema to mid-shin b/l. No calf tenderness. 2+ distal pulses b/l LEs  SKIN: Intact, no rashes or lesions.   NEURO: A&Ox3, grossly non-focal GEN: Awake and alert, sitting up in bed. .  HEENT: Mild periorbital swelling w/ ecchymosis of R eye. Mild conjunctival injection R eye. EOMI but pain elicited w/ downward gaze of of R eye. PERRL b/l. Poor dentition. Moist oral mucosa.   NECK: Supple. No masses or lymphadenopathy.  CHEST: Tachypneic, speaking in full sentences. O2 saturation ~70s when speaking, improves to 90s with rest. Minimally coarse breath sounds b/l. (+) port R chest  CV: Tachycardic, regular rhythm. (+) S1/S2. No audible S3/S4. R No murmurs, rubs, or gallops.    GI: Soft, mild epigastric tenderness to palpation, nondistended. +BS.  EXT: Warm and well perfused. 1+ pitting edema to mid-shin b/l. No calf tenderness. 2+ distal pulses b/l LEs  SKIN: Intact, no rashes or lesions.   NEURO: A&Ox3, grossly non-focal

## 2021-06-14 NOTE — H&P ADULT - ATTENDING COMMENTS
Hospitalist- Joseph Callaway MD  Pager: [phone]  If no response or off-hours, page [phone]  -------------------------------------  Pt admitted with acute hypoxic respiratory failure in the setting of holding her eliquis x 3 days from fall complicated by facial trauma, and mild b/l LE edema of 2 week duration.   - hypoxic respiratory failure: ddx includes hypoxic COPD exacerbation (pt with documented wheezes on presentation, currently no wheezes on exam) possibly in setting of acute pulmonary infection vs. PE given lung cancer hx vs. new ?HF: continue steroids and bronchodilators for now, monitor off abx and diuretics, send RVP, f/u CTA, wean o2 as tolerated, f/u repeat TTE  - Prior DVT/PE- will switch to heparin gtt in light of recent facial fractures, transition back to eliquis if tolerates without bleeding  - chronic pancreatitis- stable but pt with ongoing abd pain, will start full liquid diet, cont pain control with home meds.

## 2021-06-14 NOTE — REVIEW OF SYSTEMS
[Fatigue] : fatigue [Recent Change In Weight] : ~T recent weight change [Negative] : Allergic/Immunologic [Lower Ext Edema] : lower extremity edema [Cough] : no cough [SOB on Exertion] : no shortness of breath during exertion [Abdominal Pain] : no abdominal pain [Vomiting] : no vomiting [Diarrhea] : no diarrhea [FreeTextEntry2] : gained some weight (likely 2/2 edema) [FreeTextEntry5] : 3+ to thighs

## 2021-06-14 NOTE — H&P ADULT - PROBLEM SELECTOR PLAN 9
Plan:  -VTE:  -Diet: DASH diet Plan:  -VTE: Heparin gtt x 24 hours, if no bleeding can transition to home Eliquis  -Diet: DASH diet

## 2021-06-14 NOTE — H&P ADULT - PROBLEM SELECTOR PLAN 4
EKG today sinus tachycardia with TWI in V1-V4, changed from prior ECG in 2020 w/ TWI in V1 and V2. Pt w/ out chest pain. Lower suspicion for acute ischemic event as etiology for pt's hypoxia but will r/o w/ serial troponin    Plan:  -Troponin x 1 negative, trend troponin  -F/u TTE as above Pt w/ history of chronic pancreatitis, now with epigastric pain since last night c/w pt's chronic pain    Plan:  -Pain control per pt's home regimen w/ oxycodone prn   -C/w pancrelipase  -Zofran prn nausea

## 2021-06-14 NOTE — H&P ADULT - PROBLEM SELECTOR PLAN 6
Hx of stage III non-small cell lung cancer on chemotherapy (last tx w/ Keytruda/Alimta 6/10).    Plan:  -Will reach out to heme/onc Pt w/ anemia here appears near pt's baseline Hgb 8-9 per chart review. Normocytic anemia w/ elevated RDW, ? mixed microcytic (TOM) + macrocytic.    Plan:  -Daily CBC  -Trend H+H  -Iron studies   -C/w home folic acid, B12

## 2021-06-14 NOTE — HISTORY OF PRESENT ILLNESS
[Disease: _____________________] : Disease: [unfilled] [N: ___] : N[unfilled] [M: ___] : M[unfilled] [AJCC Stage: ____] : AJCC Stage: [unfilled] [de-identified] : Ms. Travis is a 65-year-old female with a history of recently diagnosed non-small cell lung adenocarcinoma of left lung, pulmonary embolus on apixaban, HTN, chronic pancreatitis s/p Jeffery procedure (2019), history of ARDS (2015), GERD, chronic pain on opioids, and s/p vocal cord polypectomy who presents for consultation visit\par Brief hx: Pt was recently hospitalized in mid-Feb 2020 to [facility] for dyspnea, left-sided back pain, and fatigue with loss of appetite. Found on CTPA (February 2020) to have a large filling defect in the L main pulmonary artery extending into the left upper lobar and left interlobar pulm arteries with complete opacification of the LLL pulm artery. There were thin linear filling defects in the bifurcation of the right upper pulmonary artery and the right interlobar pulmonary arteries extending to the right lower pulmonary artery, which appeared chronic. CT also showed mediastinal adenopathy, including left paratracheal, subcarinal, and left hilar. There is also a wedge shaped opacity in the posterior left lower lobe suspicious for malignancy. There is centrilobular emphysema. 2D-echo (Feb 2020) with normal LV systolic function, mild concentric LVH, normal LA, RV enlargement with normal RV systolic function, and estimated PASP 39 consistent with borderline pulmonary hypertension. She was started on heparin infusion and discharged on Apixaban. Hypercoagulable workup negative, including Factor V Leiden, Prothrombin gene mutation, and lupus anticoagulant. \par \par She had EBUS/TBNA on Feb 19, 2020, found to have adenocarcinoma in right and left paratracheal and subcarinal lymph nodes (R4, L4, level 7). She is pending PFTs, PET/CT, brain MRI. Patient was discharged on Oxycodone ER BID and Tylenol prn. She is not constipated and is on a bowel regimen with polyethylene glycol. Still does not have a good appetite. She has lost 15 lbs over last 3 months. No fevers, chills, or chest pain. Reports occasional cough for which she takes benzonatate perle about once daily. Declining influenza vaccination. Takes Duonebs twice daily as prescribed, but denies dyspnea. \par \par Last colonoscopy in 2018 normal. Mammogram in June 2019 negative. No personal or family history of miscarriages or spontaneous abortions. Reports history of smoking but denies any history of COPD or lung cancer. No family history of malignancy, sarcoidosis, or TB. No recent prolonged immobility.\par \par Of note, active smoker, quit end of last year, on and off 4-6 cigarettes/day. She had CXR for lung cancer screening in the 1980s but nothing recently. Denies etoh use. \par \par 4/6/20: No new complaints. Persistent pain in the chest and occasional cough. COntinues to lose weight,. No other constitutional symptoms. \par \par 4/16/20: Pain in the chest improved. She has been nauseous and c/o abd cramps since starting chemo. She has been taking Zofran with minimal benefit. She has not been using Reglan because she was told not to by nutritionist. She has lost 2 lbs. \par \par 5/6/2020: Pt being seen in treatment area. Pt reported that she tolerated the 2nd cycle better with the change in premeds and adding dexamethasone following the treatment for 2 days. She still experiences days of severe nausea and vomiting but not continuously. Pt has has normal bowel function. She denies SOB/ slight fatigue. Denies pain\par \par 5/20/2020: Pt being seen in treatment area. Pt is here today for cycle 3. She states that for the last 2 days she has been unable to keep anything down. She vomited the entire 2 days. This is an ongoing and chronic problem and is unrelated to chemo. She has been evaluated by GI without discovering etiology for it. Initial brain imaging (-) for metastaic disease. No headaches or dizziness. Pt states it always lasts 2 days and spontaneously resolves. Today she states she was able to tolerate broth and mandarin oranges. No fever. No c/o of cough/CP/hemoptysis or SOB. \par  \par \par 8/12/20: On maintenance now. Scans in June 2020 shows \par \par 9/2/2020: Pt seen in treatment room. Feeling well. Previously experiencing significant nausea and vomiting. Resolved. Now using omeprazole on BID dosing. No new complaints\par \par 9/23/2020: Pt seen in treatment room. SHe has been experiencing malaise and significant diarrhea since friday. Appetite poor. (+) weight loss. No fever but feels very washed out. Had scans last friday as well. \par \par 11/11/2020: Pt returns for follow up. Patient had called last week to say she was canceling her treatment and office visit . Dr. Cage spoke with the patient who said that she was having pain and that her pancreas is acting up. Patient has a history of chronic pancreatitis. As per patient she has been in touch with her PCP and GI doctor. She was rescheduled for this week. She states that as suddenly as the pain came on, it left the same way. She has not had any problems since. SHe reports she had follow up with pain management doctor as well. No other complaints\par \par 12/24/20: Left oral swelling. Has lost multiple teeth spontaneously. Has appt with oral surgeon. Has gained some weight. Has stopped smoking.\par \par 2/3/21: Pt has been fatigued, she is stressed because her friends (who she has had no contact with recently) are sick with COVID. \par \par 3/17/21: Pt seen in treatment room. Pt recently underwent a hemorrhoidectomy in the OR. She is still recovering and reports continued post op pain. Received call from Radiologist following recent CT imaging. New left retroperitoneal gas of uncertain etiology. These findings were reported to the patient and emailed to surgical team by me . Pt states that she has follow up scheduled next week but it is with covering physician as her doctor is now on maternity leave. She did have some back pain but it has since resolved. No other new complaints. \par \par 4/29/21: Doing better. No abd pain anymore. Repeat CT abd done on 3/29 showed no intra-abd pathology. LE swelling. Had repeat doppler and echo which were normal. \par \par 6/9/21: Pt returns for follow up. She is scheduled for treatment today. Pt states that over last ~3 weeks she has developed severe LE edema. Both extremities are swollen all the way up to her proximal thighs B/L. She has been evaluated by her vascular doctor. No DVT's noted on US (per patient report). she remains on Eliquis 5 mg BID. She was started on Lasix 40mg daily which has little to no impact on edema. She states that she is very uncomfortable and finds it difficult to ambulate\par \par  \par \par  [de-identified] : adeno ca

## 2021-06-14 NOTE — ED PROVIDER NOTE - CLINICAL SUMMARY MEDICAL DECISION MAKING FREE TEXT BOX
Luis Crystal MD. pt with lung ca on active chemo via r port, afib eliquis (has not taken in 3 days bc she fell), copd not on home o2 presents for acute onset sob ,60 % ra per ema, 90 % on nrb. pt tachypneic, coarse BS diffusely, accessory muscle use. concern for copd exacebation. will provide nebs, steroids,low threshold for bipap, will delmy admit

## 2021-06-14 NOTE — ED PROVIDER NOTE - PROGRESS NOTE DETAILS
Patient work of breathing significantly improved with nebs.  Reporting feeling more comfortable.  Breath sounds improving.  Rafy Chavez M.D. CXR reviewed, no consolidation appreciated.  Images improperly time stamped by machine, contacted radiology techs who will investigate.  Breathing continuing to improve.  Rafy Chavez M.D. Luis Crystal MD. pt reassessed and reevaluated. pt feels much improved. lungs much clearer. weaned to 6L NC, sating 92-93%. no longer using accessory muscles. Patient having some recurring wheezing.  Further nebs ordered.  Azithro added for COPD exacerbation.  Sats remain stable on 6L NC.  Rafy Chavez M.D.

## 2021-06-14 NOTE — ED ADULT NURSE REASSESSMENT NOTE - NS ED NURSE REASSESS COMMENT FT1
pt c/o epigastric pain that is 7/10 on pain scale.  pt becomes more tachypneic with soa2 dropping from guarding and bracing the pain.  pt repositioned to hi lopez's position and encouraged to deep breathe, which increased sao2 to WNL.  pt remains on 6L O2 via n/c that is being well tolerated.  will continue to monitor.

## 2021-06-14 NOTE — H&P ADULT - PROBLEM SELECTOR PLAN 2
Recent ED visit for R orbital floor fx sustained after fall, seen by OMFS here    Plan:  -Appreciate OMFS recs from prior visit  -Continue Augmentin BID  -No pressure to face, ice as needed  -Sinus precautions (no nose blowing, no sucking on straws, sneeze w/ mouth open) Pt w/ hx of DVT/PE on Eliquis, currently being held in s/o fall w/ R orbital floor fx. Pt currently tachycardic, tachypneic. Must r/o PE.    Plan:  -Heparin gtt for now, if no bleeding can transition to home Eliquis after 24 hours   -CTA as above  -B/l LE duplex

## 2021-06-14 NOTE — ED PROVIDER NOTE - OBJECTIVE STATEMENT
64 yo F PMHx PE on eliquis, non-small cell Lung CA on chemo (last was 6/10, at [facility]), COPD not on home O2, HTN, presents to the ED for acute SOB yesterday worsening. Per EMS, was 60% on RA, 90% on NRB. Pt denies cp, cough, fever, chills, nausea, vomiting, diarrhea, leg pain. She did stop taking her eliquis for past 3 days in yun setting that she fell 3 days ago.

## 2021-06-14 NOTE — H&P ADULT - PROBLEM SELECTOR PLAN 8
Pt w/ hx of chronic HTN, BPs stable here.     Plan:  -Continue w/ pt's home Amlodipine 10 mg  -Hold home Metroprolol in s/o possible COPD exacerbation

## 2021-06-14 NOTE — ED PROVIDER NOTE - PHYSICAL EXAMINATION
PHYSICAL EXAM:  GENERAL: accessory muscle use; tachyoneic   HEAD Atraumatic, Normocephalic  NECK: No JVD; FROM  EYES: PERRL, EOMs intact b/l w/out deficits; normal conjunctiva  CHEST/LUNG: coarse BS diffusely;   HEART: RRR no murmur/gallops/rubs  ABDOMEN: +BS, soft, NT, ND  EXTREMITIES: No LE edema, +2 radial pulses b/l, +2 DP/PT pulses b/l  MUSCULOSKELETAL: FROM of all 4 extremities;  NERVOUS SYSTEM:  A&Ox3, No motor deficits or sensory deficits; CNII-XII intact; no focal neurologic deficits  SKIN:  No new rashes

## 2021-06-14 NOTE — H&P ADULT - PROBLEM SELECTOR PLAN 1
Pt w/ reported hypoxia to 60s on room air, now improving on non-rebreather following treatment with nebs and steroids in ED. Likely acute COPD exacerbation given improvement after treatment, possibly in s/o underlying infection given CXR w/ RLL nodularity c/f infectious etiology, although pt afebrile w/out leukocytosis. Must also r/o acute PE given hx and patient currently off a/c in s/o recent fall. Pt w/out reported hx of CHF and recent nml echo 2/2021, although BNP elevated here so must also r/o. Less likely acute ischemic process, troponin x 1 in ED wnl although EKG w/ new TWI compared to prior    Plan:  -Continuous pulse ox  -Non-rebreather / NC as tolerated  -Low threshold to start on BiPAP  -Continue w/ PO steroids and Duoneb  -Last echo in 2/2021 w/ nml LV systolic function, EF 62%, will re-echo here in s/o elevated BNP  -CTA chest to r/o PE  -Will hold abx for now but low threshold to begin broad spectrum if pt spikes fever or WBC rises  -VBG lactate elevated 4.1, will continue to trend Pt w/ reported hypoxia to 60s on room air, now improving on non-rebreather following treatment with nebs and steroids in ED. Likely acute COPD exacerbation given improvement after treatment, possibly in s/o underlying infection given CXR w/ RLL nodularity c/f infectious etiology, although pt afebrile w/out leukocytosis. Must also r/o acute PE given hx and patient currently off a/c in s/o recent fall. Pt w/out reported hx of CHF and recent nml echo 2/2021, although BNP elevated here so must also r/o. Less likely acute ischemic process, troponin x 1 in ED wnl although EKG w/ new TWI compared to prior    Plan:  -Continuous pulse ox  -Non-rebreather / NC as tolerated  -Low threshold to start on BiPAP  -Continue w/ PO steroids and Duoneb  -Last echo in 2/2021 w/ nml LV systolic function, EF 65%, will re-echo here in s/o elevated BNP  -CTA chest to r/o PE  -Will hold abx for now but low threshold to begin broad spectrum if pt spikes fever or WBC rises  -VBG lactate elevated 4.1, will continue to trend Pt w/ reported hypoxia to 60s on room air, now improving on non-rebreather following treatment with nebs and steroids in ED. Likely acute COPD exacerbation given improvement after treatment, possibly in s/o underlying infection given CXR w/ RLL nodularity c/f infectious etiology, although pt afebrile w/out leukocytosis. Must also r/o acute PE given hx and patient currently off a/c in s/o recent fall. Pt w/out reported hx of CHF and recent nml echo 2/2021, although BNP elevated here so must also r/o. Less likely acute ischemic process, troponin x 1 in ED wnl although EKG w/ new TWI compared to prior    Plan:  -Continuous pulse ox  -Non-rebreather / NC as tolerated  -Low threshold to start on BiPAP  -Continue w/ PO steroids and Duoneb  -Last echo in 2/2021 w/ nml LV systolic function, EF 65%, will re-echo here in s/o elevated BNP  -CTA chest to r/o PE  -Will hold abx for now but low threshold to begin broad spectrum if pt spikes fever or WBC rises  -RVP  -VBG lactate elevated 4.1, will continue to trend

## 2021-06-14 NOTE — H&P ADULT - PROBLEM SELECTOR PLAN 7
Pt w/ hx of DVT/PE on Eliquis, currently being held in s/o fall w/ R orbital floor fx    Plan:  -CTA as above Hx of stage III non-small cell lung cancer on chemotherapy (last tx w/ Keytruda/Alimta 6/10).    Plan:  -Will reach out to heme/onc

## 2021-06-14 NOTE — ED ADULT NURSE NOTE - NSIMPLEMENTINTERV_GEN_ALL_ED
Implemented All Universal Safety Interventions:  [place] to call system. Call bell, personal items and telephone within reach. Instruct patient to call for assistance. Room bathroom lighting operational. Non-slip footwear when patient is off stretcher. Physically safe environment: no spills, clutter or unnecessary equipment. Stretcher in lowest position, wheels locked, appropriate side rails in place.

## 2021-06-14 NOTE — ED ADULT NURSE NOTE - OBJECTIVE STATEMENT
64 y/o female with pmhx of lung ca and R chest powerport in place for chemo c/o respiratory distress x 1 day.  per pt, she has been sob that has worsened in intensity since yesterday.  pt denies any fever, chills, dizziness at this time.  pt is awake, alert and responsive to all stimuli.  pt is hypoxic with sao2=79 and tachypneic with HR=26 with breaths that are shallow in nature.  100% NRM immediately applied with MD Chavez at bedside, who administered neb tx.  skin is cool but not clammy.  no cyanosis or pallor noted at this time.  pt has R chest power port in place that was accessed, per Md's orders, via sterile technique with 20g hub needle and covered with sterile dry dsg.  access is patent with no erythema or edema noted to perisite.  pt resting in bed with cardiac monitor in place and 6L O2 via n/c in place that is being well tolerated.  will continue to monitor.

## 2021-06-14 NOTE — ASSESSMENT
[FreeTextEntry1] : 64 yo w  diagnosed lung adeno ca\par Stage IIIC by imaging. Large volume of disease and hence, not started on RT\par Started carbo/Alimta/Pem given Q 3 weeks schedule. Currently on maintenance alimta/keytruda\par \par Abd AEs after hemorrhoids procedure. Repeat CT abd normal. \par NGS revealed KRAS G12V mut, low TMB and KIM. PDL1 1%.\par Continue folic acid daily and  B12 shot q6w\par Using Oxycodone 20 mg as needed. Pt follows with pain md. \par DVT on Eliquis. Repeat doppler shows no DVT but edema significantly worse. ? Progressive disease. Will repeat imaging and include pelvis on abdominal imaging in order to r/o pelvic adenopathy and assess for ascites\par CEA stable. Will check BW today \par Smoking cessation reiterated. \par ?? LE edema from hypoalbuminemia vs progressive disease vs irAE. \par Not yet received the vaccine which I highly recommended. \par OV in 6 weeks\par \par

## 2021-06-15 ENCOUNTER — APPOINTMENT (OUTPATIENT)
Age: 66
End: 2021-06-15

## 2021-06-15 LAB
ALBUMIN SERPL ELPH-MCNC: 2 G/DL — LOW (ref 3.3–5)
ALP SERPL-CCNC: 133 U/L — HIGH (ref 40–120)
ALT FLD-CCNC: 10 U/L — SIGNIFICANT CHANGE UP (ref 10–45)
ANION GAP SERPL CALC-SCNC: 11 MMOL/L — SIGNIFICANT CHANGE UP (ref 5–17)
APTT BLD: 178.2 SEC — CRITICAL HIGH (ref 27.5–35.5)
APTT BLD: 77 SEC — HIGH (ref 27.5–35.5)
APTT BLD: 85.1 SEC — HIGH (ref 27.5–35.5)
AST SERPL-CCNC: 30 U/L — SIGNIFICANT CHANGE UP (ref 10–40)
B PERT DNA SPEC QL NAA+PROBE: SIGNIFICANT CHANGE UP
BILIRUB SERPL-MCNC: 0.6 MG/DL — SIGNIFICANT CHANGE UP (ref 0.2–1.2)
BUN SERPL-MCNC: 18 MG/DL — SIGNIFICANT CHANGE UP (ref 7–23)
C PNEUM DNA SPEC QL NAA+PROBE: SIGNIFICANT CHANGE UP
CALCIUM SERPL-MCNC: 8.1 MG/DL — LOW (ref 8.4–10.5)
CHLORIDE SERPL-SCNC: 104 MMOL/L — SIGNIFICANT CHANGE UP (ref 96–108)
CO2 SERPL-SCNC: 26 MMOL/L — SIGNIFICANT CHANGE UP (ref 22–31)
COVID-19 SPIKE DOMAIN AB INTERP: NEGATIVE — SIGNIFICANT CHANGE UP
COVID-19 SPIKE DOMAIN ANTIBODY RESULT: 0.4 U/ML — SIGNIFICANT CHANGE UP
CREAT SERPL-MCNC: 1.18 MG/DL — SIGNIFICANT CHANGE UP (ref 0.5–1.3)
FERRITIN SERPL-MCNC: 1195 NG/ML — HIGH (ref 15–150)
FLUAV H1 2009 PAND RNA SPEC QL NAA+PROBE: SIGNIFICANT CHANGE UP
FLUAV H1 RNA SPEC QL NAA+PROBE: SIGNIFICANT CHANGE UP
FLUAV H3 RNA SPEC QL NAA+PROBE: SIGNIFICANT CHANGE UP
FLUAV SUBTYP SPEC NAA+PROBE: SIGNIFICANT CHANGE UP
FLUBV RNA SPEC QL NAA+PROBE: SIGNIFICANT CHANGE UP
GAS PNL BLDA: SIGNIFICANT CHANGE UP
GLUCOSE SERPL-MCNC: 131 MG/DL — HIGH (ref 70–99)
HADV DNA SPEC QL NAA+PROBE: SIGNIFICANT CHANGE UP
HCOV PNL SPEC NAA+PROBE: SIGNIFICANT CHANGE UP
HCT VFR BLD CALC: 22.8 % — LOW (ref 34.5–45)
HCT VFR BLD CALC: 23 % — LOW (ref 34.5–45)
HGB BLD-MCNC: 7.5 G/DL — LOW (ref 11.5–15.5)
HGB BLD-MCNC: 7.6 G/DL — LOW (ref 11.5–15.5)
HMPV RNA SPEC QL NAA+PROBE: SIGNIFICANT CHANGE UP
HPIV1 RNA SPEC QL NAA+PROBE: SIGNIFICANT CHANGE UP
HPIV2 RNA SPEC QL NAA+PROBE: SIGNIFICANT CHANGE UP
HPIV3 RNA SPEC QL NAA+PROBE: SIGNIFICANT CHANGE UP
HPIV4 RNA SPEC QL NAA+PROBE: SIGNIFICANT CHANGE UP
IRON SATN MFR SERPL: 74 UG/DL — SIGNIFICANT CHANGE UP (ref 30–160)
LACTATE BLDV-MCNC: 1.5 MMOL/L — SIGNIFICANT CHANGE UP (ref 0.7–2)
LIDOCAIN IGE QN: 5 U/L — LOW (ref 7–60)
MAGNESIUM SERPL-MCNC: 2.1 MG/DL — SIGNIFICANT CHANGE UP (ref 1.6–2.6)
MCHC RBC-ENTMCNC: 30.9 PG — SIGNIFICANT CHANGE UP (ref 27–34)
MCHC RBC-ENTMCNC: 31.5 PG — SIGNIFICANT CHANGE UP (ref 27–34)
MCHC RBC-ENTMCNC: 32.9 GM/DL — SIGNIFICANT CHANGE UP (ref 32–36)
MCHC RBC-ENTMCNC: 33 GM/DL — SIGNIFICANT CHANGE UP (ref 32–36)
MCV RBC AUTO: 93.8 FL — SIGNIFICANT CHANGE UP (ref 80–100)
MCV RBC AUTO: 95.4 FL — SIGNIFICANT CHANGE UP (ref 80–100)
NRBC # BLD: 0 /100 WBCS — SIGNIFICANT CHANGE UP (ref 0–0)
NRBC # BLD: 0 /100 WBCS — SIGNIFICANT CHANGE UP (ref 0–0)
PHOSPHATE SERPL-MCNC: 3 MG/DL — SIGNIFICANT CHANGE UP (ref 2.5–4.5)
PLATELET # BLD AUTO: 143 K/UL — LOW (ref 150–400)
PLATELET # BLD AUTO: 211 K/UL — SIGNIFICANT CHANGE UP (ref 150–400)
POTASSIUM SERPL-MCNC: 3.7 MMOL/L — SIGNIFICANT CHANGE UP (ref 3.5–5.3)
POTASSIUM SERPL-SCNC: 3.7 MMOL/L — SIGNIFICANT CHANGE UP (ref 3.5–5.3)
PROCALCITONIN SERPL-MCNC: 15.25 NG/ML — HIGH (ref 0.02–0.1)
PROT SERPL-MCNC: 5.3 G/DL — LOW (ref 6–8.3)
RAPID RVP RESULT: SIGNIFICANT CHANGE UP
RBC # BLD: 2.41 M/UL — LOW (ref 3.8–5.2)
RBC # BLD: 2.43 M/UL — LOW (ref 3.8–5.2)
RBC # FLD: 19.9 % — HIGH (ref 10.3–14.5)
RBC # FLD: 20 % — HIGH (ref 10.3–14.5)
RSV RNA SPEC QL NAA+PROBE: SIGNIFICANT CHANGE UP
RV+EV RNA SPEC QL NAA+PROBE: SIGNIFICANT CHANGE UP
SARS-COV-2 IGG+IGM SERPL QL IA: 0.4 U/ML — SIGNIFICANT CHANGE UP
SARS-COV-2 IGG+IGM SERPL QL IA: NEGATIVE — SIGNIFICANT CHANGE UP
SARS-COV-2 RNA SPEC QL NAA+PROBE: SIGNIFICANT CHANGE UP
SODIUM SERPL-SCNC: 141 MMOL/L — SIGNIFICANT CHANGE UP (ref 135–145)
TRANSFERRIN SERPL-MCNC: 65 MG/DL — LOW (ref 200–360)
WBC # BLD: 4.63 K/UL — SIGNIFICANT CHANGE UP (ref 3.8–10.5)
WBC # BLD: 4.85 K/UL — SIGNIFICANT CHANGE UP (ref 3.8–10.5)
WBC # FLD AUTO: 4.63 K/UL — SIGNIFICANT CHANGE UP (ref 3.8–10.5)
WBC # FLD AUTO: 4.85 K/UL — SIGNIFICANT CHANGE UP (ref 3.8–10.5)

## 2021-06-15 PROCEDURE — 99233 SBSQ HOSP IP/OBS HIGH 50: CPT | Mod: GC

## 2021-06-15 PROCEDURE — 99232 SBSQ HOSP IP/OBS MODERATE 35: CPT

## 2021-06-15 PROCEDURE — 99233 SBSQ HOSP IP/OBS HIGH 50: CPT | Mod: GC,25

## 2021-06-15 RX ORDER — CHLORHEXIDINE GLUCONATE 213 G/1000ML
1 SOLUTION TOPICAL
Refills: 0 | Status: DISCONTINUED | OUTPATIENT
Start: 2021-06-15 | End: 2021-06-28

## 2021-06-15 RX ORDER — AZITHROMYCIN 500 MG/1
500 TABLET, FILM COATED ORAL EVERY 24 HOURS
Refills: 0 | Status: DISCONTINUED | OUTPATIENT
Start: 2021-06-16 | End: 2021-06-18

## 2021-06-15 RX ORDER — AZITHROMYCIN 500 MG/1
500 TABLET, FILM COATED ORAL ONCE
Refills: 0 | Status: COMPLETED | OUTPATIENT
Start: 2021-06-15 | End: 2021-06-15

## 2021-06-15 RX ORDER — PIPERACILLIN AND TAZOBACTAM 4; .5 G/20ML; G/20ML
3.38 INJECTION, POWDER, LYOPHILIZED, FOR SOLUTION INTRAVENOUS ONCE
Refills: 0 | Status: COMPLETED | OUTPATIENT
Start: 2021-06-15 | End: 2021-06-15

## 2021-06-15 RX ORDER — PIPERACILLIN AND TAZOBACTAM 4; .5 G/20ML; G/20ML
3.38 INJECTION, POWDER, LYOPHILIZED, FOR SOLUTION INTRAVENOUS EVERY 8 HOURS
Refills: 0 | Status: DISCONTINUED | OUTPATIENT
Start: 2021-06-15 | End: 2021-06-16

## 2021-06-15 RX ORDER — APIXABAN 2.5 MG/1
5 TABLET, FILM COATED ORAL EVERY 12 HOURS
Refills: 0 | Status: DISCONTINUED | OUTPATIENT
Start: 2021-06-15 | End: 2021-06-15

## 2021-06-15 RX ORDER — ACETAMINOPHEN 500 MG
650 TABLET ORAL ONCE
Refills: 0 | Status: COMPLETED | OUTPATIENT
Start: 2021-06-15 | End: 2021-06-15

## 2021-06-15 RX ORDER — FUROSEMIDE 40 MG
40 TABLET ORAL ONCE
Refills: 0 | Status: COMPLETED | OUTPATIENT
Start: 2021-06-15 | End: 2021-06-15

## 2021-06-15 RX ORDER — HYDROMORPHONE HYDROCHLORIDE 2 MG/ML
2 INJECTION INTRAMUSCULAR; INTRAVENOUS; SUBCUTANEOUS ONCE
Refills: 0 | Status: DISCONTINUED | OUTPATIENT
Start: 2021-06-15 | End: 2021-06-15

## 2021-06-15 RX ORDER — ALBUMIN HUMAN 25 %
250 VIAL (ML) INTRAVENOUS ONCE
Refills: 0 | Status: COMPLETED | OUTPATIENT
Start: 2021-06-15 | End: 2021-06-15

## 2021-06-15 RX ORDER — AZITHROMYCIN 500 MG/1
TABLET, FILM COATED ORAL
Refills: 0 | Status: DISCONTINUED | OUTPATIENT
Start: 2021-06-15 | End: 2021-06-18

## 2021-06-15 RX ORDER — APIXABAN 2.5 MG/1
5 TABLET, FILM COATED ORAL EVERY 12 HOURS
Refills: 0 | Status: DISCONTINUED | OUTPATIENT
Start: 2021-06-15 | End: 2021-06-19

## 2021-06-15 RX ORDER — FUROSEMIDE 40 MG
20 TABLET ORAL ONCE
Refills: 0 | Status: COMPLETED | OUTPATIENT
Start: 2021-06-15 | End: 2021-06-15

## 2021-06-15 RX ADMIN — Medication 1000 UNIT(S): at 13:27

## 2021-06-15 RX ADMIN — PANTOPRAZOLE SODIUM 40 MILLIGRAM(S): 20 TABLET, DELAYED RELEASE ORAL at 06:02

## 2021-06-15 RX ADMIN — Medication 60 MILLIGRAM(S): at 22:26

## 2021-06-15 RX ADMIN — PIPERACILLIN AND TAZOBACTAM 200 GRAM(S): 4; .5 INJECTION, POWDER, LYOPHILIZED, FOR SOLUTION INTRAVENOUS at 13:32

## 2021-06-15 RX ADMIN — MIRTAZAPINE 15 MILLIGRAM(S): 45 TABLET, ORALLY DISINTEGRATING ORAL at 22:26

## 2021-06-15 RX ADMIN — HEPARIN SODIUM 900 UNIT(S)/HR: 5000 INJECTION INTRAVENOUS; SUBCUTANEOUS at 02:23

## 2021-06-15 RX ADMIN — PREGABALIN 1000 MICROGRAM(S): 225 CAPSULE ORAL at 13:27

## 2021-06-15 RX ADMIN — Medication 20 MILLIGRAM(S): at 18:12

## 2021-06-15 RX ADMIN — Medication 3 MILLILITER(S): at 17:20

## 2021-06-15 RX ADMIN — OXYCODONE HYDROCHLORIDE 30 MILLIGRAM(S): 5 TABLET ORAL at 07:57

## 2021-06-15 RX ADMIN — OXYCODONE HYDROCHLORIDE 30 MILLIGRAM(S): 5 TABLET ORAL at 09:00

## 2021-06-15 RX ADMIN — Medication 650 MILLIGRAM(S): at 14:25

## 2021-06-15 RX ADMIN — HEPARIN SODIUM 0 UNIT(S)/HR: 5000 INJECTION INTRAVENOUS; SUBCUTANEOUS at 01:22

## 2021-06-15 RX ADMIN — AZITHROMYCIN 250 MILLIGRAM(S): 500 TABLET, FILM COATED ORAL at 19:03

## 2021-06-15 RX ADMIN — Medication 40 MILLIGRAM(S): at 07:53

## 2021-06-15 RX ADMIN — Medication 3 MILLILITER(S): at 11:17

## 2021-06-15 RX ADMIN — HEPARIN SODIUM 900 UNIT(S)/HR: 5000 INJECTION INTRAVENOUS; SUBCUTANEOUS at 16:49

## 2021-06-15 RX ADMIN — Medication 3 MILLILITER(S): at 23:49

## 2021-06-15 RX ADMIN — Medication 125 MILLILITER(S): at 18:13

## 2021-06-15 RX ADMIN — Medication 1 MILLIGRAM(S): at 13:27

## 2021-06-15 RX ADMIN — PANTOPRAZOLE SODIUM 40 MILLIGRAM(S): 20 TABLET, DELAYED RELEASE ORAL at 18:12

## 2021-06-15 RX ADMIN — HYDROMORPHONE HYDROCHLORIDE 2 MILLIGRAM(S): 2 INJECTION INTRAMUSCULAR; INTRAVENOUS; SUBCUTANEOUS at 20:47

## 2021-06-15 RX ADMIN — PIPERACILLIN AND TAZOBACTAM 25 GRAM(S): 4; .5 INJECTION, POWDER, LYOPHILIZED, FOR SOLUTION INTRAVENOUS at 14:44

## 2021-06-15 RX ADMIN — Medication 1 TABLET(S): at 06:07

## 2021-06-15 RX ADMIN — CHLORHEXIDINE GLUCONATE 1 APPLICATION(S): 213 SOLUTION TOPICAL at 14:41

## 2021-06-15 RX ADMIN — Medication 2 CAPSULE(S): at 18:12

## 2021-06-15 RX ADMIN — HEPARIN SODIUM 900 UNIT(S)/HR: 5000 INJECTION INTRAVENOUS; SUBCUTANEOUS at 09:32

## 2021-06-15 RX ADMIN — AMLODIPINE BESYLATE 10 MILLIGRAM(S): 2.5 TABLET ORAL at 06:02

## 2021-06-15 RX ADMIN — PIPERACILLIN AND TAZOBACTAM 25 GRAM(S): 4; .5 INJECTION, POWDER, LYOPHILIZED, FOR SOLUTION INTRAVENOUS at 22:26

## 2021-06-15 RX ADMIN — Medication 40 MILLIGRAM(S): at 06:01

## 2021-06-15 RX ADMIN — APIXABAN 5 MILLIGRAM(S): 2.5 TABLET, FILM COATED ORAL at 18:12

## 2021-06-15 RX ADMIN — Medication 650 MILLIGRAM(S): at 15:30

## 2021-06-15 RX ADMIN — HYDROMORPHONE HYDROCHLORIDE 2 MILLIGRAM(S): 2 INJECTION INTRAMUSCULAR; INTRAVENOUS; SUBCUTANEOUS at 21:00

## 2021-06-15 NOTE — DISCHARGE NOTE PROVIDER - NSDCCONDITION_GEN_ALL_CORE
ASSESSMENT:  1. Cellulitis, unspecified cellulitis site  Right thigh cellulitis  - cephalexin (KEFLEX) 500 MG capsule; Take 1 capsule (500 mg total) by mouth 3 (three) times a day for 7 days.  Dispense: 21 capsule; Refill: 0        PLAN:  1.  Empiric treatment with Keflex 500 mg 3 times daily x7 days.  2.  She can continue to use the over-the-counter Neosporin and otherwise follow-up as needed.      No orders of the defined types were placed in this encounter.    There are no discontinued medications.    No follow-ups on file.    CHIEF COMPLAINT:  Chief Complaint   Patient presents with     Wound Infection     RT thigh, DOI 03/10-11, scab is fine, hot to touch, scratched on rock coral.       SUBJECTIVE:  Trenton is a 63 y.o. female the patient was scuba diving 0.5 days ago or so and she scraped something with her right thigh area she is not sure if it was a rock or coral, because she is on a blood thinner release in part because of that the blood quite a bit.  The area is quite tender and painful even to light touch.  She has been putting some Neosporin on it, its not bleeding anymore but she reports that it is warm to the touch.  She did not sustain any other injury.    No other change in her health status    REVIEW OF SYSTEMS:      All other systems are negative.    PFSH:  Immunization History   Administered Date(s) Administered     COVID-19,PF,Pfizer 02/01/2021, 02/22/2021     INFLUENZA,SEASONAL QUAD, PF, =/> 6months 09/15/2020     Pneumo Polysac 23-V 09/15/2020     Tdap 11/18/2019     Typhoid, Inj, Inactive 11/18/2019     ZOSTER, RECOMBINANT, IM 01/31/2021     Social History     Socioeconomic History     Marital status:      Spouse name: Not on file     Number of children: Not on file     Years of education: Not on file     Highest education level: Not on file   Occupational History     Employer: RETIRED   Social Needs     Financial resource strain: Not on file     Food insecurity     Worry: Not on file      Inability: Not on file     Transportation needs     Medical: Not on file     Non-medical: Not on file   Tobacco Use     Smoking status: Current Every Day Smoker     Packs/day: 0.50     Years: 40.00     Pack years: 20.00     Types: Cigarettes     Smokeless tobacco: Never Used   Substance and Sexual Activity     Alcohol use: Never     Frequency: Never     Drug use: Never     Sexual activity: Yes     Partners: Male   Lifestyle     Physical activity     Days per week: Not on file     Minutes per session: Not on file     Stress: Not on file   Relationships     Social connections     Talks on phone: Not on file     Gets together: Not on file     Attends Caodaism service: Not on file     Active member of club or organization: Not on file     Attends meetings of clubs or organizations: Not on file     Relationship status: Not on file     Intimate partner violence     Fear of current or ex partner: Not on file     Emotionally abused: Not on file     Physically abused: Not on file     Forced sexual activity: Not on file   Other Topics Concern     Not on file   Social History Narrative     Not on file     Past Medical History:   Diagnosis Date     Asthma      Depression      Environmental allergies      Esophageal stricture 09/15/2014    EGD done at [facility].     Gastric ulcer 09/15/2014    EGD done at [facility].     GERD (gastroesophageal reflux disease)      Left leg DVT (H)      Lupus (H)      Family History   Problem Relation Age of Onset     Hypertension Mother      Thyroid disease Mother      Arthritis Mother      Breast cancer Mother 85        breast cancer     Hypertension Father      Heart disease Father         Valve replacement ,bypass     Arthritis Father      Gout Son      Breast cancer Maternal Grandmother      Other Maternal Grandmother         some sort blood clot.     Breast cancer Paternal Aunt      Anxiety disorder Brother      Obesity Brother      Colon cancer Brother          Rectal Cancer.     Gout Son        MEDICATIONS:  Current Outpatient Medications   Medication Sig Dispense Refill     calcium carbonate-vitamin D3 (CALTRATE 600 PLUS D3) 600 mg(1,500mg) -400 unit per tablet        CERAVE Crea cream        cetirizine (ZYRTEC) 10 MG tablet Take 1 tablet (10 mg total) by mouth daily. 90 tablet 1     diazePAM (VALIUM) 10 MG tablet TAKE 1/2 TO 1 TABLET(5 TO 10 MG) BY MOUTH AT BEDTIME AS NEEDED 30 tablet 2     gabapentin (NEURONTIN) 300 MG capsule 300 mg.              LINZESS 72 mcg cap capsule        MUCINEX 600 mg 12 hr tablet        omeprazole (PRILOSEC) 20 MG capsule Take 1 capsule (20 mg total) by mouth daily before breakfast. 90 capsule 1     predniSONE (DELTASONE) 20 MG tablet Take 40 mg by mouth daily. 10 tablet 0     PROAIR HFA 90 mcg/actuation inhaler        sertraline (ZOLOFT) 25 MG tablet Take 1 tablet (25 mg total) by mouth daily. 90 tablet 1     VOLTAREN 1 % Gel Apply 2 g topically 2 (two) times a day as needed (to Feet). 150 g 2     XARELTO 20 mg tablet Take 1 tablet (20 mg total) by mouth daily. 90 tablet 2     cephalexin (KEFLEX) 500 MG capsule Take 1 capsule (500 mg total) by mouth 3 (three) times a day for 7 days. 21 capsule 0     No current facility-administered medications for this visit.        TOBACCO USE:  Social History     Tobacco Use   Smoking Status Current Every Day Smoker     Packs/day: 0.50     Years: 40.00     Pack years: 20.00     Types: Cigarettes   Smokeless Tobacco Never Used       VITALS:  Vitals:    03/15/21 0722   BP: 122/74   Patient Site: Right Arm   Patient Position: Sitting   Cuff Size: Adult Regular   Pulse: (!) 101   Weight: 147 lb 3.2 oz (66.8 kg)     Wt Readings from Last 3 Encounters:   03/15/21 147 lb 3.2 oz (66.8 kg)   03/02/21 143 lb 11.2 oz (65.2 kg)   12/18/20 144 lb (65.3 kg)       PHYSICAL EXAM:  Constitutional:   Reveals a female who appears overall health.  Vitals: per nursing notes.  Eyes:  EOMs full, PERRL.  Musculoskeletal: No  peripheral swelling.  There is an area in the right thigh where there is essentially a bruising area is quite low large in extent bigger than my outstretched hand, there is some scarring, there is no bleeding at this time the area around the scarring is just slightly warm to the touch it is red the area of redness is about 5 x 20 cm.    Neuro:  Alert and oriented. Cranial nerves, motor, sensory exams are intact.  No gross focal deficits.  Psychiatric:  Memory intact, mood appropriate.    QUALITY MEASURES:      DATA REVIEWED:     Stable

## 2021-06-15 NOTE — CONSULT NOTE ADULT - ASSESSMENT
This is a     #  -     Shyam Lucio PGY-5  Pulmonary/Critical Care Fellow  Pager: [phone] ([facility]) [phone] (NS)  Pulmonary Spectra #[id number] (NS) / [id number] ([facility]) This is a 64 y/o F with stage III L lung adenocarcinoma on alimta/keytruda, emphysema, HTN, h/o DVT/PE not on AC who presents with acute onset SOB. Found to be hypoxic and currently requiring HFNC to maintain SpO2. CT chest remarkable for diffuse, b/l GGO in the setting of moderate to severe emphysema, which is new compared to CT chest from 3 months prior. GGO may represent pulmonary edema, infection or inflammation.    #Acute hypoxic respiratory failure  - likely 2/2 b/l diffuse GGO on CT chest, though patient does have history of PE with interruption of AC in the past  - continue anticoagulation for known PE  - increase lasix dose to 40 mg to achieve adequate diuresis  - please obtain TTE with bubble study to r/o heart failure and shunting  - continue zosyn for ampiric antibiotic therapy, add azithromycin  - obtain sputum culture, ESR, CRP, Legionella Ag, BNP  - please obtain ABG to determine A-a gradient  - continue prednisone at current dose for now - b/l GGO may represent drug-induced pneumonitis though this is a diagnosis of exclusion  - continue HFNC, maintain SpO2>88%, wean as tolerated  - IS    Shyam Lucio PGY-5  Pulmonary/Critical Care Fellow  Pager: [phone] ([facility]) [phone] (NS)  Pulmonary Spectra #[id number] (NS) / [id number] ([facility])

## 2021-06-15 NOTE — CONSULT NOTE ADULT - ASSESSMENT
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 Found to be hypoxic requiring HFNC to maintain SpO2. CT chest remarkable for new diffuse, b/l GGO in the setting of moderate to severe emphysema concerning for pulmonary edema, pneumonitis 2/2 chemo or infection.  -goal net negative given GGO on CT/diffuse B lines on POCUS  -grossly normal LV function on POCUS  -continue with broad spectrum Abx  -recommend steroids 1mg/kg BID for possible pneumonitis 2/2 chemo   -continue HFNC with goal SpO2>88%    -Pt is not a MICU candidate at this time; please reconsult as needed

## 2021-06-15 NOTE — CHART NOTE - NSCHARTNOTEFT_GEN_A_CORE
Patient seen at bedside, was agitated/moving around and desatting to 60s on HFNC 40L, 60% FiO2. Patient calmed down and instructed to take deep breaths through nose and FiO2 increased to 80%, SpO2 increased to 90%. ABG obtained as per pulm: 7.53/35/50/29 on 40L, 80% FiO2. Now patient satting 88-93%, however intermittently dropping to low 80's when moving around. HFNC now increased to 100% FiO2. Patient still endorses full code. MICU consulted. Signed out to night coverage.    Phuc Hall MD, PGY-2 Resident  Department of Internal Medicine  Pager: [phone] (NS) / [phone] ([place])  Email: [email or web address]

## 2021-06-15 NOTE — PROGRESS NOTE ADULT - PROBLEM SELECTOR PLAN 3
Recent ED visit for R orbital floor fx sustained after fall, seen by OMFS here    Plan:  -Appreciate OMFS recs from prior visit  -Continue Augmentin BID  -No pressure to face, ice as needed  -Sinus precautions (no nose blowing, no sucking on straws, sneeze w/ mouth open) Recent ED visit for R orbital floor fx sustained after fall, seen by OMFS here    Plan:  -S/p Augmentin  -Appreciate OMFS recs from prior visit  -No pressure to face, ice as needed  -Sinus precautions (no nose blowing, no sucking on straws, sneeze w/ mouth open) Pt w/ history of chronic pancreatitis, now with epigastric pain since last night c/w pt's chronic pain    Plan:  -Pain control per pt's home regimen w/ oxycodone prn   -C/w pancrelipase  -Zofran prn nausea

## 2021-06-15 NOTE — PROGRESS NOTE ADULT - PROBLEM SELECTOR PROBLEM 2
Patient: Any Santamaria Date: 2017   : 1955 Attending: Dev Evangelista MD   61 year old female      Primary Rehabilitation Diagnosis: MS Exacerbation; L fibula fracture  Expected Discharge Date:  (Exacerbation of symptoms, hold d/c a few days)  Planned Discharge Destination: Home    Subjective: Denied H/A, N/V/D, CP or SOB   Feels very fatigued and still not ready to go home today  All other systems reviewed and were reported negative per patient.    Reviewed: Allergies, Medical History, Surgical History and Medications    Vital Last Value 24 Hour Range   Temperature 98.4 °F (36.9 °C) Temp  Min: 98.4 °F (36.9 °C)  Max: 98.6 °F (37 °C)   Pulse 71 Pulse  Min: 68  Max: 71   Respiratory 18 Resp  Min: 18  Max: 18   Blood Pressure 118/70 BP  Min: 110/64  Max: 118/70   Pulse Oximetry 97 % SpO2  Min: 97 %  Max: 97 %     Vital Today Admit   Weight 70.7 kg Weight: 75.8 kg   Height N/A Height: 5' 5\" (165.1 cm)   BMI N/A BMI (Calculated): 27.87     Weight over the past 48 Hours:  No data found.       Intake/Output:    Last Stool Occurrence: 1 (md hard stool .) (17 0824)    No intake/output data recorded.    I/O last 3 completed shifts:  In: 840 [P.O.:840]  Out: -       Intake/Output Summary (Last 24 hours) at 17 1023  Last data filed at 17 1800   Gross per 24 hour   Intake              660 ml   Output                0 ml   Net              660 ml       Central Line: No    Pryor: No    Physical Exam:   General Appearance:    Alert, cooperative, no distress, appears stated age   Head:    Normocephalic, without obvious abnormality, atraumatic   Eyes:    Conjunctiva/corneas clear, EOM's intact, both eyes   Nose:   Nares normal, septum midline, mucosa normal, no drainage or sinus tenderness   Throat:   Lips, mucosa, and tongue normal; no lesions or plaque   Lungs:     Clear to auscultation bilaterally, respirations unlabored    Heart:    Regular rate and rhythm, S1 and S2 normal, no murmur, rub or  gallop   Abdomen:     Soft, non-tender, bowel sounds active and normal pitch, no masses, no organomegaly  COLES OUT   Pulses:   2+ and symmetric all extremities   Skin:   Skin color, texture, turgor normal, no rashes or lesions   Neurologic:  AAO x3, CN 2-12 mild decreased visual acuity in the right eye, with a decreased shoulder shrug bilaterally.  The remaining cranial nerve examination within normal limits.  Motor examination:  increased muscle tone in the right upper extremity and bilateral lower extremities.  The motor strength RUE:the proximal muscles of the shoulder and elbow is 3-4/5, intrinsics are 4/5, finger extensors are 2-/5.  The motor strength of LUE: 4/5.    Does have significant rigidity and increased tone in bilateral lower extremities, intact with manual muscle testing.  The patient is more weak on the right than the left.  The motor strength of the bilateral lower extremities is 2-/5 to 2+/5.  Deep tendon reflexes are exaggerated with a equivocal plantar response bilaterally.  Sensory examination:  intact.        Laboratory Results:  Lab Results   Component Value Date    SODIUM 138 09/23/2017    POTASSIUM 3.9 09/23/2017    CHLORIDE 101 09/23/2017    CO2 28 09/23/2017    CALCIUM 9.7 09/23/2017    BUN 13 09/23/2017    CREATININE 0.47 (L) 09/23/2017    MG 2.0 10/23/2015    INR 1.0 11/15/2011    PT 10.4 11/15/2011    WBC 4.9 09/17/2017    HCT 33.3 (L) 09/17/2017    HGB 11.0 (L) 09/17/2017     09/17/2017    TSH 1.049 07/12/2017    ALBUMIN 3.5 (L) 08/30/2017    GFRA >90 09/23/2017    GFRNA >90 09/23/2017    GLUCOSE 88 09/23/2017     Current Functional status over the last 24 hours:  Nursing Skin Documentation:   Integumentary Assessment: Exceptions to WDL   Bladder FIM Documentation:            Score: 5-Supervision: Patient requires set up or emptying of commode            Bowel FIM Documentation:        Score: 6-Modified Independent: Pt uses medication for bowel control  Score: 5-Supervision:  Pt requires set up or emptying of commode            Pain Documentation:   Pain Assessment: Exceptions to WDL   Mobility Documentation:  Supine to Sit: Moderate Assist (Mod), Sit to Supine: Modified Independent  Sit to Stand: Supervision (Supv), Stand to Sit: Supervision (Supv), Toilet Transfers: Supervision (Supv)  Gait Assistance: Supervision (Supv), Assistive Device/: 4-wheeled walker, Ambulation Distance (Feet): 22 Feet   ,  ,    , Stair Management Assistance: Not attempted due to medical condition, Not attempted due to safety concerns   ,     Selfcare Documentation:     Grooming Assistance: Supervision  Bathing Assistance: Supervision  Upper Body Dressing Assistance: Set-up, Wheelchair  Lower Body Clothing Assistance: Touching/Steadying Assistance  Toileting Assistance: Touching/Steadying Assistance   Communication/Cognition/Swallowing Documentation:   , Expressive Language: Intact     Simple Problem Solving: Intact, Complex Problem Solving: Intact   , Delayed Memory: Mild impairment        Meds:  • lactobacillus acidophilus  1 tablet Oral Daily   • docusate calcium  240 mg Oral Daily   • pantoprazole  40 mg Oral QAM AC   • morphine SR  15 mg Oral 2 times per day   • sertraline  200 mg Oral Daily   • nitrofurantoin (macrocrystal-monohydrate)  100 mg Oral Nightly   • bisacodyl  10 mg Rectal QAM AC   • cholecalciferol  3,000 Units Oral Daily   • enoxaparin (LOVENOX) injection  40 mg Subcutaneous Q24H   • acetaminophen  1,000 mg Oral BID   • vitamin C  500 mg Oral BID   • clonazePAM  0.25 mg Oral TID   • Fampridine  1 capsule Oral 4x Daily   • levothyroxine  100 mcg Oral QAM AC   • meloxicam  15 mg Oral Daily   • mirabegron ER  50 mg Oral Daily      Quality:   VTE Pharmacologic Prophylaxis: Yes  (LMWH)  VTE Mechanical Prophylaxis: Yes    Impressions/Plan/DC Plan:  1. Multiple sclerosis exacerbation (CMS/HCC), had completed steroids. Needs to concentrate on therapy to improve mobility and  endurance.  New fatigue and tiredness - - Neurology consulted, input of Dr. Sanders appreciated, no need for imaging or a new round of steroids.   2. Closed fracture of left foot, allowed to do weight bearing without or with use of appliance for the ankle per ortho. Her MS issues present unique challenges for mobility , and subsequent healing.    3. Hypothyroid:  TSH normal as noted above, on levothyroxine 100 mcg po daily.  4. Obsessive-compulsive personality disorder. Remains on high dose sertraline. Dr Nickie Posey's consult reviewed and appreciated.   5. Constipation: - to continue PRN MiraLax, rectal Supp, added colace and lactobacillus today, to follow with Team.  7. Mobility and self-care deficits: participating actively with Rehabilitation Team (current status noted above) with emerging gains.  See team conference reports for specific discharge plans.   8. Input of Dr. Lopez for medical management appreciated.  9. - to continue Fampridine to improve lower limb strength - gait in MS  10. Increased Sertraline for her OCD symptoms.  11. MS Contin changed to twice a day, and helpful for pain - to continue.  12. GI issues better with Protonix, - to continue      I have considered how current medical status and co-morbidities are impacting progress towards goals. Presently, the patient's medical co-morbidities are maximized and are not inhibiting therapy progress with the medical plan as noted. The patient has continued functional deficits requiring inpatient rehabilitation. Continue intensive rehab, medical supervision, nursing cares and comprehensive discharge planning.    R/O Pulmonary embolism Orbital floor fracture

## 2021-06-15 NOTE — CONSULT NOTE ADULT - ATTENDING COMMENTS
66 y/o F with stage III L lung adenocarcinoma on alimta/keytruda, emphysema, HTN, h/o DVT/PE not on AC who presented with acute onset SOB. Found to be hypoxic requiring HFNC to maintain SpO2. CT chest remarkable for new diffuse, b/l GGO in the setting of moderate to severe emphysema concerning for pulmonary edema, pneumonitis 2/2 chemo or infection.  - Please give her pain control...... She iscomplaining of severe abdominal pain and has no respiratory complaints at this time.  Control pain first.   - please titrate lasix to net negative 1L today currently net even  - consider increasing steroids to 1/mg/kg IV BID for pneumonitis (side effect of both pembrolizumab and pemetrexed).   - no benefit to intubation at this time. 66 y/o F with stage III L lung adenocarcinoma on alimta/keytruda, emphysema, HTN, h/o DVT/PE not on AC who presented with acute onset SOB. Found to be hypoxic requiring HFNC to maintain SpO2. CT chest remarkable for new diffuse, b/l GGO in the setting of moderate to severe emphysema concerning for pulmonary edema, pneumonitis 2/2 chemo or infection.  - Please give her pain control...... She iscomplaining of severe abdominal pain and has no respiratory complaints at this time.  Control pain first.   - please titrate lasix to net negative 1L today currently net even  - consider increasing steroids to 1/mg/kg IV BID for pneumonitis (side effect of both pembrolizumab and pemetrexed).   - check fungitel though PCP would be rare unless she has been on other immunosupressives im not aware off.  - no benefit to intubation at this time.

## 2021-06-15 NOTE — CONSULT NOTE ADULT - ASSESSMENT
66 y/o F with PMHx HTN, COPD, non-small cell lung cancer on chemotherapy,GERD, chronic pancreatitis, hx of DVT/PE on Eliquis but recently held in s/o fall w/ sustained R orbital floor fx, admitted for acute shortness of breath and hypoxia. Oncology consulted given active malignancy on t/m.    # Non-small cell lung adenocarcinoma   - Diagnosed Feb 19, 2020   - Stage IIIC by imaging. Large volume of disease and hence, not started on RT  - Started carbo/Alimta/Pem given Q 3 weeks schedule  - Currently on maintenance alimta/keytruda (last t/m 6/10)   - CTA Chest 6/14: Bilateral symmetric groundglass opacities with interlobular septal thickening, consistent with pulmonary edema. Web at the right pulmonary artery along with pruning and stenosis of the left lower lobe pulmonary arteries, consistent with chronic thromboembolic disease.  - No plans for inpatient t/m; cont. outpatient f/up with Dr. Cage      # Acute hypoxic resp failure  - Currently being treated for COPD exac and being monitored off Abx  - Repeat echo pending (given inc BNP and pulm edema)  - CT chest as above; if all etiologies ruled out consider BAUTISTA Mccollum, PGY-4  Hematology-Oncology Fellow  [phone] ([place]) [phone] ([facility])  I can also be reached on [employer] Teams  Please page fellow on call after 5pm and on weekends

## 2021-06-15 NOTE — PROGRESS NOTE ADULT - SUBJECTIVE AND OBJECTIVE BOX
Contact information:  Esthela Matute, MS4   Medicine Team 5  Pager: [phone]    HIMANSHU VILLEGAS, MRN-[id number]    SUBJECTIVE/OVERNIGHT EVENTS: Patient seen and examined at bedside. Overnight pt desaturated to 80s on 6L NC while at rest, placed on 10L NRB w/ improvement in O2 sat to 90s. At time of assessment this AM pt was noted to drop to 70s/80s while talking, immediately improves to low 90s when resting. now increased to 15L. Pt reports feeling fatigued but otherwise well, does not feel that her breathing is significantly labored. No chest pain. Still w/ persistent epigastric pain but improves w/ oxycodone. No nausea, vomiting.     OBJECTIVE:    Vitals:  Vital Signs Last 24 Hrs  T(C): 36.4 (15 Ameya 2021 04:52), Max: 36.9 (14 Jun 2021 11:40)  T(F): 97.6 (15 Ameya 2021 04:52), Max: 98.5 (14 Jun 2021 11:40)  HR: 78 (15 Ameya 2021 04:52) (78 - 117)  BP: 113/67 (15 Ameya 2021 04:52) (102/66 - 135/104)  RR: 20 (15 Ameya 2021 04:52) (18 - 26)  SpO2: 100% (15 Ameya 2021 04:52) (82% - 100%)    Exam:  GEN: Awake and alert, lying in bed on NRB  HEENT: Mild periorbital swelling w/ ecchymosis of R eye. Mild conjunctival injection R eye. EOMI. MMM  NECK: Supple  CHEST: On NRB. Tachypneic but non-labored breathing, speaking in full sentences. O2 saturations drop to 80s when speaking, improves to 90s with rest. Coarse breath sounds b/l w/ mild bibasilar crackles. (+) port R chest  CV: Tachycardic, regular rhythm. (+) S1/S2. No audible S3/S4. R No murmurs, rubs, or gallops.    GI: Soft, mild epigastric tenderness to palpation, nondistended. +BS.  EXT: Warm and well perfused. Trace pitting edema to mid-shin b/l. No calf tenderness. 2+ distal pulses b/l LEs  SKIN: Intact, no rashes or lesions.   NEURO: A&Ox3, grossly non-focal    I&Os:    14 Jun 2021 07:01  -  15 Ameya 2021 07:00  --------------------------------------------------------  IN:    Heparin Infusion: 111 mL  Total IN: 111 mL    OUT:  Total OUT: 0 mL    Total NET: 111 mL      Labs:                        7.5    4.63  )-----------( 143      ( 15 Ameya 2021 06:59 )             22.8     06-15    141  |  104  |  18  ----------------------------<  131<H>  3.7   |  26  |  1.18    Ca    8.1<L>      15 Ameya 2021 07:00  Phos  3.0     06-15  Mg     2.1     06-15    TPro  5.3<L>  /  Alb  2.0<L>  /  TBili  0.6  /  DBili  x   /  AST  30  /  ALT  10  /  AlkPhos  133<H>  06-15    PT/INR - ( 14 Jun 2021 17:58 )   PT: 15.1 sec;   INR: 1.27 ratio    PTT - ( 15 Ameya 2021 00:28 )  PTT:178.2 sec      Radiology and Additional Studies:    < from: CT Angio Chest PE Protocol w/ IV Cont (06.14.21 @ 21:13) >    ******PRELIMINARY REPORT******          INTERPRETATION:  Linear filling defects extending from the right pulmonary artery into the right middle lobar and lower lobar arteries are unchanged from 2/13/2020, representing chronic PE.  Decreased filling defects within the left main pulmonary artey with extension into the left upper and lower lobar arteries compared to 2/13/2020.  Increased emphysema with new bilateral ground glass opacities.  Hepatic steatosis    ******PRELIMINARY REPORT******          MEDS:  MEDICATIONS  (STANDING):  albuterol/ipratropium for Nebulization 3 milliLiter(s) Nebulizer every 6 hours  albuterol/ipratropium for Nebulization.. 3 milliLiter(s) Nebulizer every 20 minutes  amLODIPine   Tablet 10 milliGRAM(s) Oral daily  amoxicillin  875 milliGRAM(s)/clavulanate 1 Tablet(s) Oral two times a day  cholecalciferol 1000 Unit(s) Oral daily  cyanocobalamin 1000 MICROGram(s) Oral daily  folic acid 1 milliGRAM(s) Oral daily  heparin  Infusion.  Unit(s)/Hr (11 mL/Hr) IV Continuous <Continuous>  mirtazapine 15 milliGRAM(s) Oral at bedtime  pancrelipase  (CREON 12,000 Lipase Units) 2 Capsule(s) Oral three times a day with meals  pantoprazole    Tablet 40 milliGRAM(s) Oral two times a day  predniSONE   Tablet 40 milliGRAM(s) Oral daily    MEDICATIONS  (PRN):  aluminum hydroxide/magnesium hydroxide/simethicone Suspension 30 milliLiter(s) Oral every 4 hours PRN Dyspepsia  heparin   Injectable 4500 Unit(s) IV Push every 6 hours PRN For aPTT less than 40  heparin   Injectable 2000 Unit(s) IV Push every 6 hours PRN For aPTT between 40 - 57  ondansetron    Tablet 4 milliGRAM(s) Oral four times a day PRN Nausea and/or Vomiting  oxyCODONE    IR 30 milliGRAM(s) Oral every 6 hours PRN Severe Pain (7 - 10)       Contact information:  Esthela Matute, MS4   Medicine Team 5  Pager: [phone]    HIMANSHU VILLEGAS, MRN-[id number]    SUBJECTIVE/OVERNIGHT EVENTS: Patient seen and examined at bedside. Overnight pt desaturated to 80s on 6L NC while at rest, placed on 10L NRB w/ improvement in O2 sat to 90s. At time of assessment this AM pt was noted to drop to 70s/80s while talking, immediately improves to low 90s when resting. Now increased to 15L. Pt reports feeling fatigued but otherwise well, does not feel that her breathing is significantly labored or worsening. No chest pain. Still w/ persistent epigastric pain but improves w/ oxycodone. No nausea, vomiting.     OBJECTIVE:    Vitals:  Vital Signs Last 24 Hrs  T(C): 36.4 (15 Ameya 2021 04:52), Max: 36.9 (14 Jun 2021 11:40)  T(F): 97.6 (15 Ameya 2021 04:52), Max: 98.5 (14 Jun 2021 11:40)  HR: 78 (15 Ameya 2021 04:52) (78 - 117)  BP: 113/67 (15 Ameya 2021 04:52) (102/66 - 135/104)  RR: 20 (15 Ameya 2021 04:52) (18 - 26)  SpO2: 100% (15 Ameya 2021 04:52) (82% - 100%)    Exam:  GEN: Awake and alert, lying in bed on NRB  HEENT: Mild periorbital swelling w/ ecchymosis of R eye. Mild conjunctival injection R eye. EOMI. MMM  NECK: Supple  CHEST: On NRB. Tachypneic but non-labored breathing, speaking in full sentences. O2 saturations drop to 80s when speaking, improves to 90s with rest. Coarse breath sounds b/l w/ mild bibasilar crackles. (+) port R chest  CV: Tachycardic, regular rhythm. (+) S1/S2. No audible S3/S4. R No murmurs, rubs, or gallops.    GI: Soft, mild epigastric tenderness to palpation, nondistended. +BS.  EXT: Warm and well perfused. Trace pitting edema to mid-shin b/l. No calf tenderness. 2+ distal pulses b/l LEs  SKIN: Intact, no rashes or lesions.   NEURO: A&Ox3, grossly non-focal    I&Os:    14 Jun 2021 07:01  -  15 Ameya 2021 07:00  --------------------------------------------------------  IN:    Heparin Infusion: 111 mL  Total IN: 111 mL    OUT:  Total OUT: 0 mL    Total NET: 111 mL      Labs:                        7.5    4.63  )-----------( 143      ( 15 Ameya 2021 06:59 )             22.8     06-15    141  |  104  |  18  ----------------------------<  131<H>  3.7   |  26  |  1.18    Ca    8.1<L>      15 Ameya 2021 07:00  Phos  3.0     06-15  Mg     2.1     06-15    TPro  5.3<L>  /  Alb  2.0<L>  /  TBili  0.6  /  DBili  x   /  AST  30  /  ALT  10  /  AlkPhos  133<H>  06-15    PT/INR - ( 14 Jun 2021 17:58 )   PT: 15.1 sec;   INR: 1.27 ratio    PTT - ( 15 Ameya 2021 00:28 )  PTT:178.2 sec      Radiology and Additional Studies:    < from: CT Angio Chest PE Protocol w/ IV Cont (06.14.21 @ 21:13) >    IMPRESSION:  1.  Bilateral symmetric groundglass opacities with interlobular septal thickening, consistent with pulmonary edema.  2.  Web at the right pulmonary artery along with pruning and stenosis of the left lower lobe pulmonary arteries, consistent with chronic thromboembolic disease.      MEDS:  MEDICATIONS  (STANDING):  albuterol/ipratropium for Nebulization 3 milliLiter(s) Nebulizer every 6 hours  albuterol/ipratropium for Nebulization.. 3 milliLiter(s) Nebulizer every 20 minutes  amLODIPine   Tablet 10 milliGRAM(s) Oral daily  amoxicillin  875 milliGRAM(s)/clavulanate 1 Tablet(s) Oral two times a day  cholecalciferol 1000 Unit(s) Oral daily  cyanocobalamin 1000 MICROGram(s) Oral daily  folic acid 1 milliGRAM(s) Oral daily  heparin  Infusion.  Unit(s)/Hr (11 mL/Hr) IV Continuous <Continuous>  mirtazapine 15 milliGRAM(s) Oral at bedtime  pancrelipase  (CREON 12,000 Lipase Units) 2 Capsule(s) Oral three times a day with meals  pantoprazole    Tablet 40 milliGRAM(s) Oral two times a day  predniSONE   Tablet 40 milliGRAM(s) Oral daily    MEDICATIONS  (PRN):  aluminum hydroxide/magnesium hydroxide/simethicone Suspension 30 milliLiter(s) Oral every 4 hours PRN Dyspepsia  heparin   Injectable 4500 Unit(s) IV Push every 6 hours PRN For aPTT less than 40  heparin   Injectable 2000 Unit(s) IV Push every 6 hours PRN For aPTT between 40 - 57  ondansetron    Tablet 4 milliGRAM(s) Oral four times a day PRN Nausea and/or Vomiting  oxyCODONE    IR 30 milliGRAM(s) Oral every 6 hours PRN Severe Pain (7 - 10)

## 2021-06-15 NOTE — CONSULT NOTE ADULT - SUBJECTIVE AND OBJECTIVE BOX
HPI:  64 y/o F with PMHx HTN, COPD, non-small cell lung cancer on chemotherapy (last tx 6/10), GERD, chronic pancreatitis, hx of DVT/PE on Eliquis but recently held in s/o fall w/ sustained R orbital floor fx, now presents for acute shortness of breath last night. Pt states she was at home when she began to feel suddenly short of breath, progressively worsening until this morning when she called EMS. Denies chest pain, palpitations, orthopnea. Does report some increased leg swelling over past 2 weeks and also states she has had increased sputum production over the past few days but denies true cough or other upper respiratory symptoms. Denies fever, chills. No sick contacts. Pt with reported history of COPD but on no home medications or oxygen, states she normally can tolerate walking half a mile without issues. In addition, the pt is also endorsing epigastric since last night, feels like her chronic pancreatitis pain, sharp and radiating to her back. Pt states similar episodes usually occur 2-3x per month, usually managed at home with home pain regimen w/ oxycodone. Pt reports decreased PO intake over past 2 days, states this is often a trigger for her pain. Denies nausea, vomiting.    ED Course: Pt afebrile, , /86, RR 26, SpO2 90% on NRB on arrival to ED, reportedly 60s on RA per EMS. Covid (-). BNP 1957, Troponin 20. VBG pCO2 47, lactate 4.1. CXR w/ RLL nodularity c/f infectious process. Pt started on nebs, steroids w/ symptomatic improvement, weaned back to NC.      (14 Jun 2021 13:50)      14 point ROS otherwise negative    PAST MEDICAL & SURGICAL HISTORY:  HTN (hypertension)    GERD (gastroesophageal reflux disease)    Chronic pancreatitis  last episode 4/2019    ARDS survivor  2015    History of adrenal adenoma  stable on imaging    Vocal cord polyp  removal 2018, benign as per pt    Thrombophlebitis  superficial right UE during 4/2019 hospital stay, resolved as per pt    Chronic abdominal pain    Non-small cell lung cancer (NSCLC)  chemo: Alimta/ Keytruda 2/24/2021    Pulmonary embolism    COPD (chronic obstructive pulmonary disease)    Pulmonary hypertension    Anemia  transfusion 2/5/21    S/P arthroscopy of shoulder  left in 2009    S/P hip replacement  left - 2010    S/P arthroscopy of shoulder  right - 2014    S/P hip replacement, right  May 2016    History of vocal cord polypectomy  1/2018    S/P shoulder replacement, left  2016    History of pancreatic surgery  Jeffery procedure (5/8/2019)        Allergies    diazepam (Other)  lemon (Other)    Intolerances        MEDICATIONS  (STANDING):  albuterol/ipratropium for Nebulization 3 milliLiter(s) Nebulizer every 6 hours  albuterol/ipratropium for Nebulization.. 3 milliLiter(s) Nebulizer every 20 minutes  amLODIPine   Tablet 10 milliGRAM(s) Oral daily  amoxicillin  875 milliGRAM(s)/clavulanate 1 Tablet(s) Oral two times a day  chlorhexidine 2% Cloths 1 Application(s) Topical <User Schedule>  cholecalciferol 1000 Unit(s) Oral daily  cyanocobalamin 1000 MICROGram(s) Oral daily  folic acid 1 milliGRAM(s) Oral daily  heparin  Infusion.  Unit(s)/Hr (11 mL/Hr) IV Continuous <Continuous>  mirtazapine 15 milliGRAM(s) Oral at bedtime  pancrelipase  (CREON 12,000 Lipase Units) 2 Capsule(s) Oral three times a day with meals  pantoprazole    Tablet 40 milliGRAM(s) Oral two times a day  piperacillin/tazobactam IVPB. 3.375 Gram(s) IV Intermittent once  piperacillin/tazobactam IVPB.. 3.375 Gram(s) IV Intermittent every 8 hours  predniSONE   Tablet 40 milliGRAM(s) Oral daily    MEDICATIONS  (PRN):  aluminum hydroxide/magnesium hydroxide/simethicone Suspension 30 milliLiter(s) Oral every 4 hours PRN Dyspepsia  heparin   Injectable 4500 Unit(s) IV Push every 6 hours PRN For aPTT less than 40  heparin   Injectable 2000 Unit(s) IV Push every 6 hours PRN For aPTT between 40 - 57  ondansetron    Tablet 4 milliGRAM(s) Oral four times a day PRN Nausea and/or Vomiting  oxyCODONE    IR 30 milliGRAM(s) Oral every 6 hours PRN Severe Pain (7 - 10)      FAMILY HISTORY:      SOCIAL HISTORY: No EtOH, no tobacco        VITALS:   T(F): 97.6 (06-15-21 @ 04:52), Max: 97.7 (06-14-21 @ 15:15)  HR: 78 (06-15-21 @ 04:52)  BP: 113/67 (06-15-21 @ 04:52)  RR: 20 (06-15-21 @ 04:52)  SpO2: 100% (06-15-21 @ 04:52)  Wt(kg): --    PHYSICAL EXAM    GENERAL: NAD, well-developed  HEAD:  Atraumatic, Normocephalic  EYES: EOMI, PERRLA, conjunctiva and sclera clear  NECK: Supple, No JVD  CHEST/LUNG: Crackles  HEART: Regular rate and rhythm; No murmurs, rubs, or gallops  ABDOMEN: Soft, Nontender, Nondistended; Bowel sounds present  EXTREMITIES:  2+ Peripheral Pulses, No clubbing, cyanosis, or edema  NEUROLOGY: non-focal  SKIN: No rashes or lesions    LABS:                         7.5    4.63  )-----------( 143      ( 15 Ameya 2021 06:59 )             22.8     06-15    141  |  104  |  18  ----------------------------<  131<H>  3.7   |  26  |  1.18    Ca    8.1<L>      15 Ameya 2021 07:00  Phos  3.0     06-15  Mg     2.1     06-15    TPro  5.3<L>  /  Alb  2.0<L>  /  TBili  0.6  /  DBili  x   /  AST  30  /  ALT  10  /  AlkPhos  133<H>  06-15    Magnesium, Serum: 2.1 mg/dL (06-15 @ 07:00)  Phosphorus Level, Serum: 3.0 mg/dL (06-15 @ 07:00)    PT/INR - ( 14 Jun 2021 17:58 )   PT: 15.1 sec;   INR: 1.27 ratio         PTT - ( 15 Ameya 2021 08:44 )  PTT:85.1 sec      IMAGING:

## 2021-06-15 NOTE — PROGRESS NOTE ADULT - PROBLEM SELECTOR PLAN 4
Pt w/ history of chronic pancreatitis, now with epigastric pain since last night c/w pt's chronic pain    Plan:  -Pain control per pt's home regimen w/ oxycodone prn   -C/w pancrelipase  -Zofran prn nausea Pt w/ anemia here appears near pt's baseline Hgb 8-9 per chart review. Normocytic anemia w/ elevated RDW, ? mixed microcytic (TOM) + macrocytic.    Plan:  -Daily CBC  -Trend H+H  -F/u iron studies   -C/w home folic acid, B12

## 2021-06-15 NOTE — PROGRESS NOTE ADULT - ASSESSMENT
64 y/o F with PMHx HTN, COPD, non-small cell lung cancer on chemotherapy,GERD, chronic pancreatitis, hx of DVT/PE on Eliquis but recently held in s/o fall w/ sustained R orbital floor fx, admitted for acute shortness of breath and hypoxia likely in s/o COPD exacerbation. Currently stable on NC.  64 y/o F with PMHx HTN, COPD, non-small cell lung cancer on chemotherapy,GERD, chronic pancreatitis, hx of DVT/PE on Eliquis but recently held in s/o fall w/ sustained R orbital floor fx, admitted for acute shortness of breath and hypoxia c/f COPD exacerbation vs. CHF vs. pneumonia.

## 2021-06-15 NOTE — CONSULT NOTE ADULT - SUBJECTIVE AND OBJECTIVE BOX
CHIEF COMPLAINT:    HPI:    PAST MEDICAL & SURGICAL HISTORY:  HTN (hypertension)    GERD (gastroesophageal reflux disease)    Chronic pancreatitis  last episode 4/2019    ARDS survivor  2015    History of adrenal adenoma  stable on imaging    Vocal cord polyp  removal 2018, benign as per pt    Thrombophlebitis  superficial right UE during 4/2019 hospital stay, resolved as per pt    Chronic abdominal pain    Non-small cell lung cancer (NSCLC)  chemo: Alimta/ Keytruda 2/24/2021    Pulmonary embolism    COPD (chronic obstructive pulmonary disease)    Pulmonary hypertension    Anemia  transfusion 2/5/21    S/P arthroscopy of shoulder  left in 2009    S/P hip replacement  left - 2010    S/P arthroscopy of shoulder  right - 2014    S/P hip replacement, right  May 2016    History of vocal cord polypectomy  1/2018    S/P shoulder replacement, left  2016    History of pancreatic surgery  Jeffery procedure (5/8/2019)        FAMILY HISTORY:      SOCIAL HISTORY:  Smoking: [ ] Never Smoked [ ] Former Smoker (__ packs x ___ years) [ ] Current Smoker  (__ packs x ___ years)  Substance Use: [ ] Never Used [ ] Used ____  EtOH Use:  Marital Status: [ ] Single [ ]  [ ]  [ ]   Sexual History:   Occupation:  Recent Travel:  Country of Birth:  Advance Directives:    Allergies    diazepam (Other)  lemon (Other)    Intolerances        HOME MEDICATIONS:  Home Medications:  Creon 24,000 units oral delayed release capsule: 1 cap(s) orally 3 times a day (14 Jun 2021 14:06)  dexamethasone 2 mg oral tablet: 2 days post chemo (14 Jun 2021 14:06)  folic acid 1 mg oral tablet: 1 tab(s) orally once a day (14 Jun 2021 14:06)  metoprolol succinate 100 mg oral tablet, extended release: 1 tab(s) orally once a day (14 Jun 2021 14:06)  mirtazapine 15 mg oral tablet: 1 tab(s) orally once a day (at bedtime) (14 Jun 2021 14:06)  Norvasc 10 mg oral tablet: 1 tab(s) orally once a day (14 Jun 2021 14:06)  omeprazole 40 mg oral delayed release capsule: 1 cap(s) orally 2 times a day (14 Jun 2021 14:06)  ondansetron 8 mg oral tablet: 1 tab(s) orally 4 times a day, As Needed (14 Jun 2021 14:06)  oxyCODONE 30 mg oral tablet: 1 tab(s) orally every 6 hours, As Needed (14 Jun 2021 14:06)  Vitamin B12: 1 tab(s) orally once a day (14 Jun 2021 14:06)  Vitamin D3: 1 tab(s) orally once a day (14 Jun 2021 14:06)      REVIEW OF SYSTEMS:  Constitutional: [ ] negative [ ] fevers [ ] chills [ ] weight loss [ ] weight gain  HEENT: [ ] negative [ ] dry eyes [ ] eye irritation [ ] postnasal drip [ ] nasal congestion  CV: [ ] negative  [ ] chest pain [ ] orthopnea [ ] palpitations [ ] murmur  Resp: [ ] negative [ ] cough [ ] shortness of breath [ ] dyspnea [ ] wheezing [ ] sputum [ ] hemoptysis  GI: [ ] negative [ ] nausea [ ] vomiting [ ] diarrhea [ ] constipation [ ] abd pain [ ] dysphagia   : [ ] negative [ ] dysuria [ ] nocturia [ ] hematuria [ ] increased urinary frequency  Musculoskeletal: [ ] negative [ ] back pain [ ] myalgias [ ] arthralgias [ ] fracture  Skin: [ ] negative [ ] rash [ ] itch  Neurological: [ ] negative [ ] headache [ ] dizziness [ ] syncope [ ] weakness [ ] numbness  Psychiatric: [ ] negative [ ] anxiety [ ] depression  Endocrine: [ ] negative [ ] diabetes [ ] thyroid problem  Hematologic/Lymphatic: [ ] negative [ ] anemia [ ] bleeding problem  Allergic/Immunologic: [ ] negative [ ] itchy eyes [ ] nasal discharge [ ] hives [ ] angioedema  [ ] All other systems negative  [ ] Unable to assess ROS because ________    OBJECTIVE:  ICU Vital Signs Last 24 Hrs  T(C): 36.7 (15 Ameya 2021 13:54), Max: 36.7 (15 Ameya 2021 13:54)  T(F): 98.1 (15 Ameya 2021 13:54), Max: 98.1 (15 Ameya 2021 13:54)  HR: 84 (15 Ameya 2021 13:55) (78 - 113)  BP: 107/70 (15 Ameya 2021 13:54) (102/66 - 121/73)  BP(mean): --  ABP: --  ABP(mean): --  RR: 20 (15 Ameya 2021 13:55) (18 - 23)  SpO2: 95% (15 Ameya 2021 13:55) (82% - 100%)        06-14 @ 07:01  -  06-15 @ 07:00  --------------------------------------------------------  IN: 111 mL / OUT: 0 mL / NET: 111 mL    06-15 @ 07:01  -  06-15 @ 14:53  --------------------------------------------------------  IN: 600 mL / OUT: 900 mL / NET: -300 mL      CAPILLARY BLOOD GLUCOSE          PHYSICAL EXAM:  General: awake and alert, nontoxic appearing *** lying in bed  HEENT: NC/AT, EOMI b/l, conjunctiva normal, MMM  Lymph Nodes: no cervical LAD  Neck: supple. full range of motion  Respiratory: CTA b/l, no w/r/c, appears comfortable on ***, no conversational dyspnea or accessory muscle use  Cardiovascular: S1 S2 present, RRR, no m/r/g  Abdomen: soft, NT/ND, +BS  Extremities: no c/c/e  Skin: no rashes or lesions noted  Neurological: AAOx3, no focal deficits  Psychiatry: calm, cooperative    LINES:     HOSPITAL MEDICATIONS:  Standing Meds:  albuterol/ipratropium for Nebulization 3 milliLiter(s) Nebulizer every 6 hours  albuterol/ipratropium for Nebulization.. 3 milliLiter(s) Nebulizer every 20 minutes  amLODIPine   Tablet 10 milliGRAM(s) Oral daily  apixaban 5 milliGRAM(s) Oral every 12 hours  chlorhexidine 2% Cloths 1 Application(s) Topical <User Schedule>  cholecalciferol 1000 Unit(s) Oral daily  cyanocobalamin 1000 MICROGram(s) Oral daily  folic acid 1 milliGRAM(s) Oral daily  heparin  Infusion.  Unit(s)/Hr IV Continuous <Continuous>  mirtazapine 15 milliGRAM(s) Oral at bedtime  pancrelipase  (CREON 12,000 Lipase Units) 2 Capsule(s) Oral three times a day with meals  pantoprazole    Tablet 40 milliGRAM(s) Oral two times a day  piperacillin/tazobactam IVPB.. 3.375 Gram(s) IV Intermittent every 8 hours  predniSONE   Tablet 40 milliGRAM(s) Oral daily      PRN Meds:  aluminum hydroxide/magnesium hydroxide/simethicone Suspension 30 milliLiter(s) Oral every 4 hours PRN  heparin   Injectable 4500 Unit(s) IV Push every 6 hours PRN  heparin   Injectable 2000 Unit(s) IV Push every 6 hours PRN  ondansetron    Tablet 4 milliGRAM(s) Oral four times a day PRN  oxyCODONE    IR 30 milliGRAM(s) Oral every 6 hours PRN      LABS:                        7.5    4.63  )-----------( 143      ( 15 Ameya 2021 06:59 )             22.8     Hgb Trend: 7.5<--, 7.6<--, 8.1<--, 8.7<--  06-15    141  |  104  |  18  ----------------------------<  131<H>  3.7   |  26  |  1.18    Ca    8.1<L>      15 Ameya 2021 07:00  Phos  3.0     06-15  Mg     2.1     06-15    TPro  5.3<L>  /  Alb  2.0<L>  /  TBili  0.6  /  DBili  x   /  AST  30  /  ALT  10  /  AlkPhos  133<H>  06-15    Creatinine Trend: 1.18<--, 1.20<--, 1.40<--  PT/INR - ( 14 Jun 2021 17:58 )   PT: 15.1 sec;   INR: 1.27 ratio         PTT - ( 15 Ameya 2021 08:44 )  PTT:85.1 sec      Venous Blood Gas:  06-15 @ 07:11  --/--/--/--/--  VBG Lactate: 1.5  Venous Blood Gas:  06-14 @ 17:37  --/--/--/--/--  VBG Lactate: 2.1  Venous Blood Gas:  06-14 @ 09:53  7.38/47/28/27/38  VBG Lactate: 4.1      MICROBIOLOGY:       RADIOLOGY:  [ ] Reviewed and interpreted by me    PULMONARY FUNCTION TESTS:    EKG: CHIEF COMPLAINT: SOB    HPI:  Patient is a 64 y/o F with PMHx of stage III L lung adenocarcinoma on alimta/pembrolizumab maintenance chemotherapy, emphysema, 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 Reports she was in her usual state of health prior to this. Says she fell 2 days ago and since then she has been having a hard time catching her breath. Denies f/c, cough, chest pain, pleuritic pain. Has been receiving maintenance chemotherapy as scheduled and has not had prior issues with it. On arrival, patient was noted to be hypoxic and was placed on supplemental O2. Her oxygen requirements have been increasing since admission and she is currently requiring HFNC at 60%/50 lpm to maintain O2 saturations. CT chest reveals emphysema with new diffuse b/l GGO. Pulmonary consulted for assistance in management.    PAST MEDICAL & SURGICAL HISTORY:  HTN (hypertension)    GERD (gastroesophageal reflux disease)    Chronic pancreatitis  last episode 4/2019    ARDS survivor  2015    History of adrenal adenoma  stable on imaging    Vocal cord polyp  removal 2018, benign as per pt    Thrombophlebitis  superficial right UE during 4/2019 hospital stay, resolved as per pt    Chronic abdominal pain    Non-small cell lung cancer (NSCLC)  chemo: Alimta/ Keytruda 2/24/2021    Pulmonary embolism    COPD (chronic obstructive pulmonary disease)    Pulmonary hypertension    Anemia  transfusion 2/5/21    S/P arthroscopy of shoulder  left in 2009    S/P hip replacement  left - 2010    S/P arthroscopy of shoulder  right - 2014    S/P hip replacement, right  May 2016    History of vocal cord polypectomy  1/2018    S/P shoulder replacement, left  2016    History of pancreatic surgery  Jeffery procedure (5/8/2019)        FAMILY HISTORY:      SOCIAL HISTORY:  Smoking: [ ] Never Smoked [x] Former Smoker (__ packs x ___ years) [ ] Current Smoker  (__ packs x ___ years)  Substance Use: [ ] Never Used [ ] Used ____  EtOH Use:  Marital Status: [ ] Single [ ]  [ ]  [ ]   Sexual History:   Occupation:  Recent Travel:  Country of Birth:  Advance Directives:    Allergies    diazepam (Other)  lemon (Other)    Intolerances        HOME MEDICATIONS:  Home Medications:  Creon 24,000 units oral delayed release capsule: 1 cap(s) orally 3 times a day (14 Jun 2021 14:06)  dexamethasone 2 mg oral tablet: 2 days post chemo (14 Jun 2021 14:06)  folic acid 1 mg oral tablet: 1 tab(s) orally once a day (14 Jun 2021 14:06)  metoprolol succinate 100 mg oral tablet, extended release: 1 tab(s) orally once a day (14 Jun 2021 14:06)  mirtazapine 15 mg oral tablet: 1 tab(s) orally once a day (at bedtime) (14 Jun 2021 14:06)  Norvasc 10 mg oral tablet: 1 tab(s) orally once a day (14 Jun 2021 14:06)  omeprazole 40 mg oral delayed release capsule: 1 cap(s) orally 2 times a day (14 Jun 2021 14:06)  ondansetron 8 mg oral tablet: 1 tab(s) orally 4 times a day, As Needed (14 Jun 2021 14:06)  oxyCODONE 30 mg oral tablet: 1 tab(s) orally every 6 hours, As Needed (14 Jun 2021 14:06)  Vitamin B12: 1 tab(s) orally once a day (14 Jun 2021 14:06)  Vitamin D3: 1 tab(s) orally once a day (14 Jun 2021 14:06)      REVIEW OF SYSTEMS:  Constitutional: [ ] negative [ ] fevers [ ] chills [ ] weight loss [ ] weight gain  HEENT: [ ] negative [ ] dry eyes [ ] eye irritation [ ] postnasal drip [ ] nasal congestion  CV: [ ] negative  [ ] chest pain [ ] orthopnea [ ] palpitations [ ] murmur  Resp: [ ] negative [ ] cough [x] shortness of breath [ ] dyspnea [ ] wheezing [ ] sputum [ ] hemoptysis  GI: [ ] negative [ ] nausea [ ] vomiting [ ] diarrhea [ ] constipation [ ] abd pain [ ] dysphagia   : [ ] negative [ ] dysuria [ ] nocturia [ ] hematuria [ ] increased urinary frequency  Musculoskeletal: [ ] negative [ ] back pain [ ] myalgias [ ] arthralgias [ ] fracture  Skin: [ ] negative [ ] rash [ ] itch  Neurological: [ ] negative [ ] headache [ ] dizziness [ ] syncope [ ] weakness [ ] numbness  Psychiatric: [ ] negative [ ] anxiety [ ] depression  Endocrine: [ ] negative [ ] diabetes [ ] thyroid problem  Hematologic/Lymphatic: [ ] negative [ ] anemia [ ] bleeding problem  Allergic/Immunologic: [ ] negative [ ] itchy eyes [ ] nasal discharge [ ] hives [ ] angioedema  [x] All other systems negative  [ ] Unable to assess ROS because ________    OBJECTIVE:  ICU Vital Signs Last 24 Hrs  T(C): 36.7 (15 Ameya 2021 13:54), Max: 36.7 (15 Ameya 2021 13:54)  T(F): 98.1 (15 Ameya 2021 13:54), Max: 98.1 (15 Ameya 2021 13:54)  HR: 84 (15 Ameya 2021 13:55) (78 - 113)  BP: 107/70 (15 Ameya 2021 13:54) (102/66 - 121/73)  BP(mean): --  ABP: --  ABP(mean): --  RR: 20 (15 Ameya 2021 13:55) (18 - 23)  SpO2: 95% (15 Ameya 2021 13:55) (82% - 100%)        06-14 @ 07:01  -  06-15 @ 07:00  --------------------------------------------------------  IN: 111 mL / OUT: 0 mL / NET: 111 mL    06-15 @ 07:01  - 06-15 @ 14:53  --------------------------------------------------------  IN: 600 mL / OUT: 900 mL / NET: -300 mL      CAPILLARY BLOOD GLUCOSE          PHYSICAL EXAM:  General: awake and alert, ill appearing female lying in bed  HEENT: NC/AT, EOMI b/l, conjunctiva normal, MMM  Lymph Nodes: no cervical LAD  Neck: supple. full range of motion  Respiratory: CTA b/l, no w/r/c, slightly tachypneic on HFNC, no conversational dyspnea or accessory muscle use  Cardiovascular: S1 S2 present, RRR, no m/r/g  Abdomen: soft, NT/ND, +BS  Extremities: no c/c/e  Skin: no rashes or lesions noted  Neurological: AAOx3, no focal deficits  Psychiatry: calm, cooperative    LINES:     HOSPITAL MEDICATIONS:  Standing Meds:  albuterol/ipratropium for Nebulization 3 milliLiter(s) Nebulizer every 6 hours  albuterol/ipratropium for Nebulization.. 3 milliLiter(s) Nebulizer every 20 minutes  amLODIPine   Tablet 10 milliGRAM(s) Oral daily  apixaban 5 milliGRAM(s) Oral every 12 hours  chlorhexidine 2% Cloths 1 Application(s) Topical <User Schedule>  cholecalciferol 1000 Unit(s) Oral daily  cyanocobalamin 1000 MICROGram(s) Oral daily  folic acid 1 milliGRAM(s) Oral daily  heparin  Infusion.  Unit(s)/Hr IV Continuous <Continuous>  mirtazapine 15 milliGRAM(s) Oral at bedtime  pancrelipase  (CREON 12,000 Lipase Units) 2 Capsule(s) Oral three times a day with meals  pantoprazole    Tablet 40 milliGRAM(s) Oral two times a day  piperacillin/tazobactam IVPB.. 3.375 Gram(s) IV Intermittent every 8 hours  predniSONE   Tablet 40 milliGRAM(s) Oral daily      PRN Meds:  aluminum hydroxide/magnesium hydroxide/simethicone Suspension 30 milliLiter(s) Oral every 4 hours PRN  heparin   Injectable 4500 Unit(s) IV Push every 6 hours PRN  heparin   Injectable 2000 Unit(s) IV Push every 6 hours PRN  ondansetron    Tablet 4 milliGRAM(s) Oral four times a day PRN  oxyCODONE    IR 30 milliGRAM(s) Oral every 6 hours PRN      LABS:                        7.5    4.63  )-----------( 143      ( 15 Ameya 2021 06:59 )             22.8     Hgb Trend: 7.5<--, 7.6<--, 8.1<--, 8.7<--  06-15    141  |  104  |  18  ----------------------------<  131<H>  3.7   |  26  |  1.18    Ca    8.1<L>      15 Ameya 2021 07:00  Phos  3.0     06-15  Mg     2.1     06-15    TPro  5.3<L>  /  Alb  2.0<L>  /  TBili  0.6  /  DBili  x   /  AST  30  /  ALT  10  /  AlkPhos  133<H>  06-15    Creatinine Trend: 1.18<--, 1.20<--, 1.40<--  PT/INR - ( 14 Jun 2021 17:58 )   PT: 15.1 sec;   INR: 1.27 ratio         PTT - ( 15 Ameya 2021 08:44 )  PTT:85.1 sec      Venous Blood Gas:  06-15 @ 07:11  --/--/--/--/--  VBG Lactate: 1.5  Venous Blood Gas:  06-14 @ 17:37  --/--/--/--/--  VBG Lactate: 2.1  Venous Blood Gas:  06-14 @ 09:53  7.38/47/28/27/38  VBG Lactate: 4.1      MICROBIOLOGY:       RADIOLOGY:  [ ] Reviewed and interpreted by me    PULMONARY FUNCTION TESTS:    EKG:

## 2021-06-15 NOTE — PROGRESS NOTE ADULT - PROBLEM SELECTOR PLAN 6
Pt w/ anemia here appears near pt's baseline Hgb 8-9 per chart review. Normocytic anemia w/ elevated RDW, ? mixed microcytic (TOM) + macrocytic.    Plan:  -Daily CBC  -Trend H+H  -F/u on studies   -C/w home folic acid, B12 Pt w/ anemia here appears near pt's baseline Hgb 8-9 per chart review. Normocytic anemia w/ elevated RDW, ? mixed microcytic (TOM) + macrocytic.    Plan:  -Daily CBC  -Trend H+H  -F/u iron studies   -C/w home folic acid, B12 Pt w/ hx of DVT/PE on Eliquis, currently being held in s/o fall w/ R orbital floor fx. Pt currently tachycardic, tachypneic but no evidence of acute PE on CTA chest.    Plan:  -CTA chest w/ evidence of chronic thromboembolic disease but no acute PE, also w/  b/l symmetric ground-glass opacities  -Currently on heparin gtt w/out evidence of bleeding, will restart Eliquis later today

## 2021-06-15 NOTE — CONSULT NOTE ADULT - ATTENDING COMMENTS
Agree with plan as outlined above. Patient seen and examined at bedside. Patient history, laboratory data, and imaging personally reviewed.    Pt is a 65F with PMHx former smoker with COPD, non-small cell lung adenocarcinoma of left lung (dz'ed Stage III, s/p Carboplatin/Alimta/Pembrolizumab last given 6/10/21), hx extensive PE (2/2020) on NOAC, chronic pancreatitis on PERT with opioid dependence, and hx vocal cord polypectomy presenting to [facility] 6/14/21 with acute onset dyspnea and progressively worsening b/l LE edema now requiring HFNC O2 supplementation. CT imaging reviewed and c/w worsening bilateral interlobular septal thickening most likely 2/2 fluid vs. inflammation.     -Increase Lasix for more aggressive diuresis with strict I/Os and BMPs with electrolyte repletion with goal of net (-) 1L/24hrs.  -c/w Prednisone x5 days. Will hold off on stress-dose steroids for now  -Repeat TTE with a bubble study. Can add pro-BNP.   -Would check ABG   -Wean O2 supplementation for goal O2 saturation 92-95%  -c/w ATC nebulizer therapy as above  -Would broaden Abx regimen to cover MRSA and atypical pathogens  -Pulmonary will continue to follow

## 2021-06-15 NOTE — CONSULT NOTE ADULT - ATTENDING COMMENTS
65 F w/ metastatic NSCL CA on Pemetrexed and Keytruda for >1 yr admitted with hypoxic respiratory failure. Pt currently on high flow with O2 sats in low 90s. Does not appear to be in distress.     Recommend   1. IV steroids to cover possible immunotherapy related pneumonitis (although this would be rare given she has been on therapy for over 1 yr  2. Pulm consult   3. Diureses as pulm edema is in the differential   4. close monitoring as pt is high risk for decompensation given high levels of O2 support

## 2021-06-15 NOTE — PROGRESS NOTE ADULT - PROBLEM SELECTOR PLAN 9
Plan:  -VTE: Heparin gtt x 24 hours, if no bleeding can transition to home Eliquis  -Diet: DASH diet

## 2021-06-15 NOTE — PROGRESS NOTE ADULT - PROBLEM SELECTOR PLAN 1
Pt admitted for dyspnea and hypoxia, still w/ persistent desaturations to 70s/80s. Likely acute COPD exacerbation, possibly in s/o underlying infection given CXR w/ RLL nodularity c/f infectious etiology, although pt afebrile w/out leukocytosis. Must also r/o acute PE given hx and patient currently off a/c in s/o recent fall. Pt w/out reported hx of CHF and recent nml echo 2/2021, although BNP elevated here so must also r/o. Less likely acute ischemic process, troponin x 2 negative.    Plan:  -Continuous pulse ox  -Non-rebreather / NC, wean as tolerate  -Low threshold to start on BiPAP if pt's saturations do not improve  -Continue w/ PO steroids and Duoneb  -Last echo in 2/2021 w/ nml LV systolic function, EF 65%, will re-echo here in s/o elevated BNP  -CTA chest to r/o PE, prelim read w/ bilateral filling defects consistent with chronic PE, b/l ground glass opacities  -Will hold abx for now but low threshold to begin broad spectrum if pt spikes fever or WBC rises  -RVP negative  -VBG lactate elevated on admission, now resolved  -40mg IV Lasix push this AM Pt admitted for dyspnea and hypoxia, still w/ persistent desaturations to 70s/80s. Differential includes COPD exacerbation, possibly in s/o underlying infection given CXR w/ RLL nodularity c/f infectious etiology, b/l ground glass opacities on CT chest and elevated procalcitonin, although pt afebrile w/out leukocytosis though may be masked in s/o immunosuppression 2/2 current chemotherapy. Also considered is new CHF given elevated BNP and that CT chest findings may represent pulmonary edema, although pt had recent nml echo in 2/2021. Unlikely PE as CTA chest negative for acute thromboembolism.    Plan:  -Continuous pulse ox  -High-flow NC for now   -Low threshold to start on BiPAP if pt's saturations do not improve  -Continue w/ PO steroids and Duoneb  -Last echo in 2/2021 w/ nml LV systolic function, EF 65%, will re-echo here in s/o elevated BNP  -CTA chest w/ evidence of chronic thromboembolic disease but no acute PE, also w/  b/l symmetric ground-glass opacities  -RVP negative, COVID-19 negative  -Procalcitonin ordered, elevated at 15  -Blood cx ordered, f/u results  -Sputum cx ordered, f/u results  -MRSA nasal swab  -Fungitell  -Will start pt on empiric abx therapy w/ Zosyn, also on Augmentin for orbital floor fx infection ppx  -VBG lactate elevated on admission, now resolved  -s/p 40mg IV Lasix push this AM Pt admitted for dyspnea and hypoxia, still w/ persistent desaturations to 70s/80s. Differential includes COPD exacerbation, possibly in s/o underlying infection given CXR w/ RLL nodularity c/f infectious etiology, b/l ground glass opacities on CT chest and elevated procalcitonin, although pt afebrile w/out leukocytosis though may be masked in s/o immunosuppression 2/2 current chemotherapy. Also considered is new CHF given elevated BNP and that CT chest findings may represent pulmonary edema, although pt had recent nml echo in 2/2021. Unlikely PE as CTA chest negative for acute thromboembolism.    Plan:  -Continuous pulse ox  -High-flow NC for now   -Low threshold to start on BiPAP if pt's saturations do not improve  -Continue w/ PO steroids and Duoneb  -Last echo in 2/2021 w/ nml LV systolic function, EF 65%, will re-echo here in s/o elevated BNP  -CTA chest w/ evidence of chronic thromboembolic disease but no acute PE, also w/  b/l symmetric ground-glass opacities  -RVP negative, COVID-19 negative  -Procalcitonin ordered, elevated at 15  -Blood cx ordered, f/u results  -Sputum cx ordered, f/u results  -MRSA nasal swab  -Fungitell  -Will start pt on empiric abx therapy w/ Zosyn  -VBG lactate elevated on admission, now resolved  -s/p 40mg IV Lasix push this AM

## 2021-06-15 NOTE — CONSULT NOTE ADULT - SUBJECTIVE AND OBJECTIVE BOX
CHIEF COMPLAINT:    HPI:    PAST MEDICAL & SURGICAL HISTORY:  HTN (hypertension)    GERD (gastroesophageal reflux disease)    Chronic pancreatitis  last episode 4/2019    ARDS survivor  2015    History of adrenal adenoma  stable on imaging    Vocal cord polyp  removal 2018, benign as per pt    Thrombophlebitis  superficial right UE during 4/2019 hospital stay, resolved as per pt    Chronic abdominal pain    Non-small cell lung cancer (NSCLC)  chemo: Alimta/ Keytruda 2/24/2021    Pulmonary embolism    COPD (chronic obstructive pulmonary disease)    Pulmonary hypertension    Anemia  transfusion 2/5/21    S/P arthroscopy of shoulder  left in 2009    S/P hip replacement  left - 2010    S/P arthroscopy of shoulder  right - 2014    S/P hip replacement, right  May 2016    History of vocal cord polypectomy  1/2018    S/P shoulder replacement, left  2016    History of pancreatic surgery  Jeffery procedure (5/8/2019)        FAMILY HISTORY:      SOCIAL HISTORY:  Smoking: __ packs x ___ years  EtOH Use:  Marital Status:  Occupation:  Recent Travel:  Country of Birth:  Advance Directives:    Allergies    diazepam (Other)  lemon (Other)    Intolerances        HOME MEDICATIONS:    REVIEW OF SYSTEMS:  Constitutional:   Eyes:  ENT:  CV:  Resp:  GI:  :  MSK:  Integumentary:  Neurological:  Psychiatric:  Endocrine:  Hematologic/Lymphatic:  Allergic/Immunologic:  [ ] All other systems negative  [ ] Unable to assess ROS because ________    OBJECTIVE:  ICU Vital Signs Last 24 Hrs  T(C): 36.8 (15 Ameya 2021 18:11), Max: 36.8 (15 Ameya 2021 18:11)  T(F): 98.2 (15 Ameya 2021 18:11), Max: 98.2 (15 Ameya 2021 18:11)  HR: 83 (15 Ameya 2021 18:17) (70 - 113)  BP: 103/62 (15 Ameya 2021 18:11) (102/66 - 113/67)  BP(mean): --  ABP: --  ABP(mean): --  RR: 20 (15 Ameya 2021 18:11) (18 - 20)  SpO2: 96% (15 Ameya 2021 18:17) (82% - 100%)        06-14 @ 07:01  -  06-15 @ 07:00  --------------------------------------------------------  IN: 111 mL / OUT: 0 mL / NET: 111 mL    06-15 @ 07:01  -  06-15 @ 19:55  --------------------------------------------------------  IN: 1050 mL / OUT: 1150 mL / NET: -100 mL      CAPILLARY BLOOD GLUCOSE          PHYSICAL EXAM:  General:   HEENT:   Lymph Nodes:  Neck:   Respiratory:   Cardiovascular:   Abdomen:   Extremities:   Skin:   Neurological:  Psychiatry:    HOSPITAL MEDICATIONS:  MEDICATIONS  (STANDING):  albuterol/ipratropium for Nebulization 3 milliLiter(s) Nebulizer every 6 hours  albuterol/ipratropium for Nebulization.. 3 milliLiter(s) Nebulizer every 20 minutes  amLODIPine   Tablet 10 milliGRAM(s) Oral daily  apixaban 5 milliGRAM(s) Oral every 12 hours  azithromycin  IVPB      chlorhexidine 2% Cloths 1 Application(s) Topical <User Schedule>  cholecalciferol 1000 Unit(s) Oral daily  cyanocobalamin 1000 MICROGram(s) Oral daily  folic acid 1 milliGRAM(s) Oral daily  mirtazapine 15 milliGRAM(s) Oral at bedtime  pancrelipase  (CREON 12,000 Lipase Units) 2 Capsule(s) Oral three times a day with meals  pantoprazole    Tablet 40 milliGRAM(s) Oral two times a day  piperacillin/tazobactam IVPB.. 3.375 Gram(s) IV Intermittent every 8 hours  predniSONE   Tablet 40 milliGRAM(s) Oral daily    MEDICATIONS  (PRN):  aluminum hydroxide/magnesium hydroxide/simethicone Suspension 30 milliLiter(s) Oral every 4 hours PRN Dyspepsia  ondansetron    Tablet 4 milliGRAM(s) Oral four times a day PRN Nausea and/or Vomiting  oxyCODONE    IR 30 milliGRAM(s) Oral every 6 hours PRN Severe Pain (7 - 10)      LABS:                        7.5    4.63  )-----------( 143      ( 15 Ameya 2021 06:59 )             22.8     06-15    141  |  104  |  18  ----------------------------<  131<H>  3.7   |  26  |  1.18    Ca    8.1<L>      15 Ameya 2021 07:00  Phos  3.0     06-15  Mg     2.1     06-15    TPro  5.3<L>  /  Alb  2.0<L>  /  TBili  0.6  /  DBili  x   /  AST  30  /  ALT  10  /  AlkPhos  133<H>  06-15    PT/INR - ( 14 Jun 2021 17:58 )   PT: 15.1 sec;   INR: 1.27 ratio         PTT - ( 15 Ameya 2021 15:36 )  PTT:77.0 sec    Arterial Blood Gas:  06-15 @ 19:35  7.53/34/50/28/83/5.3  ABG lactate: --    Venous Blood Gas:  06-15 @ 07:11  --/--/--/--/--  VBG Lactate: 1.5  Venous Blood Gas:  06-14 @ 17:37  --/--/--/--/--  VBG Lactate: 2.1  Venous Blood Gas:  06-14 @ 09:53  7.38/47/28/27/38  VBG Lactate: 4.1      MICROBIOLOGY:     RADIOLOGY:  [ ] Reviewed and interpreted by me    EKG: CHIEF COMPLAINT: SOB    HPI:  Patient is a 66 y/o F with PMHx of stage III L lung adenocarcinoma on alimta/pembrolizumab maintenance chemotherapy, emphysema, 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 Reports she was in her usual state of health prior to this. Says she fell 2 days ago and since then she has been having a hard time catching her breath. Denies f/c, cough, chest pain, pleuritic pain. Has been receiving maintenance chemotherapy as scheduled and has not had prior issues with it. On arrival, patient was noted to be hypoxic and was placed on supplemental O2. Her oxygen requirements have been increasing since admission and she is currently requiring HFNC at 60%/50 lpm to maintain O2 saturations. CT chest reveals emphysema with new diffuse b/l GGO. Pulmonary consulted for assistance in management.      PAST MEDICAL & SURGICAL HISTORY:  HTN (hypertension)    GERD (gastroesophageal reflux disease)    Chronic pancreatitis  last episode 4/2019    ARDS survivor  2015    History of adrenal adenoma  stable on imaging    Vocal cord polyp  removal 2018, benign as per pt    Thrombophlebitis  superficial right UE during 4/2019 hospital stay, resolved as per pt    Chronic abdominal pain    Non-small cell lung cancer (NSCLC)  chemo: Alimta/ Keytruda 2/24/2021    Pulmonary embolism    COPD (chronic obstructive pulmonary disease)    Pulmonary hypertension    Anemia  transfusion 2/5/21    S/P arthroscopy of shoulder  left in 2009    S/P hip replacement  left - 2010    S/P arthroscopy of shoulder  right - 2014    S/P hip replacement, right  May 2016    History of vocal cord polypectomy  1/2018    S/P shoulder replacement, left  2016    History of pancreatic surgery  Jeffery procedure (5/8/2019)        FAMILY HISTORY:      SOCIAL HISTORY:  Smoking: __ packs x ___ years  EtOH Use:  Marital Status:  Occupation:  Recent Travel:  Country of Birth:  Advance Directives:    Allergies    diazepam (Other)  lemon (Other)    Intolerances        HOME MEDICATIONS:    REVIEW OF SYSTEMS:  Constitutional:   Eyes:  ENT:  CV:  Resp:  GI:  :  MSK:  Integumentary:  Neurological:  Psychiatric:  Endocrine:  Hematologic/Lymphatic:  Allergic/Immunologic:  [ ] All other systems negative  [ ] Unable to assess ROS because ________    OBJECTIVE:  ICU Vital Signs Last 24 Hrs  T(C): 36.8 (15 Jun 2021 18:11), Max: 36.8 (15 Ameya 2021 18:11)  T(F): 98.2 (15 Ameya 2021 18:11), Max: 98.2 (15 Ameya 2021 18:11)  HR: 83 (15 Ameya 2021 18:17) (70 - 113)  BP: 103/62 (15 Ameya 2021 18:11) (102/66 - 113/67)  BP(mean): --  ABP: --  ABP(mean): --  RR: 20 (15 Ameya 2021 18:11) (18 - 20)  SpO2: 96% (15 Ameya 2021 18:17) (82% - 100%)        06-14 @ 07:01  -  06-15 @ 07:00  --------------------------------------------------------  IN: 111 mL / OUT: 0 mL / NET: 111 mL    06-15 @ 07:01  -  06-15 @ 19:55  --------------------------------------------------------  IN: 1050 mL / OUT: 1150 mL / NET: -100 mL      CAPILLARY BLOOD GLUCOSE          PHYSICAL EXAM:  General:   HEENT:   Lymph Nodes:  Neck:   Respiratory:   Cardiovascular:   Abdomen:   Extremities:   Skin:   Neurological:  Psychiatry:    HOSPITAL MEDICATIONS:  MEDICATIONS  (STANDING):  albuterol/ipratropium for Nebulization 3 milliLiter(s) Nebulizer every 6 hours  albuterol/ipratropium for Nebulization.. 3 milliLiter(s) Nebulizer every 20 minutes  amLODIPine   Tablet 10 milliGRAM(s) Oral daily  apixaban 5 milliGRAM(s) Oral every 12 hours  azithromycin  IVPB      chlorhexidine 2% Cloths 1 Application(s) Topical <User Schedule>  cholecalciferol 1000 Unit(s) Oral daily  cyanocobalamin 1000 MICROGram(s) Oral daily  folic acid 1 milliGRAM(s) Oral daily  mirtazapine 15 milliGRAM(s) Oral at bedtime  pancrelipase  (CREON 12,000 Lipase Units) 2 Capsule(s) Oral three times a day with meals  pantoprazole    Tablet 40 milliGRAM(s) Oral two times a day  piperacillin/tazobactam IVPB.. 3.375 Gram(s) IV Intermittent every 8 hours  predniSONE   Tablet 40 milliGRAM(s) Oral daily    MEDICATIONS  (PRN):  aluminum hydroxide/magnesium hydroxide/simethicone Suspension 30 milliLiter(s) Oral every 4 hours PRN Dyspepsia  ondansetron    Tablet 4 milliGRAM(s) Oral four times a day PRN Nausea and/or Vomiting  oxyCODONE    IR 30 milliGRAM(s) Oral every 6 hours PRN Severe Pain (7 - 10)      LABS:                        7.5    4.63  )-----------( 143      ( 15 Ameya 2021 06:59 )             22.8     06-15    141  |  104  |  18  ----------------------------<  131<H>  3.7   |  26  |  1.18    Ca    8.1<L>      15 Ameya 2021 07:00  Phos  3.0     06-15  Mg     2.1     06-15    TPro  5.3<L>  /  Alb  2.0<L>  /  TBili  0.6  /  DBili  x   /  AST  30  /  ALT  10  /  AlkPhos  133<H>  06-15    PT/INR - ( 14 Jun 2021 17:58 )   PT: 15.1 sec;   INR: 1.27 ratio         PTT - ( 15 Ameya 2021 15:36 )  PTT:77.0 sec    Arterial Blood Gas:  06-15 @ 19:35  7.53/34/50/28/83/5.3  ABG lactate: --    Venous Blood Gas:  06-15 @ 07:11  --/--/--/--/--  VBG Lactate: 1.5  Venous Blood Gas:  06-14 @ 17:37  --/--/--/--/--  VBG Lactate: 2.1  Venous Blood Gas:  06-14 @ 09:53  7.38/47/28/27/38  VBG Lactate: 4.1      MICROBIOLOGY:     RADIOLOGY:  [ ] Reviewed and interpreted by me    EKG: CHIEF COMPLAINT: SOB    HPI:  Patient is a 66 y/o F with PMHx of chronic pancreatitis, COPD, stage III L lung adenocarcinoma on alimta/pembrolizumab maintenance chemotherapy, emphysema, DVT/PE previously on eliquis but held due to mechanical fall with orbital fx, and HTN who presented 6/14 with acute onset SOB that started 2 days ago. Reports she was in her usual state of health prior to this. Says she fell 2 days ago and since then she has been having a hard time catching her breath. Denies f/c, cough, chest pain, pleuritic pain. Has been receiving maintenance chemotherapy as scheduled and has not had prior issues with it. On arrival, patient was noted to be hypoxic and was placed on supplemental O2. Her oxygen requirements have been increasing since admission and she is currently requiring HFNC at 40L/80% to maintain O2 saturations. CT chest reveals emphysema with new diffuse b/l GGO. MICU consulted due to concern for impending respiratory failure.      PAST MEDICAL & SURGICAL HISTORY:  HTN (hypertension)    GERD (gastroesophageal reflux disease)    Chronic pancreatitis  last episode 4/2019    ARDS survivor  2015    History of adrenal adenoma  stable on imaging    Vocal cord polyp  removal 2018, benign as per pt    Thrombophlebitis  superficial right UE during 4/2019 hospital stay, resolved as per pt    Chronic abdominal pain    Non-small cell lung cancer (NSCLC)  chemo: Alimta/ Keytruda 2/24/2021    Pulmonary embolism    COPD (chronic obstructive pulmonary disease)    Pulmonary hypertension    Anemia  transfusion 2/5/21    S/P arthroscopy of shoulder  left in 2009    S/P hip replacement  left - 2010    S/P arthroscopy of shoulder  right - 2014    S/P hip replacement, right  May 2016    History of vocal cord polypectomy  1/2018    S/P shoulder replacement, left  2016    History of pancreatic surgery  Jeffery procedure (5/8/2019)    Allergies    diazepam (Other)  lemon (Other)    Intolerances        HOME MEDICATIONS:    REVIEW OF SYSTEMS:    CONSTITUTIONAL: fevers or chills  EYES: no blurry vision or eye pain.   ENT: No throat pain. No dysphagia.    NECK: No pain or stiffness  RESPIRATORY: +No shortness of breath, No cough, wheezing, hemoptysis;   CARDIOVASCULAR: No chest pain or palpitations.  GASTROINTESTINAL: +abdominal pain. No diarrhea or constipation. No melena or hematochezia.  GENITOURINARY: No dysuria, frequency or hematuria  NEUROLOGICAL: No numbness or weakness. No dizziness or falls.   SKIN: No itching, burning, rashes, or lesions.   LYMPHATIC: No masses or swelling.   All other review of systems is negative unless indicated above.    OBJECTIVE:  ICU Vital Signs Last 24 Hrs  T(C): 36.8 (15 Ameya 2021 18:11), Max: 36.8 (15 Ameya 2021 18:11)  T(F): 98.2 (15 Ameya 2021 18:11), Max: 98.2 (15 Ameya 2021 18:11)  HR: 83 (15 Ameya 2021 18:17) (70 - 113)  BP: 103/62 (15 Ameya 2021 18:11) (102/66 - 113/67)  BP(mean): --  ABP: --  ABP(mean): --  RR: 20 (15 Ameya 2021 18:11) (18 - 20)  SpO2: 96% (15 Ameya 2021 18:17) (82% - 100%)        06-14 @ 07:01  -  06-15 @ 07:00  --------------------------------------------------------  IN: 111 mL / OUT: 0 mL / NET: 111 mL    06-15 @ 07:01  -  06-15 @ 19:55  --------------------------------------------------------  IN: 1050 mL / OUT: 1150 mL / NET: -100 mL      CAPILLARY BLOOD GLUCOSE          PHYSICAL EXAM:  General: awake and alert, chronically ill appearing female lying in bed  HEENT: NC/AT, EOMI b/l, conjunctiva normal, MMM  Lymph Nodes: no cervical LAD  Neck: supple. full range of motion  Respiratory: CTA b/l, no w/r/c, slightly tachypneic on HFNC, no conversational dyspnea or accessory muscle use  Cardiovascular: S1 S2 present, RRR, no m/r/g  Abdomen: soft, NT/ND, +BS  Extremities: no edema   Skin: no rashes or lesions noted  Neurological: AAOx3, no focal deficits  Psychiatry: [facility] MEDICATIONS:  MEDICATIONS  (STANDING):  albuterol/ipratropium for Nebulization 3 milliLiter(s) Nebulizer every 6 hours  albuterol/ipratropium for Nebulization.. 3 milliLiter(s) Nebulizer every 20 minutes  amLODIPine   Tablet 10 milliGRAM(s) Oral daily  apixaban 5 milliGRAM(s) Oral every 12 hours  azithromycin  IVPB      chlorhexidine 2% Cloths 1 Application(s) Topical <User Schedule>  cholecalciferol 1000 Unit(s) Oral daily  cyanocobalamin 1000 MICROGram(s) Oral daily  folic acid 1 milliGRAM(s) Oral daily  mirtazapine 15 milliGRAM(s) Oral at bedtime  pancrelipase  (CREON 12,000 Lipase Units) 2 Capsule(s) Oral three times a day with meals  pantoprazole    Tablet 40 milliGRAM(s) Oral two times a day  piperacillin/tazobactam IVPB.. 3.375 Gram(s) IV Intermittent every 8 hours  predniSONE   Tablet 40 milliGRAM(s) Oral daily    MEDICATIONS  (PRN):  aluminum hydroxide/magnesium hydroxide/simethicone Suspension 30 milliLiter(s) Oral every 4 hours PRN Dyspepsia  ondansetron    Tablet 4 milliGRAM(s) Oral four times a day PRN Nausea and/or Vomiting  oxyCODONE    IR 30 milliGRAM(s) Oral every 6 hours PRN Severe Pain (7 - 10)      LABS:                        7.5    4.63  )-----------( 143      ( 15 Ameya 2021 06:59 )             22.8     06-15    141  |  104  |  18  ----------------------------<  131<H>  3.7   |  26  |  1.18    Ca    8.1<L>      15 Ameya 2021 07:00  Phos  3.0     06-15  Mg     2.1     06-15    TPro  5.3<L>  /  Alb  2.0<L>  /  TBili  0.6  /  DBili  x   /  AST  30  /  ALT  10  /  AlkPhos  133<H>  06-15    PT/INR - ( 14 Jun 2021 17:58 )   PT: 15.1 sec;   INR: 1.27 ratio         PTT - ( 15 Ameya 2021 15:36 )  PTT:77.0 sec    Arterial Blood Gas:  06-15 @ 19:35  7.53/34/50/28/83/5.3  ABG lactate: --    Venous Blood Gas:  06-15 @ 07:11  --/--/--/--/--  VBG Lactate: 1.5  Venous Blood Gas:  06-14 @ 17:37  --/--/--/--/--  VBG Lactate: 2.1  Venous Blood Gas:  06-14 @ 09:53  7.38/47/28/27/38  VBG Lactate: 4.1      MICROBIOLOGY:     RADIOLOGY:  [ x ] Reviewed and interpreted by me  < from: CT Angio Chest PE Protocol w/ IV Cont (06.14.21 @ 21:13) >  IMPRESSION:  1.  Bilateral symmetric groundglass opacities with interlobular septal thickening, consistent with pulmonary edema.  2.  Web at the right pulmonary artery along with pruning and stenosis of the left lower lobe pulmonary arteries, consistent with chronic thromboembolic disease.    < end of copied text >    EKG:

## 2021-06-15 NOTE — PROGRESS NOTE ADULT - ATTENDING COMMENTS
Hospitalist- Joseph Callaway MD  Pager: [phone]  If no response or off-hours, page [phone]  -------------------------------------  hypoxic respiratory failure- has chronic PE but no acute PE, CTA notes GGOs with pulm edema- COVID neg x 2 and pt is afebrile. Recent echo shows normal EF but mild-mod MR. Unclear etiology overall- possibly immunotherapy pneumonitis- will continue steroids for possible COPD/pneumonitis, trial lasix and repeat TTE, consult pulm. Will place on HFNC for respiratory support for now.

## 2021-06-15 NOTE — CHART NOTE - NSCHARTNOTEFT_GEN_A_CORE
Had discussion with patient regarding code status. Patient expressed wishes for both intubation and cardiopulmonary resuscitation should the primary care team deem these interventions medically necessary.

## 2021-06-15 NOTE — PROGRESS NOTE ADULT - PROBLEM SELECTOR PLAN 5
EKG today sinus tachycardia with TWI in V1-V4, changed from prior ECG in 2020 w/ TWI in V1 and V2. Pt w/out chest pain. Lower suspicion for acute ischemic event as etiology for pt's respiratory distress.     Plan:  -Troponin x 2 negative  -F/u TTE as above Hx of stage III non-small cell lung cancer on chemotherapy (last tx w/ Keytruda/Alimta 6/10).    Plan:  -Will reach out to heme/onc

## 2021-06-15 NOTE — PROGRESS NOTE ADULT - PROBLEM SELECTOR PLAN 2
Pt w/ hx of DVT/PE on Eliquis, currently being held in s/o fall w/ R orbital floor fx. Pt currently tachycardic, tachypneic. Must r/o PE.    Plan:  -Heparin gtt for now, if no bleeding can transition to home Eliquis after 24 hours   -CTA chest prelim read w/ bilateral filling defects consistent with chronic PE, b/l ground glass opacities  -F/u b/l LE duplex Pt w/ hx of DVT/PE on Eliquis, currently being held in s/o fall w/ R orbital floor fx. Pt currently tachycardic, tachypneic but no evidence of acute PE on CTA chest.    Plan:  -CTA chest w/ evidence of chronic thromboembolic disease but no acute PE, also w/  b/l symmetric ground-glass opacities  -Currently on heparin gtt w/out evidence of bleeding, will restart Eliquis later today Recent ED visit for R orbital floor fx sustained after fall, seen by OMFS here    Plan:  -S/p Augmentin  -Appreciate OMFS recs from prior visit  -No pressure to face, ice as needed  -Sinus precautions (no nose blowing, no sucking on straws, sneeze w/ mouth open)

## 2021-06-15 NOTE — PROGRESS NOTE ADULT - PROBLEM SELECTOR PLAN 7
Hx of stage III non-small cell lung cancer on chemotherapy (last tx w/ Keytruda/Alimta 6/10).    Plan:  -Will reach out to heme/onc EKG today sinus tachycardia with TWI in V1-V4, changed from prior ECG in 2020 w/ TWI in V1 and V2. Pt w/out chest pain. Lower suspicion for acute ischemic event as etiology for pt's respiratory distress.     Plan:  -Troponin x 2 negative  -F/u TTE as above

## 2021-06-16 DIAGNOSIS — Z86.711 PERSONAL HISTORY OF PULMONARY EMBOLISM: ICD-10-CM

## 2021-06-16 LAB
ALBUMIN SERPL ELPH-MCNC: 2.3 G/DL — LOW (ref 3.3–5)
ALP SERPL-CCNC: 116 U/L — SIGNIFICANT CHANGE UP (ref 40–120)
ALT FLD-CCNC: 10 U/L — SIGNIFICANT CHANGE UP (ref 10–45)
ANION GAP SERPL CALC-SCNC: 11 MMOL/L — SIGNIFICANT CHANGE UP (ref 5–17)
ANISOCYTOSIS BLD QL: SIGNIFICANT CHANGE UP
APTT BLD: 28.9 SEC — SIGNIFICANT CHANGE UP (ref 27.5–35.5)
AST SERPL-CCNC: 29 U/L — SIGNIFICANT CHANGE UP (ref 10–40)
BASOPHILS # BLD AUTO: 0 K/UL — SIGNIFICANT CHANGE UP (ref 0–0.2)
BASOPHILS NFR BLD AUTO: 0 % — SIGNIFICANT CHANGE UP (ref 0–2)
BILIRUB SERPL-MCNC: 0.5 MG/DL — SIGNIFICANT CHANGE UP (ref 0.2–1.2)
BLD GP AB SCN SERPL QL: NEGATIVE — SIGNIFICANT CHANGE UP
BUN SERPL-MCNC: 19 MG/DL — SIGNIFICANT CHANGE UP (ref 7–23)
CALCIUM SERPL-MCNC: 7.9 MG/DL — LOW (ref 8.4–10.5)
CHLORIDE SERPL-SCNC: 101 MMOL/L — SIGNIFICANT CHANGE UP (ref 96–108)
CO2 SERPL-SCNC: 29 MMOL/L — SIGNIFICANT CHANGE UP (ref 22–31)
CREAT SERPL-MCNC: 1.15 MG/DL — SIGNIFICANT CHANGE UP (ref 0.5–1.3)
CRP SERPL-MCNC: 141 MG/L — HIGH (ref 0–4)
D DIMER BLD IA.RAPID-MCNC: 625 NG/ML DDU — HIGH
DACRYOCYTES BLD QL SMEAR: SLIGHT — SIGNIFICANT CHANGE UP
ELLIPTOCYTES BLD QL SMEAR: SIGNIFICANT CHANGE UP
EOSINOPHIL # BLD AUTO: 0 K/UL — SIGNIFICANT CHANGE UP (ref 0–0.5)
EOSINOPHIL NFR BLD AUTO: 0 % — SIGNIFICANT CHANGE UP (ref 0–6)
ERYTHROCYTE [SEDIMENTATION RATE] IN BLOOD: 46 MM/HR — HIGH (ref 0–20)
FIBRINOGEN PPP-MCNC: 636 MG/DL — HIGH (ref 290–520)
GAS PNL BLDA: SIGNIFICANT CHANGE UP
GLUCOSE SERPL-MCNC: 97 MG/DL — SIGNIFICANT CHANGE UP (ref 70–99)
HCT VFR BLD CALC: 20.3 % — CRITICAL LOW (ref 34.5–45)
HCT VFR BLD CALC: 20.9 % — CRITICAL LOW (ref 34.5–45)
HCT VFR BLD CALC: 23.9 % — LOW (ref 34.5–45)
HGB BLD-MCNC: 6.7 G/DL — CRITICAL LOW (ref 11.5–15.5)
HGB BLD-MCNC: 6.8 G/DL — CRITICAL LOW (ref 11.5–15.5)
HGB BLD-MCNC: 7.8 G/DL — LOW (ref 11.5–15.5)
LDH SERPL L TO P-CCNC: 675 U/L — HIGH (ref 50–242)
LYMPHOCYTES # BLD AUTO: 0.7 K/UL — LOW (ref 1–3.3)
LYMPHOCYTES # BLD AUTO: 36.5 % — SIGNIFICANT CHANGE UP (ref 13–44)
MACROCYTES BLD QL: SIGNIFICANT CHANGE UP
MAGNESIUM SERPL-MCNC: 2 MG/DL — SIGNIFICANT CHANGE UP (ref 1.6–2.6)
MANUAL SMEAR VERIFICATION: SIGNIFICANT CHANGE UP
MCHC RBC-ENTMCNC: 30.1 PG — SIGNIFICANT CHANGE UP (ref 27–34)
MCHC RBC-ENTMCNC: 30.8 PG — SIGNIFICANT CHANGE UP (ref 27–34)
MCHC RBC-ENTMCNC: 31 PG — SIGNIFICANT CHANGE UP (ref 27–34)
MCHC RBC-ENTMCNC: 32.5 GM/DL — SIGNIFICANT CHANGE UP (ref 32–36)
MCHC RBC-ENTMCNC: 32.6 GM/DL — SIGNIFICANT CHANGE UP (ref 32–36)
MCHC RBC-ENTMCNC: 33 GM/DL — SIGNIFICANT CHANGE UP (ref 32–36)
MCV RBC AUTO: 92.3 FL — SIGNIFICANT CHANGE UP (ref 80–100)
MCV RBC AUTO: 94 FL — SIGNIFICANT CHANGE UP (ref 80–100)
MCV RBC AUTO: 94.6 FL — SIGNIFICANT CHANGE UP (ref 80–100)
MONOCYTES # BLD AUTO: 0 K/UL — SIGNIFICANT CHANGE UP (ref 0–0.9)
MONOCYTES NFR BLD AUTO: 0 % — LOW (ref 2–14)
MRSA PCR RESULT.: SIGNIFICANT CHANGE UP
MYELOCYTES NFR BLD: 0.9 % — HIGH (ref 0–0)
NEUTROPHILS # BLD AUTO: 1.2 K/UL — LOW (ref 1.8–7.4)
NEUTROPHILS NFR BLD AUTO: 62.6 % — SIGNIFICANT CHANGE UP (ref 43–77)
NRBC # BLD: 0 /100 WBCS — SIGNIFICANT CHANGE UP (ref 0–0)
NRBC # BLD: 1 /100 WBCS — HIGH (ref 0–0)
NRBC # BLD: 2 /100 WBCS — HIGH (ref 0–0)
NT-PROBNP SERPL-SCNC: 2268 PG/ML — HIGH (ref 0–300)
PHOSPHATE SERPL-MCNC: 3.6 MG/DL — SIGNIFICANT CHANGE UP (ref 2.5–4.5)
PLAT MORPH BLD: NORMAL — SIGNIFICANT CHANGE UP
PLATELET # BLD AUTO: 100 K/UL — LOW (ref 150–400)
PLATELET # BLD AUTO: 103 K/UL — LOW (ref 150–400)
PLATELET # BLD AUTO: 96 K/UL — LOW (ref 150–400)
POIKILOCYTOSIS BLD QL AUTO: SLIGHT — SIGNIFICANT CHANGE UP
POTASSIUM SERPL-MCNC: 3.4 MMOL/L — LOW (ref 3.5–5.3)
POTASSIUM SERPL-SCNC: 3.4 MMOL/L — LOW (ref 3.5–5.3)
PROT SERPL-MCNC: 5.6 G/DL — LOW (ref 6–8.3)
RBC # BLD: 2.16 M/UL — LOW (ref 3.8–5.2)
RBC # BLD: 2.21 M/UL — LOW (ref 3.8–5.2)
RBC # BLD: 2.59 M/UL — LOW (ref 3.8–5.2)
RBC # FLD: 18.9 % — HIGH (ref 10.3–14.5)
RBC # FLD: 19.5 % — HIGH (ref 10.3–14.5)
RBC # FLD: 19.6 % — HIGH (ref 10.3–14.5)
RBC BLD AUTO: ABNORMAL
RH IG SCN BLD-IMP: POSITIVE — SIGNIFICANT CHANGE UP
S AUREUS DNA NOSE QL NAA+PROBE: DETECTED
SARS-COV-2 RNA SPEC QL NAA+PROBE: SIGNIFICANT CHANGE UP
SODIUM SERPL-SCNC: 141 MMOL/L — SIGNIFICANT CHANGE UP (ref 135–145)
TARGETS BLD QL SMEAR: SIGNIFICANT CHANGE UP
WBC # BLD: 1.91 K/UL — LOW (ref 3.8–10.5)
WBC # BLD: 2.05 K/UL — LOW (ref 3.8–10.5)
WBC # BLD: 2.69 K/UL — LOW (ref 3.8–10.5)
WBC # FLD AUTO: 1.91 K/UL — LOW (ref 3.8–10.5)
WBC # FLD AUTO: 2.05 K/UL — LOW (ref 3.8–10.5)
WBC # FLD AUTO: 2.69 K/UL — LOW (ref 3.8–10.5)

## 2021-06-16 PROCEDURE — 99233 SBSQ HOSP IP/OBS HIGH 50: CPT

## 2021-06-16 PROCEDURE — 99223 1ST HOSP IP/OBS HIGH 75: CPT | Mod: GC,25

## 2021-06-16 PROCEDURE — 99233 SBSQ HOSP IP/OBS HIGH 50: CPT | Mod: GC

## 2021-06-16 RX ORDER — POTASSIUM CHLORIDE 20 MEQ
10 PACKET (EA) ORAL
Refills: 0 | Status: COMPLETED | OUTPATIENT
Start: 2021-06-16 | End: 2021-06-17

## 2021-06-16 RX ORDER — CEFTRIAXONE 500 MG/1
1000 INJECTION, POWDER, FOR SOLUTION INTRAMUSCULAR; INTRAVENOUS ONCE
Refills: 0 | Status: DISCONTINUED | OUTPATIENT
Start: 2021-06-16 | End: 2021-06-16

## 2021-06-16 RX ORDER — PIPERACILLIN AND TAZOBACTAM 4; .5 G/20ML; G/20ML
3.38 INJECTION, POWDER, LYOPHILIZED, FOR SOLUTION INTRAVENOUS EVERY 8 HOURS
Refills: 0 | Status: DISCONTINUED | OUTPATIENT
Start: 2021-06-16 | End: 2021-06-22

## 2021-06-16 RX ORDER — ACETAMINOPHEN 500 MG
650 TABLET ORAL ONCE
Refills: 0 | Status: COMPLETED | OUTPATIENT
Start: 2021-06-16 | End: 2021-06-16

## 2021-06-16 RX ORDER — POTASSIUM CHLORIDE 20 MEQ
40 PACKET (EA) ORAL EVERY 4 HOURS
Refills: 0 | Status: COMPLETED | OUTPATIENT
Start: 2021-06-16 | End: 2021-06-16

## 2021-06-16 RX ORDER — FUROSEMIDE 40 MG
40 TABLET ORAL ONCE
Refills: 0 | Status: COMPLETED | OUTPATIENT
Start: 2021-06-16 | End: 2022-05-15

## 2021-06-16 RX ORDER — ACETAMINOPHEN 500 MG
650 TABLET ORAL EVERY 6 HOURS
Refills: 0 | Status: DISCONTINUED | OUTPATIENT
Start: 2021-06-16 | End: 2021-06-28

## 2021-06-16 RX ORDER — POTASSIUM CHLORIDE 20 MEQ
20 PACKET (EA) ORAL ONCE
Refills: 0 | Status: COMPLETED | OUTPATIENT
Start: 2021-06-16 | End: 2021-06-16

## 2021-06-16 RX ORDER — SODIUM CHLORIDE 9 MG/ML
250 INJECTION, SOLUTION INTRAVENOUS ONCE
Refills: 0 | Status: COMPLETED | OUTPATIENT
Start: 2021-06-16 | End: 2021-06-16

## 2021-06-16 RX ORDER — CEFTRIAXONE 500 MG/1
INJECTION, POWDER, FOR SOLUTION INTRAMUSCULAR; INTRAVENOUS
Refills: 0 | Status: DISCONTINUED | OUTPATIENT
Start: 2021-06-16 | End: 2021-06-16

## 2021-06-16 RX ORDER — FUROSEMIDE 40 MG
40 TABLET ORAL ONCE
Refills: 0 | Status: DISCONTINUED | OUTPATIENT
Start: 2021-06-16 | End: 2021-06-16

## 2021-06-16 RX ORDER — FUROSEMIDE 40 MG
40 TABLET ORAL DAILY
Refills: 0 | Status: DISCONTINUED | OUTPATIENT
Start: 2021-06-16 | End: 2021-06-16

## 2021-06-16 RX ADMIN — PIPERACILLIN AND TAZOBACTAM 25 GRAM(S): 4; .5 INJECTION, POWDER, LYOPHILIZED, FOR SOLUTION INTRAVENOUS at 13:56

## 2021-06-16 RX ADMIN — OXYCODONE HYDROCHLORIDE 30 MILLIGRAM(S): 5 TABLET ORAL at 05:59

## 2021-06-16 RX ADMIN — OXYCODONE HYDROCHLORIDE 30 MILLIGRAM(S): 5 TABLET ORAL at 17:39

## 2021-06-16 RX ADMIN — Medication 3 MILLILITER(S): at 11:20

## 2021-06-16 RX ADMIN — PIPERACILLIN AND TAZOBACTAM 25 GRAM(S): 4; .5 INJECTION, POWDER, LYOPHILIZED, FOR SOLUTION INTRAVENOUS at 05:48

## 2021-06-16 RX ADMIN — Medication 650 MILLIGRAM(S): at 17:32

## 2021-06-16 RX ADMIN — Medication 3 MILLILITER(S): at 18:09

## 2021-06-16 RX ADMIN — Medication 2 CAPSULE(S): at 09:41

## 2021-06-16 RX ADMIN — AZITHROMYCIN 250 MILLIGRAM(S): 500 TABLET, FILM COATED ORAL at 18:45

## 2021-06-16 RX ADMIN — Medication 40 MILLIGRAM(S): at 09:41

## 2021-06-16 RX ADMIN — Medication 40 MILLIEQUIVALENT(S): at 09:41

## 2021-06-16 RX ADMIN — Medication 3 MILLILITER(S): at 23:15

## 2021-06-16 RX ADMIN — Medication 60 MILLIGRAM(S): at 05:50

## 2021-06-16 RX ADMIN — PIPERACILLIN AND TAZOBACTAM 25 GRAM(S): 4; .5 INJECTION, POWDER, LYOPHILIZED, FOR SOLUTION INTRAVENOUS at 21:48

## 2021-06-16 RX ADMIN — OXYCODONE HYDROCHLORIDE 30 MILLIGRAM(S): 5 TABLET ORAL at 11:46

## 2021-06-16 RX ADMIN — Medication 3 MILLILITER(S): at 05:23

## 2021-06-16 RX ADMIN — OXYCODONE HYDROCHLORIDE 30 MILLIGRAM(S): 5 TABLET ORAL at 19:10

## 2021-06-16 RX ADMIN — Medication 650 MILLIGRAM(S): at 15:36

## 2021-06-16 RX ADMIN — PANTOPRAZOLE SODIUM 40 MILLIGRAM(S): 20 TABLET, DELAYED RELEASE ORAL at 17:41

## 2021-06-16 RX ADMIN — APIXABAN 5 MILLIGRAM(S): 2.5 TABLET, FILM COATED ORAL at 05:47

## 2021-06-16 RX ADMIN — Medication 20 MILLIEQUIVALENT(S): at 23:32

## 2021-06-16 RX ADMIN — PANTOPRAZOLE SODIUM 40 MILLIGRAM(S): 20 TABLET, DELAYED RELEASE ORAL at 05:47

## 2021-06-16 RX ADMIN — Medication 2 CAPSULE(S): at 17:41

## 2021-06-16 RX ADMIN — CHLORHEXIDINE GLUCONATE 1 APPLICATION(S): 213 SOLUTION TOPICAL at 05:51

## 2021-06-16 RX ADMIN — OXYCODONE HYDROCHLORIDE 30 MILLIGRAM(S): 5 TABLET ORAL at 12:45

## 2021-06-16 RX ADMIN — Medication 40 MILLIEQUIVALENT(S): at 19:19

## 2021-06-16 RX ADMIN — PREGABALIN 1000 MICROGRAM(S): 225 CAPSULE ORAL at 11:46

## 2021-06-16 RX ADMIN — Medication 1000 UNIT(S): at 11:46

## 2021-06-16 RX ADMIN — Medication 100 MILLIEQUIVALENT(S): at 23:32

## 2021-06-16 RX ADMIN — Medication 40 MILLIEQUIVALENT(S): at 17:31

## 2021-06-16 RX ADMIN — APIXABAN 5 MILLIGRAM(S): 2.5 TABLET, FILM COATED ORAL at 17:40

## 2021-06-16 RX ADMIN — Medication 2 CAPSULE(S): at 11:47

## 2021-06-16 RX ADMIN — Medication 40 MILLIEQUIVALENT(S): at 13:56

## 2021-06-16 RX ADMIN — SODIUM CHLORIDE 500 MILLILITER(S): 9 INJECTION, SOLUTION INTRAVENOUS at 18:45

## 2021-06-16 RX ADMIN — AMLODIPINE BESYLATE 10 MILLIGRAM(S): 2.5 TABLET ORAL at 05:47

## 2021-06-16 RX ADMIN — OXYCODONE HYDROCHLORIDE 30 MILLIGRAM(S): 5 TABLET ORAL at 06:29

## 2021-06-16 RX ADMIN — MIRTAZAPINE 15 MILLIGRAM(S): 45 TABLET, ORALLY DISINTEGRATING ORAL at 21:49

## 2021-06-16 RX ADMIN — Medication 1 MILLIGRAM(S): at 11:46

## 2021-06-16 NOTE — PROGRESS NOTE ADULT - PROBLEM SELECTOR PLAN 4
Recent ED visit for R orbital floor fx sustained after fall, seen by OMFS here    Plan:  -S/p Augmentin  -Appreciate OMFS recs from prior visit  -No pressure to face, ice as needed  -Sinus precautions (no nose blowing, no sucking on straws, sneeze w/ mouth open)  -Spoke yesterday with OMFS, pt missed outpatient appt yesterday for surgical planning, will discuss possibility of inpt surgery once medically stabilized Pt w/ history of chronic pancreatitis, now with epigastric pain since last night c/w pt's chronic pain    Plan:  -Pain control per pt's home regimen w/ oxycodone prn   -C/w pancrelipase  -Zofran prn nausea

## 2021-06-16 NOTE — PROGRESS NOTE ADULT - PROBLEM SELECTOR PLAN 5
Pt w/ hx of DVT/PE on Eliquis, currently being held in s/o fall w/ R orbital floor fx. Pt tachycardic, tachypneic on admission w/ possible EKG signs of heart strain but no evidence of acute PE on CTA chest.    Plan:  -CTA chest w/ evidence of chronic thromboembolic disease but no acute PE,   -Pt restarted on home Eliquis, will continue Recent ED visit for R orbital floor fx sustained after fall, seen by OMFS here    Plan:  -S/p Augmentin  -Appreciate OMFS recs from prior visit  -No pressure to face, ice as needed  -Sinus precautions (no nose blowing, no sucking on straws, sneeze w/ mouth open)  -Spoke yesterday with OMFS, pt missed outpatient appt yesterday for surgical planning, will discuss possibility of inpt surgery once medically stabilized

## 2021-06-16 NOTE — PROGRESS NOTE ADULT - PROBLEM SELECTOR PLAN 3
Pt w/ history of chronic pancreatitis, now with epigastric pain since last night c/w pt's chronic pain    Plan:  -Pain control per pt's home regimen w/ oxycodone prn   -C/w pancrelipase  -Zofran prn nausea Pt's baseline Hgb 8-9 per chart review. Normocytic anemia w/ elevated RDW, ? mixed microcytic + macrocytic. Hgb this AM (6/16) <7 x 2, a/w drop in WBC and platelets. No signs of bleeding.     Plan:  -Daily CBC  -Trend H+H  -C/w home folic acid, B12  -Hgb <7 this AM, will transfuse 1U PRBCs  -Hemolysis labs incl. haptoglobin, LDH sent  -DIC labs incl. dimer, fibrinogen sent

## 2021-06-16 NOTE — PROGRESS NOTE ADULT - PROBLEM SELECTOR PLAN 7
Pt w/ anemia here appears near pt's baseline Hgb 8-9 per chart review. Normocytic anemia w/ elevated RDW, ? mixed microcytic + macrocytic. Iron studies w/ low transferrin, elevated ferritin c/w AOD however ferritin may be elevated currently in s/o underlying inflammation    Plan:  -Daily CBC  -Trend H+H  -C/w home folic acid, B12 Hx of stage III non-small cell lung cancer on chemotherapy (last tx w/ Keytruda/Alimta 6/10).    Plan:  -Heme/onc consulted, recs appreciated

## 2021-06-16 NOTE — PROGRESS NOTE ADULT - ATTENDING COMMENTS
Hospitalist- Joseph Callaway MD  Pager: [phone]  If no response or off-hours, page [phone]  -------------------------------------  Pt's SOB and abd pain subjectively improved today. She was on HFNC yesterday, uptitrated settings overnight, and titrated down to 75% FIO2 and 45 LMP at bedside. Pain is improved, tolerating PO. Reports new productive cough  - plan: hypoxic respiratory failure: likely multifactorial COPD, HF, ?PNA- continue steroids, diuresis with lasix and zosyn- resent sputum culture, reswab for COVID and f/u repeat TTE, wean HFNC as tolerated.

## 2021-06-16 NOTE — PROGRESS NOTE ADULT - PROBLEM SELECTOR PLAN 1
Pt admitted for dyspnea and hypoxia, still w/ persistent desaturations but currently maintained on high-flow NC. Differential includes COPD exacerbation, possibly in s/o underlying infection given b/l ground glass opacities on CT chest and elevated procalcitonin, although pt afebrile w/out leukocytosis though may be masked in s/o immunosuppression 2/2 current chemotherapy. Also considered is new CHF given elevated BNP and that CT chest findings may represent pulmonary edema, although pt had recent nml echo in 2/2021. CT findings may also represent pneumonitis in s/o chemo/immunotherapy tx. Unlikely PE as CTA chest negative for acute thromboembolism.    Plan:  -Continuous pulse ox  -High-flow NC for now   -Low threshold to start on BiPAP if pt's saturations do not improve  -Last echo in 2/2021 w/ nml LV systolic function, EF 65%, will re-echo here in s/o elevated BNP  -CTA chest w/ evidence of chronic thromboembolic disease but no acute PE, also w/  b/l symmetric ground-glass opacities  -RVP negative, COVID-19 negative, procalcitonin elevated at 15,   -F/u blood cx, sputum cx  -F/u Fungitell  -Pulm consulted, recs appreciated  -s/p IV Lasix push x 2 yesterday, started on 40mg IV daily this AM w/ goal net neg 1L  -C/w Zosyn, Azithromycin added per pulm recs  -MICU consulted, recs appreciated  -Continue w/ Duoneb, IV solumedrol added 1mg/kg BID   -VBG lactate elevated on admission, resolved but now elevated on ABG, will trend Pt admitted for dyspnea and hypoxia, still w/ persistent desaturations but currently maintained on high-flow NC. Differential includes COPD exacerbation, possibly in s/o underlying infection given b/l ground glass opacities on CT chest and elevated procalcitonin, although pt afebrile w/out leukocytosis though may be masked in s/o immunosuppression 2/2 current chemotherapy. Also considered is new CHF given elevated BNP and that CT chest findings may represent pulmonary edema, although pt had recent nml echo in 2/2021. CT findings may also represent pneumonitis in s/o chemo/immunotherapy tx. Unlikely PE as CTA chest negative for acute thromboembolism.    Plan:  -Continuous pulse ox  -High-flow NC for now, wean as tolerated  -Low threshold to start on BiPAP if pt's saturations do not improve  -Last echo in 2/2021 w/ nml LV systolic function, EF 65%, will re-echo here in s/o elevated BNP  -CTA chest w/ evidence of chronic thromboembolic disease but no acute PE, also w/  b/l symmetric ground-glass opacities  -RVP negative, COVID-19 negative, procalcitonin elevated at 15,   -F/u blood cx, sputum cx  -F/u Fungitell  -Pulm consulted, recs appreciated  -s/p IV Lasix push x 2 yesterday, started on 40mg IV daily this AM w/ goal net neg 1L  -C/w Zosyn, Azithromycin added per pulm recs  -MICU consulted, recs appreciated  -Continue w/ Duoneb, IV solumedrol added 1mg/kg BID   -VBG lactate elevated on admission, resolved but now elevated on ABG, will trend

## 2021-06-16 NOTE — PROGRESS NOTE ADULT - SUBJECTIVE AND OBJECTIVE BOX
Contact information:  Esthela Matute, MS4  Medicine Team 5   Pager: [phone]    HIMANSHU VILLEGAS, MRN-[id number]    SUBJECTIVE/OVERNIGHT EVENTS: Patient seen and examined at bedside this AM. Overnight, pt desaturated to 60s on high-flow NC (60% FiO2 on 40L) while moving around, increased to 80% FiO2 but w/ still w/ persistent desaturations so ultimately increased to 100% FiO2 on 50L. Pt states breathing feels comfortable at time of assessment, improved slightly since yesterday. She does note her vague cough is becoming more productive now. No chest pain. Continues to endorse epigastric abdominal pain w/out associated N/V.    OBJECTIVE:    Vitals:  Vital Signs Last 24 Hrs  T(C): 36.9 (16 Jun 2021 04:44), Max: 36.9 (16 Jun 2021 04:44)  T(F): 98.4 (16 Jun 2021 04:44), Max: 98.4 (16 Jun 2021 04:44)  HR: 87 (16 Jun 2021 05:30) (70 - 113)  BP: 128/77 (16 Jun 2021 04:44) (103/62 - 128/77)  BP(mean): --  RR: 19 (16 Jun 2021 05:30) (18 - 20)  SpO2: 98% (16 Jun 2021 05:30) (89% - 99%)    Exam:  GEN: Awake and alert, lying in bed on NRB  HEENT: Mild periorbital swelling w/ ecchymosis of R eye. Mild conjunctival injection R eye. EOMI. MMM  NECK: Supple  CHEST: On high-flow NC. Mildly tachypneic but non-labored breathing, speaking in full sentences. O2 saturations drop to 80s when speaking, improves to 90s with rest. Coarse breath sounds b/l w/ mild bibasilar crackles. (+) port R chest  CV: RRR. (+) S1/S2. No audible S3/S4. R No murmurs, rubs, or gallops.    GI: Soft, mild epigastric tenderness to palpation, nondistended. +BS.  EXT: Warm and well perfused. Trace pitting edema to mid-shin b/l. No calf tenderness. 2+ distal pulses b/l LEs  SKIN: Intact, no rashes or lesions.   NEURO: A&Ox3, grossly non-focal    I&Os:  I&O's Detail    15 Ameya 2021 07:01  -  16 Jun 2021 07:00  --------------------------------------------------------  IN:    IV PiggyBack: 450 mL    Oral Fluid: 600 mL  Total IN: 1050 mL    OUT:    Voided (mL): 1550 mL  Total OUT: 1550 mL    Total NET: -500 mL      LABS:                        6.7    2.05  )-----------( 103      ( 16 Jun 2021 07:06 )             20.3     06-16    141  |  101  |  19  ----------------------------<  97  3.4<L>   |  29  |  1.15    Ca    7.9<L>      16 Jun 2021 07:06  Phos  3.6     06-16  Mg     2.0     06-16    TPro  5.6<L>  /  Alb  2.3<L>  /  TBili  0.5  /  DBili  x   /  AST  29  /  ALT  10  /  AlkPhos  116  06-16    PT/INR - ( 14 Jun 2021 17:58 )   PT: 15.1 sec;   INR: 1.27 ratio         PTT - ( 16 Jun 2021 07:06 )  PTT:28.9 sec      Radiology and Additional Studies:    MEDS:  MEDICATIONS  (STANDING):  albuterol/ipratropium for Nebulization 3 milliLiter(s) Nebulizer every 6 hours  albuterol/ipratropium for Nebulization.. 3 milliLiter(s) Nebulizer every 20 minutes  amLODIPine   Tablet 10 milliGRAM(s) Oral daily  apixaban 5 milliGRAM(s) Oral every 12 hours  azithromycin  IVPB      azithromycin  IVPB 500 milliGRAM(s) IV Intermittent every 24 hours  chlorhexidine 2% Cloths 1 Application(s) Topical <User Schedule>  cholecalciferol 1000 Unit(s) Oral daily  cyanocobalamin 1000 MICROGram(s) Oral daily  folic acid 1 milliGRAM(s) Oral daily  furosemide   Injectable 40 milliGRAM(s) IV Push daily  methylPREDNISolone sodium succinate Injectable 60 milliGRAM(s) IV Push two times a day  mirtazapine 15 milliGRAM(s) Oral at bedtime  pancrelipase  (CREON 12,000 Lipase Units) 2 Capsule(s) Oral three times a day with meals  pantoprazole    Tablet 40 milliGRAM(s) Oral two times a day  piperacillin/tazobactam IVPB.. 3.375 Gram(s) IV Intermittent every 8 hours  potassium chloride    Tablet ER 40 milliEquivalent(s) Oral every 4 hours    MEDICATIONS  (PRN):  aluminum hydroxide/magnesium hydroxide/simethicone Suspension 30 milliLiter(s) Oral every 4 hours PRN Dyspepsia  ondansetron    Tablet 4 milliGRAM(s) Oral four times a day PRN Nausea and/or Vomiting  oxyCODONE    IR 30 milliGRAM(s) Oral every 6 hours PRN Severe Pain (7 - 10)       Contact information:  Esthela Matute, MS4  Medicine Team 5   Pager: [phone]    HIMANSHU VILLEGAS, MRN-[id number]    SUBJECTIVE/OVERNIGHT EVENTS: Patient seen and examined at bedside this AM. Overnight, pt desaturated to 60s on high-flow NC (60% FiO2 on 40L) while moving around, increased to 80% FiO2 but w/ still w/ persistent desaturations so ultimately increased to 100% FiO2 on 50L. Pt states breathing feels comfortable at time of assessment, improved since yesterday. She does note her vague cough is becoming more productive now. No chest pain. Continues to endorse epigastric abdominal pain w/out associated N/V, improves with PO.      OBJECTIVE:    Vitals:  Vital Signs Last 24 Hrs  T(C): 36.9 (16 Jun 2021 04:44), Max: 36.9 (16 Jun 2021 04:44)  T(F): 98.4 (16 Jun 2021 04:44), Max: 98.4 (16 Jun 2021 04:44)  HR: 87 (16 Jun 2021 05:30) (70 - 113)  BP: 128/77 (16 Jun 2021 04:44) (103/62 - 128/77)  BP(mean): --  RR: 19 (16 Jun 2021 05:30) (18 - 20)  SpO2: 98% (16 Jun 2021 05:30) (89% - 99%)    Exam:  GEN: Awake and alert, lying in bed on NRB  HEENT: Mild periorbital swelling w/ ecchymosis of R eye. Mild conjunctival injection R eye. EOMI. MMM  NECK: Supple  CHEST: On high-flow NC, O2 saturations in 90s at time of assessment. Mildly tachypneic but non-labored breathing, speaking in full sentences. Coarse breath sounds b/l w/ mild bibasilar crackles. (+) port R chest  CV: RRR. (+) S1/S2. No audible S3/S4. R No murmurs, rubs, or gallops.    GI: Soft, mild epigastric tenderness to palpation, nondistended. +BS.  EXT: Warm and well perfused. Trace pitting edema to mid-shin b/l. No calf tenderness. 2+ distal pulses b/l LEs  SKIN: Intact, no rashes or lesions.   NEURO: A&Ox3, grossly non-focal    I&Os:  I&O's Detail    15 Ameya 2021 07:01  -  16 Jun 2021 07:00  --------------------------------------------------------  IN:    IV PiggyBack: 450 mL    Oral Fluid: 600 mL  Total IN: 1050 mL    OUT:    Voided (mL): 1550 mL  Total OUT: 1550 mL    Total NET: -500 mL      LABS:                        6.7    2.05  )-----------( 103      ( 16 Jun 2021 07:06 )             20.3     06-16    141  |  101  |  19  ----------------------------<  97  3.4<L>   |  29  |  1.15    Ca    7.9<L>      16 Jun 2021 07:06  Phos  3.6     06-16  Mg     2.0     06-16    TPro  5.6<L>  /  Alb  2.3<L>  /  TBili  0.5  /  DBili  x   /  AST  29  /  ALT  10  /  AlkPhos  116  06-16    PT/INR - ( 14 Jun 2021 17:58 )   PT: 15.1 sec;   INR: 1.27 ratio         PTT - ( 16 Jun 2021 07:06 )  PTT:28.9 sec      Radiology and Additional Studies:    MEDS:  MEDICATIONS  (STANDING):  albuterol/ipratropium for Nebulization 3 milliLiter(s) Nebulizer every 6 hours  albuterol/ipratropium for Nebulization.. 3 milliLiter(s) Nebulizer every 20 minutes  amLODIPine   Tablet 10 milliGRAM(s) Oral daily  apixaban 5 milliGRAM(s) Oral every 12 hours  azithromycin  IVPB      azithromycin  IVPB 500 milliGRAM(s) IV Intermittent every 24 hours  chlorhexidine 2% Cloths 1 Application(s) Topical <User Schedule>  cholecalciferol 1000 Unit(s) Oral daily  cyanocobalamin 1000 MICROGram(s) Oral daily  folic acid 1 milliGRAM(s) Oral daily  furosemide   Injectable 40 milliGRAM(s) IV Push daily  methylPREDNISolone sodium succinate Injectable 60 milliGRAM(s) IV Push two times a day  mirtazapine 15 milliGRAM(s) Oral at bedtime  pancrelipase  (CREON 12,000 Lipase Units) 2 Capsule(s) Oral three times a day with meals  pantoprazole    Tablet 40 milliGRAM(s) Oral two times a day  piperacillin/tazobactam IVPB.. 3.375 Gram(s) IV Intermittent every 8 hours  potassium chloride    Tablet ER 40 milliEquivalent(s) Oral every 4 hours    MEDICATIONS  (PRN):  aluminum hydroxide/magnesium hydroxide/simethicone Suspension 30 milliLiter(s) Oral every 4 hours PRN Dyspepsia  ondansetron    Tablet 4 milliGRAM(s) Oral four times a day PRN Nausea and/or Vomiting  oxyCODONE    IR 30 milliGRAM(s) Oral every 6 hours PRN Severe Pain (7 - 10)

## 2021-06-16 NOTE — PROGRESS NOTE ADULT - ASSESSMENT
This is a 66 y/o F with stage III L lung adenocarcinoma on alimta/keytruda, emphysema, HTN, h/o DVT/PE not on AC who presents with acute onset SOB. Found to be hypoxic and currently requiring HFNC to maintain SpO2. CT chest remarkable for diffuse, b/l GGO in the setting of moderate to severe emphysema, which is new compared to CT chest from 3 months prior. GGO may represent pulmonary edema, infection or inflammation.    #Acute hypoxic respiratory failure  - likely 2/2 b/l diffuse GGO on CT chest, though patient does have history of PE with interruption of AC in the past  - ABG noted 7.53/34/50/28  - continue anticoagulation for known PE  - Hb 6.7 - assess for bleeding  - lasix 40 mg daily to achieve net negative volume status  - please obtain TTE with bubble study to r/o heart failure and shunting - BNP elevated  - continue azithromycin and zosyn  - f/u sputum culture, ESR, CRP, Legionella Ag, fungitell  - agree with increased dose of steroids in patient's rapidly progressing hypoxic respiratory failure  - continue HFNC, maintain SpO2>88%, wean as tolerated  - IS  - pain control    Shyam Lucio PGY-5  Pulmonary/Critical Care Fellow  Pager: [phone] ([facility]) [phone] (NS)  Pulmonary Spectra #[id number] (NS) / [id number] ([facility]ENC[facility]) This is a 64 y/o F with stage III L lung adenocarcinoma on alimta/keytruda, emphysema, HTN, h/o DVT/PE not on AC who presents with acute onset SOB. Found to be hypoxic and currently requiring HFNC to maintain SpO2. CT chest remarkable for diffuse, b/l GGO in the setting of moderate to severe emphysema, which is new compared to CT chest from 3 months prior. GGO may represent pulmonary edema, infection or inflammation.    #Acute hypoxic respiratory failure  - likely 2/2 b/l diffuse GGO on CT chest, though patient does have history of PE with interruption of AC in the past  - ABG noted 7.53/34/50/28, however may have been done with patient in acute distress  - can repeat ABG now that patient appears more stable  - continue anticoagulation for known PE  - Hb 6.7 - assess for bleeding, transfuse to goal of Hb>7  - lasix 40 mg daily to achieve net negative volume status  - please obtain TTE with bubble study to r/o heart failure and shunting - BNP elevated  - continue azithromycin and zosyn  - f/u sputum culture, ESR, CRP, Legionella Ag, fungitell  - agree with increased dose of steroids in patient's rapidly progressing hypoxic respiratory failure  - continue HFNC, maintain SpO2>88%, wean as tolerated  - IS  - pain control    Shyam Lucio PGY-5  Pulmonary/Critical Care Fellow  Pager: [phone] ([facility]) [phone] (NS)  Pulmonary Spectra #[id number] (NS) / [id number] ([facility]) This is a 64 y/o F with stage III L lung adenocarcinoma on alimta/keytruda, emphysema, HTN, h/o DVT/PE not on AC who presents with acute onset SOB. Found to be hypoxic and currently requiring HFNC to maintain SpO2. CT chest remarkable for diffuse, b/l GGO in the setting of moderate to severe emphysema, which is new compared to CT chest from 3 months prior. GGO may represent pulmonary edema, infection or inflammation.    #Acute hypoxic respiratory failure  - likely 2/2 b/l diffuse GGO on CT chest, though patient does have history of PE with interruption of AC in the past  - ABG 7.52/38/76/31 -> improved from prior however if taken on 100% FiO2(?) remains severely hypoxemic  - ESR, CRP elevation noted  - continue anticoagulation for known PE, monitor platelets closely (decreased to 65 today)  - Hb improved after transfusion yesterday, maintain Hb>7  - continue lasix as blood pressure tolerates  - f/u TTE with bubble study to r/o heart failure and shunting - BNP elevated  - continue azithromycin and zosyn  - f/u sputum culture, Legionella Ag, fungitell  - continue solumedrol 1 mg/kg daily  - continue HFNC, maintain SpO2 88-92%, wean as tolerated  - IS, acapella device  - pain control    Shyam Lucio PGY-5  Pulmonary/Critical Care Fellow  Pager: [phone] ([facility]) [phone] (NS)  Pulmonary Spectra #[id number] (NS) / [id number] ([facility])

## 2021-06-16 NOTE — PROGRESS NOTE ADULT - ASSESSMENT
64 y/o F with PMHx HTN, COPD, non-small cell lung cancer on chemotherapy,GERD, chronic pancreatitis, hx of DVT/PE on Eliquis but recently held in s/o fall w/ sustained R orbital floor fx, admitted for acute hypoxemic respiratory failure, unclear etiology. Currently stable being maintained on high-flow.

## 2021-06-16 NOTE — PROGRESS NOTE ADULT - ATTENDING COMMENTS
Agree with plan as outlined above. Patient seen and examined at bedside. Patient history, laboratory data, and imaging personally reviewed.    Pt is a 65F with PMHx former smoker with COPD, non-small cell lung adenocarcinoma of left lung (dz'ed Stage III, s/p Carboplatin/Alimta/Pembrolizumab last given 6/10/21), hx extensive PE (2/2020) on NOAC, chronic pancreatitis on PERT with opioid dependence, and hx vocal cord polypectomy presenting to [facility] 6/14/21 with acute onset dyspnea and progressively worsening b/l LE edema now requiring HFNC O2 supplementation. CT imaging reviewed and c/w worsening bilateral interlobular septal thickening most likely 2/2 fluid vs. inflammation. Pt clinically improved this morning, HFNC weaned from 50 LPM, 100% to 45 LPM 75%.     -c/w aggressive diuresis with strict I/Os and BMPs with electrolyte repletion with goal of net (-) 1L/24hrs.    -TTE pending   -Pt started on high-dose steroids, stress dose steroids ~1mg/kg so can decrease to 60mg IV Solu-Medrol QD  -Wean O2 supplementation for goal O2 saturation 92-95%  -c/w ATC nebulizer therapy as above  -c/w broaden spectrum Abx  -Pain control with opioids for chronic pancreatitis as well as avoidance of NPO and missed meals (pt says her pancreatitis worsens if she doesn't eat)  -Pulmonary will continue to follow.

## 2021-06-16 NOTE — PROGRESS NOTE ADULT - SUBJECTIVE AND OBJECTIVE BOX
HPI:  Patient is a 66 y/o F with PMHx of stage III L lung adenocarcinoma on alimta/pembrolizumab maintenance chemotherapy, emphysema, 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 Reports she was in her usual state of health prior to this. Says she fell 2 days ago and since then she has been having a hard time catching her breath. Denies f/c, cough, chest pain, pleuritic pain. Has been receiving maintenance chemotherapy as scheduled and has not had prior issues with it. On arrival, patient was noted to be hypoxic and was placed on supplemental O2. Her oxygen requirements have been increasing since admission and she is currently requiring HFNC at 60%/50 lpm to maintain O2 saturations. CT chest reveals emphysema with new diffuse b/l GGO. Pulmonary consulted for assistance in management.    Subjective:  Oxygenation worsened overnight, now on 100% FiO2      14 point ROS negative except as noted above      OBJECTIVE:  ICU Vital Signs Last 24 Hrs  T(C): 36.9 (16 Jun 2021 04:44), Max: 36.9 (16 Jun 2021 04:44)  T(F): 98.4 (16 Jun 2021 04:44), Max: 98.4 (16 Jun 2021 04:44)  HR: 87 (16 Jun 2021 05:30) (70 - 113)  BP: 128/77 (16 Jun 2021 04:44) (103/62 - 128/77)  BP(mean): --  ABP: --  ABP(mean): --  RR: 19 (16 Jun 2021 05:30) (18 - 20)  SpO2: 98% (16 Jun 2021 05:30) (89% - 99%)      06-15 @ 07:01  -  06-16 @ 07:00  --------------------------------------------------------  IN: 1050 mL / OUT: 1550 mL / NET: -500 mL      CAPILLARY BLOOD GLUCOSE      PHYSICAL EXAM:  General: awake and alert, ill appearing female lying in bed  HEENT: NC/AT, EOMI b/l, conjunctiva normal, MMM  Lymph Nodes: no cervical LAD  Neck: supple. full range of motion  Respiratory: CTA b/l, no w/r/c, slightly tachypneic on HFNC, no conversational dyspnea or accessory muscle use  Cardiovascular: S1 S2 present, RRR, no m/r/g  Abdomen: soft, NT/ND, +BS  Extremities: no c/c/e  Skin: no rashes or lesions noted  Neurological: AAOx3, no focal deficits  Psychiatry: calm, cooperative    LINES:     HOSPITAL MEDICATIONS:  Standing Meds:  albuterol/ipratropium for Nebulization 3 milliLiter(s) Nebulizer every 6 hours  albuterol/ipratropium for Nebulization.. 3 milliLiter(s) Nebulizer every 20 minutes  amLODIPine   Tablet 10 milliGRAM(s) Oral daily  apixaban 5 milliGRAM(s) Oral every 12 hours  chlorhexidine 2% Cloths 1 Application(s) Topical <User Schedule>  cholecalciferol 1000 Unit(s) Oral daily  cyanocobalamin 1000 MICROGram(s) Oral daily  folic acid 1 milliGRAM(s) Oral daily  heparin  Infusion.  Unit(s)/Hr IV Continuous <Continuous>  mirtazapine 15 milliGRAM(s) Oral at bedtime  pancrelipase  (CREON 12,000 Lipase Units) 2 Capsule(s) Oral three times a day with meals  pantoprazole    Tablet 40 milliGRAM(s) Oral two times a day  piperacillin/tazobactam IVPB.. 3.375 Gram(s) IV Intermittent every 8 hours  predniSONE   Tablet 40 milliGRAM(s) Oral daily      PRN Meds:  aluminum hydroxide/magnesium hydroxide/simethicone Suspension 30 milliLiter(s) Oral every 4 hours PRN  heparin   Injectable 4500 Unit(s) IV Push every 6 hours PRN  heparin   Injectable 2000 Unit(s) IV Push every 6 hours PRN  ondansetron    Tablet 4 milliGRAM(s) Oral four times a day PRN  oxyCODONE    IR 30 milliGRAM(s) Oral every 6 hours PRN      LABS:                        6.7    2.05  )-----------( 103      ( 16 Jun 2021 07:06 )             20.3     Hgb Trend: 6.7<--, 7.5<--, 7.6<--, 8.1<--, 8.7<--  06-15    141  |  104  |  18  ----------------------------<  131<H>  3.7   |  26  |  1.18    Ca    8.1<L>      15 Ameya 2021 07:00  Phos  3.0     06-15  Mg     2.1     06-15    TPro  5.3<L>  /  Alb  2.0<L>  /  TBili  0.6  /  DBili  x   /  AST  30  /  ALT  10  /  AlkPhos  133<H>  06-15    Creatinine Trend: 1.18<--, 1.20<--, 1.40<--  PT/INR - ( 14 Jun 2021 17:58 )   PT: 15.1 sec;   INR: 1.27 ratio         PTT - ( 16 Jun 2021 07:06 )  PTT:28.9 sec    Arterial Blood Gas:  06-15 @ 19:35  7.53/34/50/28/83/5.3  ABG lactate: --    Venous Blood Gas:  06-15 @ 07:11  --/--/--/--/--  VBG Lactate: 1.5  Venous Blood Gas:  06-14 @ 17:37  --/--/--/--/--  VBG Lactate: 2.1  Venous Blood Gas:  06-14 @ 09:53  7.38/47/28/27/38  VBG Lactate: 4.1      MICROBIOLOGY:       RADIOLOGY:  [ ] Reviewed and interpreted by me    PULMONARY FUNCTION TESTS:    EKG: HPI:  Patient is a 64 y/o F with PMHx of stage III L lung adenocarcinoma on alimta/pembrolizumab maintenance chemotherapy, emphysema, 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 Reports she was in her usual state of health prior to this. Says she fell 2 days ago and since then she has been having a hard time catching her breath. Denies f/c, cough, chest pain, pleuritic pain. Has been receiving maintenance chemotherapy as scheduled and has not had prior issues with it. On arrival, patient was noted to be hypoxic and was placed on supplemental O2. Her oxygen requirements have been increasing since admission and she is currently requiring HFNC at 60%/50 lpm to maintain O2 saturations. CT chest reveals emphysema with new diffuse b/l GGO. Pulmonary consulted for assistance in management.    Subjective:  Oxygenation worsened overnight, now on 100% FiO2  Episode overnight associated with severe abdominal pain, patient now feels better that pain is controlled  Now has cough with sputum production  Denies f/c, SOB at rest, chest pain, pleuritic pain    14 point ROS negative except as noted above      OBJECTIVE:  ICU Vital Signs Last 24 Hrs  T(C): 36.9 (16 Jun 2021 04:44), Max: 36.9 (16 Jun 2021 04:44)  T(F): 98.4 (16 Jun 2021 04:44), Max: 98.4 (16 Jun 2021 04:44)  HR: 87 (16 Jun 2021 05:30) (70 - 113)  BP: 128/77 (16 Jun 2021 04:44) (103/62 - 128/77)  BP(mean): --  ABP: --  ABP(mean): --  RR: 19 (16 Jun 2021 05:30) (18 - 20)  SpO2: 98% (16 Jun 2021 05:30) (89% - 99%)      06-15 @ 07:01  -  06-16 @ 07:00  --------------------------------------------------------  IN: 1050 mL / OUT: 1550 mL / NET: -500 mL      CAPILLARY BLOOD GLUCOSE      PHYSICAL EXAM:  General: awake and alert, ill appearing female lying in bed  HEENT: NC/AT, EOMI b/l, conjunctiva normal, MMM  Lymph Nodes: no cervical LAD  Neck: supple. full range of motion  Respiratory: mild crackles at lung bases b/l, slightly tachypneic on HFNC, no conversational dyspnea or accessory muscle use  Cardiovascular: S1 S2 present, RRR, no m/r/g  Abdomen: soft, NT/ND, +BS  Extremities: no c/c/e  Skin: no rashes or lesions noted  Neurological: AAOx3, no focal deficits  Psychiatry: calm, cooperative    LINES:     HOSPITAL MEDICATIONS:  Standing Meds:  albuterol/ipratropium for Nebulization 3 milliLiter(s) Nebulizer every 6 hours  albuterol/ipratropium for Nebulization.. 3 milliLiter(s) Nebulizer every 20 minutes  amLODIPine   Tablet 10 milliGRAM(s) Oral daily  apixaban 5 milliGRAM(s) Oral every 12 hours  chlorhexidine 2% Cloths 1 Application(s) Topical <User Schedule>  cholecalciferol 1000 Unit(s) Oral daily  cyanocobalamin 1000 MICROGram(s) Oral daily  folic acid 1 milliGRAM(s) Oral daily  heparin  Infusion.  Unit(s)/Hr IV Continuous <Continuous>  mirtazapine 15 milliGRAM(s) Oral at bedtime  pancrelipase  (CREON 12,000 Lipase Units) 2 Capsule(s) Oral three times a day with meals  pantoprazole    Tablet 40 milliGRAM(s) Oral two times a day  piperacillin/tazobactam IVPB.. 3.375 Gram(s) IV Intermittent every 8 hours  predniSONE   Tablet 40 milliGRAM(s) Oral daily      PRN Meds:  aluminum hydroxide/magnesium hydroxide/simethicone Suspension 30 milliLiter(s) Oral every 4 hours PRN  heparin   Injectable 4500 Unit(s) IV Push every 6 hours PRN  heparin   Injectable 2000 Unit(s) IV Push every 6 hours PRN  ondansetron    Tablet 4 milliGRAM(s) Oral four times a day PRN  oxyCODONE    IR 30 milliGRAM(s) Oral every 6 hours PRN      LABS:                        6.7    2.05  )-----------( 103      ( 16 Jun 2021 07:06 )             20.3     Hgb Trend: 6.7<--, 7.5<--, 7.6<--, 8.1<--, 8.7<--  06-15    141  |  104  |  18  ----------------------------<  131<H>  3.7   |  26  |  1.18    Ca    8.1<L>      15 Ameya 2021 07:00  Phos  3.0     06-15  Mg     2.1     06-15    TPro  5.3<L>  /  Alb  2.0<L>  /  TBili  0.6  /  DBili  x   /  AST  30  /  ALT  10  /  AlkPhos  133<H>  06-15    Creatinine Trend: 1.18<--, 1.20<--, 1.40<--  PT/INR - ( 14 Jun 2021 17:58 )   PT: 15.1 sec;   INR: 1.27 ratio         PTT - ( 16 Jun 2021 07:06 )  PTT:28.9 sec    Arterial Blood Gas:  06-15 @ 19:35  7.53/34/50/28/83/5.3  ABG lactate: --    Venous Blood Gas:  06-15 @ 07:11  --/--/--/--/--  VBG Lactate: 1.5  Venous Blood Gas:  06-14 @ 17:37  --/--/--/--/--  VBG Lactate: 2.1  Venous Blood Gas:  06-14 @ 09:53  7.38/47/28/27/38  VBG Lactate: 4.1      MICROBIOLOGY:       RADIOLOGY:  [ ] Reviewed and interpreted by me    PULMONARY FUNCTION TESTS:    EKG: HPI:  Patient is a 66 y/o F with PMHx of stage III L lung adenocarcinoma on alimta/pembrolizumab maintenance chemotherapy (last treatment 6/10), emphysema, 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 Reports she was in her usual state of health prior to this. Says she fell 2 days ago and since then she has been having a hard time catching her breath. Denies f/c, cough, chest pain, pleuritic pain. Has been receiving maintenance chemotherapy as scheduled and has not had prior issues with it. On arrival, patient was noted to be hypoxic and was placed on supplemental O2. Her oxygen requirements have been increasing since admission and she is currently requiring HFNC at 60%/50 lpm to maintain O2 saturations. CT chest reveals emphysema with new diffuse b/l GGO. Pulmonary consulted for assistance in management.    Subjective:      14 point ROS negative except as noted above      OBJECTIVE:  ICU Vital Signs Last 24 Hrs  T(C): 37.2 (17 Jun 2021 04:22), Max: 37.2 (16 Jun 2021 14:59)  T(F): 98.9 (17 Jun 2021 04:22), Max: 98.9 (16 Jun 2021 14:59)  HR: 100 (17 Jun 2021 05:25) (92 - 119)  BP: 114/71 (17 Jun 2021 04:22) (104/69 - 117/75)  BP(mean): --  ABP: --  ABP(mean): --  RR: 20 (17 Jun 2021 04:22) (20 - 20)  SpO2: 99% (17 Jun 2021 05:25) (92% - 100%)      06-16 @ 07:01  -  06-17 @ 07:00  --------------------------------------------------------  IN: 890 mL / OUT: 1000 mL / NET: -110 mL      CAPILLARY BLOOD GLUCOSE      PHYSICAL EXAM:  General: awake and alert, ill appearing female lying in bed  HEENT: NC/AT, EOMI b/l, conjunctiva normal, MMM  Lymph Nodes: no cervical LAD  Neck: supple. full range of motion  Respiratory: mild crackles at lung bases b/l, slightly tachypneic on HFNC, no conversational dyspnea or accessory muscle use  Cardiovascular: S1 S2 present, RRR, no m/r/g  Abdomen: soft, NT/ND, +BS  Extremities: no c/c/e  Skin: no rashes or lesions noted  Neurological: AAOx3, no focal deficits  Psychiatry: calm, cooperative    LINES:     MEDICATIONS  (STANDING):  albuterol/ipratropium for Nebulization 3 milliLiter(s) Nebulizer every 6 hours  albuterol/ipratropium for Nebulization.. 3 milliLiter(s) Nebulizer every 20 minutes  amLODIPine   Tablet 10 milliGRAM(s) Oral daily  apixaban 5 milliGRAM(s) Oral every 12 hours  azithromycin  IVPB      azithromycin  IVPB 500 milliGRAM(s) IV Intermittent every 24 hours  chlorhexidine 2% Cloths 1 Application(s) Topical <User Schedule>  cholecalciferol 1000 Unit(s) Oral daily  cyanocobalamin 1000 MICROGram(s) Oral daily  folic acid 1 milliGRAM(s) Oral daily  magnesium sulfate  IVPB 2 Gram(s) IV Intermittent once  methylPREDNISolone sodium succinate Injectable 60 milliGRAM(s) IV Push daily  mirtazapine 15 milliGRAM(s) Oral at bedtime  pancrelipase  (CREON 12,000 Lipase Units) 2 Capsule(s) Oral three times a day with meals  pantoprazole    Tablet 40 milliGRAM(s) Oral two times a day  piperacillin/tazobactam IVPB.. 3.375 Gram(s) IV Intermittent every 8 hours  potassium phosphate / sodium phosphate Powder (PHOS-NaK) 2 Packet(s) Oral once    MEDICATIONS  (PRN):  acetaminophen   Tablet .. 650 milliGRAM(s) Oral every 6 hours PRN Mild Pain (1 - 3), Moderate Pain (4 - 6)  aluminum hydroxide/magnesium hydroxide/simethicone Suspension 30 milliLiter(s) Oral every 4 hours PRN Dyspepsia  ondansetron    Tablet 4 milliGRAM(s) Oral four times a day PRN Nausea and/or Vomiting  oxyCODONE    IR 30 milliGRAM(s) Oral every 6 hours PRN Severe Pain (7 - 10)      LABS:                        7.4    2.80  )-----------( 65       ( 17 Jun 2021 06:36 )             22.4     Hgb Trend: 7.4<--, 7.8<--, 6.8<--, 6.7<--, 7.5<--  06-17    145  |  108  |  15  ----------------------------<  95  4.8   |  26  |  1.01    Ca    7.8<L>      17 Jun 2021 06:36  Phos  1.4     06-17  Mg     1.6     06-17    TPro  5.0<L>  /  Alb  2.1<L>  /  TBili  0.5  /  DBili  x   /  AST  31  /  ALT  11  /  AlkPhos  109  06-17    Creatinine Trend: 1.01<--, 1.17<--, 1.15<--, 1.18<--, 1.20<--, 1.40<--  PTT - ( 16 Jun 2021 07:06 )  PTT:28.9 sec    Arterial Blood Gas:  06-16 @ 15:23  7.52/38/76/31/96/7.3  ABG lactate: --  Arterial Blood Gas:  06-15 @ 19:35  7.53/34/50/28/83/5.3  ABG lactate: --    Venous Blood Gas:  06-17 @ 06:38  7.45/46/36/31/66  VBG Lactate: 2.5      MICROBIOLOGY:       RADIOLOGY:  [ ] Reviewed and interpreted by me    PULMONARY FUNCTION TESTS:    EKG:

## 2021-06-16 NOTE — PROGRESS NOTE ADULT - PROBLEM SELECTOR PLAN 2
Pt w/ elevated BNP on admission, now up-trending, w/ CTA demonstrating b/l ground glass opacities possibly 2/2 pulmonary edema. Will r/o CHF.    Plan:  -Last echo in 2/2021 w/ nml LV systolic function, EF 65%, will re-echo here in s/o elevated BNP  -s/p IV Lasix push x 2 yesterday, started on 40mg IV daily this AM w/ goal net neg 1L

## 2021-06-16 NOTE — PROGRESS NOTE ADULT - PROBLEM SELECTOR PLAN 6
Hx of stage III non-small cell lung cancer on chemotherapy (last tx w/ Keytruda/Alimta 6/10).    Plan:  -Heme/onc consulted, recs appreciated Pt w/ hx of DVT/PE on Eliquis, currently being held in s/o fall w/ R orbital floor fx. Pt tachycardic, tachypneic on admission w/ possible EKG signs of heart strain but no evidence of acute PE on CTA chest.    Plan:  -CTA chest w/ evidence of chronic thromboembolic disease but no acute PE,   -Pt restarted on home Eliquis, will continue

## 2021-06-17 DIAGNOSIS — D61.818 OTHER PANCYTOPENIA: ICD-10-CM

## 2021-06-17 LAB
ALBUMIN SERPL ELPH-MCNC: 2.1 G/DL — LOW (ref 3.3–5)
ALP SERPL-CCNC: 109 U/L — SIGNIFICANT CHANGE UP (ref 40–120)
ALT FLD-CCNC: 11 U/L — SIGNIFICANT CHANGE UP (ref 10–45)
ANION GAP SERPL CALC-SCNC: 11 MMOL/L — SIGNIFICANT CHANGE UP (ref 5–17)
AST SERPL-CCNC: 31 U/L — SIGNIFICANT CHANGE UP (ref 10–40)
BASE EXCESS BLDV CALC-SCNC: 6.8 MMOL/L — HIGH (ref -2–2)
BASOPHILS # BLD AUTO: 0 K/UL — SIGNIFICANT CHANGE UP (ref 0–0.2)
BASOPHILS NFR BLD AUTO: 0 % — SIGNIFICANT CHANGE UP (ref 0–2)
BILIRUB SERPL-MCNC: 0.5 MG/DL — SIGNIFICANT CHANGE UP (ref 0.2–1.2)
BUN SERPL-MCNC: 15 MG/DL — SIGNIFICANT CHANGE UP (ref 7–23)
CALCIUM SERPL-MCNC: 7.8 MG/DL — LOW (ref 8.4–10.5)
CHLORIDE SERPL-SCNC: 108 MMOL/L — SIGNIFICANT CHANGE UP (ref 96–108)
CO2 BLDV-SCNC: 33 MMOL/L — HIGH (ref 22–30)
CO2 SERPL-SCNC: 26 MMOL/L — SIGNIFICANT CHANGE UP (ref 22–31)
CREAT SERPL-MCNC: 1.01 MG/DL — SIGNIFICANT CHANGE UP (ref 0.5–1.3)
EOSINOPHIL # BLD AUTO: 0.03 K/UL — SIGNIFICANT CHANGE UP (ref 0–0.5)
EOSINOPHIL NFR BLD AUTO: 0.9 % — SIGNIFICANT CHANGE UP (ref 0–6)
FUNGITELL: <31 PG/ML — SIGNIFICANT CHANGE UP
GAS PNL BLDV: SIGNIFICANT CHANGE UP
GLUCOSE SERPL-MCNC: 95 MG/DL — SIGNIFICANT CHANGE UP (ref 70–99)
GRAM STN FLD: SIGNIFICANT CHANGE UP
HCO3 BLDV-SCNC: 31 MMOL/L — HIGH (ref 21–29)
HCT VFR BLD CALC: 22.4 % — LOW (ref 34.5–45)
HGB BLD-MCNC: 7.4 G/DL — LOW (ref 11.5–15.5)
HOROWITZ INDEX BLDV+IHG-RTO: SIGNIFICANT CHANGE UP
LACTATE BLDV-MCNC: 2.5 MMOL/L — HIGH (ref 0.7–2)
LYMPHOCYTES # BLD AUTO: 1.41 K/UL — SIGNIFICANT CHANGE UP (ref 1–3.3)
LYMPHOCYTES # BLD AUTO: 50.4 % — HIGH (ref 13–44)
MAGNESIUM SERPL-MCNC: 1.6 MG/DL — SIGNIFICANT CHANGE UP (ref 1.6–2.6)
MCHC RBC-ENTMCNC: 30.5 PG — SIGNIFICANT CHANGE UP (ref 27–34)
MCHC RBC-ENTMCNC: 33 GM/DL — SIGNIFICANT CHANGE UP (ref 32–36)
MCV RBC AUTO: 92.2 FL — SIGNIFICANT CHANGE UP (ref 80–100)
MONOCYTES # BLD AUTO: 0.03 K/UL — SIGNIFICANT CHANGE UP (ref 0–0.9)
MONOCYTES NFR BLD AUTO: 0.9 % — LOW (ref 2–14)
NEUTROPHILS # BLD AUTO: 1.34 K/UL — LOW (ref 1.8–7.4)
NEUTROPHILS NFR BLD AUTO: 47.8 % — SIGNIFICANT CHANGE UP (ref 43–77)
NRBC # BLD: 0 /100 WBCS — SIGNIFICANT CHANGE UP (ref 0–0)
NT-PROBNP SERPL-SCNC: 2159 PG/ML — HIGH (ref 0–300)
PCO2 BLDV: 46 MMHG — SIGNIFICANT CHANGE UP (ref 35–50)
PH BLDV: 7.45 — SIGNIFICANT CHANGE UP (ref 7.35–7.45)
PHOSPHATE SERPL-MCNC: 1.4 MG/DL — LOW (ref 2.5–4.5)
PLATELET # BLD AUTO: 65 K/UL — LOW (ref 150–400)
PO2 BLDV: 36 MMHG — SIGNIFICANT CHANGE UP (ref 25–45)
POTASSIUM SERPL-MCNC: 4.8 MMOL/L — SIGNIFICANT CHANGE UP (ref 3.5–5.3)
POTASSIUM SERPL-SCNC: 4.8 MMOL/L — SIGNIFICANT CHANGE UP (ref 3.5–5.3)
PROT SERPL-MCNC: 5 G/DL — LOW (ref 6–8.3)
RBC # BLD: 2.43 M/UL — LOW (ref 3.8–5.2)
RBC # FLD: 19.9 % — HIGH (ref 10.3–14.5)
SAO2 % BLDV: 66 % — LOW (ref 67–88)
SODIUM SERPL-SCNC: 145 MMOL/L — SIGNIFICANT CHANGE UP (ref 135–145)
SPECIMEN SOURCE: SIGNIFICANT CHANGE UP
WBC # BLD: 2.8 K/UL — LOW (ref 3.8–10.5)
WBC # FLD AUTO: 2.8 K/UL — LOW (ref 3.8–10.5)

## 2021-06-17 PROCEDURE — 99233 SBSQ HOSP IP/OBS HIGH 50: CPT | Mod: GC

## 2021-06-17 PROCEDURE — 99233 SBSQ HOSP IP/OBS HIGH 50: CPT | Mod: GC,25

## 2021-06-17 PROCEDURE — 93306 TTE W/DOPPLER COMPLETE: CPT | Mod: 26

## 2021-06-17 PROCEDURE — 71045 X-RAY EXAM CHEST 1 VIEW: CPT | Mod: 26

## 2021-06-17 RX ORDER — MAGNESIUM SULFATE 500 MG/ML
2 VIAL (ML) INJECTION ONCE
Refills: 0 | Status: COMPLETED | OUTPATIENT
Start: 2021-06-17 | End: 2021-06-17

## 2021-06-17 RX ORDER — SODIUM,POTASSIUM PHOSPHATES 278-250MG
2 POWDER IN PACKET (EA) ORAL ONCE
Refills: 0 | Status: COMPLETED | OUTPATIENT
Start: 2021-06-17 | End: 2021-06-17

## 2021-06-17 RX ADMIN — Medication 100 MILLIEQUIVALENT(S): at 00:28

## 2021-06-17 RX ADMIN — Medication 3 MILLILITER(S): at 18:05

## 2021-06-17 RX ADMIN — CHLORHEXIDINE GLUCONATE 1 APPLICATION(S): 213 SOLUTION TOPICAL at 05:42

## 2021-06-17 RX ADMIN — APIXABAN 5 MILLIGRAM(S): 2.5 TABLET, FILM COATED ORAL at 18:11

## 2021-06-17 RX ADMIN — MIRTAZAPINE 15 MILLIGRAM(S): 45 TABLET, ORALLY DISINTEGRATING ORAL at 21:20

## 2021-06-17 RX ADMIN — OXYCODONE HYDROCHLORIDE 30 MILLIGRAM(S): 5 TABLET ORAL at 19:12

## 2021-06-17 RX ADMIN — PANTOPRAZOLE SODIUM 40 MILLIGRAM(S): 20 TABLET, DELAYED RELEASE ORAL at 18:11

## 2021-06-17 RX ADMIN — ONDANSETRON 4 MILLIGRAM(S): 8 TABLET, FILM COATED ORAL at 19:47

## 2021-06-17 RX ADMIN — OXYCODONE HYDROCHLORIDE 30 MILLIGRAM(S): 5 TABLET ORAL at 13:15

## 2021-06-17 RX ADMIN — Medication 1000 UNIT(S): at 13:18

## 2021-06-17 RX ADMIN — PREGABALIN 1000 MICROGRAM(S): 225 CAPSULE ORAL at 13:18

## 2021-06-17 RX ADMIN — PIPERACILLIN AND TAZOBACTAM 25 GRAM(S): 4; .5 INJECTION, POWDER, LYOPHILIZED, FOR SOLUTION INTRAVENOUS at 14:27

## 2021-06-17 RX ADMIN — Medication 2 CAPSULE(S): at 18:11

## 2021-06-17 RX ADMIN — PANTOPRAZOLE SODIUM 40 MILLIGRAM(S): 20 TABLET, DELAYED RELEASE ORAL at 05:44

## 2021-06-17 RX ADMIN — Medication 100 MILLIEQUIVALENT(S): at 01:26

## 2021-06-17 RX ADMIN — OXYCODONE HYDROCHLORIDE 30 MILLIGRAM(S): 5 TABLET ORAL at 13:45

## 2021-06-17 RX ADMIN — AZITHROMYCIN 250 MILLIGRAM(S): 500 TABLET, FILM COATED ORAL at 18:14

## 2021-06-17 RX ADMIN — Medication 60 MILLIGRAM(S): at 05:43

## 2021-06-17 RX ADMIN — APIXABAN 5 MILLIGRAM(S): 2.5 TABLET, FILM COATED ORAL at 05:42

## 2021-06-17 RX ADMIN — Medication 2 CAPSULE(S): at 13:23

## 2021-06-17 RX ADMIN — Medication 1 MILLIGRAM(S): at 13:18

## 2021-06-17 RX ADMIN — AMLODIPINE BESYLATE 10 MILLIGRAM(S): 2.5 TABLET ORAL at 05:43

## 2021-06-17 RX ADMIN — Medication 2 PACKET(S): at 15:25

## 2021-06-17 RX ADMIN — PIPERACILLIN AND TAZOBACTAM 25 GRAM(S): 4; .5 INJECTION, POWDER, LYOPHILIZED, FOR SOLUTION INTRAVENOUS at 05:43

## 2021-06-17 RX ADMIN — OXYCODONE HYDROCHLORIDE 30 MILLIGRAM(S): 5 TABLET ORAL at 19:45

## 2021-06-17 RX ADMIN — Medication 3 MILLILITER(S): at 12:38

## 2021-06-17 RX ADMIN — Medication 3 MILLILITER(S): at 05:24

## 2021-06-17 RX ADMIN — PIPERACILLIN AND TAZOBACTAM 25 GRAM(S): 4; .5 INJECTION, POWDER, LYOPHILIZED, FOR SOLUTION INTRAVENOUS at 21:20

## 2021-06-17 RX ADMIN — Medication 50 GRAM(S): at 13:16

## 2021-06-17 NOTE — PROGRESS NOTE ADULT - ATTENDING COMMENTS
Agree with plan as outlined above. Patient seen and examined at bedside. Patient history, laboratory data, and imaging personally reviewed.    Pt is a 65F with PMHx former smoker with COPD, non-small cell lung adenocarcinoma of left lung (dz'ed Stage III, s/p Carboplatin/Alimta/Pembrolizumab last given 6/10/21), hx extensive PE (2/2020) on NOAC, chronic pancreatitis on PERT with opioid dependence, and hx vocal cord polypectomy presenting to [facility] 6/14/21 with acute onset dyspnea and progressively worsening b/l LE edema now requiring HFNC O2 supplementation. CT imaging reviewed and c/w worsening bilateral interlobular septal thickening most likely 2/2 fluid vs. inflammation. Pt clinically improving, HFNC weaned from 50 LPM, 100% to 60LPM 60%.     -c/w aggressive diuresis with strict I/Os and BMPs with electrolyte repletion with goal of net (-) 1L/24hrs.    -Can c/w stress dose steroids ~1mg/kg at 60mg IV Solu-Medrol QD  -Wean O2 supplementation for goal O2 saturation 92-95%  -c/w ATC nebulizer therapy as above  -c/w broaden spectrum Abx  -Pain control with opioids for chronic pancreatitis as well as avoidance of NPO and missed meals (pt says her pancreatitis worsens if she doesn't eat)  -Pulmonary will continue to follow.

## 2021-06-17 NOTE — PROGRESS NOTE ADULT - PROBLEM SELECTOR PLAN 7
Hx of stage III non-small cell lung cancer on chemotherapy (last tx w/ Keytruda/Alimta 6/10).    Plan:  -Heme/onc consulted, recs appreciated

## 2021-06-17 NOTE — PROGRESS NOTE ADULT - ATTENDING COMMENTS
Hospitalist- Joseph Callaway MD  Pager: [phone]  If no response or off-hours, page [phone]  -------------------------------------  hypoxia- stable to slightly improved- unclear etiology although patient may have smoked cigarette just prior to admission after a period of being abstinent, pointing to a possible inflammatory etiology/ acute interstitial pneumonitis- would continue broad based treatment for now- abx, steroids. Pulm following- pt is likely high risk for bronch, so if doesn't improve may need VATS. held diuretics yesterday due to contraction alkalosis and low perceived benefit- TTE showing hyperdynamic LV but CXR with increasing pulm edema. Given pt is able to be slowly weaned, would favor diuresing PRN rather than standing.

## 2021-06-17 NOTE — PROGRESS NOTE ADULT - PROBLEM SELECTOR PLAN 3
Pt w/ chronic anemia, baseline Hgb 8-9 per chart review. Elevated RDW, blood smear w/ macrocytosis. S/p 1U PRBC (6/16). Now w/ new pancytopenia, possibly in s/o of immunotherapy. Dimer elevated but elevated fibrinogen, unlikely DIC.    Plan:  -Daily CBC  -Trend H+H, WBC, plt  -C/w home folic acid, B12  -S/p 1U PRBCs (6/16)  -Elevated LDH but nml bili, haptoglobin, anemia unlikely hemolysis  -Transfuse PRBC for Hgb <7  -Transfuse plt for <50 w/ evidence of bleeding, otherwise <10 Pt w/ chronic anemia, baseline Hgb 8-9 per chart review. Elevated RDW, blood smear w/ macrocytosis. S/p 1U PRBC (6/16). Now w/ new pancytopenia, possibly in s/o of immunotherapy.     Plan:  -Daily CBC  -Trend H+H, WBC, plt  -C/w home folic acid, B12  -S/p 1U PRBCs (6/16)  -Elevated LDH but nml bili, haptoglobin, anemia unlikely hemolysis  -Transfuse PRBC for Hgb <7  -Transfuse plt for <50 w/ evidence of bleeding, otherwise <10

## 2021-06-17 NOTE — PROGRESS NOTE ADULT - PROBLEM SELECTOR PLAN 1
Pt admitted for dyspnea and hypoxia, still w/ persistent desaturations but currently maintained on high-flow NC. Differential includes COPD exacerbation, possibly in s/o underlying infection given b/l ground glass opacities on CT chest and elevated procalcitonin, although pt afebrile w/out leukocytosis. Also considered is new CHF given elevated BNP and that CT chest findings may represent pulmonary edema, although pt had recent nml echo in 2/2021. CT findings may also represent pneumonitis in s/o chemo/immunotherapy tx. Unlikely PE as CTA chest negative for acute thromboembolism.    Plan:  -Continuous pulse ox  -High-flow NC for now, wean as tolerated  -Low threshold to start on BiPAP if pt's saturations do not improve  -CTA chest w/ evidence of chronic thromboembolic disease but no acute PE, also w/ b/l symmetric ground-glass opacities  -Last echo in 2/2021 w/ nml LV systolic function, EF 65%, f/u repeat echo performed this AM  -RVP negative, COVID-19 negative x 2, procalcitonin elevated at 15,   -Blood cx NGTD, f/u sputum cx  -F/u Fungitell  -Pulm consulted, recs appreciated  -s/p Lasix IVP x 3, holding for now  -C/w Zosyn, Azithromycin added per pulm recs  -MICU consulted, recs appreciated  -Continue w/ Duoneb, IV solumedrol added 1mg/kg BID   -VBG lactate elevated on admission, initially resolved but now elevated, will continue to trend

## 2021-06-17 NOTE — PROGRESS NOTE ADULT - PROBLEM SELECTOR PLAN 4
Pt w/ history of chronic pancreatitis, now with intermittent epigastric pain c/w pt's chronic pain    Plan:  -Pain control per pt's home regimen w/ oxycodone prn   -C/w pancrelipase  -Zofran prn nausea

## 2021-06-17 NOTE — PROGRESS NOTE ADULT - ASSESSMENT
This is a 66 y/o F with stage III L lung adenocarcinoma on alimta/keytruda, emphysema, HTN, h/o DVT/PE not on AC who presents with acute onset SOB. Found to be hypoxic and currently requiring HFNC to maintain SpO2. CT chest remarkable for diffuse, b/l GGO in the setting of moderate to severe emphysema, which is new compared to CT chest from 3 months prior. GGO may represent pulmonary edema, infection or inflammation.    #Acute hypoxic respiratory failure  - likely 2/2 b/l diffuse GGO on CT chest, though patient does have history of PE with interruption of AC in the past  - ABG 7.52/38/76/31 -> improved from prior however if taken on 100% FiO2(?) remains severely hypoxemic  - ESR, CRP elevation noted  - continue anticoagulation for known PE, monitor platelets closely (decreased to 65 today)  - Hb improved after transfusion yesterday, maintain Hb>7  - continue lasix as blood pressure tolerates  - f/u TTE with bubble study to r/o heart failure and shunting - BNP elevated  - continue azithromycin and zosyn  - f/u sputum culture, Legionella Ag, fungitell  - continue solumedrol 1 mg/kg daily  - continue HFNC, maintain SpO2 88-92%, wean as tolerated  - IS, acapella device  - pain control    Shyam Lucio PGY-5  Pulmonary/Critical Care Fellow  Pager: [phone] ([facility]) [phone] (NS)  Pulmonary Spectra #[id number] (NS) / [phone] ([facility]) This is a 64 y/o F with stage III L lung adenocarcinoma on alimta/keytruda, emphysema, HTN, h/o DVT/PE not on AC who presents with acute onset SOB. Found to be hypoxic and currently requiring HFNC to maintain SpO2. CT chest remarkable for diffuse, b/l GGO in the setting of moderate to severe emphysema, which is new compared to CT chest from 3 months prior. GGO may represent pulmonary edema, infection or inflammation.    #Acute hypoxic respiratory failure  - likely 2/2 b/l diffuse GGO on CT chest, though patient does have history of PE with interruption of AC in the past  - ABG 7.52/38/76/31 -> improved from prior however if taken on 100% FiO2(?) remains severely hypoxemic  - ESR, CRP elevation noted  - continue anticoagulation for known PE, monitor platelets closely (decreased to 65 today)  - Hb improved after transfusion yesterday, maintain Hb>7  - continue lasix as blood pressure tolerates  - TTE with bubble study did not demonstrate decreased systolic function or evidence of shunting, though study was suboptimal  - BNP elevation noted, high output cardiac failure?  - continue azithromycin and zosyn  - f/u sputum culture, Legionella Ag, fungitell  - continue solumedrol 1 mg/kg daily  - continue HFNC, maintain SpO2 88-92%, wean as tolerated  - IS, acapella device  - pain control    Shyam Lucio PGY-5  Pulmonary/Critical Care Fellow  Pager: [phone] ([facility]) [phone] (NS)  Pulmonary Spectra #[id number] (NS) / [id number] ([facility])

## 2021-06-17 NOTE — PROGRESS NOTE ADULT - PROBLEM SELECTOR PLAN 6
n/a Pt w/ hx of DVT/PE on Eliquis, currently being held in s/o fall w/ R orbital floor fx. Pt tachycardic, tachypneic on admission w/ possible EKG signs of heart strain but no evidence of acute PE on CTA chest.    Plan:  -CTA chest w/ evidence of chronic thromboembolic disease but no acute PE,   -Pt restarted on home Eliquis, will continue

## 2021-06-17 NOTE — PROGRESS NOTE ADULT - SUBJECTIVE AND OBJECTIVE BOX
Contact information:  Esthela Matute, MS4   Medicine Team 5  Pager: [phone]    HIMANSHU VILLEGAS, MRN-[id number]    SUBJECTIVE/OVERNIGHT EVENTS: Patient seen and examined at bedside. No acute events overnight. Overall pt continues to feel better. Feels she is breathing comfortably though notes cough continues to become more productive. Denies fever, chills, chest pain. No abdominal pain this morning.     OBJECTIVE:    Vitals:  Vital Signs Last 24 Hrs  T(C): 37.2 (17 Jun 2021 04:22), Max: 37.2 (16 Jun 2021 14:59)  T(F): 98.9 (17 Jun 2021 04:22), Max: 98.9 (16 Jun 2021 14:59)  HR: 100 (17 Jun 2021 05:25) (92 - 119)  BP: 114/71 (17 Jun 2021 04:22) (104/69 - 117/75)  RR: 20 (17 Jun 2021 04:22) (20 - 20)  SpO2: 99% (17 Jun 2021 05:25) (92% - 100%)    Exam:  GEN: Awake and alert, lying in bed on HFNC  HEENT: Mild periorbital swelling w/ ecchymosis of R eye, EOMI. MMM  NECK: Supple  CHEST: On high-flow NC, O2 saturations in 90s (on 75% FiO2, 45L) at time of assessment. Non-labored breathing, speaking in full sentences. Diffuse coarse crackles b/l posterior lung felids. (+) port R chest  CV: RRR. (+) S1/S2. No audible S3/S4. R No murmurs, rubs, or gallops.    GI: Soft, nontender, nondistended. +BS.  EXT: Warm and well perfused. Trace pitting edema to mid-shin b/l. No calf tenderness. 2+ distal pulses b/l LEs  SKIN: Intact, no rashes or lesions.   NEURO: A&Ox3, grossly non-focal    I&Os:    16 Jun 2021 07:01  -  17 Jun 2021 07:00  --------------------------------------------------------  IN:    IV PiggyBack: 100 mL    Lactated Ringers Bolus: 250 mL    Oral Fluid: 240 mL    PRBCs (Packed Red Blood Cells): 300 mL  Total IN: 890 mL    OUT:    Voided (mL): 1000 mL  Total OUT: 1000 mL    Total NET: -110 mL    LABS:                        7.4    2.80  )-----------( 65       ( 17 Jun 2021 06:36 )             22.4     06-17    145  |  108  |  15  ----------------------------<  95  4.8   |  26  |  1.01    Ca    7.8<L>      17 Jun 2021 06:36  Phos  1.4     06-17  Mg     1.6     06-17    TPro  5.0<L>  /  Alb  2.1<L>  /  TBili  0.5  /  DBili  x   /  AST  31  /  ALT  11  /  AlkPhos  109  06-17    PTT - ( 16 Jun 2021 07:06 )  PTT:28.9 sec      MEDS:  MEDICATIONS  (STANDING):  albuterol/ipratropium for Nebulization 3 milliLiter(s) Nebulizer every 6 hours  albuterol/ipratropium for Nebulization.. 3 milliLiter(s) Nebulizer every 20 minutes  amLODIPine   Tablet 10 milliGRAM(s) Oral daily  apixaban 5 milliGRAM(s) Oral every 12 hours  azithromycin  IVPB      azithromycin  IVPB 500 milliGRAM(s) IV Intermittent every 24 hours  chlorhexidine 2% Cloths 1 Application(s) Topical <User Schedule>  cholecalciferol 1000 Unit(s) Oral daily  cyanocobalamin 1000 MICROGram(s) Oral daily  folic acid 1 milliGRAM(s) Oral daily  magnesium sulfate  IVPB 2 Gram(s) IV Intermittent once  methylPREDNISolone sodium succinate Injectable 60 milliGRAM(s) IV Push daily  mirtazapine 15 milliGRAM(s) Oral at bedtime  pancrelipase  (CREON 12,000 Lipase Units) 2 Capsule(s) Oral three times a day with meals  pantoprazole    Tablet 40 milliGRAM(s) Oral two times a day  piperacillin/tazobactam IVPB.. 3.375 Gram(s) IV Intermittent every 8 hours  potassium phosphate / sodium phosphate Powder (PHOS-NaK) 2 Packet(s) Oral once    MEDICATIONS  (PRN):  acetaminophen   Tablet .. 650 milliGRAM(s) Oral every 6 hours PRN Mild Pain (1 - 3), Moderate Pain (4 - 6)  aluminum hydroxide/magnesium hydroxide/simethicone Suspension 30 milliLiter(s) Oral every 4 hours PRN Dyspepsia  ondansetron    Tablet 4 milliGRAM(s) Oral four times a day PRN Nausea and/or Vomiting  oxyCODONE    IR 30 milliGRAM(s) Oral every 6 hours PRN Severe Pain (7 - 10)       Contact information:  Esthela Matute, MS4   Medicine Team 5  Pager: [phone]    HIMANSHU VILLEGAS, [id number]    SUBJECTIVE/OVERNIGHT EVENTS: Patient seen and examined at bedside. No acute events overnight. Pt feels tired this morning but overall slightly improved from yesterday. Feels she is breathing comfortably though notes cough continues to become more productive. Denies fever, chills, chest pain. No abdominal pain this morning.     OBJECTIVE:    Vitals:  Vital Signs Last 24 Hrs  T(C): 37.2 (17 Jun 2021 04:22), Max: 37.2 (16 Jun 2021 14:59)  T(F): 98.9 (17 Jun 2021 04:22), Max: 98.9 (16 Jun 2021 14:59)  HR: 100 (17 Jun 2021 05:25) (92 - 119)  BP: 114/71 (17 Jun 2021 04:22) (104/69 - 117/75)  RR: 20 (17 Jun 2021 04:22) (20 - 20)  SpO2: 99% (17 Jun 2021 05:25) (92% - 100%)    Exam:  GEN: Awake and alert, lying in bed on HFNC  HEENT: Mild periorbital swelling w/ ecchymosis of R eye, EOMI. MMM  NECK: Supple  CHEST: On high-flow NC, O2 saturations in 90s (on 75% FiO2, 45L) at time of assessment. Non-labored breathing, speaking in full sentences. Diffuse coarse crackles b/l posterior lung felids. (+) port R chest  CV: RRR. (+) S1/S2. No audible S3/S4. R No murmurs, rubs, or gallops.    GI: Soft, nontender, nondistended. +BS.  EXT: Warm and well perfused. Trace pitting edema to mid-shin b/l. No calf tenderness. 2+ distal pulses b/l LEs  SKIN: Intact, no rashes or lesions.   NEURO: A&Ox3, grossly non-focal    I&Os:    16 Jun 2021 07:01  -  17 Jun 2021 07:00  --------------------------------------------------------  IN:    IV PiggyBack: 100 mL    Lactated Ringers Bolus: 250 mL    Oral Fluid: 240 mL    PRBCs (Packed Red Blood Cells): 300 mL  Total IN: 890 mL    OUT:    Voided (mL): 1000 mL  Total OUT: 1000 mL    Total NET: -110 mL    LABS:                        7.4    2.80  )-----------( 65       ( 17 Jun 2021 06:36 )             22.4     06-17    145  |  108  |  15  ----------------------------<  95  4.8   |  26  |  1.01    Ca    7.8<L>      17 Jun 2021 06:36  Phos  1.4     06-17  Mg     1.6     06-17    TPro  5.0<L>  /  Alb  2.1<L>  /  TBili  0.5  /  DBili  x   /  AST  31  /  ALT  11  /  AlkPhos  109  06-17    PTT - ( 16 Jun 2021 07:06 )  PTT:28.9 sec      MEDS:  MEDICATIONS  (STANDING):  albuterol/ipratropium for Nebulization 3 milliLiter(s) Nebulizer every 6 hours  albuterol/ipratropium for Nebulization.. 3 milliLiter(s) Nebulizer every 20 minutes  amLODIPine   Tablet 10 milliGRAM(s) Oral daily  apixaban 5 milliGRAM(s) Oral every 12 hours  azithromycin  IVPB      azithromycin  IVPB 500 milliGRAM(s) IV Intermittent every 24 hours  chlorhexidine 2% Cloths 1 Application(s) Topical <User Schedule>  cholecalciferol 1000 Unit(s) Oral daily  cyanocobalamin 1000 MICROGram(s) Oral daily  folic acid 1 milliGRAM(s) Oral daily  magnesium sulfate  IVPB 2 Gram(s) IV Intermittent once  methylPREDNISolone sodium succinate Injectable 60 milliGRAM(s) IV Push daily  mirtazapine 15 milliGRAM(s) Oral at bedtime  pancrelipase  (CREON 12,000 Lipase Units) 2 Capsule(s) Oral three times a day with meals  pantoprazole    Tablet 40 milliGRAM(s) Oral two times a day  piperacillin/tazobactam IVPB.. 3.375 Gram(s) IV Intermittent every 8 hours  potassium phosphate / sodium phosphate Powder (PHOS-NaK) 2 Packet(s) Oral once    MEDICATIONS  (PRN):  acetaminophen   Tablet .. 650 milliGRAM(s) Oral every 6 hours PRN Mild Pain (1 - 3), Moderate Pain (4 - 6)  aluminum hydroxide/magnesium hydroxide/simethicone Suspension 30 milliLiter(s) Oral every 4 hours PRN Dyspepsia  ondansetron    Tablet 4 milliGRAM(s) Oral four times a day PRN Nausea and/or Vomiting  oxyCODONE    IR 30 milliGRAM(s) Oral every 6 hours PRN Severe Pain (7 - 10)

## 2021-06-17 NOTE — PROGRESS NOTE ADULT - PROBLEM SELECTOR PLAN 2
Pt w/ elevated BNP on admission, now up-trending, w/ CTA demonstrating b/l ground glass opacities possibly 2/2 pulmonary edema. Will r/o CHF.    Plan:  -Last echo in 2/2021 w/ nml LV systolic function, EF 65%, will re-echo here in s/o elevated BNP  -s/p Lasix IVP x 3, holding for now

## 2021-06-17 NOTE — PROGRESS NOTE ADULT - PROBLEM SELECTOR PLAN 5
Recent ED visit for R orbital floor fx sustained after fall, seen by OMFS here    Plan:  -S/p Augmentin  -Appreciate OMFS recs from prior visit  -No pressure to face, ice as needed  -Sinus precautions (no nose blowing, no sucking on straws, sneeze w/ mouth open)  -Spoke with OMFS, pt missed outpatient appt yesterday for surgical planning, will discuss possibility of inpt surgery once medically stabilized

## 2021-06-17 NOTE — PROGRESS NOTE ADULT - SUBJECTIVE AND OBJECTIVE BOX
HPI:  Patient is a 64 y/o F with PMHx of stage III L lung adenocarcinoma on alimta/pembrolizumab maintenance chemotherapy (last treatment 6/10), emphysema, 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 Reports she was in her usual state of health prior to this. Says she fell 2 days ago and since then she has been having a hard time catching her breath. Denies f/c, cough, chest pain, pleuritic pain. Has been receiving maintenance chemotherapy as scheduled and has not had prior issues with it. On arrival, patient was noted to be hypoxic and was placed on supplemental O2. Her oxygen requirements have been increasing since admission and she is currently requiring HFNC at 60%/50 lpm to maintain O2 saturations. CT chest reveals emphysema with new diffuse b/l GGO. Pulmonary consulted for assistance in management.    Subjective:  No overnight events, pain better controlled  Able to wean HFNC to 60% FiO2 with stable oxygenation  SOB improved and cough improved  Denies f/c, chest pain, pleuritic pain    14 point ROS negative except as noted above      OBJECTIVE:  ICU Vital Signs Last 24 Hrs  T(C): 37.2 (17 Jun 2021 04:22), Max: 37.2 (16 Jun 2021 14:59)  T(F): 98.9 (17 Jun 2021 04:22), Max: 98.9 (16 Jun 2021 14:59)  HR: 100 (17 Jun 2021 05:25) (92 - 119)  BP: 114/71 (17 Jun 2021 04:22) (104/69 - 117/75)  BP(mean): --  ABP: --  ABP(mean): --  RR: 20 (17 Jun 2021 04:22) (20 - 20)  SpO2: 99% (17 Jun 2021 05:25) (92% - 100%)      06-16 @ 07:01  -  06-17 @ 07:00  --------------------------------------------------------  IN: 890 mL / OUT: 1000 mL / NET: -110 mL      CAPILLARY BLOOD GLUCOSE      PHYSICAL EXAM:  General: awake and alert, ill appearing female lying in bed  HEENT: NC/AT, EOMI b/l, conjunctiva normal, MMM  Lymph Nodes: no cervical LAD  Neck: supple. full range of motion  Respiratory: mild crackles at lung bases b/l unchanged, more comfortable on HFNC, no conversational dyspnea or accessory muscle use  Cardiovascular: S1 S2 present, RRR, no m/r/g  Abdomen: soft, NT/ND, +BS  Extremities: no c/c/e  Skin: no rashes or lesions noted  Neurological: AAOx3, no focal deficits  Psychiatry: calm, cooperative    LINES:     MEDICATIONS  (STANDING):  albuterol/ipratropium for Nebulization 3 milliLiter(s) Nebulizer every 6 hours  albuterol/ipratropium for Nebulization.. 3 milliLiter(s) Nebulizer every 20 minutes  amLODIPine   Tablet 10 milliGRAM(s) Oral daily  apixaban 5 milliGRAM(s) Oral every 12 hours  azithromycin  IVPB      azithromycin  IVPB 500 milliGRAM(s) IV Intermittent every 24 hours  chlorhexidine 2% Cloths 1 Application(s) Topical <User Schedule>  cholecalciferol 1000 Unit(s) Oral daily  cyanocobalamin 1000 MICROGram(s) Oral daily  folic acid 1 milliGRAM(s) Oral daily  magnesium sulfate  IVPB 2 Gram(s) IV Intermittent once  methylPREDNISolone sodium succinate Injectable 60 milliGRAM(s) IV Push daily  mirtazapine 15 milliGRAM(s) Oral at bedtime  pancrelipase  (CREON 12,000 Lipase Units) 2 Capsule(s) Oral three times a day with meals  pantoprazole    Tablet 40 milliGRAM(s) Oral two times a day  piperacillin/tazobactam IVPB.. 3.375 Gram(s) IV Intermittent every 8 hours  potassium phosphate / sodium phosphate Powder (PHOS-NaK) 2 Packet(s) Oral once    MEDICATIONS  (PRN):  acetaminophen   Tablet .. 650 milliGRAM(s) Oral every 6 hours PRN Mild Pain (1 - 3), Moderate Pain (4 - 6)  aluminum hydroxide/magnesium hydroxide/simethicone Suspension 30 milliLiter(s) Oral every 4 hours PRN Dyspepsia  ondansetron    Tablet 4 milliGRAM(s) Oral four times a day PRN Nausea and/or Vomiting  oxyCODONE    IR 30 milliGRAM(s) Oral every 6 hours PRN Severe Pain (7 - 10)      LABS:                        7.4    2.80  )-----------( 65       ( 17 Jun 2021 06:36 )             22.4     Hgb Trend: 7.4<--, 7.8<--, 6.8<--, 6.7<--, 7.5<--  06-17    145  |  108  |  15  ----------------------------<  95  4.8   |  26  |  1.01    Ca    7.8<L>      17 Jun 2021 06:36  Phos  1.4     06-17  Mg     1.6     06-17    TPro  5.0<L>  /  Alb  2.1<L>  /  TBili  0.5  /  DBili  x   /  AST  31  /  ALT  11  /  AlkPhos  109  06-17    Creatinine Trend: 1.01<--, 1.17<--, 1.15<--, 1.18<--, 1.20<--, 1.40<--  PTT - ( 16 Jun 2021 07:06 )  PTT:28.9 sec    Arterial Blood Gas:  06-16 @ 15:23  7.52/38/76/31/96/7.3  ABG lactate: --  Arterial Blood Gas:  06-15 @ 19:35  7.53/34/50/28/83/5.3  ABG lactate: --    Venous Blood Gas:  06-17 @ 06:38  7.45/46/36/31/66  VBG Lactate: 2.5      MICROBIOLOGY:       RADIOLOGY:  [ ] Reviewed and interpreted by me    PULMONARY FUNCTION TESTS:    EKG:

## 2021-06-18 ENCOUNTER — TRANSCRIPTION ENCOUNTER (OUTPATIENT)
Age: 66
End: 2021-06-18

## 2021-06-18 LAB
ALBUMIN SERPL ELPH-MCNC: 2.2 G/DL — LOW (ref 3.3–5)
ALP SERPL-CCNC: 123 U/L — HIGH (ref 40–120)
ALT FLD-CCNC: 22 U/L — SIGNIFICANT CHANGE UP (ref 10–45)
ANION GAP SERPL CALC-SCNC: 10 MMOL/L — SIGNIFICANT CHANGE UP (ref 5–17)
AST SERPL-CCNC: 41 U/L — HIGH (ref 10–40)
BASE EXCESS BLDV CALC-SCNC: 5.4 MMOL/L — HIGH (ref -2–2)
BASOPHILS # BLD AUTO: 0 K/UL — SIGNIFICANT CHANGE UP (ref 0–0.2)
BASOPHILS NFR BLD AUTO: 0 % — SIGNIFICANT CHANGE UP (ref 0–2)
BILIRUB SERPL-MCNC: 0.4 MG/DL — SIGNIFICANT CHANGE UP (ref 0.2–1.2)
BUN SERPL-MCNC: 11 MG/DL — SIGNIFICANT CHANGE UP (ref 7–23)
CALCIUM SERPL-MCNC: 8.2 MG/DL — LOW (ref 8.4–10.5)
CHLORIDE SERPL-SCNC: 106 MMOL/L — SIGNIFICANT CHANGE UP (ref 96–108)
CO2 BLDV-SCNC: 32 MMOL/L — HIGH (ref 22–30)
CO2 SERPL-SCNC: 27 MMOL/L — SIGNIFICANT CHANGE UP (ref 22–31)
CREAT SERPL-MCNC: 0.94 MG/DL — SIGNIFICANT CHANGE UP (ref 0.5–1.3)
EOSINOPHIL # BLD AUTO: 0.12 K/UL — SIGNIFICANT CHANGE UP (ref 0–0.5)
EOSINOPHIL NFR BLD AUTO: 4.4 % — SIGNIFICANT CHANGE UP (ref 0–6)
FOLATE SERPL-MCNC: 13.8 NG/ML — SIGNIFICANT CHANGE UP
GAS PNL BLDV: SIGNIFICANT CHANGE UP
GLUCOSE SERPL-MCNC: 84 MG/DL — SIGNIFICANT CHANGE UP (ref 70–99)
HAPTOGLOB SERPL-MCNC: 134 MG/DL — SIGNIFICANT CHANGE UP (ref 34–200)
HCO3 BLDV-SCNC: 30 MMOL/L — HIGH (ref 21–29)
HCT VFR BLD CALC: 23.2 % — LOW (ref 34.5–45)
HGB BLD-MCNC: 7.6 G/DL — LOW (ref 11.5–15.5)
LACTATE BLDV-MCNC: 2.9 MMOL/L — HIGH (ref 0.7–2)
LEGIONELLA AB SER-ACNC: <0.91 — SIGNIFICANT CHANGE UP (ref 0–0.9)
LYMPHOCYTES # BLD AUTO: 1.62 K/UL — SIGNIFICANT CHANGE UP (ref 1–3.3)
LYMPHOCYTES # BLD AUTO: 57 % — HIGH (ref 13–44)
MAGNESIUM SERPL-MCNC: 2 MG/DL — SIGNIFICANT CHANGE UP (ref 1.6–2.6)
MCHC RBC-ENTMCNC: 30.6 PG — SIGNIFICANT CHANGE UP (ref 27–34)
MCHC RBC-ENTMCNC: 32.8 GM/DL — SIGNIFICANT CHANGE UP (ref 32–36)
MCV RBC AUTO: 93.5 FL — SIGNIFICANT CHANGE UP (ref 80–100)
MONOCYTES # BLD AUTO: 0.05 K/UL — SIGNIFICANT CHANGE UP (ref 0–0.9)
MONOCYTES NFR BLD AUTO: 1.7 % — LOW (ref 2–14)
NEUTROPHILS # BLD AUTO: 0.9 K/UL — LOW (ref 1.8–7.4)
NEUTROPHILS NFR BLD AUTO: 31.6 % — LOW (ref 43–77)
NT-PROBNP SERPL-SCNC: 1911 PG/ML — HIGH (ref 0–300)
PCO2 BLDV: 48 MMHG — SIGNIFICANT CHANGE UP (ref 35–50)
PH BLDV: 7.41 — SIGNIFICANT CHANGE UP (ref 7.35–7.45)
PHOSPHATE SERPL-MCNC: 2.3 MG/DL — LOW (ref 2.5–4.5)
PLATELET # BLD AUTO: 30 K/UL — LOW (ref 150–400)
PO2 BLDV: 39 MMHG — SIGNIFICANT CHANGE UP (ref 25–45)
POTASSIUM SERPL-MCNC: 4.2 MMOL/L — SIGNIFICANT CHANGE UP (ref 3.5–5.3)
POTASSIUM SERPL-SCNC: 4.2 MMOL/L — SIGNIFICANT CHANGE UP (ref 3.5–5.3)
PROT SERPL-MCNC: 5.2 G/DL — LOW (ref 6–8.3)
RBC # BLD: 2.48 M/UL — LOW (ref 3.8–5.2)
RBC # FLD: 20.2 % — HIGH (ref 10.3–14.5)
SAO2 % BLDV: 66 % — LOW (ref 67–88)
SODIUM SERPL-SCNC: 143 MMOL/L — SIGNIFICANT CHANGE UP (ref 135–145)
VIT B12 SERPL-MCNC: 1536 PG/ML — HIGH (ref 232–1245)
WBC # BLD: 2.84 K/UL — LOW (ref 3.8–10.5)
WBC # FLD AUTO: 2.84 K/UL — LOW (ref 3.8–10.5)

## 2021-06-18 PROCEDURE — 99233 SBSQ HOSP IP/OBS HIGH 50: CPT | Mod: 25

## 2021-06-18 PROCEDURE — 99233 SBSQ HOSP IP/OBS HIGH 50: CPT | Mod: GC

## 2021-06-18 PROCEDURE — 99232 SBSQ HOSP IP/OBS MODERATE 35: CPT

## 2021-06-18 RX ORDER — FUROSEMIDE 40 MG
40 TABLET ORAL ONCE
Refills: 0 | Status: COMPLETED | OUTPATIENT
Start: 2021-06-18 | End: 2021-06-18

## 2021-06-18 RX ADMIN — PANTOPRAZOLE SODIUM 40 MILLIGRAM(S): 20 TABLET, DELAYED RELEASE ORAL at 05:49

## 2021-06-18 RX ADMIN — PIPERACILLIN AND TAZOBACTAM 25 GRAM(S): 4; .5 INJECTION, POWDER, LYOPHILIZED, FOR SOLUTION INTRAVENOUS at 05:50

## 2021-06-18 RX ADMIN — OXYCODONE HYDROCHLORIDE 30 MILLIGRAM(S): 5 TABLET ORAL at 03:06

## 2021-06-18 RX ADMIN — Medication 3 MILLILITER(S): at 05:29

## 2021-06-18 RX ADMIN — PREGABALIN 1000 MICROGRAM(S): 225 CAPSULE ORAL at 12:11

## 2021-06-18 RX ADMIN — Medication 1000 UNIT(S): at 12:11

## 2021-06-18 RX ADMIN — Medication 3 MILLILITER(S): at 12:07

## 2021-06-18 RX ADMIN — Medication 2 CAPSULE(S): at 08:50

## 2021-06-18 RX ADMIN — Medication 3 MILLILITER(S): at 17:25

## 2021-06-18 RX ADMIN — Medication 3 MILLILITER(S): at 00:05

## 2021-06-18 RX ADMIN — OXYCODONE HYDROCHLORIDE 30 MILLIGRAM(S): 5 TABLET ORAL at 18:16

## 2021-06-18 RX ADMIN — OXYCODONE HYDROCHLORIDE 30 MILLIGRAM(S): 5 TABLET ORAL at 03:38

## 2021-06-18 RX ADMIN — MIRTAZAPINE 15 MILLIGRAM(S): 45 TABLET, ORALLY DISINTEGRATING ORAL at 22:02

## 2021-06-18 RX ADMIN — AMLODIPINE BESYLATE 10 MILLIGRAM(S): 2.5 TABLET ORAL at 05:49

## 2021-06-18 RX ADMIN — CHLORHEXIDINE GLUCONATE 1 APPLICATION(S): 213 SOLUTION TOPICAL at 05:51

## 2021-06-18 RX ADMIN — OXYCODONE HYDROCHLORIDE 30 MILLIGRAM(S): 5 TABLET ORAL at 11:04

## 2021-06-18 RX ADMIN — Medication 2 CAPSULE(S): at 12:11

## 2021-06-18 RX ADMIN — PIPERACILLIN AND TAZOBACTAM 25 GRAM(S): 4; .5 INJECTION, POWDER, LYOPHILIZED, FOR SOLUTION INTRAVENOUS at 18:15

## 2021-06-18 RX ADMIN — Medication 40 MILLIGRAM(S): at 12:09

## 2021-06-18 RX ADMIN — APIXABAN 5 MILLIGRAM(S): 2.5 TABLET, FILM COATED ORAL at 05:49

## 2021-06-18 RX ADMIN — Medication 1 MILLIGRAM(S): at 12:11

## 2021-06-18 RX ADMIN — Medication 60 MILLIGRAM(S): at 05:49

## 2021-06-18 NOTE — DISCHARGE NOTE PROVIDER - CARE PROVIDERS DIRECT ADDRESSES
[email or web address]

## 2021-06-18 NOTE — PROGRESS NOTE ADULT - SUBJECTIVE AND OBJECTIVE BOX
INTERVAL HPI/OVERNIGHT EVENTS:  No overnight events.     MEDICATIONS  (STANDING):  albuterol/ipratropium for Nebulization 3 milliLiter(s) Nebulizer every 6 hours  albuterol/ipratropium for Nebulization.. 3 milliLiter(s) Nebulizer every 20 minutes  amLODIPine   Tablet 10 milliGRAM(s) Oral daily  apixaban 5 milliGRAM(s) Oral every 12 hours  chlorhexidine 2% Cloths 1 Application(s) Topical <User Schedule>  cholecalciferol 1000 Unit(s) Oral daily  cyanocobalamin 1000 MICROGram(s) Oral daily  folic acid 1 milliGRAM(s) Oral daily  methylPREDNISolone sodium succinate Injectable 60 milliGRAM(s) IV Push daily  mirtazapine 15 milliGRAM(s) Oral at bedtime  pancrelipase  (CREON 12,000 Lipase Units) 2 Capsule(s) Oral three times a day with meals  pantoprazole    Tablet 40 milliGRAM(s) Oral two times a day  piperacillin/tazobactam IVPB.. 3.375 Gram(s) IV Intermittent every 8 hours    MEDICATIONS  (PRN):  acetaminophen   Tablet .. 650 milliGRAM(s) Oral every 6 hours PRN Mild Pain (1 - 3), Moderate Pain (4 - 6)  aluminum hydroxide/magnesium hydroxide/simethicone Suspension 30 milliLiter(s) Oral every 4 hours PRN Dyspepsia  ondansetron    Tablet 4 milliGRAM(s) Oral four times a day PRN Nausea and/or Vomiting  oxyCODONE    IR 30 milliGRAM(s) Oral every 6 hours PRN Severe Pain (7 - 10)    Allergies    diazepam (Other)  lemon (Other)    Intolerances          VITAL SIGNS:  T(F): 99 (06-18-21 @ 11:57)  HR: 114 (06-18-21 @ 12:13)  BP: 117/76 (06-18-21 @ 11:57)  RR: 20 (06-18-21 @ 11:57)  SpO2: 92% (06-18-21 @ 12:13)  Wt(kg): --    PHYSICAL EXAM:    Constitutional: In moderate resp distress; on HFNC  Eyes: EOMI, PERRLA  Neck: supple, no masses, no JVD  Respiratory: Crackles  Cardiovascular: RRR, no M/R/G  Gastrointestinal: +BS, soft  Extremities: no c/c/e  Neurological: AAOx3, nonfocal    LABS:                        7.6    2.84  )-----------( 30       ( 18 Jun 2021 07:01 )             23.2     06-18    143  |  106  |  11  ----------------------------<  84  4.2   |  27  |  0.94    Ca    8.2<L>      18 Jun 2021 07:01  Phos  2.3     06-18  Mg     2.0     06-18    TPro  5.2<L>  /  Alb  2.2<L>  /  TBili  0.4  /  DBili  x   /  AST  41<H>  /  ALT  22  /  AlkPhos  123<H>  06-18          RADIOLOGY & ADDITIONAL TESTS:  Studies reviewed.

## 2021-06-18 NOTE — PROGRESS NOTE ADULT - PROBLEM SELECTOR PLAN 7
Hx of stage III non-small cell lung cancer on chemotherapy (last tx w/ Keytruda/Alimta 6/10).    Plan:  -Heme/onc consulted, recs appreciated  -No intervention indicated as inpatient at this time

## 2021-06-18 NOTE — PROGRESS NOTE ADULT - PROBLEM SELECTOR PLAN 3
Pt w/ chronic anemia, baseline Hgb 8-9 per chart review. Elevated RDW, blood smear w/ macrocytosis. S/p 1U PRBC (6/16). Now w/ new pancytopenia, possibly in s/o of immunotherapy.     Plan:  -Daily CBC  -Trend H+H, WBC, plt  -C/w home folic acid, B12  -S/p 1U PRBCs (6/16)  -Elevated LDH but nml bili, haptoglobin, anemia unlikely hemolysis  -Transfuse PRBC for Hgb <7  -Transfuse plt for <50 w/ evidence of bleeding, otherwise <10

## 2021-06-18 NOTE — PROGRESS NOTE ADULT - PROBLEM SELECTOR PLAN 2
Pt w/ elevated BNP on admission w/ CTA demonstrating b/l ground glass opacities possibly 2/2 pulmonary edema. Will r/o CHF.    Plan:  -Repeat CXR w/ worsening pulmonary edema  -Trend BNP  -Echo performed here w/ hyperdynamic LV systolic function, nml diastolic function  -s/p Lasix IVP x 3, will give additional this AM

## 2021-06-18 NOTE — DISCHARGE NOTE PROVIDER - NSDCFUADDAPPT_GEN_ALL_CORE_FT
PLEASE SCHEDULE AN APPOINTMENT WITH YOUR PRIMARY CARE DOCTOR, PULMONOLOGY, AND HEMATOLOGY/ONCOLOGY. PLEASE SCHEDULE AN APPOINTMENT WITH YOUR PRIMARY CARE DOCTOR, PULMONOLOGY, AND HEMATOLOGY/ONCOLOGY.    YOU RECEIVED THE MONIQUE AND MONIQUE VACCINE ON 6/28/21.

## 2021-06-18 NOTE — PROGRESS NOTE ADULT - ASSESSMENT
This is a 66 y/o F with stage III L lung adenocarcinoma on alimta/keytruda, emphysema, HTN, h/o DVT/PE not on AC who presents with acute onset SOB. Found to be hypoxic and currently requiring HFNC to maintain SpO2. CT chest remarkable for diffuse, b/l GGO in the setting of moderate to severe emphysema, which is new compared to CT chest from 3 months prior. GGO may represent pulmonary edema, infection or inflammation.    #Acute hypoxic respiratory failure  - likely 2/2 b/l diffuse GGO on CT chest, though patient does have history of PE with interruption of AC in the past  - ESR, CRP elevation noted  - continue anticoagulation for known PE, monitor platelets closely (decreased to 65 today)  - continue lasix as blood pressure tolerates  - TTE with bubble study did not demonstrate decreased systolic function or evidence of shunting, though study was suboptimal  - BNP elevation noted, high output cardiac failure?  - 5 day course of zosyn  - Legionella negative, can d/c azithromycin  - fungitell negative, less likely PCP  - continue solumedrol 1 mg/kg daily  - continue HFNC, maintain SpO2 88-92%, wean as tolerated  - IS, acapella device  - pain control    Shyam Lucio PGY-5  Pulmonary/Critical Care Fellow  Pager: [phone] ([facility]) [phone] (NS)  Pulmonary Spectra #[id number] (NS) / [id number] ([facility]) This is a 64 y/o F with stage III L lung adenocarcinoma on alimta/keytruda, emphysema, HTN, h/o DVT/PE not on AC who presents with acute onset SOB. Found to be hypoxic and currently requiring HFNC to maintain SpO2. CT chest remarkable for diffuse, b/l GGO in the setting of moderate to severe emphysema, which is new compared to CT chest from 3 months prior. GGO may represent pulmonary edema, infection or inflammation.    #Acute hypoxic respiratory failure  - likely 2/2 b/l diffuse GGO on CT chest, though patient does have history of PE with interruption of AC in the past  - ESR, CRP elevation noted  - platelets 30 today, may need to hold anticoagulation or possibly transfuse platelets  - continue lasix as blood pressure tolerates  - TTE with bubble study did not demonstrate decreased systolic function or evidence of shunting, though study was suboptimal  - BNP elevation noted, high output cardiac failure?  - 5 day course of zosyn  - Legionella negative, can d/c azithromycin  - fungitell negative, less likely PCP  - continue solumedrol 1 mg/kg daily - inflammatory markers are elevated but it would be unusual for drug-induced pneumonitis (from keytruda) to present so far after initiation of therapy  - continue HFNC, maintain SpO2 88-92%, wean as tolerated  - IS, acapella device  - pain control    Shyam Lucio PGY-5  Pulmonary/Critical Care Fellow  Pager: [phone] ([facility]) [phone] (NS)  Pulmonary Spectra #[id number] (NS) / [phone] ([facility])

## 2021-06-18 NOTE — PROGRESS NOTE ADULT - ATTENDING COMMENTS
Agree with plan as outlined above. Patient seen and examined at bedside. Patient history, laboratory data, and imaging personally reviewed.    Pt is a 65F with PMHx former smoker with COPD, non-small cell lung adenocarcinoma of left lung (dz'ed Stage III, s/p Carboplatin/Alimta/Pembrolizumab last given 6/10/21), hx extensive PE (2/2020) on NOAC, chronic pancreatitis on PERT with opioid dependence, and hx vocal cord polypectomy presenting to [facility] 6/14/21 with acute onset dyspnea and progressively worsening b/l LE edema now requiring HFNC O2 supplementation. CT imaging reviewed and c/w worsening bilateral interlobular septal thickening most likely 2/2 fluid vs. inflammation. TTE wnl, no evidence of ventricular dysfunction or pulmHTN. Pt clinically improving.     -c/w diuresis with strict I/Os and BMPs with electrolyte repletion with goal of net (-) 1L/24hrs.    -c/w stress dose steroids ~1mg/kg at 60mg IV Solu-Medrol QD  -Wean O2 supplementation for goal O2 saturation 92-95%  -c/w ATC nebulizer therapy as above  -c/w broaden spectrum Abx  -Pain control with opioids for chronic pancreatitis as well as avoidance of NPO and missed meals (pt says her pancreatitis worsens if she doesn't eat)  -Unlikely 2/2 drug-induced pneumonitis   -Pulmonary will continue to follow. Agree with plan as outlined above. Patient seen and examined at bedside. Patient history, laboratory data, and imaging personally reviewed.    Pt is a 65F with PMHx former smoker with COPD, non-small cell lung adenocarcinoma of left lung (dz'ed Stage III, s/p Carboplatin/Alimta/Pembrolizumab last given 6/10/21), hx extensive PE (2/2020) on NOAC, chronic pancreatitis on PERT with opioid dependence, and hx vocal cord polypectomy presenting to [facility] 6/14/21 with acute onset dyspnea and progressively worsening b/l LE edema now requiring HFNC O2 supplementation. CT imaging reviewed and c/w worsening bilateral interlobular septal thickening most likely 2/2 fluid vs. inflammation. TTE wnl, no evidence of ventricular dysfunction or pulmHTN. Pt clinically improving.     -c/w diuresis with strict I/Os and BMPs with electrolyte repletion with goal of net (-) 1L/24hrs.    -c/w stress dose steroids ~1mg/kg at 60mg IV Solu-Medrol QD, although drug-induced pneumonitis less likely   -Wean O2 supplementation for goal O2 saturation 92-95%  -c/w ATC nebulizer therapy as above  -c/w broaden spectrum Abx  -Pain control with opioids for chronic pancreatitis as well as avoidance of NPO and missed meals (pt says her pancreatitis worsens if she doesn't eat)  -Pulmonary will continue to follow.

## 2021-06-18 NOTE — DISCHARGE NOTE PROVIDER - NSDCCPCAREPLAN_GEN_ALL_CORE_FT
PRINCIPAL DISCHARGE DIAGNOSIS  Diagnosis: Acute hypoxemic respiratory failure  Assessment and Plan of Treatment: You were admitted to the hospital for difficulty breathing and hypoxemia, which refers to low levels of oxygen in your blood. You were placed on supplemental oxygen to help increase oxygen levels in your blood. There are many possible causes of low oxygen, including issues with your lungs or heart. You received several different treatments to address these possiblities. You received breathing treatments and steroids to help with lung inflammation. You also completed a course of antibiotics to help fight off any lung infection. In addition, you received diuretics (water pills) to remove fluid from your lungs.   PLEASE CONTACT YOUR PRIMARY DOCTOR OR RETURN TO THE EMERGENCY ROOM FOR: difficulty breathing, worsening cough, chest pain, leg swelling, or any new or concerning symptoms.      SECONDARY DISCHARGE DIAGNOSES  Diagnosis: Pancytopenia  Assessment and Plan of Treatment: You were found to have panctyopenia, which refers to low levels of different types of cells in your blood, such as white blood cells or red blood cells. Pancytopenia can have several causes, including chemotherapy or immunotherapy treatment. While you were in the hospital, you required a blood transfusion due to low hemoglobin levels.   PLEASE CONTACT YOUR PRIMARY DOCTOR OR RETURN TO THE EMERGENCY ROOM FOR: Fever, chills, coughing up blood, easy brusing or bleeding, blood in your stool, weakness or fatigue, or any new or concerning symptoms.     PRINCIPAL DISCHARGE DIAGNOSIS  Diagnosis: Acute hypoxemic respiratory failure  Assessment and Plan of Treatment: You were admitted to the hospital for difficulty breathing and hypoxemia, which refers to low levels of oxygen in your blood. You were placed on supplemental oxygen to help increase oxygen levels in your blood. There are many possible causes of low oxygen, including issues with your lungs or heart. You received several different treatments to address these possiblities. You received breathing treatments and steroids to help with lung inflammation. You also completed a course of antibiotics to help fight off any lung infection. In addition, you received diuretics (water pills) to remove fluid from your lungs.   PLEASE CONTACT YOUR PRIMARY DOCTOR OR RETURN TO THE EMERGENCY ROOM FOR: difficulty breathing, worsening cough, chest pain, leg swelling, or any new or concerning symptoms.      SECONDARY DISCHARGE DIAGNOSES  Diagnosis: Pancytopenia  Assessment and Plan of Treatment: You were found to have panctyopenia, which refers to low levels of several types of cells in your blood, such as white blood cells, red blood cells or platelets. Pancytopenia can have several causes, including chemotherapy or immunotherapy treatment. While you were in the hospital, you required several transfusions of red blood cells and platelets. You also underwent a biopsy of your bone marrow.   PLEASE CONTACT YOUR PRIMARY DOCTOR OR RETURN TO THE EMERGENCY ROOM FOR: Fever, chills, coughing up blood, easy brusing or bleeding, blood in your stool, weakness or fatigue, or any new or concerning symptoms.     PRINCIPAL DISCHARGE DIAGNOSIS  Diagnosis: Acute hypoxemic respiratory failure  Assessment and Plan of Treatment: You were admitted to the hospital for difficulty breathing and hypoxemia, which refers to low levels of oxygen in your blood. You were placed on supplemental oxygen to help increase oxygen levels in your blood. There are many possible causes of low oxygen, including issues with your lungs or heart. You received several different treatments to address these possiblities. You received breathing treatments and steroids to help with lung inflammation. You also completed a course of antibiotics to help fight off any lung infection. In addition, you received diuretics (water pills) to remove fluid from your lungs. You will have to complete your course of steroids as prescribed. Please follow up with your PCP in 1-2 weeks. A referral will also be provided for you to follow up with a pulmonologist.  PLEASE CONTACT YOUR PRIMARY DOCTOR OR RETURN TO THE EMERGENCY ROOM FOR: difficulty breathing, worsening cough, chest pain, leg swelling, or any new or concerning symptoms.      SECONDARY DISCHARGE DIAGNOSES  Diagnosis: Pancytopenia  Assessment and Plan of Treatment: You were found to have panctyopenia, which refers to low levels of several types of cells in your blood, such as white blood cells, red blood cells or platelets. Pancytopenia can have several causes, including chemotherapy or immunotherapy treatment. While you were in the hospital, you required several transfusions of red blood cells and platelets. You also underwent a biopsy of your bone marrow. Follow up with your oncologist in 1-2 weeks for the results.   PLEASE CONTACT YOUR PRIMARY DOCTOR OR RETURN TO THE EMERGENCY ROOM FOR: Fever, chills, coughing up blood, easy brusing or bleeding, blood in your stool, weakness or fatigue, or any new or concerning symptoms.     PRINCIPAL DISCHARGE DIAGNOSIS  Diagnosis: Acute hypoxemic respiratory failure  Assessment and Plan of Treatment: You were admitted to the hospital for difficulty breathing and hypoxemia, which refers to low levels of oxygen in your blood. You were placed on supplemental oxygen to help increase oxygen levels in your blood. There are many possible causes of low oxygen, including issues with your lungs or heart. You received several different treatments to address these possiblities. You received breathing treatments and steroids to help with lung inflammation. You also completed a course of antibiotics to help fight off any lung infection. In addition, you received diuretics (water pills) to remove fluid from your lungs. You will have to complete your course of steroids as prescribed. Please follow up with your PCP in 1-2 weeks. A referral will also be provided for you to follow up with a pulmonologist. Pleae continue  to wear the oxygen at home to help you with your breathing.  PLEASE CONTACT YOUR PRIMARY DOCTOR OR RETURN TO THE EMERGENCY ROOM FOR: difficulty breathing, worsening cough, chest pain, leg swelling, or any new or concerning symptoms.      SECONDARY DISCHARGE DIAGNOSES  Diagnosis: Pancytopenia  Assessment and Plan of Treatment: You were found to have panctyopenia, which refers to low levels of several types of cells in your blood, such as white blood cells, red blood cells or platelets. Pancytopenia can have several causes, including chemotherapy or immunotherapy treatment. While you were in the hospital, you required several transfusions of red blood cells and platelets. You also underwent a biopsy of your bone marrow. Follow up with your oncologist in 1-2 weeks for the results.   PLEASE CONTACT YOUR PRIMARY DOCTOR OR RETURN TO THE EMERGENCY ROOM FOR: Fever, chills, coughing up blood, easy brusing or bleeding, blood in your stool, weakness or fatigue, or any new or concerning symptoms.

## 2021-06-18 NOTE — DISCHARGE NOTE PROVIDER - HOSPITAL COURSE
66 y/o F with PMHx HTN, COPD, non-small cell lung cancer on Keytruda/Alimta (last tx 6/10), chronic pancreatitis, hx of DVT/PE on Eliquis but recently held in the s/o fall w/ sustained R orbital floor fracture, who was admitted  for acute hypoxemic respiratory failure. Ms. Travis is a 66 y/o F with PMHx HTN, COPD, non-small cell lung cancer on Keytruda/Alimta (last tx 6/10), chronic pancreatitis, hx of DVT/PE on Eliquis but recently held in the s/o fall w/ sustained R orbital floor fracture, who was admitted  for acute hypoxemic respiratory failure. On arrival to the ED - , /86, RR 26, SpO2 90% on 12L NRB; WBC 6.84, Hgb 8.1, Plt 186; BNP 1957, Troponin 20. VBG lactate 4.1. Once admitted, she had increasing O2 requirements and was placed on high-flow NC, which was weaned as tolerated throughout admission. Pulmonology was consulted. She had a CTA chest which demonstrated evidence of chronic thromboembolic disease but no acute PE, as well as bilateral ground-glass opacities and emphysematous change. COVID-19 was negative x 3. She had an echo which demonstrated hyperdynamic LV systolic function w/ EF >75%, but otherwise no abnormalities. Her BNP was trended and she received intermittent diuresis throughout admission. She was also treated with Duonebs and IV Solumedrol, and completed an empiric 7-day course of Zosyn. Her hospital course was complicated by new pancytopenia requiring 1U of PRBCs on 6/16. Ms. Travis is a 66 y/o F with PMHx HTN, COPD, non-small cell lung cancer on Keytruda/Alimta (last tx 6/10), chronic pancreatitis, hx of DVT/PE on Eliquis but recently held in the s/o fall w/ sustained R orbital floor fracture, who was admitted  for acute hypoxemic respiratory failure. On arrival to the ED - , /86, RR 26, SpO2 90% on 12L NRB; WBC 6.84, Hgb 8.1, Plt 186; BNP 1957, Troponin 20. VBG lactate 4.1. Once admitted, she had increasing O2 requirements and was placed on high-flow NC, which was weaned as tolerated throughout admission. Pulmonology was consulted. She had a CTA chest which demonstrated evidence of chronic thromboembolic disease but no acute PE, as well as bilateral ground-glass opacities and emphysematous change. COVID-19 was negative x 3. She had an echo which demonstrated hyperdynamic LV systolic function w/ EF >75%, but otherwise no abnormalities. Her BNP was trended and she received intermittent diuresis throughout admission. She was also treated with Duonebs and IV Solumedrol and then transitioned to PO prednisone for tapering. She also completed an empiric 7-day course of Zosyn. Her hospital course was complicated by new pancytopenia requiring 1U of PRBCs and 3U of platelets . Ms. Travis is a 64 y/o F with PMHx HTN, COPD, non-small cell lung cancer on Keytruda/Alimta (last tx 6/10), chronic pancreatitis, hx of DVT/PE on Eliquis but recently held in the s/o fall w/ sustained R orbital floor fracture, who was admitted  for acute hypoxemic respiratory failure. On arrival to the ED - , /86, RR 26, SpO2 90% on 12L NRB; WBC 6.84, Hgb 8.1, Plt 186; BNP 1957, Troponin 20. VBG lactate 4.1. Once admitted, she had increasing O2 requirements and was placed on high-flow NC, which was weaned as tolerated throughout admission. Pulmonology was consulted. She had a CTA chest which demonstrated evidence of chronic thromboembolic disease but no acute PE, as well as bilateral ground-glass opacities and emphysematous change. COVID-19 was negative x 3. She had an echo which demonstrated hyperdynamic LV systolic function w/ EF >75%, but otherwise no abnormalities. Her BNP was trended and she received intermittent diuresis throughout admission. She was also treated with Duonebs and IV Solumedrol and then transitioned to PO prednisone for tapering. She also completed an empiric 7-day course of Zosyn. Her hospital course was complicated by new pancytopenia requiring 1U of PRBCs and 3U of platelets. Ms. Travis is a 66 y/o F with PMHx HTN, COPD, non-small cell lung cancer on Keytruda/Alimta (last tx 6/10), chronic pancreatitis, hx of DVT/PE on Eliquis but recently held in the s/o fall w/ sustained R orbital floor fracture, who was admitted  for acute hypoxemic respiratory failure. On arrival to the ED - , /86, RR 26, SpO2 90% on 12L NRB; WBC 6.84, Hgb 8.1, Plt 186; BNP 1957, Troponin 20. VBG lactate 4.1. Once admitted, she had increasing O2 requirements and was placed on high-flow NC, which was weaned as tolerated throughout admission. Pulmonology was consulted. She had a CTA chest which demonstrated evidence of chronic thromboembolic disease but no acute PE, as well as bilateral ground-glass opacities and emphysematous change. COVID-19 was negative x 3. She had an echo which demonstrated hyperdynamic LV systolic function w/ EF >75%, but otherwise no abnormalities. Her BNP was trended and she received intermittent diuresis throughout admission. She was also treated with Duonebs and IV Solumedrol and then transitioned to PO prednisone for tapering. She also completed an empiric 7-day course of Zosyn. Her hospital course was complicated by new pancytopenia requiring 1U of PRBCs and 3U of platelets. She underwent bone marrow biopsy on 6/25. Ms. Travis is a 66 y/o F with PMHx HTN, COPD, non-small cell lung cancer on Keytruda/Alimta (last tx 6/10), chronic pancreatitis, hx of DVT/PE on Eliquis but recently held in the s/o fall w/ sustained R orbital floor fracture, who was admitted  for acute hypoxemic respiratory failure. On arrival to the ED - , /86, RR 26, SpO2 90% on 12L NRB; WBC 6.84, Hgb 8.1, Plt 186; BNP 1957, Troponin 20. VBG lactate 4.1. Once admitted, she had increasing O2 requirements and was placed on high-flow NC, which was weaned as tolerated throughout admission. Pulmonology was consulted. She had a CTA chest which demonstrated evidence of chronic thromboembolic disease but no acute PE, as well as bilateral ground-glass opacities and emphysematous change. COVID-19 was negative x 3. She had an echo which demonstrated hyperdynamic LV systolic function w/ EF >75%, but otherwise no abnormalities. Her BNP was trended and she received intermittent diuresis throughout admission. She was also treated with Duonebs and IV Solumedrol and then transitioned to PO prednisone for tapering. She also completed an empiric 7-day course of Zosyn. Her hospital course was complicated by new pancytopenia, 2/2 recent chemo vs drug-induced, requiring 1U of PRBCs and 3U of platelets. She underwent bone marrow biopsy on 6/25. Platelets are now recovering. Patient is medically stable for discharge home. Ms. Travis is a 66 y/o F with PMHx HTN, COPD, non-small cell lung cancer on Keytruda/Alimta (last tx 6/10), chronic pancreatitis, hx of DVT/PE on Eliquis but recently held in the s/o fall w/ sustained R orbital floor fracture, who was admitted  for acute hypoxemic respiratory failure. On arrival to the ED - , /86, RR 26, SpO2 90% on 12L NRB; WBC 6.84, Hgb 8.1, Plt 186; BNP 1957, Troponin 20. VBG lactate 4.1. Once admitted, she had increasing O2 requirements and was placed on high-flow NC, which was weaned as tolerated throughout admission. Pulmonology was consulted. She had a CTA chest which demonstrated evidence of chronic thromboembolic disease but no acute PE, as well as bilateral ground-glass opacities and emphysematous change. COVID-19 was negative x 3. She had an echo which demonstrated hyperdynamic LV systolic function w/ EF >75%, but otherwise no abnormalities. Her BNP was trended and she received intermittent diuresis throughout admission. She was also treated with Duonebs and IV Solumedrol and then transitioned to PO prednisone for tapering. She also completed an empiric 7-day course of Zosyn. Her hospital course was complicated by new pancytopenia, 2/2 recent chemo vs drug-induced, requiring 1U of PRBCs and 3U of platelets. She underwent bone marrow biopsy on 6/25. Platelets are now recovering. Patient is medically stable for discharge home. Pt received [employer] COVID vaccine prior to discharge on 6/28/21.

## 2021-06-18 NOTE — PROGRESS NOTE ADULT - ASSESSMENT
64 y/o F with PMHx HTN, COPD, non-small cell lung cancer on chemotherapy,GERD, chronic pancreatitis, hx of DVT/PE on Eliquis but recently held in s/o fall w/ sustained R orbital floor fx, admitted for acute shortness of breath and hypoxia. Oncology consulted given active malignancy on t/m.    # Non-small cell lung adenocarcinoma   - Diagnosed Feb 19, 2020   - Stage IIIC by imaging. Large volume of disease and hence, not started on RT  - Started carbo/Alimta/Pem given Q 3 weeks schedule  - Currently on maintenance alimta/keytruda (last t/m 6/10)   - CTA Chest 6/14: Bilateral symmetric groundglass opacities with interlobular septal thickening, consistent with pulmonary edema. Web at the right pulmonary artery along with pruning and stenosis of the left lower lobe pulmonary arteries, consistent with chronic thromboembolic disease.  - No plans for inpatient t/m; cont. outpatient f/up with Dr. Cage    # Acute hypoxic resp failure  - CT chest as above  - Discussed with pulm: differentials are pulm edema, chronic thromboembolic disease and splinting due to pain from chronic pancreatitis acting up  - Low suspicion for IRAE  - Management per pulm    # Leukopenia;  today  - Likely marrow suppression from critical illness  - Would cont. zosyn while patient neutropenic       Myles Mccollum, PGY-4  Hematology-Oncology Fellow  [phone] ([place]) [phone] ([facility])  I can also be reached on [employer] Teams  Please page fellow on call after 5pm and on weekends

## 2021-06-18 NOTE — PROGRESS NOTE ADULT - ATTENDING COMMENTS
Hospitalist- Joseph Callaway MD  Pager: [phone]  If no response or off-hours, page [phone]  -------------------------------------  Hypoxic respiratory failure- no clear unifying diagnosis although patient admits to smoking cigarette 2-3 days prior to admission, so may have developed acute interstitial pneumonitis, also noted to have non-cardiogenic pulmonary edema from unclear trigger. Weaned modestly on HFNC, continue weaning as tolerated. Continue stress dose steroids per pulm. Will give lasix again today. Would complete 7 day course of zosyn and then monitor off abx.  dispo: pending clinical progress and PT recs.

## 2021-06-18 NOTE — DISCHARGE NOTE PROVIDER - NSFOLLOWUPCLINICS_GEN_ALL_ED_FT
[facility]  Pulmonology  [address] [place] [address], [place] [address]  Phone: [phone]  Fax:   Follow Up Time: 2 weeks

## 2021-06-18 NOTE — PROGRESS NOTE ADULT - ATTENDING COMMENTS
Remains hypoxic. pulmonary Infiltrates are worse on most recent Xray. Does not seem consistent with pneumonitis from immunotherapy. Cont tx per primary team.

## 2021-06-18 NOTE — PROGRESS NOTE ADULT - SUBJECTIVE AND OBJECTIVE BOX
Contact information:  Esthela Matute, MS4   Medicine Team 5  Pager: [phone]    HIMANSHU VILLEGAS, MRN-[id number]    SUBJECTIVE/OVERNIGHT EVENTS: Patient seen and examined at bedside. No acute events overnight. Continues to feel improved, slightly better than yesterday. States her breathing is comfortable, cough is slightly better and less productive than yesterday. Pt reports no abdominal pain at beginning of interview, then reporting pain and requesting medication at end of assessment. No fever, chills, N/V.    OBJECTIVE:      Vitals:  Vital Signs Last 24 Hrs  T(C): 37.2 (18 Jun 2021 11:57), Max: 37.2 (18 Jun 2021 11:57)  T(F): 99 (18 Jun 2021 11:57), Max: 99 (18 Jun 2021 11:57)  HR: 116 (18 Jun 2021 11:57) (67 - 118)  BP: 117/76 (18 Jun 2021 11:57) (117/76 - 132/83)  RR: 20 (18 Jun 2021 11:57) (18 - 20)  SpO2: 93% (18 Jun 2021 11:57) (92% - 100%)    Exam:  GEN: Awake and alert, lying in bed on HFNC  HEENT: Mild periorbital ecchymosis of R eye, EOMI. MMM  NECK: Supple  CHEST: On high-flow NC, O2 saturations in 90s (on 100% FiO2, 50L) at time of assessment. Non-labored breathing, speaking in full sentences. Coarse crackles b/l lower lung felids. (+) port R chest  CV: RRR. (+) S1/S2. No audible S3/S4. R No murmurs, rubs, or gallops.    GI: Soft, nontender, nondistended. +BS.  EXT: Warm and well perfused. Trace pitting edema to mid-shin b/l. No calf tenderness. 2+ distal pulses b/l LEs  SKIN: Intact, no rashes or lesions.   NEURO: A&Ox3, grossly non-focal    I&Os:    17 Jun 2021 07:01  -  18 Jun 2021 07:00  --------------------------------------------------------  IN:    IV PiggyBack: 200 mL  Total IN: 200 mL    OUT:    Voided (mL): 1000 mL  Total OUT: 1000 mL    Total NET: -800 mL      Labs:                        7.6    2.84  )-----------( 30       ( 18 Jun 2021 07:01 )             23.2     06-18    143  |  106  |  11  ----------------------------<  84  4.2   |  27  |  0.94    Ca    8.2<L>      18 Jun 2021 07:01  Phos  2.3     06-18  Mg     2.0     06-18    TPro  5.2<L>  /  Alb  2.2<L>  /  TBili  0.4  /  DBili  x   /  AST  41<H>  /  ALT  22  /  AlkPhos  123<H>  06-18    Culture - Sputum . (06.17.21 @ 17:18)   Gram Stain:   Few polymorphonuclear leukocytes per low power field   Few Squamous epithelial cells per low power field   Few Yeast like cells per oil power field     MEDS:  MEDICATIONS  (STANDING):  albuterol/ipratropium for Nebulization 3 milliLiter(s) Nebulizer every 6 hours  albuterol/ipratropium for Nebulization.. 3 milliLiter(s) Nebulizer every 20 minutes  amLODIPine   Tablet 10 milliGRAM(s) Oral daily  apixaban 5 milliGRAM(s) Oral every 12 hours  chlorhexidine 2% Cloths 1 Application(s) Topical <User Schedule>  cholecalciferol 1000 Unit(s) Oral daily  cyanocobalamin 1000 MICROGram(s) Oral daily  folic acid 1 milliGRAM(s) Oral daily  methylPREDNISolone sodium succinate Injectable 60 milliGRAM(s) IV Push daily  mirtazapine 15 milliGRAM(s) Oral at bedtime  pancrelipase  (CREON 12,000 Lipase Units) 2 Capsule(s) Oral three times a day with meals  pantoprazole    Tablet 40 milliGRAM(s) Oral two times a day  piperacillin/tazobactam IVPB.. 3.375 Gram(s) IV Intermittent every 8 hours    MEDICATIONS  (PRN):  acetaminophen   Tablet .. 650 milliGRAM(s) Oral every 6 hours PRN Mild Pain (1 - 3), Moderate Pain (4 - 6)  aluminum hydroxide/magnesium hydroxide/simethicone Suspension 30 milliLiter(s) Oral every 4 hours PRN Dyspepsia  ondansetron    Tablet 4 milliGRAM(s) Oral four times a day PRN Nausea and/or Vomiting  oxyCODONE    IR 30 milliGRAM(s) Oral every 6 hours PRN Severe Pain (7 - 10)

## 2021-06-18 NOTE — DISCHARGE NOTE PROVIDER - NSDCMRMEDTOKEN_GEN_ALL_CORE_FT
Augmentin 875 mg-125 mg oral tablet: 1 tab(s) orally 2 times a day   Creon 24,000 units oral delayed release capsule: 1 cap(s) orally 3 times a day  dexamethasone 2 mg oral tablet: 2 days post chemo  Eliquis 5 mg oral tablet: 1 tab(s) orally every 12 hours   folic acid 1 mg oral tablet: 1 tab(s) orally once a day  metoprolol succinate 100 mg oral tablet, extended release: 1 tab(s) orally once a day  mirtazapine 15 mg oral tablet: 1 tab(s) orally once a day (at bedtime)  Norvasc 10 mg oral tablet: 1 tab(s) orally once a day  omeprazole 40 mg oral delayed release capsule: 1 cap(s) orally 2 times a day  ondansetron 8 mg oral tablet: 1 tab(s) orally 4 times a day, As Needed  oxyCODONE 30 mg oral tablet: 1 tab(s) orally every 6 hours, As Needed  Vitamin B12: 1 tab(s) orally once a day  Vitamin D3: 1 tab(s) orally once a day   Creon 24,000 units oral delayed release capsule: 1 cap(s) orally 3 times a day  dexamethasone 2 mg oral tablet: 2 days post chemo  Eliquis 5 mg oral tablet: 1 tab(s) orally every 12 hours   folic acid 1 mg oral tablet: 1 tab(s) orally once a day  metoprolol succinate 100 mg oral tablet, extended release: 1 tab(s) orally once a day  mirtazapine 15 mg oral tablet: 1 tab(s) orally once a day (at bedtime)  Norvasc 10 mg oral tablet: 1 tab(s) orally once a day  omeprazole 40 mg oral delayed release capsule: 1 cap(s) orally 2 times a day  ondansetron 8 mg oral tablet: 1 tab(s) orally 4 times a day, As Needed  oxyCODONE 30 mg oral tablet: 1 tab(s) orally every 6 hours, As Needed  Vitamin B12: 1 tab(s) orally once a day  Vitamin D3: 1 tab(s) orally once a day   Creon 24,000 units oral delayed release capsule: 1 cap(s) orally 3 times a day  dexamethasone 2 mg oral tablet: 2 days post chemo  Eliquis 5 mg oral tablet: 1 tab(s) orally every 12 hours   folic acid 1 mg oral tablet: 1 tab(s) orally once a day  metoprolol succinate 100 mg oral tablet, extended release: 1 tab(s) orally once a day  mirtazapine 15 mg oral tablet: 1 tab(s) orally once a day (at bedtime)  Norvasc 10 mg oral tablet: 1 tab(s) orally once a day  omeprazole 40 mg oral delayed release capsule: 1 cap(s) orally 2 times a day  ondansetron 8 mg oral tablet: 1 tab(s) orally 4 times a day, As Needed  oxyCODONE 30 mg oral tablet: 1 tab(s) orally every 6 hours, As Needed  Rolling walker:   Vitamin B12: 1 tab(s) orally once a day  Vitamin D3: 1 tab(s) orally once a day   Creon 24,000 units oral delayed release capsule: 1 cap(s) orally 3 times a day  dexamethasone 2 mg oral tablet: 2 days post chemo  Eliquis 5 mg oral tablet: 1 tab(s) orally every 12 hours   folic acid 1 mg oral tablet: 1 tab(s) orally once a day  metoprolol succinate 100 mg oral tablet, extended release: 1 tab(s) orally once a day  mirtazapine 15 mg oral tablet: 1 tab(s) orally once a day (at bedtime)  Norvasc 10 mg oral tablet: 1 tab(s) orally once a day  omeprazole 40 mg oral delayed release capsule: 1 cap(s) orally 2 times a day  ondansetron 8 mg oral tablet: 1 tab(s) orally 4 times a day, As Needed  oxyCODONE 30 mg oral tablet: 1 tab(s) orally every 6 hours, As Needed  Rolling walker:   Rolling walker:   Vitamin B12: 1 tab(s) orally once a day  Vitamin D3: 1 tab(s) orally once a day   Creon 24,000 units oral delayed release capsule: 1 cap(s) orally 3 times a day  dexamethasone 2 mg oral tablet: 2 days post chemo  Eliquis 5 mg oral tablet: 1 tab(s) orally every 12 hours   folic acid 1 mg oral tablet: 1 tab(s) orally once a day  Home oxygen: Pt requires continuous oxygen for COPD, 3L/min nasal cannula, She is hypoxic to SpO2 80% at rest on RA and 67% when active on RA.   metoprolol succinate 100 mg oral tablet, extended release: 1 tab(s) orally once a day  mirtazapine 15 mg oral tablet: 1 tab(s) orally once a day (at bedtime)  Norvasc 10 mg oral tablet: 1 tab(s) orally once a day  omeprazole 40 mg oral delayed release capsule: 1 cap(s) orally 2 times a day  ondansetron 8 mg oral tablet: 1 tab(s) orally 4 times a day, As Needed  oxyCODONE 30 mg oral tablet: 1 tab(s) orally every 6 hours, As Needed  Rolling walker:   Rolling walker:   Vitamin B12: 1 tab(s) orally once a day  Vitamin D3: 1 tab(s) orally once a day   Creon 24,000 units oral delayed release capsule: 1 cap(s) orally 3 times a day  dexamethasone 2 mg oral tablet: 2 days post chemo  Eliquis 5 mg oral tablet: 1 tab(s) orally every 12 hours   folic acid 1 mg oral tablet: 1 tab(s) orally once a day  Home oxygen: Pt requires continuous oxygen for COPD, 3L/min nasal cannula, She is hypoxic to SpO2 80% at rest on RA and 67% when active on RA.   metoprolol succinate 100 mg oral tablet, extended release: 1 tab(s) orally once a day  mirtazapine 15 mg oral tablet: 1 tab(s) orally once a day (at bedtime)  Norvasc 10 mg oral tablet: 1 tab(s) orally once a day  omeprazole 40 mg oral delayed release capsule: 1 cap(s) orally 2 times a day  ondansetron 8 mg oral tablet: 1 tab(s) orally 4 times a day, As Needed  oxyCODONE 30 mg oral tablet: 1 tab(s) orally every 6 hours, As Needed  predniSONE 10 mg oral tablet: Take 4 tabs daily for 3 days, then 3 tabs daily for 5 days, then 2 tabs daily for 5 days, then 1 tab daily for 5 days  Rolling walker:   Rolling walker:   sulfamethoxazole-trimethoprim 800 mg-160 mg oral tablet: 1 tab(s) orally 3 times a week. Take 1 tab orally on monday, wednesday, friday.   Symbicort 160 mcg-4.5 mcg/inh inhalation aerosol: 1 puff(s) inhaled 2 times a day   Vitamin B12: 1 tab(s) orally once a day  Vitamin D3: 1 tab(s) orally once a day

## 2021-06-18 NOTE — PROGRESS NOTE ADULT - ASSESSMENT
66 y/o F with PMHx HTN, COPD, non-small cell lung cancer on chemotherapy,GERD, chronic pancreatitis, hx of DVT/PE on Eliquis but recently held in s/o fall w/ sustained R orbital floor fx, admitted for acute hypoxemic respiratory failure, unclear etiology. Currently stable being maintained on high-flow.

## 2021-06-18 NOTE — PROGRESS NOTE ADULT - PROBLEM SELECTOR PLAN 1
Pt admitted for dyspnea and hypoxia, currently maintained on high-flow NC. Differential includes COPD exacerbation, possibly in s/o underlying infection given b/l ground glass opacities on CT chest and elevated procalcitonin, although pt afebrile w/out leukocytosis. Also considered is new CHF given elevated BNP and that CT chest findings may represent pulmonary edema, although echo here w/ hyperdynamic LV systolic function and nml diastolic function. CT findings may also represent pneumonitis in s/o chemo/immunotherapy tx. Unlikely PE as CTA chest negative for acute thromboembolism.    Plan:  -Continuous pulse ox  -High-flow NC for now, wean as tolerated  -Low threshold to start on BiPAP if pt's saturations do not improve  -CTA chest w/ evidence of chronic thromboembolic disease but no acute PE, also w/ b/l symmetric ground-glass opacities  -Echo performed here w/ hyperdynamic LV systolic function, nml diastolic function  -RVP negative, COVID-19 negative x 2, procalcitonin elevated at 15  -Blood cx NGTD, sputum cx gram stain w/ few PNMs  -Fungitell negative  -Pulm consulted, recs appreciated  -C/w Zosyn, d/d azithromycin as legionella (-)  -s/p Lasix IVP x 3, will give additional this AM  -MICU consulted, recs appreciated  -Continue w/ Duoneb, IV solumedrol added 1mg/kg BID   -VBG lactate elevated on admission, initially resolved but now elevated, will continue to trend

## 2021-06-18 NOTE — DISCHARGE NOTE PROVIDER - PROVIDER TOKENS
PROVIDER:[TOKEN:[[id number]]] PROVIDER:[TOKEN:[[id number]]],PROVIDER:[TOKEN:[[id number]],SCHEDULEDAPPT:[06/30/2021],ESTABLISHEDPATIENT:[T]]

## 2021-06-18 NOTE — DISCHARGE NOTE PROVIDER - CARE PROVIDER_API CALL
Maxx Cage; MBBS)  Hematology; [facility]; Medical Oncology  [address] [place] [id number]  Phone: [phone]  Fax: [phone]  Follow Up Time:    Maxx Cage (MD; MBBS)  Hematology; [facility]; Medical Oncology  [address], [place] [id number]  Phone: [phone]  Fax: [phone]  Follow Up Time:     Ananda Chavira  INTERNAL MEDICINE  [address] [facility], [place] [address]  Phone: [phone]  Fax: [phone]  Established Patient  Scheduled Appointment: 06/30/2021

## 2021-06-18 NOTE — PROGRESS NOTE ADULT - SUBJECTIVE AND OBJECTIVE BOX
HPI:  Patient is a 66 y/o F with PMHx of stage III L lung adenocarcinoma on alimta/pembrolizumab maintenance chemotherapy (last treatment 6/10), emphysema, 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 Reports she was in her usual state of health prior to this. Says she fell 2 days ago and since then she has been having a hard time catching her breath. Denies f/c, cough, chest pain, pleuritic pain. Has been receiving maintenance chemotherapy as scheduled and has not had prior issues with it. On arrival, patient was noted to be hypoxic and was placed on supplemental O2. Her oxygen requirements have been increasing since admission and she is currently requiring HFNC at 60%/50 lpm to maintain O2 saturations. CT chest reveals emphysema with new diffuse b/l GGO. Pulmonary consulted for assistance in management.    Subjective:      14 point ROS negative except as noted above      OBJECTIVE:  ICU Vital Signs Last 24 Hrs  T(C): 36.8 (18 Jun 2021 04:57), Max: 36.9 (17 Jun 2021 21:02)  T(F): 98.2 (18 Jun 2021 04:57), Max: 98.5 (17 Jun 2021 21:02)  HR: 116 (18 Jun 2021 05:41) (67 - 118)  BP: 129/76 (18 Jun 2021 04:57) (128/75 - 132/83)  BP(mean): --  ABP: --  ABP(mean): --  RR: 18 (18 Jun 2021 04:57) (18 - 20)  SpO2: 99% (18 Jun 2021 05:41) (93% - 100%)        06-17 @ 07:01  -  06-18 @ 07:00  --------------------------------------------------------  IN: 200 mL / OUT: 1000 mL / NET: -800 mL      CAPILLARY BLOOD GLUCOSE      PHYSICAL EXAM:  General: awake and alert, ill appearing female lying in bed  HEENT: NC/AT, EOMI b/l, conjunctiva normal, MMM  Lymph Nodes: no cervical LAD  Neck: supple. full range of motion  Respiratory: mild crackles at lung bases b/l unchanged, more comfortable on HFNC, no conversational dyspnea or accessory muscle use  Cardiovascular: S1 S2 present, RRR, no m/r/g  Abdomen: soft, NT/ND, +BS  Extremities: no c/c/e  Skin: no rashes or lesions noted  Neurological: AAOx3, no focal deficits  Psychiatry: calm, cooperative    LINES:     MEDICATIONS  (STANDING):  albuterol/ipratropium for Nebulization 3 milliLiter(s) Nebulizer every 6 hours  albuterol/ipratropium for Nebulization.. 3 milliLiter(s) Nebulizer every 20 minutes  amLODIPine   Tablet 10 milliGRAM(s) Oral daily  apixaban 5 milliGRAM(s) Oral every 12 hours  azithromycin  IVPB      azithromycin  IVPB 500 milliGRAM(s) IV Intermittent every 24 hours  chlorhexidine 2% Cloths 1 Application(s) Topical <User Schedule>  cholecalciferol 1000 Unit(s) Oral daily  cyanocobalamin 1000 MICROGram(s) Oral daily  folic acid 1 milliGRAM(s) Oral daily  methylPREDNISolone sodium succinate Injectable 60 milliGRAM(s) IV Push daily  mirtazapine 15 milliGRAM(s) Oral at bedtime  pancrelipase  (CREON 12,000 Lipase Units) 2 Capsule(s) Oral three times a day with meals  pantoprazole    Tablet 40 milliGRAM(s) Oral two times a day  piperacillin/tazobactam IVPB.. 3.375 Gram(s) IV Intermittent every 8 hours    MEDICATIONS  (PRN):  acetaminophen   Tablet .. 650 milliGRAM(s) Oral every 6 hours PRN Mild Pain (1 - 3), Moderate Pain (4 - 6)  aluminum hydroxide/magnesium hydroxide/simethicone Suspension 30 milliLiter(s) Oral every 4 hours PRN Dyspepsia  ondansetron    Tablet 4 milliGRAM(s) Oral four times a day PRN Nausea and/or Vomiting  oxyCODONE    IR 30 milliGRAM(s) Oral every 6 hours PRN Severe Pain (7 - 10)      LABS:                        7.6    2.84  )-----------( 30       ( 18 Jun 2021 07:01 )             23.2     Hgb Trend: 7.6<--, 7.4<--, 7.8<--, 6.8<--, 6.7<--  06-17    145  |  108  |  15  ----------------------------<  95  4.8   |  26  |  1.01    Ca    7.8<L>      17 Jun 2021 06:36  Phos  1.4     06-17  Mg     1.6     06-17    TPro  5.0<L>  /  Alb  2.1<L>  /  TBili  0.5  /  DBili  x   /  AST  31  /  ALT  11  /  AlkPhos  109  06-17    Creatinine Trend: 1.01<--, 1.17<--, 1.15<--, 1.18<--, 1.20<--, 1.40<--      Arterial Blood Gas:  06-16 @ 15:23  7.52/38/76/31/96/7.3  ABG lactate: --    Venous Blood Gas:  06-18 @ 07:10  7.41/48/39/30/66  VBG Lactate: --  Venous Blood Gas:  06-18 @ 07:09  --/--/--/--/--  VBG Lactate: 2.9  Venous Blood Gas:  06-17 @ 06:38  7.45/46/36/31/66  VBG Lactate: 2.5        MICROBIOLOGY:       RADIOLOGY:  [ ] Reviewed and interpreted by me    PULMONARY FUNCTION TESTS:    EKG: HPI:  Patient is a 64 y/o F with PMHx of stage III L lung adenocarcinoma on alimta/pembrolizumab maintenance chemotherapy (last treatment 6/10), emphysema, 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 Reports she was in her usual state of health prior to this. Says she fell 2 days ago and since then she has been having a hard time catching her breath. Denies f/c, cough, chest pain, pleuritic pain. Has been receiving maintenance chemotherapy as scheduled and has not had prior issues with it. On arrival, patient was noted to be hypoxic and was placed on supplemental O2. Her oxygen requirements have been increasing since admission and she is currently requiring HFNC at 60%/50 lpm to maintain O2 saturations. CT chest reveals emphysema with new diffuse b/l GGO. Pulmonary consulted for assistance in management.    Subjective:  O2 increased to 100% FiO2 overnight? - no events noted in chart  Saturating well on 55% this AM  Looks uncomfortable due to abdominal pain, appears to be splinting  Denies f/c, cough, worsening sputum production      14 point ROS negative except as noted above      OBJECTIVE:  ICU Vital Signs Last 24 Hrs  T(C): 36.8 (18 Jun 2021 04:57), Max: 36.9 (17 Jun 2021 21:02)  T(F): 98.2 (18 Jun 2021 04:57), Max: 98.5 (17 Jun 2021 21:02)  HR: 116 (18 Jun 2021 05:41) (67 - 118)  BP: 129/76 (18 Jun 2021 04:57) (128/75 - 132/83)  BP(mean): --  ABP: --  ABP(mean): --  RR: 18 (18 Jun 2021 04:57) (18 - 20)  SpO2: 99% (18 Jun 2021 05:41) (93% - 100%)        06-17 @ 07:01  -  06-18 @ 07:00  --------------------------------------------------------  IN: 200 mL / OUT: 1000 mL / NET: -800 mL      CAPILLARY BLOOD GLUCOSE      PHYSICAL EXAM:  General: awake and alert, ill appearing female lying in bed  HEENT: NC/AT, EOMI b/l, conjunctiva normal, MMM  Lymph Nodes: no cervical LAD  Neck: supple. full range of motion  Respiratory: mild crackles at lung bases b/l unchanged, more comfortable on HFNC, no conversational dyspnea or accessory muscle use  Cardiovascular: S1 S2 present, RRR, no m/r/g  Abdomen: soft, NT/ND, +BS  Extremities: no c/c/e  Skin: no rashes or lesions noted  Neurological: AAOx3, no focal deficits  Psychiatry: calm, cooperative    LINES:     MEDICATIONS  (STANDING):  albuterol/ipratropium for Nebulization 3 milliLiter(s) Nebulizer every 6 hours  albuterol/ipratropium for Nebulization.. 3 milliLiter(s) Nebulizer every 20 minutes  amLODIPine   Tablet 10 milliGRAM(s) Oral daily  apixaban 5 milliGRAM(s) Oral every 12 hours  azithromycin  IVPB      azithromycin  IVPB 500 milliGRAM(s) IV Intermittent every 24 hours  chlorhexidine 2% Cloths 1 Application(s) Topical <User Schedule>  cholecalciferol 1000 Unit(s) Oral daily  cyanocobalamin 1000 MICROGram(s) Oral daily  folic acid 1 milliGRAM(s) Oral daily  methylPREDNISolone sodium succinate Injectable 60 milliGRAM(s) IV Push daily  mirtazapine 15 milliGRAM(s) Oral at bedtime  pancrelipase  (CREON 12,000 Lipase Units) 2 Capsule(s) Oral three times a day with meals  pantoprazole    Tablet 40 milliGRAM(s) Oral two times a day  piperacillin/tazobactam IVPB.. 3.375 Gram(s) IV Intermittent every 8 hours    MEDICATIONS  (PRN):  acetaminophen   Tablet .. 650 milliGRAM(s) Oral every 6 hours PRN Mild Pain (1 - 3), Moderate Pain (4 - 6)  aluminum hydroxide/magnesium hydroxide/simethicone Suspension 30 milliLiter(s) Oral every 4 hours PRN Dyspepsia  ondansetron    Tablet 4 milliGRAM(s) Oral four times a day PRN Nausea and/or Vomiting  oxyCODONE    IR 30 milliGRAM(s) Oral every 6 hours PRN Severe Pain (7 - 10)      LABS:                        7.6    2.84  )-----------( 30       ( 18 Jun 2021 07:01 )             23.2     Hgb Trend: 7.6<--, 7.4<--, 7.8<--, 6.8<--, 6.7<--  06-17    145  |  108  |  15  ----------------------------<  95  4.8   |  26  |  1.01    Ca    7.8<L>      17 Jun 2021 06:36  Phos  1.4     06-17  Mg     1.6     06-17    TPro  5.0<L>  /  Alb  2.1<L>  /  TBili  0.5  /  DBili  x   /  AST  31  /  ALT  11  /  AlkPhos  109  06-17    Creatinine Trend: 1.01<--, 1.17<--, 1.15<--, 1.18<--, 1.20<--, 1.40<--      Arterial Blood Gas:  06-16 @ 15:23  7.52/38/76/31/96/7.3  ABG lactate: --    Venous Blood Gas:  06-18 @ 07:10  7.41/48/39/30/66  VBG Lactate: --  Venous Blood Gas:  06-18 @ 07:09  --/--/--/--/--  VBG Lactate: 2.9  Venous Blood Gas:  06-17 @ 06:38  7.45/46/36/31/66  VBG Lactate: 2.5        MICROBIOLOGY:       RADIOLOGY:  [ ] Reviewed and interpreted by me    PULMONARY FUNCTION TESTS:    EKG:

## 2021-06-18 NOTE — PROGRESS NOTE ADULT - PROBLEM SELECTOR PLAN 6
Pt w/ hx of DVT/PE on Eliquis, currently being held in s/o fall w/ R orbital floor fx. Pt tachycardic, tachypneic on admission w/ possible EKG signs of heart strain but no evidence of acute PE on CTA chest.    Plan:  -CTA chest w/ evidence of chronic thromboembolic disease but no acute PE  -Pt restarted on home Eliquis, will continue

## 2021-06-18 NOTE — DISCHARGE NOTE PROVIDER - NSDCFUSCHEDAPPT_GEN_ALL_CORE_FT
HIMANSHU VILLEGAS ; 06/23/2021 ; [facility]  HIMASNHU VILLEGAS ; 06/30/2021 ; [facility]  HIMANSHU VILLEGAS ; 07/22/2021 ; [facility]  HIMANSHU VILLEGAS ; 07/22/2021 ; [facility]  HIMANSHU VILLEGAS ; 08/20/2021 ; [facility] [address] HIMANSHU VILLEGAS ; 06/30/2021 ; [facility]  HIMANSHU VILLEGAS ; 07/22/2021 ; [facility]  HIMANSHU VILLEGAS ; 07/22/2021 ; [facility]  HIMANSHU VILLEGAS ; 08/20/2021 ; [facility] [address]

## 2021-06-19 LAB
ANION GAP SERPL CALC-SCNC: 13 MMOL/L — SIGNIFICANT CHANGE UP (ref 5–17)
BASE EXCESS BLDV CALC-SCNC: 6.2 MMOL/L — HIGH (ref -2–2)
BUN SERPL-MCNC: 10 MG/DL — SIGNIFICANT CHANGE UP (ref 7–23)
CALCIUM SERPL-MCNC: 8.2 MG/DL — LOW (ref 8.4–10.5)
CHLORIDE SERPL-SCNC: 102 MMOL/L — SIGNIFICANT CHANGE UP (ref 96–108)
CO2 BLDV-SCNC: 32 MMOL/L — HIGH (ref 22–30)
CO2 SERPL-SCNC: 27 MMOL/L — SIGNIFICANT CHANGE UP (ref 22–31)
CREAT SERPL-MCNC: 0.89 MG/DL — SIGNIFICANT CHANGE UP (ref 0.5–1.3)
CULTURE RESULTS: SIGNIFICANT CHANGE UP
GAS PNL BLDV: SIGNIFICANT CHANGE UP
GLUCOSE SERPL-MCNC: 84 MG/DL — SIGNIFICANT CHANGE UP (ref 70–99)
HCO3 BLDV-SCNC: 31 MMOL/L — HIGH (ref 21–29)
HCT VFR BLD CALC: 22.5 % — LOW (ref 34.5–45)
HCT VFR BLD CALC: 23.3 % — LOW (ref 34.5–45)
HGB BLD-MCNC: 7.3 G/DL — LOW (ref 11.5–15.5)
HGB BLD-MCNC: 7.5 G/DL — LOW (ref 11.5–15.5)
MAGNESIUM SERPL-MCNC: 1.8 MG/DL — SIGNIFICANT CHANGE UP (ref 1.6–2.6)
MCHC RBC-ENTMCNC: 30.5 PG — SIGNIFICANT CHANGE UP (ref 27–34)
MCHC RBC-ENTMCNC: 30.5 PG — SIGNIFICANT CHANGE UP (ref 27–34)
MCHC RBC-ENTMCNC: 32.2 GM/DL — SIGNIFICANT CHANGE UP (ref 32–36)
MCHC RBC-ENTMCNC: 32.4 GM/DL — SIGNIFICANT CHANGE UP (ref 32–36)
MCV RBC AUTO: 94.1 FL — SIGNIFICANT CHANGE UP (ref 80–100)
MCV RBC AUTO: 94.7 FL — SIGNIFICANT CHANGE UP (ref 80–100)
NRBC # BLD: 0 /100 WBCS — SIGNIFICANT CHANGE UP (ref 0–0)
NRBC # BLD: 0 /100 WBCS — SIGNIFICANT CHANGE UP (ref 0–0)
PCO2 BLDV: 46 MMHG — SIGNIFICANT CHANGE UP (ref 35–50)
PH BLDV: 7.44 — SIGNIFICANT CHANGE UP (ref 7.35–7.45)
PHOSPHATE SERPL-MCNC: 2.5 MG/DL — SIGNIFICANT CHANGE UP (ref 2.5–4.5)
PLATELET # BLD AUTO: 11 K/UL — CRITICAL LOW (ref 150–400)
PLATELET # BLD AUTO: 55 K/UL — LOW (ref 150–400)
PO2 BLDV: 29 MMHG — SIGNIFICANT CHANGE UP (ref 25–45)
POTASSIUM SERPL-MCNC: 3.7 MMOL/L — SIGNIFICANT CHANGE UP (ref 3.5–5.3)
POTASSIUM SERPL-SCNC: 3.7 MMOL/L — SIGNIFICANT CHANGE UP (ref 3.5–5.3)
RBC # BLD: 2.39 M/UL — LOW (ref 3.8–5.2)
RBC # BLD: 2.46 M/UL — LOW (ref 3.8–5.2)
RBC # FLD: 19.3 % — HIGH (ref 10.3–14.5)
RBC # FLD: 19.4 % — HIGH (ref 10.3–14.5)
SAO2 % BLDV: 47 % — LOW (ref 67–88)
SODIUM SERPL-SCNC: 142 MMOL/L — SIGNIFICANT CHANGE UP (ref 135–145)
SPECIMEN SOURCE: SIGNIFICANT CHANGE UP
WBC # BLD: 2.77 K/UL — LOW (ref 3.8–10.5)
WBC # BLD: 3.67 K/UL — LOW (ref 3.8–10.5)
WBC # FLD AUTO: 2.77 K/UL — LOW (ref 3.8–10.5)
WBC # FLD AUTO: 3.67 K/UL — LOW (ref 3.8–10.5)

## 2021-06-19 PROCEDURE — 99233 SBSQ HOSP IP/OBS HIGH 50: CPT | Mod: GC

## 2021-06-19 RX ORDER — MAGNESIUM SULFATE 500 MG/ML
2 VIAL (ML) INJECTION ONCE
Refills: 0 | Status: COMPLETED | OUTPATIENT
Start: 2021-06-19 | End: 2021-06-19

## 2021-06-19 RX ADMIN — Medication 1 MILLIGRAM(S): at 13:45

## 2021-06-19 RX ADMIN — Medication 2 CAPSULE(S): at 17:59

## 2021-06-19 RX ADMIN — Medication 3 MILLILITER(S): at 17:17

## 2021-06-19 RX ADMIN — OXYCODONE HYDROCHLORIDE 30 MILLIGRAM(S): 5 TABLET ORAL at 20:26

## 2021-06-19 RX ADMIN — PIPERACILLIN AND TAZOBACTAM 25 GRAM(S): 4; .5 INJECTION, POWDER, LYOPHILIZED, FOR SOLUTION INTRAVENOUS at 10:54

## 2021-06-19 RX ADMIN — Medication 2 CAPSULE(S): at 13:45

## 2021-06-19 RX ADMIN — APIXABAN 5 MILLIGRAM(S): 2.5 TABLET, FILM COATED ORAL at 06:46

## 2021-06-19 RX ADMIN — PIPERACILLIN AND TAZOBACTAM 25 GRAM(S): 4; .5 INJECTION, POWDER, LYOPHILIZED, FOR SOLUTION INTRAVENOUS at 17:59

## 2021-06-19 RX ADMIN — Medication 50 GRAM(S): at 10:54

## 2021-06-19 RX ADMIN — PANTOPRAZOLE SODIUM 40 MILLIGRAM(S): 20 TABLET, DELAYED RELEASE ORAL at 06:47

## 2021-06-19 RX ADMIN — PIPERACILLIN AND TAZOBACTAM 25 GRAM(S): 4; .5 INJECTION, POWDER, LYOPHILIZED, FOR SOLUTION INTRAVENOUS at 02:00

## 2021-06-19 RX ADMIN — PANTOPRAZOLE SODIUM 40 MILLIGRAM(S): 20 TABLET, DELAYED RELEASE ORAL at 17:59

## 2021-06-19 RX ADMIN — Medication 60 MILLIGRAM(S): at 06:47

## 2021-06-19 RX ADMIN — Medication 2 CAPSULE(S): at 10:54

## 2021-06-19 RX ADMIN — AMLODIPINE BESYLATE 10 MILLIGRAM(S): 2.5 TABLET ORAL at 06:46

## 2021-06-19 RX ADMIN — CHLORHEXIDINE GLUCONATE 1 APPLICATION(S): 213 SOLUTION TOPICAL at 06:46

## 2021-06-19 RX ADMIN — OXYCODONE HYDROCHLORIDE 30 MILLIGRAM(S): 5 TABLET ORAL at 10:53

## 2021-06-19 RX ADMIN — OXYCODONE HYDROCHLORIDE 30 MILLIGRAM(S): 5 TABLET ORAL at 20:56

## 2021-06-19 RX ADMIN — Medication 3 MILLILITER(S): at 23:23

## 2021-06-19 RX ADMIN — Medication 1000 UNIT(S): at 13:44

## 2021-06-19 RX ADMIN — PREGABALIN 1000 MICROGRAM(S): 225 CAPSULE ORAL at 13:44

## 2021-06-19 RX ADMIN — MIRTAZAPINE 15 MILLIGRAM(S): 45 TABLET, ORALLY DISINTEGRATING ORAL at 22:22

## 2021-06-19 RX ADMIN — Medication 3 MILLILITER(S): at 05:44

## 2021-06-19 RX ADMIN — Medication 3 MILLILITER(S): at 11:28

## 2021-06-19 NOTE — PHYSICAL THERAPY INITIAL EVALUATION ADULT - PERTINENT HX OF CURRENT PROBLEM, REHAB EVAL
65 y.o. F PMH HTN, COPD, non-small cell lung cancer on chemotherapy (last tx 6/10), GERD, chronic pancreatitis, hx of DVT/PE on Eliquis but recently held in s/o fall w/ sustained R orbital floor fx, now presents for acute SOB. States she was at home when she began to feel suddenly short of breath, progressively worsening. Reported history of COPD but on no home medications or oxygen Reports decreased PO intake over past 2 days, states this is often a trigger for her pain.

## 2021-06-19 NOTE — PROGRESS NOTE ADULT - SUBJECTIVE AND OBJECTIVE BOX
Tim Ling, PGY-1  Pager: [phone] / [phone]    CHIEF COMPLAINT: Patient is a 65y old  Female who presents with a chief complaint of Shortness of breath, hypoxia (19 Jun 2021 07:21)    INTERVAL HPI/OVERNIGHT EVENTS: LEVAR pt was seen comfortable in bed this AM. Reports her breathing is the same as yesterday, not worse. Still occasional cough, less sputum. Denies CP, abd pain, N/V. Having BMs.     MEDICATIONS (STANDING):  albuterol/ipratropium for Nebulization 3 milliLiter(s) Nebulizer every 6 hours  albuterol/ipratropium for Nebulization.. 3 milliLiter(s) Nebulizer every 20 minutes  amLODIPine   Tablet 10 milliGRAM(s) Oral daily  chlorhexidine 2% Cloths 1 Application(s) Topical <User Schedule>  cholecalciferol 1000 Unit(s) Oral daily  cyanocobalamin 1000 MICROGram(s) Oral daily  folic acid 1 milliGRAM(s) Oral daily  magnesium sulfate  IVPB 2 Gram(s) IV Intermittent once  methylPREDNISolone sodium succinate Injectable 60 milliGRAM(s) IV Push daily  mirtazapine 15 milliGRAM(s) Oral at bedtime  pancrelipase  (CREON 12,000 Lipase Units) 2 Capsule(s) Oral three times a day with meals  pantoprazole    Tablet 40 milliGRAM(s) Oral two times a day  piperacillin/tazobactam IVPB.. 3.375 Gram(s) IV Intermittent every 8 hours    MEDICATIONS  (PRN):  acetaminophen   Tablet .. 650 milliGRAM(s) Oral every 6 hours PRN  aluminum hydroxide/magnesium hydroxide/simethicone Suspension 30 milliLiter(s) Oral every 4 hours PRN  ondansetron    Tablet 4 milliGRAM(s) Oral four times a day PRN  oxyCODONE    IR 30 milliGRAM(s) Oral every 6 hours PRN    REVIEW OF SYSTEMS:  CONSTITUTIONAL: No fever, weight loss, or fatigue  RESPIRATORY: +cough, no wheezing, chills or hemoptysis; ++shortness of breath  CARDIOVASCULAR: No chest pain, palpitations, dizziness, or leg swelling  GASTROINTESTINAL: No abdominal or epigastric pain. No nausea, vomiting, or hematemesis; No diarrhea or constipation. No melena or hematochezia.  GENITOURINARY: No dysuria, frequency, hematuria, or incontinence  NEUROLOGICAL: No headaches, memory loss, loss of strength, numbness, or tremors  SKIN: No itching, burning, rashes, or lesions   MUSCULOSKELETAL: No joint pain or swelling; No muscle, back, or extremity pain    VITAL SIGNS:  T(F): 98.9 (06-19-21 @ 04:43), Max: 99.3 (06-18-21 @ 20:39)  HR: 114 (06-19-21 @ 09:45) (97 - 120)  BP: 131/75 (06-19-21 @ 04:43) (117/76 - 131/75)  RR: 18 (06-19-21 @ 09:45) (18 - 20)  SpO2: 96% (06-19-21 @ 09:45) (92% - 96%)    I&O's Summary  18 Jun 2021 07:01  -  19 Jun 2021 07:00  --------------------------------------------------------  IN: 0 mL / OUT: 1200 mL / NET: -1200 mL    Exam:  GEN: Awake and alert, lying in bed on HFNC  HEENT: Mild periorbital ecchymosis of R eye, EOMI. MMM  NECK: Supple  CHEST: On high-flow NC, O2 saturations in 90s (on 50% FiO2, 50L) at time of assessment. Non-labored breathing, speaking in full sentences. Coarse crackles b/l mid-ower lung felids. (+) port R chest  CV: RRR. (+) S1/S2. No audible S3/S4. R No murmurs, rubs, or gallops.    GI: Soft, nontender, nondistended. +BS.  EXT: Warm and well perfused. Trace pitting edema to mid-shin b/l. No calf tenderness. 2+ distal pulses b/l LEs  SKIN: Intact, no rashes or lesions.   NEURO: A&Ox3, grossly non-focal      LABS:                        7.3    3.67  )-----------( 11       ( 19 Jun 2021 07:16 )             22.5     06-19    142  |  102  |  10  ----------------------------<  84  3.7   |  27  |  0.89    Ca    8.2<L>      19 Jun 2021 07:12  Phos  2.5     06-19  Mg     1.8     06-19    TPro  5.2<L>  /  Alb  2.2<L>  /  TBili  0.4  /  DBili  x   /  AST  41<H>  /  ALT  22  /  AlkPhos  123<H>  06-18    Culture - Sputum . (06.17.21 @ 17:18)    Gram Stain:   Few polymorphonuclear leukocytes per low power field  Few Squamous epithelial cells per low power field  Few Yeast like cells per oil power field    Specimen Source: .Sputum Sputum    Culture Results:   Normal Respiratory Erin present    RADIOLOGY & ADDITIONAL TESTS:    Culture - Blood (06.15.21 @ 13:20)    Specimen Source: .Blood Blood-Peripheral    Culture Results:   No growth to date.    Culture - Blood (06.15.21 @ 13:20)    Specimen Source: .Blood Blood-Peripheral    Culture Results:   No growth to date. Tim Ling, PGY-1  Pager: [phone] / [phone]    CHIEF COMPLAINT: Patient is a 65y old  Female who presents with a chief complaint of Shortness of breath, hypoxia (19 Jun 2021 07:21)    INTERVAL HPI/OVERNIGHT EVENTS: LEVAR pt was seen comfortable in bed this AM. Reports her breathing is the same as yesterday, not worse. Still occasional cough, less sputum. Denies CP, abd pain, N/V. Having BMs.   12 point ros negative except for above    MEDICATIONS (STANDING):  albuterol/ipratropium for Nebulization 3 milliLiter(s) Nebulizer every 6 hours  albuterol/ipratropium for Nebulization.. 3 milliLiter(s) Nebulizer every 20 minutes  amLODIPine   Tablet 10 milliGRAM(s) Oral daily  chlorhexidine 2% Cloths 1 Application(s) Topical <User Schedule>  cholecalciferol 1000 Unit(s) Oral daily  cyanocobalamin 1000 MICROGram(s) Oral daily  folic acid 1 milliGRAM(s) Oral daily  magnesium sulfate  IVPB 2 Gram(s) IV Intermittent once  methylPREDNISolone sodium succinate Injectable 60 milliGRAM(s) IV Push daily  mirtazapine 15 milliGRAM(s) Oral at bedtime  pancrelipase  (CREON 12,000 Lipase Units) 2 Capsule(s) Oral three times a day with meals  pantoprazole    Tablet 40 milliGRAM(s) Oral two times a day  piperacillin/tazobactam IVPB.. 3.375 Gram(s) IV Intermittent every 8 hours    MEDICATIONS  (PRN):  acetaminophen   Tablet .. 650 milliGRAM(s) Oral every 6 hours PRN  aluminum hydroxide/magnesium hydroxide/simethicone Suspension 30 milliLiter(s) Oral every 4 hours PRN  ondansetron    Tablet 4 milliGRAM(s) Oral four times a day PRN  oxyCODONE    IR 30 milliGRAM(s) Oral every 6 hours PRN    REVIEW OF SYSTEMS:  CONSTITUTIONAL: No fever, weight loss, or fatigue  RESPIRATORY: +cough, no wheezing, chills or hemoptysis; ++shortness of breath  CARDIOVASCULAR: No chest pain, palpitations, dizziness, or leg swelling  GASTROINTESTINAL: No abdominal or epigastric pain. No nausea, vomiting, or hematemesis; No diarrhea or constipation. No melena or hematochezia.  GENITOURINARY: No dysuria, frequency, hematuria, or incontinence  NEUROLOGICAL: No headaches, memory loss, loss of strength, numbness, or tremors  SKIN: No itching, burning, rashes, or lesions   MUSCULOSKELETAL: No joint pain or swelling; No muscle, back, or extremity pain    VITAL SIGNS:  T(F): 98.9 (06-19-21 @ 04:43), Max: 99.3 (06-18-21 @ 20:39)  HR: 114 (06-19-21 @ 09:45) (97 - 120)  BP: 131/75 (06-19-21 @ 04:43) (117/76 - 131/75)  RR: 18 (06-19-21 @ 09:45) (18 - 20)  SpO2: 96% (06-19-21 @ 09:45) (92% - 96%)    I&O's Summary  18 Jun 2021 07:01  -  19 Jun 2021 07:00  --------------------------------------------------------  IN: 0 mL / OUT: 1200 mL / NET: -1200 mL    Exam:  GEN: Awake and alert, lying in bed on HFNC  HEENT: Mild periorbital ecchymosis of R eye, EOMI. MMM  NECK: Supple  CHEST: On high-flow NC, O2 saturations in 90s (on 50% FiO2, 50L) at time of assessment. Non-labored breathing, speaking in full sentences. Coarse crackles b/l mid-ower lung felids. (+) port R chest  CV: RRR. (+) S1/S2. No audible S3/S4. R No murmurs, rubs, or gallops.    GI: Soft, nontender, nondistended. +BS.  EXT: Warm and well perfused. Trace pitting edema to mid-shin b/l. No calf tenderness. 2+ distal pulses b/l LEs  SKIN: Intact, no rashes or lesions.   NEURO: A&Ox3, grossly non-focal      LABS:                        7.3    3.67  )-----------( 11       ( 19 Jun 2021 07:16 )             22.5     06-19    142  |  102  |  10  ----------------------------<  84  3.7   |  27  |  0.89    Ca    8.2<L>      19 Jun 2021 07:12  Phos  2.5     06-19  Mg     1.8     06-19    TPro  5.2<L>  /  Alb  2.2<L>  /  TBili  0.4  /  DBili  x   /  AST  41<H>  /  ALT  22  /  AlkPhos  123<H>  06-18    Culture - Sputum . (06.17.21 @ 17:18)    Gram Stain:   Few polymorphonuclear leukocytes per low power field  Few Squamous epithelial cells per low power field  Few Yeast like cells per oil power field    Specimen Source: .Sputum Sputum    Culture Results:   Normal Respiratory Erin present    RADIOLOGY & ADDITIONAL TESTS:    Culture - Blood (06.15.21 @ 13:20)    Specimen Source: .Blood Blood-Peripheral    Culture Results:   No growth to date.    Culture - Blood (06.15.21 @ 13:20)    Specimen Source: .Blood Blood-Peripheral    Culture Results:   No growth to date.

## 2021-06-19 NOTE — PROGRESS NOTE ADULT - ATTENDING COMMENTS
Patient seen and examined today. I agree with the above findings, assessment, and plan with the following additions and exceptions:     Acute hypoxic respiratory failure -- no clear diagnosis at this time. CT imaging noted. Consultants assessments noted. C/w zosyn iv. C/w systemic steroids. Prefer to keep net even to slightly net negative for now. If unable to further wean the Fio2 on HFNC, we can trial additional lasix. Avoid over oxygenation.   Acapella flutter device.  Out of bed to chair daily.   All consultant contribution to care greatly appreciated.   Rest of plan as above    Dr. Josiah Rose DO  Attending Physician  Division of Hospital Medicine  [facility]  Pager:  [phone]

## 2021-06-19 NOTE — PROGRESS NOTE ADULT - PROBLEM SELECTOR PLAN 10
Plan:  -VTE: Eliquis  -Diet: DASH diet  -Code status: Full code Plan:  -VTE: Holding Eliquis as severe thrombocytopenic  -Diet: DASH diet  -Code status: Full code

## 2021-06-19 NOTE — PHYSICAL THERAPY INITIAL EVALUATION ADULT - ADDITIONAL COMMENTS
Pt lives in a pvt home w/ spouse, 3 steps to enter, first floor set up inside. PTA pt was independent w/ all functional mobility & did not use an AD for ambulation

## 2021-06-19 NOTE — PROGRESS NOTE ADULT - PROBLEM SELECTOR PLAN 3
Pt w/ chronic anemia, baseline Hgb 8-9 per chart review. Elevated RDW, blood smear w/ macrocytosis. S/p 1U PRBC (6/16). Now w/ new pancytopenia, possibly in s/o of immunotherapy.     Plan:  -Daily CBC  -Trend H+H, WBC, plt  -C/w home folic acid, B12  -S/p 1U PRBCs (6/16)  -Elevated LDH but nml bili, haptoglobin, anemia unlikely hemolysis  -Transfuse PRBC for Hgb <7  -Transfuse plt for <50 w/ evidence of bleeding, otherwise <10 Pt w/ chronic anemia, baseline Hgb 8-9 per chart review. Elevated RDW, blood smear w/ macrocytosis. S/p 1U PRBC (6/16). Now w/ new pancytopenia, possibly in s/o of immunotherapy.     Plan:  -Daily CBC - 6/19 PLT 11 this AM, will transfuse 1 unit  -Trend H+H, WBC, plt  -C/w home folic acid, B12  -S/p 1U PRBCs (6/16)  -Elevated LDH but nml bili, haptoglobin, fibrinogen; anemia unlikely hemolysis/DIC  -Transfuse PRBC for Hgb <7  -Transfuse plt for <50 w/ evidence of bleeding, otherwise <10 Pt w/ chronic anemia, baseline Hgb 8-9 per chart review. Elevated RDW, blood smear w/ macrocytosis. S/p 1U PRBC (6/16). Now w/ new pancytopenia, possibly in s/o of immunotherapy.     Plan:  -Daily CBC - 6/19 PLT 11 this AM, will transfuse 1 unit, holding eliquis  -Trend H+H, WBC, plt  -C/w home folic acid, B12  -S/p 1U PRBCs (6/16)  -Elevated LDH but nml bili, haptoglobin, fibrinogen; anemia unlikely hemolysis/DIC  -Transfuse PRBC for Hgb <7  -Transfuse plt for <50 w/ evidence of bleeding, otherwise <10

## 2021-06-19 NOTE — PROGRESS NOTE ADULT - PROBLEM SELECTOR PLAN 1
Pt admitted for dyspnea and hypoxia, currently maintained on high-flow NC. Differential includes COPD exacerbation, possibly in s/o underlying infection given b/l ground glass opacities on CT chest and elevated procalcitonin, although pt afebrile w/out leukocytosis. Also considered is new CHF given elevated BNP and that CT chest findings may represent pulmonary edema, although echo here w/ hyperdynamic LV systolic function and nml diastolic function. CT findings may also represent pneumonitis in s/o chemo/immunotherapy tx. Unlikely PE as CTA chest negative for acute thromboembolism.    Plan:  -Continuous pulse ox  -High-flow NC for now, wean as tolerated  -Low threshold to start on BiPAP if pt's saturations do not improve  -CTA chest w/ evidence of chronic thromboembolic disease but no acute PE, also w/ b/l symmetric ground-glass opacities  -Echo performed here w/ hyperdynamic LV systolic function, nml diastolic function  -RVP negative, COVID-19 negative x 2, procalcitonin elevated at 15  -Blood cx NGTD, sputum cx gram stain w/ few PNMs  -Fungitell negative  -Pulm consulted, recs appreciated  -C/w Zosyn, d/d azithromycin as legionella (-)  -s/p Lasix IVP x 3  -MICU consulted, recs appreciated  -Continue w/ Duoneb, IV solumedrol added 1mg/kg BID   -VBG lactate elevated on admission, downtrended Pt admitted for dyspnea and hypoxia, currently maintained on high-flow NC. Differential includes COPD exacerbation, possibly in s/o underlying infection given b/l ground glass opacities on CT chest and elevated procalcitonin, although pt afebrile w/out leukocytosis. Also considered is new CHF given elevated BNP and that CT chest findings may represent pulmonary edema, although echo here w/ hyperdynamic LV systolic function and nml diastolic function. CT findings may also represent pneumonitis in s/o chemo/immunotherapy tx. Unlikely PE as CTA chest negative for acute thromboembolism.    Plan:  -Continuous pulse ox  -High-flow NC for now, wean as tolerated  -Low threshold to start on BiPAP if pt's saturations do not improve  -CTA chest w/ evidence of chronic thromboembolic disease but no acute PE, also w/ b/l symmetric ground-glass opacities  -Echo performed here w/ hyperdynamic LV systolic function, nml diastolic function  -RVP negative, COVID-19 negative x 2, procalcitonin elevated at 15  -Blood cx NGTD, sputum cx gram stain w/ few PNMs  -Fungitell negative  -Pulm consulted, recs appreciated  -C/w Zosyn day 5, d/c azithromycin as legionella (-)  -s/p Lasix IVP x 3  -MICU consulted, recs appreciated  -Continue w/ Duoneb, IV solumedrol added 1mg/kg BID   -VBG lactate elevated on admission, downtrended

## 2021-06-19 NOTE — PHYSICAL THERAPY INITIAL EVALUATION ADULT - PLANNED THERAPY INTERVENTIONS, PT EVAL
1. GOAL: In 4 weeks, pt will be able to navigate 3 steps independently./bed mobility training/gait training/transfer training

## 2021-06-19 NOTE — PROGRESS NOTE ADULT - PROBLEM SELECTOR PLAN 2
Pt w/ elevated BNP on admission w/ CTA demonstrating b/l ground glass opacities possibly 2/2 pulmonary edema. Will r/o CHF.    Plan:  -Repeat CXR w/ worsening pulmonary edema  -Trend BNP  -Echo performed here w/ hyperdynamic LV systolic function, nml diastolic function  -s/p Lasix IVP x 3, will give additional this AM Pt w/ elevated BNP on admission w/ CTA demonstrating b/l ground glass opacities possibly 2/2 pulmonary edema. Will r/o CHF.    Plan:  -Repeat CXR w/ worsening pulmonary edema  -BNP downtrending  -Echo performed here w/ hyperdynamic LV systolic function, nml diastolic function  -s/p Lasix IVP x 3, will hold further

## 2021-06-20 LAB
ALBUMIN SERPL ELPH-MCNC: 2.2 G/DL — LOW (ref 3.3–5)
ALP SERPL-CCNC: 123 U/L — HIGH (ref 40–120)
ALT FLD-CCNC: 21 U/L — SIGNIFICANT CHANGE UP (ref 10–45)
ANION GAP SERPL CALC-SCNC: 10 MMOL/L — SIGNIFICANT CHANGE UP (ref 5–17)
AST SERPL-CCNC: 23 U/L — SIGNIFICANT CHANGE UP (ref 10–40)
BILIRUB SERPL-MCNC: 0.3 MG/DL — SIGNIFICANT CHANGE UP (ref 0.2–1.2)
BLD GP AB SCN SERPL QL: NEGATIVE — SIGNIFICANT CHANGE UP
BUN SERPL-MCNC: 9 MG/DL — SIGNIFICANT CHANGE UP (ref 7–23)
CALCIUM SERPL-MCNC: 8.4 MG/DL — SIGNIFICANT CHANGE UP (ref 8.4–10.5)
CHLORIDE SERPL-SCNC: 105 MMOL/L — SIGNIFICANT CHANGE UP (ref 96–108)
CO2 SERPL-SCNC: 27 MMOL/L — SIGNIFICANT CHANGE UP (ref 22–31)
CREAT SERPL-MCNC: 0.81 MG/DL — SIGNIFICANT CHANGE UP (ref 0.5–1.3)
CULTURE RESULTS: SIGNIFICANT CHANGE UP
CULTURE RESULTS: SIGNIFICANT CHANGE UP
GLUCOSE SERPL-MCNC: 77 MG/DL — SIGNIFICANT CHANGE UP (ref 70–99)
HCT VFR BLD CALC: 22 % — LOW (ref 34.5–45)
HGB BLD-MCNC: 7.2 G/DL — LOW (ref 11.5–15.5)
MAGNESIUM SERPL-MCNC: 2.1 MG/DL — SIGNIFICANT CHANGE UP (ref 1.6–2.6)
MCHC RBC-ENTMCNC: 31 PG — SIGNIFICANT CHANGE UP (ref 27–34)
MCHC RBC-ENTMCNC: 32.7 GM/DL — SIGNIFICANT CHANGE UP (ref 32–36)
MCV RBC AUTO: 94.8 FL — SIGNIFICANT CHANGE UP (ref 80–100)
NRBC # BLD: 0 /100 WBCS — SIGNIFICANT CHANGE UP (ref 0–0)
PHOSPHATE SERPL-MCNC: 2.6 MG/DL — SIGNIFICANT CHANGE UP (ref 2.5–4.5)
PLATELET # BLD AUTO: 26 K/UL — LOW (ref 150–400)
POTASSIUM SERPL-MCNC: 3.8 MMOL/L — SIGNIFICANT CHANGE UP (ref 3.5–5.3)
POTASSIUM SERPL-SCNC: 3.8 MMOL/L — SIGNIFICANT CHANGE UP (ref 3.5–5.3)
PROT SERPL-MCNC: 5.2 G/DL — LOW (ref 6–8.3)
RBC # BLD: 2.32 M/UL — LOW (ref 3.8–5.2)
RBC # FLD: 18.8 % — HIGH (ref 10.3–14.5)
RH IG SCN BLD-IMP: POSITIVE — SIGNIFICANT CHANGE UP
SODIUM SERPL-SCNC: 142 MMOL/L — SIGNIFICANT CHANGE UP (ref 135–145)
SPECIMEN SOURCE: SIGNIFICANT CHANGE UP
SPECIMEN SOURCE: SIGNIFICANT CHANGE UP
WBC # BLD: 4.85 K/UL — SIGNIFICANT CHANGE UP (ref 3.8–10.5)
WBC # FLD AUTO: 4.85 K/UL — SIGNIFICANT CHANGE UP (ref 3.8–10.5)

## 2021-06-20 PROCEDURE — 99233 SBSQ HOSP IP/OBS HIGH 50: CPT | Mod: GC

## 2021-06-20 RX ORDER — FUROSEMIDE 40 MG
40 TABLET ORAL ONCE
Refills: 0 | Status: COMPLETED | OUTPATIENT
Start: 2021-06-20 | End: 2021-06-20

## 2021-06-20 RX ADMIN — Medication 40 MILLIGRAM(S): at 11:11

## 2021-06-20 RX ADMIN — Medication 2 CAPSULE(S): at 18:12

## 2021-06-20 RX ADMIN — Medication 60 MILLIGRAM(S): at 05:11

## 2021-06-20 RX ADMIN — Medication 2 CAPSULE(S): at 09:39

## 2021-06-20 RX ADMIN — OXYCODONE HYDROCHLORIDE 30 MILLIGRAM(S): 5 TABLET ORAL at 23:17

## 2021-06-20 RX ADMIN — MIRTAZAPINE 15 MILLIGRAM(S): 45 TABLET, ORALLY DISINTEGRATING ORAL at 22:40

## 2021-06-20 RX ADMIN — PANTOPRAZOLE SODIUM 40 MILLIGRAM(S): 20 TABLET, DELAYED RELEASE ORAL at 05:10

## 2021-06-20 RX ADMIN — AMLODIPINE BESYLATE 10 MILLIGRAM(S): 2.5 TABLET ORAL at 05:11

## 2021-06-20 RX ADMIN — Medication 1 MILLIGRAM(S): at 11:43

## 2021-06-20 RX ADMIN — OXYCODONE HYDROCHLORIDE 30 MILLIGRAM(S): 5 TABLET ORAL at 14:17

## 2021-06-20 RX ADMIN — Medication 3 MILLILITER(S): at 17:21

## 2021-06-20 RX ADMIN — PANTOPRAZOLE SODIUM 40 MILLIGRAM(S): 20 TABLET, DELAYED RELEASE ORAL at 18:14

## 2021-06-20 RX ADMIN — OXYCODONE HYDROCHLORIDE 30 MILLIGRAM(S): 5 TABLET ORAL at 05:09

## 2021-06-20 RX ADMIN — OXYCODONE HYDROCHLORIDE 30 MILLIGRAM(S): 5 TABLET ORAL at 22:47

## 2021-06-20 RX ADMIN — PIPERACILLIN AND TAZOBACTAM 25 GRAM(S): 4; .5 INJECTION, POWDER, LYOPHILIZED, FOR SOLUTION INTRAVENOUS at 22:46

## 2021-06-20 RX ADMIN — CHLORHEXIDINE GLUCONATE 1 APPLICATION(S): 213 SOLUTION TOPICAL at 05:12

## 2021-06-20 RX ADMIN — PIPERACILLIN AND TAZOBACTAM 25 GRAM(S): 4; .5 INJECTION, POWDER, LYOPHILIZED, FOR SOLUTION INTRAVENOUS at 05:12

## 2021-06-20 RX ADMIN — Medication 1000 UNIT(S): at 11:43

## 2021-06-20 RX ADMIN — PIPERACILLIN AND TAZOBACTAM 25 GRAM(S): 4; .5 INJECTION, POWDER, LYOPHILIZED, FOR SOLUTION INTRAVENOUS at 13:38

## 2021-06-20 RX ADMIN — Medication 3 MILLILITER(S): at 11:16

## 2021-06-20 RX ADMIN — Medication 3 MILLILITER(S): at 05:36

## 2021-06-20 RX ADMIN — Medication 2 CAPSULE(S): at 13:38

## 2021-06-20 RX ADMIN — PREGABALIN 1000 MICROGRAM(S): 225 CAPSULE ORAL at 11:45

## 2021-06-20 NOTE — PROGRESS NOTE ADULT - PROBLEM SELECTOR PLAN 2
Pt w/ elevated BNP on admission w/ CTA demonstrating b/l ground glass opacities possibly 2/2 pulmonary edema. Will r/o CHF.    Plan:  -Repeat CXR w/ worsening pulmonary edema  -BNP downtrending  -Echo performed here w/ hyperdynamic LV systolic function, nml diastolic function  -s/p Lasix IVP x 3, will hold further Pt w/ elevated BNP on admission w/ CTA demonstrating b/l ground glass opacities possibly 2/2 pulmonary edema. Will r/o CHF.    Plan:  -Repeat CXR w/ worsening pulmonary edema  -BNP downtrending  -Echo performed here w/ hyperdynamic LV systolic function, nml diastolic function  -s/p Lasix IVP x 4 (last administered on 06/20/21)

## 2021-06-20 NOTE — CONSULT NOTE ADULT - ATTENDING COMMENTS
Pt seen and examined with resident.    Indications for surgery, and risks benefits and alternatives to surgery d/w patient  Due to comorbidities including thrombocytopenia would defer repair of orbital floor at this time.  Pt is asymptomatic with resolved diplopia.  OMFS to f/u.  Pt to f/U as outpatient  Optho consult recommended.

## 2021-06-20 NOTE — PROGRESS NOTE ADULT - PROBLEM SELECTOR PLAN 3
Pt w/ chronic anemia, baseline Hgb 8-9 per chart review. Elevated RDW, blood smear w/ macrocytosis. S/p 1U PRBC (6/16). Now w/ new pancytopenia, possibly in s/o of immunotherapy. Elevated LDH but nml bili, haptoglobin, fibrinogen; anemia unlikely hemolysis/DIC    Plan:  - Daily CBC  - Trend H+H, WBC, plt  - C/w home folic acid, B12  - S/p 1U PRBCs (6/16)  - Hold home Eliquis   - Transfuse PRBC for Hgb <7  - Transfuse plt for <50 w/ evidence of bleeding, otherwise <10

## 2021-06-20 NOTE — CONSULT NOTE ADULT - SUBJECTIVE AND OBJECTIVE BOX
OMFS CONSULT NOTE    HPI:  Pt is a 65y old  Female who presents with a chief complaint of Shortness of breath, hypoxia. Pt is s/p fall sustaining R orbital floor fx on 6/10/2021 was consulted by OMFS in [facility] ED. Denies pain, discomfort, blurry vision.     PMH:  PAST MEDICAL & SURGICAL HISTORY:  HTN (hypertension)  GERD (gastroesophageal reflux disease)  Chronic pancreatitis  last episode 4/2019  ARDS survivor  2015    History of adrenal adenoma  stable on imaging    Vocal cord polyp  removal 2018, benign as per pt    Thrombophlebitis  superficial right UE during 4/2019 hospital stay, resolved as per pt    Chronic abdominal pain    Non-small cell lung cancer (NSCLC)  chemo: Alimta/ Keytruda 2/24/2021    Pulmonary embolism    COPD (chronic obstructive pulmonary disease)    Pulmonary hypertension    Anemia  transfusion 2/5/21    S/P arthroscopy of shoulder  left in 2009    S/P hip replacement  left - 2010    S/P arthroscopy of shoulder  right - 2014    S/P hip replacement, right  May 2016    History of vocal cord polypectomy  1/2018    S/P shoulder replacement, left  2016    History of pancreatic surgery  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   (-), SOB, cough.  (-) palpitations. (+) blurred vision/double vision.  (-) dysphagia, dyspnea.  (-) paresthesia.     Vital Signs Last 24 Hrs  T(C): 37.2 (20 Jun 2021 11:06), Max: 37.2 (19 Jun 2021 20:21)  T(F): 99 (20 Jun 2021 11:06), Max: 99 (20 Jun 2021 11:06)  HR: 109 (20 Jun 2021 11:06) (91 - 114)  BP: 132/81 (20 Jun 2021 11:06) (132/81 - 147/81)  BP(mean): --  RR: 20 (20 Jun 2021 11:06) (18 - 20)  SpO2: 99% (20 Jun 2021 11:06) (96% - 100%)    PHYSICAL EXAM:  Gen: AAox3, NAD, NC/AT  HEENT: ( - ) edema/tenderness to palpation R eye, ( - ) asymmetry, ( - ) signs of scalp infection, EOMI, VAI, ( - ) diplopia, ( + ) supra/infra orbital rims intact, ( - ) subconjunctival heme, ( - ) telecanthus, ( - ) exophthalmos, ( - )crepitis/septal hematoma/asymmetry, ( - ) malar depression, ( - ) CN V-2 paresthesia  Oral: MARTINA 40, partial edentulism, ( + ) occlusion stable and reproducible, ( - ) mobility of maxilla/crepitis. TMJ: ( - ) clicking/popping  CN II-XII intact    LABS:                                        7.2    4.85  )-----------( 26 ( 20 Jun 2021 06:31 )             22.0     06-20    142  |  105  |  9   ----------------------------<  77  3.8   |  27  |  0.81    Ca    8.4      20 Jun 2021 06:31  Phos  2.6     06-20  Mg     2.1     06-20    TPro  5.2<L>  /  Alb  2.2<L>  /  TBili  0.3  /  DBili  x   /  AST  23  /  ALT  21  /  AlkPhos  123<H>  06-20

## 2021-06-20 NOTE — CONSULT NOTE ADULT - ASSESSMENT
ASSESSMENT: 64yo F s/p fall sustaining R orbital floor fx on 6/10/2021    PLAN:    - Rec optho consult      - Rec Pain Control as per primary  - No pressure to face, ice to face  - Rec soft diet  - Rec Sinus precautions (no nose blowing, no sucking on straws, sneeze with mouth open)    [id number] ASSESSMENT: 64yo F s/p fall sustaining R orbital floor fx on 6/10/2021    PLAN:    - Rec optho consult      - Rec Pain Control as per primary  - No pressure to face, ice to face  - Rec soft diet  - Rec Sinus precautions (no nose blowing, no sucking on straws, sneeze with mouth open)    [id number] [facility]

## 2021-06-20 NOTE — PROGRESS NOTE ADULT - PROBLEM SELECTOR PLAN 1
Pt admitted for dyspnea and hypoxia, currently maintained on high-flow NC. Differential includes COPD exacerbation, possibly in s/o underlying infection given b/l ground glass opacities on CT chest and elevated procalcitonin, although pt afebrile w/out leukocytosis. Also considered is new CHF given elevated BNP and that CT chest findings may represent pulmonary edema, although echo here w/ hyperdynamic LV systolic function and nml diastolic function. CT findings may also represent pneumonitis in s/o chemo/immunotherapy tx. Unlikely PE as CTA chest negative for acute thromboembolism.    Plan:  -Continuous pulse ox  -High-flow NC for now, wean as tolerated  -Low threshold to start on BiPAP if pt's saturations do not improve  -CTA chest w/ evidence of chronic thromboembolic disease but no acute PE, also w/ b/l symmetric ground-glass opacities  -Echo performed here w/ hyperdynamic LV systolic function, nml diastolic function  -RVP negative, COVID-19 negative x 2, procalcitonin elevated at 15  -Blood cx NGTD, sputum cx gram stain w/ few PNMs  -Fungitell negative  -Pulm consulted, recs appreciated  -C/w Zosyn day 5, d/c azithromycin as legionella (-)  -s/p Lasix IVP x 3  -MICU consulted, recs appreciated  -Continue w/ Duoneb, IV solumedrol added 1mg/kg BID   -VBG lactate elevated on admission, downtrended

## 2021-06-20 NOTE — PROGRESS NOTE ADULT - ATTENDING COMMENTS
Patient seen and examined today. I agree with the above findings, assessment, and plan with the following additions and exceptions:     Acute hypoxic respiratory failure -- no clear diagnosis at this time. CT imaging noted. Consultants assessments noted. C/w zosyn iv. C/w systemic steroids. Prefer to keep net negative at this time. Lasix 40 mg ivp given. Continue to wean fio2. Was saturating 95% on fio2 of 40% with 50L of flow. Avoid over oxygenation.   Acapella flutter device.  Out of bed to chair daily.   All consultant contribution to care greatly appreciated.   Rest of plan as above    Dr. Josiah Rose DO  Attending Physician  Division of Hospital Medicine  [facility]  Pager:  [phone]

## 2021-06-20 NOTE — PROGRESS NOTE ADULT - SUBJECTIVE AND OBJECTIVE BOX
Wm Ya MD  Internal Medicine, PGY-2  Pager: [phone] ([facility]) / [phone] ([facility])    SUBJECTIVE / OVERNIGHT EVENTS:  - Pt seen and examined at bedside    MEDICATIONS  (STANDING):  albuterol/ipratropium for Nebulization 3 milliLiter(s) Nebulizer every 6 hours  albuterol/ipratropium for Nebulization.. 3 milliLiter(s) Nebulizer every 20 minutes  amLODIPine   Tablet 10 milliGRAM(s) Oral daily  chlorhexidine 2% Cloths 1 Application(s) Topical <User Schedule>  cholecalciferol 1000 Unit(s) Oral daily  cyanocobalamin 1000 MICROGram(s) Oral daily  folic acid 1 milliGRAM(s) Oral daily  methylPREDNISolone sodium succinate Injectable 60 milliGRAM(s) IV Push daily  mirtazapine 15 milliGRAM(s) Oral at bedtime  pancrelipase  (CREON 12,000 Lipase Units) 2 Capsule(s) Oral three times a day with meals  pantoprazole    Tablet 40 milliGRAM(s) Oral two times a day  piperacillin/tazobactam IVPB.. 3.375 Gram(s) IV Intermittent every 8 hours    MEDICATIONS  (PRN):  acetaminophen   Tablet .. 650 milliGRAM(s) Oral every 6 hours PRN Mild Pain (1 - 3), Moderate Pain (4 - 6)  aluminum hydroxide/magnesium hydroxide/simethicone Suspension 30 milliLiter(s) Oral every 4 hours PRN Dyspepsia  ondansetron    Tablet 4 milliGRAM(s) Oral four times a day PRN Nausea and/or Vomiting  oxyCODONE    IR 30 milliGRAM(s) Oral every 6 hours PRN Severe Pain (7 - 10)    PHYSICAL EXAM:  Vital Signs Last 24 Hrs  T(C): 37.1 (20 Jun 2021 04:54), Max: 37.2 (19 Jun 2021 20:21)  T(F): 98.8 (20 Jun 2021 04:54), Max: 98.9 (19 Jun 2021 20:21)  HR: 95 (20 Jun 2021 05:50) (91 - 114)  BP: 147/81 (20 Jun 2021 04:54) (136/79 - 147/81)  RR: 20 (20 Jun 2021 04:54) (18 - 20)  SpO2: 98% (20 Jun 2021 05:50) (96% - 100%)    I&O's Summary  19 Jun 2021 07:01  -  20 Jun 2021 07:00  --------------------------------------------------------  IN: 0 mL / OUT: 400 mL / NET: -400 mL    GEN: Awake and alert, lying in bed on HFNC  HEENT: Mild periorbital ecchymosis of R eye, EOMI. MMM  NECK: Supple  CHEST: On high-flow NC, O2 saturations in 90s (on 50% FiO2, 50L) at time of assessment. Non-labored breathing, speaking in full sentences. Coarse crackles b/l mid-ower lung felids. (+) port R chest  CV: RRR. (+) S1/S2. No audible S3/S4. R No murmurs, rubs, or gallops.    GI: Soft, nontender, nondistended. +BS.  EXT: Warm and well perfused. Trace pitting edema to mid-shin b/l. No calf tenderness. 2+ distal pulses b/l LEs  SKIN: Intact, no rashes or lesions.   NEURO: A&Ox3, grossly non-focal    LABS:                      7.2    4.85  )-----------( 26       ( 20 Jun 2021 06:31 )             22.0     06-20    142  |  105  |  9   ----------------------------<  77  3.8   |  27  |  0.81    Ca    8.4      20 Jun 2021 06:31  Phos  2.6     06-20  Mg     2.1     06-20    TPro  5.2<L>  /  Alb  2.2<L>  /  TBili  0.3  /  DBili  x   /  AST  23  /  ALT  21  /  AlkPhos  123<H>  06-20    Culture - Sputum (collected 17 Jun 2021 17:18)  Source: .Sputum Sputum  Gram Stain (17 Jun 2021 20:55):    Few polymorphonuclear leukocytes per low power field    Few Squamous epithelial cells per low power field    Few Yeast like cells per oil power field  Final Report (19 Jun 2021 19:16):    Normal Respiratory Erin present    IMAGING:  Xray Chest 1 View- PORTABLE-Urgent (06.17.21 @ 08:50)  IMPRESSION:  - Increasing pulmonary edema with compared with the prior study.   Wm Ya MD  Internal Medicine, PGY-2  Pager: [phone] ([facility]) / [phone] ([facility])    SUBJECTIVE / OVERNIGHT EVENTS:  - Pt seen and examined at bedside  - No acute complaints  - Still satting 92$ on HFNC    MEDICATIONS  (STANDING):  albuterol/ipratropium for Nebulization 3 milliLiter(s) Nebulizer every 6 hours  albuterol/ipratropium for Nebulization.. 3 milliLiter(s) Nebulizer every 20 minutes  amLODIPine   Tablet 10 milliGRAM(s) Oral daily  chlorhexidine 2% Cloths 1 Application(s) Topical <User Schedule>  cholecalciferol 1000 Unit(s) Oral daily  cyanocobalamin 1000 MICROGram(s) Oral daily  folic acid 1 milliGRAM(s) Oral daily  methylPREDNISolone sodium succinate Injectable 60 milliGRAM(s) IV Push daily  mirtazapine 15 milliGRAM(s) Oral at bedtime  pancrelipase  (CREON 12,000 Lipase Units) 2 Capsule(s) Oral three times a day with meals  pantoprazole    Tablet 40 milliGRAM(s) Oral two times a day  piperacillin/tazobactam IVPB.. 3.375 Gram(s) IV Intermittent every 8 hours    MEDICATIONS  (PRN):  acetaminophen   Tablet .. 650 milliGRAM(s) Oral every 6 hours PRN Mild Pain (1 - 3), Moderate Pain (4 - 6)  aluminum hydroxide/magnesium hydroxide/simethicone Suspension 30 milliLiter(s) Oral every 4 hours PRN Dyspepsia  ondansetron    Tablet 4 milliGRAM(s) Oral four times a day PRN Nausea and/or Vomiting  oxyCODONE    IR 30 milliGRAM(s) Oral every 6 hours PRN Severe Pain (7 - 10)    PHYSICAL EXAM:  Vital Signs Last 24 Hrs  T(C): 37.1 (20 Jun 2021 04:54), Max: 37.2 (19 Jun 2021 20:21)  T(F): 98.8 (20 Jun 2021 04:54), Max: 98.9 (19 Jun 2021 20:21)  HR: 95 (20 Jun 2021 05:50) (91 - 114)  BP: 147/81 (20 Jun 2021 04:54) (136/79 - 147/81)  RR: 20 (20 Jun 2021 04:54) (18 - 20)  SpO2: 98% (20 Jun 2021 05:50) (96% - 100%)    I&O's Summary  19 Jun 2021 07:01  -  20 Jun 2021 07:00  --------------------------------------------------------  IN: 0 mL / OUT: 400 mL / NET: -400 mL    GEN: Awake and alert, lying in bed on HFNC  HEENT: Mild periorbital ecchymosis of R eye, EOMI. MMM  NECK: Supple  CHEST: On high-flow NC, O2 saturations in 90s (on 50% FiO2, 50L) at time of assessment. Non-labored breathing, speaking in full sentences. Coarse crackles b/l mid-ower lung felids. (+) port R chest  CV: RRR. (+) S1/S2. No audible S3/S4. R No murmurs, rubs, or gallops.    GI: Soft, nontender, nondistended. +BS.  EXT: Warm and well perfused. Trace pitting edema to mid-shin b/l. No calf tenderness. 2+ distal pulses b/l LEs  SKIN: Intact, no rashes or lesions.   NEURO: A&Ox3, grossly non-focal    LABS:                      7.2    4.85  )-----------( 26       ( 20 Jun 2021 06:31 )             22.0     06-20    142  |  105  |  9   ----------------------------<  77  3.8   |  27  |  0.81    Ca    8.4      20 Jun 2021 06:31  Phos  2.6     06-20  Mg     2.1     06-20    TPro  5.2<L>  /  Alb  2.2<L>  /  TBili  0.3  /  DBili  x   /  AST  23  /  ALT  21  /  AlkPhos  123<H>  06-20    Culture - Sputum (collected 17 Jun 2021 17:18)  Source: .Sputum Sputum  Gram Stain (17 Jun 2021 20:55):    Few polymorphonuclear leukocytes per low power field    Few Squamous epithelial cells per low power field    Few Yeast like cells per oil power field  Final Report (19 Jun 2021 19:16):    Normal Respiratory Erin present    IMAGING:  Xray Chest 1 View- PORTABLE-Urgent (06.17.21 @ 08:50)  IMPRESSION:  - Increasing pulmonary edema with compared with the prior study.   Wm Ya MD  Internal Medicine, PGY-2  Pager: [phone] ([facility]) / [phone] ([facility])    SUBJECTIVE / OVERNIGHT EVENTS:  - Pt seen and examined at bedside  - No acute complaints  - Still satting 92% on HFNC    MEDICATIONS  (STANDING):  albuterol/ipratropium for Nebulization 3 milliLiter(s) Nebulizer every 6 hours  albuterol/ipratropium for Nebulization.. 3 milliLiter(s) Nebulizer every 20 minutes  amLODIPine   Tablet 10 milliGRAM(s) Oral daily  chlorhexidine 2% Cloths 1 Application(s) Topical <User Schedule>  cholecalciferol 1000 Unit(s) Oral daily  cyanocobalamin 1000 MICROGram(s) Oral daily  folic acid 1 milliGRAM(s) Oral daily  methylPREDNISolone sodium succinate Injectable 60 milliGRAM(s) IV Push daily  mirtazapine 15 milliGRAM(s) Oral at bedtime  pancrelipase  (CREON 12,000 Lipase Units) 2 Capsule(s) Oral three times a day with meals  pantoprazole    Tablet 40 milliGRAM(s) Oral two times a day  piperacillin/tazobactam IVPB.. 3.375 Gram(s) IV Intermittent every 8 hours    MEDICATIONS  (PRN):  acetaminophen   Tablet .. 650 milliGRAM(s) Oral every 6 hours PRN Mild Pain (1 - 3), Moderate Pain (4 - 6)  aluminum hydroxide/magnesium hydroxide/simethicone Suspension 30 milliLiter(s) Oral every 4 hours PRN Dyspepsia  ondansetron    Tablet 4 milliGRAM(s) Oral four times a day PRN Nausea and/or Vomiting  oxyCODONE    IR 30 milliGRAM(s) Oral every 6 hours PRN Severe Pain (7 - 10)    PHYSICAL EXAM:  Vital Signs Last 24 Hrs  T(C): 37.1 (20 Jun 2021 04:54), Max: 37.2 (19 Jun 2021 20:21)  T(F): 98.8 (20 Jun 2021 04:54), Max: 98.9 (19 Jun 2021 20:21)  HR: 95 (20 Jun 2021 05:50) (91 - 114)  BP: 147/81 (20 Jun 2021 04:54) (136/79 - 147/81)  RR: 20 (20 Jun 2021 04:54) (18 - 20)  SpO2: 98% (20 Jun 2021 05:50) (96% - 100%)    I&O's Summary  19 Jun 2021 07:01  -  20 Jun 2021 07:00  --------------------------------------------------------  IN: 0 mL / OUT: 400 mL / NET: -400 mL    GEN: Awake and alert, lying in bed on HFNC  HEENT: Mild periorbital ecchymosis of R eye, EOMI. MMM  NECK: Supple  CHEST: On high-flow NC, O2 saturations in 90s (on 50% FiO2, 50L) at time of assessment. Non-labored breathing, speaking in full sentences. Coarse crackles b/l mid-ower lung felids. (+) port R chest  CV: RRR. (+) S1/S2. No audible S3/S4. R No murmurs, rubs, or gallops.    GI: Soft, nontender, nondistended. +BS.  EXT: Warm and well perfused. Trace pitting edema to mid-shin b/l. No calf tenderness. 2+ distal pulses b/l LEs  SKIN: Intact, no rashes or lesions.   NEURO: A&Ox3, grossly non-focal    LABS:                      7.2    4.85  )-----------( 26       ( 20 Jun 2021 06:31 )             22.0     06-20    142  |  105  |  9   ----------------------------<  77  3.8   |  27  |  0.81    Ca    8.4      20 Jun 2021 06:31  Phos  2.6     06-20  Mg     2.1     06-20    TPro  5.2<L>  /  Alb  2.2<L>  /  TBili  0.3  /  DBili  x   /  AST  23  /  ALT  21  /  AlkPhos  123<H>  06-20    Culture - Sputum (collected 17 Jun 2021 17:18)  Source: .Sputum Sputum  Gram Stain (17 Jun 2021 20:55):    Few polymorphonuclear leukocytes per low power field    Few Squamous epithelial cells per low power field    Few Yeast like cells per oil power field  Final Report (19 Jun 2021 19:16):    Normal Respiratory Erin present    IMAGING:  Xray Chest 1 View- PORTABLE-Urgent (06.17.21 @ 08:50)  IMPRESSION:  - Increasing pulmonary edema with compared with the prior study.

## 2021-06-20 NOTE — PROGRESS NOTE ADULT - PROBLEM SELECTOR PLAN 6
Pt w/ hx of DVT/PE on Eliquis, currently being held in s/o fall w/ R orbital floor fx. Pt tachycardic, tachypneic on admission w/ possible EKG signs of heart strain but no evidence of acute PE on CTA chest.    Plan:  -CTA chest w/ evidence of chronic thromboembolic disease but no acute PE  - Holding home Eliquis

## 2021-06-21 LAB
ALBUMIN SERPL ELPH-MCNC: 2.4 G/DL — LOW (ref 3.3–5)
ALP SERPL-CCNC: 132 U/L — HIGH (ref 40–120)
ALT FLD-CCNC: 21 U/L — SIGNIFICANT CHANGE UP (ref 10–45)
ANION GAP SERPL CALC-SCNC: 12 MMOL/L — SIGNIFICANT CHANGE UP (ref 5–17)
APTT BLD: 25.2 SEC — LOW (ref 27.5–35.5)
AST SERPL-CCNC: 28 U/L — SIGNIFICANT CHANGE UP (ref 10–40)
BILIRUB SERPL-MCNC: 0.3 MG/DL — SIGNIFICANT CHANGE UP (ref 0.2–1.2)
BUN SERPL-MCNC: 10 MG/DL — SIGNIFICANT CHANGE UP (ref 7–23)
CALCIUM SERPL-MCNC: 8.8 MG/DL — SIGNIFICANT CHANGE UP (ref 8.4–10.5)
CHLORIDE SERPL-SCNC: 103 MMOL/L — SIGNIFICANT CHANGE UP (ref 96–108)
CO2 SERPL-SCNC: 27 MMOL/L — SIGNIFICANT CHANGE UP (ref 22–31)
CREAT SERPL-MCNC: 0.86 MG/DL — SIGNIFICANT CHANGE UP (ref 0.5–1.3)
FIBRINOGEN PPP-MCNC: 551 MG/DL — HIGH (ref 290–520)
GLUCOSE SERPL-MCNC: 90 MG/DL — SIGNIFICANT CHANGE UP (ref 70–99)
HCT VFR BLD CALC: 21.9 % — LOW (ref 34.5–45)
HCT VFR BLD CALC: 27.5 % — LOW (ref 34.5–45)
HGB BLD-MCNC: 7.1 G/DL — LOW (ref 11.5–15.5)
HGB BLD-MCNC: 9.1 G/DL — LOW (ref 11.5–15.5)
INR BLD: 1.06 RATIO — SIGNIFICANT CHANGE UP (ref 0.88–1.16)
LDH SERPL L TO P-CCNC: 559 U/L — HIGH (ref 50–242)
MAGNESIUM SERPL-MCNC: 1.9 MG/DL — SIGNIFICANT CHANGE UP (ref 1.6–2.6)
MCHC RBC-ENTMCNC: 30.5 PG — SIGNIFICANT CHANGE UP (ref 27–34)
MCHC RBC-ENTMCNC: 31.4 PG — SIGNIFICANT CHANGE UP (ref 27–34)
MCHC RBC-ENTMCNC: 32.4 GM/DL — SIGNIFICANT CHANGE UP (ref 32–36)
MCHC RBC-ENTMCNC: 33.1 GM/DL — SIGNIFICANT CHANGE UP (ref 32–36)
MCV RBC AUTO: 92.3 FL — SIGNIFICANT CHANGE UP (ref 80–100)
MCV RBC AUTO: 96.9 FL — SIGNIFICANT CHANGE UP (ref 80–100)
NRBC # BLD: 0 /100 WBCS — SIGNIFICANT CHANGE UP (ref 0–0)
NRBC # BLD: 0 /100 WBCS — SIGNIFICANT CHANGE UP (ref 0–0)
PHOSPHATE SERPL-MCNC: 3.3 MG/DL — SIGNIFICANT CHANGE UP (ref 2.5–4.5)
PLATELET # BLD AUTO: 10 K/UL — CRITICAL LOW (ref 150–400)
PLATELET # BLD AUTO: 15 K/UL — CRITICAL LOW (ref 150–400)
POTASSIUM SERPL-MCNC: 3.9 MMOL/L — SIGNIFICANT CHANGE UP (ref 3.5–5.3)
POTASSIUM SERPL-SCNC: 3.9 MMOL/L — SIGNIFICANT CHANGE UP (ref 3.5–5.3)
PROT SERPL-MCNC: 5.2 G/DL — LOW (ref 6–8.3)
PROTHROM AB SERPL-ACNC: 12.7 SEC — SIGNIFICANT CHANGE UP (ref 10.6–13.6)
RBC # BLD: 2.26 M/UL — LOW (ref 3.8–5.2)
RBC # BLD: 2.98 M/UL — LOW (ref 3.8–5.2)
RBC # BLD: 3 M/UL — LOW (ref 3.8–5.2)
RBC # FLD: 17.6 % — HIGH (ref 10.3–14.5)
RBC # FLD: 18.6 % — HIGH (ref 10.3–14.5)
RETICS #: 24.9 K/UL — LOW (ref 25–125)
RETICS/RBC NFR: 0.8 % — SIGNIFICANT CHANGE UP (ref 0.5–2.5)
SODIUM SERPL-SCNC: 142 MMOL/L — SIGNIFICANT CHANGE UP (ref 135–145)
WBC # BLD: 7.44 K/UL — SIGNIFICANT CHANGE UP (ref 3.8–10.5)
WBC # BLD: 8.79 K/UL — SIGNIFICANT CHANGE UP (ref 3.8–10.5)
WBC # FLD AUTO: 7.44 K/UL — SIGNIFICANT CHANGE UP (ref 3.8–10.5)
WBC # FLD AUTO: 8.79 K/UL — SIGNIFICANT CHANGE UP (ref 3.8–10.5)

## 2021-06-21 PROCEDURE — 99223 1ST HOSP IP/OBS HIGH 75: CPT

## 2021-06-21 PROCEDURE — 99233 SBSQ HOSP IP/OBS HIGH 50: CPT | Mod: GC

## 2021-06-21 PROCEDURE — 99232 SBSQ HOSP IP/OBS MODERATE 35: CPT | Mod: GC

## 2021-06-21 RX ORDER — METOPROLOL TARTRATE 50 MG
12.5 TABLET ORAL
Refills: 0 | Status: DISCONTINUED | OUTPATIENT
Start: 2021-06-21 | End: 2021-06-23

## 2021-06-21 RX ORDER — FUROSEMIDE 40 MG
40 TABLET ORAL DAILY
Refills: 0 | Status: DISCONTINUED | OUTPATIENT
Start: 2021-06-21 | End: 2021-06-28

## 2021-06-21 RX ORDER — OXYCODONE HYDROCHLORIDE 5 MG/1
30 TABLET ORAL EVERY 6 HOURS
Refills: 0 | Status: DISCONTINUED | OUTPATIENT
Start: 2021-06-21 | End: 2021-06-28

## 2021-06-21 RX ADMIN — Medication 1000 UNIT(S): at 12:53

## 2021-06-21 RX ADMIN — Medication 2 CAPSULE(S): at 17:45

## 2021-06-21 RX ADMIN — PREGABALIN 1000 MICROGRAM(S): 225 CAPSULE ORAL at 12:52

## 2021-06-21 RX ADMIN — OXYCODONE HYDROCHLORIDE 30 MILLIGRAM(S): 5 TABLET ORAL at 07:25

## 2021-06-21 RX ADMIN — Medication 3 MILLILITER(S): at 23:05

## 2021-06-21 RX ADMIN — Medication 3 MILLILITER(S): at 17:44

## 2021-06-21 RX ADMIN — OXYCODONE HYDROCHLORIDE 30 MILLIGRAM(S): 5 TABLET ORAL at 23:03

## 2021-06-21 RX ADMIN — Medication 40 MILLIGRAM(S): at 15:06

## 2021-06-21 RX ADMIN — Medication 2 CAPSULE(S): at 12:53

## 2021-06-21 RX ADMIN — Medication 2 CAPSULE(S): at 09:01

## 2021-06-21 RX ADMIN — AMLODIPINE BESYLATE 10 MILLIGRAM(S): 2.5 TABLET ORAL at 05:46

## 2021-06-21 RX ADMIN — MIRTAZAPINE 15 MILLIGRAM(S): 45 TABLET, ORALLY DISINTEGRATING ORAL at 22:27

## 2021-06-21 RX ADMIN — PIPERACILLIN AND TAZOBACTAM 25 GRAM(S): 4; .5 INJECTION, POWDER, LYOPHILIZED, FOR SOLUTION INTRAVENOUS at 05:44

## 2021-06-21 RX ADMIN — Medication 12.5 MILLIGRAM(S): at 15:06

## 2021-06-21 RX ADMIN — Medication 3 MILLILITER(S): at 11:39

## 2021-06-21 RX ADMIN — PIPERACILLIN AND TAZOBACTAM 25 GRAM(S): 4; .5 INJECTION, POWDER, LYOPHILIZED, FOR SOLUTION INTRAVENOUS at 15:08

## 2021-06-21 RX ADMIN — OXYCODONE HYDROCHLORIDE 30 MILLIGRAM(S): 5 TABLET ORAL at 06:55

## 2021-06-21 RX ADMIN — PANTOPRAZOLE SODIUM 40 MILLIGRAM(S): 20 TABLET, DELAYED RELEASE ORAL at 17:47

## 2021-06-21 RX ADMIN — PANTOPRAZOLE SODIUM 40 MILLIGRAM(S): 20 TABLET, DELAYED RELEASE ORAL at 05:46

## 2021-06-21 RX ADMIN — Medication 1 MILLIGRAM(S): at 12:52

## 2021-06-21 RX ADMIN — PIPERACILLIN AND TAZOBACTAM 25 GRAM(S): 4; .5 INJECTION, POWDER, LYOPHILIZED, FOR SOLUTION INTRAVENOUS at 22:27

## 2021-06-21 RX ADMIN — OXYCODONE HYDROCHLORIDE 30 MILLIGRAM(S): 5 TABLET ORAL at 15:40

## 2021-06-21 RX ADMIN — OXYCODONE HYDROCHLORIDE 30 MILLIGRAM(S): 5 TABLET ORAL at 15:07

## 2021-06-21 RX ADMIN — CHLORHEXIDINE GLUCONATE 1 APPLICATION(S): 213 SOLUTION TOPICAL at 05:45

## 2021-06-21 RX ADMIN — Medication 60 MILLIGRAM(S): at 05:45

## 2021-06-21 RX ADMIN — OXYCODONE HYDROCHLORIDE 30 MILLIGRAM(S): 5 TABLET ORAL at 22:30

## 2021-06-21 RX ADMIN — Medication 3 MILLILITER(S): at 05:46

## 2021-06-21 NOTE — PROGRESS NOTE ADULT - PROBLEM SELECTOR PLAN 6
Pt w/ hx of DVT/PE on Eliquis, currently being held in s/o fall w/ R orbital floor fx. Pt tachycardic, tachypneic on admission w/ possible EKG signs of heart strain but no evidence of acute PE on CTA chest.    Plan:  -CTA chest w/ evidence of chronic thromboembolic disease but no acute PE  -Holding home Eliquis

## 2021-06-21 NOTE — PROGRESS NOTE ADULT - PROBLEM SELECTOR PLAN 3
Pt w/ chronic anemia, baseline Hgb 8-9 per chart review. Elevated RDW, blood smear w/ macrocytosis. S/p 1U PRBC (6/16). Now w/ new pancytopenia, possibly in s/o of immunotherapy. Elevated LDH but nml bili, haptoglobin, fibrinogen; anemia unlikely hemolysis/DIC    Plan:  - Daily CBC  - Trend H+H, WBC, plt  - C/w home folic acid, B12  - S/p 1U PRBCs (6/16)  - S/p 2U platelets (6/19, 6/21)  - Hold home Eliquis   - Transfuse PRBC for Hgb <7  - Transfuse plt for <50 w/ evidence of bleeding, otherwise <10 Pt w/ chronic anemia, baseline Hgb 8-9 per chart review. Elevated RDW, blood smear w/ macrocytosis. S/p 1U PRBC (6/16). Now w/ new pancytopenia, possibly in s/o of immunotherapy. Elevated LDH but nml bili, haptoglobin, fibrinogen; anemia unlikely hemolysis/DIC    Plan:  - Daily CBC  - Trend H+H, WBC, plt  - C/w home folic acid, B12  - S/p 2U PRBCs (6/16, 6/21)  - S/p 2U platelets (6/19, 6/21)  - Hold home Eliquis   - Transfuse PRBC for Hgb <7  - Transfuse plt for <50 w/ evidence of bleeding, otherwise <10

## 2021-06-21 NOTE — CONSULT NOTE ADULT - SUBJECTIVE AND OBJECTIVE BOX
Vascular Cardiology Consult Note    DIRECT SERVICE NUMBER:  [phone]           EMAIL [email or web address]   OFFICE [phone]    CC:  Fall, SOB    HPI:     Ms. Travis is a patient known to me from the office.  She was last seen by me May 21st and was doing well at that time. History of HTN, PE on Eliquis, lung CA, had a trip and fall.  Had SOB.  Admitted for dyspnea with CT findings of emphysema/ GGO.  She is getting nebulizers, steroids and antibiotics and is being followed by pulmonary.  She is currently on hi-flow nasal cannula.  Mod-severe emphysema.  History of PE, however eliquis held after fall.  Platelets are currently 10.       Echo: ------------------------------------------------------------------------  Conclusions:  1. Normal mitral valve. Minimal mitral regurgitation.  2. Calcified trileaflet aortic valve with normal opening.  No aortic valve regurgitation seen.  3. Hyperdynamic left ventricular systolic function.  4. Normal right ventricular size and function.  5. Agitated saline injection attempted to evaluate the  interatrial septum. Images post-injection were suboptimal.  Agitated saline injection did not demonstrate an obvious  patent foramen ovale.  *** Compared with echocardiogram of 2/9/2021, estimated PA  systolic pressure was mildly elevated on the prior study.        (14 Jun 2021 13:50)    IMPRESSION:  1.  Bilateral symmetric groundglass opacities with interlobular septal thickening, consistent with pulmonary edema.  2.  Web at the right pulmonary artery along with pruning and stenosis of the left lower lobe pulmonary arteries, consistent with chronic thromboembolic disease.          Allergies    diazepam (Other)  lemon (Other)    Intolerances    	    MEDICATIONS:  amLODIPine   Tablet 10 milliGRAM(s) Oral daily    piperacillin/tazobactam IVPB.. 3.375 Gram(s) IV Intermittent every 8 hours    albuterol/ipratropium for Nebulization 3 milliLiter(s) Nebulizer every 6 hours  albuterol/ipratropium for Nebulization.. 3 milliLiter(s) Nebulizer every 20 minutes    acetaminophen   Tablet .. 650 milliGRAM(s) Oral every 6 hours PRN  mirtazapine 15 milliGRAM(s) Oral at bedtime  ondansetron    Tablet 4 milliGRAM(s) Oral four times a day PRN  oxyCODONE    IR 30 milliGRAM(s) Oral every 6 hours PRN    aluminum hydroxide/magnesium hydroxide/simethicone Suspension 30 milliLiter(s) Oral every 4 hours PRN  pancrelipase  (CREON 12,000 Lipase Units) 2 Capsule(s) Oral three times a day with meals  pantoprazole    Tablet 40 milliGRAM(s) Oral two times a day    methylPREDNISolone sodium succinate Injectable 60 milliGRAM(s) IV Push daily    chlorhexidine 2% Cloths 1 Application(s) Topical <User Schedule>  cholecalciferol 1000 Unit(s) Oral daily  cyanocobalamin 1000 MICROGram(s) Oral daily  folic acid 1 milliGRAM(s) Oral daily      PAST MEDICAL & SURGICAL HISTORY:  HTN (hypertension)    GERD (gastroesophageal reflux disease)    Chronic pancreatitis  last episode 4/2019    ARDS survivor  2015    History of adrenal adenoma  stable on imaging    Vocal cord polyp  removal 2018, benign as per pt    Thrombophlebitis  superficial right UE during 4/2019 hospital stay, resolved as per pt    Chronic abdominal pain    Non-small cell lung cancer (NSCLC)  chemo: Alimta/ Keytruda 2/24/2021    Pulmonary embolism    COPD (chronic obstructive pulmonary disease)    Pulmonary hypertension    Anemia  transfusion 2/5/21    S/P arthroscopy of shoulder  left in 2009    S/P hip replacement  left - 2010    S/P arthroscopy of shoulder  right - 2014    S/P hip replacement, right  May 2016    History of vocal cord polypectomy  1/2018    S/P shoulder replacement, left  2016    History of pancreatic surgery  Jeffery procedure (5/8/2019)        FAMILY HISTORY:      SOCIAL HISTORY:  unchanged    REVIEW OF SYSTEMS:  CONSTITUTIONAL: No fever, weight loss, or fatigue  EYES: No eye pain, visual disturbances, or discharge  ENT:  No difficulty hearing, tinnitus, vertigo; No sinus or throat pain  NECK: No pain or stiffness  RESPIRATORY:  + MAC  CARDIOVASCULAR:  no CP   GASTROINTESTINAL: No abdominal or epigastric pain. No nausea, vomiting, or hematemesis; No diarrhea or constipation. No melena or hematochezia.  GENITOURINARY: No dysuria, frequency, hematuria, or incontinence  NEUROLOGICAL: No headaches, memory loss, loss of strength, numbness, or tremors  LYMPH Nodes: No enlarged glands  ENDOCRINE: No heat or cold intolerance; No hair loss  MUSCULOSKELETAL: No joint pain or swelling; No muscle, back, or extremity pain  PSYCHIATRIC: No depression, anxiety, mood swings, or difficulty sleeping  HEME/LYMPH: No easy bruising, or bleeding gums  ALLERGY AND IMMUNOLOGIC: No hives or eczema	    [ x] All others negative	  [ ] Unable to obtain    PHYSICAL EXAM:  T(C): 36.7 (06-21-21 @ 06:37), Max: 37.4 (06-20-21 @ 20:01)  HR: 105 (06-21-21 @ 11:40) (100 - 114)  BP: 122/75 (06-21-21 @ 06:37) (113/75 - 122/75)  RR: 20 (06-21-21 @ 09:01) (20 - 20)  SpO2: 94% (06-21-21 @ 11:40) (94% - 99%)  Wt(kg): --  I&O's Summary    20 Jun 2021 07:01  -  21 Jun 2021 07:00  --------------------------------------------------------  IN: 0 mL / OUT: 1550 mL / NET: -1550 mL        Appearance:  	  HEENT:   Normal oral mucosa, PERRL, EOMI	ON high flow nasal cannula   Lymphatic: No lymphadenopathy  Cardiovascular:  S1S2 Tachycardia  Respiratory:  Course breath sounds bilaterally	  Psychiatry:  AAO x 3  Gastrointestinal:  Soft, Non-tender, + BS	  Skin: No rashes, No ecchymoses, No cyanosis	  Neurologic:  no deficits  Extremities:  no edema        LABS:	 	    CBC Full  -  ( 21 Jun 2021 06:48 )  WBC Count : 7.44 K/uL  Hemoglobin : 7.1 g/dL  Hematocrit : 21.9 %  Platelet Count - Automated : 10 K/uL  Mean Cell Volume : 96.9 fl  Mean Cell Hemoglobin : 31.4 pg  Mean Cell Hemoglobin Concentration : 32.4 gm/dL  Auto Neutrophil # : x  Auto Lymphocyte # : x  Auto Monocyte # : x  Auto Eosinophil # : x  Auto Basophil # : x  Auto Neutrophil % : x  Auto Lymphocyte % : x  Auto Monocyte % : x  Auto Eosinophil % : x  Auto Basophil % : x    06-21    142  |  103  |  10  ----------------------------<  90  3.9   |  27  |  0.86  06-20    142  |  105  |  9   ----------------------------<  77  3.8   |  27  |  0.81    Ca    8.8      21 Jun 2021 06:48  Ca    8.4      20 Jun 2021 06:31  Phos  3.3     06-21  Phos  2.6     06-20  Mg     1.9     06-21  Mg     2.1     06-20    TPro  5.2<L>  /  Alb  2.4<L>  /  TBili  0.3  /  DBili  x   /  AST  28  /  ALT  21  /  AlkPhos  132<H>  06-21  TPro  5.2<L>  /  Alb  2.2<L>  /  TBili  0.3  /  DBili  x   /  AST  23  /  ALT  21  /  AlkPhos  123<H>  06-20          Assessment:  1. Acute Respitory Failure  - COPD/ Infection  - on high flow  - elevated BNP  2.  HIstory of PE  - chronic on CT scan  - currently off a/c  3.  Elevated BNP  - currently off diuretics, was on diuretics as outpatient. (lasix 40)  4.  Pancytopenia           Plan:  1.   Consider restarting diuretic - she was on Lasix 40mg PO daily - would start Lasix 40mg IV daily   2.  If she is off a/c, then would put on pneumatic compression garments for DVT prophylaxis  3.  She was on Metoprolol 100mg daily at home, please resume at lower dose - Metroprolol 12.5mg BID if ok with pulmonary   4.  Appreciate pulmonary and hematology recs  5.  Message sent to Dr. Callaway            Thank you    Sourav Leahy     Vascular Cardiology Service    Please call with any questions:   DIRECT SERVICE NUMBER:  [phone]  Office [phone]  email:  [email or web address]

## 2021-06-21 NOTE — PROGRESS NOTE ADULT - ATTENDING COMMENTS
PT seen and examined with fellow. Medical record, labs and imaging reviewed. Acute on chronic hypoxic resp failure with b/l GGOs on CT chest. Agree with ABx treatment with IV Zosyn, FUP Cxs. Remains on IV solumedrol, slow steroid taper as outlined above. Pneumonitis is less likely given acuity of presentation. Cont to wean off high flow Nc as tolerated to keep O2 sat > 88 %. Will require outpt pulm FUP with a short interval repeat Ct chest in 3-4 weeks following discharge. PT seen and examined with fellow. Medical record, labs and imaging reviewed. Acute on chronic hypoxic resp failure with b/l GGOs on CT chest. Agree with ABx treatment with IV Zosyn for possible bacterial PNA, FUP Cxs. Remains on IV solumedrol, slow steroid taper as outlined above. Pneumonitis is less likely given acuity of presentation. Cont to wean off high flow Nc as tolerated to keep O2 sat > 88 %. Will require outpt pulm FUP with a short interval repeat Ct chest in 3-4 weeks following discharge.

## 2021-06-21 NOTE — PROGRESS NOTE ADULT - ASSESSMENT
This is a 66 y/o F with stage III L lung adenocarcinoma on alimta/keytruda, emphysema, HTN, h/o DVT/PE not on AC who presents with acute onset SOB. Found to be hypoxic and currently requiring HFNC to maintain SpO2. CT chest remarkable for diffuse, b/l GGO in the setting of moderate to severe emphysema, which is new compared to CT chest from 3 months prior. GGO may represent pulmonary edema, infection or inflammation.    #Acute hypoxic respiratory failure  - likely 2/2 b/l diffuse GGO on CT chest, though patient does have history of PE with interruption of AC in the past  - ESR, CRP elevation noted  - remains thrombocytopenic - platelets 26  - TTE with bubble study did not demonstrate decreased systolic function or evidence of shunting, though study was suboptimal  - BNP elevation noted, high output cardiac failure?  - 5 day course of zosyn  - continue solumedrol 1 mg/kg daily - inflammatory markers are elevated but it would be unusual for drug-induced pneumonitis (from keytruda) to present so far after initiation of therapy  - continue HFNC, maintain SpO2 88-92%, wean as tolerated  - IS, acapella device  - pain control    Shyam Lucio PGY-5  Pulmonary/Critical Care Fellow  Pager: [phone] ([facility]) [phone] ([facility])  Pulmonary Spectra #[id number] ([facility]) / [phone] ([facility]) This is a 64 y/o F with stage III L lung adenocarcinoma on alimta/keytruda, emphysema, HTN, h/o DVT/PE not on AC who presents with acute onset SOB. Found to be hypoxic and currently requiring HFNC to maintain SpO2. CT chest remarkable for diffuse, b/l GGO in the setting of moderate to severe emphysema, which is new compared to CT chest from 3 months prior. GGO may represent pulmonary edema, infection or inflammation.    #Acute hypoxic respiratory failure  - likely 2/2 b/l diffuse GGO on CT chest, though patient does have history of PE with interruption of AC in the past  - ESR, CRP elevation noted  - remains thrombocytopenic - platelets 26  - TTE with bubble study did not demonstrate decreased systolic function or evidence of shunting, though study was suboptimal  - BNP elevation noted, potentially high output cardiac failure  - 5 day course of zosyn - completes course today  - decrease solumedrol to 50 mg daily today, start to wean by 10 mg daily every 5 days  - inflammatory markers are elevated but it would be unusual for drug-induced pneumonitis (from keytruda) to present so far after initiation of therapy, would continue to trial her on her immunotherapy as outpatient  - continue HFNC, maintain SpO2 88-92%, wean as tolerated  - IS, acapella device  - pain control    Shyam Lucio PGY-5  Pulmonary/Critical Care Fellow  Pager: [phone] ([facility]) [phone] ([facility])  Pulmonary Spectra #[id number] ([facility]) / [phone] ([facility])

## 2021-06-21 NOTE — PROGRESS NOTE ADULT - PROBLEM SELECTOR PLAN 1
Pt admitted for dyspnea and hypoxia, currently maintained on high-flow NC. Differential includes COPD exacerbation, possibly in s/o underlying infection given b/l ground glass opacities on CT chest and elevated procalcitonin, although pt afebrile w/out leukocytosis. Also considered is new CHF given elevated BNP and that CT chest findings may represent pulmonary edema, although echo here w/ hyperdynamic LV systolic function and nml diastolic function. CT findings may also represent pneumonitis in s/o chemo/immunotherapy tx. Unlikely PE as CTA chest negative for acute thromboembolism.    Plan:  -Continuous pulse ox  -High-flow NC for now, wean as tolerated  -Low threshold to start on BiPAP if pt's saturations do not improve  -CTA chest w/ evidence of chronic thromboembolic disease but no acute PE, also w/ b/l symmetric ground-glass opacities  -Echo performed here w/ hyperdynamic LV systolic function, nml diastolic function  -RVP negative, COVID-19 negative x 2, procalcitonin elevated at 15 (6/15)  -Blood cx NGTD, sputum cx w/ nml respiratory eleanor  -Fungitell negative  -Pulm consulted, recs appreciated  -C/w Zosyn, azithromycin now d/c  -s/p Lasix IVP x 4 (last administered on 06/20/21)  -MICU consulted, recs appreciated  -Continue w/ Duoneb, IV solumedrol added 1mg/kg BID   -VBG lactate elevated on admission, downtrended

## 2021-06-21 NOTE — PROGRESS NOTE ADULT - ATTENDING COMMENTS
Pt with advanced NSCLC on maintenance pemetrexed/ pembrolizumab with last treatment on 6/10/2021. She has been having increased hypoxic respiratory failure currently on high flow oxygen. She has been noted to have decreasing platelets over the course of her hospitalization. Previously had thrombocytosis but having decreasing plts: reviewed peripheral smear: 1 schistocyte/ hpf; 0 to 3 platelets/ hpf, anisocytosis with teardrops and nucleated reds. On exam, thin woman on high flow and no ecchymoses and nontoxic. She has been on high dose Solumedrol for hypoxia. Feel multifactorial with compromised bone marrow response from prior chemotherapy and increased consumption; will await additional work up. If no improvement with supportive measures: folic acid/ B12 and past expected priya in few days, would consider BM Biopsy to evaluate.

## 2021-06-21 NOTE — PROGRESS NOTE ADULT - PROBLEM SELECTOR PLAN 5
Recent ED visit for R orbital floor fx sustained after fall, seen by OMFS here    Plan:  -S/p Augmentin  -OMFS consulted here, recs appreciated   -No pressure to face, ice as needed  -Sinus precautions (no nose blowing, no sucking on straws, sneeze w/ mouth open)  -Plan for OR 6/25 if pt can be medically cleared Recent ED visit for R orbital floor fx sustained after fall, seen by OMFS here    Plan:  -S/p Augmentin  -OMFS consulted here, recs appreciated   -No pressure to face, ice as needed  -Sinus precautions (no nose blowing, no sucking on straws, sneeze w/ mouth open)

## 2021-06-21 NOTE — PROGRESS NOTE ADULT - PROBLEM SELECTOR PLAN 2
Pt w/ elevated BNP on admission w/ CTA demonstrating b/l ground glass opacities possibly 2/2 pulmonary edema. Will r/o CHF.    Plan:  -Repeat CXR w/ worsening pulmonary edema  -BNP downtrending  -Echo performed here w/ hyperdynamic LV systolic function, nml diastolic function  -s/p Lasix IVP x 4 (last administered on 06/20/21)

## 2021-06-21 NOTE — PROGRESS NOTE ADULT - SUBJECTIVE AND OBJECTIVE BOX
Contact information:  Esthela Matute, MS4   Medicine Team 5  Pager: [phone]    HIMANSHU VILLEGAS, MRN-[id number]    SUBJECTIVE/OVERNIGHT EVENTS: Patient seen and examined at bedside. No acute events overnight, no complaints at this time. States she continues to feel slightly improved each day although still not back to baseline. Reports persistent cough productive of white sputum. No fever, chills. Abdominal pain well managed at present.     OBJECTIVE:    Vitals:  Vital Signs Last 24 Hrs  T(C): 36.7 (21 Jun 2021 06:37), Max: 37.4 (20 Jun 2021 20:01)  T(F): 98.1 (21 Jun 2021 06:37), Max: 99.4 (20 Jun 2021 20:01)  HR: 108 (21 Jun 2021 06:37) (96 - 114)  BP: 122/75 (21 Jun 2021 06:37) (113/75 - 132/81)    RR: 20 (21 Jun 2021 06:37) (20 - 20)  SpO2: 97% (21 Jun 2021 06:37) (96% - 99%)    Exam:  GEN: Awake and alert, lying in bed on HFNC  HEENT: Mild periorbital ecchymosis of R eye, EOMI. MMM  NECK: Supple  CHEST: On high-flow NC, O2 saturations in 90s (on 40% FiO2, 50L) at time of assessment. Non-labored breathing, speaking in full sentences. Coarse crackles b/l mid-lower lung felids. (+) port R chest  CV: Tachycardic. (+) S1/S2. No audible S3/S4. R No murmurs, rubs, or gallops.    GI: Soft, nontender, nondistended. +BS.  EXT: Warm and well perfused. Trace pitting edema to mid-shin b/l. No calf tenderness. 2+ distal pulses b/l LEs  SKIN: Intact, no rashes or lesions.   NEURO: A&Ox3, grossly non-focal    I&Os:    20 Jun 2021 07:01  -  21 Jun 2021 07:00  --------------------------------------------------------  IN:  Total IN: 0 mL    OUT:    Voided (mL): 1550 mL  Total OUT: 1550 mL    Total NET: -1550 mL    LABS:                        7.1    7.44  )-----------( 10       ( 21 Jun 2021 06:48 )             21.9     06-21    142  |  103  |  10  ----------------------------<  90  3.9   |  27  |  0.86    Ca    8.8      21 Jun 2021 06:48  Phos  3.3     06-21  Mg     1.9     06-21    TPro  5.2<L>  /  Alb  2.4<L>  /  TBili  0.3  /  DBili  x   /  AST  28  /  ALT  21  /  AlkPhos  132<H>  06-21    MEDS:  MEDICATIONS  (STANDING):  albuterol/ipratropium for Nebulization 3 milliLiter(s) Nebulizer every 6 hours  albuterol/ipratropium for Nebulization.. 3 milliLiter(s) Nebulizer every 20 minutes  amLODIPine   Tablet 10 milliGRAM(s) Oral daily  chlorhexidine 2% Cloths 1 Application(s) Topical <User Schedule>  cholecalciferol 1000 Unit(s) Oral daily  cyanocobalamin 1000 MICROGram(s) Oral daily  folic acid 1 milliGRAM(s) Oral daily  methylPREDNISolone sodium succinate Injectable 60 milliGRAM(s) IV Push daily  mirtazapine 15 milliGRAM(s) Oral at bedtime  pancrelipase  (CREON 12,000 Lipase Units) 2 Capsule(s) Oral three times a day with meals  pantoprazole    Tablet 40 milliGRAM(s) Oral two times a day  piperacillin/tazobactam IVPB.. 3.375 Gram(s) IV Intermittent every 8 hours    MEDICATIONS  (PRN):  acetaminophen   Tablet .. 650 milliGRAM(s) Oral every 6 hours PRN Mild Pain (1 - 3), Moderate Pain (4 - 6)  aluminum hydroxide/magnesium hydroxide/simethicone Suspension 30 milliLiter(s) Oral every 4 hours PRN Dyspepsia  ondansetron    Tablet 4 milliGRAM(s) Oral four times a day PRN Nausea and/or Vomiting  oxyCODONE    IR 30 milliGRAM(s) Oral every 6 hours PRN Severe Pain (7 - 10)

## 2021-06-21 NOTE — PROGRESS NOTE ADULT - SUBJECTIVE AND OBJECTIVE BOX
HPI:  Patient is a 64 y/o F with PMHx of stage III L lung adenocarcinoma on alimta/pembrolizumab maintenance chemotherapy (last treatment 6/10), emphysema, 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 Reports she was in her usual state of health prior to this. Says she fell 2 days ago and since then she has been having a hard time catching her breath. Denies f/c, cough, chest pain, pleuritic pain. Has been receiving maintenance chemotherapy as scheduled and has not had prior issues with it. On arrival, patient was noted to be hypoxic and was placed on supplemental O2. Her oxygen requirements have been increasing since admission and she is currently requiring HFNC at 60%/50 lpm to maintain O2 saturations. CT chest reveals emphysema with new diffuse b/l GGO. Pulmonary consulted for assistance in management.    Subjective:      14 point ROS negative except as noted above      OBJECTIVE:  ICU Vital Signs Last 24 Hrs  T(C): 36.7 (21 Jun 2021 06:37), Max: 37.4 (20 Jun 2021 20:01)  T(F): 98.1 (21 Jun 2021 06:37), Max: 99.4 (20 Jun 2021 20:01)  HR: 108 (21 Jun 2021 06:37) (96 - 114)  BP: 122/75 (21 Jun 2021 06:37) (113/75 - 132/81)  BP(mean): --  ABP: --  ABP(mean): --  RR: 20 (21 Jun 2021 06:37) (20 - 20)  SpO2: 97% (21 Jun 2021 06:37) (96% - 99%)      06-20 @ 07:01  -  06-21 @ 07:00  --------------------------------------------------------  IN: 0 mL / OUT: 1550 mL / NET: -1550 mL      CAPILLARY BLOOD GLUCOSE      PHYSICAL EXAM:  General: awake and alert, ill appearing female lying in bed  HEENT: NC/AT, EOMI b/l, conjunctiva normal, MMM  Lymph Nodes: no cervical LAD  Neck: supple. full range of motion  Respiratory: mild crackles at lung bases b/l unchanged, more comfortable on HFNC, no conversational dyspnea or accessory muscle use  Cardiovascular: S1 S2 present, RRR, no m/r/g  Abdomen: soft, NT/ND, +BS  Extremities: no c/c/e  Skin: no rashes or lesions noted  Neurological: AAOx3, no focal deficits  Psychiatry: calm, cooperative    LINES:     MEDICATIONS  (STANDING):  albuterol/ipratropium for Nebulization 3 milliLiter(s) Nebulizer every 6 hours  albuterol/ipratropium for Nebulization.. 3 milliLiter(s) Nebulizer every 20 minutes  amLODIPine   Tablet 10 milliGRAM(s) Oral daily  chlorhexidine 2% Cloths 1 Application(s) Topical <User Schedule>  cholecalciferol 1000 Unit(s) Oral daily  cyanocobalamin 1000 MICROGram(s) Oral daily  folic acid 1 milliGRAM(s) Oral daily  methylPREDNISolone sodium succinate Injectable 60 milliGRAM(s) IV Push daily  mirtazapine 15 milliGRAM(s) Oral at bedtime  pancrelipase  (CREON 12,000 Lipase Units) 2 Capsule(s) Oral three times a day with meals  pantoprazole    Tablet 40 milliGRAM(s) Oral two times a day  piperacillin/tazobactam IVPB.. 3.375 Gram(s) IV Intermittent every 8 hours    MEDICATIONS  (PRN):  acetaminophen   Tablet .. 650 milliGRAM(s) Oral every 6 hours PRN Mild Pain (1 - 3), Moderate Pain (4 - 6)  aluminum hydroxide/magnesium hydroxide/simethicone Suspension 30 milliLiter(s) Oral every 4 hours PRN Dyspepsia  ondansetron    Tablet 4 milliGRAM(s) Oral four times a day PRN Nausea and/or Vomiting  oxyCODONE    IR 30 milliGRAM(s) Oral every 6 hours PRN Severe Pain (7 - 10)      LABS:                        7.2    4.85  )-----------( 26 ( 20 Jun 2021 06:31 )             22.0     Hgb Trend: 7.2<--, 7.5<--, 7.3<--, 7.6<--, 7.4<--  06-21    142  |  103  |  10  ----------------------------<  90  3.9   |  27  |  0.86    Ca    8.8      21 Jun 2021 06:48  Phos  3.3     06-21  Mg     1.9     06-21    TPro  5.2<L>  /  Alb  2.4<L>  /  TBili  0.3  /  DBili  x   /  AST  28  /  ALT  21  /  AlkPhos  132<H>  06-21    Creatinine Trend: 0.86<--, 0.81<--, 0.89<--, 0.94<--, 1.01<--, 1.17<--      Venous Blood Gas:  06-19 @ 18:11  7.44/46/29/31/47  VBG Lactate: --      MICROBIOLOGY:       RADIOLOGY:  [ ] Reviewed and interpreted by me    PULMONARY FUNCTION TESTS:    EKG: HPI:  Patient is a 64 y/o F with PMHx of stage III L lung adenocarcinoma on alimta/pembrolizumab maintenance chemotherapy (last treatment 6/10), emphysema, 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 Reports she was in her usual state of health prior to this. Says she fell 2 days ago and since then she has been having a hard time catching her breath. Denies f/c, cough, chest pain, pleuritic pain. Has been receiving maintenance chemotherapy as scheduled and has not had prior issues with it. On arrival, patient was noted to be hypoxic and was placed on supplemental O2. Her oxygen requirements have been increasing since admission and she is currently requiring HFNC at 60%/50 lpm to maintain O2 saturations. CT chest reveals emphysema with new diffuse b/l GGO. Pulmonary consulted for assistance in management.    Subjective:  No overnight events  Continues to wean down on oxygen  Denies f/c, cough, SOB, chest pain    14 point ROS negative except as noted above      OBJECTIVE:  ICU Vital Signs Last 24 Hrs  T(C): 36.7 (21 Jun 2021 06:37), Max: 37.4 (20 Jun 2021 20:01)  T(F): 98.1 (21 Jun 2021 06:37), Max: 99.4 (20 Jun 2021 20:01)  HR: 108 (21 Jun 2021 06:37) (96 - 114)  BP: 122/75 (21 Jun 2021 06:37) (113/75 - 132/81)  BP(mean): --  ABP: --  ABP(mean): --  RR: 20 (21 Jun 2021 06:37) (20 - 20)  SpO2: 97% (21 Jun 2021 06:37) (96% - 99%)      06-20 @ 07:01  -  06-21 @ 07:00  --------------------------------------------------------  IN: 0 mL / OUT: 1550 mL / NET: -1550 mL      CAPILLARY BLOOD GLUCOSE      PHYSICAL EXAM:  General: awake and alert, ill appearing female lying in bed  HEENT: NC/AT, EOMI b/l, conjunctiva normal, MMM  Lymph Nodes: no cervical LAD  Neck: supple. full range of motion  Respiratory: mild crackles at lung bases b/l unchanged, more comfortable on HFNC, no conversational dyspnea or accessory muscle use  Cardiovascular: S1 S2 present, RRR, no m/r/g  Abdomen: soft, NT/ND, +BS  Extremities: no c/c/e  Skin: no rashes or lesions noted  Neurological: AAOx3, no focal deficits  Psychiatry: calm, cooperative    LINES:     MEDICATIONS  (STANDING):  albuterol/ipratropium for Nebulization 3 milliLiter(s) Nebulizer every 6 hours  albuterol/ipratropium for Nebulization.. 3 milliLiter(s) Nebulizer every 20 minutes  amLODIPine   Tablet 10 milliGRAM(s) Oral daily  chlorhexidine 2% Cloths 1 Application(s) Topical <User Schedule>  cholecalciferol 1000 Unit(s) Oral daily  cyanocobalamin 1000 MICROGram(s) Oral daily  folic acid 1 milliGRAM(s) Oral daily  methylPREDNISolone sodium succinate Injectable 60 milliGRAM(s) IV Push daily  mirtazapine 15 milliGRAM(s) Oral at bedtime  pancrelipase  (CREON 12,000 Lipase Units) 2 Capsule(s) Oral three times a day with meals  pantoprazole    Tablet 40 milliGRAM(s) Oral two times a day  piperacillin/tazobactam IVPB.. 3.375 Gram(s) IV Intermittent every 8 hours    MEDICATIONS  (PRN):  acetaminophen   Tablet .. 650 milliGRAM(s) Oral every 6 hours PRN Mild Pain (1 - 3), Moderate Pain (4 - 6)  aluminum hydroxide/magnesium hydroxide/simethicone Suspension 30 milliLiter(s) Oral every 4 hours PRN Dyspepsia  ondansetron    Tablet 4 milliGRAM(s) Oral four times a day PRN Nausea and/or Vomiting  oxyCODONE    IR 30 milliGRAM(s) Oral every 6 hours PRN Severe Pain (7 - 10)      LABS:                        7.2    4.85  )-----------( 26       ( 20 Jun 2021 06:31 )             22.0     Hgb Trend: 7.2<--, 7.5<--, 7.3<--, 7.6<--, 7.4<--  06-21    142  |  103  |  10  ----------------------------<  90  3.9   |  27  |  0.86    Ca    8.8      21 Jun 2021 06:48  Phos  3.3     06-21  Mg     1.9     06-21    TPro  5.2<L>  /  Alb  2.4<L>  /  TBili  0.3  /  DBili  x   /  AST  28  /  ALT  21  /  AlkPhos  132<H>  06-21    Creatinine Trend: 0.86<--, 0.81<--, 0.89<--, 0.94<--, 1.01<--, 1.17<--      Venous Blood Gas:  06-19 @ 18:11  7.44/46/29/31/47  VBG Lactate: --      MICROBIOLOGY:       RADIOLOGY:  [ ] Reviewed and interpreted by me    PULMONARY FUNCTION TESTS:    EKG:

## 2021-06-21 NOTE — PROGRESS NOTE ADULT - ASSESSMENT
64 y/o F with PMHx HTN, COPD, non-small cell lung cancer on chemotherapy,GERD, chronic pancreatitis, hx of DVT/PE on Eliquis but recently held in s/o fall w/ sustained R orbital floor fx, admitted for acute shortness of breath and hypoxia. Oncology consulted given active malignancy on t/m.    # Non-small cell lung adenocarcinoma   - Diagnosed Feb 19, 2020   - Stage IIIC by imaging. Large volume of disease and hence, not started on RT  - Started carbo/Alimta/Pem given Q 3 weeks schedule  - Currently on maintenance alimta/keytruda (last t/m 6/10)   - CTA Chest 6/14: Bilateral symmetric groundglass opacities with interlobular septal thickening, consistent with pulmonary edema. Web at the right pulmonary artery along with pruning and stenosis of the left lower lobe pulmonary arteries, consistent with chronic thromboembolic disease.  - No plans for inpatient t/m; cont. outpatient f/up with Dr. Cage    # Acute hypoxic resp failure  - CT chest as above  - Discussed with pulm: differentials are pulm edema, chronic thromboembolic disease and splinting due to pain from chronic pancreatitis acting up  - Low suspicion for IRAE  - Management per pulm    # Acute thrombocytopenia  - LDH 6/16: 675; hapto 6/18: 134  - Coags not checked since 6/14  - Fibrinogen WNL 6/16  - Reviewed labs: platelet count was normal until 6/15 (211). Dropped to 143 when checked 6 hours later. Has gradually trended down. Now at its lowest at 10k.   - First heparin exposure 6/14: 4T score is <5% given timing of heparin exposure and platlet drop; low concern for HIT but would still check PF4  - Currently not on AC for chronic thromboembolic disease as platelets low  - Transfused 6/19 and DID have an appropriate reponse to transfusion however has dropped again  - P/S reviewed: Target cells seen; rare schistocytes/helmet cells, minimal platelet clumping (true thrombocytopenia), no dysplastic white cells  - Have ordered stat: LDH, haptoglobin, coags and fibrinogen, retic. Will follow   - Cont. to monitor with daily CBC       Myles Mccollum, PGY-4  Hematology-Oncology Fellow  [phone] ([place]) [phone] ([facility])  I can also be reached on [employer] [facility]  Please page fellow on call after 5pm and on weekends 66 y/o F with PMHx HTN, COPD, non-small cell lung cancer on chemotherapy,GERD, chronic pancreatitis, hx of DVT/PE on Eliquis but recently held in s/o fall w/ sustained R orbital floor fx, admitted for acute shortness of breath and hypoxia. Oncology consulted given active malignancy on t/m.    # Non-small cell lung adenocarcinoma   - Diagnosed Feb 19, 2020   - Stage IIIC by imaging. Large volume of disease and hence, not started on RT  - Started carbo/Alimta/Pem given Q 3 weeks schedule  - Currently on maintenance alimta/keytruda (last t/m 6/10)   - CTA Chest 6/14: Bilateral symmetric groundglass opacities with interlobular septal thickening, consistent with pulmonary edema. Web at the right pulmonary artery along with pruning and stenosis of the left lower lobe pulmonary arteries, consistent with chronic thromboembolic disease.  - No plans for inpatient t/m; cont. outpatient f/up with Dr. Cage    # Acute hypoxic resp failure  - CT chest as above  - Discussed with pulm: differentials are pulm edema, chronic thromboembolic disease and splinting due to pain from chronic pancreatitis acting up  - Low suspicion for IRAE  - Management per pulm    # Acute thrombocytopenia  - LDH 6/16: 675; hapto 6/18: 134  - Coags not checked since 6/14  - Fibrinogen WNL 6/16  - Reviewed labs: platelet count was normal until 6/15 (211). Dropped to 143 when checked 6 hours later. Has gradually trended down. Now at its lowest at 10k.   - First heparin exposure 6/14: 4T score is <5% given timing of heparin exposure and platlet drop; low concern for HIT but would still check PF4  - Currently not on AC for chronic thromboembolic disease as platelets low  - Transfused 6/19 and DID have an appropriate reponse to transfusion however has dropped again  - P/S reviewed: Target cells seen; rare schistocytes/helmet cells (1/HPF); some tear drop cells and nucleated red cells suggestive of marrow stress, minimal platelet clumping (true thrombocytopenia), no dysplastic white cells  - Have ordered stat: LDH, haptoglobin, coags and fibrinogen, retic. Will follow   - Cont. to monitor with daily CBC       Myles Mccollum, PGY-4  Hematology-Oncology Fellow  [phone] [facility]) [phone] ([facility])  I can also be reached on [employer] Teams  Please page fellow on call after 5pm and on weekends

## 2021-06-21 NOTE — PROGRESS NOTE ADULT - ATTENDING COMMENTS
Hospitalist- Joseph Callaway MD  Pager: [phone]  If no response or off-hours, page [phone]  -------------------------------------  - hypoxic respiratory failure: steadily improving, now on HFNC 30%/30LPM saturating well. Continue steroids/abx. Reinstate lasix. Cont weaning as tolerated.  - PE- holding AC due to thrombocytopenia. Will start SCDs.

## 2021-06-21 NOTE — PROGRESS NOTE ADULT - SUBJECTIVE AND OBJECTIVE BOX
INTERVAL HPI/OVERNIGHT EVENTS:  Patient seen and assessed this AM. Continues to be on HFNC. States she feels like she is continuing to improve. Denies any bleeding from anywhere.     MEDICATIONS  (STANDING):  albuterol/ipratropium for Nebulization 3 milliLiter(s) Nebulizer every 6 hours  albuterol/ipratropium for Nebulization.. 3 milliLiter(s) Nebulizer every 20 minutes  amLODIPine   Tablet 10 milliGRAM(s) Oral daily  chlorhexidine 2% Cloths 1 Application(s) Topical <User Schedule>  cholecalciferol 1000 Unit(s) Oral daily  cyanocobalamin 1000 MICROGram(s) Oral daily  folic acid 1 milliGRAM(s) Oral daily  furosemide    Tablet 40 milliGRAM(s) Oral daily  methylPREDNISolone sodium succinate Injectable 60 milliGRAM(s) IV Push daily  metoprolol tartrate 12.5 milliGRAM(s) Oral two times a day  mirtazapine 15 milliGRAM(s) Oral at bedtime  pancrelipase  (CREON 12,000 Lipase Units) 2 Capsule(s) Oral three times a day with meals  pantoprazole    Tablet 40 milliGRAM(s) Oral two times a day  piperacillin/tazobactam IVPB.. 3.375 Gram(s) IV Intermittent every 8 hours    MEDICATIONS  (PRN):  acetaminophen   Tablet .. 650 milliGRAM(s) Oral every 6 hours PRN Mild Pain (1 - 3), Moderate Pain (4 - 6)  aluminum hydroxide/magnesium hydroxide/simethicone Suspension 30 milliLiter(s) Oral every 4 hours PRN Dyspepsia  ondansetron    Tablet 4 milliGRAM(s) Oral four times a day PRN Nausea and/or Vomiting  oxyCODONE    IR 30 milliGRAM(s) Oral every 6 hours PRN Severe Pain (7 - 10)    Allergies    diazepam (Other)  lemon (Other)    Intolerances          VITAL SIGNS:  T(F): 97.8 (06-21-21 @ 12:40)  HR: 98 (06-21-21 @ 12:40)  BP: 127/77 (06-21-21 @ 12:40)  RR: 20 (06-21-21 @ 12:40)  SpO2: 97% (06-21-21 @ 12:40)  Wt(kg): --    PHYSICAL EXAM:    Constitutional: In mild resp distress,  on HFNC  Eyes: EOMI, PERRLA  Neck: supple, no masses, no JVD  Respiratory: + diffuse crackles  Cardiovascular: RRR, no M/R/G  Gastrointestinal: +BS, soft, NTND,   Extremities: no c/c/e  Neurological: AAOx3, nonfocal    LABS:                        7.1    7.44  )-----------( 10       ( 21 Jun 2021 06:48 )             21.9     06-21    142  |  103  |  10  ----------------------------<  90  3.9   |  27  |  0.86    Ca    8.8      21 Jun 2021 06:48  Phos  3.3     06-21  Mg     1.9     06-21    TPro  5.2<L>  /  Alb  2.4<L>  /  TBili  0.3  /  DBili  x   /  AST  28  /  ALT  21  /  AlkPhos  132<H>  06-21          RADIOLOGY & ADDITIONAL TESTS:  Studies reviewed.

## 2021-06-22 LAB
ALBUMIN SERPL ELPH-MCNC: 2.5 G/DL — LOW (ref 3.3–5)
ALP SERPL-CCNC: 131 U/L — HIGH (ref 40–120)
ALT FLD-CCNC: 21 U/L — SIGNIFICANT CHANGE UP (ref 10–45)
ANION GAP SERPL CALC-SCNC: 12 MMOL/L — SIGNIFICANT CHANGE UP (ref 5–17)
AST SERPL-CCNC: 25 U/L — SIGNIFICANT CHANGE UP (ref 10–40)
BILIRUB SERPL-MCNC: 0.4 MG/DL — SIGNIFICANT CHANGE UP (ref 0.2–1.2)
BUN SERPL-MCNC: 9 MG/DL — SIGNIFICANT CHANGE UP (ref 7–23)
CALCIUM SERPL-MCNC: 8.7 MG/DL — SIGNIFICANT CHANGE UP (ref 8.4–10.5)
CHLORIDE SERPL-SCNC: 102 MMOL/L — SIGNIFICANT CHANGE UP (ref 96–108)
CO2 SERPL-SCNC: 26 MMOL/L — SIGNIFICANT CHANGE UP (ref 22–31)
CREAT SERPL-MCNC: 0.78 MG/DL — SIGNIFICANT CHANGE UP (ref 0.5–1.3)
GLUCOSE SERPL-MCNC: 92 MG/DL — SIGNIFICANT CHANGE UP (ref 70–99)
HAPTOGLOB SERPL-MCNC: 110 MG/DL — SIGNIFICANT CHANGE UP (ref 34–200)
HCT VFR BLD CALC: 27.6 % — LOW (ref 34.5–45)
HCT VFR BLD CALC: 28 % — LOW (ref 34.5–45)
HEPARIN-PF4 AB RESULT: SIGNIFICANT CHANGE UP U/ML (ref 0–0.9)
HGB BLD-MCNC: 9.2 G/DL — LOW (ref 11.5–15.5)
HGB BLD-MCNC: 9.3 G/DL — LOW (ref 11.5–15.5)
MAGNESIUM SERPL-MCNC: 1.6 MG/DL — SIGNIFICANT CHANGE UP (ref 1.6–2.6)
MCHC RBC-ENTMCNC: 30.3 PG — SIGNIFICANT CHANGE UP (ref 27–34)
MCHC RBC-ENTMCNC: 30.9 PG — SIGNIFICANT CHANGE UP (ref 27–34)
MCHC RBC-ENTMCNC: 32.9 GM/DL — SIGNIFICANT CHANGE UP (ref 32–36)
MCHC RBC-ENTMCNC: 33.7 GM/DL — SIGNIFICANT CHANGE UP (ref 32–36)
MCV RBC AUTO: 91.7 FL — SIGNIFICANT CHANGE UP (ref 80–100)
MCV RBC AUTO: 92.1 FL — SIGNIFICANT CHANGE UP (ref 80–100)
NRBC # BLD: 0 /100 WBCS — SIGNIFICANT CHANGE UP (ref 0–0)
NRBC # BLD: 2 /100 WBCS — HIGH (ref 0–0)
PF4 HEPARIN CMPLX AB SER-ACNC: NEGATIVE — SIGNIFICANT CHANGE UP
PHOSPHATE SERPL-MCNC: 2.9 MG/DL — SIGNIFICANT CHANGE UP (ref 2.5–4.5)
PLATELET # BLD AUTO: 10 K/UL — CRITICAL LOW (ref 150–400)
PLATELET # BLD AUTO: 87 K/UL — LOW (ref 150–400)
POTASSIUM SERPL-MCNC: 3.3 MMOL/L — LOW (ref 3.5–5.3)
POTASSIUM SERPL-SCNC: 3.3 MMOL/L — LOW (ref 3.5–5.3)
PROT SERPL-MCNC: 5.5 G/DL — LOW (ref 6–8.3)
RBC # BLD: 3.01 M/UL — LOW (ref 3.8–5.2)
RBC # BLD: 3.04 M/UL — LOW (ref 3.8–5.2)
RBC # FLD: 17.9 % — HIGH (ref 10.3–14.5)
RBC # FLD: 17.9 % — HIGH (ref 10.3–14.5)
SODIUM SERPL-SCNC: 140 MMOL/L — SIGNIFICANT CHANGE UP (ref 135–145)
WBC # BLD: 7.91 K/UL — SIGNIFICANT CHANGE UP (ref 3.8–10.5)
WBC # BLD: 9.98 K/UL — SIGNIFICANT CHANGE UP (ref 3.8–10.5)
WBC # FLD AUTO: 7.91 K/UL — SIGNIFICANT CHANGE UP (ref 3.8–10.5)
WBC # FLD AUTO: 9.98 K/UL — SIGNIFICANT CHANGE UP (ref 3.8–10.5)

## 2021-06-22 PROCEDURE — 99233 SBSQ HOSP IP/OBS HIGH 50: CPT | Mod: GC

## 2021-06-22 PROCEDURE — 99232 SBSQ HOSP IP/OBS MODERATE 35: CPT | Mod: GC

## 2021-06-22 PROCEDURE — 99252 IP/OBS CONSLTJ NEW/EST SF 35: CPT | Mod: GC

## 2021-06-22 PROCEDURE — 99233 SBSQ HOSP IP/OBS HIGH 50: CPT

## 2021-06-22 RX ORDER — POTASSIUM CHLORIDE 20 MEQ
40 PACKET (EA) ORAL EVERY 4 HOURS
Refills: 0 | Status: COMPLETED | OUTPATIENT
Start: 2021-06-22 | End: 2021-06-22

## 2021-06-22 RX ORDER — MAGNESIUM SULFATE 500 MG/ML
2 VIAL (ML) INJECTION ONCE
Refills: 0 | Status: COMPLETED | OUTPATIENT
Start: 2021-06-22 | End: 2021-06-22

## 2021-06-22 RX ADMIN — OXYCODONE HYDROCHLORIDE 30 MILLIGRAM(S): 5 TABLET ORAL at 06:34

## 2021-06-22 RX ADMIN — AMLODIPINE BESYLATE 10 MILLIGRAM(S): 2.5 TABLET ORAL at 05:28

## 2021-06-22 RX ADMIN — Medication 40 MILLIGRAM(S): at 05:31

## 2021-06-22 RX ADMIN — PANTOPRAZOLE SODIUM 40 MILLIGRAM(S): 20 TABLET, DELAYED RELEASE ORAL at 17:10

## 2021-06-22 RX ADMIN — Medication 2 CAPSULE(S): at 14:42

## 2021-06-22 RX ADMIN — Medication 2 CAPSULE(S): at 17:09

## 2021-06-22 RX ADMIN — PIPERACILLIN AND TAZOBACTAM 25 GRAM(S): 4; .5 INJECTION, POWDER, LYOPHILIZED, FOR SOLUTION INTRAVENOUS at 05:29

## 2021-06-22 RX ADMIN — Medication 40 MILLIEQUIVALENT(S): at 17:09

## 2021-06-22 RX ADMIN — Medication 3 MILLILITER(S): at 17:55

## 2021-06-22 RX ADMIN — PANTOPRAZOLE SODIUM 40 MILLIGRAM(S): 20 TABLET, DELAYED RELEASE ORAL at 05:28

## 2021-06-22 RX ADMIN — Medication 12.5 MILLIGRAM(S): at 05:28

## 2021-06-22 RX ADMIN — Medication 3 MILLILITER(S): at 23:15

## 2021-06-22 RX ADMIN — Medication 12.5 MILLIGRAM(S): at 17:10

## 2021-06-22 RX ADMIN — Medication 3 MILLILITER(S): at 05:28

## 2021-06-22 RX ADMIN — MIRTAZAPINE 15 MILLIGRAM(S): 45 TABLET, ORALLY DISINTEGRATING ORAL at 21:54

## 2021-06-22 RX ADMIN — Medication 2 CAPSULE(S): at 09:52

## 2021-06-22 RX ADMIN — Medication 50 MILLIGRAM(S): at 05:29

## 2021-06-22 RX ADMIN — Medication 3 MILLILITER(S): at 11:36

## 2021-06-22 RX ADMIN — Medication 1 MILLIGRAM(S): at 14:41

## 2021-06-22 RX ADMIN — CHLORHEXIDINE GLUCONATE 1 APPLICATION(S): 213 SOLUTION TOPICAL at 10:39

## 2021-06-22 RX ADMIN — OXYCODONE HYDROCHLORIDE 30 MILLIGRAM(S): 5 TABLET ORAL at 07:06

## 2021-06-22 RX ADMIN — PREGABALIN 1000 MICROGRAM(S): 225 CAPSULE ORAL at 14:41

## 2021-06-22 RX ADMIN — OXYCODONE HYDROCHLORIDE 30 MILLIGRAM(S): 5 TABLET ORAL at 21:55

## 2021-06-22 RX ADMIN — Medication 1000 UNIT(S): at 14:41

## 2021-06-22 RX ADMIN — Medication 40 MILLIEQUIVALENT(S): at 14:43

## 2021-06-22 RX ADMIN — Medication 50 GRAM(S): at 14:48

## 2021-06-22 NOTE — PROGRESS NOTE ADULT - ATTENDING COMMENTS
Pt seen and examined, agree with above. Acute hypoxic resp failure with b/l GGOs on Ct chest. Clinically improving on Zosyn and IV solumedrol. Agree with slow steroid taper as outlined above, please add bactrim for PCP px. Gentle diuresis to keep negative fluid balance. Titrate off high flow Nc as tolerated to keep saturation > 88 %. Will require outpt pulm FUP with short interval repeat Ct chest in 3-4 weeks. Pt seen and examined, agree with above. Acute hypoxic resp failure with b/l GGOs on Ct chest. Clinically improving on Zosyn for possible bacterial PNA. Remains on IV solumedrol. Agree with slow steroid taper as outlined above, please add bactrim for PCP px. Gentle diuresis to keep negative fluid balance. Titrate off high flow Nc as tolerated to keep saturation > 88 %. Will require outpt pulm FUP with short interval repeat Ct chest in 3-4 weeks. Cause of severe thrombocytopenia unclear, cont work up per heme/onc.

## 2021-06-22 NOTE — CONSULT NOTE ADULT - REASON FOR ADMISSION
Shortness of breath, hypoxia

## 2021-06-22 NOTE — CONSULT NOTE ADULT - CONSULT REASON
R orbital floor fx
Fall, sob
Acute on Chronic Hypoxic Respiratory Failure
Floor fracture
Known senior care patient
hypoxia

## 2021-06-22 NOTE — PROVIDER CONTACT NOTE (CRITICAL VALUE NOTIFICATION) - TEST AND RESULT REPORTED:
Potassium and Lactate from ABG
HGB 6.7/ HCT 20.3
Platelet
Platelet 10
Aptt results was 178.2
HGB 6.8, HCT 20.9
PLT level
Platelets -11

## 2021-06-22 NOTE — CONSULT NOTE ADULT - SUBJECTIVE AND OBJECTIVE BOX
[facility] DEPARTMENT OF OPHTHALMOLOGY - INITIAL ADULT CONSULT  -----------------------------------------------------------------------------------------------------------------  Lucio Parisi MD PGY 2  Pager: [phone]  -----------------------------------------------------------------------------------------------------------------    HPI: 66 y/o F with PMHx HTN, COPD, non-small cell lung cancer on chemotherapy (last tx 6/10), GERD, chronic pancreatitis, hx of DVT/PE on Eliquis but recently held in s/o fall w/ sustained R orbital floor fx, admitted for acute hypoxemic respiratory failure 2/2 COPD exacerbation vs infection vs new CHF, found to have pancytopenia possibly in setting of immunotherapy. Ophthalmology consulted due to right orbital floor fracture on 6/10/21. Patient seen by [facility] ophthalmology clinic on 6/12/21. Patient denies any pain or blurry vision. She stated she had double vision, which had resolved yesterday.      Past Medical History:HTN, COPD, non-small cell lung cancer on chemotherapy (last tx 6/10), GERD, chronic pancreatitis, hx of DVT/PE on Eliquis, right orbital floor fracture  Past Ocular History: None. Denies eye surgeries, injections, or lasers.  Drops: Artificial tears prn OU  Allergies: Diazepam, lemon  Family History: No family history of eye diseases      Review of Systems:  Constitutional: No fever, chills  Eyes: No blurry vision, flashes, floaters, FBS, erythema, discharge, double vision, OU  Neuro: No tremors  Cardiovascular: No chest pain, palpitations  Respiratory: No SOB, no cough  GI: No nausea, vomiting, abdominal pain    Vital Signs: T(C): 36.9 (06-22-21 @ 22:02)  T(F): 98.5 (06-22-21 @ 22:02), Max: 98.5 (06-22-21 @ 13:51)  HR: 101 (06-22-21 @ 22:02) (85 - 113)  BP: 127/77 (06-22-21 @ 22:02) (124/74 - 140/83)  RR:  (18 - 22)  SpO2:  (93% - 100%)  Wt(kg): --  General: AAO x 3, appropriate mood and affect    Ophthalmology Exam:  Visual acuity (cc): 20/25, 20/30  Pupils: PERRL OU, no APD  Intraocular Pressure: 14, 15   Extraocular movements (EOMs): Full OU, no pain, no diplopia.  Confrontational Visual Field (CVF): Full OU.  Color Plates: 12/12 OU.    Pen Light Exam (PLE)  External: Normal OU. No hypesthesia noted in distribution of infraorbital nerve. No pint tenderness, enophthalmos.   Lids/Lashes/Lacrimal Ducts: Flat OU.  Sclera/Conjunctiva: White and quiet OU.  Cornea: Clear OU.  Anterior Chamber: Deep and formed OU.    Iris: Flat OU.  Lens: Clear OU.    Fundus Exam: dilated with 1% tropicamide and 2.5% phenylephrine  Approval obtained from primary team for dilation  Patient aware that pupils can remained dilated for at least 4-6 hours.  Exam performed with 20 D lens    Vitreous: wnl OU  Disc, cup/disc: sharp and pink, 0.5 with ?temporal pallor, 0.60  Macula: wnl OU  Vessels: wnl OU  Periphery: wnl OU    Labs/Imaging:  CT HEAD 6/10: No acute intracranial findings.  CT FACIAL BONES 6/10: Comminuted acute fracture of the right orbital floor with prominent herniation of the intraorbital fat into the right maxillary sinus, and vertical orientation and intrasinus herniation of the right inferior rectus muscle, concerning for muscular entrapment. Suggestion of unusual bulbous appearance to the distal right optic nerve. Correlation with ophthalmologic examination is advised.  Acute fracture of the right nasal bone and question subtle acute fracture of the right lamina papyracea.    Assessment and Recommendations:  65y female with a past medical history/ocular history of HTN, COPD, non-small cell lung cancer on chemotherapy (last tx 6/10), GERD, chronic pancreatitis, hx of DVT/PE on Eliquis consulted for right orbital floor fracture.    1. Right orbital floor fracture  -Patient fell on right side of face on 6/10/21 and suffered right orbital floor fracture. CT maxillofacial showed comminuted acute fracture of the right orbital floor with prominent herniation of the intraorbital fat into the right maxillary sinus, and vertical orientation and intrasinus herniation of the right inferior rectus muscle, concerning for muscular entrapment.   -Patient denies any eye complaints. She stated she had monocular diplopia (right eye), which resolved on 6/21.   -On exam, extraocular muscles intact both eyes. No concern for muscle entrapment. No hypesthesia noted in distribution of infraorbital nerve. No pint tenderness, enophthalmos.   -OMFS on board. Plans for intervention on 6/25 if medically cleared given pancytopenia.  -Sinus precautions: no nose blowing, sucking on straws, sneezing with mouth open.    2. Incidental CT finding of unusual bulbous appearance to the distal right optic nerve  -Pt noted with incidental finding on CT with unusual bulbous appearance to the distal right optic nerve. Discussed with radiology.  -On dilated fundus exam, optic nerves appear normal with only possible temporal pallor right eye.  -Can follow-up with OCT nerve as an outpatient.    Seen and discussed with Dr. Christopher, ophthalmic hospitalist.    Outpatient Follow-up: Patient should follow-up with his/her ophthalmologist or with [facility] Department of Ophthalmology within 1 week of after discharge at:    [address]  [place] [address]  [phone]    Lucio Parisi MD, PGY-2  Pager: [phone]  Also available on Microsoft Teams         [facility] DEPARTMENT OF OPHTHALMOLOGY - INITIAL ADULT CONSULT  -----------------------------------------------------------------------------------------------------------------  Lucio Parisi MD PGY 2  Pager: [phone]  -----------------------------------------------------------------------------------------------------------------    HPI: 64 y/o F with PMHx HTN, COPD, non-small cell lung cancer on chemotherapy (last tx 6/10), GERD, chronic pancreatitis, hx of DVT/PE on Eliquis but recently held in s/o fall w/ sustained R orbital floor fx, admitted for acute hypoxemic respiratory failure 2/2 COPD exacerbation vs infection vs new CHF, found to have pancytopenia possibly in setting of immunotherapy. Ophthalmology consulted due to right orbital floor fracture on 6/10/21. Patient seen by [facility] ophthalmology clinic on 6/12/21. Patient denies any pain or blurry vision. She stated she had double vision out of one eye, which had resolved yesterday.      Past Medical History:HTN, COPD, non-small cell lung cancer on chemotherapy (last tx 6/10), GERD, chronic pancreatitis, hx of DVT/PE on Eliquis, right orbital floor fracture  Past Ocular History: None. Denies eye surgeries, injections, or lasers.  Drops: Artificial tears prn OU  Allergies: Diazepam, lemon  Family History: No family history of eye diseases, no ARMD, glaucoma  Social: no smoking      Review of Systems:  Constitutional: No fever, chills  Eyes: No blurry vision, flashes, floaters, FBS, erythema, discharge, double vision, OU  Neuro: No tremors  Cardiovascular: No chest pain, palpitations  Respiratory: No SOB, no cough  GI: No nausea, vomiting, abdominal pain    Vital Signs: T(C): 36.9 (06-22-21 @ 22:02)  T(F): 98.5 (06-22-21 @ 22:02), Max: 98.5 (06-22-21 @ 13:51)  HR: 101 (06-22-21 @ 22:02) (85 - 113)  BP: 127/77 (06-22-21 @ 22:02) (124/74 - 140/83)  RR:  (18 - 22)  SpO2:  (93% - 100%)  Wt(kg): --  General: AAO x 3, appropriate mood and affect    Ophthalmology Exam:  Visual acuity (cc): 20/25, 20/30  Pupils: PERRL OU, no APD  Intraocular Pressure: 14, 15   Extraocular movements (EOMs): Full OU, no pain, no diplopia.  Confrontational Visual Field (CVF): Full OU.  Color Plates: 12/12 OU.    Pen Light Exam (PLE)  External: Normal OU. No hypesthesia noted in distribution of infraorbital nerve. No point tenderness, enophthalmos.   Lids/Lashes/Lacrimal Ducts: Flat OU.  Sclera/Conjunctiva: White and quiet OU.  Cornea: Clear OU.  Anterior Chamber: Deep and formed OU.    Iris: Flat OU.  Lens: Clear OU.    Fundus Exam: dilated with 1% tropicamide and 2.5% phenylephrine  Approval obtained from primary team for dilation  Patient aware that pupils can remained dilated for at least 4-6 hours.  Exam performed with 20 D lens    Vitreous: wnl OU  Disc, cup/disc: sharp and pink, 0.5, 0.60  Macula: wnl OU  Vessels: wnl OU  Periphery: wnl OU    Labs/Imaging:  CT HEAD 6/10: No acute intracranial findings.  CT FACIAL BONES 6/10: Comminuted acute fracture of the right orbital floor with prominent herniation of the intraorbital fat into the right maxillary sinus, and vertical orientation and intrasinus herniation of the right inferior rectus muscle, concerning for muscular entrapment. Suggestion of unusual bulbous appearance to the distal right optic nerve. Correlation with ophthalmologic examination is advised.  Acute fracture of the right nasal bone and question subtle acute fracture of the right lamina papyracea.    Assessment and Recommendations:  65y female with a past medical history/ocular history of HTN, COPD, non-small cell lung cancer on chemotherapy (last tx 6/10), GERD, chronic pancreatitis, hx of DVT/PE on Eliquis consulted for right orbital floor fracture.    1. Right orbital floor fracture  -Patient fell on right side of face on 6/10/21 and suffered right orbital floor fracture. CT maxillofacial showed comminuted acute fracture of the right orbital floor with prominent herniation of the intraorbital fat into the right maxillary sinus, and vertical orientation and intrasinus herniation of the right inferior rectus muscle, concerning for muscular entrapment.   -Patient denies any eye complaints. She stated she had monocular diplopia (right eye), which resolved on 6/21. No evidence of muscle entrapment clinically.  -On exam, extraocular muscles intact both eyes. No concern for muscle entrapment. No hypesthesia noted in distribution of infraorbital nerve. No point tenderness, enophthalmos.   -OMFS on board. Plans for intervention on 6/25 if medically cleared given pancytopenia.  -Sinus precautions: no nose blowing, sucking on straws, sneezing with mouth open.  - abx per primary team    2. Incidental CT finding of unusual bulbous appearance to the distal right optic nerve  -Pt noted with incidental finding on CT with unusual bulbous appearance to the distal right optic nerve. Discussed with radiology.  No concern for optic nerve pathology, clinically no concern either.  -On dilated fundus exam, optic nerves appear normal.  -Can follow-up with OCT nerve as an outpatient.  - findings and plan discussed with patient and primary team    Seen and discussed with Dr. Christopher, ophthalmic hospitalist.    Outpatient Follow-up: Patient should follow-up with his/her ophthalmologist or with [facility] Department of Ophthalmology within 1 week of after discharge, sooner if symptoms worsen or change at:    [address]  [place] [address]  [phone]    Lucio Parisi MD, PGY-2  Pager: [phone]  Also available on [employer] Teams         [facility] DEPARTMENT OF OPHTHALMOLOGY - INITIAL ADULT CONSULT  -----------------------------------------------------------------------------------------------------------------  Lucio Parisi MD PGY 2  Pager: [phone]  -----------------------------------------------------------------------------------------------------------------    HPI: 66 y/o F with PMHx HTN, COPD, non-small cell lung cancer on chemotherapy (last tx 6/10), GERD, chronic pancreatitis, hx of DVT/PE on Eliquis but recently held in s/o fall w/ sustained R orbital floor fx, admitted for acute hypoxemic respiratory failure 2/2 COPD exacerbation vs infection vs new CHF, found to have pancytopenia possibly in setting of immunotherapy. Ophthalmology consulted due to right orbital floor fracture on 6/10/21. Patient seen by [facility] ophthalmology clinic on 6/12/21. Patient denies any pain or blurry vision. She stated she had double vision out of one eye, which had resolved yesterday.      Past Medical History:HTN, COPD, non-small cell lung cancer on chemotherapy (last tx 6/10), GERD, chronic pancreatitis, hx of DVT/PE on Eliquis, right orbital floor fracture  Past Ocular History: None. Denies eye surgeries, injections, or lasers.  Drops: Artificial tears prn OU  Allergies: Diazepam, lemon  Family History: No family history of eye diseases, no ARMD, glaucoma  Social: no smoking      Review of Systems:  Constitutional: No fever, chills  Eyes: No blurry vision, flashes, floaters, FBS, erythema, discharge, double vision, OU  Neuro: No tremors  Cardiovascular: No chest pain, palpitations  Respiratory: No SOB, no cough  GI: No nausea, vomiting, abdominal pain    Vital Signs: T(C): 36.9 (06-22-21 @ 22:02)  T(F): 98.5 (06-22-21 @ 22:02), Max: 98.5 (06-22-21 @ 13:51)  HR: 101 (06-22-21 @ 22:02) (85 - 113)  BP: 127/77 (06-22-21 @ 22:02) (124/74 - 140/83)  RR:  (18 - 22)  SpO2:  (93% - 100%)  Wt(kg): --  General: AAO x 3, appropriate mood and affect    Ophthalmology Exam:  Visual acuity (cc): 20/25, 20/30  Pupils: PERRL OU, no APD  Intraocular Pressure: 14, 15   Extraocular movements (EOMs): Full OU, no pain, no diplopia.  Confrontational Visual Field (CVF): Full OU.  Color Plates: 12/12 OU.    Pen Light Exam (PLE)  External: Normal OU. No hypesthesia noted in distribution of infraorbital nerve. No point tenderness, enophthalmos.   Lids/Lashes/Lacrimal Ducts: Flat OU.  Sclera/Conjunctiva: White and quiet OU.  Cornea: Clear OU.  Anterior Chamber: Deep and formed OU.    Iris: Flat OU.  Lens: Clear OU.    Fundus Exam: dilated with 1% tropicamide and 2.5% phenylephrine  Approval obtained from primary team for dilation  Patient aware that pupils can remained dilated for at least 4-6 hours.  Exam performed with 20 D lens    Vitreous: wnl OU  Disc, cup/disc: sharp and pink, 0.5, 0.60  Macula: wnl OU  Vessels: wnl OU  Periphery: wnl OU    Labs/Imaging:  CT HEAD 6/10: No acute intracranial findings.  CT FACIAL BONES 6/10: Comminuted acute fracture of the right orbital floor with prominent herniation of the intraorbital fat into the right maxillary sinus, and vertical orientation and intrasinus herniation of the right inferior rectus muscle, concerning for muscular entrapment. Suggestion of unusual bulbous appearance to the distal right optic nerve. Correlation with ophthalmologic examination is advised.  Acute fracture of the right nasal bone and question subtle acute fracture of the right lamina papyracea.    Assessment and Recommendations:  65y female with a past medical history/ocular history of HTN, COPD, non-small cell lung cancer on chemotherapy (last tx 6/10), GERD, chronic pancreatitis, hx of DVT/PE on Eliquis consulted for right orbital floor fracture.    1. Right orbital floor fracture  -Patient fell on right side of face on 6/10/21 and suffered right orbital floor fracture. CT maxillofacial showed comminuted acute fracture of the right orbital floor with prominent herniation of the intraorbital fat into the right maxillary sinus, and vertical orientation and intrasinus herniation of the right inferior rectus muscle, concerning for muscular entrapment.   -Patient denies any eye complaints. She stated she had monocular diplopia (right eye), which resolved on 6/21. No evidence of muscle entrapment clinically.  -On exam, extraocular muscles intact both eyes. No concern for muscle entrapment. No hypesthesia noted in distribution of infraorbital nerve. No point tenderness, enophthalmos.   -OMFS on board. Plans for intervention on 6/25 if medically cleared given pancytopenia.  -Sinus precautions: no nose blowing, sucking on straws, sneezing with mouth open.  - abx per primary team    2. Incidental CT finding of unusual bulbous appearance to the distal right optic nerve  -Pt noted with incidental finding on CT with unusual bulbous appearance to the distal right optic nerve. Discussed with radiology.  No concern for optic nerve pathology, clinically no concern either.  -On dilated fundus exam, optic nerves appear normal.  -Can follow-up with OCT nerve as an outpatient.  - findings and plan discussed with patient and primary team    3. Glaucoma suspect due to enlarged C/D OU.  IOP wnl, no family history.  - follow up for glaucoma work up as outpatient    Seen and discussed with Dr. Christopher, ophthalmic hospitalist.    Outpatient Follow-up: Patient should follow-up with his/her ophthalmologist or with [facility] Department of Ophthalmology within 1 week of after discharge, sooner if symptoms worsen or change at:    [address]  [place] [address]  [phone]    Lucio Parisi MD, PGY-2  Pager: [phone]  Also available on [employer] Teams

## 2021-06-22 NOTE — PROGRESS NOTE ADULT - ATTENDING COMMENTS
Hospitalist- Joseph Callaway MD  Pager: [phone]  If no response or off-hours, page [phone]  -------------------------------------  hypoxia- steadily improving, today on HFNC 40/40. Will dc abx due to thrombocytopenia and pt has completed zosyn. Continue steroids/lasix. Holding AC due to thrombocytopenia- pt wearing SCDs on my exam today.  thrombocytopenia- persists despite plt transfusion, will need repeat transfusion if drops < 10. Possibly abx related vs less likely DIC- stop zosyn, cont monitoring.  dispo: likely home with home PT pending weaning off hfnc and improvement in platelets, reinstating AC.

## 2021-06-22 NOTE — PROGRESS NOTE ADULT - SUBJECTIVE AND OBJECTIVE BOX
INTERVAL HPI/OVERNIGHT EVENTS:  No overnight events. Cont. to be on HFNC    MEDICATIONS  (STANDING):  albuterol/ipratropium for Nebulization 3 milliLiter(s) Nebulizer every 6 hours  albuterol/ipratropium for Nebulization.. 3 milliLiter(s) Nebulizer every 20 minutes  amLODIPine   Tablet 10 milliGRAM(s) Oral daily  chlorhexidine 2% Cloths 1 Application(s) Topical <User Schedule>  cholecalciferol 1000 Unit(s) Oral daily  cyanocobalamin 1000 MICROGram(s) Oral daily  folic acid 1 milliGRAM(s) Oral daily  furosemide    Tablet 40 milliGRAM(s) Oral daily  magnesium sulfate  IVPB 2 Gram(s) IV Intermittent once  metoprolol tartrate 12.5 milliGRAM(s) Oral two times a day  mirtazapine 15 milliGRAM(s) Oral at bedtime  pancrelipase  (CREON 12,000 Lipase Units) 2 Capsule(s) Oral three times a day with meals  pantoprazole    Tablet 40 milliGRAM(s) Oral two times a day  potassium chloride    Tablet ER 40 milliEquivalent(s) Oral every 4 hours    MEDICATIONS  (PRN):  acetaminophen   Tablet .. 650 milliGRAM(s) Oral every 6 hours PRN Mild Pain (1 - 3), Moderate Pain (4 - 6)  aluminum hydroxide/magnesium hydroxide/simethicone Suspension 30 milliLiter(s) Oral every 4 hours PRN Dyspepsia  ondansetron    Tablet 4 milliGRAM(s) Oral four times a day PRN Nausea and/or Vomiting  oxyCODONE    IR 30 milliGRAM(s) Oral every 6 hours PRN Severe Pain (7 - 10)    Allergies    diazepam (Other)  lemon (Other)    Intolerances          VITAL SIGNS:  T(F): 98.4 (06-22-21 @ 09:18)  HR: 98 (06-22-21 @ 09:18)  BP: 128/71 (06-22-21 @ 09:18)  RR: 22 (06-22-21 @ 09:18)  SpO2: 100% (06-22-21 @ 09:18)  Wt(kg): --    PHYSICAL EXAM:    Constitutional: NAD, in bed wearning HFNC  Eyes: EOMI, PERRLA  Neck: supple, no masses, no JVD  Respiratory: CTAB; no r/r/w  Cardiovascular: RRR, no M/R/G  Gastrointestinal: +BS, soft, NTND, no hepatosplenomegaly  Extremities: no c/c/e  Neurological: AAOx3, nonfocal    LABS:                        9.2    9.98  )-----------( 10       ( 22 Jun 2021 06:47 )             28.0     06-22    140  |  102  |  9   ----------------------------<  92  3.3<L>   |  26  |  0.78    Ca    8.7      22 Jun 2021 06:47  Phos  2.9     06-22  Mg     1.6     06-22    TPro  5.5<L>  /  Alb  2.5<L>  /  TBili  0.4  /  DBili  x   /  AST  25  /  ALT  21  /  AlkPhos  131<H>  06-22    PT/INR - ( 21 Jun 2021 22:13 )   PT: 12.7 sec;   INR: 1.06 ratio         PTT - ( 21 Jun 2021 22:13 )  PTT:25.2 sec      RADIOLOGY & ADDITIONAL TESTS:  Studies reviewed.

## 2021-06-22 NOTE — PROGRESS NOTE ADULT - SUBJECTIVE AND OBJECTIVE BOX
Vascular Cardiology Progress Note    DIRECT SERVICE NUMBER:  [phone]           EMAIL [email or web address]   OFFICE [phone]    CC:  Fall, SOB     Doing better today  feels less short of breath  remains on high flow this morning  eating breakfast  SCD is not on       Allergies    diazepam (Other)  lemon (Other)    Intolerances    	   1MEDICATIONS  (STANDING):  albuterol/ipratropium for Nebulization 3 milliLiter(s) Nebulizer every 6 hours  albuterol/ipratropium for Nebulization.. 3 milliLiter(s) Nebulizer every 20 minutes  amLODIPine   Tablet 10 milliGRAM(s) Oral daily  chlorhexidine 2% Cloths 1 Application(s) Topical <User Schedule>  cholecalciferol 1000 Unit(s) Oral daily  cyanocobalamin 1000 MICROGram(s) Oral daily  folic acid 1 milliGRAM(s) Oral daily  furosemide    Tablet 40 milliGRAM(s) Oral daily  metoprolol tartrate 12.5 milliGRAM(s) Oral two times a day  mirtazapine 15 milliGRAM(s) Oral at bedtime  pancrelipase  (CREON 12,000 Lipase Units) 2 Capsule(s) Oral three times a day with meals  pantoprazole    Tablet 40 milliGRAM(s) Oral two times a day  trimethoprim  160 mG/sulfamethoxazole 800 mG 1 Tablet(s) Oral <User Schedule>      PAST MEDICAL & SURGICAL HISTORY:  HTN (hypertension)    GERD (gastroesophageal reflux disease)    Chronic pancreatitis  last episode 4/2019    ARDS survivor  2015    History of adrenal adenoma  stable on imaging    Vocal cord polyp  removal 2018, benign as per pt    Thrombophlebitis  superficial right UE during 4/2019 hospital stay, resolved as per pt    Chronic abdominal pain    Non-small cell lung cancer (NSCLC)  chemo: Alimta/ Keytruda 2/24/2021    Pulmonary embolism    COPD (chronic obstructive pulmonary disease)    Pulmonary hypertension    Anemia  transfusion 2/5/21    S/P arthroscopy of shoulder  left in 2009    S/P hip replacement  left - 2010    S/P arthroscopy of shoulder  right - 2014    S/P hip replacement, right  May 2016    History of vocal cord polypectomy  1/2018    S/P shoulder replacement, left  2016    History of pancreatic surgery  Jeffery procedure (5/8/2019)        FAMILY HISTORY:      SOCIAL HISTORY:  unchanged    REVIEW OF SYSTEMS:  CONSTITUTIONAL: No fever, weight loss, or fatigue  EYES: No eye pain, visual disturbances, or discharge  ENT:  No difficulty hearing, tinnitus, vertigo; No sinus or throat pain  NECK: No pain or stiffness  RESPIRATORY:  + MAC  CARDIOVASCULAR:  no CP   GASTROINTESTINAL: No abdominal or epigastric pain. No nausea, vomiting, or hematemesis; No diarrhea or constipation. No melena or hematochezia.  GENITOURINARY: No dysuria, frequency, hematuria, or incontinence  NEUROLOGICAL: No headaches, memory loss, loss of strength, numbness, or tremors  LYMPH Nodes: No enlarged glands  ENDOCRINE: No heat or cold intolerance; No hair loss  MUSCULOSKELETAL: No joint pain or swelling; No muscle, back, or extremity pain  PSYCHIATRIC: No depression, anxiety, mood swings, or difficulty sleeping  HEME/LYMPH: No easy bruising, or bleeding gums  ALLERGY AND IMMUNOLOGIC: No hives or eczema	    [ x] All others negative	  [ ] Unable to obtain      ICU Vital Signs Last 24 Hrs  T(C): 36.9 (22 Jun 2021 13:51), Max: 37.2 (21 Jun 2021 20:58)  T(F): 98.5 (22 Jun 2021 13:51), Max: 98.9 (21 Jun 2021 20:58)  HR: 98 (22 Jun 2021 16:43) (85 - 110)  BP: 128/77 (22 Jun 2021 13:51) (124/74 - 150/89)  BP(mean): --  ABP: --  ABP(mean): --  RR: 18 (22 Jun 2021 13:51) (18 - 22)  SpO2: 100% (22 Jun 2021 16:43) (95% - 100%)    Appearance:  	  HEENT:   Normal oral mucosa, PERRL, EOMI	ON high flow nasal cannula   Lymphatic: No lymphadenopathy  Cardiovascular:  S1S2   Respiratory:  Course breath sounds left lower base	  Psychiatry:  AAO x 3  Gastrointestinal:  Soft, Non-tender, + BS	  Skin: No rashes, No ecchymoses, No cyanosis	  Neurologic:  no deficits  Extremities:  no edema                           9.3    7.91  )-----------( x        ( 22 Jun 2021 17:24 )             27.6   06-22    140  |  102  |  9   ----------------------------<  92  3.3<L>   |  26  |  0.78    Ca    8.7      22 Jun 2021 06:47  Phos  2.9     06-22  Mg     1.6     06-22    TPro  5.5<L>  /  Alb  2.5<L>  /  TBili  0.4  /  DBili  x   /  AST  25  /  ALT  21  /  AlkPhos  131<H>  06-22        Assessment:  1. Acute Respitory Failure  - COPD/ Infection  - on high flow  - elevated BNP  2.  HIstory of PE  - chronic on CT scan  - currently off a/c  3.  Elevated BNP  - currently off diuretics, was on diuretics as outpatient. (lasix 40)  4.  Pancytopenia           Plan:  1.   Continue lasix 40mg PO daily    2.  Pneumatic compression garments for DVT prophylaxis  3.   Metroprolol 12.5mg BID   4.  Appreciate pulmonary and hematology recs  5.  Message sent to Dr. Callaway            Thank you    Sourav Leahy     Vascular Cardiology Service    Please call with any questions:   DIRECT SERVICE NUMBER:  [phone]  Office [phone]  email:  [email or web address]

## 2021-06-22 NOTE — DIETITIAN INITIAL EVALUATION ADULT. - PROBLEM SELECTOR PLAN 4
Pt w/ history of chronic pancreatitis, now with epigastric pain since last night c/w pt's chronic pain    Plan:  -Pain control per pt's home regimen w/ oxycodone prn   -C/w pancrelipase  -Zofran prn nausea

## 2021-06-22 NOTE — PROGRESS NOTE ADULT - SUBJECTIVE AND OBJECTIVE BOX
Contact information:  Esthela Matute, MS4   Medicine Team 5  Pager: [phone]    HIMANSHU VILLEGAS, MRN-[id number]    SUBJECTIVE/OVERNIGHT EVENTS: Patient seen and examined at bedside. No acute events overnight. Pt still feels like she is improving. No complaints at this time.     OBJECTIVE:    Vitals:  Vital Signs Last 24 Hrs  T(C): 36.8 (22 Jun 2021 04:44), Max: 37.2 (21 Jun 2021 20:58)  T(F): 98.2 (22 Jun 2021 04:44), Max: 98.9 (21 Jun 2021 20:58)  HR: 97 (22 Jun 2021 05:39) (85 - 110)  BP: 140/83 (22 Jun 2021 04:44) (127/77 - 150/89)  RR: 18 (22 Jun 2021 04:44) (18 - 20)  SpO2: 98% (22 Jun 2021 05:39) (94% - 98%)    Exam:  GEN: Awake and alert, lying in bed on HFNC  HEENT: Mild periorbital ecchymosis of R eye, EOMI. MMM  NECK: Supple  CHEST: On high-flow NC, O2 saturations in 90s (on 40% FiO2, 40L) at time of assessment. Non-labored breathing, speaking in full sentences. Coarse crackles b/l mid-lower lung felids. (+) port R chest  CV: Tachycardic. (+) S1/S2. No audible S3/S4. R No murmurs, rubs, or gallops.    GI: Soft, nontender, nondistended. +BS.  EXT: Warm and well perfused. Trace pitting edema to mid-shin b/l. No calf tenderness. 2+ distal pulses b/l LEs  SKIN: Intact, no rashes or lesions.   NEURO: A&Ox3, grossly non-focal    I&Os:    21 Jun 2021 07:01  -  22 Jun 2021 07:00  --------------------------------------------------------  IN:    IV PiggyBack: 100 mL  Total IN: 100 mL    OUT:    Voided (mL): 850 mL  Total OUT: 850 mL    Total NET: -750 mL      LABS:                        9.2    9.98  )-----------( 10       ( 22 Jun 2021 06:47 )             28.0     06-22    140  |  102  |  9   ----------------------------<  92  3.3<L>   |  26  |  0.78    Ca    8.7      22 Jun 2021 06:47  Phos  2.9     06-22  Mg     1.6     06-22    TPro  5.5<L>  /  Alb  2.5<L>  /  TBili  0.4  /  DBili  x   /  AST  25  /  ALT  21  /  AlkPhos  131<H>  06-22    PT/INR - ( 21 Jun 2021 22:13 )   PT: 12.7 sec;   INR: 1.06 ratio    PTT - ( 21 Jun 2021 22:13 )  PTT:25.2 sec    Heparin-PF4 AB Interp: Negative (06.22.21 @ 06:47)   Haptoglobin, Serum: 110 mg/dL (06.22.21 @ 03:54)   Fibrinogen Assay: 551 mg/dL (06.21.21 @ 22:13)   Lactate Dehydrogenase, Serum: 559 U/L (06.21.21 @ 22:12)     MEDS:  MEDICATIONS  (STANDING):  albuterol/ipratropium for Nebulization 3 milliLiter(s) Nebulizer every 6 hours  albuterol/ipratropium for Nebulization.. 3 milliLiter(s) Nebulizer every 20 minutes  amLODIPine   Tablet 10 milliGRAM(s) Oral daily  chlorhexidine 2% Cloths 1 Application(s) Topical <User Schedule>  cholecalciferol 1000 Unit(s) Oral daily  cyanocobalamin 1000 MICROGram(s) Oral daily  folic acid 1 milliGRAM(s) Oral daily  furosemide    Tablet 40 milliGRAM(s) Oral daily  methylPREDNISolone sodium succinate Injectable 50 milliGRAM(s) IV Push daily  metoprolol tartrate 12.5 milliGRAM(s) Oral two times a day  mirtazapine 15 milliGRAM(s) Oral at bedtime  pancrelipase  (CREON 12,000 Lipase Units) 2 Capsule(s) Oral three times a day with meals  pantoprazole    Tablet 40 milliGRAM(s) Oral two times a day  piperacillin/tazobactam IVPB.. 3.375 Gram(s) IV Intermittent every 8 hours    MEDICATIONS  (PRN):  acetaminophen   Tablet .. 650 milliGRAM(s) Oral every 6 hours PRN Mild Pain (1 - 3), Moderate Pain (4 - 6)  aluminum hydroxide/magnesium hydroxide/simethicone Suspension 30 milliLiter(s) Oral every 4 hours PRN Dyspepsia  ondansetron    Tablet 4 milliGRAM(s) Oral four times a day PRN Nausea and/or Vomiting  oxyCODONE    IR 30 milliGRAM(s) Oral every 6 hours PRN Severe Pain (7 - 10)       No

## 2021-06-22 NOTE — DIETITIAN INITIAL EVALUATION ADULT. - PROBLEM SELECTOR PLAN 1
Pt w/ reported hypoxia to 60s on room air, now improving on non-rebreather following treatment with nebs and steroids in ED. Likely acute COPD exacerbation given improvement after treatment, possibly in s/o underlying infection given CXR w/ RLL nodularity c/f infectious etiology, although pt afebrile w/out leukocytosis. Must also r/o acute PE given hx and patient currently off a/c in s/o recent fall. Pt w/out reported hx of CHF and recent nml echo 2/2021, although BNP elevated here so must also r/o. Less likely acute ischemic process, troponin x 1 in ED wnl although EKG w/ new TWI compared to prior    Plan:  -Continuous pulse ox  -Non-rebreather / NC as tolerated  -Low threshold to start on BiPAP  -Continue w/ PO steroids and Duoneb  -Last echo in 2/2021 w/ nml LV systolic function, EF 65%, will re-echo here in s/o elevated BNP  -CTA chest to r/o PE  -Will hold abx for now but low threshold to begin broad spectrum if pt spikes fever or WBC rises  -RVP  -VBG lactate elevated 4.1, will continue to trend

## 2021-06-22 NOTE — PROGRESS NOTE ADULT - PROBLEM SELECTOR PLAN 2
Pt w/ elevated BNP on admission w/ CTA demonstrating b/l ground glass opacities possibly 2/2 pulmonary edema. Will r/o CHF.    Plan:  -Repeat CXR w/ worsening pulmonary edema  -BNP downtrending  -Echo performed here w/ hyperdynamic LV systolic function, nml diastolic function  -Started on Lasix 40mg IV daily, will continue

## 2021-06-22 NOTE — PROGRESS NOTE ADULT - PROBLEM SELECTOR PLAN 5
Recent ED visit for R orbital floor fx sustained after fall, seen by OMFS here    Plan:  -S/p Augmentin  -OMFS consulted here, recs appreciated   -No pressure to face, ice as needed  -Sinus precautions (no nose blowing, no sucking on straws, sneeze w/ mouth open)  -Plan for OR 6/25 if pt can be medically cleared Recent ED visit for R orbital floor fx sustained after fall, seen by OMFS here    Plan:  -S/p Augmentin  -OMFS consulted here, recs appreciated   -No pressure to face, ice as needed  -Sinus precautions (no nose blowing, no sucking on straws, sneeze w/ mouth open)  -Ophtho consult per OMFS, will f/u recs  -Plan for OR 6/25 if pt can be medically cleared

## 2021-06-22 NOTE — DIETITIAN INITIAL EVALUATION ADULT. - PERTINENT MEDS FT
prednisone  magnesium sulfate IVPB  potassium chloride  Lasix  Lopressor  oxycodone PRN  Maalox PRN  vitamin D3  cyanocobalamin  folic acid  Remeron  Zofran PRN  pancrelipase  Protonix

## 2021-06-22 NOTE — DIETITIAN INITIAL EVALUATION ADULT. - PROBLEM SELECTOR PLAN 2
Pt w/ hx of DVT/PE on Eliquis, currently being held in s/o fall w/ R orbital floor fx. Pt currently tachycardic, tachypneic. Must r/o PE.    Plan:  -Heparin gtt for now, if no bleeding can transition to home Eliquis after 24 hours   -CTA as above  -B/l LE duplex

## 2021-06-22 NOTE — PROGRESS NOTE ADULT - SUBJECTIVE AND OBJECTIVE BOX
HPI:  Patient is a 66 y/o F with PMHx of stage III L lung adenocarcinoma on alimta/pembrolizumab maintenance chemotherapy (last treatment 6/10), emphysema, 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 Reports she was in her usual state of health prior to this. Says she fell 2 days ago and since then she has been having a hard time catching her breath. Denies f/c, cough, chest pain, pleuritic pain. Has been receiving maintenance chemotherapy as scheduled and has not had prior issues with it. On arrival, patient was noted to be hypoxic and was placed on supplemental O2. Her oxygen requirements have been increasing since admission and she is currently requiring HFNC at 60%/50 lpm to maintain O2 saturations. CT chest reveals emphysema with new diffuse b/l GGO. Pulmonary consulted for assistance in management.    Subjective:      14 point ROS negative except as noted above      OBJECTIVE:  ICU Vital Signs Last 24 Hrs  T(C): 36.8 (22 Jun 2021 04:44), Max: 37.2 (21 Jun 2021 20:58)  T(F): 98.2 (22 Jun 2021 04:44), Max: 98.9 (21 Jun 2021 20:58)  HR: 97 (22 Jun 2021 05:39) (85 - 110)  BP: 140/83 (22 Jun 2021 04:44) (127/77 - 150/89)  BP(mean): --  ABP: --  ABP(mean): --  RR: 18 (22 Jun 2021 04:44) (18 - 20)  SpO2: 98% (22 Jun 2021 05:39) (94% - 98%)        06-21 @ 07:01  -  06-22 @ 07:00  --------------------------------------------------------  IN: 100 mL / OUT: 850 mL / NET: -750 mL      CAPILLARY BLOOD GLUCOSE        PHYSICAL EXAM:  General: awake and alert, ill appearing female lying in bed  HEENT: NC/AT, EOMI b/l, conjunctiva normal, MMM  Lymph Nodes: no cervical LAD  Neck: supple. full range of motion  Respiratory: mild crackles at lung bases b/l unchanged, more comfortable on HFNC, no conversational dyspnea or accessory muscle use  Cardiovascular: S1 S2 present, RRR, no m/r/g  Abdomen: soft, NT/ND, +BS  Extremities: no c/c/e  Skin: no rashes or lesions noted  Neurological: AAOx3, no focal deficits  Psychiatry: calm, cooperative    LINES:     MEDICATIONS  (STANDING):  albuterol/ipratropium for Nebulization 3 milliLiter(s) Nebulizer every 6 hours  albuterol/ipratropium for Nebulization.. 3 milliLiter(s) Nebulizer every 20 minutes  amLODIPine   Tablet 10 milliGRAM(s) Oral daily  chlorhexidine 2% Cloths 1 Application(s) Topical <User Schedule>  cholecalciferol 1000 Unit(s) Oral daily  cyanocobalamin 1000 MICROGram(s) Oral daily  folic acid 1 milliGRAM(s) Oral daily  furosemide    Tablet 40 milliGRAM(s) Oral daily  methylPREDNISolone sodium succinate Injectable 50 milliGRAM(s) IV Push daily  metoprolol tartrate 12.5 milliGRAM(s) Oral two times a day  mirtazapine 15 milliGRAM(s) Oral at bedtime  pancrelipase  (CREON 12,000 Lipase Units) 2 Capsule(s) Oral three times a day with meals  pantoprazole    Tablet 40 milliGRAM(s) Oral two times a day  piperacillin/tazobactam IVPB.. 3.375 Gram(s) IV Intermittent every 8 hours    MEDICATIONS  (PRN):  acetaminophen   Tablet .. 650 milliGRAM(s) Oral every 6 hours PRN Mild Pain (1 - 3), Moderate Pain (4 - 6)  aluminum hydroxide/magnesium hydroxide/simethicone Suspension 30 milliLiter(s) Oral every 4 hours PRN Dyspepsia  ondansetron    Tablet 4 milliGRAM(s) Oral four times a day PRN Nausea and/or Vomiting  oxyCODONE    IR 30 milliGRAM(s) Oral every 6 hours PRN Severe Pain (7 - 10)      LABS:                        9.2    9.98  )-----------( 10       ( 22 Jun 2021 06:47 )             28.0     Hgb Trend: 9.2<--, 9.1<--, 7.1<--, 7.2<--, 7.5<--  06-22    140  |  102  |  9   ----------------------------<  92  3.3<L>   |  26  |  0.78    Ca    8.7      22 Jun 2021 06:47  Phos  2.9     06-22  Mg     1.6     06-22    TPro  5.5<L>  /  Alb  2.5<L>  /  TBili  0.4  /  DBili  x   /  AST  25  /  ALT  21  /  AlkPhos  131<H>  06-22    Creatinine Trend: 0.78<--, 0.86<--, 0.81<--, 0.89<--, 0.94<--, 1.01<--  PT/INR - ( 21 Jun 2021 22:13 )   PT: 12.7 sec;   INR: 1.06 ratio         PTT - ( 21 Jun 2021 22:13 )  PTT:25.2 sec      MICROBIOLOGY:       RADIOLOGY:  [ ] Reviewed and interpreted by me    PULMONARY FUNCTION TESTS:    EKG: HPI:  Patient is a 64 y/o F with PMHx of stage III L lung adenocarcinoma on alimta/pembrolizumab maintenance chemotherapy (last treatment 6/10), emphysema, 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 Reports she was in her usual state of health prior to this. Says she fell 2 days ago and since then she has been having a hard time catching her breath. Denies f/c, cough, chest pain, pleuritic pain. Has been receiving maintenance chemotherapy as scheduled and has not had prior issues with it. On arrival, patient was noted to be hypoxic and was placed on supplemental O2. Her oxygen requirements have been increasing since admission and she is currently requiring HFNC at 60%/50 lpm to maintain O2 saturations. CT chest reveals emphysema with new diffuse b/l GGO. Pulmonary consulted for assistance in management.    Subjective:  No overnight events  Tolerating 35%/40 lpm  Feels well, has no complaints  Denies f/c, cough, SOB, chest pain, pleuritic pain, sputum production, abdominal pain    14 point ROS negative except as noted above      OBJECTIVE:  ICU Vital Signs Last 24 Hrs  T(C): 36.8 (22 Jun 2021 04:44), Max: 37.2 (21 Jun 2021 20:58)  T(F): 98.2 (22 Jun 2021 04:44), Max: 98.9 (21 Jun 2021 20:58)  HR: 97 (22 Jun 2021 05:39) (85 - 110)  BP: 140/83 (22 Jun 2021 04:44) (127/77 - 150/89)  BP(mean): --  ABP: --  ABP(mean): --  RR: 18 (22 Jun 2021 04:44) (18 - 20)  SpO2: 98% (22 Jun 2021 05:39) (94% - 98%)        06-21 @ 07:01  -  06-22 @ 07:00  --------------------------------------------------------  IN: 100 mL / OUT: 850 mL / NET: -750 mL      CAPILLARY BLOOD GLUCOSE        PHYSICAL EXAM:  General: awake and alert, ill appearing female lying in bed  HEENT: NC/AT, EOMI b/l, conjunctiva normal, MMM  Lymph Nodes: no cervical LAD  Neck: supple. full range of motion  Respiratory: mild crackles at lung bases b/l improved, more comfortable on HFNC, no conversational dyspnea or accessory muscle use  Cardiovascular: S1 S2 present, RRR, no m/r/g  Abdomen: soft, NT/ND, +BS  Extremities: no c/c/e  Skin: no rashes or lesions noted  Neurological: AAOx3, no focal deficits  Psychiatry: calm, cooperative    LINES:     MEDICATIONS  (STANDING):  albuterol/ipratropium for Nebulization 3 milliLiter(s) Nebulizer every 6 hours  albuterol/ipratropium for Nebulization.. 3 milliLiter(s) Nebulizer every 20 minutes  amLODIPine   Tablet 10 milliGRAM(s) Oral daily  chlorhexidine 2% Cloths 1 Application(s) Topical <User Schedule>  cholecalciferol 1000 Unit(s) Oral daily  cyanocobalamin 1000 MICROGram(s) Oral daily  folic acid 1 milliGRAM(s) Oral daily  furosemide    Tablet 40 milliGRAM(s) Oral daily  methylPREDNISolone sodium succinate Injectable 50 milliGRAM(s) IV Push daily  metoprolol tartrate 12.5 milliGRAM(s) Oral two times a day  mirtazapine 15 milliGRAM(s) Oral at bedtime  pancrelipase  (CREON 12,000 Lipase Units) 2 Capsule(s) Oral three times a day with meals  pantoprazole    Tablet 40 milliGRAM(s) Oral two times a day  piperacillin/tazobactam IVPB.. 3.375 Gram(s) IV Intermittent every 8 hours    MEDICATIONS  (PRN):  acetaminophen   Tablet .. 650 milliGRAM(s) Oral every 6 hours PRN Mild Pain (1 - 3), Moderate Pain (4 - 6)  aluminum hydroxide/magnesium hydroxide/simethicone Suspension 30 milliLiter(s) Oral every 4 hours PRN Dyspepsia  ondansetron    Tablet 4 milliGRAM(s) Oral four times a day PRN Nausea and/or Vomiting  oxyCODONE    IR 30 milliGRAM(s) Oral every 6 hours PRN Severe Pain (7 - 10)      LABS:                        9.2    9.98  )-----------( 10       ( 22 Jun 2021 06:47 )             28.0     Hgb Trend: 9.2<--, 9.1<--, 7.1<--, 7.2<--, 7.5<--  06-22    140  |  102  |  9   ----------------------------<  92  3.3<L>   |  26  |  0.78    Ca    8.7      22 Jun 2021 06:47  Phos  2.9     06-22  Mg     1.6     06-22    TPro  5.5<L>  /  Alb  2.5<L>  /  TBili  0.4  /  DBili  x   /  AST  25  /  ALT  21  /  AlkPhos  131<H>  06-22    Creatinine Trend: 0.78<--, 0.86<--, 0.81<--, 0.89<--, 0.94<--, 1.01<--  PT/INR - ( 21 Jun 2021 22:13 )   PT: 12.7 sec;   INR: 1.06 ratio         PTT - ( 21 Jun 2021 22:13 )  PTT:25.2 sec      MICROBIOLOGY:       RADIOLOGY:  [ ] Reviewed and interpreted by me    PULMONARY FUNCTION TESTS:    EKG:

## 2021-06-22 NOTE — DIETITIAN INITIAL EVALUATION ADULT. - REASON INDICATOR FOR ASSESSMENT
Pt seen for length of stay assessment. Source: EMR, RN, ( ). Pt is a 66 yo female with PMH of HTN, COPD, non-small cell lung cancer on chemotherapy (last tx 6/10), GERD, chronic pancreatitis, hx of DVT/PE but recently held in setting of fall w/ sustained R orbital floor fx.    Admitted 6/14. Pt seen for length of stay assessment. Source: EMR, RN. Pt is a 64 yo female with PMH of HTN, COPD, non-small cell lung cancer on chemotherapy (last tx 6/10), GERD, chronic pancreatitis, hx of DVT/PE but recently held in setting of fall w/ sustained R orbital floor fx.    Admitted 6/14.

## 2021-06-22 NOTE — PROGRESS NOTE ADULT - PROBLEM SELECTOR PLAN 6
Pt w/ hx of DVT/PE on Eliquis, currently being held in s/o fall w/ R orbital floor fx. Pt tachycardic, tachypneic on admission w/ possible EKG signs of heart strain but no evidence of acute PE on CTA chest.    Plan:  -CTA chest w/ evidence of chronic thromboembolic disease but no acute PE  -Holding home Eliquis  -SCDs as pt off a/c

## 2021-06-22 NOTE — DIETITIAN INITIAL EVALUATION ADULT. - SIGNS/SYMPTOMS
In clinic device check with Device RN followed by office visit with Yohannes Hansen NP.  Please see link for full device report.  Patient was informed of results and next follow up during today's visit.   as evidenced by pt with non-small cell lung cancer on chemotherapy, chronic pancreatitis

## 2021-06-22 NOTE — PROVIDER CONTACT NOTE (CRITICAL VALUE NOTIFICATION) - BACKGROUND
Pt admitted for COPD exacerbation
admitting dx: COPD
Adm with copd exacerbation on a heparin drip for DVT/PE.
receive 1 unit of  PLT during day . Before transfusion PLT  level =10
Hx of Anemia

## 2021-06-22 NOTE — DIETITIAN INITIAL EVALUATION ADULT. - PROBLEM SELECTOR PLAN 6
Pt w/ anemia here appears near pt's baseline Hgb 8-9 per chart review. Normocytic anemia w/ elevated RDW, ? mixed microcytic (TOM) + macrocytic.    Plan:  -Daily CBC  -Trend H+H  -Iron studies   -C/w home folic acid, B12

## 2021-06-22 NOTE — PROVIDER CONTACT NOTE (CRITICAL VALUE NOTIFICATION) - RECOMMENDATIONS
Platelet to be given
Platelets transfusion
Pt. instructed to notify RN for any s/s's of active  bleeding .

## 2021-06-22 NOTE — CONSULT NOTE ADULT - ATTENDING COMMENTS
I have interviewed and examined the patient and reviewed the residents note including the history, exam, assessment, and plan.  I agree with the residents assessment and plan.    65y female with a past medical history/ocular history of HTN, COPD, non-small cell lung cancer on chemotherapy (last tx 6/10), GERD, chronic pancreatitis, hx of DVT/PE on Eliquis consulted for right orbital floor fracture.    1. Right orbital floor fracture  -Patient fell on right side of face on 6/10/21 and suffered right orbital floor fracture. CT maxillofacial showed comminuted acute fracture of the right orbital floor with prominent herniation of the intraorbital fat into the right maxillary sinus, and vertical orientation and intrasinus herniation of the right inferior rectus muscle, concerning for muscular entrapment.   -Patient denies any eye complaints. She stated she had monocular diplopia (right eye), which resolved on 6/21. No evidence of muscle entrapment clinically.  -On exam, extraocular muscles intact both eyes. No concern for muscle entrapment. No hypesthesia noted in distribution of infraorbital nerve. No point tenderness, enophthalmos.   -OMFS on board. Plans for intervention on 6/25 if medically cleared given pancytopenia.  -Sinus precautions: no nose blowing, sucking on straws, sneezing with mouth open.  - abx per primary team    2. Incidental CT finding of unusual bulbous appearance to the distal right optic nerve  -Pt noted with incidental finding on CT with unusual bulbous appearance to the distal right optic nerve. Discussed with radiology.  No concern for optic nerve pathology, clinically no concern either.  -On dilated fundus exam, optic nerves appear normal.  -Can follow-up with OCT nerve as an outpatient.  - findings and plan discussed with patient and primary team    Kelly Christopher MD I have interviewed and examined the patient and reviewed the residents note including the history, exam, assessment, and plan.  I agree with the residents assessment and plan.    65y female with a past medical history/ocular history of HTN, COPD, non-small cell lung cancer on chemotherapy (last tx 6/10), GERD, chronic pancreatitis, hx of DVT/PE on Eliquis consulted for right orbital floor fracture.    1. Right orbital floor fracture  -Patient fell on right side of face on 6/10/21 and suffered right orbital floor fracture. CT maxillofacial showed comminuted acute fracture of the right orbital floor with prominent herniation of the intraorbital fat into the right maxillary sinus, and vertical orientation and intrasinus herniation of the right inferior rectus muscle, concerning for muscular entrapment.   -Patient denies any eye complaints. She stated she had monocular diplopia (right eye), which resolved on 6/21. No evidence of muscle entrapment clinically.  -On exam, extraocular muscles intact both eyes. No concern for muscle entrapment. No hypesthesia noted in distribution of infraorbital nerve. No point tenderness, enophthalmos.   -OMFS on board. Plans for intervention on 6/25 if medically cleared given pancytopenia.  -Sinus precautions: no nose blowing, sucking on straws, sneezing with mouth open.  - abx per primary team    2. Incidental CT finding of unusual bulbous appearance to the distal right optic nerve  -Pt noted with incidental finding on CT with unusual bulbous appearance to the distal right optic nerve. Discussed with radiology.  No concern for optic nerve pathology, clinically no concern either.  -On dilated fundus exam, optic nerves appear normal.  -Can follow-up with OCT nerve as an outpatient.  - findings and plan discussed with patient and primary team    3. Glaucoma suspect due to enlarged C/D OU.  IOP wnl, no family history.  - follow up for glaucoma work up as outpatient      Kelly Christopher MD

## 2021-06-22 NOTE — PROGRESS NOTE ADULT - ASSESSMENT
66 y/o F with PMHx HTN, COPD, non-small cell lung cancer on chemotherapy,GERD, chronic pancreatitis, hx of DVT/PE on Eliquis but recently held in s/o fall w/ sustained R orbital floor fx, admitted for acute shortness of breath and hypoxia. Oncology consulted given active malignancy on t/m.    # Non-small cell lung adenocarcinoma   - Diagnosed Feb 19, 2020   - Stage IIIC by imaging. Large volume of disease and hence, not started on RT  - Started carbo/Alimta/Pem given Q 3 weeks schedule  - Currently on maintenance alimta/keytruda (last t/m 6/10)   - CTA Chest 6/14: Bilateral symmetric groundglass opacities with interlobular septal thickening, consistent with pulmonary edema. Web at the right pulmonary artery along with pruning and stenosis of the left lower lobe pulmonary arteries, consistent with chronic thromboembolic disease.  - No plans for inpatient t/m; cont. outpatient f/up with Dr. Cage    # Acute hypoxic resp failure  - CT chest as above  - Discussed with pulm: differentials are pulm edema, chronic thromboembolic disease and splinting due to pain from chronic pancreatitis acting up  - Low suspicion for IRAE  - Management per pulm    # Acute thrombocytopenia  - LDH 6/16: 675; hapto 6/18: 134  - Coags not checked since 6/14  - Fibrinogen WNL 6/16  - Reviewed labs: platelet count was normal until 6/15 (211). Dropped to 143 when checked 6 hours later. Has gradually trended down. Now at its lowest at 10k.   - First heparin exposure 6/14: 4T score is <5% given timing of heparin exposure and platlet drop; low concern for HIT but would still check PF4  - Currently not on AC for chronic thromboembolic disease as platelets low  - Transfused 6/19 and DID have an appropriate reponse to transfusion however has dropped again  - P/S reviewed: Target cells seen; rare schistocytes/helmet cells (1/HPF); some tear drop cells and nucleated red cells suggestive of marrow stress, minimal platelet clumping (true thrombocytopenia), no dysplastic white cells  - , haptoglobin 110, coags WNL; fibrinogen 551, retic suggestive of hypoproliferation; not c/w DIC/TTP  - ? multifactorial with compromised bone marrow response from prior chemotherapy and increased consumption. Already on steroid for resp issues  - If no improvement with supportive measures: folic acid/ B12 and past expected priya in few days, would consider BM Biopsy to evaluate   - Cont. to monitor with daily CBC       Myles Mccollum, PGY-4  Hematology-Oncology Fellow  [phone] ([place]) [phone] ([facility])  I can also be reached on [employer] Teams  Please page fellow on call after 5pm and on weekends

## 2021-06-22 NOTE — PROVIDER CONTACT NOTE (CRITICAL VALUE NOTIFICATION) - PERSON GIVING RESULT:
[facility] - Lab
Sunil Potter
Rufus Vang
Rufus Vang
Rakel Brillante
Jamie/Godfrey Romero
Love Hdz/ Thomas
Rufus Vang

## 2021-06-22 NOTE — DIETITIAN INITIAL EVALUATION ADULT. - PROBLEM SELECTOR PLAN 5
EKG today sinus tachycardia with TWI in V1-V4, changed from prior ECG in 2020 w/ TWI in V1 and V2. Pt w/ out chest pain. Lower suspicion for acute ischemic event as etiology for pt's hypoxia but will r/o w/ serial troponin    Plan:  -Troponin x 1 negative, trend troponin  -F/u TTE as above

## 2021-06-22 NOTE — CONSULT NOTE ADULT - CONSULT REQUESTED DATE/TIME
21-Jun-2021 12:37
15-Ameya-2021 14:53
15-Ameya-2021 12:08
21-Jun-2021 12:31
15-Ameya-2021 19:55
11-Jun-2021 02:50

## 2021-06-22 NOTE — PROVIDER CONTACT NOTE (CRITICAL VALUE NOTIFICATION) - ASSESSMENT
A&OX4, no s/s of distress noted or reported.
no s/s of active bleed
see flowsheets
No s/s's of active  bleeding
Pt remains A&Ox4, no signs of bleeding noted. Drip was stopped for 1 hour per normogram, will be restarted after one hour at reduced rate of 9ml/hr.
Platelets -11

## 2021-06-22 NOTE — PROGRESS NOTE ADULT - PROBLEM SELECTOR PLAN 9
Pt w/ hx of chronic HTN, BPs stable here.     Plan:  -Continue w/ pt's home Amlodipine 10mg QD  -Restarted home metoprolol at lower dose 12.5mg BID Pt w/ hx of chronic HTN, BPs stable here.     Plan:  -Continue w/ pt's home Amlodipine 10mg QD  -Restarted home metoprolol at lower dose 12.5mg BID, will slowly increase to eventually reach pt's home regimen of 100mg qd

## 2021-06-22 NOTE — PROGRESS NOTE ADULT - SUBJECTIVE AND OBJECTIVE BOX
Tim Ling, PGY-1  Pager: [phone] / [phone]    CHIEF COMPLAINT: Patient is a 65y old  Female who presents with a chief complaint of Shortness of breath, hypoxia (22 Jun 2021 08:39)    INTERVAL HPI/OVERNIGHT EVENTS: LEVAR, pt was seen comfortable at bedside. Endorse improvement in the breathing status, less sob, less cough. Abdominal pain under control. Denies N/V, cp, urinary symptoms diplopia, visual changes. Denies bleeding from any site. Tolerating diet well.     MEDICATIONS (STANDING):  albuterol/ipratropium for Nebulization 3 milliLiter(s) Nebulizer every 6 hours  albuterol/ipratropium for Nebulization.. 3 milliLiter(s) Nebulizer every 20 minutes  amLODIPine   Tablet 10 milliGRAM(s) Oral daily  chlorhexidine 2% Cloths 1 Application(s) Topical <User Schedule>  cholecalciferol 1000 Unit(s) Oral daily  cyanocobalamin 1000 MICROGram(s) Oral daily  folic acid 1 milliGRAM(s) Oral daily  furosemide    Tablet 40 milliGRAM(s) Oral daily  magnesium sulfate  IVPB 2 Gram(s) IV Intermittent once  metoprolol tartrate 12.5 milliGRAM(s) Oral two times a day  mirtazapine 15 milliGRAM(s) Oral at bedtime  pancrelipase  (CREON 12,000 Lipase Units) 2 Capsule(s) Oral three times a day with meals  pantoprazole    Tablet 40 milliGRAM(s) Oral two times a day  piperacillin/tazobactam IVPB.. 3.375 Gram(s) IV Intermittent every 8 hours  potassium chloride    Tablet ER 40 milliEquivalent(s) Oral every 4 hours    MEDICATIONS  (PRN):  acetaminophen   Tablet .. 650 milliGRAM(s) Oral every 6 hours PRN  aluminum hydroxide/magnesium hydroxide/simethicone Suspension 30 milliLiter(s) Oral every 4 hours PRN  ondansetron    Tablet 4 milliGRAM(s) Oral four times a day PRN  oxyCODONE    IR 30 milliGRAM(s) Oral every 6 hours PRN    REVIEW OF SYSTEMS:  CONSTITUTIONAL: No fever, weight loss, or fatigue  RESPIRATORY: No cough, wheezing, chills or hemoptysis; No shortness of breath  CARDIOVASCULAR: No chest pain, palpitations, dizziness, or leg swelling  GASTROINTESTINAL: No abdominal or epigastric pain. No nausea, vomiting, or hematemesis; No diarrhea or constipation. No melena or hematochezia.  GENITOURINARY: No dysuria, frequency, hematuria, or incontinence  NEUROLOGICAL: No headaches, memory loss, loss of strength, numbness, or tremors  SKIN: No itching, burning, rashes, or lesions   MUSCULOSKELETAL: No joint pain or swelling; No muscle, back, or extremity pain    VITAL SIGNS:  T(F): 98.4 (06-22-21 @ 09:18), Max: 98.9 (06-21-21 @ 20:58)  HR: 98 (06-22-21 @ 09:18) (85 - 110)  BP: 128/71 (06-22-21 @ 09:18) (127/77 - 150/89)  RR: 22 (06-22-21 @ 09:18) (18 - 22)  SpO2: 100% (06-22-21 @ 09:18) (94% - 100%)    I&O's Summary  21 Jun 2021 07:01  -  22 Jun 2021 07:00  --------------------------------------------------------  IN: 100 mL / OUT: 850 mL / NET: -750 mL    Exam:  GEN: Awake and alert, lying in bed on HFNC  HEENT: Mild periorbital ecchymosis of R eye, EOMI. MMM  NECK: Supple  CHEST: On high-flow NC, O2 saturations in 90s (on 40% FiO2, 50L) at time of assessment. Non-labored breathing, speaking in full sentences. Coarse crackles b/l mid-lower lung felids. (+) port R chest  CV: Tachycardic. (+) S1/S2. No audible S3/S4. R No murmurs, rubs, or gallops.    GI: Soft, nontender, nondistended. +BS.  EXT: Warm and well perfused. Trace pitting edema to mid-shin b/l. No calf tenderness. 2+ distal pulses b/l LEs  SKIN: Intact, no rashes or lesions.   NEURO: A&Ox3, grossly non-focal      LABS:                        9.2    9.98  )-----------( 10       ( 22 Jun 2021 06:47 )             28.0     06-22    140  |  102  |  9   ----------------------------<  92  3.3<L>   |  26  |  0.78    Ca    8.7      22 Jun 2021 06:47  Phos  2.9     06-22  Mg     1.6     06-22    TPro  5.5<L>  /  Alb  2.5<L>  /  TBili  0.4  /  DBili  x   /  AST  25  /  ALT  21  /  AlkPhos  131<H>  06-22    PT/INR - ( 21 Jun 2021 22:13 )   PT: 12.7 sec;   INR: 1.06 ratio         PTT - ( 21 Jun 2021 22:13 )  PTT:25.2 sec        RADIOLOGY & ADDITIONAL TESTS:

## 2021-06-22 NOTE — PROGRESS NOTE ADULT - ASSESSMENT
This is a 64 y/o F with stage III L lung adenocarcinoma on alimta/keytruda, emphysema, HTN, h/o DVT/PE not on AC who presents with acute onset SOB. Found to be hypoxic and currently requiring HFNC to maintain SpO2. CT chest remarkable for diffuse, b/l GGO in the setting of moderate to severe emphysema, which is new compared to CT chest from 3 months prior. GGO may represent pulmonary edema, infection or inflammation.    #Acute hypoxic respiratory failure  - likely 2/2 b/l diffuse GGO on CT chest, though patient does have history of PE with interruption of AC in the past  - ESR, CRP elevation noted  - remains thrombocytopenic  - BNP elevation noted, potentially high output cardiac failure  - s/p empiric course of zosyn  - wean steroids - can switch to prednisone 50 mg daily, start to wean by 10 mg daily every 5 days  - inflammatory markers are elevated but it would be unusual for drug-induced pneumonitis (from keytruda) to present so far after initiation of therapy, would continue to trial her on her immunotherapy as outpatient  - continue supplemental O2, maintain SpO2 88-92%, wean as tolerated - trial NC today  - IS, acapella device  - pain control    Shyam Lucio PGY-5  Pulmonary/Critical Care Fellow  Pager: [phone] ([facility]) [phone] ([facility])  Pulmonary Spectra #[id number] ([facility]) / [id number] ([facility]) This is a 66 y/o F with stage III L lung adenocarcinoma on alimta/keytruda, emphysema, HTN, h/o DVT/PE not on AC who presents with acute onset SOB. Found to be hypoxic and currently requiring HFNC to maintain SpO2. CT chest remarkable for diffuse, b/l GGO in the setting of moderate to severe emphysema, which is new compared to CT chest from 3 months prior. GGO may represent pulmonary edema, infection or inflammation.    #Acute hypoxic respiratory failure  - likely 2/2 b/l diffuse GGO on CT chest, though patient does have history of PE with interruption of AC in the past  - ESR, CRP elevation noted  - remains thrombocytopenic  - BNP elevation noted, potentially high output cardiac failure  - s/p empiric course of zosyn  - wean steroids - can switch to prednisone 50 mg daily, start to wean by 10 mg daily every 5 days  - start bactrim TIW (or single strength daily) for PCP ppx  - inflammatory markers are elevated but it would be unusual for drug-induced pneumonitis (from keytruda) to present so far after initiation of therapy, would continue to trial her on her immunotherapy as outpatient  - continue supplemental O2, maintain SpO2 88-92%, wean as tolerated - trial NC today  - IS, acapella device  - pain control    Shyam Lucio PGY-5  Pulmonary/Critical Care Fellow  Pager: [phone] ([facility]) [phone] ([facility])  Pulmonary Spectra #[id number] ([facility]) / [id number] ([facility])

## 2021-06-22 NOTE — DIETITIAN INITIAL EVALUATION ADULT. - OTHER INFO
Pt sleeping soundly x multiple attempts to visit. RN reports pt eating well. Last documented BM 6/20. Per Patient Profile, pt swallows foods/liquids without difficulty. Food allergy to lemon noted.     Per previous RD note (2/2020): " pounds; weight noted 2/17/2020 - 134.9 pounds." Dosing weight this admission 122.3 pounds suggests wt loss. Noted pt on Lasix PTA and in-house, fluid loss likely contributing to weight loss. Will continue to monitor and trend weights.

## 2021-06-22 NOTE — DIETITIAN INITIAL EVALUATION ADULT. - ORAL INTAKE PTA/DIET HISTORY
Per H&P: "Pt reports decreased PO intake over past 2 days" (PTA); pt taking folic acid, vitamin B12 and vitamin D3 PTA.

## 2021-06-22 NOTE — PROGRESS NOTE ADULT - PROBLEM SELECTOR PLAN 3
Pt w/ chronic anemia, baseline Hgb 8-9 per chart review. Elevated RDW, blood smear w/ macrocytosis. S/p 1U PRBC (6/16). Now w/ new pancytopenia, possibly in s/o of immunotherapy. Elevated LDH but nml bili, haptoglobin, fibrinogen; anemia unlikely hemolysis/DIC    Plan:  - Daily CBC  - Trend H+H, WBC, plt  - C/w home folic acid, B12  - S/p 2U PRBCs (6/16, 6/21)  - S/p 3U platelets (6/19, 6/21, 6/22)  - Hold home Eliquis   - Transfuse PRBC for Hgb <7  - Transfuse plt for <50 w/ evidence of bleeding, otherwise <10 Pt w/ chronic anemia, baseline Hgb 8-9 per chart review. Elevated RDW, blood smear w/ macrocytosis. Now w/ new pancytopenia, possibly in s/o of immunotherapy. WBC count now improved. Hgb responsive to PRBC but platelet w/ persistent drops after platelet transfusion. Elevated LDH but nml bili, haptoglobin, fibrinogen; anemia unlikely hemolysis/DIC.     Plan:  - Daily CBC  - Trend H+H, WBC, plt  - C/w home folic acid, B12  - S/p 2U PRBCs (6/16, 6/21)  - S/p 3U platelets (6/19, 6/21, 6/22)  - Hold home Eliquis   - Transfuse PRBC for Hgb <7  - Transfuse plt for <50 w/ evidence of bleeding, otherwise <10 Pt w/ chronic anemia, baseline Hgb 8-9 per chart review. Elevated RDW, blood smear w/ macrocytosis. Now w/ new pancytopenia, possibly in s/o of immunotherapy. WBC count now improved. Hgb responsive to PRBC but platelet w/ persistent drops after platelet transfusion. Elevated LDH but nml bili, haptoglobin, fibrinogen; unlikely hemolysis, DIC.     Plan:  - Daily CBC  - Trend H+H, WBC, plt  - C/w home folic acid, B12  - Heme/onc consulted, recs appreciated  - S/p 2U PRBCs (6/16, 6/21)  - S/p 3U platelets (6/19, 6/21, 6/22)  - Hold home Eliquis   - Transfuse PRBC for Hgb <7  - Transfuse plt for <50 w/ evidence of bleeding, otherwise <10

## 2021-06-22 NOTE — DIETITIAN INITIAL EVALUATION ADULT. - PROBLEM SELECTOR PLAN 7
Hx of stage III non-small cell lung cancer on chemotherapy (last tx w/ Keytruda/Alimta 6/10).    Plan:  -Will reach out to heme/onc

## 2021-06-22 NOTE — PROVIDER CONTACT NOTE (CRITICAL VALUE NOTIFICATION) - ACTION/TREATMENT ORDERED:
Continue to follow the hospital normogram for heparin drip.
will order repeat CBC
Dr Ling aware
Platelet ordered
awaiting orders
MD aware . Platelets ordered. Will reassess  post transfusion.
will order PRBC and lasix
Pt. will be monitored

## 2021-06-22 NOTE — PROGRESS NOTE ADULT - ATTENDING COMMENTS
Pt with metastatic NSCLC with thrombocytopenia: work up so far without any microangiopathic process; peripheral smear shows tear drops and nucleated reds; if no recovery past the pemetrexed priya; will be having BM biopsy for further evaluation. Clinically stable: currently transitioned to NC oxygen today; on exam: decreased BS at bases; no petechiae over the BLE or bleeding or bruising.

## 2021-06-22 NOTE — PROGRESS NOTE ADULT - PROBLEM SELECTOR PLAN 10
Plan:  -VTE: Holding Eliquis as severe thrombocytopenic; SCDs  -Diet: DASH diet w/ fluid restriction to 1000mL  -Code status: Full code

## 2021-06-22 NOTE — DIETITIAN INITIAL EVALUATION ADULT. - PROBLEM SELECTOR PLAN 3
Recent ED visit for R orbital floor fx sustained after fall, seen by OMFS here    Plan:  -Appreciate OMFS recs from prior visit  -Continue Augmentin BID  -No pressure to face, ice as needed  -Sinus precautions (no nose blowing, no sucking on straws, sneeze w/ mouth open)

## 2021-06-23 ENCOUNTER — TRANSCRIPTION ENCOUNTER (OUTPATIENT)
Age: 66
End: 2021-06-23

## 2021-06-23 ENCOUNTER — APPOINTMENT (OUTPATIENT)
Dept: HEMATOLOGY ONCOLOGY | Facility: CLINIC | Age: 66
End: 2021-06-23

## 2021-06-23 LAB
ANION GAP SERPL CALC-SCNC: 12 MMOL/L — SIGNIFICANT CHANGE UP (ref 5–17)
BUN SERPL-MCNC: 7 MG/DL — SIGNIFICANT CHANGE UP (ref 7–23)
CALCIUM SERPL-MCNC: 8.5 MG/DL — SIGNIFICANT CHANGE UP (ref 8.4–10.5)
CHLORIDE SERPL-SCNC: 109 MMOL/L — HIGH (ref 96–108)
CO2 SERPL-SCNC: 24 MMOL/L — SIGNIFICANT CHANGE UP (ref 22–31)
CREAT SERPL-MCNC: 0.7 MG/DL — SIGNIFICANT CHANGE UP (ref 0.5–1.3)
GLUCOSE SERPL-MCNC: 136 MG/DL — HIGH (ref 70–99)
HCT VFR BLD CALC: 27.3 % — LOW (ref 34.5–45)
HGB BLD-MCNC: 8.9 G/DL — LOW (ref 11.5–15.5)
MAGNESIUM SERPL-MCNC: 2 MG/DL — SIGNIFICANT CHANGE UP (ref 1.6–2.6)
MCHC RBC-ENTMCNC: 30.7 PG — SIGNIFICANT CHANGE UP (ref 27–34)
MCHC RBC-ENTMCNC: 32.6 GM/DL — SIGNIFICANT CHANGE UP (ref 32–36)
MCV RBC AUTO: 94.1 FL — SIGNIFICANT CHANGE UP (ref 80–100)
NRBC # BLD: 2 /100 WBCS — HIGH (ref 0–0)
PHOSPHATE SERPL-MCNC: 2.6 MG/DL — SIGNIFICANT CHANGE UP (ref 2.5–4.5)
PLATELET # BLD AUTO: 47 K/UL — LOW (ref 150–400)
POTASSIUM SERPL-MCNC: 3.6 MMOL/L — SIGNIFICANT CHANGE UP (ref 3.5–5.3)
POTASSIUM SERPL-SCNC: 3.6 MMOL/L — SIGNIFICANT CHANGE UP (ref 3.5–5.3)
RBC # BLD: 2.9 M/UL — LOW (ref 3.8–5.2)
RBC # FLD: 18.1 % — HIGH (ref 10.3–14.5)
SODIUM SERPL-SCNC: 145 MMOL/L — SIGNIFICANT CHANGE UP (ref 135–145)
WBC # BLD: 8.89 K/UL — SIGNIFICANT CHANGE UP (ref 3.8–10.5)
WBC # FLD AUTO: 8.89 K/UL — SIGNIFICANT CHANGE UP (ref 3.8–10.5)

## 2021-06-23 PROCEDURE — 99233 SBSQ HOSP IP/OBS HIGH 50: CPT | Mod: GC

## 2021-06-23 PROCEDURE — 99232 SBSQ HOSP IP/OBS MODERATE 35: CPT | Mod: GC

## 2021-06-23 PROCEDURE — 99233 SBSQ HOSP IP/OBS HIGH 50: CPT

## 2021-06-23 RX ORDER — METOPROLOL TARTRATE 50 MG
50 TABLET ORAL
Refills: 0 | Status: COMPLETED | OUTPATIENT
Start: 2021-06-23 | End: 2021-06-25

## 2021-06-23 RX ORDER — POTASSIUM CHLORIDE 20 MEQ
40 PACKET (EA) ORAL ONCE
Refills: 0 | Status: COMPLETED | OUTPATIENT
Start: 2021-06-23 | End: 2021-06-23

## 2021-06-23 RX ORDER — METOPROLOL TARTRATE 50 MG
25 TABLET ORAL ONCE
Refills: 0 | Status: COMPLETED | OUTPATIENT
Start: 2021-06-23 | End: 2021-06-23

## 2021-06-23 RX ORDER — METOPROLOL TARTRATE 50 MG
25 TABLET ORAL
Refills: 0 | Status: DISCONTINUED | OUTPATIENT
Start: 2021-06-23 | End: 2021-06-23

## 2021-06-23 RX ADMIN — OXYCODONE HYDROCHLORIDE 30 MILLIGRAM(S): 5 TABLET ORAL at 12:31

## 2021-06-23 RX ADMIN — CHLORHEXIDINE GLUCONATE 1 APPLICATION(S): 213 SOLUTION TOPICAL at 05:55

## 2021-06-23 RX ADMIN — Medication 3 MILLILITER(S): at 17:29

## 2021-06-23 RX ADMIN — Medication 50 MILLIGRAM(S): at 05:55

## 2021-06-23 RX ADMIN — Medication 25 MILLIGRAM(S): at 17:31

## 2021-06-23 RX ADMIN — Medication 3 MILLILITER(S): at 11:50

## 2021-06-23 RX ADMIN — Medication 2 CAPSULE(S): at 17:30

## 2021-06-23 RX ADMIN — AMLODIPINE BESYLATE 10 MILLIGRAM(S): 2.5 TABLET ORAL at 05:54

## 2021-06-23 RX ADMIN — Medication 2 CAPSULE(S): at 10:42

## 2021-06-23 RX ADMIN — Medication 25 MILLIGRAM(S): at 21:27

## 2021-06-23 RX ADMIN — Medication 3 MILLILITER(S): at 05:58

## 2021-06-23 RX ADMIN — OXYCODONE HYDROCHLORIDE 30 MILLIGRAM(S): 5 TABLET ORAL at 13:01

## 2021-06-23 RX ADMIN — PANTOPRAZOLE SODIUM 40 MILLIGRAM(S): 20 TABLET, DELAYED RELEASE ORAL at 05:54

## 2021-06-23 RX ADMIN — Medication 1000 UNIT(S): at 17:29

## 2021-06-23 RX ADMIN — PREGABALIN 1000 MICROGRAM(S): 225 CAPSULE ORAL at 17:29

## 2021-06-23 RX ADMIN — PANTOPRAZOLE SODIUM 40 MILLIGRAM(S): 20 TABLET, DELAYED RELEASE ORAL at 17:30

## 2021-06-23 RX ADMIN — Medication 1 TABLET(S): at 09:25

## 2021-06-23 RX ADMIN — MIRTAZAPINE 15 MILLIGRAM(S): 45 TABLET, ORALLY DISINTEGRATING ORAL at 21:27

## 2021-06-23 RX ADMIN — Medication 3 MILLILITER(S): at 23:59

## 2021-06-23 RX ADMIN — Medication 40 MILLIGRAM(S): at 05:54

## 2021-06-23 RX ADMIN — Medication 2 CAPSULE(S): at 12:28

## 2021-06-23 RX ADMIN — Medication 12.5 MILLIGRAM(S): at 05:53

## 2021-06-23 RX ADMIN — Medication 40 MILLIEQUIVALENT(S): at 09:24

## 2021-06-23 RX ADMIN — Medication 1 MILLIGRAM(S): at 17:29

## 2021-06-23 NOTE — DISCHARGE NOTE NURSING/CASE MANAGEMENT/SOCIAL WORK - PATIENT PORTAL LINK FT
You can access the FollowMyHealth Patient Portal offered by [facility] by registering at the following website: [email or web address]. By joining [facility]’s FollowMyHealth portal, you will also be able to view your health information using other applications (apps) compatible with our system.

## 2021-06-23 NOTE — PROGRESS NOTE ADULT - PROBLEM SELECTOR PLAN 4
Pt w/ history of chronic pancreatitis, now with intermittent epigastric pain c/w pt's chronic pain. currently well controlled    Plan:  -Pain control per pt's home regimen w/ oxycodone prn   -C/w pancrelipase  -Zofran prn nausea

## 2021-06-23 NOTE — PROVIDER CONTACT NOTE (OTHER) - REASON
Patient tachycardic, Lopressor 25mg administered
Pt saturating low 80s on 6L NC
tachycardia
Per blood bank: Platelets awaiting delivery

## 2021-06-23 NOTE — PROGRESS NOTE ADULT - SUBJECTIVE AND OBJECTIVE BOX
Contact information:  Esthela Matute, MS4   Medicine Team 5  Pager: [phone]    HIMANSHU VILLEGAS, MRN-[id number]    SUBJECTIVE/OVERNIGHT EVENTS: Patient seen and examined at bedside. No acute events overnight. Patient was weaned yesterday to nasal canula, tolerating well. Pt feels improved, still somewhat short of breath but comfortable. Cough is improving, now more dry.     OBJECTIVE:    Vitals:  Vital Signs Last 24 Hrs  T(C): 36.8 (23 Jun 2021 04:52), Max: 36.9 (22 Jun 2021 09:18)  T(F): 98.2 (23 Jun 2021 04:52), Max: 98.5 (22 Jun 2021 13:51)  HR: 93 (23 Jun 2021 06:01) (90 - 113)  BP: 147/78 (23 Jun 2021 04:52) (124/74 - 147/78)  RR: 18 (23 Jun 2021 04:52) (18 - 22)  SpO2: 98% (23 Jun 2021 06:01) (93% - 100%)    Exam:  GEN: Awake and alert, lying in bed on HFNC  HEENT: Mild periorbital ecchymosis of R eye, EOMI. MMM  NECK: Supple  CHEST: On 4L NC, O2 saturations in 90s at time of assessment. Non-labored breathing, speaking in full sentences. Coarse bibasilar crackles (+) port R chest  CV: RRR (+) S1/S2. No murmurs, rubs, or gallops.    GI: Soft, nontender, nondistended. +BS.  EXT: Warm and well perfused. SCDs in place. Trace pitting edema to mid-shin b/l. No calf tenderness. 2+ distal pulses b/l LEs  SKIN: Intact, no rashes or lesions.   NEURO: A&Ox3, grossly non-focal    I&Os:    22 Jun 2021 07:01  -  23 Jun 2021 07:00  --------------------------------------------------------  IN:    IV PiggyBack: 50 mL    Oral Fluid: 720 mL    Platelets - Single Donor: 225 mL  Total IN: 995 mL    OUT:    Voided (mL): 1300 mL  Total OUT: 1300 mL    Total NET: -305 mL    LABS:                        8.9    8.89  )-----------( 47       ( 23 Jun 2021 06:52 )             27.3     06-23    145  |  109<H>  |  7   ----------------------------<  136<H>  3.6   |  24  |  0.70    Ca    8.5      06-23  Phos  2.6     06-23  Mg     2.0     06-23    TPro  5.5<L>  /  Alb  2.5<L>  /  TBili  0.4  /  DBili  x   /  AST  25  /  ALT  21  /  AlkPhos  131<H>  06-22    PT/INR - ( 21 Jun 2021 22:13 )   PT: 12.7 sec;   INR: 1.06 ratio    PTT - ( 21 Jun 2021 22:13 )  PTT:25.2 sec      MEDS:  MEDICATIONS  (STANDING):  albuterol/ipratropium for Nebulization 3 milliLiter(s) Nebulizer every 6 hours  albuterol/ipratropium for Nebulization.. 3 milliLiter(s) Nebulizer every 20 minutes  amLODIPine   Tablet 10 milliGRAM(s) Oral daily  chlorhexidine 2% Cloths 1 Application(s) Topical <User Schedule>  cholecalciferol 1000 Unit(s) Oral daily  cyanocobalamin 1000 MICROGram(s) Oral daily  folic acid 1 milliGRAM(s) Oral daily  furosemide    Tablet 40 milliGRAM(s) Oral daily  metoprolol tartrate 12.5 milliGRAM(s) Oral two times a day  mirtazapine 15 milliGRAM(s) Oral at bedtime  pancrelipase  (CREON 12,000 Lipase Units) 2 Capsule(s) Oral three times a day with meals  pantoprazole    Tablet 40 milliGRAM(s) Oral two times a day  predniSONE   Tablet 50 milliGRAM(s) Oral daily  trimethoprim  160 mG/sulfamethoxazole 800 mG 1 Tablet(s) Oral <User Schedule>    MEDICATIONS  (PRN):  acetaminophen   Tablet .. 650 milliGRAM(s) Oral every 6 hours PRN Mild Pain (1 - 3), Moderate Pain (4 - 6)  aluminum hydroxide/magnesium hydroxide/simethicone Suspension 30 milliLiter(s) Oral every 4 hours PRN Dyspepsia  ondansetron    Tablet 4 milliGRAM(s) Oral four times a day PRN Nausea and/or Vomiting  oxyCODONE    IR 30 milliGRAM(s) Oral every 6 hours PRN Severe Pain (7 - 10)

## 2021-06-23 NOTE — PROGRESS NOTE ADULT - PROBLEM SELECTOR PLAN 3
Pt w/ chronic anemia, baseline Hgb 8-9 per chart review. Elevated RDW, blood smear w/ macrocytosis. Now w/ new pancytopenia, possibly in s/o of immunotherapy. WBC count now improved. Hgb responsive to PRBC but platelet w/ persistent drops after platelet transfusion. Elevated LDH but nml bili, haptoglobin, fibrinogen; unlikely hemolysis, DIC.     Plan:  - Daily CBC  - Trend H+H, WBC, plt  - C/w home folic acid, B12  - Heme/onc consulted, recs appreciated  - S/p 2U PRBCs (6/16, 6/21)  - S/p 3U platelets (6/19, 6/21, 6/22)  - Hold home Eliquis   - Transfuse PRBC for Hgb <7  - Transfuse plt for <50 w/ evidence of bleeding, otherwise <10

## 2021-06-23 NOTE — PROGRESS NOTE ADULT - PROBLEM SELECTOR PLAN 2
Pt w/ elevated BNP on admission w/ CTA demonstrating b/l ground glass opacities possibly 2/2 pulmonary edema. Will r/o CHF.    Plan:  -Repeat CXR w/ worsening pulmonary edema  -BNP downtrending  -Echo performed here w/ hyperdynamic LV systolic function, nml diastolic function  -C/w Lasix 40mg PO daily

## 2021-06-23 NOTE — PROGRESS NOTE ADULT - PROBLEM SELECTOR PLAN 1
Pt admitted for dyspnea and hypoxia, now weaned to NC. Differential includes COPD exacerbation, possibly in s/o underlying infection given b/l ground glass opacities on CT chest and elevated procalcitonin, although pt afebrile w/out leukocytosis. Also considered is new CHF given elevated BNP and that CT chest findings may represent pulmonary edema, although echo here w/ hyperdynamic LV systolic function and nml diastolic function. CT findings may also represent pneumonitis in s/o chemo/immunotherapy tx. Unlikely PE as CTA chest negative for acute thromboembolism.    Plan:  -Continuous pulse ox  -On NC, wean as tolerated  -Low threshold to start on BiPAP if pt's saturations do not improve  -CTA chest w/ evidence of chronic thromboembolic disease but no acute PE, also w/ b/l symmetric ground-glass opacities  -Echo performed here w/ hyperdynamic LV systolic function, nml diastolic function  -RVP negative, COVID-19 negative x 2, procalcitonin elevated at 15 (6/15)  -Blood cx NGTD, sputum cx w/ nml respiratory eleanor  -Fungitell negative  -MICU consulted, recs appreciated  -Pulm consulted, recs appreciated  -S/p Zosyn (completed 6/22)  -C/w Lasix 40mg PO daily  -C/w Duoneb  -S/p IV solumedrol 1mg/kg BID, now weaning on Prednisone at 50mg, reduce by 10mg q5d  -TMP-SMX for PCP prophylaxis given prolonged steroid course  -VBG lactate elevated on admission, downtrended

## 2021-06-23 NOTE — PROGRESS NOTE ADULT - SUBJECTIVE AND OBJECTIVE BOX
HPI:  Patient is a 66 y/o F with PMHx of stage III L lung adenocarcinoma on alimta/pembrolizumab maintenance chemotherapy (last treatment 6/10), emphysema, 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 Reports she was in her usual state of health prior to this. Says she fell 2 days ago and since then she has been having a hard time catching her breath. Denies f/c, cough, chest pain, pleuritic pain. Has been receiving maintenance chemotherapy as scheduled and has not had prior issues with it. On arrival, patient was noted to be hypoxic and was placed on supplemental O2. Her oxygen requirements have been increasing since admission and she is currently requiring HFNC at 60%/50 lpm to maintain O2 saturations. CT chest reveals emphysema with new diffuse b/l GGO. Pulmonary consulted for assistance in management.    Subjective:      14 point ROS negative except as noted above          OBJECTIVE:  ICU Vital Signs Last 24 Hrs  T(C): 36.8 (23 Jun 2021 04:52), Max: 36.9 (22 Jun 2021 09:18)  T(F): 98.2 (23 Jun 2021 04:52), Max: 98.5 (22 Jun 2021 13:51)  HR: 93 (23 Jun 2021 06:01) (90 - 113)  BP: 147/78 (23 Jun 2021 04:52) (124/74 - 147/78)  BP(mean): --  ABP: --  ABP(mean): --  RR: 18 (23 Jun 2021 04:52) (18 - 22)  SpO2: 98% (23 Jun 2021 06:01) (93% - 100%)        06-21 @ 07:01  -  06-22 @ 07:00  --------------------------------------------------------  IN: 100 mL / OUT: 850 mL / NET: -750 mL    06-22 @ 07:01  -  06-23 @ 06:31  --------------------------------------------------------  IN: 995 mL / OUT: 1100 mL / NET: -105 mL      CAPILLARY BLOOD GLUCOSE        PHYSICAL EXAM:  General: awake and alert, ill appearing female lying in bed  HEENT: NC/AT, EOMI b/l, conjunctiva normal, MMM  Lymph Nodes: no cervical LAD  Neck: supple. full range of motion  Respiratory: mild crackles at lung bases b/l improved, more comfortable on HFNC, no conversational dyspnea or accessory muscle use  Cardiovascular: S1 S2 present, RRR, no m/r/g  Abdomen: soft, NT/ND, +BS  Extremities: no c/c/e  Skin: no rashes or lesions noted  Neurological: AAOx3, no focal deficits  Psychiatry: calm, cooperative    LINES:     MEDICATIONS  (STANDING):  albuterol/ipratropium for Nebulization 3 milliLiter(s) Nebulizer every 6 hours  albuterol/ipratropium for Nebulization.. 3 milliLiter(s) Nebulizer every 20 minutes  amLODIPine   Tablet 10 milliGRAM(s) Oral daily  chlorhexidine 2% Cloths 1 Application(s) Topical <User Schedule>  cholecalciferol 1000 Unit(s) Oral daily  cyanocobalamin 1000 MICROGram(s) Oral daily  folic acid 1 milliGRAM(s) Oral daily  furosemide    Tablet 40 milliGRAM(s) Oral daily  metoprolol tartrate 12.5 milliGRAM(s) Oral two times a day  mirtazapine 15 milliGRAM(s) Oral at bedtime  pancrelipase  (CREON 12,000 Lipase Units) 2 Capsule(s) Oral three times a day with meals  pantoprazole    Tablet 40 milliGRAM(s) Oral two times a day  predniSONE   Tablet 50 milliGRAM(s) Oral daily  trimethoprim  160 mG/sulfamethoxazole 800 mG 1 Tablet(s) Oral <User Schedule>    MEDICATIONS  (PRN):  acetaminophen   Tablet .. 650 milliGRAM(s) Oral every 6 hours PRN Mild Pain (1 - 3), Moderate Pain (4 - 6)  aluminum hydroxide/magnesium hydroxide/simethicone Suspension 30 milliLiter(s) Oral every 4 hours PRN Dyspepsia  ondansetron    Tablet 4 milliGRAM(s) Oral four times a day PRN Nausea and/or Vomiting  oxyCODONE    IR 30 milliGRAM(s) Oral every 6 hours PRN Severe Pain (7 - 10)      LABS:                        9.3    7.91  )-----------( 87       ( 22 Jun 2021 17:24 )             27.6     Hgb Trend: 9.3<--, 9.2<--, 9.1<--, 7.1<--, 7.2<--  06-22    140  |  102  |  9   ----------------------------<  92  3.3<L>   |  26  |  0.78    Ca    8.7      22 Jun 2021 06:47  Phos  2.9     06-22  Mg     1.6     06-22    TPro  5.5<L>  /  Alb  2.5<L>  /  TBili  0.4  /  DBili  x   /  AST  25  /  ALT  21  /  AlkPhos  131<H>  06-22    Creatinine Trend: 0.78<--, 0.86<--, 0.81<--, 0.89<--, 0.94<--, 1.01<--  PT/INR - ( 21 Jun 2021 22:13 )   PT: 12.7 sec;   INR: 1.06 ratio         PTT - ( 21 Jun 2021 22:13 )  PTT:25.2 sec      MICROBIOLOGY:       RADIOLOGY:  [ ] Reviewed and interpreted by me    PULMONARY FUNCTION TESTS:    EKG: HPI:  Patient is a 66 y/o F with PMHx of stage III L lung adenocarcinoma on alimta/pembrolizumab maintenance chemotherapy (last treatment 6/10), emphysema, 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 Reports she was in her usual state of health prior to this. Says she fell 2 days ago and since then she has been having a hard time catching her breath. Denies f/c, cough, chest pain, pleuritic pain. Has been receiving maintenance chemotherapy as scheduled and has not had prior issues with it. On arrival, patient was noted to be hypoxic and was placed on supplemental O2. Her oxygen requirements have been increasing since admission and she is currently requiring HFNC at 60%/50 lpm to maintain O2 saturations. CT chest reveals emphysema with new diffuse b/l GGO. Pulmonary consulted for assistance in management.    Subjective:  No overnight events  Weaned to NC yesterday and tolerating well on 3L  Resting comfortably in bed this AM    14 point ROS negative except as noted above      OBJECTIVE:  ICU Vital Signs Last 24 Hrs  T(C): 36.8 (23 Jun 2021 04:52), Max: 36.9 (22 Jun 2021 09:18)  T(F): 98.2 (23 Jun 2021 04:52), Max: 98.5 (22 Jun 2021 13:51)  HR: 93 (23 Jun 2021 06:01) (90 - 113)  BP: 147/78 (23 Jun 2021 04:52) (124/74 - 147/78)  BP(mean): --  ABP: --  ABP(mean): --  RR: 18 (23 Jun 2021 04:52) (18 - 22)  SpO2: 98% (23 Jun 2021 06:01) (93% - 100%)        06-21 @ 07:01  -  06-22 @ 07:00  --------------------------------------------------------  IN: 100 mL / OUT: 850 mL / NET: -750 mL    06-22 @ 07:01  -  06-23 @ 06:31  --------------------------------------------------------  IN: 995 mL / OUT: 1100 mL / NET: -105 mL      CAPILLARY BLOOD GLUCOSE        PHYSICAL EXAM:  General: awake and alert, ill appearing female lying in bed  HEENT: NC/AT, EOMI b/l, conjunctiva normal, MMM  Lymph Nodes: no cervical LAD  Neck: supple. full range of motion  Respiratory: mild crackles at lung bases b/l improved, comfortable on NC, no conversational dyspnea or accessory muscle use  Cardiovascular: S1 S2 present, RRR, no m/r/g  Abdomen: soft, NT/ND, +BS  Extremities: no c/c/e  Skin: no rashes or lesions noted  Neurological: AAOx3, no focal deficits  Psychiatry: calm, cooperative    LINES:     MEDICATIONS  (STANDING):  albuterol/ipratropium for Nebulization 3 milliLiter(s) Nebulizer every 6 hours  albuterol/ipratropium for Nebulization.. 3 milliLiter(s) Nebulizer every 20 minutes  amLODIPine   Tablet 10 milliGRAM(s) Oral daily  chlorhexidine 2% Cloths 1 Application(s) Topical <User Schedule>  cholecalciferol 1000 Unit(s) Oral daily  cyanocobalamin 1000 MICROGram(s) Oral daily  folic acid 1 milliGRAM(s) Oral daily  furosemide    Tablet 40 milliGRAM(s) Oral daily  metoprolol tartrate 12.5 milliGRAM(s) Oral two times a day  mirtazapine 15 milliGRAM(s) Oral at bedtime  pancrelipase  (CREON 12,000 Lipase Units) 2 Capsule(s) Oral three times a day with meals  pantoprazole    Tablet 40 milliGRAM(s) Oral two times a day  predniSONE   Tablet 50 milliGRAM(s) Oral daily  trimethoprim  160 mG/sulfamethoxazole 800 mG 1 Tablet(s) Oral <User Schedule>    MEDICATIONS  (PRN):  acetaminophen   Tablet .. 650 milliGRAM(s) Oral every 6 hours PRN Mild Pain (1 - 3), Moderate Pain (4 - 6)  aluminum hydroxide/magnesium hydroxide/simethicone Suspension 30 milliLiter(s) Oral every 4 hours PRN Dyspepsia  ondansetron    Tablet 4 milliGRAM(s) Oral four times a day PRN Nausea and/or Vomiting  oxyCODONE    IR 30 milliGRAM(s) Oral every 6 hours PRN Severe Pain (7 - 10)      LABS:                        9.3    7.91  )-----------( 87       ( 22 Jun 2021 17:24 )             27.6     Hgb Trend: 9.3<--, 9.2<--, 9.1<--, 7.1<--, 7.2<--  06-22    140  |  102  |  9   ----------------------------<  92  3.3<L>   |  26  |  0.78    Ca    8.7      22 Jun 2021 06:47  Phos  2.9     06-22  Mg     1.6     06-22    TPro  5.5<L>  /  Alb  2.5<L>  /  TBili  0.4  /  DBili  x   /  AST  25  /  ALT  21  /  AlkPhos  131<H>  06-22    Creatinine Trend: 0.78<--, 0.86<--, 0.81<--, 0.89<--, 0.94<--, 1.01<--  PT/INR - ( 21 Jun 2021 22:13 )   PT: 12.7 sec;   INR: 1.06 ratio         PTT - ( 21 Jun 2021 22:13 )  PTT:25.2 sec      MICROBIOLOGY:       RADIOLOGY:  [ ] Reviewed and interpreted by me    PULMONARY FUNCTION TESTS:    EKG:

## 2021-06-23 NOTE — PROGRESS NOTE ADULT - ASSESSMENT
This is a 64 y/o F with stage III L lung adenocarcinoma on alimta/keytruda, emphysema, HTN, h/o DVT/PE not on AC who presents with acute onset SOB. Found to be hypoxic and currently requiring HFNC to maintain SpO2. CT chest remarkable for diffuse, b/l GGO in the setting of moderate to severe emphysema, which is new compared to CT chest from 3 months prior. GGO may represent pulmonary edema, infection or inflammation.    #Acute hypoxic respiratory failure  - likely 2/2 b/l diffuse GGO on CT chest, though patient does have history of PE with interruption of AC in the past  - ESR, CRP elevation noted  - remains thrombocytopenic  - BNP elevation noted, potentially high output cardiac failure  - s/p empiric course of zosyn  - continue prednisone wean by 10 mg every 5 days - currently on prednisone 50 mg daily  - bactrim TIW (or single strength daily) for PCP ppx  - inflammatory markers are elevated but it would be unusual for drug-induced pneumonitis (from keytruda) to present so far after initiation of therapy, would continue to trial her on her immunotherapy as outpatient  - continue supplemental O2, maintain SpO2 88-92%, wean as tolerated - tolerating NC  - IS, acapella device  - pain control    Shyam Lucio PGY-5  Pulmonary/Critical Care Fellow  Pager: [phone] ([facility]) [phone] (NS)  Pulmonary Spectra #[id number] ([facility]) / [id number] ([facility]) This is a 64 y/o F with stage III L lung adenocarcinoma on alimta/keytruda, emphysema, HTN, h/o DVT/PE not on AC who presents with acute onset SOB. Found to be hypoxic and currently requiring HFNC to maintain SpO2. CT chest remarkable for diffuse, b/l GGO in the setting of moderate to severe emphysema, which is new compared to CT chest from 3 months prior. GGO may represent pulmonary edema, infection or inflammation.    #Acute hypoxic respiratory failure  - likely 2/2 b/l diffuse GGO on CT chest, though patient does have history of PE with interruption of AC in the past  - remains thrombocytopenic - unable to AC safely at present  - ESR, CRP elevation noted  - BNP elevation noted, potentially high output cardiac failure  - s/p empiric course of zosyn  - continue prednisone wean by 10 mg every 5 days - currently on prednisone 50 mg daily  - bactrim TIW (or single strength daily) for PCP ppx  - inflammatory markers are elevated but it would be unusual for drug-induced pneumonitis (from keytruda) to present so far after initiation of therapy, would continue to trial her on her immunotherapy as outpatient  - continue supplemental O2, maintain SpO2 88-92%, wean as tolerated - tolerating NC  - continue to wean down O2 and obtain ambulatory SpO2 to determine if patient needs O2 at home  - IS, acapella device  - pain control    Shyam Lucio PGY-5  Pulmonary/Critical Care Fellow  Pager: [phone] ([facility]) [phone] (NS)  Pulmonary Spectra #[id number] (NS) / [id number] ([facility]) This is a 66 y/o F with stage III L lung adenocarcinoma on alimta/keytruda, emphysema, HTN, h/o DVT/PE not on AC who presents with acute onset SOB. Found to be hypoxic and currently requiring HFNC to maintain SpO2. CT chest remarkable for diffuse, b/l GGO in the setting of moderate to severe emphysema, which is new compared to CT chest from 3 months prior. GGO may represent pulmonary edema, infection or inflammation.    #Acute hypoxic respiratory failure  - likely 2/2 b/l diffuse GGO on CT chest, though patient does have history of PE with interruption of AC in the past  - remains thrombocytopenic - unable to AC safely at present  - ESR, CRP elevation noted  - BNP elevation noted, potentially high output cardiac failure  - s/p empiric course of zosyn  - continue prednisone wean by 10 mg every 5 days - currently on prednisone 50 mg daily  - bactrim TIW (or single strength daily) for PCP ppx  - inflammatory markers are elevated but it would be unusual for drug-induced pneumonitis (from keytruda) to present so far after initiation of therapy, would continue to trial her on her immunotherapy as outpatient  - continue supplemental O2, maintain SpO2 88-92%, wean as tolerated - tolerating NC  - continue to wean down O2 and obtain ambulatory SpO2 to determine if patient needs O2 at home  - IS, acapella device  - pain control    Patient should f/u with pulmonary within 4 weeks of discharge for repeat CT chest to assess for resolution. Please email [email or web address] and include patient's name,  and MRN and allow 24 hours for scheduling.    Pulmonary will sign off. Please call with any questions.    Shyam Lucio PGY-5  Pulmonary/Critical Care Fellow  Pager: [phone] ([facility]) [phone] (NS)  Pulmonary Spectra #[id number] (NS) / [phone] ([facility])

## 2021-06-23 NOTE — PROGRESS NOTE ADULT - ASSESSMENT
66 y/o F with PMHx HTN, COPD, non-small cell lung cancer on chemotherapy,GERD, chronic pancreatitis, hx of DVT/PE on Eliquis but recently held in s/o fall w/ sustained R orbital floor fx, admitted for acute hypoxemic respiratory failure, unclear etiology, likely multifactorial. Currently stable, now weaned to NC.

## 2021-06-23 NOTE — PROGRESS NOTE ADULT - PROBLEM SELECTOR PLAN 5
Recent ED visit for R orbital floor fx sustained after fall, seen by OMFS here    Plan:  -S/p Augmentin  -OMFS consulted here, recs appreciated   -No pressure to face, ice as needed  -Sinus precautions (no nose blowing, no sucking on straws, sneeze w/ mouth open)  -Ophtho consult per OMFS, appreciate recs  -Plan for OR 6/25 if pt can be medically cleared Recent ED visit for R orbital floor fx sustained after fall, seen by OMFS here    Plan:  -S/p Augmentin  -OMFS consulted here, recs appreciated   -No pressure to face, ice as needed  -Sinus precautions (no nose blowing, no sucking on straws, sneeze w/ mouth open)  -Ophtho consult per OMFS, appreciate recs  -Discussed with OMFS, will likely not be operating as inpatient, can f/u upon d/c

## 2021-06-23 NOTE — PROGRESS NOTE ADULT - ATTENDING COMMENTS
Hospitalist- Joseph Callaway MD  Pager: [phone]  If no response or off-hours, page [phone]  -------------------------------------  hypoxia- continues to improve: cont steroid taper, lasix and wean NC as tolerated. Will likely need home o2 on discharge.  s/p fall- per optho no evidence of acute optho injury. May not need surgery- will f/u OMFS.  dispo: likely home with home pt pending set up of home o2

## 2021-06-23 NOTE — PROGRESS NOTE ADULT - SUBJECTIVE AND OBJECTIVE BOX
Vascular Cardiology Progress Note    DIRECT SERVICE NUMBER:  [phone]           EMAIL [email or web address]   OFFICE [phone]    CC:  Fall, SOB     Doing better today  feels less short of breath  now on nasal cannula   SCDs are on     Allergies    diazepam (Other)  lemon (Other)    Intolerances   MEDICATIONS  (STANDING):  albuterol/ipratropium for Nebulization 3 milliLiter(s) Nebulizer every 6 hours  albuterol/ipratropium for Nebulization.. 3 milliLiter(s) Nebulizer every 20 minutes  amLODIPine   Tablet 10 milliGRAM(s) Oral daily  chlorhexidine 2% Cloths 1 Application(s) Topical <User Schedule>  cholecalciferol 1000 Unit(s) Oral daily  cyanocobalamin 1000 MICROGram(s) Oral daily  folic acid 1 milliGRAM(s) Oral daily  furosemide    Tablet 40 milliGRAM(s) Oral daily  metoprolol tartrate 25 milliGRAM(s) Oral two times a day  mirtazapine 15 milliGRAM(s) Oral at bedtime  pancrelipase  (CREON 12,000 Lipase Units) 2 Capsule(s) Oral three times a day with meals  pantoprazole    Tablet 40 milliGRAM(s) Oral two times a day  predniSONE   Tablet 50 milliGRAM(s) Oral daily  trimethoprim  160 mG/sulfamethoxazole 800 mG 1 Tablet(s) Oral <User Schedule>    PAST MEDICAL & SURGICAL HISTORY:  HTN (hypertension)    GERD (gastroesophageal reflux disease)    Chronic pancreatitis  last episode 4/2019    ARDS survivor  2015    History of adrenal adenoma  stable on imaging    Vocal cord polyp  removal 2018, benign as per pt    Thrombophlebitis  superficial right UE during 4/2019 hospital stay, resolved as per pt    Chronic abdominal pain    Non-small cell lung cancer (NSCLC)  chemo: Alimta/ Keytruda 2/24/2021    Pulmonary embolism    COPD (chronic obstructive pulmonary disease)    Pulmonary hypertension    Anemia  transfusion 2/5/21    S/P arthroscopy of shoulder  left in 2009    S/P hip replacement  left - 2010    S/P arthroscopy of shoulder  right - 2014    S/P hip replacement, right  May 2016    History of vocal cord polypectomy  1/2018    S/P shoulder replacement, left  2016    History of pancreatic surgery  Jeffery procedure (5/8/2019)        FAMILY HISTORY:      SOCIAL HISTORY:  unchanged    REVIEW OF SYSTEMS:  CONSTITUTIONAL: No fever, weight loss, or fatigue  EYES: No eye pain, visual disturbances, or discharge  ENT:  No difficulty hearing, tinnitus, vertigo; No sinus or throat pain  NECK: No pain or stiffness  RESPIRATORY:  + MAC improving   CARDIOVASCULAR:  no CP   GASTROINTESTINAL: No abdominal or epigastric pain. No nausea, vomiting, or hematemesis; No diarrhea or constipation. No melena or hematochezia.  GENITOURINARY: No dysuria, frequency, hematuria, or incontinence  NEUROLOGICAL: No headaches, memory loss, loss of strength, numbness, or tremors  LYMPH Nodes: No enlarged glands  ENDOCRINE: No heat or cold intolerance; No hair loss  MUSCULOSKELETAL: No joint pain or swelling; No muscle, back, or extremity pain  PSYCHIATRIC: No depression, anxiety, mood swings, or difficulty sleeping  HEME/LYMPH: No easy bruising, or bleeding gums  ALLERGY AND IMMUNOLOGIC: No hives or eczema	    [ x] All others negative	  [ ] Unable to obtain    ICU Vital Signs Last 24 Hrs  T(C): 36.4 (23 Jun 2021 14:09), Max: 36.9 (22 Jun 2021 22:02)  T(F): 97.5 (23 Jun 2021 14:09), Max: 98.5 (22 Jun 2021 22:02)  HR: 112 (23 Jun 2021 17:39) (93 - 114)  BP: 120/74 (23 Jun 2021 14:09) (120/74 - 147/78)  BP(mean): --  ABP: --  ABP(mean): --  RR: 18 (23 Jun 2021 17:39) (18 - 18)  SpO2: 94% (23 Jun 2021 17:39) (94% - 98%)  )    Appearance:  	  HEENT:   Normal oral mucosa, PERRL, EOMI	ON   nasal cannula   Lymphatic: No lymphadenopathy  Cardiovascular:  S1S2   Respiratory:  Course breath sounds left lower base	  Psychiatry:  AAO x 3  Gastrointestinal:  Soft, Non-tender, + BS	  Skin: No rashes, No ecchymoses, No cyanosis	  Neurologic:  no deficits  Extremities:  no edema                            8.9    8.89  )-----------( 47       ( 23 Jun 2021 06:52 )             27.3   06-23    145  |  109<H>  |  7   ----------------------------<  136<H>  3.6   |  24  |  0.70    Ca    8.5      23 Jun 2021 06:52  Phos  2.6     06-23  Mg     2.0     06-23    TPro  5.5<L>  /  Alb  2.5<L>  /  TBili  0.4  /  DBili  x   /  AST  25  /  ALT  21  /  AlkPhos  131<H>  06-22          Assessment:  1. Acute Respitory Failure  - COPD/ Infection  - on high flow  - elevated BNP  2.  HIstory of PE  - chronic on CT scan  - currently off a/c  3.  Elevated BNP  - currently off diuretics, was on diuretics as outpatient. (lasix 40)  4.  Pancytopenia           Plan:  1.  Continue Lasix 40mg PO daily    2.  Pneumatic compression garments for DVT prophylaxis  3.   Can up-titrate metoprolol to 50mg BID, then her home dose of Toprol XL 100mg daily   4.  Appreciate pulmonary and hematology recs              Thank you    Sourav Leahy     Vascular Cardiology Service    Please call with any questions:   DIRECT SERVICE NUMBER:  [phone]  Office [phone]  email:  [email or web address]

## 2021-06-23 NOTE — PROVIDER CONTACT NOTE (OTHER) - RECOMMENDATIONS
on 10L NRB, saturating 89-92%
plt transfusion remains pending. patient made aware
Lopressor dosage increase

## 2021-06-23 NOTE — DISCHARGE NOTE NURSING/CASE MANAGEMENT/SOCIAL WORK - NSDCFUADDAPPT_GEN_ALL_CORE_FT
PLEASE SCHEDULE AN APPOINTMENT WITH YOUR PRIMARY CARE DOCTOR, PULMONOLOGY, AND HEMATOLOGY/ONCOLOGY.    YOU RECEIVED THE MONIQUE AND MONIQUE VACCINE ON 6/28/21.

## 2021-06-23 NOTE — PROGRESS NOTE ADULT - PROBLEM SELECTOR PLAN 9
Pt w/ hx of chronic HTN, BPs stable here.     Plan:  -Continue w/ pt's home Amlodipine 10mg QD  -Restarted home metoprolol at lower dose 12.5mg BID, will slowly increase to eventually reach pt's home regimen of 100mg QD

## 2021-06-24 LAB
ANION GAP SERPL CALC-SCNC: 12 MMOL/L — SIGNIFICANT CHANGE UP (ref 5–17)
BUN SERPL-MCNC: 8 MG/DL — SIGNIFICANT CHANGE UP (ref 7–23)
CALCIUM SERPL-MCNC: 8.8 MG/DL — SIGNIFICANT CHANGE UP (ref 8.4–10.5)
CHLORIDE SERPL-SCNC: 106 MMOL/L — SIGNIFICANT CHANGE UP (ref 96–108)
CO2 SERPL-SCNC: 25 MMOL/L — SIGNIFICANT CHANGE UP (ref 22–31)
CREAT SERPL-MCNC: 0.81 MG/DL — SIGNIFICANT CHANGE UP (ref 0.5–1.3)
GLUCOSE SERPL-MCNC: 89 MG/DL — SIGNIFICANT CHANGE UP (ref 70–99)
HCT VFR BLD CALC: 28.9 % — LOW (ref 34.5–45)
HGB BLD-MCNC: 9.4 G/DL — LOW (ref 11.5–15.5)
MAGNESIUM SERPL-MCNC: 1.8 MG/DL — SIGNIFICANT CHANGE UP (ref 1.6–2.6)
MCHC RBC-ENTMCNC: 31.2 PG — SIGNIFICANT CHANGE UP (ref 27–34)
MCHC RBC-ENTMCNC: 32.5 GM/DL — SIGNIFICANT CHANGE UP (ref 32–36)
MCV RBC AUTO: 96 FL — SIGNIFICANT CHANGE UP (ref 80–100)
NRBC # BLD: 3 /100 WBCS — HIGH (ref 0–0)
PHOSPHATE SERPL-MCNC: 3.3 MG/DL — SIGNIFICANT CHANGE UP (ref 2.5–4.5)
PLATELET # BLD AUTO: 28 K/UL — LOW (ref 150–400)
POTASSIUM SERPL-MCNC: 4.5 MMOL/L — SIGNIFICANT CHANGE UP (ref 3.5–5.3)
POTASSIUM SERPL-SCNC: 4.5 MMOL/L — SIGNIFICANT CHANGE UP (ref 3.5–5.3)
RBC # BLD: 3.01 M/UL — LOW (ref 3.8–5.2)
RBC # FLD: 18.1 % — HIGH (ref 10.3–14.5)
SODIUM SERPL-SCNC: 143 MMOL/L — SIGNIFICANT CHANGE UP (ref 135–145)
WBC # BLD: 9.01 K/UL — SIGNIFICANT CHANGE UP (ref 3.8–10.5)
WBC # FLD AUTO: 9.01 K/UL — SIGNIFICANT CHANGE UP (ref 3.8–10.5)

## 2021-06-24 PROCEDURE — 99232 SBSQ HOSP IP/OBS MODERATE 35: CPT | Mod: GC

## 2021-06-24 PROCEDURE — 99233 SBSQ HOSP IP/OBS HIGH 50: CPT | Mod: GC

## 2021-06-24 RX ADMIN — Medication 2 CAPSULE(S): at 13:26

## 2021-06-24 RX ADMIN — MIRTAZAPINE 15 MILLIGRAM(S): 45 TABLET, ORALLY DISINTEGRATING ORAL at 21:16

## 2021-06-24 RX ADMIN — Medication 50 MILLIGRAM(S): at 05:50

## 2021-06-24 RX ADMIN — Medication 50 MILLIGRAM(S): at 05:51

## 2021-06-24 RX ADMIN — Medication 3 MILLILITER(S): at 11:40

## 2021-06-24 RX ADMIN — OXYCODONE HYDROCHLORIDE 30 MILLIGRAM(S): 5 TABLET ORAL at 04:50

## 2021-06-24 RX ADMIN — OXYCODONE HYDROCHLORIDE 30 MILLIGRAM(S): 5 TABLET ORAL at 15:30

## 2021-06-24 RX ADMIN — Medication 3 MILLILITER(S): at 18:14

## 2021-06-24 RX ADMIN — OXYCODONE HYDROCHLORIDE 30 MILLIGRAM(S): 5 TABLET ORAL at 14:58

## 2021-06-24 RX ADMIN — Medication 3 MILLILITER(S): at 05:15

## 2021-06-24 RX ADMIN — Medication 1 MILLIGRAM(S): at 13:26

## 2021-06-24 RX ADMIN — Medication 2 CAPSULE(S): at 17:45

## 2021-06-24 RX ADMIN — Medication 1000 UNIT(S): at 13:29

## 2021-06-24 RX ADMIN — Medication 2 CAPSULE(S): at 09:47

## 2021-06-24 RX ADMIN — AMLODIPINE BESYLATE 10 MILLIGRAM(S): 2.5 TABLET ORAL at 05:51

## 2021-06-24 RX ADMIN — PREGABALIN 1000 MICROGRAM(S): 225 CAPSULE ORAL at 13:27

## 2021-06-24 RX ADMIN — CHLORHEXIDINE GLUCONATE 1 APPLICATION(S): 213 SOLUTION TOPICAL at 05:53

## 2021-06-24 RX ADMIN — PANTOPRAZOLE SODIUM 40 MILLIGRAM(S): 20 TABLET, DELAYED RELEASE ORAL at 17:48

## 2021-06-24 RX ADMIN — Medication 50 MILLIGRAM(S): at 17:46

## 2021-06-24 RX ADMIN — PANTOPRAZOLE SODIUM 40 MILLIGRAM(S): 20 TABLET, DELAYED RELEASE ORAL at 05:51

## 2021-06-24 RX ADMIN — Medication 40 MILLIGRAM(S): at 05:51

## 2021-06-24 NOTE — PROGRESS NOTE ADULT - PROBLEM SELECTOR PLAN 10
Plan:  -VTE: Holding Eliquis as severe thrombocytopenic; SCDs  -Diet: DASH diet w/ fluid restriction to 1000mL  -Code status: Full code  -Dispo: Home w/ home PT, may require home O2

## 2021-06-24 NOTE — PROGRESS NOTE ADULT - ATTENDING COMMENTS
Hospitalist- Joseph Callaway MD  Pager: [phone]  If no response or off-hours, page [phone]  -------------------------------------  hypoxic respiratory failure- continues to improve, now on nasal cannula. Cont daily lasix, steroid taper per pulm. Now off abx. Will set up home o2.  Thrombocytopenia- unclear etiology- meds reviewed and adjusted, no clear etiology- plan for bm biopsy with heme tomorrow.  dispo: possibly home tmrw pending heme clearance and set up of home o2.

## 2021-06-24 NOTE — PROGRESS NOTE ADULT - SUBJECTIVE AND OBJECTIVE BOX
INTERVAL HPI/OVERNIGHT EVENTS:  No overnight events. Improving resp status. No complains    MEDICATIONS  (STANDING):  albuterol/ipratropium for Nebulization 3 milliLiter(s) Nebulizer every 6 hours  albuterol/ipratropium for Nebulization.. 3 milliLiter(s) Nebulizer every 20 minutes  amLODIPine   Tablet 10 milliGRAM(s) Oral daily  chlorhexidine 2% Cloths 1 Application(s) Topical <User Schedule>  cholecalciferol 1000 Unit(s) Oral daily  cyanocobalamin 1000 MICROGram(s) Oral daily  folic acid 1 milliGRAM(s) Oral daily  furosemide    Tablet 40 milliGRAM(s) Oral daily  metoprolol tartrate 50 milliGRAM(s) Oral two times a day  mirtazapine 15 milliGRAM(s) Oral at bedtime  pancrelipase  (CREON 12,000 Lipase Units) 2 Capsule(s) Oral three times a day with meals  pantoprazole    Tablet 40 milliGRAM(s) Oral two times a day  predniSONE   Tablet 50 milliGRAM(s) Oral daily  trimethoprim  160 mG/sulfamethoxazole 800 mG 1 Tablet(s) Oral <User Schedule>    MEDICATIONS  (PRN):  acetaminophen   Tablet .. 650 milliGRAM(s) Oral every 6 hours PRN Mild Pain (1 - 3), Moderate Pain (4 - 6)  aluminum hydroxide/magnesium hydroxide/simethicone Suspension 30 milliLiter(s) Oral every 4 hours PRN Dyspepsia  ondansetron    Tablet 4 milliGRAM(s) Oral four times a day PRN Nausea and/or Vomiting  oxyCODONE    IR 30 milliGRAM(s) Oral every 6 hours PRN Severe Pain (7 - 10)    Allergies    diazepam (Other)  lemon (Other)    Intolerances          VITAL SIGNS:  T(F): 98 (06-24-21 @ 04:40)  HR: 86 (06-24-21 @ 05:15)  BP: 151/85 (06-24-21 @ 04:40)  RR: 18 (06-24-21 @ 04:40)  SpO2: 93% (06-24-21 @ 05:15)  Wt(kg): --    PHYSICAL EXAM:    Constitutional: NAD, lying comfortably in bed; now on 3L NC  Eyes: EOMI, PERRLA  Neck: supple, no masses, no JVD  Respiratory: CTAB; no r/r/w  Cardiovascular: RRR, no M/R/G  Gastrointestinal: +BS, soft, NTND, no hepatosplenomegaly  Extremities: no c/c/e  Neurological: AAOx3, nonfocal    LABS:                        9.4    9.01  )-----------( 28       ( 24 Jun 2021 07:18 )             28.9     06-24    143  |  106  |  8   ----------------------------<  89  4.5   |  25  |  0.81    Ca    8.8      24 Jun 2021 07:17  Phos  3.3     06-24  Mg     1.8     06-24            RADIOLOGY & ADDITIONAL TESTS:  Studies reviewed.

## 2021-06-24 NOTE — PROGRESS NOTE ADULT - PROBLEM SELECTOR PLAN 1
Pt admitted for dyspnea and hypoxia, now weaned to NC. Differential includes COPD exacerbation, possibly in s/o underlying infection given b/l ground glass opacities on CT chest and elevated procalcitonin, although pt afebrile w/out leukocytosis. Also considered is new CHF given elevated BNP and that CT chest findings may represent pulmonary edema, although echo here w/ hyperdynamic LV systolic function and nml diastolic function. CT findings may also represent pneumonitis in s/o chemo/immunotherapy tx. Unlikely PE as CTA chest negative for acute thromboembolism.    Plan:  -Continuous pulse ox  -On NC, wean as tolerated  -Low threshold to start on BiPAP if pt's saturations do not improve  -CTA chest w/ evidence of chronic thromboembolic disease but no acute PE, also w/ b/l symmetric ground-glass opacities  -Echo performed here w/ hyperdynamic LV systolic function, nml diastolic function  -RVP negative, COVID-19 negative x 2, procalcitonin elevated at 15 (6/15)  -Blood cx NGTD, sputum cx w/ nml respiratory eleanor  -Fungitell negative  -MICU consulted, recs appreciated  -Pulm consulted, recs appreciated  -S/p Zosyn (completed 6/22)  -C/w Lasix 40mg PO daily  -C/w Duoneb  -S/p IV solumedrol 1mg/kg BID, now weaning on Prednisone at 50mg, reduce by 10mg q5d  -TMP-SMX for PCP prophylaxis given prolonged steroid course  -VBG lactate elevated on admission, downtrended Pt admitted for dyspnea and hypoxia, now weaned to NC. Differential includes COPD exacerbation, possibly in s/o underlying infection given b/l ground glass opacities on CT chest and elevated procalcitonin, although pt afebrile w/out leukocytosis. Also considered is new CHF given elevated BNP and that CT chest findings may represent pulmonary edema, although echo here w/ hyperdynamic LV systolic function and nml diastolic function. CT findings may also represent pneumonitis in s/o chemo/immunotherapy tx. Unlikely PE as CTA chest negative for acute thromboembolism.    Plan:  -Continuous pulse ox, On NC, wean as tolerated  -CTA chest w/ evidence of chronic thromboembolic disease but no acute PE, also w/ b/l symmetric ground-glass opacities  -Echo performed here w/ hyperdynamic LV systolic function, nml diastolic function  -RVP negative, COVID-19 negative x 2, procalcitonin elevated at 15 (6/15), Blood cx NGTD, sputum cx w/ nml respiratory eleanor, Fungitell negative  -Pulm consulted, recs appreciated  -S/p Zosyn (completed 6/22)  -C/w Lasix 40mg PO daily  -C/w Duoneb  -S/p IV solumedrol 1mg/kg BID, now weaning on Prednisone at 50mg, reduce by 10mg q5d  -TMP-SMX for PCP prophylaxis given prolonged steroid course  -VBG lactate elevated on admission, downtrended    #Need for home oxygen  -Pt is diagnosed with acute hypoxic respiratory failure 2/2 COPD exacerbation  -She is still hypoxic and need continuos oxygen therapy, on 3L at rest and 5L in activity. All other treatments including duonebs, abx and steroids have been deployed and pt still need of oxygen. Pt is aware of her status.  -Resting SpO2 on RA is 90%  -Exercise SpO2 on RA is 67%

## 2021-06-24 NOTE — PROGRESS NOTE ADULT - ATTENDING COMMENTS
Advanced NSCLC who has been able to be titrated to NC oxygen. She had platelet transfusion with response but has been downtrending. She denies any new fevers or symptoms. She is amenable to BM biopsy to further evaluate thrombocytopenia.

## 2021-06-24 NOTE — PROGRESS NOTE ADULT - ASSESSMENT
66 y/o F with PMHx HTN, COPD, non-small cell lung cancer on chemotherapy,GERD, chronic pancreatitis, hx of DVT/PE on Eliquis but recently held in s/o fall w/ sustained R orbital floor fx, admitted for acute shortness of breath and hypoxia. Oncology consulted given active malignancy on t/m.    # Non-small cell lung adenocarcinoma   - Diagnosed Feb 19, 2020   - Stage IIIC by imaging. Large volume of disease and hence, not started on RT  - Started carbo/Alimta/Pem given Q 3 weeks schedule  - Currently on maintenance alimta/keytruda (last t/m 6/10)   - CTA Chest 6/14: Bilateral symmetric groundglass opacities with interlobular septal thickening, consistent with pulmonary edema. Web at the right pulmonary artery along with pruning and stenosis of the left lower lobe pulmonary arteries, consistent with chronic thromboembolic disease.  - No plans for inpatient t/m; cont. outpatient f/up with Dr. Cage    # Acute hypoxic resp failure  - CT chest as above  - Discussed with pulm: differentials are pulm edema, chronic thromboembolic disease and splinting due to pain from chronic pancreatitis acting up  - Low suspicion for IRAE  - Management per pulm; resp status improving    # Acute thrombocytopenia  - , haptoglobin 110, coags WNL; fibrinogen 551, retic suggestive of hypoproliferation; not c/w DIC/TTP  - Reviewed labs: platelet count was normal until 6/15 (211). Dropped to 143 when checked 6 hours later. Has gradually trended down. Nadired at 10k.   - First heparin exposure 6/14:  PF4 neg  - Currently not on AC for chronic thromboembolic disease as platelets low  - Does have an appropriate response to transfusion however counts drop again  - P/S reviewed: Target cells seen; rare schistocytes/helmet cells (1/HPF); some tear drop cells and nucleated red cells suggestive of marrow stress, minimal platelet clumping (true thrombocytopenia), no dysplastic white cells  - ? multifactorial with compromised bone marrow response from prior chemotherapy and increased consumption. Already on steroids for resp issues  - If no improvement with supportive measures: folic acid/ B12 and past expected priya in few days, would consider BM Biopsy to evaluate   - Cont. to monitor with daily CBC       Myles Mccollum, PGY-4  Hematology-Oncology Fellow  [phone] [facility]) [phone] ([facility])  I can also be reached on [employer] Teams  Please page fellow on call after 5pm and on weekends 64 y/o F with PMHx HTN, COPD, non-small cell lung cancer on chemotherapy,GERD, chronic pancreatitis, hx of DVT/PE on Eliquis but recently held in s/o fall w/ sustained R orbital floor fx, admitted for acute shortness of breath and hypoxia. Oncology consulted given active malignancy on t/m.    # Non-small cell lung adenocarcinoma   - Diagnosed Feb 19, 2020   - Stage IIIC by imaging. Large volume of disease and hence, not started on RT  - Started carbo/Alimta/Pem given Q 3 weeks schedule  - Currently on maintenance alimta/keytruda (last t/m 6/10)   - CTA Chest 6/14: Bilateral symmetric groundglass opacities with interlobular septal thickening, consistent with pulmonary edema. Web at the right pulmonary artery along with pruning and stenosis of the left lower lobe pulmonary arteries, consistent with chronic thromboembolic disease.  - No plans for inpatient t/m; cont. outpatient f/up with Dr. Cage    # Acute hypoxic resp failure  - CT chest as above  - Discussed with pulm: differentials are pulm edema, chronic thromboembolic disease and splinting due to pain from chronic pancreatitis acting up  - Low suspicion for IRAE  - Management per pulm; resp status improving    # Acute thrombocytopenia  - , haptoglobin 110, coags WNL; fibrinogen 551, retic suggestive of hypoproliferation; not c/w DIC/TTP  - Reviewed labs: platelet count was normal until 6/15 (211). Dropped to 143 when checked 6 hours later. Has gradually trended down. Nadired at 10k.   - First heparin exposure 6/14:  PF4 neg  - Currently not on AC for chronic thromboembolic disease as platelets low  - Does have an appropriate response to transfusion however counts drop again  - P/S reviewed: Target cells seen; rare schistocytes/helmet cells (1/HPF); some tear drop cells and nucleated red cells suggestive of marrow stress, minimal platelet clumping (true thrombocytopenia), no dysplastic white cells  - ? multifactorial with compromised bone marrow response from prior chemotherapy and increased consumption. Already on steroids for resp issues  - No obvious improvement; counts dropping after transfusion- plan for BM bx 6/25   - Cont. to monitor with daily CBC       Myles Mccollum, PGY-4  Hematology-Oncology Fellow  [phone] [facility]) [phone] ([facility])  I can also be reached on [employer] Teams  Please page fellow on call after 5pm and on weekends

## 2021-06-24 NOTE — PROGRESS NOTE ADULT - PROBLEM SELECTOR PLAN 5
Recent ED visit for R orbital floor fx sustained after fall, seen by OMFS here    Plan:  -S/p Augmentin  -OMFS consulted here, recs appreciated   -No pressure to face, ice as needed  -Sinus precautions (no nose blowing, no sucking on straws, sneeze w/ mouth open)  -Ophtho consult per OMFS, appreciate recs  -Discussed with OMFS, will likely not be operating as inpatient, can f/u upon d/c

## 2021-06-24 NOTE — PROGRESS NOTE ADULT - ASSESSMENT
64 y/o F with PMHx HTN, COPD, non-small cell lung cancer on chemotherapy,GERD, chronic pancreatitis, hx of DVT/PE on Eliquis but recently held in s/o fall w/ sustained R orbital floor fx, admitted for acute hypoxemic respiratory failure, unclear etiology, likely multifactorial. Currently stable, now weaned to NC.

## 2021-06-24 NOTE — PROGRESS NOTE ADULT - PROBLEM SELECTOR PLAN 9
Pt w/ hx of chronic HTN, BPs stable here.     Plan:  -Continue w/ pt's home Amlodipine 10mg QD  -Metoprolol now up-titrated to 50mg BID, will continue to increase to pt's home regimen of 100mg QD

## 2021-06-24 NOTE — PROGRESS NOTE ADULT - SUBJECTIVE AND OBJECTIVE BOX
Contact information:  Esthela Matute, MS4   Medicine Team 5  Pager: [phone]    HIMANSHU VILLEGAS, MRN-[id number]    SUBJECTIVE/OVERNIGHT EVENTS: Patient seen and examined at bedside. Pt was noted to be persistently tachycardic to 110s yesterday evening. NC also increased from 3L to 6L overnight as O2 saturation dropped to 80s. Pt does not report changes in her breathing. Denies shortness of breath, chest pain, palpitations.  Cough continues to improve, occasionally productive but more dry.     OBJECTIVE:    Vitals:  Vital Signs Last 24 Hrs  T(C): 36.7 (24 Jun 2021 04:40), Max: 36.9 (23 Jun 2021 20:49)  T(F): 98 (24 Jun 2021 04:40), Max: 98.4 (23 Jun 2021 20:49)  HR: 86 (24 Jun 2021 05:15) (86 - 114)  BP: 151/85 (24 Jun 2021 04:40) (120/74 - 151/85)  RR: 18 (24 Jun 2021 04:40) (18 - 18)  SpO2: 93% (24 Jun 2021 05:15) (93% - 96%)    Exam:  GEN: Awake and alert, lying in bed on NC  HEENT: Mild periorbital ecchymosis of R eye, EOMI. MMM  NECK: Supple  CHEST: On 6L NC, O2 saturations in 90s at time of assessment. Non-labored breathing, speaking in full sentences. Bibasilar crackles. (+) port R chest  CV: RRR (+) S1/S2. No murmurs, rubs, or gallops.    GI: Soft, nontender, nondistended. +BS.  EXT: Warm and well perfused. SCDs in place. Trace pitting edema to mid-shin b/l. No calf tenderness. 2+ distal pulses b/l LEs  SKIN: Intact, no rashes or lesions.   NEURO: A&Ox3, grossly non-focal    I&Os:    23 Jun 2021 07:01  -  24 Jun 2021 07:00  --------------------------------------------------------  IN:    Oral Fluid: 240 mL  Total IN: 240 mL    OUT:    Voided (mL): 1300 mL  Total OUT: 1300 mL    Total NET: -1060 mL      LABS:                        9.4    9.01  )-----------( 28 ( 24 Jun 2021 07:18 )             28.9     06-24    143  |  106  |  8   ----------------------------<  89  4.5   |  25  |  0.81    Ca    8.8      24 Jun 2021 07:17  Phos  3.3     06-24  Mg     1.8     06-24      MEDS:  MEDICATIONS  (STANDING):  albuterol/ipratropium for Nebulization 3 milliLiter(s) Nebulizer every 6 hours  albuterol/ipratropium for Nebulization.. 3 milliLiter(s) Nebulizer every 20 minutes  amLODIPine   Tablet 10 milliGRAM(s) Oral daily  chlorhexidine 2% Cloths 1 Application(s) Topical <User Schedule>  cholecalciferol 1000 Unit(s) Oral daily  cyanocobalamin 1000 MICROGram(s) Oral daily  folic acid 1 milliGRAM(s) Oral daily  furosemide    Tablet 40 milliGRAM(s) Oral daily  metoprolol tartrate 50 milliGRAM(s) Oral two times a day  mirtazapine 15 milliGRAM(s) Oral at bedtime  pancrelipase  (CREON 12,000 Lipase Units) 2 Capsule(s) Oral three times a day with meals  pantoprazole    Tablet 40 milliGRAM(s) Oral two times a day  predniSONE   Tablet 50 milliGRAM(s) Oral daily  trimethoprim  160 mG/sulfamethoxazole 800 mG 1 Tablet(s) Oral <User Schedule>    MEDICATIONS  (PRN):  acetaminophen   Tablet .. 650 milliGRAM(s) Oral every 6 hours PRN Mild Pain (1 - 3), Moderate Pain (4 - 6)  aluminum hydroxide/magnesium hydroxide/simethicone Suspension 30 milliLiter(s) Oral every 4 hours PRN Dyspepsia  ondansetron    Tablet 4 milliGRAM(s) Oral four times a day PRN Nausea and/or Vomiting  oxyCODONE    IR 30 milliGRAM(s) Oral every 6 hours PRN Severe Pain (7 - 10)

## 2021-06-25 ENCOUNTER — RESULT REVIEW (OUTPATIENT)
Age: 66
End: 2021-06-25

## 2021-06-25 LAB
ALBUMIN SERPL ELPH-MCNC: 2.6 G/DL — LOW (ref 3.3–5)
ALP SERPL-CCNC: 132 U/L — HIGH (ref 40–120)
ALT FLD-CCNC: 26 U/L — SIGNIFICANT CHANGE UP (ref 10–45)
ANION GAP SERPL CALC-SCNC: 12 MMOL/L — SIGNIFICANT CHANGE UP (ref 5–17)
AST SERPL-CCNC: 22 U/L — SIGNIFICANT CHANGE UP (ref 10–40)
BILIRUB SERPL-MCNC: 0.2 MG/DL — SIGNIFICANT CHANGE UP (ref 0.2–1.2)
BUN SERPL-MCNC: 11 MG/DL — SIGNIFICANT CHANGE UP (ref 7–23)
CALCIUM SERPL-MCNC: 8.7 MG/DL — SIGNIFICANT CHANGE UP (ref 8.4–10.5)
CHLORIDE SERPL-SCNC: 106 MMOL/L — SIGNIFICANT CHANGE UP (ref 96–108)
CO2 SERPL-SCNC: 26 MMOL/L — SIGNIFICANT CHANGE UP (ref 22–31)
CREAT SERPL-MCNC: 0.84 MG/DL — SIGNIFICANT CHANGE UP (ref 0.5–1.3)
GLUCOSE SERPL-MCNC: 80 MG/DL — SIGNIFICANT CHANGE UP (ref 70–99)
HCT VFR BLD CALC: 28.1 % — LOW (ref 34.5–45)
HGB BLD-MCNC: 9.1 G/DL — LOW (ref 11.5–15.5)
MAGNESIUM SERPL-MCNC: 1.7 MG/DL — SIGNIFICANT CHANGE UP (ref 1.6–2.6)
MCHC RBC-ENTMCNC: 31.2 PG — SIGNIFICANT CHANGE UP (ref 27–34)
MCHC RBC-ENTMCNC: 32.4 GM/DL — SIGNIFICANT CHANGE UP (ref 32–36)
MCV RBC AUTO: 96.2 FL — SIGNIFICANT CHANGE UP (ref 80–100)
NRBC # BLD: 2 /100 WBCS — HIGH (ref 0–0)
PHOSPHATE SERPL-MCNC: 4.1 MG/DL — SIGNIFICANT CHANGE UP (ref 2.5–4.5)
PLATELET # BLD AUTO: 36 K/UL — LOW (ref 150–400)
POTASSIUM SERPL-MCNC: 3.9 MMOL/L — SIGNIFICANT CHANGE UP (ref 3.5–5.3)
POTASSIUM SERPL-SCNC: 3.9 MMOL/L — SIGNIFICANT CHANGE UP (ref 3.5–5.3)
PROT SERPL-MCNC: 5.7 G/DL — LOW (ref 6–8.3)
RBC # BLD: 2.92 M/UL — LOW (ref 3.8–5.2)
RBC # FLD: 18 % — HIGH (ref 10.3–14.5)
SODIUM SERPL-SCNC: 144 MMOL/L — SIGNIFICANT CHANGE UP (ref 135–145)
WBC # BLD: 8.17 K/UL — SIGNIFICANT CHANGE UP (ref 3.8–10.5)
WBC # FLD AUTO: 8.17 K/UL — SIGNIFICANT CHANGE UP (ref 3.8–10.5)

## 2021-06-25 PROCEDURE — 99232 SBSQ HOSP IP/OBS MODERATE 35: CPT | Mod: GC

## 2021-06-25 PROCEDURE — 88360 TUMOR IMMUNOHISTOCHEM/MANUAL: CPT | Mod: 26

## 2021-06-25 PROCEDURE — 88305 TISSUE EXAM BY PATHOLOGIST: CPT | Mod: 26

## 2021-06-25 PROCEDURE — 88291 CYTO/MOLECULAR REPORT: CPT

## 2021-06-25 PROCEDURE — 88189 FLOWCYTOMETRY/READ 16 & >: CPT

## 2021-06-25 PROCEDURE — 99233 SBSQ HOSP IP/OBS HIGH 50: CPT

## 2021-06-25 PROCEDURE — 99233 SBSQ HOSP IP/OBS HIGH 50: CPT | Mod: GC

## 2021-06-25 PROCEDURE — 85097 BONE MARROW INTERPRETATION: CPT

## 2021-06-25 PROCEDURE — 88313 SPECIAL STAINS GROUP 2: CPT | Mod: 26

## 2021-06-25 RX ORDER — METOPROLOL TARTRATE 50 MG
100 TABLET ORAL DAILY
Refills: 0 | Status: DISCONTINUED | OUTPATIENT
Start: 2021-06-26 | End: 2021-06-28

## 2021-06-25 RX ADMIN — Medication 40 MILLIGRAM(S): at 05:52

## 2021-06-25 RX ADMIN — MIRTAZAPINE 15 MILLIGRAM(S): 45 TABLET, ORALLY DISINTEGRATING ORAL at 21:29

## 2021-06-25 RX ADMIN — OXYCODONE HYDROCHLORIDE 30 MILLIGRAM(S): 5 TABLET ORAL at 14:46

## 2021-06-25 RX ADMIN — OXYCODONE HYDROCHLORIDE 30 MILLIGRAM(S): 5 TABLET ORAL at 14:16

## 2021-06-25 RX ADMIN — AMLODIPINE BESYLATE 10 MILLIGRAM(S): 2.5 TABLET ORAL at 05:52

## 2021-06-25 RX ADMIN — Medication 50 MILLIGRAM(S): at 17:40

## 2021-06-25 RX ADMIN — Medication 2 CAPSULE(S): at 17:39

## 2021-06-25 RX ADMIN — CHLORHEXIDINE GLUCONATE 1 APPLICATION(S): 213 SOLUTION TOPICAL at 05:52

## 2021-06-25 RX ADMIN — Medication 3 MILLILITER(S): at 23:24

## 2021-06-25 RX ADMIN — Medication 2 CAPSULE(S): at 09:06

## 2021-06-25 RX ADMIN — PREGABALIN 1000 MICROGRAM(S): 225 CAPSULE ORAL at 12:59

## 2021-06-25 RX ADMIN — OXYCODONE HYDROCHLORIDE 30 MILLIGRAM(S): 5 TABLET ORAL at 21:29

## 2021-06-25 RX ADMIN — Medication 50 MILLIGRAM(S): at 05:52

## 2021-06-25 RX ADMIN — Medication 2 CAPSULE(S): at 12:59

## 2021-06-25 RX ADMIN — Medication 3 MILLILITER(S): at 11:39

## 2021-06-25 RX ADMIN — PANTOPRAZOLE SODIUM 40 MILLIGRAM(S): 20 TABLET, DELAYED RELEASE ORAL at 17:40

## 2021-06-25 RX ADMIN — Medication 1 TABLET(S): at 09:06

## 2021-06-25 RX ADMIN — Medication 1000 UNIT(S): at 12:59

## 2021-06-25 RX ADMIN — PANTOPRAZOLE SODIUM 40 MILLIGRAM(S): 20 TABLET, DELAYED RELEASE ORAL at 05:52

## 2021-06-25 RX ADMIN — Medication 3 MILLILITER(S): at 17:58

## 2021-06-25 RX ADMIN — Medication 1 MILLIGRAM(S): at 12:59

## 2021-06-25 RX ADMIN — Medication 3 MILLILITER(S): at 05:08

## 2021-06-25 RX ADMIN — Medication 3 MILLILITER(S): at 00:12

## 2021-06-25 RX ADMIN — OXYCODONE HYDROCHLORIDE 30 MILLIGRAM(S): 5 TABLET ORAL at 21:30

## 2021-06-25 NOTE — PROCEDURE NOTE - ADDITIONAL PROCEDURE DETAILS
Hematology/Oncology Procedure Note    Bone Marrow Aspiration/Biopsy    Indication:     Bone marrow aspiration and biopsy procedure description, risks, and benefits were discussed in detail with the patient.  All questions were answered.  Informed consent was obtained and time-out performed.      The area of the right posterior iliac crest was prepped and draped using sterile technique. Local anesthetic with 2% Lidocaine.    Bone marrow aspiration and biopsy  was performed using sterile technique by Dr. Myles Mccollum with Dr. Esquivel assist and supervision. Specimens were obtained. No complications and less than 2 cc of blood loss.     The procedure was well tolerated and no local bleeding or other complications were observed.  Pressure was applied to the procedure site and a wound dressing was placed.  The patient and nursing staff were advised that the patient is to lie flat for 30 minutes post procedure and not to shower or change the dressing for 24 hours. Tylenol may be used if no contraindications for pain at the procedure site.        Myles Mccollum, PGY-4  Hematology-Oncology Fellow  [phone] ([place]) [phone] ([facility])  I can also be reached on [employer] Teams  Please page fellow on call after 5pm and on weekends

## 2021-06-25 NOTE — PROGRESS NOTE ADULT - PROBLEM SELECTOR PLAN 2
Pt w/ chronic anemia, baseline Hgb 8-9 per chart review. Elevated RDW, blood smear w/ macrocytosis. Now w/ new pancytopenia, possibly in s/o of immunotherapy. WBC count now improved. Hgb responsive to PRBC but platelet w/ persistent drops after platelet transfusion. Elevated LDH but nml bili, haptoglobin, fibrinogen; unlikely hemolysis, DIC.     Plan:  - Daily CBC  - Trend H+H, WBC, plt  - C/w home folic acid, B12  - Heme/onc consulted, recs appreciated  - S/p 2U PRBCs (6/16, 6/21)  - S/p 3U platelets (6/19, 6/21, 6/22)  - Plan for bone marrow biopsy 6/25  - Hold home Eliquis   - Transfuse PRBC for Hgb <7  - Transfuse plt for <50 w/ evidence of bleeding, otherwise <10

## 2021-06-25 NOTE — PROGRESS NOTE ADULT - SUBJECTIVE AND OBJECTIVE BOX
INTERVAL HPI/OVERNIGHT EVENTS:  No overnight events.     MEDICATIONS  (STANDING):  albuterol/ipratropium for Nebulization 3 milliLiter(s) Nebulizer every 6 hours  albuterol/ipratropium for Nebulization.. 3 milliLiter(s) Nebulizer every 20 minutes  amLODIPine   Tablet 10 milliGRAM(s) Oral daily  chlorhexidine 2% Cloths 1 Application(s) Topical <User Schedule>  cholecalciferol 1000 Unit(s) Oral daily  cyanocobalamin 1000 MICROGram(s) Oral daily  folic acid 1 milliGRAM(s) Oral daily  furosemide    Tablet 40 milliGRAM(s) Oral daily  metoprolol tartrate 50 milliGRAM(s) Oral two times a day  mirtazapine 15 milliGRAM(s) Oral at bedtime  pancrelipase  (CREON 12,000 Lipase Units) 2 Capsule(s) Oral three times a day with meals  pantoprazole    Tablet 40 milliGRAM(s) Oral two times a day  predniSONE   Tablet 50 milliGRAM(s) Oral daily  trimethoprim  160 mG/sulfamethoxazole 800 mG 1 Tablet(s) Oral <User Schedule>    MEDICATIONS  (PRN):  acetaminophen   Tablet .. 650 milliGRAM(s) Oral every 6 hours PRN Mild Pain (1 - 3), Moderate Pain (4 - 6)  aluminum hydroxide/magnesium hydroxide/simethicone Suspension 30 milliLiter(s) Oral every 4 hours PRN Dyspepsia  ondansetron    Tablet 4 milliGRAM(s) Oral four times a day PRN Nausea and/or Vomiting  oxyCODONE    IR 30 milliGRAM(s) Oral every 6 hours PRN Severe Pain (7 - 10)    Allergies    diazepam (Other)  lemon (Other)    Intolerances          VITAL SIGNS:  T(F): 98.9 (06-25-21 @ 11:34)  HR: 95 (06-25-21 @ 12:00)  BP: 102/65 (06-25-21 @ 11:34)  RR: 18 (06-25-21 @ 11:34)  SpO2: 95% (06-25-21 @ 12:00)  Wt(kg): --    PHYSICAL EXAM:    Constitutional: NAD, lying comfortably in bed  Eyes: EOMI, PERRLA  Neck: supple, no masses, no JVD  Respiratory: CTAB; no r/r/w  Cardiovascular: RRR, no M/R/G  Gastrointestinal: +BS, soft, NTND, no hepatosplenomegaly  Extremities: no c/c/e  Neurological: AAOx3, nonfocal    LABS:                        9.1    8.17  )-----------( 36       ( 25 Jun 2021 06:39 )             28.1     06-25    144  |  106  |  11  ----------------------------<  80  3.9   |  26  |  0.84    Ca    8.7      25 Jun 2021 06:39  Phos  4.1     06-25  Mg     1.7     06-25    TPro  5.7<L>  /  Alb  2.6<L>  /  TBili  0.2  /  DBili  x   /  AST  22  /  ALT  26  /  AlkPhos  132<H>  06-25          RADIOLOGY & ADDITIONAL TESTS:  Studies reviewed.

## 2021-06-25 NOTE — CHART NOTE - NSCHARTNOTEFT_GEN_A_CORE
Patient has a need for home oxygen  -Pt is diagnosed with acute hypoxic respiratory failure 2/2 COPD exacerbation  -She is still hypoxic and need continuos oxygen therapy, on 3L at rest and 5L in activity. All other treatments including duonebs, abx and steroids have been deployed and pt still need of oxygen. Pt is aware of her status.  -Resting SpO2 on RA is 90%  -Exercise SpO2 on RA is 67%. Patient has a need for home oxygen    -Pt is diagnosed with acute hypoxic respiratory failure 2/2 COPD exacerbation  -She is still hypoxic and will need continuous oxygen therapy at home, on 3L at rest and 5L in activity.   -Resting SpO2 on RA is 80%  -Exercise SpO2 on RA is 67%.  -All other treatments including Duonebs, antibiotics and steroids have been deployed and pt still need of oxygen.     Pt is aware of her status.

## 2021-06-25 NOTE — PROGRESS NOTE ADULT - ASSESSMENT
66 y/o F with PMHx HTN, COPD, non-small cell lung cancer on chemotherapy,GERD, chronic pancreatitis, hx of DVT/PE on Eliquis but recently held in s/o fall w/ sustained R orbital floor fx, admitted for acute hypoxemic respiratory failure, unclear etiology, likely multifactorial. Currently stable, now weaned to NC. Course also c/b pancytopenia w/ persistently dropping platelets. Plan for bone marrow biopsy 6/25.

## 2021-06-25 NOTE — PROGRESS NOTE ADULT - ATTENDING COMMENTS
Hospitalist- Joseph Callaway MD  Pager: [phone]  If no response or off-hours, page [phone]  -------------------------------------  - hypoxic respiratory failure- multifactorial, improved to where patient is on NC saturating in upper 90's. Continue lasix, steroid taper per pulm. Finished zosyn. Will set up home O2.  - lung ca- to resume immunotherapy as OP  - thrombocytopenia- modest improvement today, pt to get bm biopsy with heme, recommending awaiting prelim results.  dispo: possibly home with home PT tomorrow after bm bx prelim results and set up of home o2. Hospitalist- Joseph Callaway MD  Pager: [phone]  If no response or off-hours, page [phone]  -------------------------------------  - hypoxic respiratory failure- multifactorial, improved to where patient is on NC saturating in upper 90's. Continue lasix, steroid taper per pulm. Finished zosyn. Will set up home O2.  - lung ca- to resume immunotherapy as OP  - thrombocytopenia- modest improvement today, pt to get bm biopsy with heme, recommending awaiting prelim results.  dispo: possibly home with home PT tomorrow after bm bx prelim results and set up of home o2.  left message with PCP's office to discuss care- also set up f/u appointment with PCP for 6/30/21 at 9am.

## 2021-06-25 NOTE — PROGRESS NOTE ADULT - SUBJECTIVE AND OBJECTIVE BOX
Vascular Cardiology Progress Note    DIRECT SERVICE NUMBER:  [phone]           EMAIL [email or web address]   OFFICE [phone]    CC:  Fall, SOB     Interval Events:  No C/P. No current SOB.  MAC improved.  No LE pain.  Bone marrow biopsy today.    Allergies    diazepam (Other)  lemon (Other)    Intolerances    MEDICATIONS  (STANDING):  albuterol/ipratropium for Nebulization 3 milliLiter(s) Nebulizer every 6 hours  albuterol/ipratropium for Nebulization.. 3 milliLiter(s) Nebulizer every 20 minutes  amLODIPine   Tablet 10 milliGRAM(s) Oral daily  chlorhexidine 2% Cloths 1 Application(s) Topical <User Schedule>  cholecalciferol 1000 Unit(s) Oral daily  cyanocobalamin 1000 MICROGram(s) Oral daily  folic acid 1 milliGRAM(s) Oral daily  furosemide    Tablet 40 milliGRAM(s) Oral daily  mirtazapine 15 milliGRAM(s) Oral at bedtime  pancrelipase  (CREON 12,000 Lipase Units) 2 Capsule(s) Oral three times a day with meals  pantoprazole    Tablet 40 milliGRAM(s) Oral two times a day  predniSONE   Tablet 50 milliGRAM(s) Oral daily  trimethoprim  160 mG/sulfamethoxazole 800 mG 1 Tablet(s) Oral <User Schedule>    PAST MEDICAL & SURGICAL HISTORY:  HTN (hypertension)  GERD (gastroesophageal reflux disease)  Chronic pancreatitis  last episode 4/2019  ARDS survivor  2015  History of adrenal adenoma  stable on imaging  Vocal cord polyp  removal 2018, benign as per pt  Thrombophlebitis  superficial right UE during 4/2019 hospital stay, resolved as per pt  Chronic abdominal pain  Non-small cell lung cancer (NSCLC)  chemo: Alimta/ Keytruda 2/24/2021  Pulmonary embolism  COPD (chronic obstructive pulmonary disease)  Pulmonary hypertension  Anemia  transfusion 2/5/21  S/P arthroscopy of shoulder  left in 2009  S/P hip replacement  left - 2010  S/P arthroscopy of shoulder  right - 2014  S/P hip replacement, right  May 2016  History of vocal cord polypectomy  1/2018  S/P shoulder replacement, left  2016  History of pancreatic surgery  Jeffery procedure (5/8/2019)    FAMILY HISTORY: see HPI    SOCIAL HISTORY:  unchanged    REVIEW OF SYSTEMS:  CONSTITUTIONAL: No fever  EYES: No eye pain  ENT:  No throat pain  NECK: No pain  RESPIRATORY: MAC improved  CARDIOVASCULAR:  no CP   GASTROINTESTINAL: No abdominal pain  GENITOURINARY: No hematuria  NEUROLOGICAL: No memory loss  LYMPH Nodes: No enlarged glands  ENDOCRINE: No heat or cold intolerance  MUSCULOSKELETAL: No LE pain  PSYCHIATRIC: No depression, anxiety  HEME/LYMPH: No bleeding gums  ALLERGY AND IMMUNOLOGIC: No hives	    [ x] All others negative	    Vital Signs Last 24 Hrs  T(C): 37.2 (25 Jun 2021 11:34), Max: 37.2 (25 Jun 2021 11:34)  T(F): 98.9 (25 Jun 2021 11:34), Max: 98.9 (25 Jun 2021 11:34)  HR: 106 (25 Jun 2021 17:41) (95 - 106)  BP: 130/79 (25 Jun 2021 17:41) (102/65 - 144/78)  RR: 18 (25 Jun 2021 11:34) (18 - 18)  SpO2: 95% (25 Jun 2021 12:00) (93% - 100%)    Appearance:  NAD	  HEENT: NC/AT On NC  Cardiovascular:  Tachycardic, S1S2  Respiratory: Decreased Basilar BS	  Psychiatry:  AAO x 3  Gastrointestinal:  Soft, Non-tender, + BS	  Skin: No rashes, No ecchymoses, No cyanosis	  Neurologic:  no deficits  Extremities:  no edema    Labs:                     9.1    8.17  )-----------( 36       ( 25 Jun 2021 06:39 )             28.1     06-25    144  |  106  |  11  ----------------------------<  80  3.9   |  26  |  0.84    Ca    8.7      25 Jun 2021 06:39  Phos  4.1     06-25  Mg     1.7     06-25    TPro  5.7<L>  /  Alb  2.6<L>  /  TBili  0.2  /  DBili  x   /  AST  22  /  ALT  26  /  AlkPhos  132<H>  06-25      Assessment:  1. Acute Respiratory Failure  - COPD/ Infection  - on NC  2.  History of PE  - chronic on CT scan  - currently off anticoagulation   3. Elevated BNP  - on diuretics as outpatient  4.  Pancytopenia     Plan:  1. Continue Lasix 40mg PO daily .       Daily Weights. Strict I/Os.        Echo showed normal EF, no notable valve disease.  2. Pneumatic compression garments for DVT prophylaxis at all times in bed.  3. Continue Toprol  mg daily.      Continue to monitor HR.  4. Keep K > 4 and Mg > 2.  5. Appreciate Pulmonary and Hematology recommendations.     Thank you  KATIE Michel, MPAS  Vascular Cardiology Service    Please call with any questions:   DIRECT SERVICE NUMBER:  [phone]  Office [phone]  email:  [email or web address]

## 2021-06-25 NOTE — PROGRESS NOTE ADULT - ATTENDING COMMENTS
Pt seen with her friend at bedside. She continues on NC oxygen without any new symptoms. She has metastatic NSCLC with last treatment on 6/10/2021; given plts still have not recovered with current work up negative, she will undergo BM biopsy to investigate etiology of thrombocytopenia and future ability to receive further pemetrexed chemotherapy. If blood counts remain stable, she can be discharged and will have follow up with Dr Cage for management of metastatic NSCLC and follow up of BM biopsy results.

## 2021-06-25 NOTE — PROGRESS NOTE ADULT - ASSESSMENT
64 y/o F with PMHx HTN, COPD, non-small cell lung cancer on chemotherapy,GERD, chronic pancreatitis, hx of DVT/PE on Eliquis but recently held in s/o fall w/ sustained R orbital floor fx, admitted for acute shortness of breath and hypoxia. Oncology consulted given active malignancy on t/m.    # Non-small cell lung adenocarcinoma   - Diagnosed Feb 19, 2020   - Stage IIIC by imaging. Large volume of disease and hence, not started on RT  - Started carbo/Alimta/Pem given Q 3 weeks schedule  - Currently on maintenance alimta/keytruda (last t/m 6/10)   - CTA Chest 6/14: Bilateral symmetric groundglass opacities with interlobular septal thickening, consistent with pulmonary edema. Web at the right pulmonary artery along with pruning and stenosis of the left lower lobe pulmonary arteries, consistent with chronic thromboembolic disease.  - No plans for inpatient t/m; cont. outpatient f/up with Dr. Cage    # Acute hypoxic resp failure  - CT chest as above  - Discussed with pulm: differentials are pulm edema, chronic thromboembolic disease and splinting due to pain from chronic pancreatitis acting up  - Low suspicion for IRAE  - Management per pulm; resp status improving    # Acute thrombocytopenia  - , haptoglobin 110, coags WNL; fibrinogen 551, retic suggestive of hypoproliferation; not c/w DIC/TTP  - Reviewed labs: platelet count was normal until 6/15 (211). Dropped to 143 when checked 6 hours later. Has gradually trended down. Nadired at 10k.   - First heparin exposure 6/14:  PF4 neg  - Currently not on AC for chronic thromboembolic disease as platelets low  - Does have an appropriate response to transfusion however counts drop again  - P/S reviewed: Target cells seen; rare schistocytes/helmet cells (1/HPF); some tear drop cells and nucleated red cells suggestive of marrow stress, minimal platelet clumping (true thrombocytopenia), no dysplastic white cells  - ? multifactorial with compromised bone marrow response from prior chemotherapy and increased consumption. Already on steroids for resp issues  - Cont. to monitor with daily CBC       Myles Mccollum, PGY-4  Hematology-Oncology Fellow  [phone] ([place]) [phone] ([facility])  I can also be reached on [employer] Teams  Please page fellow on call after 5pm and on weekends 66 y/o F with PMHx HTN, COPD, non-small cell lung cancer on chemotherapy,GERD, chronic pancreatitis, hx of DVT/PE on Eliquis but recently held in s/o fall w/ sustained R orbital floor fx, admitted for acute shortness of breath and hypoxia. Oncology consulted given active malignancy on t/m.    # Non-small cell lung adenocarcinoma   - Diagnosed Feb 19, 2020   - Stage IIIC by imaging. Large volume of disease and hence, not started on RT  - Started carbo/Alimta/Pem given Q 3 weeks schedule  - Currently on maintenance alimta/keytruda (last t/m 6/10)   - CTA Chest 6/14: Bilateral symmetric groundglass opacities with interlobular septal thickening, consistent with pulmonary edema. Web at the right pulmonary artery along with pruning and stenosis of the left lower lobe pulmonary arteries, consistent with chronic thromboembolic disease.  - No plans for inpatient t/m; cont. outpatient f/up with Dr. Cage    # Acute hypoxic resp failure  - CT chest as above  - Discussed with pulm: differentials are pulm edema, chronic thromboembolic disease and splinting due to pain from chronic pancreatitis acting up  - Low suspicion for IRAE  - Management per pulm; resp status improving    # Acute thrombocytopenia  - , haptoglobin 110, coags WNL; fibrinogen 551, retic suggestive of hypoproliferation; not c/w DIC/TTP  - Reviewed labs: platelet count was normal until 6/15 (211). Dropped to 143 when checked 6 hours later. Has gradually trended down. Nadired at 10k.   - First heparin exposure 6/14:  PF4 neg  - Currently not on AC for chronic thromboembolic disease as platelets low  - Does have an appropriate response to transfusion however counts drop again  - P/S reviewed: Target cells seen; rare schistocytes/helmet cells (1/HPF); some tear drop cells and nucleated red cells suggestive of marrow stress, minimal platelet clumping (true thrombocytopenia), no dysplastic white cells  - ? multifactorial with compromised bone marrow response from prior chemotherapy and increased consumption. Already on steroids for resp issues  - BM Bx 6/25; results can be followed up as outpatient  - Cont. to monitor with daily CBC       Myles Mccollum, PGY-4  Hematology-Oncology Fellow  [phone] ([place]) [phone] ([facility])  I can also be reached on [employer] Teams  Please page fellow on call after 5pm and on weekends

## 2021-06-25 NOTE — PROGRESS NOTE ADULT - SUBJECTIVE AND OBJECTIVE BOX
Contact information:  Esthela Matute, MS4   Medicine Team 5  Pager: [phone]    HIMANSHU VILLEGAS, MRN-[id number]    SUBJECTIVE/OVERNIGHT EVENTS: Patient seen and examined at bedside. No acute events overnight. Patient maintained on 3L NC, saturating well. Patient feels well, breathing continues to improve and cough resolving. Anxious for bone marrow biopsy today.     OBJECTIVE:    Vitals:  Vital Signs Last 24 Hrs  T(C): 37 (25 Jun 2021 05:07), Max: 37 (25 Jun 2021 05:07)  T(F): 98.6 (25 Jun 2021 05:07), Max: 98.6 (25 Jun 2021 05:07)  HR: 102 (25 Jun 2021 05:08) (95 - 122)  BP: 144/78 (25 Jun 2021 05:07) (110/74 - 144/78)  RR: 18 (25 Jun 2021 05:07) (18 - 18)  SpO2: 93% (25 Jun 2021 05:08) (67% - 100%)    Exam:  GEN: Awake and alert, lying in bed on NC  HEENT: EOMI. MMM  NECK: Supple  CHEST: On 3L NC, O2 saturations in 90s at time of assessment. Non-labored breathing, speaking in full sentences. Bibasilar crackles. (+) port R chest  CV: RRR (+) S1/S2. No murmurs, rubs, or gallops.    GI: Soft, nontender, nondistended. +BS.  EXT: Warm and well perfused. SCDs in place. Trace pitting edema to mid-shin b/l. No calf tenderness. 2+ distal pulses b/l LEs  SKIN: Intact, no rashes or lesions.   NEURO: A&Ox3, grossly non-focal    I&Os:    24 Jun 2021 07:01  -  25 Jun 2021 07:00  --------------------------------------------------------  IN:    Oral Fluid: 1460 mL  Total IN: 1460 mL    OUT:    Voided (mL): 1600 mL  Total OUT: 1600 mL    Total NET: -140 mL      LABS:                        9.1    8.17  )-----------( 36       ( 25 Jun 2021 06:39 )             28.1     06-25    144  |  106  |  11  ----------------------------<  80  3.9   |  26  |  0.84    Ca    8.7      25 Jun 2021 06:39  Phos  4.1     06-25  Mg     1.7     06-25    TPro  5.7<L>  /  Alb  2.6<L>  /  TBili  0.2  /  DBili  x   /  AST  22  /  ALT  26  /  AlkPhos  132<H>  06-25      MEDS:  MEDICATIONS  (STANDING):  albuterol/ipratropium for Nebulization 3 milliLiter(s) Nebulizer every 6 hours  albuterol/ipratropium for Nebulization.. 3 milliLiter(s) Nebulizer every 20 minutes  amLODIPine   Tablet 10 milliGRAM(s) Oral daily  chlorhexidine 2% Cloths 1 Application(s) Topical <User Schedule>  cholecalciferol 1000 Unit(s) Oral daily  cyanocobalamin 1000 MICROGram(s) Oral daily  folic acid 1 milliGRAM(s) Oral daily  furosemide    Tablet 40 milliGRAM(s) Oral daily  metoprolol tartrate 50 milliGRAM(s) Oral two times a day  mirtazapine 15 milliGRAM(s) Oral at bedtime  pancrelipase  (CREON 12,000 Lipase Units) 2 Capsule(s) Oral three times a day with meals  pantoprazole    Tablet 40 milliGRAM(s) Oral two times a day  predniSONE   Tablet 50 milliGRAM(s) Oral daily  trimethoprim  160 mG/sulfamethoxazole 800 mG 1 Tablet(s) Oral <User Schedule>    MEDICATIONS  (PRN):  acetaminophen   Tablet .. 650 milliGRAM(s) Oral every 6 hours PRN Mild Pain (1 - 3), Moderate Pain (4 - 6)  aluminum hydroxide/magnesium hydroxide/simethicone Suspension 30 milliLiter(s) Oral every 4 hours PRN Dyspepsia  ondansetron    Tablet 4 milliGRAM(s) Oral four times a day PRN Nausea and/or Vomiting  oxyCODONE    IR 30 milliGRAM(s) Oral every 6 hours PRN Severe Pain (7 - 10)

## 2021-06-25 NOTE — PROGRESS NOTE ADULT - PROBLEM SELECTOR PLAN 3
Pt w/ elevated BNP on admission w/ CTA demonstrating b/l ground glass opacities possibly 2/2 pulmonary edema. Will r/o CHF.    Plan:  -Echo performed here w/ hyperdynamic LV systolic function, nml diastolic function  -Repeat CXR w/ worsening pulmonary edema  -C/w Lasix 40mg PO daily  -BNP downtrending

## 2021-06-25 NOTE — PROGRESS NOTE ADULT - PROBLEM SELECTOR PLAN 1
Pt admitted for dyspnea and hypoxia, now weaned to NC. Etiology still unclear, likely multifactorial. Differential includes COPD exacerbation, possibly in s/o underlying infection given b/l ground glass opacities on CT chest and elevated procalcitonin, although pt afebrile w/out leukocytosis. Also considered is new CHF given elevated BNP and that CT chest findings may represent pulmonary edema, although echo here w/ hyperdynamic LV systolic function and nml diastolic function. CT findings may also represent pneumonitis in s/o chemo/immunotherapy tx. Unlikely PE as CTA chest negative for acute thromboembolism.    Plan:  -Continuous pulse ox, On NC, wean as tolerated  -CTA chest w/ evidence of chronic thromboembolic disease but no acute PE, also w/ b/l symmetric ground-glass opacities  -Echo performed here w/ hyperdynamic LV systolic function, nml diastolic function  -RVP negative, COVID-19 negative x 2, procalcitonin elevated at 15 (6/15), Blood cx NGTD, sputum cx w/ nml respiratory eleanor, Fungitell negative  -Pulm consulted, recs appreciated  -S/p Zosyn (completed 6/22)  -C/w Lasix 40mg PO daily  -C/w Duoneb  -S/p IV solumedrol 1mg/kg BID, now weaning on Prednisone at 50mg, reduce by 10mg q5d  -TMP-SMX for PCP prophylaxis given prolonged steroid course  -VBG lactate elevated on admission, downtrended    #Need for home oxygen  -Pt is diagnosed with acute hypoxic respiratory failure 2/2 COPD exacerbation  -She is still hypoxic and need continuos oxygen therapy, on 3L at rest and 5L in activity. All other treatments including Duonebs, abx and steroids have been deployed and pt still need of oxygen. Pt is aware of her status.  -Resting SpO2 on RA is 90%  -Exercise SpO2 on RA is 67%

## 2021-06-26 LAB
ALBUMIN SERPL ELPH-MCNC: 2.9 G/DL — LOW (ref 3.3–5)
ALP SERPL-CCNC: 148 U/L — HIGH (ref 40–120)
ALT FLD-CCNC: 30 U/L — SIGNIFICANT CHANGE UP (ref 10–45)
ANION GAP SERPL CALC-SCNC: 13 MMOL/L — SIGNIFICANT CHANGE UP (ref 5–17)
AST SERPL-CCNC: 26 U/L — SIGNIFICANT CHANGE UP (ref 10–40)
BILIRUB SERPL-MCNC: 0.2 MG/DL — SIGNIFICANT CHANGE UP (ref 0.2–1.2)
BUN SERPL-MCNC: 9 MG/DL — SIGNIFICANT CHANGE UP (ref 7–23)
CALCIUM SERPL-MCNC: 8.4 MG/DL — SIGNIFICANT CHANGE UP (ref 8.4–10.5)
CHLORIDE SERPL-SCNC: 104 MMOL/L — SIGNIFICANT CHANGE UP (ref 96–108)
CO2 SERPL-SCNC: 24 MMOL/L — SIGNIFICANT CHANGE UP (ref 22–31)
CREAT SERPL-MCNC: 0.89 MG/DL — SIGNIFICANT CHANGE UP (ref 0.5–1.3)
GLUCOSE SERPL-MCNC: 79 MG/DL — SIGNIFICANT CHANGE UP (ref 70–99)
HCT VFR BLD CALC: 28.9 % — LOW (ref 34.5–45)
HGB BLD-MCNC: 9.1 G/DL — LOW (ref 11.5–15.5)
MAGNESIUM SERPL-MCNC: 1.6 MG/DL — SIGNIFICANT CHANGE UP (ref 1.6–2.6)
MCHC RBC-ENTMCNC: 30.8 PG — SIGNIFICANT CHANGE UP (ref 27–34)
MCHC RBC-ENTMCNC: 31.5 GM/DL — LOW (ref 32–36)
MCV RBC AUTO: 98 FL — SIGNIFICANT CHANGE UP (ref 80–100)
NRBC # BLD: 4 /100 WBCS — HIGH (ref 0–0)
PHOSPHATE SERPL-MCNC: 4.3 MG/DL — SIGNIFICANT CHANGE UP (ref 2.5–4.5)
PLATELET # BLD AUTO: 51 K/UL — LOW (ref 150–400)
POTASSIUM SERPL-MCNC: 3.8 MMOL/L — SIGNIFICANT CHANGE UP (ref 3.5–5.3)
POTASSIUM SERPL-SCNC: 3.8 MMOL/L — SIGNIFICANT CHANGE UP (ref 3.5–5.3)
PROT SERPL-MCNC: 5.9 G/DL — LOW (ref 6–8.3)
RBC # BLD: 2.95 M/UL — LOW (ref 3.8–5.2)
RBC # FLD: 18.3 % — HIGH (ref 10.3–14.5)
SODIUM SERPL-SCNC: 141 MMOL/L — SIGNIFICANT CHANGE UP (ref 135–145)
WBC # BLD: 7.79 K/UL — SIGNIFICANT CHANGE UP (ref 3.8–10.5)
WBC # FLD AUTO: 7.79 K/UL — SIGNIFICANT CHANGE UP (ref 3.8–10.5)

## 2021-06-26 PROCEDURE — 99232 SBSQ HOSP IP/OBS MODERATE 35: CPT

## 2021-06-26 RX ORDER — BUDESONIDE AND FORMOTEROL FUMARATE DIHYDRATE 160; 4.5 UG/1; UG/1
2 AEROSOL RESPIRATORY (INHALATION)
Refills: 0 | Status: DISCONTINUED | OUTPATIENT
Start: 2021-06-26 | End: 2021-06-28

## 2021-06-26 RX ADMIN — Medication 3 MILLILITER(S): at 05:07

## 2021-06-26 RX ADMIN — CHLORHEXIDINE GLUCONATE 1 APPLICATION(S): 213 SOLUTION TOPICAL at 05:06

## 2021-06-26 RX ADMIN — Medication 1000 UNIT(S): at 13:08

## 2021-06-26 RX ADMIN — Medication 3 MILLILITER(S): at 12:11

## 2021-06-26 RX ADMIN — Medication 2 CAPSULE(S): at 13:08

## 2021-06-26 RX ADMIN — PANTOPRAZOLE SODIUM 40 MILLIGRAM(S): 20 TABLET, DELAYED RELEASE ORAL at 17:35

## 2021-06-26 RX ADMIN — MIRTAZAPINE 15 MILLIGRAM(S): 45 TABLET, ORALLY DISINTEGRATING ORAL at 21:05

## 2021-06-26 RX ADMIN — OXYCODONE HYDROCHLORIDE 30 MILLIGRAM(S): 5 TABLET ORAL at 21:00

## 2021-06-26 RX ADMIN — PANTOPRAZOLE SODIUM 40 MILLIGRAM(S): 20 TABLET, DELAYED RELEASE ORAL at 05:05

## 2021-06-26 RX ADMIN — OXYCODONE HYDROCHLORIDE 30 MILLIGRAM(S): 5 TABLET ORAL at 05:50

## 2021-06-26 RX ADMIN — AMLODIPINE BESYLATE 10 MILLIGRAM(S): 2.5 TABLET ORAL at 05:05

## 2021-06-26 RX ADMIN — Medication 3 MILLILITER(S): at 23:44

## 2021-06-26 RX ADMIN — Medication 2 CAPSULE(S): at 17:34

## 2021-06-26 RX ADMIN — Medication 2 CAPSULE(S): at 08:21

## 2021-06-26 RX ADMIN — Medication 40 MILLIGRAM(S): at 05:05

## 2021-06-26 RX ADMIN — OXYCODONE HYDROCHLORIDE 30 MILLIGRAM(S): 5 TABLET ORAL at 13:08

## 2021-06-26 RX ADMIN — Medication 100 MILLIGRAM(S): at 05:10

## 2021-06-26 RX ADMIN — OXYCODONE HYDROCHLORIDE 30 MILLIGRAM(S): 5 TABLET ORAL at 05:10

## 2021-06-26 RX ADMIN — PREGABALIN 1000 MICROGRAM(S): 225 CAPSULE ORAL at 13:09

## 2021-06-26 RX ADMIN — Medication 50 MILLIGRAM(S): at 05:05

## 2021-06-26 RX ADMIN — Medication 3 MILLILITER(S): at 17:41

## 2021-06-26 RX ADMIN — OXYCODONE HYDROCHLORIDE 30 MILLIGRAM(S): 5 TABLET ORAL at 21:35

## 2021-06-26 RX ADMIN — BUDESONIDE AND FORMOTEROL FUMARATE DIHYDRATE 2 PUFF(S): 160; 4.5 AEROSOL RESPIRATORY (INHALATION) at 18:21

## 2021-06-26 RX ADMIN — OXYCODONE HYDROCHLORIDE 30 MILLIGRAM(S): 5 TABLET ORAL at 13:38

## 2021-06-26 RX ADMIN — Medication 1 MILLIGRAM(S): at 13:09

## 2021-06-26 NOTE — PROGRESS NOTE ADULT - PROBLEM SELECTOR PLAN 9
Pt w/ hx of chronic HTN, BPs stable here.     Plan:  -Continue w/ pt's home Amlodipine 10mg QD, metoprolol 100mg QD

## 2021-06-26 NOTE — PROGRESS NOTE ADULT - PROBLEM SELECTOR PLAN 10
Plan:  -VTE: Holding Eliquis as severe thrombocytopenic; SCDs  -Diet: DASH diet w/ fluid restriction to 1000mL  -Code status: Full code  -Dispo: Home w/ home PT, O2 to be delivered 6/28

## 2021-06-26 NOTE — PROGRESS NOTE ADULT - ATTENDING COMMENTS
Seen, examined the patient this afternoon    The patient is a 65 F with h/o HTN, COPD, non-small cell lung cancer on chemotherapy, GERD, chronic pancreatitis, hx of DVT/PE on Eliquis but recently held in s/o fall w/ sustained R orbital floor fx, admitted for acute hypoxemic respiratory failure, likely multifactorial- PNA, pulmonary edema in the setting of COPD.    - Overall better, still needs O2, completed IV Zosyn and has been on bronchodilators, inhaled steroid and prednisone taper    c/w Lasix 40mg/d, metoprolol ER 100mg/d, Amlodipine 10mg/d and O2 \    Appreciated Vascular cardiology and Pulmonary plans  - Heme did BM biopsy for Anemia/thrombocytopenia, awaiting on result->outpatient f/u  - d/c planning in progress with home O2 in 2 days

## 2021-06-26 NOTE — PROGRESS NOTE ADULT - ASSESSMENT
66 y/o F with PMHx HTN, COPD, non-small cell lung cancer on chemotherapy,GERD, chronic pancreatitis, hx of DVT/PE on Eliquis but recently held in s/o fall w/ sustained R orbital floor fx, admitted for acute hypoxemic respiratory failure, unclear etiology, likely multifactorial. Currently stable, now weaned to NC. Course also c/b pancytopenia w/ persistently dropping platelets s/p bone marrow biopsy 6/25.

## 2021-06-26 NOTE — PROGRESS NOTE ADULT - PROBLEM SELECTOR PLAN 7
Hx of stage III non-small cell lung cancer on chemotherapy (last tx w/ Keytruda/Alimta 6/10).    Plan:  -Heme/onc consulted, recs appreciated  -No intervention indicated as inpatient at this time Hx of stage III non-small cell lung cancer on chemotherapy (last tx w/ Keytruda/Alimta 6/10).    Plan:       -Heme/onc consulted, recs appreciated  -No intervention indicated as inpatient at this time

## 2021-06-26 NOTE — PROGRESS NOTE ADULT - PROBLEM SELECTOR PLAN 2
Pt w/ chronic anemia, baseline Hgb 8-9 per chart review. Elevated RDW, blood smear w/ macrocytosis. Now w/ new pancytopenia, possibly in s/o of immunotherapy. WBC count now improved. Hgb responsive to PRBC but platelet w/ persistent drops after platelet transfusion. Elevated LDH but nml bili, haptoglobin, fibrinogen; unlikely hemolysis, DIC. S/p bone marrow biopsy 6/25    Plan:  - Daily CBC  - Trend H+H, WBC, plt  - C/w home folic acid, B12  - Heme/onc consulted, recs appreciated  - S/p 2U PRBCs (6/16, 6/21)  - S/p 3U platelets (6/19, 6/21, 6/22)  - S/p bone marrow biopsy 6/25, f/u results  - Hold home Eliquis   - Transfuse PRBC for Hgb <7  - Transfuse plt for <50 w/ evidence of bleeding, otherwise <10 Pt w/ chronic anemia, baseline Hgb 8-9 per chart review. Elevated RDW, blood smear w/ macrocytosis. Now w/ new pancytopenia, possibly in s/o of immunotherapy. WBC count now improved. Hgb responsive to PRBC but platelet w/ persistent drops after platelet transfusion. Elevated LDH but nml bili, haptoglobin, fibrinogen; unlikely hemolysis, DIC. S/p bone marrow biopsy 6/25    Plan:      - Daily CBC  - Trend H+H, WBC, plt  - C/w home folic acid, B12  - Heme/onc consulted, recs appreciated  - S/p 2U PRBCs (6/16, 6/21)  - S/p 3U platelets (6/19, 6/21, 6/22)  - S/p bone marrow biopsy 6/25, f/u results  - Hold home Eliquis   - Transfuse PRBC for Hgb <7  - Transfuse plt for <50 w/ evidence of bleeding, otherwise <10

## 2021-06-26 NOTE — PROGRESS NOTE ADULT - PROBLEM SELECTOR PLAN 1
Pt admitted for dyspnea and hypoxia, now weaned to NC. Etiology still unclear, likely multifactorial. Differential includes COPD exacerbation, possibly in s/o underlying infection given b/l ground glass opacities on CT chest and elevated procalcitonin, although pt afebrile w/out leukocytosis. Also considered is new CHF given elevated BNP and that CT chest findings may represent pulmonary edema, although echo here w/ hyperdynamic LV systolic function and nml diastolic function. CT findings may also represent pneumonitis in s/o chemo/immunotherapy tx. Unlikely PE as CTA chest negative for acute thromboembolism.    Plan:  -Continuous pulse ox, on NC, wean as tolerated  -CTA chest w/ evidence of chronic thromboembolic disease but no acute PE, also w/ b/l symmetric ground-glass opacities  -Echo performed here w/ hyperdynamic LV systolic function, nml diastolic function  -RVP negative, COVID-19 negative x 2, procalcitonin elevated at 15 (6/15), Blood cx NGTD, sputum cx w/ nml respiratory eleanor, Fungitell negative  -Pulm consulted, recs appreciated  -S/p Zosyn (completed 6/22)  -C/w Lasix 40mg PO daily  -C/w Duoneb  -S/p IV solumedrol 1mg/kg BID, now weaning on Prednisone at 50mg, reduce by 10mg q5d  -TMP-SMX for PCP prophylaxis given prolonged steroid course  -VBG lactate elevated on admission, downtrended    #Need for home oxygen  -Pt is diagnosed with acute hypoxic respiratory failure 2/2 COPD exacerbation  -She is still hypoxic and need continuos oxygen therapy, on 3L at rest and 5L in activity. All other treatments including Duonebs, antibiotics and steroids have been deployed and pt still need of oxygen.   -Resting SpO2 on RA is 80%  -Exercise SpO2 on RA is 67%

## 2021-06-26 NOTE — PROGRESS NOTE ADULT - SUBJECTIVE AND OBJECTIVE BOX
Contact information:  Esthela Matute, MS4   Medicine Team 5  Pager: [phone]    HIMANSHU VILLEGAS, MRN-[id number]    SUBJECTIVE/OVERNIGHT EVENTS: Patient seen and examined at bedside. Pt is s/p bone marrow biopsy yesterday evening. Currently c/o back pain at site of biopsy. Otherwise pt has no acute complaints at this time. Still breathing comfortably, cough minimal.  OBJECTIVE:    Vitals:  Vital Signs Last 24 Hrs  T(C): 36.6 (26 Jun 2021 05:00), Max: 37.2 (25 Jun 2021 11:34)  T(F): 97.9 (26 Jun 2021 05:00), Max: 98.9 (25 Jun 2021 11:34)  HR: 105 (26 Jun 2021 05:00) (92 - 106)  BP: 138/80 (26 Jun 2021 05:00) (102/65 - 138/80)  RR: 18 (26 Jun 2021 05:00) (18 - 20)  SpO2: 92% (26 Jun 2021 05:00) (92% - 100%)    Exam:  GEN: Awake and alert, lying in bed on 3L NC  HEENT: EOMI. MMM  NECK: Supple  CHEST: On 3L NC, O2 saturations in 90s at time of assessment. Non-labored breathing, speaking in full sentences. Bibasilar crackles. (+) port R chest  CV: RRR (+) S1/S2. No murmurs, rubs, or gallops.    GI: Soft, nontender, nondistended. +BS.  EXT: Warm and well perfused. SCDs in place. 2+ distal pulses b/l LEs  SKIN: Intact, no rashes or lesions.   NEURO: A&Ox3, grossly non-focal    I&Os:    25 Jun 2021 07:01  -  26 Jun 2021 07:00  --------------------------------------------------------  IN:    Oral Fluid: 780 mL  Total IN: 780 mL    OUT:    Voided (mL): 1000 mL  Total OUT: 1000 mL    Total NET: -220 mL    LABS:                        9.1    7.79  )-----------( 51       ( 26 Jun 2021 07:02 )             28.9     06-26    141  |  104  |  9   ----------------------------<  79  3.8   |  24  |  0.89    Ca    8.4      26 Jun 2021 07:01  Phos  4.3     06-26  Mg     1.6     06-26    TPro  5.9<L>  /  Alb  2.9<L>  /  TBili  0.2  /  DBili  x   /  AST  26  /  ALT  30  /  AlkPhos  148<H>  06-26      MEDS:  MEDICATIONS  (STANDING):  albuterol/ipratropium for Nebulization 3 milliLiter(s) Nebulizer every 6 hours  albuterol/ipratropium for Nebulization.. 3 milliLiter(s) Nebulizer every 20 minutes  amLODIPine   Tablet 10 milliGRAM(s) Oral daily  chlorhexidine 2% Cloths 1 Application(s) Topical <User Schedule>  cholecalciferol 1000 Unit(s) Oral daily  cyanocobalamin 1000 MICROGram(s) Oral daily  folic acid 1 milliGRAM(s) Oral daily  furosemide    Tablet 40 milliGRAM(s) Oral daily  metoprolol succinate  milliGRAM(s) Oral daily  mirtazapine 15 milliGRAM(s) Oral at bedtime  pancrelipase  (CREON 12,000 Lipase Units) 2 Capsule(s) Oral three times a day with meals  pantoprazole    Tablet 40 milliGRAM(s) Oral two times a day  trimethoprim  160 mG/sulfamethoxazole 800 mG 1 Tablet(s) Oral <User Schedule>    MEDICATIONS  (PRN):  acetaminophen   Tablet .. 650 milliGRAM(s) Oral every 6 hours PRN Mild Pain (1 - 3), Moderate Pain (4 - 6)  aluminum hydroxide/magnesium hydroxide/simethicone Suspension 30 milliLiter(s) Oral every 4 hours PRN Dyspepsia  ondansetron    Tablet 4 milliGRAM(s) Oral four times a day PRN Nausea and/or Vomiting  oxyCODONE    IR 30 milliGRAM(s) Oral every 6 hours PRN Severe Pain (7 - 10)

## 2021-06-27 LAB
ALBUMIN SERPL ELPH-MCNC: 2.7 G/DL — LOW (ref 3.3–5)
ALP SERPL-CCNC: 137 U/L — HIGH (ref 40–120)
ALT FLD-CCNC: 25 U/L — SIGNIFICANT CHANGE UP (ref 10–45)
ANION GAP SERPL CALC-SCNC: 14 MMOL/L — SIGNIFICANT CHANGE UP (ref 5–17)
AST SERPL-CCNC: 22 U/L — SIGNIFICANT CHANGE UP (ref 10–40)
BILIRUB SERPL-MCNC: 0.2 MG/DL — SIGNIFICANT CHANGE UP (ref 0.2–1.2)
BUN SERPL-MCNC: 8 MG/DL — SIGNIFICANT CHANGE UP (ref 7–23)
CALCIUM SERPL-MCNC: 8.4 MG/DL — SIGNIFICANT CHANGE UP (ref 8.4–10.5)
CHLORIDE SERPL-SCNC: 105 MMOL/L — SIGNIFICANT CHANGE UP (ref 96–108)
CO2 SERPL-SCNC: 25 MMOL/L — SIGNIFICANT CHANGE UP (ref 22–31)
CREAT SERPL-MCNC: 0.86 MG/DL — SIGNIFICANT CHANGE UP (ref 0.5–1.3)
GLUCOSE SERPL-MCNC: 97 MG/DL — SIGNIFICANT CHANGE UP (ref 70–99)
HCT VFR BLD CALC: 28.5 % — LOW (ref 34.5–45)
HGB BLD-MCNC: 8.9 G/DL — LOW (ref 11.5–15.5)
MAGNESIUM SERPL-MCNC: 1.7 MG/DL — SIGNIFICANT CHANGE UP (ref 1.6–2.6)
MCHC RBC-ENTMCNC: 30.7 PG — SIGNIFICANT CHANGE UP (ref 27–34)
MCHC RBC-ENTMCNC: 31.2 GM/DL — LOW (ref 32–36)
MCV RBC AUTO: 98.3 FL — SIGNIFICANT CHANGE UP (ref 80–100)
NRBC # BLD: 3 /100 WBCS — HIGH (ref 0–0)
PHOSPHATE SERPL-MCNC: 3.8 MG/DL — SIGNIFICANT CHANGE UP (ref 2.5–4.5)
PLATELET # BLD AUTO: 70 K/UL — LOW (ref 150–400)
POTASSIUM SERPL-MCNC: 3.5 MMOL/L — SIGNIFICANT CHANGE UP (ref 3.5–5.3)
POTASSIUM SERPL-SCNC: 3.5 MMOL/L — SIGNIFICANT CHANGE UP (ref 3.5–5.3)
PROT SERPL-MCNC: 5.7 G/DL — LOW (ref 6–8.3)
RBC # BLD: 2.9 M/UL — LOW (ref 3.8–5.2)
RBC # FLD: 19.2 % — HIGH (ref 10.3–14.5)
SODIUM SERPL-SCNC: 144 MMOL/L — SIGNIFICANT CHANGE UP (ref 135–145)
WBC # BLD: 8.38 K/UL — SIGNIFICANT CHANGE UP (ref 3.8–10.5)
WBC # FLD AUTO: 8.38 K/UL — SIGNIFICANT CHANGE UP (ref 3.8–10.5)

## 2021-06-27 PROCEDURE — 99232 SBSQ HOSP IP/OBS MODERATE 35: CPT

## 2021-06-27 RX ORDER — BUDESONIDE AND FORMOTEROL FUMARATE DIHYDRATE 160; 4.5 UG/1; UG/1
1 AEROSOL RESPIRATORY (INHALATION)
Qty: 1 | Refills: 0
Start: 2021-06-27 | End: 2021-07-26

## 2021-06-27 RX ORDER — APIXABAN 2.5 MG/1
5 TABLET, FILM COATED ORAL EVERY 12 HOURS
Refills: 0 | Status: DISCONTINUED | OUTPATIENT
Start: 2021-06-27 | End: 2021-06-28

## 2021-06-27 RX ADMIN — Medication 100 MILLIGRAM(S): at 05:40

## 2021-06-27 RX ADMIN — AMLODIPINE BESYLATE 10 MILLIGRAM(S): 2.5 TABLET ORAL at 05:39

## 2021-06-27 RX ADMIN — Medication 3 MILLILITER(S): at 12:06

## 2021-06-27 RX ADMIN — APIXABAN 5 MILLIGRAM(S): 2.5 TABLET, FILM COATED ORAL at 17:58

## 2021-06-27 RX ADMIN — Medication 1000 UNIT(S): at 12:05

## 2021-06-27 RX ADMIN — Medication 1 MILLIGRAM(S): at 12:05

## 2021-06-27 RX ADMIN — BUDESONIDE AND FORMOTEROL FUMARATE DIHYDRATE 2 PUFF(S): 160; 4.5 AEROSOL RESPIRATORY (INHALATION) at 05:51

## 2021-06-27 RX ADMIN — CHLORHEXIDINE GLUCONATE 1 APPLICATION(S): 213 SOLUTION TOPICAL at 10:15

## 2021-06-27 RX ADMIN — OXYCODONE HYDROCHLORIDE 30 MILLIGRAM(S): 5 TABLET ORAL at 20:16

## 2021-06-27 RX ADMIN — Medication 40 MILLIGRAM(S): at 05:40

## 2021-06-27 RX ADMIN — Medication 3 MILLILITER(S): at 05:30

## 2021-06-27 RX ADMIN — OXYCODONE HYDROCHLORIDE 30 MILLIGRAM(S): 5 TABLET ORAL at 20:50

## 2021-06-27 RX ADMIN — Medication 2 CAPSULE(S): at 12:06

## 2021-06-27 RX ADMIN — Medication 3 MILLILITER(S): at 23:27

## 2021-06-27 RX ADMIN — OXYCODONE HYDROCHLORIDE 30 MILLIGRAM(S): 5 TABLET ORAL at 14:04

## 2021-06-27 RX ADMIN — MIRTAZAPINE 15 MILLIGRAM(S): 45 TABLET, ORALLY DISINTEGRATING ORAL at 21:22

## 2021-06-27 RX ADMIN — BUDESONIDE AND FORMOTEROL FUMARATE DIHYDRATE 2 PUFF(S): 160; 4.5 AEROSOL RESPIRATORY (INHALATION) at 17:24

## 2021-06-27 RX ADMIN — OXYCODONE HYDROCHLORIDE 30 MILLIGRAM(S): 5 TABLET ORAL at 05:54

## 2021-06-27 RX ADMIN — OXYCODONE HYDROCHLORIDE 30 MILLIGRAM(S): 5 TABLET ORAL at 06:31

## 2021-06-27 RX ADMIN — PREGABALIN 1000 MICROGRAM(S): 225 CAPSULE ORAL at 12:06

## 2021-06-27 RX ADMIN — PANTOPRAZOLE SODIUM 40 MILLIGRAM(S): 20 TABLET, DELAYED RELEASE ORAL at 05:41

## 2021-06-27 RX ADMIN — Medication 3 MILLILITER(S): at 17:24

## 2021-06-27 RX ADMIN — Medication 2 CAPSULE(S): at 10:13

## 2021-06-27 RX ADMIN — Medication 2 CAPSULE(S): at 17:57

## 2021-06-27 RX ADMIN — PANTOPRAZOLE SODIUM 40 MILLIGRAM(S): 20 TABLET, DELAYED RELEASE ORAL at 17:57

## 2021-06-27 RX ADMIN — OXYCODONE HYDROCHLORIDE 30 MILLIGRAM(S): 5 TABLET ORAL at 15:57

## 2021-06-27 NOTE — PROGRESS NOTE ADULT - SUBJECTIVE AND OBJECTIVE BOX
Phuc Hall MD    Internal Medicine Resident (PGY-2)  Pager: [phone] (NS) / [phone] (CR[facility]ENCIO)  Please see "resident assignment" column on [place] for coverage info  ___________________________________________________________________________________________________      HIMANSHU VILLEGAS 65y Female    Overnight events/subjective:     Vital Signs Last 24 Hrs  T(C): 36.9 (27 Jun 2021 05:03), Max: 36.9 (26 Jun 2021 22:15)  T(F): 98.4 (27 Jun 2021 05:03), Max: 98.5 (26 Jun 2021 22:15)  HR: 95 (27 Jun 2021 05:51) (94 - 106)  BP: 125/67 (27 Jun 2021 05:03) (109/66 - 125/67)  BP(mean): --  RR: 18 (27 Jun 2021 05:03) (18 - 18)  SpO2: 96% (27 Jun 2021 05:51) (95% - 99%)    PHYSICAL EXAM:      HOSPITAL MEDICATIONS:  MEDICATIONS  (STANDING):  albuterol/ipratropium for Nebulization 3 milliLiter(s) Nebulizer every 6 hours  albuterol/ipratropium for Nebulization.. 3 milliLiter(s) Nebulizer every 20 minutes  amLODIPine   Tablet 10 milliGRAM(s) Oral daily  budesonide 160 MICROgram(s)/formoterol 4.5 MICROgram(s) Inhaler 2 Puff(s) Inhalation two times a day  chlorhexidine 2% Cloths 1 Application(s) Topical <User Schedule>  cholecalciferol 1000 Unit(s) Oral daily  cyanocobalamin 1000 MICROGram(s) Oral daily  folic acid 1 milliGRAM(s) Oral daily  furosemide    Tablet 40 milliGRAM(s) Oral daily  metoprolol succinate  milliGRAM(s) Oral daily  mirtazapine 15 milliGRAM(s) Oral at bedtime  pancrelipase  (CREON 12,000 Lipase Units) 2 Capsule(s) Oral three times a day with meals  pantoprazole    Tablet 40 milliGRAM(s) Oral two times a day  predniSONE   Tablet 40 milliGRAM(s) Oral daily  trimethoprim  160 mG/sulfamethoxazole 800 mG 1 Tablet(s) Oral <User Schedule>    MEDICATIONS  (PRN):  acetaminophen   Tablet .. 650 milliGRAM(s) Oral every 6 hours PRN Mild Pain (1 - 3), Moderate Pain (4 - 6)  aluminum hydroxide/magnesium hydroxide/simethicone Suspension 30 milliLiter(s) Oral every 4 hours PRN Dyspepsia  ondansetron    Tablet 4 milliGRAM(s) Oral four times a day PRN Nausea and/or Vomiting  oxyCODONE    IR 30 milliGRAM(s) Oral every 6 hours PRN Severe Pain (7 - 10)      LABS:                        8.9    8.38  )-----------( 70       ( 27 Jun 2021 07:12 )             28.5     06-27    144  |  105  |  8   ----------------------------<  97  3.5   |  25  |  0.86    Ca    8.4      27 Jun 2021 07:03  Phos  3.8     06-27  Mg     1.7     06-27    TPro  5.7<L>  /  Alb  2.7<L>  /  TBili  0.2  /  DBili  x   /  AST  22  /  ALT  25  /  AlkPhos  137<H>  06-27           Phuc Hall MD    Internal Medicine Resident (PGY-2)  Pager: [phone] (NS) / [phone] (CR[facility]ENCIO)  Please see "resident assignment" column on [place] for coverage info  ___________________________________________________________________________________________________      HIMANSHU VILLEGAS 65y Female    Overnight events/subjective: No acute overnight events. Patient seen and examined at bedside. No complaints. Denies fever, chills, chest pain, shortness of breath, abdominal pain, nausea, vomiting, changes in bowel habits, or urinary symptoms.    Vital Signs Last 24 Hrs  T(C): 36.9 (27 Jun 2021 05:03), Max: 36.9 (26 Jun 2021 22:15)  T(F): 98.4 (27 Jun 2021 05:03), Max: 98.5 (26 Jun 2021 22:15)  HR: 95 (27 Jun 2021 05:51) (94 - 106)  BP: 125/67 (27 Jun 2021 05:03) (109/66 - 125/67)  BP(mean): --  RR: 18 (27 Jun 2021 05:03) (18 - 18)  SpO2: 96% (27 Jun 2021 05:51) (95% - 99%)    PHYSICAL EXAM:  GEN: Awake and alert, lying in bed on 3L NC  HEENT: EOMI. MMM  NECK: Supple  CHEST: On 3L NC, O2 saturations in 90s at time of assessment. Non-labored breathing, speaking in full sentences. Bibasilar crackles. (+) port R chest  CV: RRR (+) S1/S2. No murmurs, rubs, or gallops.    GI: Soft, nontender, nondistended. +BS.  EXT: Warm and well perfused. SCDs in place. 2+ distal pulses b/l LEs  SKIN: Intact, no rashes or lesions.   NEURO: A&Ox3, grossly non-focal    HOSPITAL MEDICATIONS:  MEDICATIONS  (STANDING):  albuterol/ipratropium for Nebulization 3 milliLiter(s) Nebulizer every 6 hours  albuterol/ipratropium for Nebulization.. 3 milliLiter(s) Nebulizer every 20 minutes  amLODIPine   Tablet 10 milliGRAM(s) Oral daily  budesonide 160 MICROgram(s)/formoterol 4.5 MICROgram(s) Inhaler 2 Puff(s) Inhalation two times a day  chlorhexidine 2% Cloths 1 Application(s) Topical <User Schedule>  cholecalciferol 1000 Unit(s) Oral daily  cyanocobalamin 1000 MICROGram(s) Oral daily  folic acid 1 milliGRAM(s) Oral daily  furosemide    Tablet 40 milliGRAM(s) Oral daily  metoprolol succinate  milliGRAM(s) Oral daily  mirtazapine 15 milliGRAM(s) Oral at bedtime  pancrelipase  (CREON 12,000 Lipase Units) 2 Capsule(s) Oral three times a day with meals  pantoprazole    Tablet 40 milliGRAM(s) Oral two times a day  predniSONE   Tablet 40 milliGRAM(s) Oral daily  trimethoprim  160 mG/sulfamethoxazole 800 mG 1 Tablet(s) Oral <User Schedule>    MEDICATIONS  (PRN):  acetaminophen   Tablet .. 650 milliGRAM(s) Oral every 6 hours PRN Mild Pain (1 - 3), Moderate Pain (4 - 6)  aluminum hydroxide/magnesium hydroxide/simethicone Suspension 30 milliLiter(s) Oral every 4 hours PRN Dyspepsia  ondansetron    Tablet 4 milliGRAM(s) Oral four times a day PRN Nausea and/or Vomiting  oxyCODONE    IR 30 milliGRAM(s) Oral every 6 hours PRN Severe Pain (7 - 10)      LABS:                        8.9    8.38  )-----------( 70       ( 27 Jun 2021 07:12 )             28.5     06-27    144  |  105  |  8   ----------------------------<  97  3.5   |  25  |  0.86    Ca    8.4      27 Jun 2021 07:03  Phos  3.8     06-27  Mg     1.7     06-27    TPro  5.7<L>  /  Alb  2.7<L>  /  TBili  0.2  /  DBili  x   /  AST  22  /  ALT  25  /  AlkPhos  137<H>  06-27

## 2021-06-27 NOTE — PROGRESS NOTE ADULT - PROBLEM SELECTOR PLAN 10
Plan:  -VTE: Eliquis  -Diet: DASH diet w/ fluid restriction to 1500mL  -Code status: Full code  -Dispo: Home w/ home PT, O2 to be delivered 6/28

## 2021-06-27 NOTE — PROGRESS NOTE ADULT - ATTENDING COMMENTS
Seen, examined the patient this afternoon    The patient is a 65 F with h/o HTN, COPD, non-small cell lung cancer on chemotherapy, GERD, chronic pancreatitis, hx of DVT/PE on Eliquis but recently held in s/o fall w/ sustained R orbital floor fx, admitted for acute hypoxemic respiratory failure, likely multifactorial- PNA, pulmonary edema in the setting of COPD.    - Overall better, still needs O2, completed IV Zosyn for multifactorial- PNA. Has been on bronchodilators, inhaled steroid and prednisone taper    c/w Lasix 40mg/d, metoprolol ER 100mg/d, Amlodipine 10mg/d and O2 \    Appreciated Vascular cardiology and Pulmonary plans  - Heme did BM biopsy for Anemia/thrombocytopenia, awaiting on result->outpatient f/u  - d/c planning in progress with home O2 tomorrow.

## 2021-06-27 NOTE — PROGRESS NOTE ADULT - ASSESSMENT
64 y/o F with PMHx HTN, COPD, non-small cell lung cancer on chemotherapy,GERD, chronic pancreatitis, hx of DVT/PE on Eliquis but recently held in s/o fall w/ sustained R orbital floor fx, admitted for acute hypoxemic respiratory failure, unclear etiology, likely multifactorial. Currently stable, now weaned to NC. Course also c/b pancytopenia w/ persistently dropping platelets s/p bone marrow biopsy 6/25.

## 2021-06-27 NOTE — PROGRESS NOTE ADULT - PROBLEM SELECTOR PLAN 6
Pt w/ hx of DVT/PE on Eliquis, currently being held in s/o fall w/ R orbital floor fx. Pt tachycardic, tachypneic on admission w/ possible EKG signs of heart strain but no evidence of acute PE on CTA chest.    Plan:  -CTA chest w/ evidence of chronic thromboembolic disease but no acute PE  -Eliquis restarted now plt >50

## 2021-06-27 NOTE — PROGRESS NOTE ADULT - PROBLEM SELECTOR PLAN 2
Pt w/ chronic anemia, baseline Hgb 8-9 per chart review. Elevated RDW, blood smear w/ macrocytosis. Now w/ new pancytopenia, possibly in s/o of immunotherapy. WBC count now improved. Hgb responsive to PRBC but platelet w/ persistent drops after platelet transfusion. Elevated LDH but nml bili, haptoglobin, fibrinogen; unlikely hemolysis, DIC. S/p bone marrow biopsy 6/25    Plan:      - Daily CBC  - Trend H+H, WBC, plt  - C/w home folic acid, B12  - Heme/onc consulted, now s/p BMB. F/u results  - Platelets now uptrending >50, eliquis restarted  - S/p 2U PRBCs (6/16, 6/21)  - S/p 3U platelets (6/19, 6/21, 6/22)  - S/p bone marrow biopsy 6/25, f/u results  - Hold home Eliquis   - Transfuse PRBC for Hgb <7  - Transfuse plt for <50 w/ evidence of bleeding, otherwise <10

## 2021-06-28 VITALS
RESPIRATION RATE: 22 BRPM | DIASTOLIC BLOOD PRESSURE: 86 MMHG | HEART RATE: 104 BPM | SYSTOLIC BLOOD PRESSURE: 121 MMHG | TEMPERATURE: 98 F | OXYGEN SATURATION: 91 %

## 2021-06-28 LAB
HCT VFR BLD CALC: 29.6 % — LOW (ref 34.5–45)
HGB BLD-MCNC: 9.4 G/DL — LOW (ref 11.5–15.5)
MCHC RBC-ENTMCNC: 31.1 PG — SIGNIFICANT CHANGE UP (ref 27–34)
MCHC RBC-ENTMCNC: 31.8 GM/DL — LOW (ref 32–36)
MCV RBC AUTO: 98 FL — SIGNIFICANT CHANGE UP (ref 80–100)
NRBC # BLD: 2 /100 WBCS — HIGH (ref 0–0)
PLATELET # BLD AUTO: 101 K/UL — LOW (ref 150–400)
RBC # BLD: 3.02 M/UL — LOW (ref 3.8–5.2)
RBC # FLD: 19.8 % — HIGH (ref 10.3–14.5)
WBC # BLD: 9.25 K/UL — SIGNIFICANT CHANGE UP (ref 3.8–10.5)
WBC # FLD AUTO: 9.25 K/UL — SIGNIFICANT CHANGE UP (ref 3.8–10.5)

## 2021-06-28 PROCEDURE — 87205 SMEAR GRAM STAIN: CPT

## 2021-06-28 PROCEDURE — 94640 AIRWAY INHALATION TREATMENT: CPT

## 2021-06-28 PROCEDURE — 83880 ASSAY OF NATRIURETIC PEPTIDE: CPT

## 2021-06-28 PROCEDURE — 0031A: CPT

## 2021-06-28 PROCEDURE — 85652 RBC SED RATE AUTOMATED: CPT

## 2021-06-28 PROCEDURE — 97110 THERAPEUTIC EXERCISES: CPT

## 2021-06-28 PROCEDURE — 85610 PROTHROMBIN TIME: CPT

## 2021-06-28 PROCEDURE — 82746 ASSAY OF FOLIC ACID SERUM: CPT

## 2021-06-28 PROCEDURE — 93306 TTE W/DOPPLER COMPLETE: CPT

## 2021-06-28 PROCEDURE — 84466 ASSAY OF TRANSFERRIN: CPT

## 2021-06-28 PROCEDURE — 85730 THROMBOPLASTIN TIME PARTIAL: CPT

## 2021-06-28 PROCEDURE — 97161 PT EVAL LOW COMPLEX 20 MIN: CPT

## 2021-06-28 PROCEDURE — 85014 HEMATOCRIT: CPT

## 2021-06-28 PROCEDURE — 87641 MR-STAPH DNA AMP PROBE: CPT

## 2021-06-28 PROCEDURE — 99239 HOSP IP/OBS DSCHRG MGMT >30: CPT | Mod: GC

## 2021-06-28 PROCEDURE — 99291 CRITICAL CARE FIRST HOUR: CPT | Mod: 25

## 2021-06-28 PROCEDURE — 97530 THERAPEUTIC ACTIVITIES: CPT

## 2021-06-28 PROCEDURE — P9037: CPT

## 2021-06-28 PROCEDURE — 83010 ASSAY OF HAPTOGLOBIN QUANT: CPT

## 2021-06-28 PROCEDURE — 80053 COMPREHEN METABOLIC PANEL: CPT

## 2021-06-28 PROCEDURE — 96375 TX/PRO/DX INJ NEW DRUG ADDON: CPT

## 2021-06-28 PROCEDURE — 85384 FIBRINOGEN ACTIVITY: CPT

## 2021-06-28 PROCEDURE — 80048 BASIC METABOLIC PNL TOTAL CA: CPT

## 2021-06-28 PROCEDURE — 86901 BLOOD TYPING SEROLOGIC RH(D): CPT

## 2021-06-28 PROCEDURE — 85379 FIBRIN DEGRADATION QUANT: CPT

## 2021-06-28 PROCEDURE — 86140 C-REACTIVE PROTEIN: CPT

## 2021-06-28 PROCEDURE — 83540 ASSAY OF IRON: CPT

## 2021-06-28 PROCEDURE — 84484 ASSAY OF TROPONIN QUANT: CPT

## 2021-06-28 PROCEDURE — 86769 SARS-COV-2 COVID-19 ANTIBODY: CPT

## 2021-06-28 PROCEDURE — 82803 BLOOD GASES ANY COMBINATION: CPT

## 2021-06-28 PROCEDURE — 88305 TISSUE EXAM BY PATHOLOGIST: CPT

## 2021-06-28 PROCEDURE — 88271 CYTOGENETICS DNA PROBE: CPT

## 2021-06-28 PROCEDURE — 97116 GAIT TRAINING THERAPY: CPT

## 2021-06-28 PROCEDURE — 87640 STAPH A DNA AMP PROBE: CPT

## 2021-06-28 PROCEDURE — 82607 VITAMIN B-12: CPT

## 2021-06-28 PROCEDURE — 88275 CYTOGENETICS 100-300: CPT

## 2021-06-28 PROCEDURE — U0003: CPT

## 2021-06-28 PROCEDURE — 86923 COMPATIBILITY TEST ELECTRIC: CPT

## 2021-06-28 PROCEDURE — 85027 COMPLETE CBC AUTOMATED: CPT

## 2021-06-28 PROCEDURE — 84100 ASSAY OF PHOSPHORUS: CPT

## 2021-06-28 PROCEDURE — U0005: CPT

## 2021-06-28 PROCEDURE — 85025 COMPLETE CBC W/AUTO DIFF WBC: CPT

## 2021-06-28 PROCEDURE — 84295 ASSAY OF SERUM SODIUM: CPT

## 2021-06-28 PROCEDURE — 99232 SBSQ HOSP IP/OBS MODERATE 35: CPT

## 2021-06-28 PROCEDURE — 88264 CHROMOSOME ANALYSIS 20-25: CPT

## 2021-06-28 PROCEDURE — 88237 TISSUE CULTURE BONE MARROW: CPT

## 2021-06-28 PROCEDURE — P9040: CPT

## 2021-06-28 PROCEDURE — 85097 BONE MARROW INTERPRETATION: CPT

## 2021-06-28 PROCEDURE — 84145 PROCALCITONIN (PCT): CPT

## 2021-06-28 PROCEDURE — 83690 ASSAY OF LIPASE: CPT

## 2021-06-28 PROCEDURE — 87040 BLOOD CULTURE FOR BACTERIA: CPT

## 2021-06-28 PROCEDURE — 71045 X-RAY EXAM CHEST 1 VIEW: CPT

## 2021-06-28 PROCEDURE — 88313 SPECIAL STAINS GROUP 2: CPT

## 2021-06-28 PROCEDURE — 83615 LACTATE (LD) (LDH) ENZYME: CPT

## 2021-06-28 PROCEDURE — 86850 RBC ANTIBODY SCREEN: CPT

## 2021-06-28 PROCEDURE — 96374 THER/PROPH/DIAG INJ IV PUSH: CPT

## 2021-06-28 PROCEDURE — 85018 HEMOGLOBIN: CPT

## 2021-06-28 PROCEDURE — 85045 AUTOMATED RETICULOCYTE COUNT: CPT

## 2021-06-28 PROCEDURE — P9045: CPT

## 2021-06-28 PROCEDURE — 87449 NOS EACH ORGANISM AG IA: CPT

## 2021-06-28 PROCEDURE — 87070 CULTURE OTHR SPECIMN AEROBIC: CPT

## 2021-06-28 PROCEDURE — 88360 TUMOR IMMUNOHISTOCHEM/MANUAL: CPT

## 2021-06-28 PROCEDURE — 83605 ASSAY OF LACTIC ACID: CPT

## 2021-06-28 PROCEDURE — 88184 FLOWCYTOMETRY/ TC 1 MARKER: CPT

## 2021-06-28 PROCEDURE — 82330 ASSAY OF CALCIUM: CPT

## 2021-06-28 PROCEDURE — 86900 BLOOD TYPING SEROLOGIC ABO: CPT

## 2021-06-28 PROCEDURE — 88185 FLOWCYTOMETRY/TC ADD-ON: CPT

## 2021-06-28 PROCEDURE — 82728 ASSAY OF FERRITIN: CPT

## 2021-06-28 PROCEDURE — 84132 ASSAY OF SERUM POTASSIUM: CPT

## 2021-06-28 PROCEDURE — 86713 LEGIONELLA ANTIBODY: CPT

## 2021-06-28 PROCEDURE — 36430 TRANSFUSION BLD/BLD COMPNT: CPT

## 2021-06-28 PROCEDURE — 88280 CHROMOSOME KARYOTYPE STUDY: CPT

## 2021-06-28 PROCEDURE — 86022 PLATELET ANTIBODIES: CPT

## 2021-06-28 PROCEDURE — 82435 ASSAY OF BLOOD CHLORIDE: CPT

## 2021-06-28 PROCEDURE — 82947 ASSAY GLUCOSE BLOOD QUANT: CPT

## 2021-06-28 PROCEDURE — 83735 ASSAY OF MAGNESIUM: CPT

## 2021-06-28 PROCEDURE — 0225U NFCT DS DNA&RNA 21 SARSCOV2: CPT

## 2021-06-28 PROCEDURE — 71275 CT ANGIOGRAPHY CHEST: CPT

## 2021-06-28 RX ORDER — JNJ-78436735 50000000000 [PFU]/.5ML
0.5 SUSPENSION INTRAMUSCULAR ONCE
Refills: 0 | Status: COMPLETED | OUTPATIENT
Start: 2021-06-28 | End: 2021-06-28

## 2021-06-28 RX ADMIN — Medication 3 MILLILITER(S): at 17:26

## 2021-06-28 RX ADMIN — AMLODIPINE BESYLATE 10 MILLIGRAM(S): 2.5 TABLET ORAL at 06:18

## 2021-06-28 RX ADMIN — Medication 2 CAPSULE(S): at 12:41

## 2021-06-28 RX ADMIN — CHLORHEXIDINE GLUCONATE 1 APPLICATION(S): 213 SOLUTION TOPICAL at 12:45

## 2021-06-28 RX ADMIN — APIXABAN 5 MILLIGRAM(S): 2.5 TABLET, FILM COATED ORAL at 17:27

## 2021-06-28 RX ADMIN — Medication 100 MILLIGRAM(S): at 06:19

## 2021-06-28 RX ADMIN — OXYCODONE HYDROCHLORIDE 30 MILLIGRAM(S): 5 TABLET ORAL at 08:17

## 2021-06-28 RX ADMIN — JNJ-78436735 0.5 MILLILITER(S): 50000000000 SUSPENSION INTRAMUSCULAR at 16:10

## 2021-06-28 RX ADMIN — OXYCODONE HYDROCHLORIDE 30 MILLIGRAM(S): 5 TABLET ORAL at 14:40

## 2021-06-28 RX ADMIN — PANTOPRAZOLE SODIUM 40 MILLIGRAM(S): 20 TABLET, DELAYED RELEASE ORAL at 17:27

## 2021-06-28 RX ADMIN — Medication 40 MILLIGRAM(S): at 06:19

## 2021-06-28 RX ADMIN — Medication 1 TABLET(S): at 07:47

## 2021-06-28 RX ADMIN — APIXABAN 5 MILLIGRAM(S): 2.5 TABLET, FILM COATED ORAL at 06:19

## 2021-06-28 RX ADMIN — OXYCODONE HYDROCHLORIDE 30 MILLIGRAM(S): 5 TABLET ORAL at 07:47

## 2021-06-28 RX ADMIN — BUDESONIDE AND FORMOTEROL FUMARATE DIHYDRATE 2 PUFF(S): 160; 4.5 AEROSOL RESPIRATORY (INHALATION) at 05:44

## 2021-06-28 RX ADMIN — Medication 1000 UNIT(S): at 12:41

## 2021-06-28 RX ADMIN — BUDESONIDE AND FORMOTEROL FUMARATE DIHYDRATE 2 PUFF(S): 160; 4.5 AEROSOL RESPIRATORY (INHALATION) at 17:26

## 2021-06-28 RX ADMIN — Medication 3 MILLILITER(S): at 05:40

## 2021-06-28 RX ADMIN — Medication 3 MILLILITER(S): at 11:00

## 2021-06-28 RX ADMIN — Medication 2 CAPSULE(S): at 17:27

## 2021-06-28 RX ADMIN — Medication 2 CAPSULE(S): at 07:47

## 2021-06-28 RX ADMIN — PREGABALIN 1000 MICROGRAM(S): 225 CAPSULE ORAL at 12:42

## 2021-06-28 RX ADMIN — Medication 1 MILLIGRAM(S): at 12:41

## 2021-06-28 RX ADMIN — PANTOPRAZOLE SODIUM 40 MILLIGRAM(S): 20 TABLET, DELAYED RELEASE ORAL at 06:18

## 2021-06-28 NOTE — PROGRESS NOTE ADULT - PROBLEM SELECTOR PLAN 3
Pt w/ elevated BNP on admission w/ CTA demonstrating b/l ground glass opacities possibly 2/2 pulmonary edema. Will r/o CHF.    Plan:  -Echo performed here w/ hyperdynamic LV systolic function, nml diastolic function  -Repeat CXR w/ worsening pulmonary edema  -C/w Lasix 40mg PO daily  -BNP downtrending Pt w/ elevated BNP on admission w/ CTA demonstrating b/l ground glass opacities possibly 2/2 pulmonary edema.     Plan:  -Echo performed here w/ hyperdynamic LV systolic function, nml diastolic function  -BNP downtrended  -C/w Lasix 40mg PO daily

## 2021-06-28 NOTE — PROGRESS NOTE ADULT - ATTENDING COMMENTS
Doing well  Continue lasix and eliquis as prescribed  outpatient visit in 4-6 weeks    Armond  [phone]

## 2021-06-28 NOTE — PROGRESS NOTE ADULT - PROBLEM SELECTOR PROBLEM 8
Electrocardiogram abnormal
Hypertension
Electrocardiogram abnormal

## 2021-06-28 NOTE — PROGRESS NOTE ADULT - PROBLEM SELECTOR PROBLEM 9
Hypertension
Prophylactic measure
Hypertension

## 2021-06-28 NOTE — PROGRESS NOTE ADULT - ATTENDING SUPERVISION STATEMENT
ACP
Fellow
Resident
Fellow
Resident

## 2021-06-28 NOTE — PROGRESS NOTE ADULT - PROBLEM SELECTOR PLAN 2
Pt w/ chronic anemia, baseline Hgb 8-9 per chart review. Elevated RDW, blood smear w/ macrocytosis. Now w/ new pancytopenia, possibly in s/o of immunotherapy. WBC count now improved. Hgb responsive to PRBC but platelet w/ persistent drops after platelet transfusion. Elevated LDH but nml bili, haptoglobin, fibrinogen; unlikely hemolysis, DIC. S/p bone marrow biopsy 6/25    Plan:      - Daily CBC  - Trend H+H, WBC, plt  - C/w home folic acid, B12  - Heme/onc consulted, now s/p BMB. F/u results  - Platelets now uptrending >50, eliquis restarted  - S/p 2U PRBCs (6/16, 6/21)  - S/p 3U platelets (6/19, 6/21, 6/22)  - S/p bone marrow biopsy 6/25, f/u results  - Hold home Eliquis   - Transfuse PRBC for Hgb <7  - Transfuse plt for <50 w/ evidence of bleeding, otherwise <10 Pt w/ chronic anemia, baseline Hgb 8-9 per chart review. Elevated RDW, blood smear w/ macrocytosis. Now w/ new pancytopenia, possibly in s/o of immunotherapy. WBC count now improved. Hgb responsive to PRBC but platelet w/ persistent drops after platelet transfusion. Elevated LDH but nml bili, haptoglobin, fibrinogen; unlikely hemolysis, DIC. S/p bone marrow biopsy 6/25    Plan:      - Daily CBC  - Trend H+H, WBC, plt  - C/w home folic acid, B12  - Heme/onc consulted, now s/p BMB. F/u results  - Platelets now uptrending >50, Eliquis restarted  - S/p 2U PRBCs (6/16, 6/21)  - S/p 3U platelets (6/19, 6/21, 6/22)  - S/p bone marrow biopsy 6/25, f/u results as outpatient   - Transfuse PRBC for Hgb <7  - Transfuse Plt for <50 w/ evidence of bleeding, otherwise <10

## 2021-06-28 NOTE — PROGRESS NOTE ADULT - PROBLEM SELECTOR PLAN 8
EKG today sinus tachycardia with TWI in V1-V4, changed from prior ECG in 2020 w/ TWI in V1 and V2. Pt w/out chest pain. Lower suspicion for acute ischemic event as etiology for pt's respiratory distress.     Plan:  -Troponin x 2 negative  -TTE as above
EKG today sinus tachycardia with TWI in V1-V4, changed from prior ECG in 2020 w/ TWI in V1 and V2. Pt w/out chest pain. Lower suspicion for acute ischemic event as etiology for pt's respiratory distress.     Plan:  -Troponin x 2 negative  -F/u TTE as above
EKG today sinus tachycardia with TWI in V1-V4, changed from prior ECG in 2020 w/ TWI in V1 and V2. Pt w/out chest pain. Lower suspicion for acute ischemic event as etiology for pt's respiratory distress.     Plan:  -Troponin x 2 negative  -F/u TTE as above
Pt w/ hx of chronic HTN, BPs stable here.     Plan:  -Continue w/ pt's home Amlodipine 10 mg  -Hold home Metroprolol in s/o possible COPD exacerbation
EKG today sinus tachycardia with TWI in V1-V4, changed from prior ECG in 2020 w/ TWI in V1 and V2. Pt w/out chest pain. Lower suspicion for acute ischemic event as etiology for pt's respiratory distress.     Plan:  -Troponin x 2 negative  -TTE as above

## 2021-06-28 NOTE — PROGRESS NOTE ADULT - SUBJECTIVE AND OBJECTIVE BOX
Sweta Pinto MD  PGY 1 Department of Internal Medicine  Pager: [phone] NS, [phone] [facility]      Patient is a 65y old  Female who presents with a chief complaint of Shortness of breath, hypoxia (27 Jun 2021 10:33)      SUBJECTIVE / OVERNIGHT EVENTS: Pt seen and examined. No acute overnight events.   Denies fevers, chills, CP/palpitations, SOB/cough, abdominal pain, N/V, constipation, or diarrhea.        MEDICATIONS  (STANDING):  albuterol/ipratropium for Nebulization 3 milliLiter(s) Nebulizer every 6 hours  albuterol/ipratropium for Nebulization.. 3 milliLiter(s) Nebulizer every 20 minutes  amLODIPine   Tablet 10 milliGRAM(s) Oral daily  apixaban 5 milliGRAM(s) Oral every 12 hours  budesonide 160 MICROgram(s)/formoterol 4.5 MICROgram(s) Inhaler 2 Puff(s) Inhalation two times a day  chlorhexidine 2% Cloths 1 Application(s) Topical <User Schedule>  cholecalciferol 1000 Unit(s) Oral daily  cyanocobalamin 1000 MICROGram(s) Oral daily  folic acid 1 milliGRAM(s) Oral daily  furosemide    Tablet 40 milliGRAM(s) Oral daily  metoprolol succinate  milliGRAM(s) Oral daily  mirtazapine 15 milliGRAM(s) Oral at bedtime  pancrelipase  (CREON 12,000 Lipase Units) 2 Capsule(s) Oral three times a day with meals  pantoprazole    Tablet 40 milliGRAM(s) Oral two times a day  predniSONE   Tablet 40 milliGRAM(s) Oral daily  trimethoprim  160 mG/sulfamethoxazole 800 mG 1 Tablet(s) Oral <User Schedule>    MEDICATIONS  (PRN):  acetaminophen   Tablet .. 650 milliGRAM(s) Oral every 6 hours PRN Mild Pain (1 - 3), Moderate Pain (4 - 6)  aluminum hydroxide/magnesium hydroxide/simethicone Suspension 30 milliLiter(s) Oral every 4 hours PRN Dyspepsia  ondansetron    Tablet 4 milliGRAM(s) Oral four times a day PRN Nausea and/or Vomiting  oxyCODONE    IR 30 milliGRAM(s) Oral every 6 hours PRN Severe Pain (7 - 10)      I&O's Summary    27 Jun 2021 07:01  -  28 Jun 2021 07:00  --------------------------------------------------------  IN: 240 mL / OUT: 100 mL / NET: 140 mL        Vital Signs Last 24 Hrs  T(C): 36.8 (28 Jun 2021 04:49), Max: 37.1 (27 Jun 2021 20:37)  T(F): 98.2 (28 Jun 2021 04:49), Max: 98.7 (27 Jun 2021 20:37)  HR: 90 (28 Jun 2021 05:54) (89 - 121)  BP: 115/71 (28 Jun 2021 04:49) (111/67 - 126/74)  BP(mean): --  RR: 18 (28 Jun 2021 04:49) (18 - 18)  SpO2: 93% (28 Jun 2021 05:54) (93% - 99%)    CAPILLARY BLOOD GLUCOSE          PHYSICAL EXAM:  GENERAL: NAD,   HEAD:  Atraumatic, Normocephalic  EYES: EOMI, PERRL, conjunctiva and sclera clear  NECK: No JVD  CHEST/LUNG: Clear to auscultation bilaterally; No wheeze  HEART: Regular rate and rhythm; No murmurs, rubs, or gallops  ABDOMEN: Soft, Nontender, Nondistended; Bowel sounds present  EXTREMITIES:  2+ Peripheral Pulses, No clubbing, cyanosis, or edema  PSYCH: AAOx3  NEUROLOGY: non-focal  SKIN: No rashes or lesions       LABS:                        8.9    8.38  )-----------( 70       ( 27 Jun 2021 07:12 )             28.5     Auto Eosinophil # x     / Auto Eosinophil % x     / Auto Neutrophil # x     / Auto Neutrophil % x     / BANDS % x        06-27    144  |  105  |  8   ----------------------------<  97  3.5   |  25  |  0.86    Ca    8.4      27 Jun 2021 07:03  Mg     1.7     06-27  Phos  3.8     06-27  TPro  5.7<L>  /  Alb  2.7<L>  /  TBili  0.2  /  DBili  x   /  AST  22  /  ALT  25  /  AlkPhos  137<H>  06-27           Sweta Pinto MD  PGY 1 Department of Internal Medicine  Pager: [phone] [place], [address] [facility]    Patient is a 65y old female who presents with a chief complaint of Shortness of breath, hypoxia (27 Jun 2021 10:33)    SUBJECTIVE/OVERNIGHT EVENTS: Patient seen and examined at bedside. No acute events overnight. Breathing feels comfortable on current O2 regimen, no shortness of breath at present. Cough nearly resolved. Patient has no acute complaints at this time, anticipating discharge. Denies fever, chills, chest pain, shortness of breath, abdominal pain, nausea, vomiting.    OBJECTIVE:    Vitals:  Vital Signs Last 24 Hrs  T(C): 36.8 (28 Jun 2021 04:49), Max: 37.1 (27 Jun 2021 20:37)  T(F): 98.2 (28 Jun 2021 04:49), Max: 98.7 (27 Jun 2021 20:37)  HR: 90 (28 Jun 2021 05:54) (89 - 121)  BP: 115/71 (28 Jun 2021 04:49) (111/67 - 126/74)  RR: 18 (28 Jun 2021 04:49) (18 - 18)  SpO2: 93% (28 Jun 2021 05:54) (93% - 99%)    PHYSICAL EXAM:  GEN: Awake and alert, lying in bed on 3L NC  HEENT: EOMI. MMM  NECK: Supple  CHEST: On 3L NC, O2 saturations in 90s at time of assessment. Non-labored breathing, speaking in full sentences. Mild bibasilar crackles. (+) port R chest  CV: RRR (+) S1/S2. No murmurs, rubs, or gallops.    GI: Soft, nontender, nondistended. +BS.  EXT: Warm and well perfused. SCDs in place. 2+ distal pulses b/l LEs  SKIN: Intact, no rashes or lesions.   NEURO: A&Ox3, grossly non-focal    I&Os:    27 Jun 2021 07:01  -  28 Jun 2021 07:00  --------------------------------------------------------  IN:    Oral Fluid: 240 mL  Total IN: 240 mL    OUT:    Voided (mL): 100 mL  Total OUT: 100 mL    Total NET: 140 mL      LABS:                        9.4    9.25  )-----------( 101      ( 28 Jun 2021 07:04 )             29.6     06-27    144  |  105  |  8   ----------------------------<  97  3.5   |  25  |  0.86    Ca    8.4      27 Jun 2021 07:03  Phos  3.8     06-27  Mg     1.7     06-27    TPro  5.7<L>  /  Alb  2.7<L>  /  TBili  0.2  /  DBili  x   /  AST  22  /  ALT  25  /  AlkPhos  137<H>  06-27      MEDS:  MEDICATIONS  (STANDING):  albuterol/ipratropium for Nebulization 3 milliLiter(s) Nebulizer every 6 hours  albuterol/ipratropium for Nebulization.. 3 milliLiter(s) Nebulizer every 20 minutes  amLODIPine   Tablet 10 milliGRAM(s) Oral daily  apixaban 5 milliGRAM(s) Oral every 12 hours  budesonide 160 MICROgram(s)/formoterol 4.5 MICROgram(s) Inhaler 2 Puff(s) Inhalation two times a day  chlorhexidine 2% Cloths 1 Application(s) Topical <User Schedule>  cholecalciferol 1000 Unit(s) Oral daily  cyanocobalamin 1000 MICROGram(s) Oral daily  folic acid 1 milliGRAM(s) Oral daily  furosemide    Tablet 40 milliGRAM(s) Oral daily  metoprolol succinate  milliGRAM(s) Oral daily  mirtazapine 15 milliGRAM(s) Oral at bedtime  pancrelipase  (CREON 12,000 Lipase Units) 2 Capsule(s) Oral three times a day with meals  pantoprazole    Tablet 40 milliGRAM(s) Oral two times a day  predniSONE   Tablet 40 milliGRAM(s) Oral daily  trimethoprim  160 mG/sulfamethoxazole 800 mG 1 Tablet(s) Oral <User Schedule>    MEDICATIONS  (PRN):  acetaminophen   Tablet .. 650 milliGRAM(s) Oral every 6 hours PRN Mild Pain (1 - 3), Moderate Pain (4 - 6)  aluminum hydroxide/magnesium hydroxide/simethicone Suspension 30 milliLiter(s) Oral every 4 hours PRN Dyspepsia  ondansetron    Tablet 4 milliGRAM(s) Oral four times a day PRN Nausea and/or Vomiting  oxyCODONE    IR 30 milliGRAM(s) Oral every 6 hours PRN Severe Pain (7 - 10)

## 2021-06-28 NOTE — PROGRESS NOTE ADULT - ATTENDING COMMENTS
Seen, examined the patient this afternoon    The patient is a 65 F with h/o HTN, COPD, non-small cell lung cancer on chemotherapy, GERD, chronic pancreatitis, hx of DVT/PE on Eliquis but recently held in s/o fall w/ sustained R orbital floor fx, admitted for acute hypoxemic respiratory failure, likely multifactorial- PNA, pulmonary edema in the setting of COPD.    - Feels ok, afebrile, no chest pain. Overall better, needs O2, completed IV Zosyn for multifactorial- PNA. Has been on bronchodilators, inhaled steroid   and prednisone taper    c/w Lasix 40mg/d, metoprolol ER 100mg/d, Amlodipine 10mg/d and O2     Appreciated Vascular cardiology and Pulmonary plans  - Heme did BM biopsy for Anemia/thrombocytopenia, awaiting on result->outpatient f/u  - d/c planning today with home O2, Outpatient Pulmonary and cardiology f/u   - d/c time 43 min

## 2021-06-28 NOTE — PROGRESS NOTE ADULT - REASON FOR ADMISSION
Shortness of breath, hypoxia

## 2021-06-28 NOTE — PROGRESS NOTE ADULT - PROBLEM SELECTOR PROBLEM 1
Acute hypoxemic respiratory failure

## 2021-06-28 NOTE — PROGRESS NOTE ADULT - NSICDXPILOT_GEN_ALL_CORE
[place]

## 2021-06-28 NOTE — PROGRESS NOTE ADULT - SUBJECTIVE AND OBJECTIVE BOX
Vascular Cardiology  Progress note  PHONE LINE: [phone] EMAIL [email or web address]       CC: Admitted with low oxygen levels and shortness of breath     INTERVAL HISTORY: The patient was re-started on eliquis on 6/27 has received 2 doses with no negative effect on hemoglobin or platelets which were noted to be very low in the setting of pancytopenia possibly from immunotherapy with Keytruda. Patient has bone marrow biopsy pending.     SUBJECTIVE:   No overnight issues.   Feels very well physically.   Reports no active bleeding and denies hematuria, melena, hematemesis or hematochezia.   Reports no shortness of breath, palpitations or chest pain.       Allergies    diazepam (Other)  lemon (Other)    Intolerances    	    MEDICATIONS:  amLODIPine   Tablet 10 milliGRAM(s) Oral daily  apixaban 5 milliGRAM(s) Oral every 12 hours  furosemide    Tablet 40 milliGRAM(s) Oral daily  metoprolol succinate  milliGRAM(s) Oral daily    trimethoprim  160 mG/sulfamethoxazole 800 mG 1 Tablet(s) Oral <User Schedule>    albuterol/ipratropium for Nebulization 3 milliLiter(s) Nebulizer every 6 hours  albuterol/ipratropium for Nebulization.. 3 milliLiter(s) Nebulizer every 20 minutes  budesonide 160 MICROgram(s)/formoterol 4.5 MICROgram(s) Inhaler 2 Puff(s) Inhalation two times a day    acetaminophen   Tablet .. 650 milliGRAM(s) Oral every 6 hours PRN  mirtazapine 15 milliGRAM(s) Oral at bedtime  ondansetron    Tablet 4 milliGRAM(s) Oral four times a day PRN  oxyCODONE    IR 30 milliGRAM(s) Oral every 6 hours PRN    aluminum hydroxide/magnesium hydroxide/simethicone Suspension 30 milliLiter(s) Oral every 4 hours PRN  pancrelipase  (CREON 12,000 Lipase Units) 2 Capsule(s) Oral three times a day with meals  pantoprazole    Tablet 40 milliGRAM(s) Oral two times a day    predniSONE   Tablet 40 milliGRAM(s) Oral daily    chlorhexidine 2% Cloths 1 Application(s) Topical <User Schedule>  cholecalciferol 1000 Unit(s) Oral daily  cyanocobalamin 1000 MICROGram(s) Oral daily  folic acid 1 milliGRAM(s) Oral daily      PAST MEDICAL & SURGICAL HISTORY:  HTN (hypertension)    GERD (gastroesophageal reflux disease)    Chronic pancreatitis  last episode 4/2019    ARDS survivor  2015    History of adrenal adenoma  stable on imaging    Vocal cord polyp  removal 2018, benign as per pt    Thrombophlebitis  superficial right UE during 4/2019 hospital stay, resolved as per pt    Chronic abdominal pain    Non-small cell lung cancer (NSCLC)  chemo: Alimta/ Keytruda 2/24/2021    Pulmonary embolism    COPD (chronic obstructive pulmonary disease)    Pulmonary hypertension    Anemia  transfusion 2/5/21    S/P arthroscopy of shoulder  left in 2009    S/P hip replacement  left - 2010    S/P arthroscopy of shoulder  right - 2014    S/P hip replacement, right  May 2016    History of vocal cord polypectomy  1/2018    S/P shoulder replacement, left  2016    History of pancreatic surgery  Jeffery procedure (5/8/2019)        FAMILY HISTORY:      SOCIAL HISTORY:  unchanged    REVIEW OF SYSTEMS:  CONSTITUTIONAL: No fever, weight loss, or fatigue  EYES: No eye pain or visual changes   RESPIRATORY: No cough, wheezing, chills or hemoptysis; No Shortness of Breath  CARDIOVASCULAR: No chest pain, palpitations, dizziness, or leg swelling  GASTROINTESTINAL: No abdominal pain. No nausea, vomiting, or hematemesis; No diarrhea or constipation. No melena or hematochezia.  GENITOURINARY: No dysuria, frequency, hematuria, or incontinence  NEUROLOGICAL: No headaches or loss of strength   ENDOCRINE: No heat intolerance   MUSCULOSKELETAL: No joint pain or swelling; No muscle, back, or extremity pain  PSYCHIATRIC: No depression, anxiety, mood swings, or difficulty sleeping  HEME/LYMPH: No easy bruising, or bleeding gums    [ x] All others negative	  [ ] Unable to obtain    PHYSICAL EXAM:  T(C): 36.8 (06-28-21 @ 04:49), Max: 37.1 (06-27-21 @ 20:37)  HR: 98 (06-28-21 @ 11:00) (89 - 121)  BP: 115/71 (06-28-21 @ 04:49) (111/67 - 126/74)  RR: 18 (06-28-21 @ 04:49) (18 - 18)  SpO2: 92% (06-28-21 @ 11:00) (92% - 99%)  Wt(kg): --  I&O's Summary    27 Jun 2021 07:01  -  28 Jun 2021 07:00  --------------------------------------------------------  IN: 240 mL / OUT: 100 mL / NET: 140 mL        Appearance: Normal	  HEENT:   Normal oral mucosa, PERRL, EOMI	  Carotid:  Right: No bruit    Left:  No bruit  Lymphatic: No lymphadenopathy  Cardiovascular: Normal S1 S2, No JVD, No murmurs, No edema, Tachycardic   Respiratory: Soft left middle posterior crackle, CTA otherwise in all other lung fields   Psychiatry: A & O x 3, Mood & affect appropriate  Gastrointestinal:  Soft, Non-tender, + BS	  Skin: No rashes, No ecchymoses, No cyanosis	  Neurologic: Non-focal  Extremities: Normal range of motion, No clubbing, cyanosis.  Vascular:   Right Femoral:  Palpable   Left Femoral:  Palpable  Right Popliteal: Palpable   Left Popliteal:  palpable  Right DP:  Palpable             Left DP:  Palpable  Right PT:  Palpable              Left PT:  Palpable      LABS:	 	    CBC Full  -  ( 28 Jun 2021 07:04 )  WBC Count : 9.25 K/uL  Hemoglobin : 9.4 g/dL  Hematocrit : 29.6 %  Platelet Count - Automated : 101 K/uL  Mean Cell Volume : 98.0 fl  Mean Cell Hemoglobin : 31.1 pg  Mean Cell Hemoglobin Concentration : 31.8 gm/dL  Auto Neutrophil # : x  Auto Lymphocyte # : x  Auto Monocyte # : x  Auto Eosinophil # : x  Auto Basophil # : x  Auto Neutrophil % : x  Auto Lymphocyte % : x  Auto Monocyte % : x  Auto Eosinophil % : x  Auto Basophil % : x    06-27    144  |  105  |  8   ----------------------------<  97  3.5   |  25  |  0.86    Ca    8.4      27 Jun 2021 07:03  Phos  3.8     06-27  Mg     1.7     06-27    TPro  5.7<L>  /  Alb  2.7<L>  /  TBili  0.2  /  DBili  x   /  AST  22  /  ALT  25  /  AlkPhos  137<H>  06-27          Assessment:  1. Acute hypoxic respiratory failure due to unclear etiology, currently appears euvolemic.   - On tx for COPD exacerbation   - On NC 2L, satting %   2.  History of PE  - chronic on CT scan  - Re-started on eliquis 5mg BID 6/27 - 6/28   - No active bleeding   3. Elevated BNP  - on diuretics as outpatient  4.  Pancytopenia possibly 2/2 to immunotherapy   - Hemoglobin stable and uptrending 8.38 --->9.25   - Platelets stable and uptrending 70 ---> 101       Plan:  1. Continue lasix 40mg qDaily   ---- Echo w/ hyperdynamic systolic function, normal EF and no valve disease   ----Strict I's and Out's   ----Monitor Daily Standing weights   2. Continue eliquis 5mg BID   3. Continue toprol 100mg XL   4. Continue to monitor HR   5. Keep K > 4 and Mg > 2    ****Case has not been discussed with the Attending yet and these recommendations to not represent the official recommendations***     Thank you      Vascular Cardiology Service  Office [phone]   email:   [email or web address]     Vascular Cardiology  Progress note  PHONE LINE: [phone] EMAIL [email or web address]       CC: Admitted with low oxygen levels and shortness of breath     INTERVAL HISTORY: The patient was re-started on eliquis on 6/27 has received 2 doses with no negative effects on hemoglobin or platelets which were noted to be very low in the setting of pancytopenia possibly from immunotherapy with Keytruda. Patient has bone marrow biopsy pending.     SUBJECTIVE:   No overnight issues.   Feels very well physically.   Reports no active bleeding and denies hematuria, melena, hematemesis or hematochezia.   Reports no shortness of breath, palpitations or chest pain.       Allergies    diazepam (Other)  lemon (Other)    Intolerances    	    MEDICATIONS:  amLODIPine   Tablet 10 milliGRAM(s) Oral daily  apixaban 5 milliGRAM(s) Oral every 12 hours  furosemide    Tablet 40 milliGRAM(s) Oral daily  metoprolol succinate  milliGRAM(s) Oral daily    trimethoprim  160 mG/sulfamethoxazole 800 mG 1 Tablet(s) Oral <User Schedule>    albuterol/ipratropium for Nebulization 3 milliLiter(s) Nebulizer every 6 hours  albuterol/ipratropium for Nebulization.. 3 milliLiter(s) Nebulizer every 20 minutes  budesonide 160 MICROgram(s)/formoterol 4.5 MICROgram(s) Inhaler 2 Puff(s) Inhalation two times a day    acetaminophen   Tablet .. 650 milliGRAM(s) Oral every 6 hours PRN  mirtazapine 15 milliGRAM(s) Oral at bedtime  ondansetron    Tablet 4 milliGRAM(s) Oral four times a day PRN  oxyCODONE    IR 30 milliGRAM(s) Oral every 6 hours PRN    aluminum hydroxide/magnesium hydroxide/simethicone Suspension 30 milliLiter(s) Oral every 4 hours PRN  pancrelipase  (CREON 12,000 Lipase Units) 2 Capsule(s) Oral three times a day with meals  pantoprazole    Tablet 40 milliGRAM(s) Oral two times a day    predniSONE   Tablet 40 milliGRAM(s) Oral daily    chlorhexidine 2% Cloths 1 Application(s) Topical <User Schedule>  cholecalciferol 1000 Unit(s) Oral daily  cyanocobalamin 1000 MICROGram(s) Oral daily  folic acid 1 milliGRAM(s) Oral daily      PAST MEDICAL & SURGICAL HISTORY:  HTN (hypertension)    GERD (gastroesophageal reflux disease)    Chronic pancreatitis  last episode 4/2019    ARDS survivor  2015    History of adrenal adenoma  stable on imaging    Vocal cord polyp  removal 2018, benign as per pt    Thrombophlebitis  superficial right UE during 4/2019 hospital stay, resolved as per pt    Chronic abdominal pain    Non-small cell lung cancer (NSCLC)  chemo: Alimta/ Keytruda 2/24/2021    Pulmonary embolism    COPD (chronic obstructive pulmonary disease)    Pulmonary hypertension    Anemia  transfusion 2/5/21    S/P arthroscopy of shoulder  left in 2009    S/P hip replacement  left - 2010    S/P arthroscopy of shoulder  right - 2014    S/P hip replacement, right  May 2016    History of vocal cord polypectomy  1/2018    S/P shoulder replacement, left  2016    History of pancreatic surgery  Jeffery procedure (5/8/2019)        FAMILY HISTORY:      SOCIAL HISTORY:  unchanged    REVIEW OF SYSTEMS:  CONSTITUTIONAL: No fever, weight loss, or fatigue  EYES: No eye pain or visual changes   RESPIRATORY: No cough, wheezing, chills or hemoptysis; No Shortness of Breath  CARDIOVASCULAR: No chest pain, palpitations, dizziness, or leg swelling  GASTROINTESTINAL: No abdominal pain. No nausea, vomiting, or hematemesis; No diarrhea or constipation. No melena or hematochezia.  GENITOURINARY: No dysuria, frequency, hematuria, or incontinence  NEUROLOGICAL: No headaches or loss of strength   ENDOCRINE: No heat intolerance   MUSCULOSKELETAL: No joint pain or swelling; No muscle, back, or extremity pain  PSYCHIATRIC: No depression, anxiety, mood swings, or difficulty sleeping  HEME/LYMPH: No easy bruising, or bleeding gums    [ x] All others negative	  [ ] Unable to obtain    PHYSICAL EXAM:  T(C): 36.8 (06-28-21 @ 04:49), Max: 37.1 (06-27-21 @ 20:37)  HR: 98 (06-28-21 @ 11:00) (89 - 121)  BP: 115/71 (06-28-21 @ 04:49) (111/67 - 126/74)  RR: 18 (06-28-21 @ 04:49) (18 - 18)  SpO2: 92% (06-28-21 @ 11:00) (92% - 99%)  Wt(kg): --  I&O's Summary    27 Jun 2021 07:01  -  28 Jun 2021 07:00  --------------------------------------------------------  IN: 240 mL / OUT: 100 mL / NET: 140 mL        Appearance: Normal	  HEENT:   Normal oral mucosa, PERRL, EOMI	  Carotid:  Right: No bruit    Left:  No bruit  Lymphatic: No lymphadenopathy  Cardiovascular: Normal S1 S2, No JVD, No murmurs, No edema, Tachycardic   Respiratory: Soft left middle posterior crackle, CTA otherwise in all other lung fields   Psychiatry: A & O x 3, Mood & affect appropriate  Gastrointestinal:  Soft, Non-tender, + BS	  Skin: No rashes, No ecchymosis, No cyanosis	  Neurologic: Non-focal  Extremities: Normal range of motion, No clubbing, cyanosis.  Vascular:   Right Femoral:  Palpable   Left Femoral:  Palpable  Right Popliteal: Palpable   Left Popliteal:  palpable  Right DP:  Palpable             Left DP:  Palpable  Right PT:  Palpable              Left PT:  Palpable      LABS:	 	    CBC Full  -  ( 28 Jun 2021 07:04 )  WBC Count : 9.25 K/uL  Hemoglobin : 9.4 g/dL  Hematocrit : 29.6 %  Platelet Count - Automated : 101 K/uL  Mean Cell Volume : 98.0 fl  Mean Cell Hemoglobin : 31.1 pg  Mean Cell Hemoglobin Concentration : 31.8 gm/dL  Auto Neutrophil # : x  Auto Lymphocyte # : x  Auto Monocyte # : x  Auto Eosinophil # : x  Auto Basophil # : x  Auto Neutrophil % : x  Auto Lymphocyte % : x  Auto Monocyte % : x  Auto Eosinophil % : x  Auto Basophil % : x    06-27    144  |  105  |  8   ----------------------------<  97  3.5   |  25  |  0.86    Ca    8.4      27 Jun 2021 07:03  Phos  3.8     06-27  Mg     1.7     06-27    TPro  5.7<L>  /  Alb  2.7<L>  /  TBili  0.2  /  DBili  x   /  AST  22  /  ALT  25  /  AlkPhos  137<H>  06-27          Assessment:  1. Acute hypoxic respiratory failure due to unclear etiology, currently appears euvolemic  - On tx for COPD exacerbation w/ steroid taper   - On NC 2L, satting % (when waveform is appropriate at rest)   2.  History of PE  - chronic on CT scan  - Re-started on eliquis 5mg BID 6/27 - 6/28   - No active bleeding   3. Elevated BNP  - on diuretics as outpatient  4.  Pancytopenia possibly 2/2 to immunotherapy   - Hemoglobin stable and uptrending 8.38 --->9.25   - Bone marrow bx results pending, outpatient f/u with Heme/Onc   - Platelets stable and uptrending 70 ---> 101     Plan:  1. Continue lasix 40mg qDaily   ---- Echo w/ hyperdynamic systolic function, normal EF and no valve disease   ----Strict I's and Out's   ----Monitor Daily Standing weights   2. Continue eliquis 5mg BID   3. Continue toprol 100mg XL   4. Continue to monitor HR   5. Keep K > 4 and Mg > 2  6. No contraindications to discharge from a Vascular Cardiology standpoint  7. Follow up with Dr. Leahy in outpatient clinic in 4-6 weeks after discharge for re-assessment.     Thank you    Vascular Cardiology Service  Office [phone]   email:   [email or web address]     Vascular Cardiology  Progress note  PHONE LINE: [phone] EMAIL [email or web address]       CC: Admitted with low oxygen levels and shortness of breath     INTERVAL HISTORY: The patient was re-started on eliquis on 6/27 has received 2 doses with no negative effects on hemoglobin or platelets which were noted to be very low in the setting of pancytopenia possibly from immunotherapy with Keytruda.     SUBJECTIVE:   No overnight issues.   Feels very well physically.   Reports no active bleeding and denies hematuria, melena, hematemesis or hematochezia.   Reports no shortness of breath, palpitations or chest pain.       Allergies    diazepam (Other)  lemon (Other)    Intolerances    	    MEDICATIONS:  amLODIPine   Tablet 10 milliGRAM(s) Oral daily  apixaban 5 milliGRAM(s) Oral every 12 hours  furosemide    Tablet 40 milliGRAM(s) Oral daily  metoprolol succinate  milliGRAM(s) Oral daily    trimethoprim  160 mG/sulfamethoxazole 800 mG 1 Tablet(s) Oral <User Schedule>    albuterol/ipratropium for Nebulization 3 milliLiter(s) Nebulizer every 6 hours  albuterol/ipratropium for Nebulization.. 3 milliLiter(s) Nebulizer every 20 minutes  budesonide 160 MICROgram(s)/formoterol 4.5 MICROgram(s) Inhaler 2 Puff(s) Inhalation two times a day    acetaminophen   Tablet .. 650 milliGRAM(s) Oral every 6 hours PRN  mirtazapine 15 milliGRAM(s) Oral at bedtime  ondansetron    Tablet 4 milliGRAM(s) Oral four times a day PRN  oxyCODONE    IR 30 milliGRAM(s) Oral every 6 hours PRN    aluminum hydroxide/magnesium hydroxide/simethicone Suspension 30 milliLiter(s) Oral every 4 hours PRN  pancrelipase  (CREON 12,000 Lipase Units) 2 Capsule(s) Oral three times a day with meals  pantoprazole    Tablet 40 milliGRAM(s) Oral two times a day    predniSONE   Tablet 40 milliGRAM(s) Oral daily    chlorhexidine 2% Cloths 1 Application(s) Topical <User Schedule>  cholecalciferol 1000 Unit(s) Oral daily  cyanocobalamin 1000 MICROGram(s) Oral daily  folic acid 1 milliGRAM(s) Oral daily      PAST MEDICAL & SURGICAL HISTORY:  HTN (hypertension)    GERD (gastroesophageal reflux disease)    Chronic pancreatitis  last episode 4/2019    ARDS survivor  2015    History of adrenal adenoma  stable on imaging    Vocal cord polyp  removal 2018, benign as per pt    Thrombophlebitis  superficial right UE during 4/2019 hospital stay, resolved as per pt    Chronic abdominal pain    Non-small cell lung cancer (NSCLC)  chemo: Alimta/ Keytruda 2/24/2021    Pulmonary embolism    COPD (chronic obstructive pulmonary disease)    Pulmonary hypertension    Anemia  transfusion 2/5/21    S/P arthroscopy of shoulder  left in 2009    S/P hip replacement  left - 2010    S/P arthroscopy of shoulder  right - 2014    S/P hip replacement, right  May 2016    History of vocal cord polypectomy  1/2018    S/P shoulder replacement, left  2016    History of pancreatic surgery  Jeffery procedure (5/8/2019)        FAMILY HISTORY:      SOCIAL HISTORY:  unchanged    REVIEW OF SYSTEMS:  CONSTITUTIONAL: No fever, weight loss, or fatigue  EYES: No eye pain or visual changes   RESPIRATORY: No cough, wheezing, chills or hemoptysis; No Shortness of Breath  CARDIOVASCULAR: No chest pain, palpitations, dizziness, or leg swelling  GASTROINTESTINAL: No abdominal pain. No nausea, vomiting, or hematemesis; No diarrhea or constipation. No melena or hematochezia.  GENITOURINARY: No dysuria, frequency, hematuria, or incontinence  NEUROLOGICAL: No headaches or loss of strength   ENDOCRINE: No heat intolerance   MUSCULOSKELETAL: No joint pain or swelling; No muscle, back, or extremity pain  PSYCHIATRIC: No depression, anxiety, mood swings, or difficulty sleeping  HEME/LYMPH: No easy bruising, or bleeding gums    [ x] All others negative	  [ ] Unable to obtain    PHYSICAL EXAM:  T(C): 36.8 (06-28-21 @ 04:49), Max: 37.1 (06-27-21 @ 20:37)  HR: 98 (06-28-21 @ 11:00) (89 - 121)  BP: 115/71 (06-28-21 @ 04:49) (111/67 - 126/74)  RR: 18 (06-28-21 @ 04:49) (18 - 18)  SpO2: 92% (06-28-21 @ 11:00) (92% - 99%)  Wt(kg): --  I&O's Summary    27 Jun 2021 07:01  -  28 Jun 2021 07:00  --------------------------------------------------------  IN: 240 mL / OUT: 100 mL / NET: 140 mL        Appearance: Normal	  HEENT:   Normal oral mucosa, PERRL, EOMI	  Carotid:  Right: No bruit    Left:  No bruit  Lymphatic: No lymphadenopathy  Cardiovascular: Normal S1 S2, No JVD, No murmurs, No edema, Tachycardic   Respiratory: Soft left middle posterior crackle, CTA otherwise in all other lung fields   Psychiatry: A & O x 3, Mood & affect appropriate  Gastrointestinal:  Soft, Non-tender, + BS	  Skin: No rashes, No ecchymosis, No cyanosis	  Neurologic: Non-focal  Extremities: Normal range of motion, No clubbing, cyanosis.  Vascular:   Right Femoral:  Palpable   Left Femoral:  Palpable  Right Popliteal: Palpable   Left Popliteal:  palpable  Right DP:  Palpable             Left DP:  Palpable  Right PT:  Palpable              Left PT:  Palpable      LABS:	 	    CBC Full  -  ( 28 Jun 2021 07:04 )  WBC Count : 9.25 K/uL  Hemoglobin : 9.4 g/dL  Hematocrit : 29.6 %  Platelet Count - Automated : 101 K/uL  Mean Cell Volume : 98.0 fl  Mean Cell Hemoglobin : 31.1 pg  Mean Cell Hemoglobin Concentration : 31.8 gm/dL  Auto Neutrophil # : x  Auto Lymphocyte # : x  Auto Monocyte # : x  Auto Eosinophil # : x  Auto Basophil # : x  Auto Neutrophil % : x  Auto Lymphocyte % : x  Auto Monocyte % : x  Auto Eosinophil % : x  Auto Basophil % : x    06-27    144  |  105  |  8   ----------------------------<  97  3.5   |  25  |  0.86    Ca    8.4      27 Jun 2021 07:03  Phos  3.8     06-27  Mg     1.7     06-27    TPro  5.7<L>  /  Alb  2.7<L>  /  TBili  0.2  /  DBili  x   /  AST  22  /  ALT  25  /  AlkPhos  137<H>  06-27          Assessment:  1. Acute hypoxic respiratory failure due to unclear etiology, currently appears euvolemic  - On tx for COPD exacerbation w/ steroid taper   - On NC 2L, satting % (when waveform is appropriate at rest)   2.  History of PE  - chronic on CT scan  - Re-started on eliquis 5mg BID 6/27 - 6/28   - No active bleeding   3. Elevated BNP  - on diuretics as outpatient  4.  Pancytopenia possibly 2/2 to immunotherapy   - Hemoglobin stable and uptrending 8.38 --->9.25   - Bone marrow bx results pending, outpatient f/u with Heme/Onc   - Platelets stable and uptrending 70 ---> 101     Plan:  1. Continue lasix 40mg qDaily   ---- Echo w/ hyperdynamic systolic function, normal EF and no valve disease   ----Strict I's and Out's   ----Monitor Daily Standing weights   2. Continue eliquis 5mg BID   3. Continue toprol 100mg XL   4. Continue to monitor HR   5. Keep K > 4 and Mg > 2  6. No contraindications to discharge from a Vascular Cardiology standpoint  7. Follow up with Dr. Leahy in outpatient clinic in 4-6 weeks after discharge for re-assessment.     Thank you    Vascular Cardiology Service  Office [phone]   email:   [email or web address]

## 2021-06-28 NOTE — PROGRESS NOTE ADULT - PROBLEM SELECTOR PLAN 10
Plan:  -VTE: Eliquis  -Diet: DASH diet w/ fluid restriction to 1500mL  -Code status: Full code  -Dispo: Home w/ home PT, O2 to be delivered 6/28 Plan:  -VTE: Eliquis  -Diet: DASH diet w/ fluid restriction to 1500mL  -Code status: Full code  -Dispo: Home w/ home PT, O2 to be delivered 6/28. Plan for d/c today

## 2021-06-30 ENCOUNTER — APPOINTMENT (OUTPATIENT)
Dept: INFUSION THERAPY | Facility: HOSPITAL | Age: 66
End: 2021-06-30

## 2021-06-30 LAB — TM INTERPRETATION: SIGNIFICANT CHANGE UP

## 2021-07-09 LAB — CHROM ANALY OVERALL INTERP SPEC-IMP: SIGNIFICANT CHANGE UP

## 2021-07-14 ENCOUNTER — RESULT REVIEW (OUTPATIENT)
Age: 66
End: 2021-07-14

## 2021-07-14 LAB — CHROM ANALY INTERPHASE BLD FISH-IMP: SIGNIFICANT CHANGE UP

## 2021-07-16 NOTE — ED ADULT TRIAGE NOTE - NS ED TRIAGE HISTORIAN
Chief Complaint(s) and History of Present Illness(es)     Annual Eye Exam     Laterality: both eyes    Associated symptoms: Negative for eye pain, redness and discharge    Treatments tried: glasses              Comments     Sacha is here with his mother for a 1 year exam due to anisometropia. No change in vision, per patient. Wears glasses full time. No eye pain, redness, or discharge noted. No strabismus or AHP seen.                  
Patient

## 2021-07-20 ENCOUNTER — OUTPATIENT (OUTPATIENT)
Dept: OUTPATIENT SERVICES | Facility: HOSPITAL | Age: 66
LOS: 1 days | Discharge: HOME | End: 2021-07-20

## 2021-07-20 DIAGNOSIS — Z98.890 OTHER SPECIFIED POSTPROCEDURAL STATES: Chronic | ICD-10-CM

## 2021-07-20 DIAGNOSIS — C34.90 MALIGNANT NEOPLASM OF UNSPECIFIED PART OF UNSPECIFIED BRONCHUS OR LUNG: ICD-10-CM

## 2021-07-20 DIAGNOSIS — Z98.89 OTHER SPECIFIED POSTPROCEDURAL STATES: Chronic | ICD-10-CM

## 2021-07-20 DIAGNOSIS — Z96.612 PRESENCE OF LEFT ARTIFICIAL SHOULDER JOINT: Chronic | ICD-10-CM

## 2021-07-20 DIAGNOSIS — Z96.641 PRESENCE OF RIGHT ARTIFICIAL HIP JOINT: Chronic | ICD-10-CM

## 2021-07-21 ENCOUNTER — APPOINTMENT (OUTPATIENT)
Dept: CT IMAGING | Facility: IMAGING CENTER | Age: 66
End: 2021-07-21
Payer: MEDICAID

## 2021-07-21 ENCOUNTER — OUTPATIENT (OUTPATIENT)
Dept: OUTPATIENT SERVICES | Facility: HOSPITAL | Age: 66
LOS: 1 days | End: 2021-07-21
Payer: COMMERCIAL

## 2021-07-21 DIAGNOSIS — Z96.612 PRESENCE OF LEFT ARTIFICIAL SHOULDER JOINT: Chronic | ICD-10-CM

## 2021-07-21 DIAGNOSIS — Z98.890 OTHER SPECIFIED POSTPROCEDURAL STATES: Chronic | ICD-10-CM

## 2021-07-21 DIAGNOSIS — Z98.89 OTHER SPECIFIED POSTPROCEDURAL STATES: Chronic | ICD-10-CM

## 2021-07-21 DIAGNOSIS — Z96.641 PRESENCE OF RIGHT ARTIFICIAL HIP JOINT: Chronic | ICD-10-CM

## 2021-07-21 DIAGNOSIS — C34.92 MALIGNANT NEOPLASM OF UNSPECIFIED PART OF LEFT BRONCHUS OR LUNG: ICD-10-CM

## 2021-07-21 PROCEDURE — 82565 ASSAY OF CREATININE: CPT

## 2021-07-21 PROCEDURE — 71260 CT THORAX DX C+: CPT

## 2021-07-21 PROCEDURE — 74177 CT ABD & PELVIS W/CONTRAST: CPT | Mod: 26

## 2021-07-21 PROCEDURE — 71260 CT THORAX DX C+: CPT | Mod: 26

## 2021-07-21 PROCEDURE — 74177 CT ABD & PELVIS W/CONTRAST: CPT

## 2021-07-22 ENCOUNTER — RESULT REVIEW (OUTPATIENT)
Age: 66
End: 2021-07-22

## 2021-07-22 ENCOUNTER — LABORATORY RESULT (OUTPATIENT)
Age: 66
End: 2021-07-22

## 2021-07-22 ENCOUNTER — APPOINTMENT (OUTPATIENT)
Dept: INFUSION THERAPY | Facility: HOSPITAL | Age: 66
End: 2021-07-22

## 2021-07-22 ENCOUNTER — APPOINTMENT (OUTPATIENT)
Dept: HEMATOLOGY ONCOLOGY | Facility: CLINIC | Age: 66
End: 2021-07-22
Payer: MEDICAID

## 2021-07-22 VITALS
HEART RATE: 100 BPM | DIASTOLIC BLOOD PRESSURE: 72 MMHG | BODY MASS INDEX: 18.92 KG/M2 | SYSTOLIC BLOOD PRESSURE: 105 MMHG | TEMPERATURE: 98 F | OXYGEN SATURATION: 99 % | WEIGHT: 110.23 LBS | RESPIRATION RATE: 14 BRPM

## 2021-07-22 LAB
BASOPHILS # BLD AUTO: 0.11 K/UL — SIGNIFICANT CHANGE UP (ref 0–0.2)
BASOPHILS NFR BLD AUTO: 0.9 % — SIGNIFICANT CHANGE UP (ref 0–2)
EOSINOPHIL # BLD AUTO: 0.26 K/UL — SIGNIFICANT CHANGE UP (ref 0–0.5)
EOSINOPHIL NFR BLD AUTO: 2.1 % — SIGNIFICANT CHANGE UP (ref 0–6)
HCT VFR BLD CALC: 39.7 % — SIGNIFICANT CHANGE UP (ref 34.5–45)
HGB BLD-MCNC: 12.7 G/DL — SIGNIFICANT CHANGE UP (ref 11.5–15.5)
IMM GRANULOCYTES NFR BLD AUTO: 2.3 % — HIGH (ref 0–1.5)
LYMPHOCYTES # BLD AUTO: 2.95 K/UL — SIGNIFICANT CHANGE UP (ref 1–3.3)
LYMPHOCYTES # BLD AUTO: 24.1 % — SIGNIFICANT CHANGE UP (ref 13–44)
MCHC RBC-ENTMCNC: 31.1 PG — SIGNIFICANT CHANGE UP (ref 27–34)
MCHC RBC-ENTMCNC: 32 G/DL — SIGNIFICANT CHANGE UP (ref 32–36)
MCV RBC AUTO: 97.1 FL — SIGNIFICANT CHANGE UP (ref 80–100)
MONOCYTES # BLD AUTO: 1.06 K/UL — HIGH (ref 0–0.9)
MONOCYTES NFR BLD AUTO: 8.7 % — SIGNIFICANT CHANGE UP (ref 2–14)
NEUTROPHILS # BLD AUTO: 7.57 K/UL — HIGH (ref 1.8–7.4)
NEUTROPHILS NFR BLD AUTO: 61.9 % — SIGNIFICANT CHANGE UP (ref 43–77)
NRBC # BLD: 0 /100 WBCS — SIGNIFICANT CHANGE UP (ref 0–0)
PLATELET # BLD AUTO: 268 K/UL — SIGNIFICANT CHANGE UP (ref 150–400)
RBC # BLD: 4.09 M/UL — SIGNIFICANT CHANGE UP (ref 3.8–5.2)
RBC # FLD: 16.2 % — HIGH (ref 10.3–14.5)
WBC # BLD: 12.23 K/UL — HIGH (ref 3.8–10.5)
WBC # FLD AUTO: 12.23 K/UL — HIGH (ref 3.8–10.5)

## 2021-07-22 PROCEDURE — 99214 OFFICE O/P EST MOD 30 MIN: CPT

## 2021-07-22 PROCEDURE — 99072 ADDL SUPL MATRL&STAF TM PHE: CPT

## 2021-07-22 NOTE — REVIEW OF SYSTEMS
[Fatigue] : fatigue [Recent Change In Weight] : ~T recent weight change [Lower Ext Edema] : lower extremity edema [Cough] : no cough [SOB on Exertion] : no shortness of breath during exertion [Abdominal Pain] : no abdominal pain [Vomiting] : no vomiting [Diarrhea] : no diarrhea [Negative] : Allergic/Immunologic [FreeTextEntry2] : gained some weight (likely 2/2 edema) [FreeTextEntry5] : 3+ to thighs

## 2021-07-22 NOTE — ASSESSMENT
[FreeTextEntry1] : 66 yo w  diagnosed lung adeno ca\par Stage IIIC by imaging. Large volume of disease and hence, not started on RT\par Started carbo/Alimta/Pem given Q 3 weeks schedule. Currently on maintenance alimta/keytruda\par \par Abd AEs after hemorrhoids procedure. Repeat CT abd normal. \par NGS revealed KRAS G12V mut, low TMB and KIM. PDL1 1%.\par Continue folic acid daily and  B12 shot q6w\par Using Oxycodone 20 mg as needed. Pt follows with pain md. \par DVT on Eliquis. Repeat doppler shows no DVT\par Smoking cessation reiterated. \par Due to patient's fatigue and recent hospitalization Alimta will be discontinue.\par Patient will receive Keytruda only \par Treatment was rescheduled for the day\par CT scan 6/14 reviewed with patient -Bilateral symmetric groundglass opacities with interlobular septal thickening, consistent with pulmonary edema. 2. Web at the right pulmonary artery along with pruning and stenosis of the left lower lobe pulmonary arteries, consistent with chronic thromboembolic disease.\par  Not yet received the vaccine which I highly recommended. \par OV in 1 weeks\par \par

## 2021-07-22 NOTE — PHYSICAL EXAM
[Restricted in physically strenuous activity but ambulatory and able to carry out work of a light or sedentary nature] : Status 1- Restricted in physically strenuous activity but ambulatory and able to carry out work of a light or sedentary nature, e.g., light house work, office work [Thin] : thin [Normal] : grossly intact [de-identified] : clear bilaterally.   no adventitious breath sounds [de-identified] : 2+ edema [de-identified] : steady gait

## 2021-07-22 NOTE — HISTORY OF PRESENT ILLNESS
[Disease: _____________________] : Disease: [unfilled] [N: ___] : N[unfilled] [M: ___] : M[unfilled] [AJCC Stage: ____] : AJCC Stage: [unfilled] [de-identified] : Ms. Travis is a 65-year-old female with a history of recently diagnosed non-small cell lung adenocarcinoma of left lung, pulmonary embolus on apixaban, HTN, chronic pancreatitis s/p Jeffery procedure (2019), history of ARDS (2015), GERD, chronic pain on opioids, and s/p vocal cord polypectomy who presents for consultation visit\par Brief hx: Pt was recently hospitalized in mid-Feb 2020 to [facility] for dyspnea, left-sided back pain, and fatigue with loss of appetite. Found on CTPA (February 2020) to have a large filling defect in the L main pulmonary artery extending into the left upper lobar and left interlobar pulm arteries with complete opacification of the LLL pulm artery. There were thin linear filling defects in the bifurcation of the right upper pulmonary artery and the right interlobar pulmonary arteries extending to the right lower pulmonary artery, which appeared chronic. CT also showed mediastinal adenopathy, including left paratracheal, subcarinal, and left hilar. There is also a wedge shaped opacity in the posterior left lower lobe suspicious for malignancy. There is centrilobular emphysema. 2D-echo (Feb 2020) with normal LV systolic function, mild concentric LVH, normal LA, RV enlargement with normal RV systolic function, and estimated PASP 39 consistent with borderline pulmonary hypertension. She was started on heparin infusion and discharged on Apixaban. Hypercoagulable workup negative, including Factor V Leiden, Prothrombin gene mutation, and lupus anticoagulant. \par \par She had EBUS/TBNA on Feb 19, 2020, found to have adenocarcinoma in right and left paratracheal and subcarinal lymph nodes (R4, L4, level 7). She is pending PFTs, PET/CT, brain MRI. Patient was discharged on Oxycodone ER BID and Tylenol prn. She is not constipated and is on a bowel regimen with polyethylene glycol. Still does not have a good appetite. She has lost 15 lbs over last 3 months. No fevers, chills, or chest pain. Reports occasional cough for which she takes benzonatate perle about once daily. Declining influenza vaccination. Takes Duonebs twice daily as prescribed, but denies dyspnea. \par \par Last colonoscopy in 2018 normal. Mammogram in June 2019 negative. No personal or family history of miscarriages or spontaneous abortions. Reports history of smoking but denies any history of COPD or lung cancer. No family history of malignancy, sarcoidosis, or TB. No recent prolonged immobility.\par \par Of note, active smoker, quit end of last year, on and off 4-6 cigarettes/day. She had CXR for lung cancer screening in the 1980s but nothing recently. Denies etoh use. \par \par 4/6/20: No new complaints. Persistent pain in the chest and occasional cough. COntinues to lose weight,. No other constitutional symptoms. \par \par 4/16/20: Pain in the chest improved. She has been nauseous and c/o abd cramps since starting chemo. She has been taking Zofran with minimal benefit. She has not been using Reglan because she was told not to by nutritionist. She has lost 2 lbs. \par \par 5/6/2020: Pt being seen in treatment area. Pt reported that she tolerated the 2nd cycle better with the change in premeds and adding dexamethasone following the treatment for 2 days. She still experiences days of severe nausea and vomiting but not continuously. Pt has has normal bowel function. She denies SOB/ slight fatigue. Denies pain\par \par 5/20/2020: Pt being seen in treatment area. Pt is here today for cycle 3. She states that for the last 2 days she has been unable to keep anything down. She vomited the entire 2 days. This is an ongoing and chronic problem and is unrelated to chemo. She has been evaluated by GI without discovering etiology for it. Initial brain imaging (-) for metastaic disease. No headaches or dizziness. Pt states it always lasts 2 days and spontaneously resolves. Today she states she was able to tolerate broth and mandarin oranges. No fever. No c/o of cough/CP/hemoptysis or SOB. \par  \par \par 8/12/20: On maintenance now. Scans in June 2020 shows \par \par 9/2/2020: Pt seen in treatment room. Feeling well. Previously experiencing significant nausea and vomiting. Resolved. Now using omeprazole on BID dosing. No new complaints\par \par 9/23/2020: Pt seen in treatment room. SHe has been experiencing malaise and significant diarrhea since friday. Appetite poor. (+) weight loss. No fever but feels very washed out. Had scans last friday as well. \par \par 11/11/2020: Pt returns for follow up. Patient had called last week to say she was canceling her treatment and office visit . Dr. Cage spoke with the patient who said that she was having pain and that her pancreas is acting up. Patient has a history of chronic pancreatitis. As per patient she has been in touch with her PCP and GI doctor. She was rescheduled for this week. She states that as suddenly as the pain came on, it left the same way. She has not had any problems since. SHe reports she had follow up with pain management doctor as well. No other complaints\par \par 12/24/20: Left oral swelling. Has lost multiple teeth spontaneously. Has appt with oral surgeon. Has gained some weight. Has stopped smoking.\par \par 2/3/21: Pt has been fatigued, she is stressed because her friends (who she has had no contact with recently) are sick with COVID. \par \par 3/17/21: Pt seen in treatment room. Pt recently underwent a hemorrhoidectomy in the OR. She is still recovering and reports continued post op pain. Received call from Radiologist following recent CT imaging. New left retroperitoneal gas of uncertain etiology. These findings were reported to the patient and emailed to surgical team by me . Pt states that she has follow up scheduled next week but it is with covering physician as her doctor is now on maternity leave. She did have some back pain but it has since resolved. No other new complaints. \par \par 4/29/21: Doing better. No abd pain anymore. Repeat CT abd done on 3/29 showed no intra-abd pathology. LE swelling. Had repeat doppler and echo which were normal. \par \par 6/9/21: Pt returns for follow up. She is scheduled for treatment today. Pt states that over last ~3 weeks she has developed severe LE edema. Both extremities are swollen all the way up to her proximal thighs B/L. She has been evaluated by her vascular doctor. No DVT's noted on US (per patient report). she remains on Eliquis 5 mg BID. She was started on Lasix 40mg daily which has little to no impact on edema. She states that she is very uncomfortable and finds it difficult to ambulate\par \par 7/22/21: Patient came in for follow-up after recent hospitalization on 6/14/21- 6/28/21 due to a fall and low oxygen saturation.  While hospitalized patient was place in supplemental oxygen.   Patient's hospital course was complicated by platelet counts,  bone ivy was done and since came back normal.  Patient is currently on 2L of supplemental oxygen.  She complain of fatigue and SOB.  Patient requested to reschedule today's treatment.   Patient denies diarrhea, rash and itching.  \par \par  \par \par  [de-identified] : adeno ca

## 2021-07-23 ENCOUNTER — RX RENEWAL (OUTPATIENT)
Age: 66
End: 2021-07-23

## 2021-07-23 RX ORDER — OMEPRAZOLE 40 MG/1
40 CAPSULE, DELAYED RELEASE ORAL TWICE DAILY
Qty: 180 | Refills: 3 | Status: ACTIVE | COMMUNITY
Start: 2021-07-23 | End: 1900-01-01

## 2021-07-29 ENCOUNTER — RESULT REVIEW (OUTPATIENT)
Age: 66
End: 2021-07-29

## 2021-07-29 ENCOUNTER — LABORATORY RESULT (OUTPATIENT)
Age: 66
End: 2021-07-29

## 2021-07-29 ENCOUNTER — APPOINTMENT (OUTPATIENT)
Dept: INFUSION THERAPY | Facility: HOSPITAL | Age: 66
End: 2021-07-29

## 2021-07-29 DIAGNOSIS — Z51.11 ENCOUNTER FOR ANTINEOPLASTIC CHEMOTHERAPY: ICD-10-CM

## 2021-07-29 LAB
BASOPHILS # BLD AUTO: 0.09 K/UL — SIGNIFICANT CHANGE UP (ref 0–0.2)
BASOPHILS NFR BLD AUTO: 1 % — SIGNIFICANT CHANGE UP (ref 0–2)
EOSINOPHIL # BLD AUTO: 0.18 K/UL — SIGNIFICANT CHANGE UP (ref 0–0.5)
EOSINOPHIL NFR BLD AUTO: 2 % — SIGNIFICANT CHANGE UP (ref 0–6)
HCT VFR BLD CALC: 35.8 % — SIGNIFICANT CHANGE UP (ref 34.5–45)
HGB BLD-MCNC: 11 G/DL — LOW (ref 11.5–15.5)
LYMPHOCYTES # BLD AUTO: 2.67 K/UL — SIGNIFICANT CHANGE UP (ref 1–3.3)
LYMPHOCYTES # BLD AUTO: 29 % — SIGNIFICANT CHANGE UP (ref 13–44)
MCHC RBC-ENTMCNC: 30.1 PG — SIGNIFICANT CHANGE UP (ref 27–34)
MCHC RBC-ENTMCNC: 30.7 G/DL — LOW (ref 32–36)
MCV RBC AUTO: 97.8 FL — SIGNIFICANT CHANGE UP (ref 80–100)
MONOCYTES # BLD AUTO: 0.74 K/UL — SIGNIFICANT CHANGE UP (ref 0–0.9)
MONOCYTES NFR BLD AUTO: 8 % — SIGNIFICANT CHANGE UP (ref 2–14)
NEUTROPHILS # BLD AUTO: 5.53 K/UL — SIGNIFICANT CHANGE UP (ref 1.8–7.4)
NEUTROPHILS NFR BLD AUTO: 60 % — SIGNIFICANT CHANGE UP (ref 43–77)
NRBC # BLD: 0 /100 — SIGNIFICANT CHANGE UP (ref 0–0)
NRBC # BLD: SIGNIFICANT CHANGE UP /100 WBCS (ref 0–0)
PLAT MORPH BLD: NORMAL — SIGNIFICANT CHANGE UP
PLATELET # BLD AUTO: 246 K/UL — SIGNIFICANT CHANGE UP (ref 150–400)
RBC # BLD: 3.66 M/UL — LOW (ref 3.8–5.2)
RBC # FLD: 15.9 % — HIGH (ref 10.3–14.5)
RBC BLD AUTO: SIGNIFICANT CHANGE UP
WBC # BLD: 9.21 K/UL — SIGNIFICANT CHANGE UP (ref 3.8–10.5)
WBC # FLD AUTO: 9.21 K/UL — SIGNIFICANT CHANGE UP (ref 3.8–10.5)

## 2021-07-30 ENCOUNTER — NON-APPOINTMENT (OUTPATIENT)
Age: 66
End: 2021-07-30

## 2021-08-05 ENCOUNTER — APPOINTMENT (OUTPATIENT)
Dept: OPHTHALMOLOGY | Facility: CLINIC | Age: 66
End: 2021-08-05
Payer: MEDICAID

## 2021-08-05 ENCOUNTER — APPOINTMENT (OUTPATIENT)
Dept: OPHTHALMOLOGY | Facility: CLINIC | Age: 66
End: 2021-08-05

## 2021-08-05 ENCOUNTER — NON-APPOINTMENT (OUTPATIENT)
Age: 66
End: 2021-08-05

## 2021-08-05 PROCEDURE — 92004 COMPRE OPH EXAM NEW PT 1/>: CPT

## 2021-08-05 PROCEDURE — 99072 ADDL SUPL MATRL&STAF TM PHE: CPT

## 2021-08-13 ENCOUNTER — APPOINTMENT (OUTPATIENT)
Dept: CARDIOLOGY | Facility: CLINIC | Age: 66
End: 2021-08-13
Payer: MEDICAID

## 2021-08-13 VITALS
HEART RATE: 88 BPM | SYSTOLIC BLOOD PRESSURE: 130 MMHG | OXYGEN SATURATION: 91 % | WEIGHT: 119 LBS | BODY MASS INDEX: 20.32 KG/M2 | DIASTOLIC BLOOD PRESSURE: 74 MMHG | HEIGHT: 64 IN

## 2021-08-13 DIAGNOSIS — R60.0 LOCALIZED EDEMA: ICD-10-CM

## 2021-08-13 PROCEDURE — 99215 OFFICE O/P EST HI 40 MIN: CPT

## 2021-08-13 PROCEDURE — 99072 ADDL SUPL MATRL&STAF TM PHE: CPT

## 2021-08-13 NOTE — ASSESSMENT
[FreeTextEntry1] : Assessment:\par 1. History of PE\par 2. History of Below knee DVT\par 3. Non-small cell lung CA\par 4. Recent Hemorrhoid Surgery 3/8\par 5. Leg Swelling above the Knee\par 6. HTN\par 7. Mild Pulm HTN\par \par Plan:\par 1.  Increase Lasix 40mg BID x 1 week, then down to 40mg daily - she has some edema and rales on exam\par 2. Amlodipine 5mg daily \par 3. Continue potassium supplement. 10meq daily  (double it while on BID lasix)\par 4. Low salt diet.\par 5. Daily weights.\par 6.  Continue Eliquis 5 mg BID\par 7. Continue compression stockings.\par 8. Follow-up in 3 month, call with questions.\par 9.  Pulmonary followup, hopefully she can come off oxygen soon.\par \par \par

## 2021-08-13 NOTE — HISTORY OF PRESENT ILLNESS
[FreeTextEntry1] : 8/13/2021\par \par She was recently hospitalized.  Now is on oxygen\par off oxygen she states her O2 sat is 88%\par She is on 2 Liters\par She is going to see pulmonary Dr. Alvarado on the 23rd.\par \par She smoked 1 cigarette since the hospital stay.  \par No blood in the urine or stool \par \par She plans to have full physical and labs with her GP next week.\par She continues to have LE edema.\par \par Medications:\par Metoprolol XL 100mg daily \par Amlodipine 5mg daily \par Lasix 40mg daily \par Eliquis 5mg BID\par Creon [id number] TID\par Omeprazole\par Oxycodone PRN\par Reglan PRN\par Folic acid\par Calcium \par \par

## 2021-08-13 NOTE — PHYSICAL EXAM
[General Appearance - Well Developed] : well developed [Normal Appearance] : normal appearance [General Appearance - Well Nourished] : well nourished [Heart Rate And Rhythm] : heart rate and rhythm were normal [Heart Sounds] : normal S1 and S2 [Murmurs] : no murmurs present [Bowel Sounds] : normal bowel sounds [Abdomen Soft] : soft [Abdomen Tenderness] : non-tender [Abnormal Walk] : normal gait [Nail Clubbing] : no clubbing of the fingernails [Cyanosis, Localized] : no localized cyanosis [] : no rash [No Skin Ulcers] : no skin ulcer [Oriented To Time, Place, And Person] : oriented to person, place, and time [FreeTextEntry1] : Palpable DP and PT B/L.  + 1+ edema bilaterally

## 2021-08-18 ENCOUNTER — APPOINTMENT (OUTPATIENT)
Dept: HEMATOLOGY ONCOLOGY | Facility: CLINIC | Age: 66
End: 2021-08-18
Payer: MEDICAID

## 2021-08-18 ENCOUNTER — LABORATORY RESULT (OUTPATIENT)
Age: 66
End: 2021-08-18

## 2021-08-18 ENCOUNTER — APPOINTMENT (OUTPATIENT)
Dept: INFUSION THERAPY | Facility: HOSPITAL | Age: 66
End: 2021-08-18

## 2021-08-18 ENCOUNTER — RESULT REVIEW (OUTPATIENT)
Age: 66
End: 2021-08-18

## 2021-08-18 VITALS
SYSTOLIC BLOOD PRESSURE: 130 MMHG | TEMPERATURE: 97.9 F | RESPIRATION RATE: 14 BRPM | OXYGEN SATURATION: 92 % | DIASTOLIC BLOOD PRESSURE: 84 MMHG | WEIGHT: 114.64 LBS | HEART RATE: 89 BPM | BODY MASS INDEX: 19.68 KG/M2

## 2021-08-18 LAB
BASOPHILS # BLD AUTO: 0.09 K/UL — SIGNIFICANT CHANGE UP (ref 0–0.2)
BASOPHILS NFR BLD AUTO: 0.7 % — SIGNIFICANT CHANGE UP (ref 0–2)
EOSINOPHIL # BLD AUTO: 0.1 K/UL — SIGNIFICANT CHANGE UP (ref 0–0.5)
EOSINOPHIL NFR BLD AUTO: 0.8 % — SIGNIFICANT CHANGE UP (ref 0–6)
HCT VFR BLD CALC: 38.5 % — SIGNIFICANT CHANGE UP (ref 34.5–45)
HGB BLD-MCNC: 11.8 G/DL — SIGNIFICANT CHANGE UP (ref 11.5–15.5)
IMM GRANULOCYTES NFR BLD AUTO: 0.9 % — SIGNIFICANT CHANGE UP (ref 0–1.5)
LYMPHOCYTES # BLD AUTO: 36.5 % — SIGNIFICANT CHANGE UP (ref 13–44)
LYMPHOCYTES # BLD AUTO: 4.69 K/UL — HIGH (ref 1–3.3)
MCHC RBC-ENTMCNC: 29.1 PG — SIGNIFICANT CHANGE UP (ref 27–34)
MCHC RBC-ENTMCNC: 30.6 G/DL — LOW (ref 32–36)
MCV RBC AUTO: 94.8 FL — SIGNIFICANT CHANGE UP (ref 80–100)
MONOCYTES # BLD AUTO: 1.29 K/UL — HIGH (ref 0–0.9)
MONOCYTES NFR BLD AUTO: 10 % — SIGNIFICANT CHANGE UP (ref 2–14)
NEUTROPHILS # BLD AUTO: 6.57 K/UL — SIGNIFICANT CHANGE UP (ref 1.8–7.4)
NEUTROPHILS NFR BLD AUTO: 51.1 % — SIGNIFICANT CHANGE UP (ref 43–77)
NRBC # BLD: 0 /100 WBCS — SIGNIFICANT CHANGE UP (ref 0–0)
PLATELET # BLD AUTO: 278 K/UL — SIGNIFICANT CHANGE UP (ref 150–400)
RBC # BLD: 4.06 M/UL — SIGNIFICANT CHANGE UP (ref 3.8–5.2)
RBC # FLD: 15.1 % — HIGH (ref 10.3–14.5)
WBC # BLD: 12.85 K/UL — HIGH (ref 3.8–10.5)
WBC # FLD AUTO: 12.85 K/UL — HIGH (ref 3.8–10.5)

## 2021-08-18 PROCEDURE — 99072 ADDL SUPL MATRL&STAF TM PHE: CPT

## 2021-08-18 PROCEDURE — 99214 OFFICE O/P EST MOD 30 MIN: CPT

## 2021-08-18 NOTE — ASSESSMENT
[FreeTextEntry1] : 66 yo w  diagnosed lung adeno ca\par Stage IIIC by imaging. Large volume of disease and hence, not started on RT\par Started carbo/Alimta/Pem given Q 3 weeks schedule. Currently on maintenance alimta/keytruda\par \par \par NGS revealed KRAS G12V mut, low TMB and KIM. PDL1 1%.\par Using Oxycodone 20 mg as needed. Pt follows with pain md. \par DVT on Eliquis. Repeat doppler shows no DVT\par Smoking cessation reiterated. \par Due to patient's fatigue and recent hospitalization Alimta will be discontinue.\par Patient will receive Keytruda only. She is scheduled for treatment today\par CT scan 6/14 -Bilateral symmetric groundglass opacities with interlobular septal thickening, consistent with pulmonary edema. 2. Web at the right pulmonary artery along with pruning and stenosis of the left lower lobe pulmonary arteries, consistent with chronic thromboembolic disease.\par  Not yet received the vaccine which I highly recommended. \par OV in 1 Month\par \par  [Palliative] : Goals of care discussed with patient: Palliative

## 2021-08-18 NOTE — HISTORY OF PRESENT ILLNESS
[Disease: _____________________] : Disease: [unfilled] [N: ___] : N[unfilled] [M: ___] : M[unfilled] [AJCC Stage: ____] : AJCC Stage: [unfilled] [de-identified] : Ms. Travis is a 65-year-old female with a history of recently diagnosed non-small cell lung adenocarcinoma of left lung, pulmonary embolus on apixaban, HTN, chronic pancreatitis s/p Jeffery procedure (2019), history of ARDS (2015), GERD, chronic pain on opioids, and s/p vocal cord polypectomy who presents for consultation visit\par Brief hx: Pt was recently hospitalized in mid-Feb 2020 to [facility] for dyspnea, left-sided back pain, and fatigue with loss of appetite. Found on CTPA (February 2020) to have a large filling defect in the L main pulmonary artery extending into the left upper lobar and left interlobar pulm arteries with complete opacification of the LLL pulm artery. There were thin linear filling defects in the bifurcation of the right upper pulmonary artery and the right interlobar pulmonary arteries extending to the right lower pulmonary artery, which appeared chronic. CT also showed mediastinal adenopathy, including left paratracheal, subcarinal, and left hilar. There is also a wedge shaped opacity in the posterior left lower lobe suspicious for malignancy. There is centrilobular emphysema. 2D-echo (Feb 2020) with normal LV systolic function, mild concentric LVH, normal LA, RV enlargement with normal RV systolic function, and estimated PASP 39 consistent with borderline pulmonary hypertension. She was started on heparin infusion and discharged on Apixaban. Hypercoagulable workup negative, including Factor V Leiden, Prothrombin gene mutation, and lupus anticoagulant. \par \par She had EBUS/TBNA on Feb 19, 2020, found to have adenocarcinoma in right and left paratracheal and subcarinal lymph nodes (R4, L4, level 7). She is pending PFTs, PET/CT, brain MRI. Patient was discharged on Oxycodone ER BID and Tylenol prn. She is not constipated and is on a bowel regimen with polyethylene glycol. Still does not have a good appetite. She has lost 15 lbs over last 3 months. No fevers, chills, or chest pain. Reports occasional cough for which she takes benzonatate perle about once daily. Declining influenza vaccination. Takes Duonebs twice daily as prescribed, but denies dyspnea. \par \par Last colonoscopy in 2018 normal. Mammogram in June 2019 negative. No personal or family history of miscarriages or spontaneous abortions. Reports history of smoking but denies any history of COPD or lung cancer. No family history of malignancy, sarcoidosis, or TB. No recent prolonged immobility.\par \par Of note, active smoker, quit end of last year, on and off 4-6 cigarettes/day. She had CXR for lung cancer screening in the 1980s but nothing recently. Denies etoh use. \par \par 4/6/20: No new complaints. Persistent pain in the chest and occasional cough. COntinues to lose weight,. No other constitutional symptoms. \par \par 4/16/20: Pain in the chest improved. She has been nauseous and c/o abd cramps since starting chemo. She has been taking Zofran with minimal benefit. She has not been using Reglan because she was told not to by nutritionist. She has lost 2 lbs. \par \par 5/6/2020: Pt being seen in treatment area. Pt reported that she tolerated the 2nd cycle better with the change in premeds and adding dexamethasone following the treatment for 2 days. She still experiences days of severe nausea and vomiting but not continuously. Pt has has normal bowel function. She denies SOB/ slight fatigue. Denies pain\par \par 5/20/2020: Pt being seen in treatment area. Pt is here today for cycle 3. She states that for the last 2 days she has been unable to keep anything down. She vomited the entire 2 days. This is an ongoing and chronic problem and is unrelated to chemo. She has been evaluated by GI without discovering etiology for it. Initial brain imaging (-) for metastaic disease. No headaches or dizziness. Pt states it always lasts 2 days and spontaneously resolves. Today she states she was able to tolerate broth and mandarin oranges. No fever. No c/o of cough/CP/hemoptysis or SOB. \par  \par \par 8/12/20: On maintenance now. Scans in June 2020 shows \par \par 9/2/2020: Pt seen in treatment room. Feeling well. Previously experiencing significant nausea and vomiting. Resolved. Now using omeprazole on BID dosing. No new complaints\par \par 9/23/2020: Pt seen in treatment room. SHe has been experiencing malaise and significant diarrhea since friday. Appetite poor. (+) weight loss. No fever but feels very washed out. Had scans last friday as well. \par \par 11/11/2020: Pt returns for follow up. Patient had called last week to say she was canceling her treatment and office visit . Dr. Cage spoke with the patient who said that she was having pain and that her pancreas is acting up. Patient has a history of chronic pancreatitis. As per patient she has been in touch with her PCP and GI doctor. She was rescheduled for this week. She states that as suddenly as the pain came on, it left the same way. She has not had any problems since. SHe reports she had follow up with pain management doctor as well. No other complaints\par \par 12/24/20: Left oral swelling. Has lost multiple teeth spontaneously. Has appt with oral surgeon. Has gained some weight. Has stopped smoking.\par \par 2/3/21: Pt has been fatigued, she is stressed because her friends (who she has had no contact with recently) are sick with COVID. \par \par 3/17/21: Pt seen in treatment room. Pt recently underwent a hemorrhoidectomy in the OR. She is still recovering and reports continued post op pain. Received call from Radiologist following recent CT imaging. New left retroperitoneal gas of uncertain etiology. These findings were reported to the patient and emailed to surgical team by me . Pt states that she has follow up scheduled next week but it is with covering physician as her doctor is now on maternity leave. She did have some back pain but it has since resolved. No other new complaints. \par \par 4/29/21: Doing better. No abd pain anymore. Repeat CT abd done on 3/29 showed no intra-abd pathology. LE swelling. Had repeat doppler and echo which were normal. \par \par 6/9/21: Pt returns for follow up. She is scheduled for treatment today. Pt states that over last ~3 weeks she has developed severe LE edema. Both extremities are swollen all the way up to her proximal thighs B/L. She has been evaluated by her vascular doctor. No DVT's noted on US (per patient report). she remains on Eliquis 5 mg BID. She was started on Lasix 40mg daily which has little to no impact on edema. She states that she is very uncomfortable and finds it difficult to ambulate\par \par 7/22/21: Patient came in for follow-up after recent hospitalization on 6/14/21- 6/28/21 due to a fall and low oxygen saturation.  While hospitalized patient was place in supplemental oxygen.   Patient's hospital course was complicated by platelet counts,  bone ivy was done and since came back normal.  Patient is currently on 2L of supplemental oxygen.  She complain of fatigue and SOB.  Patient requested to reschedule today's treatment.   Patient denies diarrhea, rash and itching.  \par \par 8/18/21: Pt returns for follow up. She is O2 dependent. She states she is feeling better. Offers no complaints \par \par  [de-identified] : adeno ca

## 2021-08-18 NOTE — PHYSICAL EXAM
[Restricted in physically strenuous activity but ambulatory and able to carry out work of a light or sedentary nature] : Status 1- Restricted in physically strenuous activity but ambulatory and able to carry out work of a light or sedentary nature, e.g., light house work, office work [Thin] : thin [Normal] : grossly intact [de-identified] : clear bilaterally.   no adventitious breath sounds [de-identified] : 2+ edema [de-identified] : steady gait

## 2021-08-18 NOTE — REVIEW OF SYSTEMS
[Fatigue] : fatigue [Recent Change In Weight] : ~T recent weight change [Lower Ext Edema] : lower extremity edema [Cough] : no cough [SOB on Exertion] : no shortness of breath during exertion [Abdominal Pain] : no abdominal pain [Vomiting] : no vomiting [Diarrhea] : no diarrhea [Negative] : Allergic/Immunologic [FreeTextEntry5] : 3+ to thighs [FreeTextEntry2] : gained some weight (likely 2/2 edema)

## 2021-08-19 ENCOUNTER — APPOINTMENT (OUTPATIENT)
Dept: INFUSION THERAPY | Facility: HOSPITAL | Age: 66
End: 2021-08-19

## 2021-08-21 ENCOUNTER — APPOINTMENT (OUTPATIENT)
Dept: DISASTER EMERGENCY | Facility: CLINIC | Age: 66
End: 2021-08-21

## 2021-08-22 LAB — SARS-COV-2 N GENE NPH QL NAA+PROBE: NOT DETECTED

## 2021-08-23 ENCOUNTER — APPOINTMENT (OUTPATIENT)
Dept: PULMONOLOGY | Facility: CLINIC | Age: 66
End: 2021-08-23

## 2021-09-05 ENCOUNTER — OUTPATIENT (OUTPATIENT)
Dept: OUTPATIENT SERVICES | Facility: HOSPITAL | Age: 66
LOS: 1 days | Discharge: HOME | End: 2021-09-05

## 2021-09-05 DIAGNOSIS — Z98.89 OTHER SPECIFIED POSTPROCEDURAL STATES: Chronic | ICD-10-CM

## 2021-09-05 DIAGNOSIS — Z96.641 PRESENCE OF RIGHT ARTIFICIAL HIP JOINT: Chronic | ICD-10-CM

## 2021-09-05 DIAGNOSIS — Z98.890 OTHER SPECIFIED POSTPROCEDURAL STATES: Chronic | ICD-10-CM

## 2021-09-05 DIAGNOSIS — C34.90 MALIGNANT NEOPLASM OF UNSPECIFIED PART OF UNSPECIFIED BRONCHUS OR LUNG: ICD-10-CM

## 2021-09-05 DIAGNOSIS — Z96.612 PRESENCE OF LEFT ARTIFICIAL SHOULDER JOINT: Chronic | ICD-10-CM

## 2021-09-08 ENCOUNTER — RESULT REVIEW (OUTPATIENT)
Age: 66
End: 2021-09-08

## 2021-09-08 ENCOUNTER — APPOINTMENT (OUTPATIENT)
Dept: INFUSION THERAPY | Facility: HOSPITAL | Age: 66
End: 2021-09-08

## 2021-09-08 ENCOUNTER — LABORATORY RESULT (OUTPATIENT)
Age: 66
End: 2021-09-08

## 2021-09-08 DIAGNOSIS — Z51.11 ENCOUNTER FOR ANTINEOPLASTIC CHEMOTHERAPY: ICD-10-CM

## 2021-09-08 LAB
BASOPHILS # BLD AUTO: 0.06 K/UL — SIGNIFICANT CHANGE UP (ref 0–0.2)
BASOPHILS NFR BLD AUTO: 0.7 % — SIGNIFICANT CHANGE UP (ref 0–2)
EOSINOPHIL # BLD AUTO: 0.09 K/UL — SIGNIFICANT CHANGE UP (ref 0–0.5)
EOSINOPHIL NFR BLD AUTO: 1 % — SIGNIFICANT CHANGE UP (ref 0–6)
HCT VFR BLD CALC: 35.1 % — SIGNIFICANT CHANGE UP (ref 34.5–45)
HGB BLD-MCNC: 10.9 G/DL — LOW (ref 11.5–15.5)
IMM GRANULOCYTES NFR BLD AUTO: 0.2 % — SIGNIFICANT CHANGE UP (ref 0–1.5)
LYMPHOCYTES # BLD AUTO: 3.48 K/UL — HIGH (ref 1–3.3)
LYMPHOCYTES # BLD AUTO: 39.5 % — SIGNIFICANT CHANGE UP (ref 13–44)
MCHC RBC-ENTMCNC: 28.4 PG — SIGNIFICANT CHANGE UP (ref 27–34)
MCHC RBC-ENTMCNC: 31.1 G/DL — LOW (ref 32–36)
MCV RBC AUTO: 91.4 FL — SIGNIFICANT CHANGE UP (ref 80–100)
MONOCYTES # BLD AUTO: 0.79 K/UL — SIGNIFICANT CHANGE UP (ref 0–0.9)
MONOCYTES NFR BLD AUTO: 9 % — SIGNIFICANT CHANGE UP (ref 2–14)
NEUTROPHILS # BLD AUTO: 4.36 K/UL — SIGNIFICANT CHANGE UP (ref 1.8–7.4)
NEUTROPHILS NFR BLD AUTO: 49.6 % — SIGNIFICANT CHANGE UP (ref 43–77)
NRBC # BLD: 0 /100 WBCS — SIGNIFICANT CHANGE UP (ref 0–0)
PLATELET # BLD AUTO: 320 K/UL — SIGNIFICANT CHANGE UP (ref 150–400)
RBC # BLD: 3.84 M/UL — SIGNIFICANT CHANGE UP (ref 3.8–5.2)
RBC # FLD: 14.5 % — SIGNIFICANT CHANGE UP (ref 10.3–14.5)
WBC # BLD: 8.8 K/UL — SIGNIFICANT CHANGE UP (ref 3.8–10.5)
WBC # FLD AUTO: 8.8 K/UL — SIGNIFICANT CHANGE UP (ref 3.8–10.5)

## 2021-09-09 ENCOUNTER — NON-APPOINTMENT (OUTPATIENT)
Age: 66
End: 2021-09-09

## 2021-09-28 ENCOUNTER — APPOINTMENT (OUTPATIENT)
Dept: SURGERY | Facility: CLINIC | Age: 66
End: 2021-09-28
Payer: MEDICAID

## 2021-09-28 VITALS
RESPIRATION RATE: 16 BRPM | OXYGEN SATURATION: 95 % | BODY MASS INDEX: 19.46 KG/M2 | DIASTOLIC BLOOD PRESSURE: 67 MMHG | HEART RATE: 86 BPM | WEIGHT: 114 LBS | HEIGHT: 64 IN | SYSTOLIC BLOOD PRESSURE: 104 MMHG

## 2021-09-28 DIAGNOSIS — Z98.890 OTHER SPECIFIED POSTPROCEDURAL STATES: ICD-10-CM

## 2021-09-28 PROCEDURE — 99072 ADDL SUPL MATRL&STAF TM PHE: CPT

## 2021-09-28 PROCEDURE — 99214 OFFICE O/P EST MOD 30 MIN: CPT

## 2021-09-29 ENCOUNTER — RESULT REVIEW (OUTPATIENT)
Age: 66
End: 2021-09-29

## 2021-09-29 NOTE — ED ADULT NURSE NOTE - NS ED NURSE REPORT GIVEN TO FT
September 29, 2021     Patient: Alessio Holloway   YOB: 2007   Date of Visit: 9/29/2021       To Whom it May Concern:    Alessio Holloway was seen in my clinic on 9/29/2021 at 2:30 pm.     Please excuse Alessio for his absence from school on the date listed above to be able to make his appointment.    He may return to school and sports with no restrictions. He must filipe tape his fingers for the next two weeks during sports only.     Sincerely,         Bebo Drew, DO    Medical information is confidential and cannot be disclosed without the written consent of the patient or his representative.      
Kath

## 2021-09-30 ENCOUNTER — APPOINTMENT (OUTPATIENT)
Dept: INFUSION THERAPY | Facility: HOSPITAL | Age: 66
End: 2021-09-30

## 2021-09-30 ENCOUNTER — RESULT REVIEW (OUTPATIENT)
Age: 66
End: 2021-09-30

## 2021-09-30 ENCOUNTER — LABORATORY RESULT (OUTPATIENT)
Age: 66
End: 2021-09-30

## 2021-09-30 ENCOUNTER — APPOINTMENT (OUTPATIENT)
Dept: HEMATOLOGY ONCOLOGY | Facility: CLINIC | Age: 66
End: 2021-09-30
Payer: MEDICAID

## 2021-09-30 ENCOUNTER — APPOINTMENT (OUTPATIENT)
Dept: HEMATOLOGY ONCOLOGY | Facility: CLINIC | Age: 66
End: 2021-09-30

## 2021-09-30 VITALS
WEIGHT: 110.67 LBS | OXYGEN SATURATION: 94 % | RESPIRATION RATE: 16 BRPM | HEART RATE: 77 BPM | BODY MASS INDEX: 19 KG/M2 | TEMPERATURE: 97.1 F | DIASTOLIC BLOOD PRESSURE: 67 MMHG | SYSTOLIC BLOOD PRESSURE: 103 MMHG

## 2021-09-30 LAB
BASOPHILS # BLD AUTO: 0.05 K/UL — SIGNIFICANT CHANGE UP (ref 0–0.2)
BASOPHILS NFR BLD AUTO: 0.6 % — SIGNIFICANT CHANGE UP (ref 0–2)
EOSINOPHIL # BLD AUTO: 0.1 K/UL — SIGNIFICANT CHANGE UP (ref 0–0.5)
EOSINOPHIL NFR BLD AUTO: 1.2 % — SIGNIFICANT CHANGE UP (ref 0–6)
HCT VFR BLD CALC: 32.6 % — LOW (ref 34.5–45)
HGB BLD-MCNC: 10.4 G/DL — LOW (ref 11.5–15.5)
IMM GRANULOCYTES NFR BLD AUTO: 0.4 % — SIGNIFICANT CHANGE UP (ref 0–1.5)
LYMPHOCYTES # BLD AUTO: 3.54 K/UL — HIGH (ref 1–3.3)
LYMPHOCYTES # BLD AUTO: 42.4 % — SIGNIFICANT CHANGE UP (ref 13–44)
MCHC RBC-ENTMCNC: 27.9 PG — SIGNIFICANT CHANGE UP (ref 27–34)
MCHC RBC-ENTMCNC: 31.9 G/DL — LOW (ref 32–36)
MCV RBC AUTO: 87.4 FL — SIGNIFICANT CHANGE UP (ref 80–100)
MONOCYTES # BLD AUTO: 0.78 K/UL — SIGNIFICANT CHANGE UP (ref 0–0.9)
MONOCYTES NFR BLD AUTO: 9.4 % — SIGNIFICANT CHANGE UP (ref 2–14)
NEUTROPHILS # BLD AUTO: 3.84 K/UL — SIGNIFICANT CHANGE UP (ref 1.8–7.4)
NEUTROPHILS NFR BLD AUTO: 46 % — SIGNIFICANT CHANGE UP (ref 43–77)
NRBC # BLD: 0 /100 WBCS — SIGNIFICANT CHANGE UP (ref 0–0)
PLATELET # BLD AUTO: 263 K/UL — SIGNIFICANT CHANGE UP (ref 150–400)
RBC # BLD: 3.73 M/UL — LOW (ref 3.8–5.2)
RBC # FLD: 15.9 % — HIGH (ref 10.3–14.5)
WBC # BLD: 8.34 K/UL — SIGNIFICANT CHANGE UP (ref 3.8–10.5)
WBC # FLD AUTO: 8.34 K/UL — SIGNIFICANT CHANGE UP (ref 3.8–10.5)

## 2021-09-30 PROCEDURE — 99215 OFFICE O/P EST HI 40 MIN: CPT

## 2021-09-30 PROCEDURE — 99072 ADDL SUPL MATRL&STAF TM PHE: CPT

## 2021-10-01 ENCOUNTER — NON-APPOINTMENT (OUTPATIENT)
Age: 66
End: 2021-10-01

## 2021-10-01 PROBLEM — Z98.890 HISTORY OF PANCREATIC SURGERY: Status: ACTIVE | Noted: 2021-10-01

## 2021-10-01 NOTE — PHYSICAL EXAM
[Normal] : oriented to person, place and time, with appropriate affect [de-identified] : thin appearing female, well developing, no acute distress  [de-identified] : anicteric  [de-identified] : normal respiratory effort  [de-identified] : no deformities appreciated

## 2021-10-01 NOTE — ASSESSMENT
[FreeTextEntry1] : 64 y/o F, s/p Jeffery procedure 5/2019 for chronic pancreatitis. Was diagnosed with non-small cell lung cancer in Feb 2020 s/p chemo, now on immunotherapy. Reports recent onset of pancreatic pain. Needs imaging.  \par \par Plan: \par 1) CT A/P w/ contrast\par 2) Check Ca 19-9 level\par 3) RTC after above \par

## 2021-10-01 NOTE — HISTORY OF PRESENT ILLNESS
[de-identified] : Ms. HIMANSHU VILLEGAS is a 65 year old female presents for follow-up visit. Patient well known to me with history of chronic calcific pancreatitis, and is s/p Jeffery procedure on 5/8/19. She did great post-operatively and no longer required recurrent hospital admissions for pancreatitis/pain control after surgery. Unfortunately she was diagnosed with non-small cell lung cancer stage IIIB, in February 2020. She is being followed by medical oncology for this and underwent chemotherapy, followed by immunotherapy (currently on maintenance Keytruda). She reports recent onset of pancreatic pain.  Last CT c/a/p 7/21/21 noting chronic pancreatitis and s/p Jeffery procedure, no acute findings.

## 2021-10-01 NOTE — REVIEW OF SYSTEMS
[Negative] : Endocrine [FreeTextEntry8] : No N/V/D, Reports intermittent abdominal pain [FreeTextEntry1] : Lung ca, on immunotherapy

## 2021-10-02 NOTE — HISTORY OF PRESENT ILLNESS
[de-identified] : Ms. Travis is a 65-year-old female with a history of recently diagnosed non-small cell lung adenocarcinoma of left lung, pulmonary embolus on apixaban, HTN, chronic pancreatitis s/p Jeffery procedure (2019), history of ARDS (2015), GERD, chronic pain on opioids, and s/p vocal cord polypectomy who presents for consultation visit\par Brief hx: Pt was recently hospitalized in mid-Feb 2020 to [facility] for dyspnea, left-sided back pain, and fatigue with loss of appetite. Found on CTPA (February 2020) to have a large filling defect in the L main pulmonary artery extending into the left upper lobar and left interlobar pulm arteries with complete opacification of the LLL pulm artery. There were thin linear filling defects in the bifurcation of the right upper pulmonary artery and the right interlobar pulmonary arteries extending to the right lower pulmonary artery, which appeared chronic. CT also showed mediastinal adenopathy, including left paratracheal, subcarinal, and left hilar. There is also a wedge shaped opacity in the posterior left lower lobe suspicious for malignancy. There is centrilobular emphysema. 2D-echo (Feb 2020) with normal LV systolic function, mild concentric LVH, normal LA, RV enlargement with normal RV systolic function, and estimated PASP 39 consistent with borderline pulmonary hypertension. She was started on heparin infusion and discharged on Apixaban. Hypercoagulable workup negative, including Factor V Leiden, Prothrombin gene mutation, and lupus anticoagulant. \par \par She had EBUS/TBNA on Feb 19, 2020, found to have adenocarcinoma in right and left paratracheal and subcarinal lymph nodes (R4, L4, level 7). She is pending PFTs, PET/CT, brain MRI. Patient was discharged on Oxycodone ER BID and Tylenol prn. She is not constipated and is on a bowel regimen with polyethylene glycol. Still does not have a good appetite. She has lost 15 lbs over last 3 months. No fevers, chills, or chest pain. Reports occasional cough for which she takes benzonatate perle about once daily. Declining influenza vaccination. Takes Duonebs twice daily as prescribed, but denies dyspnea. \par \par Last colonoscopy in 2018 normal. Mammogram in June 2019 negative. No personal or family history of miscarriages or spontaneous abortions. Reports history of smoking but denies any history of COPD or lung cancer. No family history of malignancy, sarcoidosis, or TB. No recent prolonged immobility.\par \par Of note, active smoker, quit end of last year, on and off 4-6 cigarettes/day. She had CXR for lung cancer screening in the 1980s but nothing recently. Denies etoh use. \par \par 4/6/20: No new complaints. Persistent pain in the chest and occasional cough. COntinues to lose weight,. No other constitutional symptoms. \par \par 4/16/20: Pain in the chest improved. She has been nauseous and c/o abd cramps since starting chemo. She has been taking Zofran with minimal benefit. She has not been using Reglan because she was told not to by nutritionist. She has lost 2 lbs. \par \par 5/6/2020: Pt being seen in treatment area. Pt reported that she tolerated the 2nd cycle better with the change in premeds and adding dexamethasone following the treatment for 2 days. She still experiences days of severe nausea and vomiting but not continuously. Pt has has normal bowel function. She denies SOB/ slight fatigue. Denies pain\par \par 5/20/2020: Pt being seen in treatment area. Pt is here today for cycle 3. She states that for the last 2 days she has been unable to keep anything down. She vomited the entire 2 days. This is an ongoing and chronic problem and is unrelated to chemo. She has been evaluated by GI without discovering etiology for it. Initial brain imaging (-) for metastaic disease. No headaches or dizziness. Pt states it always lasts 2 days and spontaneously resolves. Today she states she was able to tolerate broth and mandarin oranges. No fever. No c/o of cough/CP/hemoptysis or SOB. \par  \par \par 8/12/20: On maintenance now. Scans in June 2020 shows \par \par 9/2/2020: Pt seen in treatment room. Feeling well. Previously experiencing significant nausea and vomiting. Resolved. Now using omeprazole on BID dosing. No new complaints\par \par 9/23/2020: Pt seen in treatment room. SHe has been experiencing malaise and significant diarrhea since friday. Appetite poor. (+) weight loss. No fever but feels very washed out. Had scans last friday as well. \par \par 11/11/2020: Pt returns for follow up. Patient had called last week to say she was canceling her treatment and office visit . Dr. Cage spoke with the patient who said that she was having pain and that her pancreas is acting up. Patient has a history of chronic pancreatitis. As per patient she has been in touch with her PCP and GI doctor. She was rescheduled for this week. She states that as suddenly as the pain came on, it left the same way. She has not had any problems since. SHe reports she had follow up with pain management doctor as well. No other complaints\par \par 12/24/20: Left oral swelling. Has lost multiple teeth spontaneously. Has appt with oral surgeon. Has gained some weight. Has stopped smoking.\par \par 2/3/21: Pt has been fatigued, she is stressed because her friends (who she has had no contact with recently) are sick with COVID. \par \par 3/17/21: Pt seen in treatment room. Pt recently underwent a hemorrhoidectomy in the OR. She is still recovering and reports continued post op pain. Received call from Radiologist following recent CT imaging. New left retroperitoneal gas of uncertain etiology. These findings were reported to the patient and emailed to surgical team by me . Pt states that she has follow up scheduled next week but it is with covering physician as her doctor is now on maternity leave. She did have some back pain but it has since resolved. No other new complaints. \par \par 4/29/21: Doing better. No abd pain anymore. Repeat CT abd done on 3/29 showed no intra-abd pathology. LE swelling. Had repeat doppler and echo which were normal. \par \par 6/9/21: Pt returns for follow up. She is scheduled for treatment today. Pt states that over last ~3 weeks she has developed severe LE edema. Both extremities are swollen all the way up to her proximal thighs B/L. She has been evaluated by her vascular doctor. No DVT's noted on US (per patient report). she remains on Eliquis 5 mg BID. She was started on Lasix 40mg daily which has little to no impact on edema. She states that she is very uncomfortable and finds it difficult to ambulate\par \par 7/22/21: Patient came in for follow-up after recent hospitalization on 6/14/21- 6/28/21 due to a fall and low oxygen saturation.  While hospitalized patient was place in supplemental oxygen.   Patient's hospital course was complicated by platelet counts,  bone ivy was done and since came back normal.  Patient is currently on 2L of supplemental oxygen.  She complain of fatigue and SOB.  Patient requested to reschedule today's treatment.   Patient denies diarrhea, rash and itching.  \par \par 8/18/21: Pt returns for follow up. She is O2 dependent. She states she is feeling better. Offers no complaints \par \par 9/30/21: Doing well. On and off O2 use. No pain.\par \par  [de-identified] : adeno ca

## 2021-10-02 NOTE — PHYSICAL EXAM
[de-identified] : clear bilaterally.   no adventitious breath sounds [de-identified] : steady gait

## 2021-10-02 NOTE — ASSESSMENT
Pt called to request assistance w/cost of canagliflozin (INVOKANA) 300 MG TABS tablet or a Rx for a cheaper medication. Pt stated he is out medicine but the Rx cost too much for his fixed income and he is unsure of what to do. Please advise.     #[phone] [FreeTextEntry1] : 66 yo w  diagnosed lung adeno ca\par Stage IIIC by imaging. Large volume of disease and hence, not started on RT\par Started carbo/Alimta/Pem given Q 3 weeks schedule. was on maintenance alimta/keytruda. Alimta was subsequently discontinued due to severe fatigue. She remains on Keytruda maintenance.\par NGS revealed KRAS G12V mut, low TMB and KIM. PDL1 1%.\par Using Oxycodone 20 mg as needed. Pt follows with pain md. \par DVT on Eliquis. Repeat doppler shows no DVT\par Pt has stopped smoking. Congratulated pt and encouraged her to stay off of tobacco\par Due to patient's fatigue and recent hospitalization Alimta will be discontinue.\par Due for scans- will order\par COVID vaccinated- J&J.\par

## 2021-10-02 NOTE — REVIEW OF SYSTEMS
[Fatigue] : fatigue [Recent Change In Weight] : ~T recent weight change [Cough] : cough [SOB on Exertion] : shortness of breath during exertion [Negative] : Allergic/Immunologic [Lower Ext Edema] : no lower extremity edema [Abdominal Pain] : no abdominal pain [Vomiting] : no vomiting [Diarrhea] : no diarrhea

## 2021-10-02 NOTE — PHYSICAL EXAM
[Restricted in physically strenuous activity but ambulatory and able to carry out work of a light or sedentary nature] : Status 1- Restricted in physically strenuous activity but ambulatory and able to carry out work of a light or sedentary nature, e.g., light house work, office work [Thin] : thin [Normal] : grossly intact [de-identified] : clear bilaterally.   no adventitious breath sounds [de-identified] : steady gait

## 2021-10-02 NOTE — ASSESSMENT
[Palliative] : Goals of care discussed with patient: Palliative [FreeTextEntry1] : 66 yo w  diagnosed lung adeno ca\par Stage IIIC by imaging. Large volume of disease and hence, not started on RT\par Started carbo/Alimta/Pem given Q 3 weeks schedule. was on maintenance alimta/keytruda. Alimta was subsequently discontinued due to severe fatigue. She remains on Keytruda maintenance.\par NGS revealed KRAS G12V mut, low TMB and KIM. PDL1 1%.\par Using Oxycodone 20 mg as needed. Pt follows with pain md. \par DVT on Eliquis. Repeat doppler shows no DVT\par Pt has stopped smoking. Congratulated pt and encouraged her to stay off of tobacco\par Due to patient's fatigue and recent hospitalization Alimta will be discontinue.\par Due for scans- will order\par COVID vaccinated- J&J.\par

## 2021-10-02 NOTE — REVIEW OF SYSTEMS
[Lower Ext Edema] : no lower extremity edema [Abdominal Pain] : no abdominal pain [Vomiting] : no vomiting [Diarrhea] : no diarrhea

## 2021-10-02 NOTE — HISTORY OF PRESENT ILLNESS
[Disease: _____________________] : Disease: [unfilled] [N: ___] : N[unfilled] [M: ___] : M[unfilled] [AJCC Stage: ____] : AJCC Stage: [unfilled] [de-identified] : Ms. Travis is a 65-year-old female with a history of recently diagnosed non-small cell lung adenocarcinoma of left lung, pulmonary embolus on apixaban, HTN, chronic pancreatitis s/p Jeffery procedure (2019), history of ARDS (2015), GERD, chronic pain on opioids, and s/p vocal cord polypectomy who presents for consultation visit\par Brief hx: Pt was recently hospitalized in mid-Feb 2020 to [facility] for dyspnea, left-sided back pain, and fatigue with loss of appetite. Found on CTPA (February 2020) to have a large filling defect in the L main pulmonary artery extending into the left upper lobar and left interlobar pulm arteries with complete opacification of the LLL pulm artery. There were thin linear filling defects in the bifurcation of the right upper pulmonary artery and the right interlobar pulmonary arteries extending to the right lower pulmonary artery, which appeared chronic. CT also showed mediastinal adenopathy, including left paratracheal, subcarinal, and left hilar. There is also a wedge shaped opacity in the posterior left lower lobe suspicious for malignancy. There is centrilobular emphysema. 2D-echo (Feb 2020) with normal LV systolic function, mild concentric LVH, normal LA, RV enlargement with normal RV systolic function, and estimated PASP 39 consistent with borderline pulmonary hypertension. She was started on heparin infusion and discharged on Apixaban. Hypercoagulable workup negative, including Factor V Leiden, Prothrombin gene mutation, and lupus anticoagulant. \par \par She had EBUS/TBNA on Feb 19, 2020, found to have adenocarcinoma in right and left paratracheal and subcarinal lymph nodes (R4, L4, level 7). She is pending PFTs, PET/CT, brain MRI. Patient was discharged on Oxycodone ER BID and Tylenol prn. She is not constipated and is on a bowel regimen with polyethylene glycol. Still does not have a good appetite. She has lost 15 lbs over last 3 months. No fevers, chills, or chest pain. Reports occasional cough for which she takes benzonatate perle about once daily. Declining influenza vaccination. Takes Duonebs twice daily as prescribed, but denies dyspnea. \par \par Last colonoscopy in 2018 normal. Mammogram in June 2019 negative. No personal or family history of miscarriages or spontaneous abortions. Reports history of smoking but denies any history of COPD or lung cancer. No family history of malignancy, sarcoidosis, or TB. No recent prolonged immobility.\par \par Of note, active smoker, quit end of last year, on and off 4-6 cigarettes/day. She had CXR for lung cancer screening in the 1980s but nothing recently. Denies etoh use. \par \par 4/6/20: No new complaints. Persistent pain in the chest and occasional cough. COntinues to lose weight,. No other constitutional symptoms. \par \par 4/16/20: Pain in the chest improved. She has been nauseous and c/o abd cramps since starting chemo. She has been taking Zofran with minimal benefit. She has not been using Reglan because she was told not to by nutritionist. She has lost 2 lbs. \par \par 5/6/2020: Pt being seen in treatment area. Pt reported that she tolerated the 2nd cycle better with the change in premeds and adding dexamethasone following the treatment for 2 days. She still experiences days of severe nausea and vomiting but not continuously. Pt has has normal bowel function. She denies SOB/ slight fatigue. Denies pain\par \par 5/20/2020: Pt being seen in treatment area. Pt is here today for cycle 3. She states that for the last 2 days she has been unable to keep anything down. She vomited the entire 2 days. This is an ongoing and chronic problem and is unrelated to chemo. She has been evaluated by GI without discovering etiology for it. Initial brain imaging (-) for metastaic disease. No headaches or dizziness. Pt states it always lasts 2 days and spontaneously resolves. Today she states she was able to tolerate broth and mandarin oranges. No fever. No c/o of cough/CP/hemoptysis or SOB. \par  \par \par 8/12/20: On maintenance now. Scans in June 2020 shows \par \par 9/2/2020: Pt seen in treatment room. Feeling well. Previously experiencing significant nausea and vomiting. Resolved. Now using omeprazole on BID dosing. No new complaints\par \par 9/23/2020: Pt seen in treatment room. SHe has been experiencing malaise and significant diarrhea since friday. Appetite poor. (+) weight loss. No fever but feels very washed out. Had scans last friday as well. \par \par 11/11/2020: Pt returns for follow up. Patient had called last week to say she was canceling her treatment and office visit . Dr. Cage spoke with the patient who said that she was having pain and that her pancreas is acting up. Patient has a history of chronic pancreatitis. As per patient she has been in touch with her PCP and GI doctor. She was rescheduled for this week. She states that as suddenly as the pain came on, it left the same way. She has not had any problems since. SHe reports she had follow up with pain management doctor as well. No other complaints\par \par 12/24/20: Left oral swelling. Has lost multiple teeth spontaneously. Has appt with oral surgeon. Has gained some weight. Has stopped smoking.\par \par 2/3/21: Pt has been fatigued, she is stressed because her friends (who she has had no contact with recently) are sick with COVID. \par \par 3/17/21: Pt seen in treatment room. Pt recently underwent a hemorrhoidectomy in the OR. She is still recovering and reports continued post op pain. Received call from Radiologist following recent CT imaging. New left retroperitoneal gas of uncertain etiology. These findings were reported to the patient and emailed to surgical team by me . Pt states that she has follow up scheduled next week but it is with covering physician as her doctor is now on maternity leave. She did have some back pain but it has since resolved. No other new complaints. \par \par 4/29/21: Doing better. No abd pain anymore. Repeat CT abd done on 3/29 showed no intra-abd pathology. LE swelling. Had repeat doppler and echo which were normal. \par \par 6/9/21: Pt returns for follow up. She is scheduled for treatment today. Pt states that over last ~3 weeks she has developed severe LE edema. Both extremities are swollen all the way up to her proximal thighs B/L. She has been evaluated by her vascular doctor. No DVT's noted on US (per patient report). she remains on Eliquis 5 mg BID. She was started on Lasix 40mg daily which has little to no impact on edema. She states that she is very uncomfortable and finds it difficult to ambulate\par \par 7/22/21: Patient came in for follow-up after recent hospitalization on 6/14/21- 6/28/21 due to a fall and low oxygen saturation.  While hospitalized patient was place in supplemental oxygen.   Patient's hospital course was complicated by platelet counts,  bone ivy was done and since came back normal.  Patient is currently on 2L of supplemental oxygen.  She complain of fatigue and SOB.  Patient requested to reschedule today's treatment.   Patient denies diarrhea, rash and itching.  \par \par 8/18/21: Pt returns for follow up. She is O2 dependent. She states she is feeling better. Offers no complaints \par \par 9/30/21: Doing well. On and off O2 use. No pain.\par \par  [de-identified] : adeno ca

## 2021-10-06 ENCOUNTER — APPOINTMENT (OUTPATIENT)
Dept: PULMONOLOGY | Facility: CLINIC | Age: 66
End: 2021-10-06

## 2021-10-06 ENCOUNTER — APPOINTMENT (OUTPATIENT)
Dept: CT IMAGING | Facility: IMAGING CENTER | Age: 66
End: 2021-10-06
Payer: MEDICAID

## 2021-10-06 ENCOUNTER — OUTPATIENT (OUTPATIENT)
Dept: OUTPATIENT SERVICES | Facility: HOSPITAL | Age: 66
LOS: 1 days | End: 2021-10-06
Payer: COMMERCIAL

## 2021-10-06 DIAGNOSIS — Z96.612 PRESENCE OF LEFT ARTIFICIAL SHOULDER JOINT: Chronic | ICD-10-CM

## 2021-10-06 DIAGNOSIS — K86.1 OTHER CHRONIC PANCREATITIS: ICD-10-CM

## 2021-10-06 DIAGNOSIS — R10.9 UNSPECIFIED ABDOMINAL PAIN: ICD-10-CM

## 2021-10-06 DIAGNOSIS — Z96.641 PRESENCE OF RIGHT ARTIFICIAL HIP JOINT: Chronic | ICD-10-CM

## 2021-10-06 DIAGNOSIS — Z98.890 OTHER SPECIFIED POSTPROCEDURAL STATES: Chronic | ICD-10-CM

## 2021-10-06 DIAGNOSIS — Z98.89 OTHER SPECIFIED POSTPROCEDURAL STATES: Chronic | ICD-10-CM

## 2021-10-06 PROCEDURE — 74177 CT ABD & PELVIS W/CONTRAST: CPT

## 2021-10-06 PROCEDURE — 74177 CT ABD & PELVIS W/CONTRAST: CPT | Mod: 26

## 2021-10-06 PROCEDURE — 71260 CT THORAX DX C+: CPT | Mod: 26

## 2021-10-06 PROCEDURE — 71260 CT THORAX DX C+: CPT

## 2021-10-12 ENCOUNTER — APPOINTMENT (OUTPATIENT)
Dept: SURGERY | Facility: CLINIC | Age: 66
End: 2021-10-12
Payer: MEDICAID

## 2021-10-12 VITALS
HEART RATE: 73 BPM | SYSTOLIC BLOOD PRESSURE: 103 MMHG | DIASTOLIC BLOOD PRESSURE: 64 MMHG | OXYGEN SATURATION: 90 % | RESPIRATION RATE: 16 BRPM

## 2021-10-12 DIAGNOSIS — K30 FUNCTIONAL DYSPEPSIA: ICD-10-CM

## 2021-10-12 PROCEDURE — 99214 OFFICE O/P EST MOD 30 MIN: CPT

## 2021-10-12 PROCEDURE — 99072 ADDL SUPL MATRL&STAF TM PHE: CPT

## 2021-10-12 RX ORDER — ONDANSETRON 4 MG/1
4 TABLET ORAL EVERY 8 HOURS
Qty: 90 | Refills: 0 | Status: ACTIVE | COMMUNITY
Start: 2021-10-12 | End: 1900-01-01

## 2021-10-12 RX ORDER — ONDANSETRON 8 MG/1
8 TABLET ORAL 3 TIMES DAILY
Qty: 180 | Refills: 1 | Status: DISCONTINUED | COMMUNITY
Start: 2019-05-28 | End: 2021-10-12

## 2021-10-14 NOTE — ASSESSMENT
[FreeTextEntry1] : 66 y/o F, s/p Jeffery procedure 5/2019 for chronic pancreatitis. Was diagnosed with non-small cell lung cancer in Feb 2020 s/p chemo, now on immunotherapy. Reports recent onset of abdominal pain, loss of appetite/ nausea and associated weight loss.  Normal BMs on Creon. \par \par Plan: \par Restart on Megace for appetite, Erythromycin for DGE, Zofran for nausea\par Already taking omeprazole BID\par Pt will call my office in 2 weeks to let me us how she is doing.  RTC in 2 months.

## 2021-10-14 NOTE — HISTORY OF PRESENT ILLNESS
[de-identified] : Ms. HIMANSHU VILLEGAS is a 65 year old female presents for follow-up visit. Patient well known to me with history of chronic calcific pancreatitis, and is s/p Jeffery procedure on 5/8/19. She did great post-operatively and no longer required recurrent hospital admissions for pancreatitis/pain control after surgery. Unfortunately she was diagnosed with non-small cell lung cancer stage IIIB, in February 2020. She is being followed by medical oncology for this and underwent chemotherapy, followed by immunotherapy (currently on maintenance Keytruda). She reports recent onset of pancreatic pain, and also weight loss, decreased appetite. Sh reports normal bowel movements while on Creon 3x daily with meals. \par CT abdomen on 10/6/21 performed for further evaluation of her unexplained pain, with findings again noting chronic pancreatitis. She report

## 2021-10-14 NOTE — PHYSICAL EXAM
[Normal] : oriented to person, place and time, with appropriate affect [de-identified] : thin appearing female, well developing, no acute distress  [de-identified] : anicteric  [de-identified] : normal respiratory effort  [de-identified] : no deformities appreciated

## 2021-10-20 ENCOUNTER — OUTPATIENT (OUTPATIENT)
Dept: OUTPATIENT SERVICES | Facility: HOSPITAL | Age: 66
LOS: 1 days | Discharge: HOME | End: 2021-10-20

## 2021-10-20 DIAGNOSIS — Z96.612 PRESENCE OF LEFT ARTIFICIAL SHOULDER JOINT: Chronic | ICD-10-CM

## 2021-10-20 DIAGNOSIS — C34.90 MALIGNANT NEOPLASM OF UNSPECIFIED PART OF UNSPECIFIED BRONCHUS OR LUNG: ICD-10-CM

## 2021-10-20 DIAGNOSIS — Z98.890 OTHER SPECIFIED POSTPROCEDURAL STATES: Chronic | ICD-10-CM

## 2021-10-20 DIAGNOSIS — Z98.89 OTHER SPECIFIED POSTPROCEDURAL STATES: Chronic | ICD-10-CM

## 2021-10-20 DIAGNOSIS — Z96.641 PRESENCE OF RIGHT ARTIFICIAL HIP JOINT: Chronic | ICD-10-CM

## 2021-10-21 ENCOUNTER — LABORATORY RESULT (OUTPATIENT)
Age: 66
End: 2021-10-21

## 2021-10-21 ENCOUNTER — APPOINTMENT (OUTPATIENT)
Dept: INFUSION THERAPY | Facility: HOSPITAL | Age: 66
End: 2021-10-21

## 2021-10-21 ENCOUNTER — APPOINTMENT (OUTPATIENT)
Dept: HEMATOLOGY ONCOLOGY | Facility: CLINIC | Age: 66
End: 2021-10-21

## 2021-10-22 ENCOUNTER — RX RENEWAL (OUTPATIENT)
Age: 66
End: 2021-10-22

## 2021-10-25 ENCOUNTER — RX RENEWAL (OUTPATIENT)
Age: 66
End: 2021-10-25

## 2021-11-05 ENCOUNTER — OUTPATIENT (OUTPATIENT)
Dept: OUTPATIENT SERVICES | Facility: HOSPITAL | Age: 66
LOS: 1 days | Discharge: HOME | End: 2021-11-05

## 2021-11-05 DIAGNOSIS — Z98.890 OTHER SPECIFIED POSTPROCEDURAL STATES: Chronic | ICD-10-CM

## 2021-11-05 DIAGNOSIS — Z96.641 PRESENCE OF RIGHT ARTIFICIAL HIP JOINT: Chronic | ICD-10-CM

## 2021-11-05 DIAGNOSIS — Z96.612 PRESENCE OF LEFT ARTIFICIAL SHOULDER JOINT: Chronic | ICD-10-CM

## 2021-11-05 DIAGNOSIS — C34.90 MALIGNANT NEOPLASM OF UNSPECIFIED PART OF UNSPECIFIED BRONCHUS OR LUNG: ICD-10-CM

## 2021-11-05 DIAGNOSIS — Z98.89 OTHER SPECIFIED POSTPROCEDURAL STATES: Chronic | ICD-10-CM

## 2021-11-08 ENCOUNTER — NON-APPOINTMENT (OUTPATIENT)
Age: 66
End: 2021-11-08

## 2021-11-08 DIAGNOSIS — E87.6 HYPOKALEMIA: ICD-10-CM

## 2021-11-08 RX ORDER — POTASSIUM CHLORIDE 750 MG/1
10 TABLET, FILM COATED, EXTENDED RELEASE ORAL
Qty: 90 | Refills: 1 | Status: ACTIVE | COMMUNITY
Start: 2021-05-21 | End: 1900-01-01

## 2021-11-09 ENCOUNTER — INPATIENT (INPATIENT)
Facility: HOSPITAL | Age: 66
LOS: 5 days | Discharge: HOME | DRG: 189 | End: 2021-11-15
Attending: STUDENT IN AN ORGANIZED HEALTH CARE EDUCATION/TRAINING PROGRAM | Admitting: HOSPITALIST
Payer: MEDICARE

## 2021-11-09 ENCOUNTER — NON-APPOINTMENT (OUTPATIENT)
Age: 66
End: 2021-11-09

## 2021-11-09 VITALS
OXYGEN SATURATION: 98 % | TEMPERATURE: 98 F | SYSTOLIC BLOOD PRESSURE: 158 MMHG | RESPIRATION RATE: 18 BRPM | DIASTOLIC BLOOD PRESSURE: 91 MMHG | WEIGHT: 110.01 LBS | HEART RATE: 123 BPM | HEIGHT: 64 IN

## 2021-11-09 DIAGNOSIS — Z98.890 OTHER SPECIFIED POSTPROCEDURAL STATES: Chronic | ICD-10-CM

## 2021-11-09 DIAGNOSIS — Z96.641 PRESENCE OF RIGHT ARTIFICIAL HIP JOINT: Chronic | ICD-10-CM

## 2021-11-09 DIAGNOSIS — J43.9 EMPHYSEMA, UNSPECIFIED: ICD-10-CM

## 2021-11-09 DIAGNOSIS — K85.90 ACUTE PANCREATITIS WITHOUT NECROSIS OR INFECTION, UNSPECIFIED: ICD-10-CM

## 2021-11-09 DIAGNOSIS — I10 ESSENTIAL (PRIMARY) HYPERTENSION: ICD-10-CM

## 2021-11-09 DIAGNOSIS — Z98.89 OTHER SPECIFIED POSTPROCEDURAL STATES: Chronic | ICD-10-CM

## 2021-11-09 DIAGNOSIS — I26.99 OTHER PULMONARY EMBOLISM WITHOUT ACUTE COR PULMONALE: ICD-10-CM

## 2021-11-09 DIAGNOSIS — C34.90 MALIGNANT NEOPLASM OF UNSPECIFIED PART OF UNSPECIFIED BRONCHUS OR LUNG: ICD-10-CM

## 2021-11-09 DIAGNOSIS — Z29.9 ENCOUNTER FOR PROPHYLACTIC MEASURES, UNSPECIFIED: ICD-10-CM

## 2021-11-09 DIAGNOSIS — K21.9 GASTRO-ESOPHAGEAL REFLUX DISEASE WITHOUT ESOPHAGITIS: ICD-10-CM

## 2021-11-09 DIAGNOSIS — Z96.612 PRESENCE OF LEFT ARTIFICIAL SHOULDER JOINT: Chronic | ICD-10-CM

## 2021-11-09 DIAGNOSIS — R06.02 SHORTNESS OF BREATH: ICD-10-CM

## 2021-11-09 DIAGNOSIS — J96.01 ACUTE RESPIRATORY FAILURE WITH HYPOXIA: ICD-10-CM

## 2021-11-09 LAB
ALBUMIN SERPL ELPH-MCNC: 2.5 G/DL — LOW (ref 3.3–5)
ALP SERPL-CCNC: 172 U/L — HIGH (ref 40–120)
ALT FLD-CCNC: 19 U/L — SIGNIFICANT CHANGE UP (ref 10–45)
ANION GAP SERPL CALC-SCNC: 17 MMOL/L — SIGNIFICANT CHANGE UP (ref 5–17)
AST SERPL-CCNC: 28 U/L — SIGNIFICANT CHANGE UP (ref 10–40)
BASE EXCESS BLDV CALC-SCNC: 0 MMOL/L — SIGNIFICANT CHANGE UP (ref -2–2)
BASOPHILS # BLD AUTO: 0.04 K/UL — SIGNIFICANT CHANGE UP (ref 0–0.2)
BASOPHILS NFR BLD AUTO: 0.5 % — SIGNIFICANT CHANGE UP (ref 0–2)
BILIRUB SERPL-MCNC: 0.4 MG/DL — SIGNIFICANT CHANGE UP (ref 0.2–1.2)
BUN SERPL-MCNC: 18 MG/DL — SIGNIFICANT CHANGE UP (ref 7–23)
CA-I SERPL-SCNC: 1.14 MMOL/L — LOW (ref 1.15–1.33)
CALCIUM SERPL-MCNC: 8.3 MG/DL — LOW (ref 8.4–10.5)
CHLORIDE BLDV-SCNC: 103 MMOL/L — SIGNIFICANT CHANGE UP (ref 96–108)
CHLORIDE SERPL-SCNC: 103 MMOL/L — SIGNIFICANT CHANGE UP (ref 96–108)
CO2 BLDV-SCNC: 26 MMOL/L — SIGNIFICANT CHANGE UP (ref 22–26)
CO2 SERPL-SCNC: 19 MMOL/L — LOW (ref 22–31)
CREAT SERPL-MCNC: 0.91 MG/DL — SIGNIFICANT CHANGE UP (ref 0.5–1.3)
EOSINOPHIL # BLD AUTO: 0.03 K/UL — SIGNIFICANT CHANGE UP (ref 0–0.5)
EOSINOPHIL NFR BLD AUTO: 0.4 % — SIGNIFICANT CHANGE UP (ref 0–6)
GAS PNL BLDV: 136 MMOL/L — SIGNIFICANT CHANGE UP (ref 136–145)
GAS PNL BLDV: SIGNIFICANT CHANGE UP
GAS PNL BLDV: SIGNIFICANT CHANGE UP
GLUCOSE BLDV-MCNC: 87 MG/DL — SIGNIFICANT CHANGE UP (ref 70–99)
GLUCOSE SERPL-MCNC: 87 MG/DL — SIGNIFICANT CHANGE UP (ref 70–99)
HCO3 BLDV-SCNC: 25 MMOL/L — SIGNIFICANT CHANGE UP (ref 22–29)
HCT VFR BLD CALC: 39 % — SIGNIFICANT CHANGE UP (ref 34.5–45)
HCT VFR BLDA CALC: 39 % — SIGNIFICANT CHANGE UP (ref 34.5–46.5)
HGB BLD CALC-MCNC: 12.9 G/DL — SIGNIFICANT CHANGE UP (ref 11.7–16.1)
HGB BLD-MCNC: 12.3 G/DL — SIGNIFICANT CHANGE UP (ref 11.5–15.5)
IMM GRANULOCYTES NFR BLD AUTO: 1.5 % — SIGNIFICANT CHANGE UP (ref 0–1.5)
LACTATE BLDV-MCNC: 2.4 MMOL/L — HIGH (ref 0.7–2)
LIDOCAIN IGE QN: 4 U/L — LOW (ref 7–60)
LYMPHOCYTES # BLD AUTO: 2.53 K/UL — SIGNIFICANT CHANGE UP (ref 1–3.3)
LYMPHOCYTES # BLD AUTO: 30.8 % — SIGNIFICANT CHANGE UP (ref 13–44)
MAGNESIUM SERPL-MCNC: 1.8 MG/DL — SIGNIFICANT CHANGE UP (ref 1.6–2.6)
MCHC RBC-ENTMCNC: 28 PG — SIGNIFICANT CHANGE UP (ref 27–34)
MCHC RBC-ENTMCNC: 31.5 GM/DL — LOW (ref 32–36)
MCV RBC AUTO: 88.8 FL — SIGNIFICANT CHANGE UP (ref 80–100)
MONOCYTES # BLD AUTO: 0.66 K/UL — SIGNIFICANT CHANGE UP (ref 0–0.9)
MONOCYTES NFR BLD AUTO: 8 % — SIGNIFICANT CHANGE UP (ref 2–14)
NEUTROPHILS # BLD AUTO: 4.84 K/UL — SIGNIFICANT CHANGE UP (ref 1.8–7.4)
NEUTROPHILS NFR BLD AUTO: 58.8 % — SIGNIFICANT CHANGE UP (ref 43–77)
NRBC # BLD: 0 /100 WBCS — SIGNIFICANT CHANGE UP (ref 0–0)
NT-PROBNP SERPL-SCNC: 1281 PG/ML — HIGH (ref 0–300)
OTHER CELLS CSF MANUAL: 6.8 ML/DL — LOW (ref 18–22)
PCO2 BLDV: 40 MMHG — SIGNIFICANT CHANGE UP (ref 39–42)
PH BLDV: 7.4 — SIGNIFICANT CHANGE UP (ref 7.32–7.43)
PLATELET # BLD AUTO: 524 K/UL — HIGH (ref 150–400)
PO2 BLDV: 25 MMHG — SIGNIFICANT CHANGE UP (ref 25–45)
POTASSIUM BLDV-SCNC: 3.6 MMOL/L — SIGNIFICANT CHANGE UP (ref 3.5–5.1)
POTASSIUM SERPL-MCNC: 3.7 MMOL/L — SIGNIFICANT CHANGE UP (ref 3.5–5.3)
POTASSIUM SERPL-SCNC: 3.7 MMOL/L — SIGNIFICANT CHANGE UP (ref 3.5–5.3)
PROT SERPL-MCNC: 6.9 G/DL — SIGNIFICANT CHANGE UP (ref 6–8.3)
RAPID RVP RESULT: SIGNIFICANT CHANGE UP
RBC # BLD: 4.39 M/UL — SIGNIFICANT CHANGE UP (ref 3.8–5.2)
RBC # FLD: 18.4 % — HIGH (ref 10.3–14.5)
SAO2 % BLDV: 38 % — LOW (ref 67–88)
SARS-COV-2 RNA SPEC QL NAA+PROBE: SIGNIFICANT CHANGE UP
SARS-COV-2 RNA SPEC QL NAA+PROBE: SIGNIFICANT CHANGE UP
SODIUM SERPL-SCNC: 139 MMOL/L — SIGNIFICANT CHANGE UP (ref 135–145)
TROPONIN T, HIGH SENSITIVITY RESULT: 20 NG/L — SIGNIFICANT CHANGE UP (ref 0–51)
WBC # BLD: 8.22 K/UL — SIGNIFICANT CHANGE UP (ref 3.8–10.5)
WBC # FLD AUTO: 8.22 K/UL — SIGNIFICANT CHANGE UP (ref 3.8–10.5)

## 2021-11-09 PROCEDURE — 99285 EMERGENCY DEPT VISIT HI MDM: CPT | Mod: CS

## 2021-11-09 PROCEDURE — 74177 CT ABD & PELVIS W/CONTRAST: CPT | Mod: 26,MA

## 2021-11-09 PROCEDURE — 71045 X-RAY EXAM CHEST 1 VIEW: CPT | Mod: 26

## 2021-11-09 PROCEDURE — 71275 CT ANGIOGRAPHY CHEST: CPT | Mod: 26,MA

## 2021-11-09 PROCEDURE — 99223 1ST HOSP IP/OBS HIGH 75: CPT | Mod: GC,AI

## 2021-11-09 PROCEDURE — 93308 TTE F-UP OR LMTD: CPT | Mod: 26

## 2021-11-09 RX ORDER — PIPERACILLIN AND TAZOBACTAM 4; .5 G/20ML; G/20ML
3.38 INJECTION, POWDER, LYOPHILIZED, FOR SOLUTION INTRAVENOUS ONCE
Refills: 0 | Status: COMPLETED | OUTPATIENT
Start: 2021-11-09 | End: 2021-11-09

## 2021-11-09 RX ORDER — MIRTAZAPINE 45 MG/1
1 TABLET, ORALLY DISINTEGRATING ORAL
Qty: 0 | Refills: 0 | DISCHARGE

## 2021-11-09 RX ORDER — SODIUM CHLORIDE 9 MG/ML
1000 INJECTION, SOLUTION INTRAVENOUS
Refills: 0 | Status: DISCONTINUED | OUTPATIENT
Start: 2021-11-09 | End: 2021-11-11

## 2021-11-09 RX ORDER — APIXABAN 2.5 MG/1
5 TABLET, FILM COATED ORAL
Refills: 0 | Status: DISCONTINUED | OUTPATIENT
Start: 2021-11-09 | End: 2021-11-15

## 2021-11-09 RX ORDER — METOPROLOL TARTRATE 50 MG
100 TABLET ORAL DAILY
Refills: 0 | Status: DISCONTINUED | OUTPATIENT
Start: 2021-11-09 | End: 2021-11-15

## 2021-11-09 RX ORDER — FOLIC ACID 0.8 MG
1 TABLET ORAL DAILY
Refills: 0 | Status: DISCONTINUED | OUTPATIENT
Start: 2021-11-09 | End: 2021-11-15

## 2021-11-09 RX ORDER — LIPASE/PROTEASE/AMYLASE 16-48-48K
1 CAPSULE,DELAYED RELEASE (ENTERIC COATED) ORAL
Qty: 0 | Refills: 0 | DISCHARGE

## 2021-11-09 RX ORDER — ONDANSETRON 8 MG/1
1 TABLET, FILM COATED ORAL
Qty: 0 | Refills: 0 | DISCHARGE

## 2021-11-09 RX ORDER — LIPASE/PROTEASE/AMYLASE 16-48-48K
1 CAPSULE,DELAYED RELEASE (ENTERIC COATED) ORAL
Refills: 0 | Status: DISCONTINUED | OUTPATIENT
Start: 2021-11-09 | End: 2021-11-10

## 2021-11-09 RX ORDER — MORPHINE SULFATE 50 MG/1
4 CAPSULE, EXTENDED RELEASE ORAL ONCE
Refills: 0 | Status: DISCONTINUED | OUTPATIENT
Start: 2021-11-09 | End: 2021-11-09

## 2021-11-09 RX ORDER — PANTOPRAZOLE SODIUM 20 MG/1
40 TABLET, DELAYED RELEASE ORAL
Refills: 0 | Status: DISCONTINUED | OUTPATIENT
Start: 2021-11-09 | End: 2021-11-15

## 2021-11-09 RX ORDER — MIRTAZAPINE 45 MG/1
15 TABLET, ORALLY DISINTEGRATING ORAL AT BEDTIME
Refills: 0 | Status: DISCONTINUED | OUTPATIENT
Start: 2021-11-09 | End: 2021-11-15

## 2021-11-09 RX ORDER — CHOLECALCIFEROL (VITAMIN D3) 125 MCG
1 CAPSULE ORAL
Qty: 0 | Refills: 0 | DISCHARGE

## 2021-11-09 RX ORDER — HYDROMORPHONE HYDROCHLORIDE 2 MG/ML
1 INJECTION INTRAMUSCULAR; INTRAVENOUS; SUBCUTANEOUS EVERY 4 HOURS
Refills: 0 | Status: DISCONTINUED | OUTPATIENT
Start: 2021-11-09 | End: 2021-11-12

## 2021-11-09 RX ORDER — ONDANSETRON 8 MG/1
4 TABLET, FILM COATED ORAL EVERY 4 HOURS
Refills: 0 | Status: DISCONTINUED | OUTPATIENT
Start: 2021-11-09 | End: 2021-11-14

## 2021-11-09 RX ORDER — IPRATROPIUM/ALBUTEROL SULFATE 18-103MCG
3 AEROSOL WITH ADAPTER (GRAM) INHALATION EVERY 6 HOURS
Refills: 0 | Status: DISCONTINUED | OUTPATIENT
Start: 2021-11-09 | End: 2021-11-10

## 2021-11-09 RX ORDER — PIPERACILLIN AND TAZOBACTAM 4; .5 G/20ML; G/20ML
3.38 INJECTION, POWDER, LYOPHILIZED, FOR SOLUTION INTRAVENOUS EVERY 8 HOURS
Refills: 0 | Status: DISCONTINUED | OUTPATIENT
Start: 2021-11-09 | End: 2021-11-15

## 2021-11-09 RX ORDER — ONDANSETRON 8 MG/1
4 TABLET, FILM COATED ORAL EVERY 8 HOURS
Refills: 0 | Status: DISCONTINUED | OUTPATIENT
Start: 2021-11-09 | End: 2021-11-09

## 2021-11-09 RX ORDER — PREGABALIN 225 MG/1
1 CAPSULE ORAL
Qty: 0 | Refills: 0 | DISCHARGE

## 2021-11-09 RX ORDER — ACETAMINOPHEN 500 MG
650 TABLET ORAL EVERY 6 HOURS
Refills: 0 | Status: DISCONTINUED | OUTPATIENT
Start: 2021-11-09 | End: 2021-11-15

## 2021-11-09 RX ORDER — OXYCODONE HYDROCHLORIDE 5 MG/1
1 TABLET ORAL
Qty: 0 | Refills: 0 | DISCHARGE

## 2021-11-09 RX ORDER — HYDROMORPHONE HYDROCHLORIDE 2 MG/ML
1 INJECTION INTRAMUSCULAR; INTRAVENOUS; SUBCUTANEOUS ONCE
Refills: 0 | Status: DISCONTINUED | OUTPATIENT
Start: 2021-11-09 | End: 2021-11-09

## 2021-11-09 RX ORDER — ONDANSETRON 8 MG/1
4 TABLET, FILM COATED ORAL ONCE
Refills: 0 | Status: COMPLETED | OUTPATIENT
Start: 2021-11-09 | End: 2021-11-09

## 2021-11-09 RX ORDER — HYDROMORPHONE HYDROCHLORIDE 2 MG/ML
0.5 INJECTION INTRAMUSCULAR; INTRAVENOUS; SUBCUTANEOUS EVERY 4 HOURS
Refills: 0 | Status: DISCONTINUED | OUTPATIENT
Start: 2021-11-09 | End: 2021-11-12

## 2021-11-09 RX ORDER — LANOLIN ALCOHOL/MO/W.PET/CERES
3 CREAM (GRAM) TOPICAL AT BEDTIME
Refills: 0 | Status: DISCONTINUED | OUTPATIENT
Start: 2021-11-09 | End: 2021-11-15

## 2021-11-09 RX ORDER — AMLODIPINE BESYLATE 2.5 MG/1
10 TABLET ORAL DAILY
Refills: 0 | Status: DISCONTINUED | OUTPATIENT
Start: 2021-11-09 | End: 2021-11-15

## 2021-11-09 RX ORDER — SODIUM CHLORIDE 9 MG/ML
1000 INJECTION, SOLUTION INTRAVENOUS ONCE
Refills: 0 | Status: COMPLETED | OUTPATIENT
Start: 2021-11-09 | End: 2021-11-09

## 2021-11-09 RX ADMIN — MIRTAZAPINE 15 MILLIGRAM(S): 45 TABLET, ORALLY DISINTEGRATING ORAL at 22:44

## 2021-11-09 RX ADMIN — MORPHINE SULFATE 4 MILLIGRAM(S): 50 CAPSULE, EXTENDED RELEASE ORAL at 12:33

## 2021-11-09 RX ADMIN — MORPHINE SULFATE 4 MILLIGRAM(S): 50 CAPSULE, EXTENDED RELEASE ORAL at 13:00

## 2021-11-09 RX ADMIN — HYDROMORPHONE HYDROCHLORIDE 1 MILLIGRAM(S): 2 INJECTION INTRAMUSCULAR; INTRAVENOUS; SUBCUTANEOUS at 13:04

## 2021-11-09 RX ADMIN — SODIUM CHLORIDE 1000 MILLILITER(S): 9 INJECTION, SOLUTION INTRAVENOUS at 12:32

## 2021-11-09 RX ADMIN — ONDANSETRON 4 MILLIGRAM(S): 8 TABLET, FILM COATED ORAL at 12:36

## 2021-11-09 RX ADMIN — HYDROMORPHONE HYDROCHLORIDE 1 MILLIGRAM(S): 2 INJECTION INTRAMUSCULAR; INTRAVENOUS; SUBCUTANEOUS at 22:22

## 2021-11-09 RX ADMIN — HYDROMORPHONE HYDROCHLORIDE 1 MILLIGRAM(S): 2 INJECTION INTRAMUSCULAR; INTRAVENOUS; SUBCUTANEOUS at 13:27

## 2021-11-09 RX ADMIN — APIXABAN 5 MILLIGRAM(S): 2.5 TABLET, FILM COATED ORAL at 19:31

## 2021-11-09 RX ADMIN — PIPERACILLIN AND TAZOBACTAM 25 GRAM(S): 4; .5 INJECTION, POWDER, LYOPHILIZED, FOR SOLUTION INTRAVENOUS at 22:44

## 2021-11-09 RX ADMIN — ONDANSETRON 4 MILLIGRAM(S): 8 TABLET, FILM COATED ORAL at 18:21

## 2021-11-09 RX ADMIN — PIPERACILLIN AND TAZOBACTAM 200 GRAM(S): 4; .5 INJECTION, POWDER, LYOPHILIZED, FOR SOLUTION INTRAVENOUS at 19:31

## 2021-11-09 RX ADMIN — HYDROMORPHONE HYDROCHLORIDE 1 MILLIGRAM(S): 2 INJECTION INTRAMUSCULAR; INTRAVENOUS; SUBCUTANEOUS at 18:22

## 2021-11-09 NOTE — CHART NOTE - NSCHARTNOTEFT_GEN_A_CORE
Search Terms: Sakshi Travis, 1955Search Date: 11/09/2021 19:20:49 PM  Searching on behalf of: 05 - [facility]  The Drug Utilization Report below displays all of the controlled substance prescriptions, if any, that your patient has filled in the last twelve months. The information displayed on this report is compiled from pharmacy submissions to the Department, and accurately reflects the information as submitted by the pharmacies.    This report was requested by: Gauri Ramirez | Reference #: [id number]    Others' Prescriptions  Patient Name: Sakshi TravisBirth Date: 1955  Address: [id number] [place] [id number]: Female  Rx Written	Rx Dispensed	Drug	Quantity	Days Supply	Prescriber Name	Prescriber Alee #	Payment Method	Dispenser  09/29/2021	09/29/2021	oxycodone hcl 30 mg tablet	120	20	Gurjit Mccauley	[id number]	Medicaid	[id number]  09/01/2021	09/09/2021	oxycodone hcl 30 mg tablet	120	20	GarrickGurjit landaverde	[id number]	Medicaid	[id number]  09/01/2021	09/04/2021	methadone hcl 10 mg tablet	60	30	GarrickGurjit de la fuente	[id number]	Medicaid	[id number]  05/12/2021	05/21/2021	methadone hcl 10 mg tablet	60	30	GarrickGurjit de la fuente	[id number]	Medicaid	[id number]  05/12/2021	05/13/2021	oxycodone hcl 30 mg tablet	120	30	GarrickGurjit de la fuente	[id number]	Medicaid	[id number]  04/09/2021	04/12/2021	oxycodone hcl 30 mg tablet	120	20	GarrickGurjit landaverde	[id number]	Medicaid	[id number]  04/09/2021	04/12/2021	pregabalin 50 mg capsule	90	30	Gurjit Mccauley	[id number]	Medicaid	[id number]  03/12/2021	03/18/2021	oxycodone hcl 30 mg tablet	120	20	GarrickGurjit de la fuente	[id number]	Medicaid	[id number]  03/08/2021	03/08/2021	oxycodone hcl 5 mg tablet	20	5	[facility]	[id number]	Medicaid	[id number]  12/04/2020	12/09/2020	oxycodone hcl 30 mg tablet	120	20	Gurjit Mccauley	[id number]	Medicaid	[id number]  11/06/2020	11/21/2020	oxycodone hcl 30 mg tablet	120	20	Gurjit Mccauley	[id number]	Medicaid	[id number]  * - Drugs marked with an asterisk are compound drugs. If the compound drug is made up of more than one controlled substance, then each controlled substance will be a separate row in the table.    ***************************************************************  Gauri Ramirez, PGY2  Internal Medicine   pager: NS: [phone] [facility]: [phone]  ***************************************************************

## 2021-11-09 NOTE — MEDICAL STUDENT ADULT H&P (EDUCATION) - NS MD HP STUD PE VITALS FT
Vital Signs Last 24 Hrs  T(C): 37.1 (09 Nov 2021 17:44), Max: 37.1 (09 Nov 2021 17:44)  T(F): 98.7 (09 Nov 2021 17:44), Max: 98.7 (09 Nov 2021 17:44)  HR: 115 (09 Nov 2021 18:06) (108 - 125)  BP: 157/97 (09 Nov 2021 18:06) (132/91 - 161/111)  BP(mean): --  RR: 16 (09 Nov 2021 18:06) (16 - 25)  SpO2: 100% (09 Nov 2021 18:06) (97% - 100%)

## 2021-11-09 NOTE — ED PROVIDER NOTE - PHYSICAL EXAMINATION
Vital signs reviewed  GENERAL:   HEAD: NCAT  EYES: Anicteric  ENT: MMM  NECK: Supple, non tender  RESPIRATORY: tachypneic, coarse breath sounds   CARDIOVASCULAR: tachycardic   ABDOMEN: Soft. Nondistended. +epigastric ttp  MUSCULOSKELETAL/EXTREMITIES: Brisk cap refill. 2+ radial pulses. No leg edema.  SKIN:  Warm and dry  NEURO: AAOx3. No gross FND.  PSYCHIATRIC: Cooperative. Affect appropriate.

## 2021-11-09 NOTE — MEDICAL STUDENT ADULT H&P (EDUCATION) - NS MD HP STUD ASPLAN PLAN FT
Problem 1: Acute hypoxic respiratory failure  Patient with tachypnea, b/l rales on auscultation. Currently on 2L nasal cannula. Differential includes COPD exacerbation vs. right-sided HF vs. non-small cell lung cancer progression vs. infection.  - CTA (11/9) demonstrated chronic thromboembolic disease but no acute PE  - Infectious workup: blood culture, sputum culture, urine culture, RVP  - Continue w/ 2L O2 nasal cannula  - Echo (11/9) ruled out pericardial effusion. Can do full TTE to further workup.  - Start Zosyn to empirically treat infection, can d/c if infectious workup negative  - Start albuterol/ipratropium nebulizer    Problem 2: Abdominal Pain  Patient with PMHx of chronic pancreatitis (s/p Jeffery procedure 2019) and endorses severe epigastric pain radiating to the back.  - Continue w/ pancrelipase.  - In ED received 1L LR. Start 1000mL D5 LR at 50cc/hr.  - Pain management: PO acetaminophen 650mg q6h for mild pain; IV hydromorphone 0.5mg q4h for moderate pain; IV hydromorphone 1mg q4h for severe pain  - PRN IV ondansetron 4mg q4h for nausea  - Clear liquid diet, can advance as tolerated    Problem 3: Pulmonary Thromboembolism  Patient with history of chronic PE on apixaban. CTA currently demonstrates evidence of chronic thromboembolic disease but no acute PE.  - Continue w/ PO apixaban 5mg BID     Problem 4: COPD  Patient with a history of COPD not currently on any treatment. Formerly on home O2 but has not needed it since July.  - Start albuterol/ipratropium nebulizer as above.  - Continue w/ 2L O2 nasal cannula    Problem 5: HTN  - Continue w/ PO amlodipine 10mg qd, PO metoprolol succinate 100mg qd    Problem 6: GERD  - Continue w/ PO omeprazole 40mg qd    Problem 7: Non-Small Cell Lung Cancer  Patient with a history of non-small cell lung cancer s/p chemotherapy currently on Keytruda maintenance immunotherapy.  - Consider Hem/Onc consult.  - Monitor CBC and vital signs.    Problem 8: Preventive Measure  - DVT PPx: PO apixaban 5mg BID  - Diet: clear liquids, advance as tolerated  - Dispo: home

## 2021-11-09 NOTE — MEDICAL STUDENT ADULT H&P (EDUCATION) - NS MD HP STUD PE NEURO FT
Alert & Oriented X3, Good concentration; Motor Strength 5/5 b/l; Sensation to light touch intact b/l

## 2021-11-09 NOTE — H&P ADULT - NSHPLABSRESULTS_GEN_ALL_CORE
LABS:      The Labs were reviewed by me   The Radiology was reviewed by me    EKG tracing reviewed by me    11-09    139  |  103  |  18  ----------------------------<  87  3.7   |  19<L>  |  0.91    Ca    8.3<L>      09 Nov 2021 12:21  Mg     1.8     11-09    TPro  6.9  /  Alb  2.5<L>  /  TBili  0.4  /  DBili  x   /  AST  28  /  ALT  19  /  AlkPhos  172<H>  11-09    Magnesium, Serum: 1.8 mg/dL (11-09-21 @ 12:21)                                                12.3   8.22  )-----------( 524      ( 09 Nov 2021 12:21 )             39.0     CAPILLARY BLOOD GLUCOSE        Blood Gas Source Venous: Venous (11-09-21 @ 12:19)      Blood Gas Venous - Lactate: 2.4 mmol/L (11-09-21 @ 12:19)      RADIOLOGY & ADDITIONAL TESTS:    Imaging Personally Reviewed:  [ ] YES  [ ] NO

## 2021-11-09 NOTE — MEDICAL STUDENT ADULT H&P (EDUCATION) - NS MD HP STUD ROS GI FT
(+) epigastric pain. No nausea, vomiting, or hematemesis; No diarrhea or constipation. No melena or hematochezia.

## 2021-11-09 NOTE — H&P ADULT - HISTORY OF PRESENT ILLNESS
***************************************************************  Gauri Ashley, PGY2  Internal Medicine   pager: NS: [phone] [facility]: [phone]  ***************************************************************    HIMANSHU VILLEGAS  65y  Female      Patient is a 65y old  Female who presents with a chief complaint of     HPI:    ED Course:    ***************************************************************  Gauri Ramirez, PGY2  Internal Medicine   pager: NS: [phone] [facility]: [phone]  ***************************************************************    HIMANSHU VILLEGAS  65y  Female      Patient is a 65y old  Female who presents with a chief complaint of acute hypoxic respiratory failure    HPI:  Patient is a 65 year old female with a history of HTN, COPD, NSCLC (adenocarcinoma left lung) Stage IIIB (dx 2/2020 s/p chemo followed by immunotherapy, currently on Keytruda maintenance therapy), pulmonary embolus on apixaban, chronic pancreatitis (s/p Jeffery procedure 2019), hx of ARDS (2015), GERD, chronic pain (on opioids), and s/p vocal cord polpectomy who presents with tachynpea and SOB x 4 days, requiring 2L NC (not on home O2). She also has sharp epigastric pain and nausea and vomiting.      Of note, patient was hospitalized 6/14 - 6/27 for acute hypoxic respiratory failure found to have on CTA chronic thromboembolic disease, no acute PE and was treated with zosyn x 7 days, IV Solu-Medrol, diuresed, duonebs with hospital course c/b new pancytopenia requiring 1u pRBC and 3U PLTs.    ED Course:   VS: afebrile, , /111, RR 25, SpO2 98% on 2L NC  Labs: proBNP 1281, pH 7.4/40/25/25, Lactate 2.4   CTA and CT a/p: No acute PE, chronic PE LLL and RLL, thickening of descending colon  TTE bedside: no pericardial effusion  s/p qL LR, dilaudid 1 mg IVP, morphine 4 mg, zofran 4 mg ***************************************************************  Gauri Ramirez, PGY2  Internal Medicine   pager: NS: [phone] [facility]: [phone]  ***************************************************************    HIMANSHU VILLEGAS  65y  Female      Patient is a 65y old  Female who presents with a chief complaint of acute hypoxic respiratory failure    HPI:  Patient is a 65 year old female with a history of HTN, COPD, NSCLC (adenocarcinoma left lung) Stage IIIB (dx 2/2020 s/p chemo followed by immunotherapy, currently on Keytruda maintenance therapy), pulmonary embolus on apixaban, chronic pancreatitis (s/p Jeffery procedure 2019), hx of ARDS (2015), GERD, chronic pain (on opioids), and s/p vocal cord polpectomy who presents with tachypnea and SOB that started last Friday, requiring 2L NC (has home O2, but last used this summer). Her  has been asking her to go to the hospital but she refused until today. She also has sharp epigastric pain that radiates to the back and nausea and vomiting.  Her epigastric pain started 4 weeks ago, and she saw Dr. Valdez who performed at CT a/p with no findings. Her epigastric pain worsened and starting last Friday she had vomiting and nausea with inability to take PO. She also has a headache. She denies CP, diarrhea, constipation, urinary symptoms, fevers, chills, cough, sore throat, rhinorrhea.     Of note, patient was hospitalized 6/14 - 6/27 for acute hypoxic respiratory failure found to have on CTA chronic thromboembolic disease, no acute PE and was treated with zosyn x 7 days, IV Solu-Medrol, diuresed, duonebs with hospital course c/b new pancytopenia requiring 1u pRBC and 3U PLTs.    ED Course:   VS: afebrile, , /111, RR 25, SpO2 98% on 2L NC  Labs: proBNP 1281, pH 7.4/40/25/25, Lactate 2.4, lipase 4.0  CTA and CT a/p: No acute PE, chronic PE LLL and RLL, thickening of descending colon  TTE bedside: no pericardial effusion  s/p qL LR, dilaudid 1 mg IVP, morphine 4 mg, zofran 4 mg

## 2021-11-09 NOTE — MEDICAL STUDENT ADULT H&P (EDUCATION) - NS MD HP STUD PMH FT
Anemia transfusion 2/5/21  ARDS survivor 2015  Chronic abdominal pain   Chronic pancreatitis last episode 4/2019  COPD (chronic obstructive pulmonary disease)   GERD (gastroesophageal reflux disease)   History of adrenal adenoma stable on imaging  HTN (hypertension)   Non-small cell lung cancer (NSCLC) chemo: Alimta/ Keytruda 2/24/2021  Pulmonary embolism   Pulmonary hypertension   Thrombophlebitis superficial right UE during 4/2019 hospital stay, resolved as per pt  Vocal cord polyp removal 2018, benign as per pt.

## 2021-11-09 NOTE — ED PROVIDER NOTE - NS ED ROS FT
Constitutional: No fever, chills.  Eyes:  No visual changes  ENMT:  No neck pain  Cardiac:  No chest pain  Respiratory:  No cough, +SOB  GI:  +nausea, vomiting  :  No dysuria, hematuria  MS:  No back pain.  Neuro:  No headache or lightheadedness

## 2021-11-09 NOTE — H&P ADULT - PROBLEM SELECTOR PLAN 5
Not on any outpatient nebulizers or inhalers, has O2 at home but has not used since this summer  - started duonebs  - c/w O2 as above  - infectious workup as above

## 2021-11-09 NOTE — MEDICAL STUDENT ADULT H&P (EDUCATION) - NS MD HP STUD MEDICATIONS FT
Home Medications:  Creon 24,000 units oral delayed release capsule: 1 cap(s) orally 3 times a day (09 Nov 2021 18:29)  dexamethasone 2 mg oral tablet: 2 days post chemo (09 Nov 2021 18:29)  folic acid 1 mg oral tablet: 1 tab(s) orally once a day (09 Nov 2021 18:29)  Lasix 40 mg oral tablet: 1 tab(s) orally once a day (09 Nov 2021 18:29)  metoprolol succinate 100 mg oral tablet, extended release: 1 tab(s) orally once a day (09 Nov 2021 18:29)  mirtazapine 15 mg oral tablet: 1 tab(s) orally once a day (at bedtime) (09 Nov 2021 18:29)  Norvasc 10 mg oral tablet: 1 tab(s) orally once a day (09 Nov 2021 18:29)  omeprazole 40 mg oral delayed release capsule: 1 cap(s) orally 2 times a day (09 Nov 2021 18:29)  ondansetron 8 mg oral tablet: 1 tab(s) orally 4 times a day, As Needed (09 Nov 2021 18:29)  oxyCODONE 30 mg oral tablet: 1 tab(s) orally every 6 hours, As Needed (09 Nov 2021 18:29)  Vitamin B12: 1 tab(s) orally once a day (09 Nov 2021 18:29)  Vitamin D3: 1 tab(s) orally once a day (09 Nov 2021 18:29)

## 2021-11-09 NOTE — ED PROVIDER NOTE - CLINICAL SUMMARY MEDICAL DECISION MAKING FREE TEXT BOX
66 y/o F with PMHx HTN, COPD, non-small cell lung cancer on chemotherapy (last tx 6/10), GERD, chronic pancreatitis, hx of DVT/PE on Eliquis p/w tachypnea and sob x 4 days ago. tachycardic, tachypnic, satting 95% on RA. epi gastric ttp. eval for pancreatitis, colitis, pe, angina. will eval with labs and imaging and reassess.

## 2021-11-09 NOTE — H&P ADULT - PROBLEM SELECTOR PLAN 4
Hx Stage IIIB (dx 2/2020 s/p chemo now on Keytruda maintenance every 3 weeks)  - No intervention as inpatient at this time  - consider Hem/Onc consult in am

## 2021-11-09 NOTE — H&P ADULT - ATTENDING COMMENTS
65 year old female with PMH HTN, COPD, NSCLC currently on Keytruda, PE on Eliquis and chronic pancreatitis s/p Jeffery procedure who presents with abdominal pain. The patient states she has had the pain for several weeks and saw her surgeon Dr. Molina who performed a CT scan but it was negative for acute findings. Over the weekend, her pain worsened and she began to have vomiting and inability to tolerate PO so she came to the ED. On presentation, CT showed thickening of the descending colon which may be least partially be due to under distention-correlate for signs of colitis however the patient does not have any diarrhea. For now, will treat with analgesics and antiemetics. Additionally, will consult patient's surgeon Dr. Molina for evaluation. The patient is also requiring 2L NC (has home oxygen but does not use it, weaned off of it) however, her saturations are in the high 90s to 100, will wean off as tolerated. Follow up infectious workup, can treat with zosyn for now for possible colitis. Rest of plan as above.    Jose A Convissar, DO  Pager [phone]  If no answer [phone]

## 2021-11-09 NOTE — ED PROVIDER NOTE - ATTENDING CONTRIBUTION TO CARE
Attending MD Hernandez:  I personally have seen and examined this patient.  Resident note reviewed and agree on plan of care and except where noted.  See HPI, PE, and MDM for details.        66 y/o F with PMHx HTN, COPD, non-small cell lung cancer on chemotherapy (last tx 6/10), GERD, chronic pancreatitis, hx of DVT/PE on Eliquis p/w tachypnea and sob x 4 days ago. Exam with tachycardia to 110s, clear lungs anteriorly, ttp epigastrium. Ddx includes PE, PNA, pancreatitis, lung CA tumor burden. Plan for CTA, CT a/p, analgesia, admission

## 2021-11-09 NOTE — H&P ADULT - NSHPREVIEWOFSYSTEMS_GEN_ALL_CORE
REVIEW OF SYSTEMS:  CONSTITUTIONAL: No fever, weight loss, or fatigue  EYES: No eye pain, visual disturbances, or discharge  ENT:  No difficulty hearing, tinnitus, vertigo; No sinus or throat pain  NECK: No pain or stiffness  BREASTS: No pain, masses, or nipple discharge  RESPIRATORY: No cough, wheezing, chills or hemoptysis; No shortness of breath  CARDIOVASCULAR: No chest pain, palpitations, dizziness, or leg swelling  GASTROINTESTINAL: No abdominal or epigastric pain. No nausea, vomiting, or hematemesis; No diarrhea or constipation. No melena or hematochezia.  GENITOURINARY: No dysuria, frequency, hematuria, or incontinence  NEUROLOGICAL: No headaches, memory loss, loss of strength, numbness, or tremors  SKIN: No itching, burning, rashes, or lesions   LYMPH NODES: No enlarged glands  ENDOCRINE: No heat or cold intolerance; No hair loss  MUSCULOSKELETAL: No joint pain or swelling; No muscle, back, or extremity pain  PSYCHIATRIC: No depression, anxiety, mood swings, or difficulty sleeping  HEME/LYMPH: No easy bruising, or bleeding gums  ALLERGY AND IMMUNOLOGIC: No hives or eczema REVIEW OF SYSTEMS:  CONSTITUTIONAL: No fever, weight loss, or fatigue  EYES: No eye pain, visual disturbances, or discharge  ENT:  No difficulty hearing, tinnitus, vertigo; No sinus or throat pain  NECK: No pain or stiffness  BREASTS: No pain, masses, or nipple discharge  RESPIRATORY: No cough, wheezing, chills or hemoptysis; (+) shortness of breath  CARDIOVASCULAR: No chest pain, palpitations, dizziness, or leg swelling  GASTROINTESTINAL: (+) epigastric pain. No nausea, vomiting, or hematemesis; No diarrhea or constipation. No melena or hematochezia.  GENITOURINARY: No dysuria, frequency, hematuria, or incontinence  NEUROLOGICAL: (+) headaches, No memory loss, loss of strength, numbness, or tremors  SKIN: No itching, burning, rashes, or lesions   LYMPH NODES: No enlarged glands  ENDOCRINE: No heat or cold intolerance; No hair loss  MUSCULOSKELETAL: No joint pain or swelling; No muscle, back, or extremity pain  PSYCHIATRIC: No depression, anxiety, mood swings, or difficulty sleeping  HEME/LYMPH: No easy bruising, or bleeding gums  ALLERGY AND IMMUNOLOGIC: No hives or eczema

## 2021-11-09 NOTE — MEDICAL STUDENT ADULT H&P (EDUCATION) - NS MD HP STUD HX OF PRESENT ILLNESS FT
Patient is a 64 y/o F w/ a PMHx of HTN, COPD, GERD, non-small cell lung cancer on chemotherapy, chronic pancreatitis, and chronic PE's on Eliquis, who is presenting with acute onset of shortness of breath and abdominal pain.  Patient is a 64 y/o F w/ a PMHx of HTN, COPD, GERD, non-small cell lung cancer on chemotherapy and immunotherapy (currently on Keytruda), chronic pancreatitis (s/p Jeffery procedure in 2019), chronic PE's on Eliquis, hx of ARDS (2015), who is presenting with acute onset of shortness of breath and abdominal pain. The patient had a previous hospitalization in 06/2021 for acute hypoxic respiratory failure, found to have b/l ground glass opacities and evidence of chronic thromboembolic disease but no acute PE on CT and CTA chest, treated with a course of Zosyn, Lasix, Duoneb, and IV solumedrol. Patient's course was complicated by pancytopenia requiring RBC and platelet transfusions.     The patient's SOB started last Friday (11/5), which prompted her  to ask that she go to the hospital. She refused to go until today (11/9). She reports previously being on home O2 but has not needed it since July. She denies any fevers, chills, cough, cold, wheezing, or chest pain. She reports that the abdominal pain began 4 weeks ago is concentrated in her epigastric region and radiates to the back, which she states is similar in character to her prior pancreatic pain. This prompted her to see her PCP, who ordered a CT A/P which showed no findings. She has also had associated nausea and vomiting since Friday (11/9), and has vomited each day since prior to today. She currently reports a headache as well. She denies any diarrhea, constipation, or urinary symptoms.

## 2021-11-09 NOTE — ED ADULT NURSE NOTE - OBJECTIVE STATEMENT
65 y F pmhx of Lung CA, pancreatitis presents to the ED with abdominal pain radiating to the back, SOB since Friday. On assessment, A&Ox4. Denies lightheadedness, dizziness, headaches, numbness and tingling. Breathing spontaneously and labored. Denies cough and CP. No Peripheral edema. Cap refill 2s. No JVD. Peripheral pulses strong and equal bilaterally. On cardiac monitor. Denies CP, and palpitations. Abdomen soft, nondistended, negative CVA tenderness, positive bowel sounds in all four quadrants. Pt is continent. Denies n/v/d, dysuria, melena and hematuria. IV placed 20g in b/l forearms. Labs drawn and sent. Pt safety maintained. Call bell within reach. Side rails in upward position. Pt awaiting dispo.  R chest wall port unaccessed.

## 2021-11-09 NOTE — ED PROVIDER NOTE - PROGRESS NOTE DETAILS
Rudi Ruiz PGY-2 admission for worsening sob, oxygen requirements, tachycardia and tachypnea with pain control. pt amenable to admission

## 2021-11-09 NOTE — H&P ADULT - PROBLEM SELECTOR PLAN 3
History of DVT/PE on eliquis  - continue home eliquis 5 mf BID  - CTA w/ evidence of chronic thromboembolic disease but no acute PE

## 2021-11-09 NOTE — MEDICAL STUDENT ADULT H&P (EDUCATION) - NS MD HP STUD RESULTS RAD FT
EXAM:  CT ABDOMEN AND PELVIS IC                        EXAM:  CT ANGIO CHEST PULM ART WAWIC        IMPRESSION:  CTA CHEST: No acute pulmonary embolus.    Chronic pulmonary embolus within the left lower lobe pulmonary arteries and within the right lower lobe interlobar artery.    CT abdomen and pelvis: Thickening of the descending colon which may be least partially be due to under distention. Correlate for signs of colitis. Otherwise no acute pathology.    < end of copied text >

## 2021-11-09 NOTE — MEDICAL STUDENT ADULT H&P (EDUCATION) - NS MD HP STUD RESULTS LAB FT
12.3   8.22  )-----------( 524      ( 09 Nov 2021 12:21 )             39.0     11-09    139  |  103  |  18  ----------------------------<  87  3.7   |  19<L>  |  0.91    Ca    8.3<L>      09 Nov 2021 12:21  Mg     1.8     11-09    TPro  6.9  /  Alb  2.5<L>  /  TBili  0.4  /  DBili  x   /  AST  28  /  ALT  19  /  AlkPhos  172<H>  11-09

## 2021-11-09 NOTE — H&P ADULT - PROBLEM SELECTOR PLAN 2
History of chronic pancreatitis s/p Jeffery procedure 2019, follows with Dr. Molina  - c/w pancrelipase  - zofran PRN for nausea  - CLD-->advance diet as tolerated  - for pain: dilaudid 0.5 mg Q4h for moderate and dilaudid 1 mg Q4h for severe  - holding home oxycodone prn  - call Dr. Molina in the morning History of chronic pancreatitis s/p Jeffery procedure 2019, follows with Dr. Molina  - s/p 1L LR in ED, started on D5 LR @50 cc/hr  - c/w pancrelipase  - zofran PRN for nausea  - CLD-->advance diet as tolerated  - for pain: dilaudid 0.5 mg Q4h for moderate and dilaudid 1 mg Q4h for severe  - holding home oxycodone prn  - call Dr. Molina in the morning

## 2021-11-09 NOTE — H&P ADULT - ASSESSMENT
Patient is a 65 year old female with a history of HTN, COPD, NSCLC (adenocarcinoma left lung) Stage IIIB (dx 2/2020 s/p chemo followed by immunotherapy, currently on Keytruda maintenance therapy), pulmonary embolus on apixaban, chronic pancreatitis (s/p Jeffery procedure 2019), hx of ARDS (2015), GERD, chronic pain (on opioids), and s/p vocal cord polpectomy who presents with tachypnea and SOB that started last Friday, requiring 2L NC and epigastric pain with vomiting that started 4 days ago c/f COPD exacerbation vs right-sided HF vs acute pancreatitis vs infectious process.

## 2021-11-09 NOTE — ED ADULT NURSE REASSESSMENT NOTE - NS ED NURSE REASSESS COMMENT FT1
Report received from ISHA Stanton. Pt AAOx4, NAD, resp nonlabored, skin warm/dry, resting comfortably in bed with call bell at bedside. Pt denies headache, dizziness, chest pain, palpitations, SOB, abd pain, n/v/d, urinary symptoms, fevers, chills at this time. Pt awaiting bed. Safety maintained.

## 2021-11-09 NOTE — H&P ADULT - PROBLEM SELECTOR PLAN 8
Dispo: pending PT eval  Diet: CLD-->advance as tolerated  DVT ppx: eliquis    ***************************************************************  Gauri Ashley, PGY2  Internal Medicine   pager: NS: [phone] [facility]: [phone]  ***************************************************************

## 2021-11-09 NOTE — ED PROVIDER NOTE - OBJECTIVE STATEMENT
64 y/o F with PMHx HTN, COPD, non-small cell lung cancer on chemotherapy (last tx 6/10), GERD, chronic pancreatitis, hx of DVT/PE on Eliquis p/w tachypnea and sob x 4 days ago. pt states she was resting when sxs began, then developed intermittent sharp epigastric abd pain with intermittent nausea and vomiting. came to ed today for worsening pain and tachypnea.   pt denies any fever, chills, cp, palpitations, sob, dysuria

## 2021-11-09 NOTE — H&P ADULT - NSHPPHYSICALEXAM_GEN_ALL_CORE
T(C): 36.6 (11-09-21 @ 12:06), Max: 36.6 (11-09-21 @ 11:20)  HR: 108 (11-09-21 @ 14:49) (108 - 125)  BP: 132/91 (11-09-21 @ 14:49) (132/91 - 161/111)  RR: 20 (11-09-21 @ 14:49) (18 - 25)  SpO2: 100% (11-09-21 @ 14:49) (97% - 100%)  Wt(kg): --Vital Signs Last 24 Hrs  T(C): 36.6 (09 Nov 2021 12:06), Max: 36.6 (09 Nov 2021 11:20)  T(F): 97.8 (09 Nov 2021 12:06), Max: 97.9 (09 Nov 2021 11:20)  HR: 108 (09 Nov 2021 14:49) (108 - 125)  BP: 132/91 (09 Nov 2021 14:49) (132/91 - 161/111)  BP(mean): --  RR: 20 (09 Nov 2021 14:49) (18 - 25)  SpO2: 100% (09 Nov 2021 14:49) (97% - 100%)    PHYSICAL EXAM:  GENERAL: NAD, well-groomed, well-developed  HEAD:  Atraumatic, Normocephalic  EYES: EOMI, PERRLA, conjunctiva and sclera clear  ENMT: No tonsillar erythema, exudates, or enlargement; Moist mucous membranes, Good dentition, No lesions  NECK: Supple, No JVD, Normal thyroid  NERVOUS SYSTEM:  Alert & Oriented X3, Good concentration; Motor Strength 5/5 B/L upper and lower extremities; DTRs 2+ intact and symmetric  CHEST/LUNG: Clear to percussion bilaterally; No rales, rhonchi, wheezing, or rubs  HEART: Regular rate and rhythm; No murmurs, rubs, or gallops  ABDOMEN: Soft, Nontender, Nondistended; Bowel sounds present  EXTREMITIES:  2+ Peripheral Pulses, No clubbing, cyanosis, or edema  LYMPH: No lymphadenopathy noted  SKIN: No rashes or lesions    Consultant(s) Notes Reviewed:  [x ] YES  [ ] NO  Care Discussed with Consultants/Other Providers [ x] YES  [ ] NO T(C): 36.6 (11-09-21 @ 12:06), Max: 36.6 (11-09-21 @ 11:20)  HR: 108 (11-09-21 @ 14:49) (108 - 125)  BP: 132/91 (11-09-21 @ 14:49) (132/91 - 161/111)  RR: 20 (11-09-21 @ 14:49) (18 - 25)  SpO2: 100% (11-09-21 @ 14:49) (97% - 100%)  Wt(kg): --Vital Signs Last 24 Hrs  T(C): 36.6 (09 Nov 2021 12:06), Max: 36.6 (09 Nov 2021 11:20)  T(F): 97.8 (09 Nov 2021 12:06), Max: 97.9 (09 Nov 2021 11:20)  HR: 108 (09 Nov 2021 14:49) (108 - 125)  BP: 132/91 (09 Nov 2021 14:49) (132/91 - 161/111)  BP(mean): --  RR: 20 (09 Nov 2021 14:49) (18 - 25)  SpO2: 100% (09 Nov 2021 14:49) (97% - 100%)    PHYSICAL EXAM:  GENERAL: (+) appears in acute distress due to epigastric pain, in a fetal position  HEAD:  Atraumatic, Normocephalic  EYES: EOMI, PERRLA, conjunctiva and sclera clear  ENMT: No tonsillar erythema, exudates, or enlargement; Moist mucous membranes, Good dentition, No lesions  NECK: Supple, No JVD, Normal thyroid  NERVOUS SYSTEM:  Alert & Oriented X3, Good concentration; Motor Strength 5/5 B/L upper and lower extremities; DTRs 2+ intact and symmetric  CHEST/LUNG: (+) inspiratory and expiratory rhonchi   HEART: Regular rate with tachycardia; No murmurs, rubs, or gallops  ABDOMEN: Soft, Nondistended; Bowel sounds present, (+) TTP epigastric area  EXTREMITIES:  2+ Peripheral Pulses, No clubbing, cyanosis, or edema  LYMPH: No lymphadenopathy noted  SKIN: No rashes or lesions    Consultant(s) Notes Reviewed:  [x ] YES  [ ] NO  Care Discussed with Consultants/Other Providers [ x] YES  [ ] NO

## 2021-11-09 NOTE — MEDICAL STUDENT ADULT H&P (EDUCATION) - NS MD HP STUD PSH FT
History of pancreatic surgery Jeffery procedure (5/8/2019)    History of vocal cord polypectomy 1/2018    S/P arthroscopy of shoulder left in 2009    S/P arthroscopy of shoulder right - 2014    S/P hip replacement left - 2010    S/P hip replacement, right May 2016    S/P shoulder replacement, left 2016.

## 2021-11-09 NOTE — H&P ADULT - PROBLEM SELECTOR PLAN 1
Admitted for tachypnea and hypoxia, requiring 2L NC. Etiology unclear but likely multifactorial. DDx includes COPD exacerbation, infectious process, right-sided HF, NSCLC progression.  - Bedside echo ruled out pericardial effusion (11/9)  - CTA w/ evidence of chronic thromboembolic disease not no acute PE (11/9)  - Trend CBC and fever curve  - f/u BCx, sputum cx, UCx, RVP  - Continue NC and wean as tolerated  - started duonebs  - started zosyn, d/c if infectious workup is negative  - f/u TTE

## 2021-11-10 ENCOUNTER — TRANSCRIPTION ENCOUNTER (OUTPATIENT)
Age: 66
End: 2021-11-10

## 2021-11-10 LAB
ALBUMIN SERPL ELPH-MCNC: 2.4 G/DL — LOW (ref 3.3–5)
ALP SERPL-CCNC: 145 U/L — HIGH (ref 40–120)
ALT FLD-CCNC: 23 U/L — SIGNIFICANT CHANGE UP (ref 10–45)
ANION GAP SERPL CALC-SCNC: 18 MMOL/L — HIGH (ref 5–17)
AST SERPL-CCNC: 50 U/L — HIGH (ref 10–40)
BASE EXCESS BLDV CALC-SCNC: 1.8 MMOL/L — SIGNIFICANT CHANGE UP (ref -2–2)
BASOPHILS # BLD AUTO: 0.08 K/UL — SIGNIFICANT CHANGE UP (ref 0–0.2)
BASOPHILS NFR BLD AUTO: 0.9 % — SIGNIFICANT CHANGE UP (ref 0–2)
BILIRUB SERPL-MCNC: 0.3 MG/DL — SIGNIFICANT CHANGE UP (ref 0.2–1.2)
BUN SERPL-MCNC: 13 MG/DL — SIGNIFICANT CHANGE UP (ref 7–23)
CA-I SERPL-SCNC: 1.14 MMOL/L — LOW (ref 1.15–1.33)
CALCIUM SERPL-MCNC: 8.2 MG/DL — LOW (ref 8.4–10.5)
CHLORIDE BLDV-SCNC: 104 MMOL/L — SIGNIFICANT CHANGE UP (ref 96–108)
CHLORIDE SERPL-SCNC: 102 MMOL/L — SIGNIFICANT CHANGE UP (ref 96–108)
CO2 BLDV-SCNC: 27 MMOL/L — HIGH (ref 22–26)
CO2 SERPL-SCNC: 20 MMOL/L — LOW (ref 22–31)
COVID-19 NUCLEOCAPSID GAM AB INTERP: NEGATIVE — SIGNIFICANT CHANGE UP
COVID-19 NUCLEOCAPSID TOTAL GAM ANTIBODY RESULT: 0.08 INDEX — SIGNIFICANT CHANGE UP
COVID-19 SPIKE DOMAIN AB INTERP: NEGATIVE — SIGNIFICANT CHANGE UP
COVID-19 SPIKE DOMAIN ANTIBODY RESULT: 0.75 U/ML — SIGNIFICANT CHANGE UP
CREAT SERPL-MCNC: 0.95 MG/DL — SIGNIFICANT CHANGE UP (ref 0.5–1.3)
EOSINOPHIL # BLD AUTO: 0.2 K/UL — SIGNIFICANT CHANGE UP (ref 0–0.5)
EOSINOPHIL NFR BLD AUTO: 2.1 % — SIGNIFICANT CHANGE UP (ref 0–6)
GAS PNL BLDV: 134 MMOL/L — LOW (ref 136–145)
GAS PNL BLDV: SIGNIFICANT CHANGE UP
GAS PNL BLDV: SIGNIFICANT CHANGE UP
GLUCOSE BLDV-MCNC: 86 MG/DL — SIGNIFICANT CHANGE UP (ref 70–99)
GLUCOSE SERPL-MCNC: 89 MG/DL — SIGNIFICANT CHANGE UP (ref 70–99)
HCO3 BLDV-SCNC: 26 MMOL/L — SIGNIFICANT CHANGE UP (ref 22–29)
HCT VFR BLD CALC: 33.3 % — LOW (ref 34.5–45)
HCT VFR BLDA CALC: 34 % — LOW (ref 34.5–46.5)
HGB BLD CALC-MCNC: 11.3 G/DL — LOW (ref 11.7–16.1)
HGB BLD-MCNC: 11 G/DL — LOW (ref 11.5–15.5)
IMM GRANULOCYTES NFR BLD AUTO: 1.4 % — SIGNIFICANT CHANGE UP (ref 0–1.5)
LACTATE BLDV-MCNC: 2.6 MMOL/L — HIGH (ref 0.7–2)
LACTATE SERPL-SCNC: 1.9 MMOL/L — SIGNIFICANT CHANGE UP (ref 0.7–2)
LACTATE SERPL-SCNC: 2.9 MMOL/L — HIGH (ref 0.7–2)
LYMPHOCYTES # BLD AUTO: 2.73 K/UL — SIGNIFICANT CHANGE UP (ref 1–3.3)
LYMPHOCYTES # BLD AUTO: 29 % — SIGNIFICANT CHANGE UP (ref 13–44)
MAGNESIUM SERPL-MCNC: 1.8 MG/DL — SIGNIFICANT CHANGE UP (ref 1.6–2.6)
MCHC RBC-ENTMCNC: 28.6 PG — SIGNIFICANT CHANGE UP (ref 27–34)
MCHC RBC-ENTMCNC: 33 GM/DL — SIGNIFICANT CHANGE UP (ref 32–36)
MCV RBC AUTO: 86.5 FL — SIGNIFICANT CHANGE UP (ref 80–100)
MONOCYTES # BLD AUTO: 0.87 K/UL — SIGNIFICANT CHANGE UP (ref 0–0.9)
MONOCYTES NFR BLD AUTO: 9.3 % — SIGNIFICANT CHANGE UP (ref 2–14)
NEUTROPHILS # BLD AUTO: 5.39 K/UL — SIGNIFICANT CHANGE UP (ref 1.8–7.4)
NEUTROPHILS NFR BLD AUTO: 57.3 % — SIGNIFICANT CHANGE UP (ref 43–77)
NRBC # BLD: 0 /100 WBCS — SIGNIFICANT CHANGE UP (ref 0–0)
PCO2 BLDV: 37 MMHG — LOW (ref 39–42)
PH BLDV: 7.45 — HIGH (ref 7.32–7.43)
PHOSPHATE SERPL-MCNC: 3 MG/DL — SIGNIFICANT CHANGE UP (ref 2.5–4.5)
PLATELET # BLD AUTO: 440 K/UL — HIGH (ref 150–400)
PO2 BLDV: 67 MMHG — HIGH (ref 25–45)
POTASSIUM BLDV-SCNC: 2.9 MMOL/L — CRITICAL LOW (ref 3.5–5.1)
POTASSIUM SERPL-MCNC: 3.4 MMOL/L — LOW (ref 3.5–5.3)
POTASSIUM SERPL-SCNC: 3.4 MMOL/L — LOW (ref 3.5–5.3)
PROCALCITONIN SERPL-MCNC: 1.39 NG/ML — HIGH (ref 0.02–0.1)
PROT SERPL-MCNC: 6.3 G/DL — SIGNIFICANT CHANGE UP (ref 6–8.3)
RBC # BLD: 3.85 M/UL — SIGNIFICANT CHANGE UP (ref 3.8–5.2)
RBC # FLD: 18.2 % — HIGH (ref 10.3–14.5)
SAO2 % BLDV: 94.5 % — HIGH (ref 67–88)
SARS-COV-2 IGG+IGM SERPL QL IA: 0.08 INDEX — SIGNIFICANT CHANGE UP
SARS-COV-2 IGG+IGM SERPL QL IA: 0.75 U/ML — SIGNIFICANT CHANGE UP
SARS-COV-2 IGG+IGM SERPL QL IA: NEGATIVE — SIGNIFICANT CHANGE UP
SARS-COV-2 IGG+IGM SERPL QL IA: NEGATIVE — SIGNIFICANT CHANGE UP
SODIUM SERPL-SCNC: 140 MMOL/L — SIGNIFICANT CHANGE UP (ref 135–145)
WBC # BLD: 9.4 K/UL — SIGNIFICANT CHANGE UP (ref 3.8–10.5)
WBC # FLD AUTO: 9.4 K/UL — SIGNIFICANT CHANGE UP (ref 3.8–10.5)

## 2021-11-10 PROCEDURE — 93306 TTE W/DOPPLER COMPLETE: CPT | Mod: 26

## 2021-11-10 PROCEDURE — 99233 SBSQ HOSP IP/OBS HIGH 50: CPT | Mod: GC

## 2021-11-10 PROCEDURE — 70553 MRI BRAIN STEM W/O & W/DYE: CPT | Mod: 26

## 2021-11-10 PROCEDURE — 99223 1ST HOSP IP/OBS HIGH 75: CPT

## 2021-11-10 PROCEDURE — 70450 CT HEAD/BRAIN W/O DYE: CPT | Mod: 26

## 2021-11-10 PROCEDURE — 76700 US EXAM ABDOM COMPLETE: CPT | Mod: 26

## 2021-11-10 RX ORDER — LIPASE/PROTEASE/AMYLASE 16-48-48K
2 CAPSULE,DELAYED RELEASE (ENTERIC COATED) ORAL
Refills: 0 | Status: DISCONTINUED | OUTPATIENT
Start: 2021-11-10 | End: 2021-11-15

## 2021-11-10 RX ORDER — SODIUM CHLORIDE 9 MG/ML
500 INJECTION, SOLUTION INTRAVENOUS ONCE
Refills: 0 | Status: COMPLETED | OUTPATIENT
Start: 2021-11-10 | End: 2021-11-10

## 2021-11-10 RX ORDER — IPRATROPIUM/ALBUTEROL SULFATE 18-103MCG
3 AEROSOL WITH ADAPTER (GRAM) INHALATION EVERY 6 HOURS
Refills: 0 | Status: DISCONTINUED | OUTPATIENT
Start: 2021-11-10 | End: 2021-11-10

## 2021-11-10 RX ORDER — MORPHINE SULFATE 50 MG/1
2 CAPSULE, EXTENDED RELEASE ORAL ONCE
Refills: 0 | Status: DISCONTINUED | OUTPATIENT
Start: 2021-11-10 | End: 2021-11-10

## 2021-11-10 RX ORDER — IPRATROPIUM/ALBUTEROL SULFATE 18-103MCG
3 AEROSOL WITH ADAPTER (GRAM) INHALATION EVERY 6 HOURS
Refills: 0 | Status: DISCONTINUED | OUTPATIENT
Start: 2021-11-10 | End: 2021-11-15

## 2021-11-10 RX ORDER — METOCLOPRAMIDE HCL 10 MG
10 TABLET ORAL ONCE
Refills: 0 | Status: COMPLETED | OUTPATIENT
Start: 2021-11-10 | End: 2021-11-10

## 2021-11-10 RX ORDER — SODIUM CHLORIDE 9 MG/ML
1000 INJECTION, SOLUTION INTRAVENOUS ONCE
Refills: 0 | Status: COMPLETED | OUTPATIENT
Start: 2021-11-10 | End: 2021-11-10

## 2021-11-10 RX ORDER — MORPHINE SULFATE 50 MG/1
2 CAPSULE, EXTENDED RELEASE ORAL EVERY 4 HOURS
Refills: 0 | Status: DISCONTINUED | OUTPATIENT
Start: 2021-11-10 | End: 2021-11-13

## 2021-11-10 RX ORDER — INFLUENZA VIRUS VACCINE 15; 15; 15; 15 UG/.5ML; UG/.5ML; UG/.5ML; UG/.5ML
0.5 SUSPENSION INTRAMUSCULAR ONCE
Refills: 0 | Status: DISCONTINUED | OUTPATIENT
Start: 2021-11-10 | End: 2021-11-15

## 2021-11-10 RX ORDER — METOCLOPRAMIDE HCL 10 MG
10 TABLET ORAL EVERY 6 HOURS
Refills: 0 | Status: DISCONTINUED | OUTPATIENT
Start: 2021-11-10 | End: 2021-11-14

## 2021-11-10 RX ADMIN — PIPERACILLIN AND TAZOBACTAM 25 GRAM(S): 4; .5 INJECTION, POWDER, LYOPHILIZED, FOR SOLUTION INTRAVENOUS at 15:21

## 2021-11-10 RX ADMIN — Medication 2 CAPSULE(S): at 20:15

## 2021-11-10 RX ADMIN — PANTOPRAZOLE SODIUM 40 MILLIGRAM(S): 20 TABLET, DELAYED RELEASE ORAL at 05:50

## 2021-11-10 RX ADMIN — HYDROMORPHONE HYDROCHLORIDE 1 MILLIGRAM(S): 2 INJECTION INTRAMUSCULAR; INTRAVENOUS; SUBCUTANEOUS at 23:41

## 2021-11-10 RX ADMIN — MIRTAZAPINE 15 MILLIGRAM(S): 45 TABLET, ORALLY DISINTEGRATING ORAL at 22:14

## 2021-11-10 RX ADMIN — Medication 1 MILLIGRAM(S): at 15:21

## 2021-11-10 RX ADMIN — APIXABAN 5 MILLIGRAM(S): 2.5 TABLET, FILM COATED ORAL at 05:51

## 2021-11-10 RX ADMIN — Medication 10 MILLIGRAM(S): at 15:10

## 2021-11-10 RX ADMIN — PIPERACILLIN AND TAZOBACTAM 25 GRAM(S): 4; .5 INJECTION, POWDER, LYOPHILIZED, FOR SOLUTION INTRAVENOUS at 22:14

## 2021-11-10 RX ADMIN — HYDROMORPHONE HYDROCHLORIDE 1 MILLIGRAM(S): 2 INJECTION INTRAMUSCULAR; INTRAVENOUS; SUBCUTANEOUS at 11:14

## 2021-11-10 RX ADMIN — PIPERACILLIN AND TAZOBACTAM 25 GRAM(S): 4; .5 INJECTION, POWDER, LYOPHILIZED, FOR SOLUTION INTRAVENOUS at 06:09

## 2021-11-10 RX ADMIN — HYDROMORPHONE HYDROCHLORIDE 1 MILLIGRAM(S): 2 INJECTION INTRAMUSCULAR; INTRAVENOUS; SUBCUTANEOUS at 11:45

## 2021-11-10 RX ADMIN — Medication 3 MILLILITER(S): at 00:34

## 2021-11-10 RX ADMIN — MORPHINE SULFATE 2 MILLIGRAM(S): 50 CAPSULE, EXTENDED RELEASE ORAL at 15:11

## 2021-11-10 RX ADMIN — Medication 100 MILLIGRAM(S): at 05:50

## 2021-11-10 RX ADMIN — Medication 40 MILLIGRAM(S): at 15:28

## 2021-11-10 RX ADMIN — HYDROMORPHONE HYDROCHLORIDE 1 MILLIGRAM(S): 2 INJECTION INTRAMUSCULAR; INTRAVENOUS; SUBCUTANEOUS at 05:27

## 2021-11-10 RX ADMIN — ONDANSETRON 4 MILLIGRAM(S): 8 TABLET, FILM COATED ORAL at 05:27

## 2021-11-10 RX ADMIN — SODIUM CHLORIDE 500 MILLILITER(S): 9 INJECTION, SOLUTION INTRAVENOUS at 11:18

## 2021-11-10 RX ADMIN — Medication 2 CAPSULE(S): at 13:47

## 2021-11-10 RX ADMIN — Medication 3 MILLILITER(S): at 20:15

## 2021-11-10 RX ADMIN — AMLODIPINE BESYLATE 10 MILLIGRAM(S): 2.5 TABLET ORAL at 05:50

## 2021-11-10 RX ADMIN — ONDANSETRON 4 MILLIGRAM(S): 8 TABLET, FILM COATED ORAL at 11:14

## 2021-11-10 RX ADMIN — HYDROMORPHONE HYDROCHLORIDE 1 MILLIGRAM(S): 2 INJECTION INTRAMUSCULAR; INTRAVENOUS; SUBCUTANEOUS at 22:15

## 2021-11-10 RX ADMIN — Medication 3 MILLILITER(S): at 05:56

## 2021-11-10 RX ADMIN — APIXABAN 5 MILLIGRAM(S): 2.5 TABLET, FILM COATED ORAL at 20:15

## 2021-11-10 NOTE — PATIENT PROFILE ADULT - BRAND OF COVID-19 VACCINATION
30yo M hx of CP, gallstones p/w ruq pain since 4am. constant non-radiating notes that pain is subsiding. feels similar to his gallstones pain in the past. + nausea. Denies f/c/v/cp/sob/palpitations/ cough/rash/headache/dizziness/d/c/dysuria/hematuria. no sick contacts/recent travel. notes pain is not affected by food Gi

## 2021-11-10 NOTE — PROGRESS NOTE ADULT - PROBLEM SELECTOR PLAN 1
Admitted for tachypnea and hypoxia, requiring 2L NC. Etiology unclear but likely multifactorial. DDx includes COPD exacerbation, infectious process, right-sided HF, NSCLC progression.  - Bedside echo ruled out pericardial effusion (11/9)  - CTA w/ evidence of chronic thromboembolic disease not no acute PE (11/9)  - Trend CBC and fever curve  - f/u BCx, sputum cx, UCx, RVP  - Continue NC and wean as tolerated  - started duonebs  - started zosyn, d/c if infectious workup is negative  - f/u TTE Admitted for tachypnea and hypoxia, requiring 2L NC. Etiology unclear but likely multifactorial. DDx includes COPD exacerbation, infectious process, right-sided HF, NSCLC progression.  - Bedside echo ruled out pericardial effusion (11/9)  - CTA w/ evidence of chronic thromboembolic disease not no acute PE (11/9)  - Trend CBC and fever curve  - f/u BCx, sputum cx, UCx, RVP  - Continue NC and wean as tolerated  - started duonebs PRN  - started zosyn, d/c if infectious workup is negative  - f/u TTE  - Pulm consulted: apprec recs  - Dr. Leahy (cardiology) consulted: apprec recs Admitted for tachypnea and hypoxia, requiring 2L NC. Etiology unclear but likely multifactorial. DDx includes COPD exacerbation, infectious process, right-sided HF, NSCLC progression.  - Bedside echo ruled out pericardial effusion (11/9)  - CTA w/ evidence of chronic thromboembolic disease not no acute PE (11/9)  - Trend CBC and fever curve  - f/u BCx, sputum cx, UCx, RVP  - Continue NC and wean as tolerated  - c/w duonebs, started prednisone 40 mg PO qd  - c/w zosyn, procal 1.4  - Trending lactate to peak 2.4--->2.6 (s/p 500cc LR bolus 11/10)  - f/u TTE  - Pulm consulted: apprec recs  - Dr. Leahy (cardiology) consulted: apprec recs

## 2021-11-10 NOTE — PROGRESS NOTE ADULT - PROBLEM SELECTOR PLAN 4
Hx Stage IIIB (dx 2/2020 s/p chemo now on Keytruda maintenance every 3 weeks)  - No intervention as inpatient at this time  - consider Hem/Onc consult in am Hx Stage IIIB (dx 2/2020 s/p chemo now on Keytruda maintenance every 3 weeks)  - No intervention as inpatient at this time  - Hem/Onc consulted: apprec recs  - Holding Keytruda Hx Stage IIIB (dx 2/2020 s/p chemo now on Keytruda maintenance every 3 weeks)  - No intervention as inpatient at this time  - Hem/Onc consulted: apprec recs  - Holding Keytruda  - f/u CTH non-contrast and MR brain w/ IV contrast to r/o brain mets Hx Stage IIIB (dx 2/2020 s/p chemo now on Keytruda maintenance every 3 weeks)  - No intervention as inpatient at this time  - Hem/Onc consulted: apprec recs  - Holding Keytruda (last dose 9/30)  - f/u CTH non-contrast and MR brain w/ IV contrast to r/o brain mets

## 2021-11-10 NOTE — PHYSICAL THERAPY INITIAL EVALUATION ADULT - PERTINENT HX OF CURRENT PROBLEM, REHAB EVAL
66 y/o F, history of HTN, COPD, NSCLC (adenocarcinoma left lung) Stage IIIB (dx 2/2020 s/p chemo followed by immunotherapy, currently on Keytruda maintenance therapy), pulmonary embolus on apixaban, chronic pancreatitis (s/p Jeffery procedure 2019), hx of ARDS (2015), GERD, chronic pain (on opioids), and s/p vocal cord polpectomy who presents with

## 2021-11-10 NOTE — PROGRESS NOTE ADULT - SUBJECTIVE AND OBJECTIVE BOX
***************************************************************  Gauri Ashley, PGY2  Internal Medicine   pager: NS: [phone] [facility]: [phone]  ***************************************************************    PROGRESS NOTE:     Patient is a 65y old  Female who presents with a chief complaint of Acute hypoxic respiratory failure (09 Nov 2021 17:20)      SUBJECTIVE / OVERNIGHT EVENTS:    REVIEW OF SYSTEMS:  CONSTITUTIONAL: No fever, weight loss, or fatigue  EYES: No eye pain, visual disturbances, or discharge  ENMT:  No difficulty hearing, tinnitus, vertigo; No sinus or throat pain  NECK: No pain or stiffness  BREASTS: No pain, masses, or nipple discharge  RESPIRATORY: No cough, wheezing, chills or hemoptysis; No shortness of breath  CARDIOVASCULAR: No chest pain, palpitations, dizziness, or leg swelling  GASTROINTESTINAL: No abdominal or epigastric pain. No nausea, vomiting, or hematemesis; No diarrhea or constipation. No melena or hematochezia.  GENITOURINARY: No dysuria, frequency, hematuria, or incontinence  NEUROLOGICAL: No headaches, memory loss, loss of strength, numbness, or tremors  SKIN: No itching, burning, rashes, or lesions   LYMPH NODES: No enlarged glands  ENDOCRINE: No heat or cold intolerance; No hair loss  MUSCULOSKELETAL: No joint pain or swelling; No muscle, back, or extremity pain  PSYCHIATRIC: No depression, anxiety, mood swings, or difficulty sleeping  HEME/LYMPH: No easy bruising, or bleeding gums  ALLERY AND IMMUNOLOGIC: No hives or eczema    MEDICATIONS  (STANDING):  albuterol/ipratropium for Nebulization 3 milliLiter(s) Nebulizer every 6 hours  amLODIPine   Tablet 10 milliGRAM(s) Oral daily  apixaban 5 milliGRAM(s) Oral two times a day  dextrose 5% + lactated ringers. 1000 milliLiter(s) (50 mL/Hr) IV Continuous <Continuous>  folic acid 1 milliGRAM(s) Oral daily  metoprolol succinate  milliGRAM(s) Oral daily  mirtazapine 15 milliGRAM(s) Oral at bedtime  pancrelipase (ZENPEP 25,000 Lipase Units) 1 Capsule(s) Oral three times a day with meals  pantoprazole    Tablet 40 milliGRAM(s) Oral before breakfast  piperacillin/tazobactam IVPB.. 3.375 Gram(s) IV Intermittent every 8 hours    MEDICATIONS  (PRN):  acetaminophen     Tablet .. 650 milliGRAM(s) Oral every 6 hours PRN Temp greater or equal to 38C (100.4F), Mild Pain (1 - 3)  HYDROmorphone  Injectable 0.5 milliGRAM(s) IV Push every 4 hours PRN Moderate Pain (4 - 6)  HYDROmorphone  Injectable 1 milliGRAM(s) IV Push every 4 hours PRN Severe Pain (7 - 10)  melatonin 3 milliGRAM(s) Oral at bedtime PRN Insomnia  ondansetron Injectable 4 milliGRAM(s) IV Push every 4 hours PRN Nausea and/or Vomiting      CAPILLARY BLOOD GLUCOSE        I&O's Summary      PHYSICAL EXAM:  Vital Signs Last 24 Hrs  T(C): 36.8 (10 Nov 2021 05:51), Max: 37.2 (09 Nov 2021 21:52)  T(F): 98.2 (10 Nov 2021 05:51), Max: 99 (09 Nov 2021 21:52)  HR: 112 (10 Nov 2021 05:51) (98 - 125)  BP: 162/95 (10 Nov 2021 05:51) (131/86 - 162/95)  BP(mean): --  RR: 18 (10 Nov 2021 05:51) (16 - 25)  SpO2: 98% (10 Nov 2021 05:51) (97% - 100%)    CONSTITUTIONAL: NAD, well-developed  RESPIRATORY: Normal respiratory effort; lungs are clear to auscultation bilaterally  CARDIOVASCULAR: Regular rate and rhythm, normal S1 and S2, no murmur/rub/gallop; No lower extremity edema; Peripheral pulses are 2+ bilaterally  ABDOMEN: Nontender to palpation, normoactive bowel sounds, no rebound/guarding; No hepatosplenomegaly  MUSCLOSKELETAL: no clubbing or cyanosis of digits; no joint swelling or tenderness to palpation  NEURO: CN 2-12 grossly intact, moves all limbs spontaneously  PSYCH: A+O to person, place, and time; affect appropriate    LABS:                        12.3   8.22  )-----------( 524      ( 09 Nov 2021 12:21 )             39.0     11-09    139  |  103  |  18  ----------------------------<  87  3.7   |  19<L>  |  0.91    Ca    8.3<L>      09 Nov 2021 12:21  Mg     1.8     11-09    TPro  6.9  /  Alb  2.5<L>  /  TBili  0.4  /  DBili  x   /  AST  28  /  ALT  19  /  AlkPhos  172<H>  11-09                RADIOLOGY & ADDITIONAL TESTS:  Results Reviewed:   Imaging Personally Reviewed:  Electrocardiogram Personally Reviewed:    COORDINATION OF CARE:  Care Discussed with Consultants/Other Providers [Y/N]:  Prior or Outpatient Records Reviewed [Y/N]:   ***************************************************************  Gauri Ramirez, PGY2  Internal Medicine   pager: [phone]: [phone] [facility]: [phone]  ***************************************************************    PROGRESS NOTE:     Patient is a 65y old  Female who presents with a chief complaint of Acute hypoxic respiratory failure (09 Nov 2021 17:20)      SUBJECTIVE / OVERNIGHT EVENTS:  Patient felt nauseous and vomited overnight and although she received zofran and dilaudid at 5am was given a dose of morphine 2 mg IV x 1 and reglan 10 mg IV x 1. She endorsed continued and uncontrolled epigastric pain. Lactate increased from 2.4 to 2.6, and was given 500 cc LR bolus. Procal was 1.4, and she was started on zosyn yesterday with infectious workup pending. Will be getting TTE today. Denies fevers, chills, urinary sxs, diarrhea, constipation, CP, SOB.    REVIEW OF SYSTEMS:  CONSTITUTIONAL: No fever, weight loss, or fatigue  EYES: No eye pain, visual disturbances, or discharge  ENMT:  No difficulty hearing, tinnitus, vertigo; No sinus or throat pain  NECK: No pain or stiffness  BREASTS: No pain, masses, or nipple discharge  RESPIRATORY: No cough, wheezing, chills or hemoptysis; No shortness of breath  CARDIOVASCULAR: No chest pain, palpitations, dizziness, or leg swelling  GASTROINTESTINAL: No abdominal or epigastric pain. No nausea, vomiting, or hematemesis; No diarrhea or constipation. No melena or hematochezia.  GENITOURINARY: No dysuria, frequency, hematuria, or incontinence  NEUROLOGICAL: No headaches, memory loss, loss of strength, numbness, or tremors  SKIN: No itching, burning, rashes, or lesions   LYMPH NODES: No enlarged glands  ENDOCRINE: No heat or cold intolerance; No hair loss  MUSCULOSKELETAL: No joint pain or swelling; No muscle, back, or extremity pain  PSYCHIATRIC: No depression, anxiety, mood swings, or difficulty sleeping  HEME/LYMPH: No easy bruising, or bleeding gums  ALLERY AND IMMUNOLOGIC: No hives or eczema    MEDICATIONS  (STANDING):  albuterol/ipratropium for Nebulization 3 milliLiter(s) Nebulizer every 6 hours  amLODIPine   Tablet 10 milliGRAM(s) Oral daily  apixaban 5 milliGRAM(s) Oral two times a day  dextrose 5% + lactated ringers. 1000 milliLiter(s) (50 mL/Hr) IV Continuous <Continuous>  folic acid 1 milliGRAM(s) Oral daily  metoprolol succinate  milliGRAM(s) Oral daily  mirtazapine 15 milliGRAM(s) Oral at bedtime  pancrelipase (ZENPEP 25,000 Lipase Units) 1 Capsule(s) Oral three times a day with meals  pantoprazole    Tablet 40 milliGRAM(s) Oral before breakfast  piperacillin/tazobactam IVPB.. 3.375 Gram(s) IV Intermittent every 8 hours    MEDICATIONS  (PRN):  acetaminophen     Tablet .. 650 milliGRAM(s) Oral every 6 hours PRN Temp greater or equal to 38C (100.4F), Mild Pain (1 - 3)  HYDROmorphone  Injectable 0.5 milliGRAM(s) IV Push every 4 hours PRN Moderate Pain (4 - 6)  HYDROmorphone  Injectable 1 milliGRAM(s) IV Push every 4 hours PRN Severe Pain (7 - 10)  melatonin 3 milliGRAM(s) Oral at bedtime PRN Insomnia  ondansetron Injectable 4 milliGRAM(s) IV Push every 4 hours PRN Nausea and/or Vomiting      CAPILLARY BLOOD GLUCOSE        I&O's Summary      PHYSICAL EXAM:  Vital Signs Last 24 Hrs  T(C): 36.8 (10 Nov 2021 05:51), Max: 37.2 (09 Nov 2021 21:52)  T(F): 98.2 (10 Nov 2021 05:51), Max: 99 (09 Nov 2021 21:52)  HR: 112 (10 Nov 2021 05:51) (98 - 125)  BP: 162/95 (10 Nov 2021 05:51) (131/86 - 162/95)  BP(mean): --  RR: 18 (10 Nov 2021 05:51) (16 - 25)  SpO2: 98% (10 Nov 2021 05:51) (97% - 100%)    CONSTITUTIONAL: NAD, well-developed  RESPIRATORY: Normal respiratory effort; lungs are clear to auscultation bilaterally  CARDIOVASCULAR: Regular rate and rhythm, normal S1 and S2, no murmur/rub/gallop; No lower extremity edema; Peripheral pulses are 2+ bilaterally  ABDOMEN: Nontender to palpation, normoactive bowel sounds, no rebound/guarding; No hepatosplenomegaly  MUSCLOSKELETAL: no clubbing or cyanosis of digits; no joint swelling or tenderness to palpation  NEURO: CN 2-12 grossly intact, moves all limbs spontaneously  PSYCH: A+O to person, place, and time; affect appropriate    LABS:                        12.3   8.22  )-----------( 524      ( 09 Nov 2021 12:21 )             39.0     11-09    139  |  103  |  18  ----------------------------<  87  3.7   |  19<L>  |  0.91    Ca    8.3<L>      09 Nov 2021 12:21  Mg     1.8     11-09    TPro  6.9  /  Alb  2.5<L>  /  TBili  0.4  /  DBili  x   /  AST  28  /  ALT  19  /  AlkPhos  172<H>  11-09                RADIOLOGY & ADDITIONAL TESTS:  Results Reviewed:   Imaging Personally Reviewed:  Electrocardiogram Personally Reviewed:    COORDINATION OF CARE:  Care Discussed with Consultants/Other Providers [Y/N]:  Prior or Outpatient Records Reviewed [Y/N]:

## 2021-11-10 NOTE — PROGRESS NOTE ADULT - PROBLEM SELECTOR PLAN 2
History of chronic pancreatitis s/p Jeffery procedure 2019, follows with Dr. Molina  - s/p 1L LR in ED, started on D5 LR @50 cc/hr  - c/w pancrelipase  - zofran PRN for nausea  - CLD-->advance diet as tolerated  - for pain: dilaudid 0.5 mg Q4h for moderate and dilaudid 1 mg Q4h for severe  - holding home oxycodone prn  - call Dr. Molina in the morning History of chronic pancreatitis s/p Jeffery procedure 2019, follows with Dr. Molina  - s/p 1L LR in ED, started on D5 LR @50 cc/hr  - c/w pancrelipase  - zofran 4mg Q4h PRN for nausea, and reglan 10 mg Q6h PRN for breakthrough nausea/vomiting  - CLD-->advance diet as tolerated  - for pain: dilaudid 0.5 mg Q4h for moderate and dilaudid 1 mg Q4h for severe, morphine 2 mg Q6h PRN for breakthrough pain  - holding home oxycodone prn  - Dr. Molina consulted: apprec recs

## 2021-11-10 NOTE — PROGRESS NOTE ADULT - PROBLEM SELECTOR PLAN 5
Not on any outpatient nebulizers or inhalers, has O2 at home but has not used since this summer  - started duonebs  - c/w O2 as above  - infectious workup as above Not on any outpatient nebulizers or inhalers, has O2 at home but has not used since this summer  - management per above  - c/w O2 as above  - infectious workup as above

## 2021-11-10 NOTE — DISCHARGE NOTE PROVIDER - CARE PROVIDERS DIRECT ADDRESSES
,[email or web address],[email or web address] None

## 2021-11-10 NOTE — DISCHARGE NOTE PROVIDER - HOSPITAL COURSE
Patient is a 65 year old female with a history of HTN, COPD, NSCLC (adenocarcinoma left lung) Stage IIIB (dx 2/2020 s/p chemo followed by immunotherapy, currently on Keytruda maintenance therapy), pulmonary embolus on apixaban, chronic pancreatitis (s/p Jeffery procedure 2019), hx of ARDS (2015), GERD, chronic pain (on opioids), and s/p vocal cord polpectomy who presents with tachypnea and SOB that started last Friday, requiring 2L NC (has home O2, but last used this summer). Her  has been asking her to go to the hospital but she refused until today. She also has sharp epigastric pain that radiates to the back and nausea and vomiting.  Her epigastric pain started 4 weeks ago, and she saw Dr. Valdez who performed at CT a/p with no findings. Her epigastric pain worsened and starting last Friday she had vomiting and nausea with inability to take PO. She also has a headache. She denies CP, diarrhea, constipation, urinary symptoms, fevers, chills, cough, sore throat, rhinorrhea.     Of note, patient was hospitalized 6/14 - 6/27 for acute hypoxic respiratory failure found to have on CTA chronic thromboembolic disease, no acute PE and was treated with zosyn x 7 days, IV Solu-Medrol, diayesha, taylor with hospital course c/b new pancytopenia requiring 1u pRBC and 3U PLTs. Patient is a 65 year old female with a history of HTN, COPD, NSCLC (adenocarcinoma left lung) Stage IIIB (dx 2/2020 s/p chemo followed by immunotherapy, currently on Keytruda maintenance therapy), pulmonary embolus on apixaban, chronic pancreatitis (s/p Jeffery procedure 2019), hx of ARDS (2015), GERD, chronic pain (on opioids), and s/p vocal cord polpectomy who presents with tachypnea and SOB that started last Friday, requiring 2L NC (has home O2, but last used this summer). Her  has been asking her to go to the hospital but she refused until today. She also has sharp epigastric pain that radiates to the back and nausea and vomiting.  Her epigastric pain started 4 weeks ago, and she saw Dr. Valdez who performed at CT a/p with no findings. Her epigastric pain worsened and starting the Friday prior to admission, she had vomiting and nausea with inability to take PO. She also has a headache. She denies CP, diarrhea, constipation, urinary symptoms, fevers, chills, cough, sore throat, rhinorrhea.     Of note, patient was hospitalized 6/14 - 6/27 for acute hypoxic respiratory failure found to have on CTA chronic thromboembolic disease, no acute PE and was treated with zosyn x 7 days, IV Solu-Medrol, diuresed, duonebs with hospital course c/b new pancytopenia requiring 1u pRBC and 3U PLTs.    Patient had a renal u/s which showed hepatic steatosis, bilateral renal cysts and pancreatic calcifications. Pulmonary was consulted and recommended standing duonebs and a 5 day course of prednisone 40 mg qd. Oncology was consulted and recommended MR brain to rule out brain metastasis since patient had a new headache. CTH showed no acute pathology. Her procal was elevated at 1.4, and she had transaminitis with  and AST 50. Lactate was elevated at admission at 2.4 and increased to 2.6 despite fluids. Dr. Molina was consulted to help with management of the etiology of her abdominal pain and acute on chronic pancreatitis. Dr. Leahy (cardiology) was also consulted. A TTE was performed and showed ....    Patient is now HD stable and ready to be discharged.      Patient is a 65 year old female with a history of HTN, COPD, NSCLC (adenocarcinoma left lung) Stage IIIB (dx 2/2020 s/p chemo followed by immunotherapy, currently on Keytruda maintenance therapy), pulmonary embolus on apixaban, chronic pancreatitis (s/p Jeffery procedure 2019), hx of ARDS (2015), GERD, chronic pain (on opioids), and s/p vocal cord polpectomy who presents with tachypnea and SOB that started last Friday, requiring 2L NC (has home O2, but last used this summer). Her  has been asking her to go to the hospital but she refused until today. She also has sharp epigastric pain that radiates to the back and nausea and vomiting.  Her epigastric pain started 4 weeks ago, and she saw Dr. Valdez who performed at CT a/p with no findings. Her epigastric pain worsened and starting the Friday prior to admission, she had vomiting and nausea with inability to take PO. She also has a headache. She denies CP, diarrhea, constipation, urinary symptoms, fevers, chills, cough, sore throat, rhinorrhea.     Of note, patient was hospitalized 6/14 - 6/27 for acute hypoxic respiratory failure found to have on CTA chronic thromboembolic disease, no acute PE and was treated with zosyn x 7 days, IV Solu-Medrol, diuresed, duonebs with hospital course c/b new pancytopenia requiring 1u pRBC and 3U PLTs.    Patient had a renal u/s which showed hepatic steatosis, bilateral renal cysts and pancreatic calcifications. Pulmonary was consulted and recommended standing duonebs and a 5 day course of prednisone 40 mg qd. Oncology was consulted and recommended MR brain to rule out brain metastasis since patient had a new headache. CTH showed no acute pathology. Her procal was elevated at 1.4, and she had transaminitis with  and AST 50. Lactate was elevated at admission at 2.4 and increased to 2.6 despite fluids. Dr. Molina was consulted to help with management of the etiology of her abdominal pain and acute on chronic pancreatitis. Dr. Leahy (cardiology) was also consulted. A TTE was performed and showed calcified aortic valve and EF 71%. Patient was placed on spiriva and symbicort with improvement in pulmonary symptoms. She was eventually transitioned to oral pain and anti-nausea medication. She is tolerating a regular diet although continues to have increased abdominal pain.    Patient is now HD stable and ready to be discharged.      Patient is a 65 year old female with a history of HTN, COPD, NSCLC (adenocarcinoma left lung) Stage IIIB (dx 2/2020 s/p chemo followed by immunotherapy, currently on Keytruda maintenance therapy), pulmonary embolus on apixaban, chronic pancreatitis (s/p Jeffery procedure 2019), hx of ARDS (2015), GERD, chronic pain (on opioids), and s/p vocal cord polpectomy who presents with tachypnea and SOB that started last Friday, requiring 2L NC (has home O2, but last used this summer). Her  has been asking her to go to the hospital but she refused until today. She also has sharp epigastric pain that radiates to the back and nausea and vomiting.  Her epigastric pain started 4 weeks ago, and she saw Dr. Valdez who performed at CT a/p with no findings. Her epigastric pain worsened and starting the Friday prior to admission, she had vomiting and nausea with inability to take PO. She also has a headache. She denies CP, diarrhea, constipation, urinary symptoms, fevers, chills, cough, sore throat, rhinorrhea.     Of note, patient was hospitalized 6/14 - 6/27 for acute hypoxic respiratory failure found to have on CTA chronic thromboembolic disease, no acute PE and colitis was treated with zosyn x 7 days, IV Solu-Medrol, diuresed, duonebs with hospital course c/b new pancytopenia requiring 1u pRBC and 3U PLTs.    Patient had a renal u/s which showed hepatic steatosis, bilateral renal cysts and pancreatic calcifications. Pulmonary was consulted and recommended standing duonebs and a 5 day course of prednisone 40 mg qd. Oncology was consulted and recommended MR brain to rule out brain metastasis since patient had a new headache. CTH showed no acute pathology. Her procal was elevated at 1.4, and she had transaminitis with  and AST 50. Lactate was elevated at admission at 2.4 and increased to 2.6 despite fluids. Dr. Molina was consulted to help with management of the etiology of her abdominal pain and acute on chronic pancreatitis. Dr. Leahy (cardiology) was also consulted. A TTE was performed and showed calcified aortic valve and EF 71%. Patient was placed on spiriva and symbicort with improvement in pulmonary symptoms. She was eventually transitioned to oral pain and anti-nausea medication. She is tolerating a regular diet although continues to have increased abdominal pain. She will follow up with her pain doctor outpatient for pain management. Patient will follow up outpatient with Dr. Abad - pulmonary.     Patient is now HD stable and ready to be discharged.

## 2021-11-10 NOTE — CONSULT NOTE ADULT - SUBJECTIVE AND OBJECTIVE BOX
Vascular Cardiology Consult Note    DIRECT SERVICE NUMBER:  [phone]           EMAIL [email or web address]   OFFICE [phone]    CC:  Dyspnea    HPI:       Ms. Travis is known to my practice for PE.  She is on eliquis for this.  She is admitted for dyspnea on exertion.  She is on oxygen at home.  Dyspnea even at rest and with minimal exertion.  CT showed small, chronic PE and she is on eliquis.  She also has a history of    HTN, COPD, NSCLC (adenocarcinoma left lung) Stage IIIB (dx 2/2020 s/p chemo followed by immunotherapy, currently on Keytruda maintenance therapy), pulmonary embolus on apixaban, chronic pancreatitis (s/p Jeffery procedure 2019), hx of ARDS (2015), GERD, chronic pain (on opioids), and s/p vocal cord polpectomy.  CT showed no ACUTE pe.  Seen in the ER, on nasal cannula, feels a bit better.  She has impressive pulmonary findings on the CT        Allergies    diazepam (Other)  lemon (Other)    Intolerances    	    MEDICATIONS:  amLODIPine   Tablet 10 milliGRAM(s) Oral daily  apixaban 5 milliGRAM(s) Oral two times a day  metoprolol succinate  milliGRAM(s) Oral daily    piperacillin/tazobactam IVPB.. 3.375 Gram(s) IV Intermittent every 8 hours    albuterol/ipratropium for Nebulization 3 milliLiter(s) Nebulizer every 6 hours    acetaminophen     Tablet .. 650 milliGRAM(s) Oral every 6 hours PRN  HYDROmorphone  Injectable 0.5 milliGRAM(s) IV Push every 4 hours PRN  HYDROmorphone  Injectable 1 milliGRAM(s) IV Push every 4 hours PRN  melatonin 3 milliGRAM(s) Oral at bedtime PRN  metoclopramide Injectable 10 milliGRAM(s) IV Push every 6 hours PRN  mirtazapine 15 milliGRAM(s) Oral at bedtime  morphine  - Injectable 2 milliGRAM(s) IV Push every 4 hours PRN  ondansetron Injectable 4 milliGRAM(s) IV Push every 4 hours PRN    pancrelipase  (CREON 12,000 Lipase Units) 2 Capsule(s) Oral three times a day with meals  pantoprazole    Tablet 40 milliGRAM(s) Oral before breakfast    predniSONE   Tablet 40 milliGRAM(s) Oral daily    dextrose 5% + lactated ringers. 1000 milliLiter(s) IV Continuous <Continuous>  folic acid 1 milliGRAM(s) Oral daily  lactated ringers Bolus 1000 milliLiter(s) IV Bolus once      PAST MEDICAL & SURGICAL HISTORY:  HTN (hypertension)    GERD (gastroesophageal reflux disease)    Chronic pancreatitis  last episode 4/2019    ARDS survivor  2015    History of adrenal adenoma  stable on imaging    Vocal cord polyp  removal 2018, benign as per pt    Thrombophlebitis  superficial right UE during 4/2019 hospital stay, resolved as per pt    Chronic abdominal pain    Non-small cell lung cancer (NSCLC)  chemo: Alimta/ Keytruda 2/24/2021    Pulmonary embolism    COPD (chronic obstructive pulmonary disease)    Pulmonary hypertension    Anemia  transfusion 2/5/21    S/P arthroscopy of shoulder  left in 2009    S/P hip replacement  left - 2010    S/P arthroscopy of shoulder  right - 2014    S/P hip replacement, right  May 2016    History of vocal cord polypectomy  1/2018    S/P shoulder replacement, left  2016    History of pancreatic surgery  Jeffery procedure (5/8/2019)        FAMILY HISTORY:      SOCIAL HISTORY:  unchanged    REVIEW OF SYSTEMS:  CONSTITUTIONAL: No fever, weight loss, or fatigue  EYES: No eye pain, visual disturbances, or discharge  ENT:  No difficulty hearing, tinnitus, vertigo; No sinus or throat pain  NECK: No pain or stiffness  RESPIRATORY:  MAC  CARDIOVASCULAR:  No CP    GASTROINTESTINAL: No abdominal or epigastric pain. No nausea, vomiting, or hematemesis; No diarrhea or constipation. No melena or hematochezia.  GENITOURINARY: No dysuria, frequency, hematuria, or incontinence  NEUROLOGICAL: No headaches, memory loss, loss of strength, numbness, or tremors  LYMPH Nodes: No enlarged glands  ENDOCRINE: No heat or cold intolerance; No hair loss  MUSCULOSKELETAL: No joint pain or swelling; No muscle, back, or extremity pain  PSYCHIATRIC: No depression, anxiety, mood swings, or difficulty sleeping  HEME/LYMPH: No easy bruising, or bleeding gums  ALLERGY AND IMMUNOLOGIC: No hives or eczema	    [ x] All others negative	  [ ] Unable to obtain    PHYSICAL EXAM:  T(C): 36.6 (11-10-21 @ 21:45), Max: 37.2 (11-09-21 @ 21:52)  HR: 97 (11-10-21 @ 21:45) (92 - 112)  BP: 141/81 (11-10-21 @ 21:45) (131/86 - 162/95)  RR: 18 (11-10-21 @ 21:45) (18 - 19)  SpO2: 99% (11-10-21 @ 21:45) (98% - 100%)  Wt(kg): --  I&O's Summary    09 Nov 2021 07:01  -  10 Nov 2021 07:00  --------------------------------------------------------  IN: 200 mL / OUT: 0 mL / NET: 200 mL        Appearance:  	  HEENT:   Normal oral mucosa, PERRL, EOMI	   Lymphatic: No lymphadenopathy  Cardiovascular:  S1S2 RRR  Respiratory:  	Diffuse fine crackles  Psychiatry:  AAO x 3  Gastrointestinal:  Soft, Non-tender, + BS	  Skin: No rashes, No ecchymoses, No cyanosis	  Neurologic:  no deficits  Extremities:  no edema    LABS:	 	    CBC Full  -  ( 10 Nov 2021 06:57 )  WBC Count : 9.40 K/uL  Hemoglobin : 11.0 g/dL  Hematocrit : 33.3 %  Platelet Count - Automated : 440 K/uL  Mean Cell Volume : 86.5 fl  Mean Cell Hemoglobin : 28.6 pg  Mean Cell Hemoglobin Concentration : 33.0 gm/dL  Auto Neutrophil # : 5.39 K/uL  Auto Lymphocyte # : 2.73 K/uL  Auto Monocyte # : 0.87 K/uL  Auto Eosinophil # : 0.20 K/uL  Auto Basophil # : 0.08 K/uL  Auto Neutrophil % : 57.3 %  Auto Lymphocyte % : 29.0 %  Auto Monocyte % : 9.3 %  Auto Eosinophil % : 2.1 %  Auto Basophil % : 0.9 %    11-10    140  |  102  |  13  ----------------------------<  89  3.4<L>   |  20<L>  |  0.95  11-09    139  |  103  |  18  ----------------------------<  87  3.7   |  19<L>  |  0.91    Ca    8.2<L>      10 Nov 2021 06:57  Ca    8.3<L>      09 Nov 2021 12:21  Phos  3.0     11-10  Mg     1.8     11-10  Mg     1.8     11-09    TPro  6.3  /  Alb  2.4<L>  /  TBili  0.3  /  DBili  x   /  AST  50<H>  /  ALT  23  /  AlkPhos  145<H>  11-10  TPro  6.9  /  Alb  2.5<L>  /  TBili  0.4  /  DBili  x   /  AST  28  /  ALT  19  /  AlkPhos  172<H>  11-09      < from: CT Angio Chest PE Protocol w/ IV Cont (11.09.21 @ 14:09) >  CTA CHEST: No acute pulmonary embolus.    Chronic pulmonary embolus within the left lower lobe pulmonary arteries and within the right lower lobe interlobar artery.    CT abdomen and pelvis: Thickening of the descending colon which may be least partially be due to under distention. Correlate for signs of colitis. Otherwise no acute pathology.    --- End of Report ---    < end of copied text >  < from: US TTE 2D F/U, Limited w/o Contrast (ED) (11.09.21 @ 12:12) >    INTERPRETATION:  A focused transthoracic cardiac ultrasound examination was performed.  No pericardial effusion was present. Mixed echogenicity structure anterior to right ventricle likely epicardial fat pad.  No global wall motion abnormality was identified.        IMPRESSION:  No Pericardial Effusion.    --- End of Report ---    < end of copied text >      Assessment:  1. History of PE, with chronic findings on CT  2.  HTN  3.  Lung disease  4.  Dyspnea on exertion  5.  Colitis?        Plan:  1. Would obtain 2d echocardiogram  2.  Continue apixaban 5mg BID  3.  Would ask pulmonary to see given extensive pulmonary disease seen on CT scan.    4.  Defer to primary team to comment on Colitis seen on CT scan.     Discussed with Dr. Vargas - appreciate his excellent care      Thank you  Sourav Cool     Vascular Cardiology Service    Please call with any questions:   DIRECT SERVICE NUMBER:  [phone]  Office [phone]  email:  [email or web address]

## 2021-11-10 NOTE — PHYSICAL THERAPY INITIAL EVALUATION ADULT - PRECAUTIONS/LIMITATIONS, REHAB EVAL
CONT:  tachypnea and SOB that started last Friday, requiring 2L NC and epigastric pain with vomiting that started 4 days ago c/f COPD exacerbation vs right-sided HF vs acute pancreatitis vs infectious process. CT abdomen & pelvis: CTA CHEST: No acute pulmonary embolus. Chronic pulmonary embolus within the left lower lobe pulmonary arteries and within the right lower lobe interlobar artery. CT abdomen and pelvis: Thickening of the descending colon which may be least partially be due to under distention. Correlate for signs of colitis. Otherwise no acute pathology. US TTE: No Pericardial Effusion. CXR: The heart is normal in size. The lungs appear to be clear. No pleural effusion. No pneumothorax. A MediPort is seen on the right and the tip is in the superior vena cava. Status post total left shoulder replacement./fall precautions CONT:  tachypnea and SOB that started last Friday, requiring 2L NC and epigastric pain with vomiting that started 4 days ago c/f COPD exacerbation vs right-sided HF vs acute pancreatitis vs infectious process. CT abdomen & pelvis: CTA CHEST: No acute pulmonary embolus. Chronic pulmonary embolus within the left lower lobe pulmonary arteries and within the right lower lobe interlobar artery. CT abdomen and pelvis: Thickening of the descending colon which may be least partially be due to under distention. Correlate for signs of colitis. Otherwise no acute pathology. US TTE: No Pericardial Effusion. CXR: The heart is normal in size. The lungs appear to be clear. No pleural effusion. No pneumothorax. A MediPort is seen on the right and the tip is in the superior vena cava. Status post total left shoulder replacement. MR head: No evidence of metastatic disease. Chronic appearing small infarcts within the right parietal and right occipital lobes as well as within the cerebellar hemispheres which are new compared with the most recent MR./fall precautions

## 2021-11-10 NOTE — PHYSICAL THERAPY INITIAL EVALUATION ADULT - PLANNED THERAPY INTERVENTIONS, PT EVAL
GOAL: Pt will be able to Negotiate up/down 10 steps, independently, w/ unilateral rail/and appropriate assistive device, w/reciprocal/step-to gait pattern, in 2 weeks./strengthening

## 2021-11-10 NOTE — DISCHARGE NOTE PROVIDER - NSDCCPTREATMENT_GEN_ALL_CORE_FT
PRINCIPAL PROCEDURE  Procedure: CT abdomen  Findings and Treatment: Mediastinum and Heart: Aorta and pulmonary arteries are normal in size. Thyroid gland is unremarkable. No lymphadenopathy. No pericardial effusion.  Lungs, Pleura, and Airways: There is no acute pulmonary embolus. There is obliteration of the left lower lobe vessels with paucity of vasculature in the left lung. This is secondary to chronic pulmonary embolus in the left lower lobe. Additional calcified pulmonary embolus in the right lower lobe interlobar artery indicates that this is chronic.  Severe centrilobular emphysema noted. Bilateral reticular opacities consistent with fibrosis are also noted and are unchanged since previous exam.  CT abdomen and pelvis:  There is diffuse fatty infiltration of the liver. No focal hepatic or splenic lesions are noted.  Calcifications noted from chronic pancreatitis. Post Jeffery procedure. The adrenal glands demonstrate a right adrenal 1 cm nodule which is stable since previous exam. Both kidneys enhance symmetrically without stones or hydronephrosis. Bilateral renal cysts are noted. The retroperitoneal vasculature is within normal limits.  No evidence of bowel obstruction. Thickening of the descending colon which may be least partially be due to under distention. Diverticulosis without diverticulitis. Fibroid uterus noted.  Post bilateral hip replacements. No acute bony abnormality.  IMPRESSION:  CTA CHEST: No acute pulmonary embolus.  Chronic pulmonary embolus within the left lower lobe pulmonary arteries and within the right lower lobe interlobar artery.  CT abdomen and pelvis: Thickening of the descending colon which may be least partially be due to under distention. Correlate for signs of colitis. Otherwise no acute pathology.

## 2021-11-10 NOTE — DISCHARGE NOTE PROVIDER - NSDCMRMEDTOKEN_GEN_ALL_CORE_FT
Creon 24,000 units oral delayed release capsule: 1 cap(s) orally 3 times a day  dexamethasone 2 mg oral tablet: 2 days post chemo  Eliquis 5 mg oral tablet: 1 tab(s) orally every 12 hours   folic acid 1 mg oral tablet: 1 tab(s) orally once a day  Home oxygen: Pt requires continuous oxygen for COPD, 3L/min nasal cannula, She is hypoxic to SpO2 80% at rest on RA and 67% when active on RA.   Lasix 40 mg oral tablet: 1 tab(s) orally once a day  metoprolol succinate 100 mg oral tablet, extended release: 1 tab(s) orally once a day  mirtazapine 15 mg oral tablet: 1 tab(s) orally once a day (at bedtime)  Norvasc 10 mg oral tablet: 1 tab(s) orally once a day  omeprazole 40 mg oral delayed release capsule: 1 cap(s) orally 2 times a day  ondansetron 8 mg oral tablet: 1 tab(s) orally 4 times a day, As Needed  oxyCODONE 30 mg oral tablet: 1 tab(s) orally every 6 hours, As Needed  Rolling walker:   Vitamin B12: 1 tab(s) orally once a day  Vitamin D3: 1 tab(s) orally once a day   budesonide-formoterol 80 mcg-4.5 mcg/inh inhalation aerosol: 2 puff(s) inhaled 2 times a day  Creon 24,000 units oral delayed release capsule: 1 cap(s) orally 3 times a day  dexamethasone 2 mg oral tablet: 2 days post chemo  Eliquis 5 mg oral tablet: 1 tab(s) orally every 12 hours   folic acid 1 mg oral tablet: 1 tab(s) orally once a day  Home oxygen: Pt requires continuous oxygen for COPD, 3L/min nasal cannula, She is hypoxic to SpO2 80% at rest on RA and 67% when active on RA.   Lasix 40 mg oral tablet: 1 tab(s) orally once a day  metoprolol succinate 100 mg oral tablet, extended release: 1 tab(s) orally once a day  mirtazapine 15 mg oral tablet: 1 tab(s) orally once a day (at bedtime)  Norvasc 10 mg oral tablet: 1 tab(s) orally once a day  omeprazole 40 mg oral delayed release capsule: 1 cap(s) orally 2 times a day  ondansetron 8 mg oral tablet: 1 tab(s) orally 4 times a day, As Needed  oxyCODONE 30 mg oral tablet: 1 tab(s) orally every 6 hours, As Needed  Rolling walker:   Vitamin B12: 1 tab(s) orally once a day  Vitamin D3: 1 tab(s) orally once a day   albuterol 90 mcg/inh inhalation aerosol: 2 puff(s) inhaled every 8 hours, As Needed shortness of breath or wheezing  amLODIPine 10 mg oral tablet: 1 tab(s) orally once a day  budesonide-formoterol 80 mcg-4.5 mcg/inh inhalation aerosol: 2 puff(s) inhaled 2 times a day  Creon 24,000 units oral delayed release capsule: 1 cap(s) orally 3 times a day  dexamethasone 2 mg oral tablet: 2 days post chemo  Eliquis 5 mg oral tablet: 1 tab(s) orally every 12 hours   folic acid 1 mg oral tablet: 1 tab(s) orally once a day  Home oxygen: Pt requires continuous oxygen for COPD, 3L/min nasal cannula, She is hypoxic to SpO2 80% at rest on RA and 67% when active on RA.   Lasix 40 mg oral tablet: 1 tab(s) orally once a day    Please Hold this medication until you see your primary care doctor outpatient.   metoprolol succinate 100 mg oral tablet, extended release: 1 tab(s) orally once a day  mirtazapine 15 mg oral tablet: 1 tab(s) orally once a day (at bedtime)  omeprazole 40 mg oral delayed release capsule: 1 cap(s) orally 2 times a day  ondansetron 8 mg oral tablet: 1 tab(s) orally 4 times a day, As Needed  oxyCODONE 30 mg oral tablet: 1 tab(s) orally every 6 hours, As Needed  Rolling walker:   tiotropium 18 mcg inhalation capsule: 1 cap(s) inhaled once a day  Vitamin B12: 1 tab(s) orally once a day  Vitamin D3: 1 tab(s) orally once a day

## 2021-11-10 NOTE — DISCHARGE NOTE PROVIDER - NSDCCPCAREPLAN_GEN_ALL_CORE_FT
PRINCIPAL DISCHARGE DIAGNOSIS  Diagnosis: Acute respiratory failure with hypoxia  Assessment and Plan of Treatment: You came to the hospital with oxygen requirements when you have not used oxygen at home since this summer. Over the hospital course, you did not need oxygen and your shortness of breath resolved. You were treated with duonebs and oral prednisone. Your need for oxygen was likely due to many causes, exacerbation of your COPD, your pulmonary embolisms and your lung cancer. Please follow up with your oncologist within 2 weeks of leaving the hospital. We also provided a referral for a pulmonologist and recommend you make an appointment with them for management of your pulmonary hypertension and COPD.      SECONDARY DISCHARGE DIAGNOSES  Diagnosis: Acute pancreatitis  Assessment and Plan of Treatment: You came to the hospital with extreme abdominal pain and nausea and vomiting. You were given zofran and pain medications which helped, as well as fluids. Please follow up with Dr. Molina within two weeks of leaving the hospital for management of your pancreatitis.     PRINCIPAL DISCHARGE DIAGNOSIS  Diagnosis: Acute respiratory failure with hypoxia  Assessment and Plan of Treatment: You came to the hospital with oxygen requirements when you have not used oxygen at home since this summer. Over the hospital course, you did not need oxygen and your shortness of breath resolved. You were treated with duonebs and oral prednisone. You were also started on Spiriva and Symbicort and we recommend that you continue with these outpatient until you see Dr. Abad (pulmonologist). Your need for oxygen was likely due to many causes, exacerbation of your COPD, your pulmonary embolisms and your lung cancer. Please follow up with your oncologist within 2 weeks of leaving the hospital. We also provided a referral for a pulmonologist and recommend you make an appointment with them for management of your pulmonary hypertension and COPD.      SECONDARY DISCHARGE DIAGNOSES  Diagnosis: Acute pancreatitis  Assessment and Plan of Treatment: You came to the hospital with extreme abdominal pain and nausea and vomiting. You were given zofran and pain medications which helped, as well as fluids. Please follow up with Dr. Molina within two weeks of leaving the hospital for management of your pancreatitis.     PRINCIPAL DISCHARGE DIAGNOSIS  Diagnosis: Acute respiratory failure with hypoxia  Assessment and Plan of Treatment: You came to the hospital with oxygen requirements when you have not used oxygen at home since this summer. Over the hospital course, you did not need oxygen and your shortness of breath resolved. You were treated with duonebs and oral prednisone. You were also started on Spiriva and Symbicort and we recommend that you continue with these outpatient until you see Dr. Abad (pulmonologist). Your need for oxygen was likely due to many causes, exacerbation of your COPD, your pulmonary embolisms and your lung cancer. Please follow up with your oncologist within 2 weeks of leaving the hospital. We also provided a referral for a pulmonologist and recommend you make an appointment with them for management of your pulmonary hypertension and COPD.      SECONDARY DISCHARGE DIAGNOSES  Diagnosis: Acute colitis  Assessment and Plan of Treatment: You were found to have an infection in your colon. This was treated with antibiotics for 7 days.    Diagnosis: Acute pancreatitis  Assessment and Plan of Treatment: You came to the hospital with extreme abdominal pain and nausea and vomiting. You were given zofran and pain medications which helped, as well as fluids. Please follow up with Dr. Molina within two weeks of leaving the hospital for management of your pancreatitis. We suspect this might be acute on chronic pancreatitis.   You were given Dilaudid 6mg by mouth every 4 hours as needed for your pain. Please continue your oxycodone at home.

## 2021-11-10 NOTE — PROGRESS NOTE ADULT - PROBLEM SELECTOR PLAN 8
Dispo: pending PT eval  Diet: CLD-->advance as tolerated  DVT ppx: eliquis    ***************************************************************  Gauri Ashley, PGY2  Internal Medicine   pager: [phone]: [phone] [facility]: [phone]  ***************************************************************

## 2021-11-10 NOTE — DISCHARGE NOTE PROVIDER - PROVIDER TOKENS
PROVIDER:[TOKEN:[[id number]],FOLLOWUP:[1 week],ESTABLISHEDPATIENT:[T]],PROVIDER:[TOKEN:[[id number]],FOLLOWUP:[1 week],ESTABLISHEDPATIENT:[T]],PROVIDER:[TOKEN:[[id number]],FOLLOWUP:[2 weeks],ESTABLISHEDPATIENT:[T]] PROVIDER:[TOKEN:[[id number]],FOLLOWUP:[1 week],ESTABLISHEDPATIENT:[T]],PROVIDER:[TOKEN:[[id number]],FOLLOWUP:[1 week],ESTABLISHEDPATIENT:[T]],PROVIDER:[TOKEN:[[id number]],FOLLOWUP:[2 weeks],ESTABLISHEDPATIENT:[T]],PROVIDER:[TOKEN:[[id number]],FOLLOWUP:[1 week]]

## 2021-11-10 NOTE — DISCHARGE NOTE PROVIDER - NSDCFUSCHEDAPPT_GEN_ALL_CORE_FT
HIMANSHU VILLEGAS ; 11/11/2021 ; [facility]  HIMANSHU VILLEGAS ; 11/11/2021 ; [facility]  HIMANSHU VILLEGAS ; 11/12/2021 ; [facility] [address]  HIMANSHU VILLEGAS ; 11/12/2021 ; [facility] [address]  HIMANSHU VILLEGAS ; 11/16/2021 ; [facility] [address] HIMANSHU Hoover ; 12/02/2021 ; [facility]  HIMANSHU VILLEGAS ; 12/23/2021 ; [facility] HIMANSHU VILLEGAS ; 11/16/2021 ; [facility] [address] HIMANSH[facility] ; 12/02/2021 ; [facility]  HIMANSHU VILLEGAS ; 12/23/2021 ; [facility] HIMANSHU VILLEGAS ; 11/16/2021 ; [facility] [address] HIMANSHU [facility] ; 12/02/2021 ; [facility]  HIMANSHU VILLEGAS ; 12/16/2021 ; [facility] [address] [facility]  HIMANSHU VILLEGAS ; 12/16/2021 ; [facility] [address] [facility]  HIMANSHU VILLEGAS ; 12/23/2021 ; [facility] HIMANSHU VILLEGAS ; 12/02/2021 ; [facility]  HIMANSHU VILLEGAS ; 12/16/2021 ; [facility] [address] [facility]  HIMANSHU VILLEGAS ; 12/16/2021 ; [facility] [address] [facility]  HIMANSHU VILLEGAS ; 12/23/2021 ; [facility]  HIMANSHU VILLEGAS ; 12/28/2021 ; [facility] Cardio [address]

## 2021-11-10 NOTE — DISCHARGE NOTE PROVIDER - NSDCFUADDAPPT_GEN_ALL_CORE_FT
Please make an appointment with Dr. Abad (pulmonary medicine) within 1 week of hospital discharge.     Please make an appointment with Dr. Cage within 1 week of hospital discharge to determine when to restart Keytruda and for further management of your lung cancer. Please make an appointment with Dr. Abad (pulmonary medicine) within 1-2 week of hospital discharge.     Please make an appointment with Dr. Cage within 1-2 week of hospital discharge to determine when to restart Keytruda and for further management of your lung cancer. Please make an appointment with Dr. Abad (pulmonary medicine) within 1-2 week of hospital discharge.     Please make an appointment with Dr. Cage within 1-2 week of hospital discharge to determine when to restart Keytruda and for further management of your lung cancer.    Please HOLD your Lasix 40mg daily until you follow up with your primary care doctor. She/He can determine if you can resume taking it.

## 2021-11-11 ENCOUNTER — APPOINTMENT (OUTPATIENT)
Dept: HEMATOLOGY ONCOLOGY | Facility: CLINIC | Age: 66
End: 2021-11-11

## 2021-11-11 ENCOUNTER — APPOINTMENT (OUTPATIENT)
Dept: INFUSION THERAPY | Facility: HOSPITAL | Age: 66
End: 2021-11-11

## 2021-11-11 LAB
ALBUMIN SERPL ELPH-MCNC: 2.3 G/DL — LOW (ref 3.3–5)
ALP SERPL-CCNC: 117 U/L — SIGNIFICANT CHANGE UP (ref 40–120)
ALT FLD-CCNC: 13 U/L — SIGNIFICANT CHANGE UP (ref 10–45)
ANION GAP SERPL CALC-SCNC: 10 MMOL/L — SIGNIFICANT CHANGE UP (ref 5–17)
AST SERPL-CCNC: 19 U/L — SIGNIFICANT CHANGE UP (ref 10–40)
BASOPHILS # BLD AUTO: 0.02 K/UL — SIGNIFICANT CHANGE UP (ref 0–0.2)
BASOPHILS NFR BLD AUTO: 0.4 % — SIGNIFICANT CHANGE UP (ref 0–2)
BILIRUB SERPL-MCNC: 0.3 MG/DL — SIGNIFICANT CHANGE UP (ref 0.2–1.2)
BUN SERPL-MCNC: 7 MG/DL — SIGNIFICANT CHANGE UP (ref 7–23)
CALCIUM SERPL-MCNC: 8.3 MG/DL — LOW (ref 8.4–10.5)
CHLORIDE SERPL-SCNC: 107 MMOL/L — SIGNIFICANT CHANGE UP (ref 96–108)
CO2 SERPL-SCNC: 23 MMOL/L — SIGNIFICANT CHANGE UP (ref 22–31)
CREAT SERPL-MCNC: 0.96 MG/DL — SIGNIFICANT CHANGE UP (ref 0.5–1.3)
EOSINOPHIL # BLD AUTO: 0.01 K/UL — SIGNIFICANT CHANGE UP (ref 0–0.5)
EOSINOPHIL NFR BLD AUTO: 0.2 % — SIGNIFICANT CHANGE UP (ref 0–6)
GLUCOSE SERPL-MCNC: 68 MG/DL — LOW (ref 70–99)
HCT VFR BLD CALC: 32 % — LOW (ref 34.5–45)
HGB BLD-MCNC: 10 G/DL — LOW (ref 11.5–15.5)
IMM GRANULOCYTES NFR BLD AUTO: 1.1 % — SIGNIFICANT CHANGE UP (ref 0–1.5)
LACTATE SERPL-SCNC: 2.4 MMOL/L — HIGH (ref 0.7–2)
LYMPHOCYTES # BLD AUTO: 1.27 K/UL — SIGNIFICANT CHANGE UP (ref 1–3.3)
LYMPHOCYTES # BLD AUTO: 23.2 % — SIGNIFICANT CHANGE UP (ref 13–44)
MAGNESIUM SERPL-MCNC: 1.8 MG/DL — SIGNIFICANT CHANGE UP (ref 1.6–2.6)
MCHC RBC-ENTMCNC: 27.8 PG — SIGNIFICANT CHANGE UP (ref 27–34)
MCHC RBC-ENTMCNC: 31.3 GM/DL — LOW (ref 32–36)
MCV RBC AUTO: 88.9 FL — SIGNIFICANT CHANGE UP (ref 80–100)
MONOCYTES # BLD AUTO: 0.32 K/UL — SIGNIFICANT CHANGE UP (ref 0–0.9)
MONOCYTES NFR BLD AUTO: 5.9 % — SIGNIFICANT CHANGE UP (ref 2–14)
NEUTROPHILS # BLD AUTO: 3.79 K/UL — SIGNIFICANT CHANGE UP (ref 1.8–7.4)
NEUTROPHILS NFR BLD AUTO: 69.2 % — SIGNIFICANT CHANGE UP (ref 43–77)
NRBC # BLD: 0 /100 WBCS — SIGNIFICANT CHANGE UP (ref 0–0)
PHOSPHATE SERPL-MCNC: 2.5 MG/DL — SIGNIFICANT CHANGE UP (ref 2.5–4.5)
PLATELET # BLD AUTO: 393 K/UL — SIGNIFICANT CHANGE UP (ref 150–400)
POTASSIUM SERPL-MCNC: 3.6 MMOL/L — SIGNIFICANT CHANGE UP (ref 3.5–5.3)
POTASSIUM SERPL-SCNC: 3.6 MMOL/L — SIGNIFICANT CHANGE UP (ref 3.5–5.3)
PROT SERPL-MCNC: 5.5 G/DL — LOW (ref 6–8.3)
RBC # BLD: 3.6 M/UL — LOW (ref 3.8–5.2)
RBC # FLD: 17.7 % — HIGH (ref 10.3–14.5)
SODIUM SERPL-SCNC: 140 MMOL/L — SIGNIFICANT CHANGE UP (ref 135–145)
WBC # BLD: 5.47 K/UL — SIGNIFICANT CHANGE UP (ref 3.8–10.5)
WBC # FLD AUTO: 5.47 K/UL — SIGNIFICANT CHANGE UP (ref 3.8–10.5)

## 2021-11-11 PROCEDURE — 99233 SBSQ HOSP IP/OBS HIGH 50: CPT

## 2021-11-11 PROCEDURE — 99223 1ST HOSP IP/OBS HIGH 75: CPT

## 2021-11-11 PROCEDURE — 99233 SBSQ HOSP IP/OBS HIGH 50: CPT | Mod: GC

## 2021-11-11 PROCEDURE — 99223 1ST HOSP IP/OBS HIGH 75: CPT | Mod: GC

## 2021-11-11 RX ORDER — SODIUM CHLORIDE 9 MG/ML
1000 INJECTION, SOLUTION INTRAVENOUS
Refills: 0 | Status: DISCONTINUED | OUTPATIENT
Start: 2021-11-11 | End: 2021-11-12

## 2021-11-11 RX ORDER — TIOTROPIUM BROMIDE 18 UG/1
1 CAPSULE ORAL; RESPIRATORY (INHALATION) DAILY
Refills: 0 | Status: DISCONTINUED | OUTPATIENT
Start: 2021-11-11 | End: 2021-11-15

## 2021-11-11 RX ORDER — BUDESONIDE AND FORMOTEROL FUMARATE DIHYDRATE 160; 4.5 UG/1; UG/1
2 AEROSOL RESPIRATORY (INHALATION)
Refills: 0 | Status: DISCONTINUED | OUTPATIENT
Start: 2021-11-11 | End: 2021-11-15

## 2021-11-11 RX ORDER — SODIUM CHLORIDE 9 MG/ML
500 INJECTION, SOLUTION INTRAVENOUS ONCE
Refills: 0 | Status: COMPLETED | OUTPATIENT
Start: 2021-11-11 | End: 2021-11-11

## 2021-11-11 RX ADMIN — TIOTROPIUM BROMIDE 1 CAPSULE(S): 18 CAPSULE ORAL; RESPIRATORY (INHALATION) at 18:24

## 2021-11-11 RX ADMIN — BUDESONIDE AND FORMOTEROL FUMARATE DIHYDRATE 2 PUFF(S): 160; 4.5 AEROSOL RESPIRATORY (INHALATION) at 18:24

## 2021-11-11 RX ADMIN — Medication 40 MILLIGRAM(S): at 05:50

## 2021-11-11 RX ADMIN — Medication 100 MILLIGRAM(S): at 11:59

## 2021-11-11 RX ADMIN — Medication 2 CAPSULE(S): at 17:30

## 2021-11-11 RX ADMIN — MIRTAZAPINE 15 MILLIGRAM(S): 45 TABLET, ORALLY DISINTEGRATING ORAL at 22:07

## 2021-11-11 RX ADMIN — SODIUM CHLORIDE 100 MILLILITER(S): 9 INJECTION, SOLUTION INTRAVENOUS at 15:12

## 2021-11-11 RX ADMIN — Medication 3 MILLILITER(S): at 06:17

## 2021-11-11 RX ADMIN — MORPHINE SULFATE 2 MILLIGRAM(S): 50 CAPSULE, EXTENDED RELEASE ORAL at 08:58

## 2021-11-11 RX ADMIN — APIXABAN 5 MILLIGRAM(S): 2.5 TABLET, FILM COATED ORAL at 17:30

## 2021-11-11 RX ADMIN — HYDROMORPHONE HYDROCHLORIDE 1 MILLIGRAM(S): 2 INJECTION INTRAMUSCULAR; INTRAVENOUS; SUBCUTANEOUS at 09:31

## 2021-11-11 RX ADMIN — HYDROMORPHONE HYDROCHLORIDE 1 MILLIGRAM(S): 2 INJECTION INTRAMUSCULAR; INTRAVENOUS; SUBCUTANEOUS at 23:16

## 2021-11-11 RX ADMIN — HYDROMORPHONE HYDROCHLORIDE 1 MILLIGRAM(S): 2 INJECTION INTRAMUSCULAR; INTRAVENOUS; SUBCUTANEOUS at 10:01

## 2021-11-11 RX ADMIN — AMLODIPINE BESYLATE 10 MILLIGRAM(S): 2.5 TABLET ORAL at 11:59

## 2021-11-11 RX ADMIN — Medication 2 CAPSULE(S): at 11:56

## 2021-11-11 RX ADMIN — Medication 3 MILLILITER(S): at 11:56

## 2021-11-11 RX ADMIN — Medication 1 MILLIGRAM(S): at 11:57

## 2021-11-11 RX ADMIN — Medication 3 MILLILITER(S): at 23:17

## 2021-11-11 RX ADMIN — PIPERACILLIN AND TAZOBACTAM 25 GRAM(S): 4; .5 INJECTION, POWDER, LYOPHILIZED, FOR SOLUTION INTRAVENOUS at 22:07

## 2021-11-11 RX ADMIN — Medication 3 MILLILITER(S): at 17:30

## 2021-11-11 RX ADMIN — HYDROMORPHONE HYDROCHLORIDE 1 MILLIGRAM(S): 2 INJECTION INTRAMUSCULAR; INTRAVENOUS; SUBCUTANEOUS at 18:28

## 2021-11-11 RX ADMIN — HYDROMORPHONE HYDROCHLORIDE 1 MILLIGRAM(S): 2 INJECTION INTRAMUSCULAR; INTRAVENOUS; SUBCUTANEOUS at 22:37

## 2021-11-11 RX ADMIN — ONDANSETRON 4 MILLIGRAM(S): 8 TABLET, FILM COATED ORAL at 13:09

## 2021-11-11 RX ADMIN — Medication 2 CAPSULE(S): at 08:15

## 2021-11-11 RX ADMIN — HYDROMORPHONE HYDROCHLORIDE 1 MILLIGRAM(S): 2 INJECTION INTRAMUSCULAR; INTRAVENOUS; SUBCUTANEOUS at 13:30

## 2021-11-11 RX ADMIN — SODIUM CHLORIDE 1000 MILLILITER(S): 9 INJECTION, SOLUTION INTRAVENOUS at 10:00

## 2021-11-11 RX ADMIN — APIXABAN 5 MILLIGRAM(S): 2.5 TABLET, FILM COATED ORAL at 05:50

## 2021-11-11 RX ADMIN — HYDROMORPHONE HYDROCHLORIDE 1 MILLIGRAM(S): 2 INJECTION INTRAMUSCULAR; INTRAVENOUS; SUBCUTANEOUS at 13:09

## 2021-11-11 RX ADMIN — HYDROMORPHONE HYDROCHLORIDE 1 MILLIGRAM(S): 2 INJECTION INTRAMUSCULAR; INTRAVENOUS; SUBCUTANEOUS at 18:50

## 2021-11-11 RX ADMIN — MORPHINE SULFATE 2 MILLIGRAM(S): 50 CAPSULE, EXTENDED RELEASE ORAL at 07:20

## 2021-11-11 RX ADMIN — HYDROMORPHONE HYDROCHLORIDE 1 MILLIGRAM(S): 2 INJECTION INTRAMUSCULAR; INTRAVENOUS; SUBCUTANEOUS at 05:49

## 2021-11-11 RX ADMIN — Medication 3 MILLILITER(S): at 00:46

## 2021-11-11 RX ADMIN — HYDROMORPHONE HYDROCHLORIDE 1 MILLIGRAM(S): 2 INJECTION INTRAMUSCULAR; INTRAVENOUS; SUBCUTANEOUS at 07:23

## 2021-11-11 RX ADMIN — Medication 10 MILLIGRAM(S): at 09:31

## 2021-11-11 RX ADMIN — PIPERACILLIN AND TAZOBACTAM 25 GRAM(S): 4; .5 INJECTION, POWDER, LYOPHILIZED, FOR SOLUTION INTRAVENOUS at 14:17

## 2021-11-11 RX ADMIN — ONDANSETRON 4 MILLIGRAM(S): 8 TABLET, FILM COATED ORAL at 08:52

## 2021-11-11 RX ADMIN — PANTOPRAZOLE SODIUM 40 MILLIGRAM(S): 20 TABLET, DELAYED RELEASE ORAL at 05:50

## 2021-11-11 RX ADMIN — SODIUM CHLORIDE 1000 MILLILITER(S): 9 INJECTION, SOLUTION INTRAVENOUS at 02:32

## 2021-11-11 RX ADMIN — PIPERACILLIN AND TAZOBACTAM 25 GRAM(S): 4; .5 INJECTION, POWDER, LYOPHILIZED, FOR SOLUTION INTRAVENOUS at 06:08

## 2021-11-11 NOTE — CONSULT NOTE ADULT - TIME BILLING
Review of outpt records, records from prior hospitalization, review of current hospital course/ labs/ imaging, devising a pulm management plan, coordination of care with medical team/ consultants and counseling of patient on resp symptoms and current management plan.

## 2021-11-11 NOTE — CONSULT NOTE ADULT - SUBJECTIVE AND OBJECTIVE BOX
Oncology Consult Note    HPI: Patient is a 65y old  Female who presents with a chief complaint of acute hypoxic respiratory failure    Patient is a 65 year old female with a history of HTN, COPD, NSCLC (adenocarcinoma left lung) Stage IIIB (dx 2/2020 s/p chemo followed by immunotherapy, currently on Keytruda maintenance therapy), pulmonary embolus on apixaban, chronic pancreatitis (s/p Jeffery procedure 2019), hx of ARDS (2015), GERD, chronic pain (on opioids), and s/p vocal cord polpectomy who presents with tachypnea and SOB that started on 11/5/21, requiring 2L NC (has home O2, but last used this summer). Her  has been asking her to go to the hospital but she refused until day of admission. She also has sharp epigastric pain that radiates to the back and nausea and vomiting.  Her epigastric pain started 4 weeks ago, and she saw Dr. Valdez who performed at CT a/p with no findings. Her epigastric pain worsened and starting last Friday she had vomiting and nausea with inability to take PO. She also has a headache. She denies CP, diarrhea, constipation, urinary symptoms, fevers, chills, cough, sore throat, rhinorrhea.     Of note, patient was hospitalized 6/14 - 6/27 for acute hypoxic respiratory failure found to have on CTA chronic thromboembolic disease, no acute PE and was treated with zosyn x 7 days, IV Solu-Medrol, diuresed, duonebs with hospital course c/b new pancytopenia requiring 1u pRBC and 3U PLTs.    ED Course:   VS: afebrile, , /111, RR 25, SpO2 98% on 2L NC  Labs: proBNP 1281, pH 7.4/40/25/25, Lactate 2.4, lipase 4.0  CTA and CT a/p: No acute PE, chronic PE LLL and RLL, thickening of descending colon  TTE bedside: no pericardial effusion  s/p qL LR, dilaudid 1 mg IVP, morphine 4 mg, zofran 4 mg (09 Nov 2021 17:20)    CTPA was negative for acute PE and showed changes consistent with her known chronic L PE. No acute infiltrates were seen. A Ct of the abdomen/ pelvis showed possible descending colitis and she is currently being treated with Zosyn IV. Pt continues to report ongoing abdominal discomfort. She is receiving duonebs and Prednisone 40 mg daily for a possible COPD exacerbation and denies any wheezing or SOB at present. She does report that the SOB tends to occur when she has episodes of severe abdominal discomfort.    PAST MEDICAL & SURGICAL HISTORY:  HTN (hypertension)    GERD (gastroesophageal reflux disease)    Chronic pancreatitis  last episode 4/2019    ARDS survivor  2015    History of adrenal adenoma  stable on imaging    Vocal cord polyp  removal 2018, benign as per pt    Thrombophlebitis  superficial right UE during 4/2019 hospital stay, resolved as per pt    Chronic abdominal pain    Non-small cell lung cancer (NSCLC)  chemo: Alimta/ Keytruda 2/24/2021    Pulmonary embolism    COPD (chronic obstructive pulmonary disease)    Pulmonary hypertension    Anemia  transfusion 2/5/21    S/P arthroscopy of shoulder  left in 2009    S/P hip replacement  left - 2010    S/P arthroscopy of shoulder  right - 2014    S/P hip replacement, right  May 2016    History of vocal cord polypectomy  1/2018    S/P shoulder replacement, left  2016    History of pancreatic surgery  Jeffery procedure (5/8/2019)    FAMILY HISTORY:      MEDICATIONS  (STANDING):  albuterol/ipratropium for Nebulization 3 milliLiter(s) Nebulizer every 6 hours  amLODIPine   Tablet 10 milliGRAM(s) Oral daily  apixaban 5 milliGRAM(s) Oral two times a day  budesonide  80 MICROgram(s)/formoterol 4.5 MICROgram(s) Inhaler 2 Puff(s) Inhalation two times a day  dextrose 5% + lactated ringers. 1000 milliLiter(s) (100 mL/Hr) IV Continuous <Continuous>  folic acid 1 milliGRAM(s) Oral daily  influenza   Vaccine 0.5 milliLiter(s) IntraMuscular once  metoprolol succinate  milliGRAM(s) Oral daily  mirtazapine 15 milliGRAM(s) Oral at bedtime  pancrelipase  (CREON 12,000 Lipase Units) 2 Capsule(s) Oral three times a day with meals  pantoprazole    Tablet 40 milliGRAM(s) Oral before breakfast  piperacillin/tazobactam IVPB.. 3.375 Gram(s) IV Intermittent every 8 hours  predniSONE   Tablet 40 milliGRAM(s) Oral daily  tiotropium 18 MICROgram(s) Capsule 1 Capsule(s) Inhalation daily    MEDICATIONS  (PRN):  acetaminophen     Tablet .. 650 milliGRAM(s) Oral every 6 hours PRN Temp greater or equal to 38C (100.4F), Mild Pain (1 - 3)  HYDROmorphone  Injectable 0.5 milliGRAM(s) IV Push every 4 hours PRN Moderate Pain (4 - 6)  HYDROmorphone  Injectable 1 milliGRAM(s) IV Push every 4 hours PRN Severe Pain (7 - 10)  melatonin 3 milliGRAM(s) Oral at bedtime PRN Insomnia  metoclopramide Injectable 10 milliGRAM(s) IV Push every 6 hours PRN vomiting/nausea  morphine  - Injectable 2 milliGRAM(s) IV Push every 4 hours PRN Severe Pain (7 - 10)  ondansetron Injectable 4 milliGRAM(s) IV Push every 4 hours PRN Nausea and/or Vomiting      Allergies    diazepam (Other)  lemon (Other)    Intolerances        SOCIAL HISTORY: Lives with  and brother in law.  HCP is  and eldest brother.  Stopped smoking.    REVIEW OF SYSTEMS:    REVIEW OF SYSTEMS:  CONSTITUTIONAL: No fever, weight loss, or fatigue  EYES: No eye pain, visual disturbances, or discharge  ENT:  No difficulty hearing, tinnitus, vertigo; No sinus or throat pain  NECK: No pain or stiffness  BREASTS: No pain, masses, or nipple discharge  RESPIRATORY: No cough, wheezing, chills or hemoptysis; (+) shortness of breath  CARDIOVASCULAR: No chest pain, palpitations, dizziness, or leg swelling  GASTROINTESTINAL: (+) epigastric pain. No nausea, vomiting, or hematemesis; No diarrhea or constipation. No melena or hematochezia.  GENITOURINARY: No dysuria, frequency, hematuria, or incontinence  NEUROLOGICAL: (+) headaches, No memory loss, loss of strength, numbness, or tremors  SKIN: No itching, burning, rashes, or lesions   LYMPH NODES: No enlarged glands  ENDOCRINE: No heat or cold intolerance; No hair loss  MUSCULOSKELETAL: No joint pain or swelling; No muscle, back, or extremity pain  PSYCHIATRIC: No depression, anxiety, mood swings, or difficulty sleeping  HEME/LYMPH: No easy bruising, or bleeding gums  ALLERGY AND IMMUNOLOGIC: No hives or eczema        T(F): 97.3 (11-11-21 @ 21:44), Max: 98.1 (11-11-21 @ 04:31)  HR: 102 (11-11-21 @ 21:44)  BP: 128/66 (11-11-21 @ 21:44)  RR: 18 (11-11-21 @ 21:44)  SpO2: 98% (11-11-21 @ 21:44)  Wt(kg): --      PHYSICAL EXAM:  GENERAL: (+) appears in acute distress due to epigastric pain, in a fetal position  HEAD:  Atraumatic, Normocephalic  EYES: EOMI, PERRLA, conjunctiva and sclera clear  ENMT: No tonsillar erythema, exudates, or enlargement; Moist mucous membranes, Good dentition, No lesions  NECK: Supple, No JVD, Normal thyroid  NERVOUS SYSTEM:  Alert & Oriented X3, Good concentration; Motor Strength 5/5 B/L upper and lower extremities; DTRs 2+ intact and symmetric  CHEST/LUNG: (+) inspiratory and expiratory rhonchi   HEART: Regular rate with tachycardia; No murmurs, rubs, or gallops  ABDOMEN: Soft, Nondistended; Bowel sounds present, (+) TTP epigastric area  EXTREMITIES:  2+ Peripheral Pulses, No clubbing, cyanosis, or edema  LYMPH: No lymphadenopathy noted  SKIN: No rashes or lesions    Consultant(s) Notes Reviewed:  [x ] YES  [ ] NO  Care Discussed with Consultants/Other Providers [ x] YES  [ ] NO                          10.0   5.47  )-----------( 393      ( 11 Nov 2021 06:10 )             32.0       11-11    140  |  107  |  7   ----------------------------<  68<L>  3.6   |  23  |  0.96    Ca    8.3<L>      11 Nov 2021 06:10  Phos  2.5     11-11  Mg     1.8     11-11    TPro  5.5<L>  /  Alb  2.3<L>  /  TBili  0.3  /  DBili  x   /  AST  19  /  ALT  13  /  AlkPhos  117  11-11      Magnesium, Serum: 1.8 mg/dL (11-11 @ 06:10)  Phosphorus Level, Serum: 2.5 mg/dL (11-11 @ 06:10)    ra< from: MR Head w/wo IV Cont (11.10.21 @ 21:45) >    EXAM:  MR BRAIN WAW IC                            PROCEDURE DATE:  11/10/2021      INTERPRETATION:  INDICATIONS:  Falls and headache. Rule out metastatic disease.    TECHNIQUE:  Multiplanar imaging was performed using T1 weighted, T2 weightedand FLAIR sequences.  Diffusion weighted and susceptibility sensitive images were also obtained.  Following intravenous gadolinium, multiplanar T1 weighted images were performed. 7.5 cc Gadavist were administered. 0 cc were discarded.    COMPARISON EXAMINATION:  Brain CT 11/10/2021 and brain MR 3/29/2020    FINDINGS:  VENTRICLES AND SULCI:  Unchanged. Prominent in size compatible with mild cerebral volume loss  INTRA-AXIAL:  2 new areas of cortical encephalomalacia are seen when compared with theprior MR scan within the right parietal lobe medially and in the right occipital lobe compatible with areas of interval infarction. Multiple scattered small cerebellar chronic lacunae are also seen, new from the prior exam. No diffusion restriction is seen to suggest acute ischemia. No mass effect or abnormal enhancement is seen. Patchy areas of white matter T2 hyperintensity are seen, nonspecific but likely microvascular in nature. A more prominent area is seen adjacent to the right frontal lobecompared with the prior exam. A small left cerebellar developmental venous anomaly is again seen.  EXTRA-AXIAL:  No mass or collection.  VISUALIZED SINUSES:  Mild to moderate polypoid mucosal thickening  VISUALIZED MASTOIDS:  Mild nonspecific soft tissue bilaterally  CALVARIUM:  Normal.  CAROTID FLOW VOIDS:  Present.  MISCELLANEOUS:  None.    IMPRESSION:  No evidence of metastatic disease.    Chronic appearing small infarcts within the right parietal and right occipital lobes as well as within the cerebellar hemispheres which are new compared with the most recent MR.    --- End of Report ---    DANIAL KEANE MD; Attending Radiologist  This document has been electronically signed. Nov 11 2021  8:34AM    < end of copied text >  < from: CT Head No Cont (11.10.21 @ 14:43) >    EXAM:  CT BRAIN                          PROCEDURE DATE:  11/10/2021      INTERPRETATION:  Noncontrast CT of the brain.    CLINICAL INDICATION:  Falls and headache    TECHNIQUE : Axial CT scanning of the brain was obtained from the skull base to the vertex without the administration of intravenous contrast.    COMPARISON: Brain CT dated 6/10/2021    FINDINGS:    No acute hemorrhage, hydrocephalus, midline shift or extra-axial collections are identified. Age-appropriate involutional changes and moderate microvascular ischemic changes are present. Partially radiology brachium pontis anesthesiologist vestibular tracking abnormality may be on the sella    Where there is empty sella.    No displaced calvarial fracture is identified.    The orbits are not remarkable in appearance.    Bilateral maxillary sinus polyps versus retention cysts.    The tympanomastoid cavities are free of acute disease.    IMPRESSION:    No acute hemorrhage, extra-axial collections or displaced calvarial fracture  --- End of Report ---    CAPRICE KUMAR MD; Attending Radiologist  This document has been electronically signed. Nov 10 2021  3:17PM    < end of copied text >    < from: US Abdomen Complete (US Abdomen Complete .) (11.10.21 @ 10:11) >    EXAM:  US ABDOMEN COMPLETE                          PROCEDURE DATE:  11/10/2021      INTERPRETATION:  CLINICAL INFORMATION: Transaminitis, abdominal pain for 5 days.    COMPARISON: CT abdomen/pelvis 11/9/2021. Abdominal ultrasound 5/19/2015.    TECHNIQUE: Sonography of the abdomen.    FINDINGS:    Liver: Hepatic steatosis. Minimal/mild nodularity of the contour.  Bile ducts: Normal caliber. Common bile duct measures 5.4 mm.  Gallbladder: Mildly distended. No sonographic Kelley's sign.  Pancreas: Multiple calcifications.  Spleen: 6.2 cm. Within normal limits.  Right kidney: 9.3 cm. No hydronephrosis. Interpolar cyst measuring 1.9 x 1.4 x 1.6 cm.  Left kidney: 9.1 cm.  No hydronephrosis. Interpolar cyst measuring 1.3 x 0.8 x 1.1 cm.  Ascites: None.  Aorta and IVC: Atherosclerotic changes of the aorta. IVC is patent.    IMPRESSION:    Hepatic steatosis. Minimal/mild nodularity of the contour is likely. Recommend follow up.    Multiple pancreatic calcifications from chronic pancreatitis.    Bilateral renal cysts.  --- End of Report ---      NIKI GARY M.D., RADIOLOGY RESIDENT  This document has been electronically signed.  DOUG PATEL MD; Attending Radiologist  This document has been electronicallysigned. Nov 10 2021 12:52PM    < end of copied text >      < from: CT Abdomen and Pelvis w/ IV Cont (11.09.21 @ 14:09) >    EXAM:  CT ABDOMEN AND PELVIS IC                          EXAM:  CT ANGIO CHEST PULM ART [facility]                          PROCEDURE DATE:  11/09/2021      INTERPRETATION:  Reason for Exam: Shortness of breath. chest pain.    CTA of the chest was performed from the thoracic inlet to the level of the adrenal glands following IV contrast injection of  80 cc of Omnipaque 350. No immediate complications were reported.  MIP images were also created and reviewed.    Comparison: October 6, 2021    Tubes/Lines: Right-sided Mediport catheter with tip in SVC.    Mediastinum and Heart: Aorta and pulmonary arteries are normal in size. Thyroid gland is unremarkable. No lymphadenopathy. No pericardial effusion.    Lungs, Pleura, and Airways: There is no acute pulmonary embolus. There is obliteration of the left lower lobe vessels with paucity of vasculature in the left lung. This is secondary to chronic pulmonary embolus in the left lower lobe. Additional calcified pulmonary embolus in the right lower lobe interlobar artery indicates that this is chronic.    Severe centrilobular emphysema noted. Bilateral reticular opacities consistent with fibrosis are also noted and are unchanged since previous exam.    CT abdomen and pelvis:    There is diffuse fatty infiltration of the liver. No focal hepatic or splenic lesions are noted.    Calcifications noted from chronic pancreatitis. Post Jeffery procedure. The adrenal glands demonstrate a right adrenal 1 cm nodule which is stable since previous exam. Both kidneys enhance symmetrically without stones or hydronephrosis. Bilateral renal cysts are noted. The retroperitoneal vasculature is within normal limits.    No evidence of bowel obstruction. Thickening of the descending colon which may be least partially be due to under distention. Diverticulosis without diverticulitis. Fibroid uterus noted.    Post bilateral hip replacements. No acute bony abnormality.    IMPRESSION:    CTA CHEST: No acute pulmonary embolus.    Chronic pulmonary embolus within the left lower lobe pulmonary arteries and within the right lower lobe interlobar artery.    CT abdomen and pelvis: Thickening of the descending colon which may be least partially be due to under distention. Correlate for signs of colitis. Otherwise no acute pathology.    --- End of Report ---    ARTEM HOPKINS MD; Attending Radiologist  This document has been electronically signed. Nov 9 2021  2:32PM    < end of copied text >

## 2021-11-11 NOTE — PROGRESS NOTE ADULT - PROBLEM SELECTOR PLAN 5
Not on any outpatient nebulizers or inhalers, has O2 at home but has not used since this summer  - management per above  - c/w O2 as above  - infectious workup as above Not on any outpatient nebulizers or inhalers, has O2 at home but has not used since this summer  - management per above  -off O2  - infectious workup as above

## 2021-11-11 NOTE — MEDICAL STUDENT PROGRESS NOTE(EDUCATION) - SUBJECTIVE AND OBJECTIVE BOX
PROGRESS NOTE:   Patient is a 65y old  Female who presents with a chief complaint of Acute hypoxic respiratory failure (11 Nov 2021 05:19)    SUBJECTIVE / OVERNIGHT EVENTS:  Patient still reporting severe abdominal pain (10/10 severity) and nausea this morning. She states that the pain medications (Dilaudid, morphine) only help for about an hour before the pain recurs and that heat packs provide mild relief. She states that Reglan helps with the nausea more than Zofran. She has not vomited since yesterday and reports that she ate half a sandwich yesterday and was able to keep it down. She denies any SOB and is on room air. She denies any fevers, chills, chest pain, diarrhea, or constipation.     REVIEW OF SYSTEMS:  CONSTITUTIONAL: No fever, weight loss, or fatigue  EYES: No eye pain, visual disturbances, or discharge  ENMT: No difficulty hearing, tinnitus, vertigo; No sinus or throat pain  NECK: No pain or stiffness  BREASTS: No pain, masses, or nipple discharge  RESPIRATORY: No cough, wheezing, chills or hemoptysis; No shortness of breath  CARDIOVASCULAR: No chest pain, palpitations, dizziness, or leg swelling  GASTROINTESTINAL: +Severe abdominal pain and nausea. No vomiting, hematemesis, diarrhea, constipation, melena, or hematochezia.  GENITOURINARY: No dysuria, frequency, hematuria, or incontinence  NEUROLOGICAL: No headaches, memory loss, loss of strength, numbness, or tremors  SKIN: No itching, burning, rashes, or lesions   LYMPH NODES: No enlarged glands  ENDOCRINE: No heat or cold intolerance; No hair loss  MUSCULOSKELETAL: No joint pain or swelling; No muscle, back, or extremity pain  PSYCHIATRIC: No depression, anxiety, mood swings, or difficulty sleeping  HEME/LYMPH: No easy bruising, or bleeding gums  ALLERGY AND IMMUNOLOGIC: No hives or eczema    MEDICATIONS  (STANDING):  albuterol/ipratropium for Nebulization 3 milliLiter(s) Nebulizer every 6 hours  amLODIPine   Tablet 10 milliGRAM(s) Oral daily  apixaban 5 milliGRAM(s) Oral two times a day  dextrose 5% + lactated ringers. 1000 milliLiter(s) (50 mL/Hr) IV Continuous <Continuous>  folic acid 1 milliGRAM(s) Oral daily  influenza   Vaccine 0.5 milliLiter(s) IntraMuscular once  metoprolol succinate  milliGRAM(s) Oral daily  mirtazapine 15 milliGRAM(s) Oral at bedtime  pancrelipase  (CREON 12,000 Lipase Units) 2 Capsule(s) Oral three times a day with meals  pantoprazole    Tablet 40 milliGRAM(s) Oral before breakfast  piperacillin/tazobactam IVPB.. 3.375 Gram(s) IV Intermittent every 8 hours  predniSONE   Tablet 40 milliGRAM(s) Oral daily    MEDICATIONS  (PRN):  acetaminophen     Tablet .. 650 milliGRAM(s) Oral every 6 hours PRN Temp greater or equal to 38C (100.4F), Mild Pain (1 - 3)  HYDROmorphone  Injectable 0.5 milliGRAM(s) IV Push every 4 hours PRN Moderate Pain (4 - 6)  HYDROmorphone  Injectable 1 milliGRAM(s) IV Push every 4 hours PRN Severe Pain (7 - 10)  melatonin 3 milliGRAM(s) Oral at bedtime PRN Insomnia  metoclopramide Injectable 10 milliGRAM(s) IV Push every 6 hours PRN vomiting/nausea  morphine  - Injectable 2 milliGRAM(s) IV Push every 4 hours PRN Severe Pain (7 - 10)  ondansetron Injectable 4 milliGRAM(s) IV Push every 4 hours PRN Nausea and/or Vomiting      PHYSICAL EXAM:  Vital Signs Last 24 Hrs  T(C): 36.7 (11 Nov 2021 04:31), Max: 36.8 (10 Nov 2021 15:45)  T(F): 98.1 (11 Nov 2021 04:31), Max: 98.2 (10 Nov 2021 15:45)  HR: 89 (11 Nov 2021 04:31) (89 - 102)  BP: 104/62 (11 Nov 2021 04:31) (104/62 - 148/84)  BP(mean): --  RR: 18 (11 Nov 2021 04:31) (18 - 18)  SpO2: 99% (11 Nov 2021 04:31) (98% - 99%)    CONSTITUTIONAL: +Appears acute distressed and is clutching abdomen. Well-developed.  HEENT: PERRLA, EOMI, moist mucous membranes.  NECK: Supple. No JVD.  CARDIOVASCULAR: Regular rate and rhythm with normal S1 and S2. No murmurs.  RESPIRATORY: Lungs CTAB. No wheezes, rales, or rhonchi.  EXTREMITIES: 2+ peripheral pulses. No clubbing, cyanosis, or edema.  ABDOMEN: +Tenderness to palpation in epigastric area. Soft, non-distended. Normoactive bowel sounds.  MUSCULOSKELETAL: No joint swelling or tenderness to palpation  NEUROLOGIC: A&Ox3. Motor strength 5/5 b/l. Sensation to light touch intact b/l.  PSYCH: Affect appropriate    LABS:                        10.0   5.47  )-----------( 393      ( 11 Nov 2021 06:10 )             32.0     11-11    140  |  107  |  7   ----------------------------<  68<L>  3.6   |  23  |  0.96    Ca    8.3<L>      11 Nov 2021 06:10  Phos  2.5     11-11  Mg     1.8     11-11    TPro  5.5<L>  /  Alb  2.3<L>  /  TBili  0.3  /  DBili  x   /  AST  19  /  ALT  13  /  AlkPhos  117  11-11    Lactate, Blood (11.10.21 @ 16:39)    Lactate, Blood: 2.9 mmol/L    Culture - Blood (collected 09 Nov 2021 21:52)  Source: .Blood Blood-Peripheral  Preliminary Report (10 Nov 2021 22:02):    No growth to date.    Culture - Blood (collected 09 Nov 2021 21:52)  Source: .Blood Blood-Peripheral  Preliminary Report (10 Nov 2021 22:02):    No growth to date.      RADIOLOGY & ADDITIONAL TESTS:  Results Reviewed:   Imaging Personally Reviewed: TTE, CTH, MR Brain  Electrocardiogram Personally Reviewed:

## 2021-11-11 NOTE — PROGRESS NOTE ADULT - PROBLEM SELECTOR PLAN 1
Admitted for tachypnea and hypoxia, requiring 2L NC. Etiology unclear but likely multifactorial. DDx includes COPD exacerbation, infectious process, right-sided HF, NSCLC progression.  - Bedside echo ruled out pericardial effusion (11/9)  - CTA w/ evidence of chronic thromboembolic disease not no acute PE (11/9)  - Trend CBC and fever curve  - f/u BCx, sputum cx, UCx, RVP  - Continue NC and wean as tolerated  - c/w duonebs, started prednisone 40 mg PO qd  - c/w zosyn, procal 1.4  - Trending lactate to peak 2.4--->2.6 (s/p 500cc LR bolus 11/10)  - f/u TTE  - Pulm consulted: apprec recs  - Dr. Leahy (cardiology) consulted: apprec recs Admitted for tachypnea and hypoxia, requiring 2L NC, now off O2. Etiology unclear but likely multifactorial. DDx includes COPD exacerbation, infectious process, right-sided HF, NSCLC progression.  - Bedside echo ruled out pericardial effusion (11/9)  - CTA w/ evidence of chronic thromboembolic disease not no acute PE (11/9)  - Trend CBC and fever curve  - BCx NGTD, f/u sputum cx, UCx, RVP  - Continue NC and wean as tolerated  - c/w duonebs, started prednisone 40 mg PO qd  - c/w zosyn, procal 1.4  - Trending lactate to peak 2.4--->2.6 (s/p 500cc LR bolus 11/10)  - TTE (11/11/21) EF 71%, calcified AV  - Pulm consulted: apprec recs  - Dr. Leahy (cardiology) consulted: apprec recs Admitted for tachypnea and hypoxia, requiring 2L NC, now off O2. Etiology unclear but likely multifactorial. DDx includes COPD exacerbation, infectious process, right-sided HF, NSCLC progression. Colitis on CT a/p but no diarrhea.   - Bedside echo ruled out pericardial effusion (11/9)  - CTA w/ evidence of chronic thromboembolic disease not no acute PE (11/9)  - Trend CBC and fever curve  - BCx NGTD, f/u sputum cx, UCx, RVP  - Continue NC and wean as tolerated  - c/w duonebs, started prednisone 40 mg PO qd  - c/w zosyn, procal 1.4  - Trending lactate to peak 2.4--->2.6 (s/p 500cc LR bolus 11/10)  - TTE (11/11/21) EF 71%, calcified AV  - Pulm consulted: apprec recs  - Dr. Leahy (cardiology) consulted: apprec recs

## 2021-11-11 NOTE — PROGRESS NOTE ADULT - PROBLEM SELECTOR PLAN 3
History of DVT/PE on eliquis  - continue home eliquis 5 mg BID  - CTA w/ evidence of chronic thromboembolic disease but no acute PE

## 2021-11-11 NOTE — PROGRESS NOTE ADULT - PROBLEM SELECTOR PLAN 4
Hx Stage IIIB (dx 2/2020 s/p chemo now on Keytruda maintenance every 3 weeks)  - No intervention as inpatient at this time  - Hem/Onc consulted: apprec recs  - Holding Keytruda (last dose 9/30)  - f/u CTH non-contrast and MR brain w/ IV contrast to r/o brain mets Hx Stage IIIB (dx 2/2020 s/p chemo now on Keytruda maintenance every 3 weeks)  - No intervention as inpatient at this time  - Hem/Onc consulted: apprec recs  - Holding Keytruda (last dose 9/30)  - negative CTH non-contrast and MR brain w/ IV contrast ruled out brain mets

## 2021-11-11 NOTE — PROGRESS NOTE ADULT - SUBJECTIVE AND OBJECTIVE BOX
Vascular Cardiology Progress  Note    DIRECT SERVICE NUMBER:  [phone]           EMAIL [email or web address]   OFFICE [phone]    CC:  Dyspnea    HPI:        Echo within normal limits  Breathing is normal.  She is now on room air  Her main complaint is abdominal pains, on zosyn.  Not eating or drinking much  No edema    Allergies    diazepam (Other)  lemon (Other)    Intolerances    	 MEDICATIONS  (STANDING):  albuterol/ipratropium for Nebulization 3 milliLiter(s) Nebulizer every 6 hours  amLODIPine   Tablet 10 milliGRAM(s) Oral daily  apixaban 5 milliGRAM(s) Oral two times a day  dextrose 5% + lactated ringers. 1000 milliLiter(s) (50 mL/Hr) IV Continuous <Continuous>  folic acid 1 milliGRAM(s) Oral daily  influenza   Vaccine 0.5 milliLiter(s) IntraMuscular once  lactated ringers Bolus 500 milliLiter(s) IV Bolus once  metoprolol succinate  milliGRAM(s) Oral daily  mirtazapine 15 milliGRAM(s) Oral at bedtime  pancrelipase  (CREON 12,000 Lipase Units) 2 Capsule(s) Oral three times a day with meals  pantoprazole    Tablet 40 milliGRAM(s) Oral before breakfast  piperacillin/tazobactam IVPB.. 3.375 Gram(s) IV Intermittent every 8 hours  predniSONE   Tablet 40 milliGRAM(s) Oral daily      PAST MEDICAL & SURGICAL HISTORY:  HTN (hypertension)    GERD (gastroesophageal reflux disease)    Chronic pancreatitis  last episode 4/2019    ARDS survivor  2015    History of adrenal adenoma  stable on imaging    Vocal cord polyp  removal 2018, benign as per pt    Thrombophlebitis  superficial right UE during 4/2019 hospital stay, resolved as per pt    Chronic abdominal pain    Non-small cell lung cancer (NSCLC)  chemo: Alimta/ Keytruda 2/24/2021    Pulmonary embolism    COPD (chronic obstructive pulmonary disease)    Pulmonary hypertension    Anemia  transfusion 2/5/21    S/P arthroscopy of shoulder  left in 2009    S/P hip replacement  left - 2010    S/P arthroscopy of shoulder  right - 2014    S/P hip replacement, right  May 2016    History of vocal cord polypectomy  1/2018    S/P shoulder replacement, left  2016    History of pancreatic surgery  Jeffery procedure (5/8/2019)        FAMILY HISTORY:      SOCIAL HISTORY:  unchanged    REVIEW OF SYSTEMS:  CONSTITUTIONAL: No fever, weight loss, or fatigue  EYES: No eye pain, visual disturbances, or discharge  ENT:  No difficulty hearing, tinnitus, vertigo; No sinus or throat pain  NECK: No pain or stiffness  RESPIRATORY:  MAC improving  CARDIOVASCULAR:  No CP    GASTROINTESTINAL:  + abomdinal pain  GENITOURINARY: No dysuria, frequency, hematuria, or incontinence  NEUROLOGICAL: No headaches, memory loss, loss of strength, numbness, or tremors  LYMPH Nodes: No enlarged glands  ENDOCRINE: No heat or cold intolerance; No hair loss  MUSCULOSKELETAL: No joint pain or swelling; No muscle, back, or extremity pain  PSYCHIATRIC: No depression, anxiety, mood swings, or difficulty sleeping  HEME/LYMPH: No easy bruising, or bleeding gums  ALLERGY AND IMMUNOLOGIC: No hives or eczema	    [ x] All others negative	  [ ] Unable to obtain       09 Nov 2021 07:01  -  10 Nov 2021 07:00  --------------------------------------------------------  IN: 200 mL / OUT: 0 mL / NET: 200 mL        Appearance:  	  HEENT:   Normal oral mucosa, PERRL, EOMI	   Lymphatic: No lymphadenopathy  Cardiovascular:  S1S2 RRR  Respiratory:  	Diffuse fine crackles  Psychiatry:  AAO x 3  Gastrointestinal:  Abdominal tenderness  Skin: No rashes, No ecchymoses, No cyanosis	  Neurologic:  no deficits  Extremities:  no edema                         10.0   5.47  )-----------( 393      ( 11 Nov 2021 06:10 )             32.0       < from: CT Angio Chest PE Protocol w/ IV Cont (11.09.21 @ 14:09) >  CTA CHEST: No acute pulmonary embolus.    Chronic pulmonary embolus within the left lower lobe pulmonary arteries and within the right lower lobe interlobar artery.    CT abdomen and pelvis: Thickening of the descending colon which may be least partially be due to under distention. Correlate for signs of colitis. Otherwise no acute pathology.    --- End of Report ---    < end of copied text >  < from: US TTE 2D F/U, Limited w/o Contrast (ED) (11.09.21 @ 12:12) >    INTERPRETATION:  A focused transthoracic cardiac ultrasound examination was performed.  No pericardial effusion was present. Mixed echogenicity structure anterior to right ventricle likely epicardial fat pad.  No global wall motion abnormality was identified.        IMPRESSION:  No Pericardial Effusion.    --- End of Report ---    < end of copied text >      Assessment:  1. History of PE, with chronic findings on CT  2.  HTN  3.  Lung disease  4.  Dyspnea on exertion  5.  Colitis? abdominal pains        Plan:  1.  Continue apixaban 5mg BID  2.  Echo within normal limits - no further cardiac testing  3.  She is asking to decrease prednisone - thinks its causing her some upset stomach  4.  Abdominal pains - discussed with Dr. An - he is on zosyn.  Will give some IV fluids for elevated lactate.  Await Dr. Molina's team recs  5.  Nebulizers and pulm eval    Thank you  Sourav Leahy    Vascular Cardiology Service    Please call with any questions:   DIRECT SERVICE NUMBER:  [phone]  Office [phone]  email:  [email or web address]

## 2021-11-11 NOTE — CONSULT NOTE ADULT - ATTENDING COMMENTS
metastatic adenocarcinoma on keytruda maintenance p/w shortness of breath and hypoxia with pneumonia and COPD exacerbation     appreciate medicine care  hold off systemic treatment  will follow up

## 2021-11-11 NOTE — PROGRESS NOTE ADULT - PROBLEM SELECTOR PLAN 2
History of chronic pancreatitis s/p Jeffery procedure 2019, follows with Dr. Molina  - s/p 1L LR in ED, started on D5 LR @50 cc/hr  - c/w pancrelipase  - zofran 4mg Q4h PRN for nausea, and reglan 10 mg Q6h PRN for breakthrough nausea/vomiting  - CLD-->advance diet as tolerated  - for pain: dilaudid 0.5 mg Q4h for moderate and dilaudid 1 mg Q4h for severe, morphine 2 mg Q6h PRN for breakthrough pain  - holding home oxycodone prn  - Dr. Molina consulted: apprec recs History of chronic pancreatitis s/p Jeffery procedure 2019, follows with Dr. Molina. EKG (11/10) QTc 452 sinus tachycardia  - s/p 1L LR in ED, started on D5 LR @100 cc/hr  - c/w pancrelipase  - zofran 4mg Q4h PRN for nausea, and reglan 10 mg Q6h PRN for breakthrough nausea/vomiting  - soft diet-->advance diet as tolerated  - for pain: dilaudid 0.5 mg Q4h for moderate and dilaudid 1 mg Q4h for severe, morphine 2 mg Q6h PRN for breakthrough pain  - holding home oxycodone prn  - Dr. Molina consulted: apprec recs  - GI consulted: will reach out to Dr. Damian

## 2021-11-11 NOTE — PROGRESS NOTE ADULT - SUBJECTIVE AND OBJECTIVE BOX
***************************************************************  Gauri Ashley, PGY2  Internal Medicine   pager: NS: [phone] [facility]: [phone]  ***************************************************************    PROGRESS NOTE:     Patient is a 65y old  Female who presents with a chief complaint of Acute hypoxic respiratory failure (10 Nov 2021 21:48)      SUBJECTIVE / OVERNIGHT EVENTS:    REVIEW OF SYSTEMS:  CONSTITUTIONAL: No fever, weight loss, or fatigue  EYES: No eye pain, visual disturbances, or discharge  ENMT:  No difficulty hearing, tinnitus, vertigo; No sinus or throat pain  NECK: No pain or stiffness  BREASTS: No pain, masses, or nipple discharge  RESPIRATORY: No cough, wheezing, chills or hemoptysis; No shortness of breath  CARDIOVASCULAR: No chest pain, palpitations, dizziness, or leg swelling  GASTROINTESTINAL: No abdominal or epigastric pain. No nausea, vomiting, or hematemesis; No diarrhea or constipation. No melena or hematochezia.  GENITOURINARY: No dysuria, frequency, hematuria, or incontinence  NEUROLOGICAL: No headaches, memory loss, loss of strength, numbness, or tremors  SKIN: No itching, burning, rashes, or lesions   LYMPH NODES: No enlarged glands  ENDOCRINE: No heat or cold intolerance; No hair loss  MUSCULOSKELETAL: No joint pain or swelling; No muscle, back, or extremity pain  PSYCHIATRIC: No depression, anxiety, mood swings, or difficulty sleeping  HEME/LYMPH: No easy bruising, or bleeding gums  ALLERY AND IMMUNOLOGIC: No hives or eczema    MEDICATIONS  (STANDING):  albuterol/ipratropium for Nebulization 3 milliLiter(s) Nebulizer every 6 hours  amLODIPine   Tablet 10 milliGRAM(s) Oral daily  apixaban 5 milliGRAM(s) Oral two times a day  dextrose 5% + lactated ringers. 1000 milliLiter(s) (50 mL/Hr) IV Continuous <Continuous>  folic acid 1 milliGRAM(s) Oral daily  influenza   Vaccine 0.5 milliLiter(s) IntraMuscular once  metoprolol succinate  milliGRAM(s) Oral daily  mirtazapine 15 milliGRAM(s) Oral at bedtime  pancrelipase  (CREON 12,000 Lipase Units) 2 Capsule(s) Oral three times a day with meals  pantoprazole    Tablet 40 milliGRAM(s) Oral before breakfast  piperacillin/tazobactam IVPB.. 3.375 Gram(s) IV Intermittent every 8 hours  predniSONE   Tablet 40 milliGRAM(s) Oral daily    MEDICATIONS  (PRN):  acetaminophen     Tablet .. 650 milliGRAM(s) Oral every 6 hours PRN Temp greater or equal to 38C (100.4F), Mild Pain (1 - 3)  HYDROmorphone  Injectable 0.5 milliGRAM(s) IV Push every 4 hours PRN Moderate Pain (4 - 6)  HYDROmorphone  Injectable 1 milliGRAM(s) IV Push every 4 hours PRN Severe Pain (7 - 10)  melatonin 3 milliGRAM(s) Oral at bedtime PRN Insomnia  metoclopramide Injectable 10 milliGRAM(s) IV Push every 6 hours PRN vomiting/nausea  morphine  - Injectable 2 milliGRAM(s) IV Push every 4 hours PRN Severe Pain (7 - 10)  ondansetron Injectable 4 milliGRAM(s) IV Push every 4 hours PRN Nausea and/or Vomiting      CAPILLARY BLOOD GLUCOSE        I&O's Summary    09 Nov 2021 07:01  -  10 Nov 2021 07:00  --------------------------------------------------------  IN: 200 mL / OUT: 0 mL / NET: 200 mL        PHYSICAL EXAM:  Vital Signs Last 24 Hrs  T(C): 36.7 (11 Nov 2021 04:31), Max: 36.8 (10 Nov 2021 05:51)  T(F): 98.1 (11 Nov 2021 04:31), Max: 98.2 (10 Nov 2021 05:51)  HR: 89 (11 Nov 2021 04:31) (89 - 112)  BP: 104/62 (11 Nov 2021 04:31) (104/62 - 162/95)  BP(mean): --  RR: 18 (11 Nov 2021 04:31) (18 - 18)  SpO2: 99% (11 Nov 2021 04:31) (98% - 99%)    CONSTITUTIONAL: NAD, well-developed  RESPIRATORY: Normal respiratory effort; lungs are clear to auscultation bilaterally  CARDIOVASCULAR: Regular rate and rhythm, normal S1 and S2, no murmur/rub/gallop; No lower extremity edema; Peripheral pulses are 2+ bilaterally  ABDOMEN: Nontender to palpation, normoactive bowel sounds, no rebound/guarding; No hepatosplenomegaly  MUSCLOSKELETAL: no clubbing or cyanosis of digits; no joint swelling or tenderness to palpation  NEURO: CN 2-12 grossly intact, moves all limbs spontaneously  PSYCH: A+O to person, place, and time; affect appropriate    LABS:                        11.0   9.40  )-----------( 440      ( 10 Nov 2021 06:57 )             33.3     11-10    140  |  102  |  13  ----------------------------<  89  3.4<L>   |  20<L>  |  0.95    Ca    8.2<L>      10 Nov 2021 06:57  Phos  3.0     11-10  Mg     1.8     11-10    TPro  6.3  /  Alb  2.4<L>  /  TBili  0.3  /  DBili  x   /  AST  50<H>  /  ALT  23  /  AlkPhos  145<H>  11-10              Culture - Blood (collected 09 Nov 2021 21:52)  Source: .Blood Blood-Peripheral  Preliminary Report (10 Nov 2021 22:02):    No growth to date.    Culture - Blood (collected 09 Nov 2021 21:52)  Source: .Blood Blood-Peripheral  Preliminary Report (10 Nov 2021 22:02):    No growth to date.        RADIOLOGY & ADDITIONAL TESTS:  Results Reviewed:   Imaging Personally Reviewed:  Electrocardiogram Personally Reviewed:    COORDINATION OF CARE:  Care Discussed with Consultants/Other Providers [Y/N]:  Prior or Outpatient Records Reviewed [Y/N]:   ***************************************************************  Gauri Ashley, PGY2  Internal Medicine   pager: NS: [phone] [facility]: [phone]  ***************************************************************    PROGRESS NOTE:     Patient is a 65y old  Female who presents with a chief complaint of Acute hypoxic respiratory failure (10 Nov 2021 21:48)      SUBJECTIVE / OVERNIGHT EVENTS:    REVIEW OF SYSTEMS:  CONSTITUTIONAL: No fever, weight loss, or fatigue  EYES: No eye pain, visual disturbances, or discharge  ENMT:  No difficulty hearing, tinnitus, vertigo; No sinus or throat pain  NECK: No pain or stiffness  BREASTS: No pain, masses, or nipple discharge  RESPIRATORY: No cough, wheezing, chills or hemoptysis; No shortness of breath  CARDIOVASCULAR: No chest pain, palpitations, dizziness, or leg swelling  GASTROINTESTINAL: No abdominal or epigastric pain. No nausea, vomiting, or hematemesis; No diarrhea or constipation. No melena or hematochezia.  GENITOURINARY: No dysuria, frequency, hematuria, or incontinence  NEUROLOGICAL: No headaches, memory loss, loss of strength, numbness, or tremors  SKIN: No itching, burning, rashes, or lesions   LYMPH NODES: No enlarged glands  ENDOCRINE: No heat or cold intolerance; No hair loss  MUSCULOSKELETAL: No joint pain or swelling; No muscle, back, or extremity pain  PSYCHIATRIC: No depression, anxiety, mood swings, or difficulty sleeping  HEME/LYMPH: No easy bruising, or bleeding gums  ALLERY AND IMMUNOLOGIC: No hives or eczema    MEDICATIONS  (STANDING):  albuterol/ipratropium for Nebulization 3 milliLiter(s) Nebulizer every 6 hours  amLODIPine   Tablet 10 milliGRAM(s) Oral daily  apixaban 5 milliGRAM(s) Oral two times a day  dextrose 5% + lactated ringers. 1000 milliLiter(s) (50 mL/Hr) IV Continuous <Continuous>  folic acid 1 milliGRAM(s) Oral daily  influenza   Vaccine 0.5 milliLiter(s) IntraMuscular once  metoprolol succinate  milliGRAM(s) Oral daily  mirtazapine 15 milliGRAM(s) Oral at bedtime  pancrelipase  (CREON 12,000 Lipase Units) 2 Capsule(s) Oral three times a day with meals  pantoprazole    Tablet 40 milliGRAM(s) Oral before breakfast  piperacillin/tazobactam IVPB.. 3.375 Gram(s) IV Intermittent every 8 hours  predniSONE   Tablet 40 milliGRAM(s) Oral daily    MEDICATIONS  (PRN):  acetaminophen     Tablet .. 650 milliGRAM(s) Oral every 6 hours PRN Temp greater or equal to 38C (100.4F), Mild Pain (1 - 3)  HYDROmorphone  Injectable 0.5 milliGRAM(s) IV Push every 4 hours PRN Moderate Pain (4 - 6)  HYDROmorphone  Injectable 1 milliGRAM(s) IV Push every 4 hours PRN Severe Pain (7 - 10)  melatonin 3 milliGRAM(s) Oral at bedtime PRN Insomnia  metoclopramide Injectable 10 milliGRAM(s) IV Push every 6 hours PRN vomiting/nausea  morphine  - Injectable 2 milliGRAM(s) IV Push every 4 hours PRN Severe Pain (7 - 10)  ondansetron Injectable 4 milliGRAM(s) IV Push every 4 hours PRN Nausea and/or Vomiting      CAPILLARY BLOOD GLUCOSE        I&O's Summary    09 Nov 2021 07:01  -  10 Nov 2021 07:00  --------------------------------------------------------  IN: 200 mL / OUT: 0 mL / NET: 200 mL        PHYSICAL EXAM:  Vital Signs Last 24 Hrs  T(C): 36.7 (11 Nov 2021 04:31), Max: 36.8 (10 Nov 2021 05:51)  T(F): 98.1 (11 Nov 2021 04:31), Max: 98.2 (10 Nov 2021 05:51)  HR: 89 (11 Nov 2021 04:31) (89 - 112)  BP: 104/62 (11 Nov 2021 04:31) (104/62 - 162/95)  BP(mean): --  RR: 18 (11 Nov 2021 04:31) (18 - 18)  SpO2: 99% (11 Nov 2021 04:31) (98% - 99%)    GENERAL: (+) appears in acute distress due to epigastric pain  HEAD:  Atraumatic, Normocephalic  EYES: EOMI, PERRLA, conjunctiva and sclera clear  ENMT: No tonsillar erythema, exudates, or enlargement; Moist mucous membranes, Good dentition, No lesions  NECK: Supple, No JVD, Normal thyroid  NERVOUS SYSTEM:  Alert & Oriented X3, Good concentration; Motor Strength 5/5 B/L upper and lower extremities; DTRs 2+ intact and symmetric  CHEST/LUNG: (+) inspiratory and expiratory rhonchi   HEART: Regular rate with tachycardia; No murmurs, rubs, or gallops  ABDOMEN: Soft, Nondistended; Bowel sounds present, (+) TTP epigastric area  EXTREMITIES:  2+ Peripheral Pulses, No clubbing, cyanosis, or edema  LYMPH: No lymphadenopathy noted  SKIN: No rashes or lesions    LABS:                        11.0   9.40  )-----------( 440      ( 10 Nov 2021 06:57 )             33.3     11-10    140  |  102  |  13  ----------------------------<  89  3.4<L>   |  20<L>  |  0.95    Ca    8.2<L>      10 Nov 2021 06:57  Phos  3.0     11-10  Mg     1.8     11-10    TPro  6.3  /  Alb  2.4<L>  /  TBili  0.3  /  DBili  x   /  AST  50<H>  /  ALT  23  /  AlkPhos  145<H>  11-10              Culture - Blood (collected 09 Nov 2021 21:52)  Source: .Blood Blood-Peripheral  Preliminary Report (10 Nov 2021 22:02):    No growth to date.    Culture - Blood (collected 09 Nov 2021 21:52)  Source: .Blood Blood-Peripheral  Preliminary Report (10 Nov 2021 22:02):    No growth to date.        RADIOLOGY & ADDITIONAL TESTS:  Results Reviewed:   Imaging Personally Reviewed:  Electrocardiogram Personally Reviewed:    COORDINATION OF CARE:  Care Discussed with Consultants/Other Providers [Y/N]:  Prior or Outpatient Records Reviewed [Y/N]:   ***************************************************************  Gauri Ramirez, PGY2  Internal Medicine   pager: NS: [phone] [facility]: [phone]  ***************************************************************    PROGRESS NOTE:     Patient is a 65y old  Female who presents with a chief complaint of Acute hypoxic respiratory failure (10 Nov 2021 21:48)      SUBJECTIVE / OVERNIGHT EVENTS:  This morning, patient had no vomiting but felt very nauseous and had abdominal pain diffusely with TTP upper quadrants. She was started on pred 40 mg qd x 5 days and standing duonebs per pulmonary recs, and was transitioned off oxygen NC. CTH was negative. TTE showed moderate pulmonary pressures, calcified AV, Qtc 452 and EF 71%. She is not eating or drinking due to nausea and epigastric pain. She denies diarrhea, constipation, fevers, chills, SOB, CP, urinary sxs.    REVIEW OF SYSTEMS:  CONSTITUTIONAL: No fever, weight loss, or fatigue  EYES: No eye pain, visual disturbances, or discharge  ENMT:  No difficulty hearing, tinnitus, vertigo; No sinus or throat pain  NECK: No pain or stiffness  BREASTS: No pain, masses, or nipple discharge  RESPIRATORY: No cough, wheezing, chills or hemoptysis; No shortness of breath  CARDIOVASCULAR: No chest pain, palpitations, dizziness, or leg swelling  GASTROINTESTINAL: No abdominal or epigastric pain. No nausea, vomiting, or hematemesis; No diarrhea or constipation. No melena or hematochezia.  GENITOURINARY: No dysuria, frequency, hematuria, or incontinence  NEUROLOGICAL: No headaches, memory loss, loss of strength, numbness, or tremors  SKIN: No itching, burning, rashes, or lesions   LYMPH NODES: No enlarged glands  ENDOCRINE: No heat or cold intolerance; No hair loss  MUSCULOSKELETAL: No joint pain or swelling; No muscle, back, or extremity pain  PSYCHIATRIC: No depression, anxiety, mood swings, or difficulty sleeping  HEME/LYMPH: No easy bruising, or bleeding gums  ALLERY AND IMMUNOLOGIC: No hives or eczema    MEDICATIONS  (STANDING):  albuterol/ipratropium for Nebulization 3 milliLiter(s) Nebulizer every 6 hours  amLODIPine   Tablet 10 milliGRAM(s) Oral daily  apixaban 5 milliGRAM(s) Oral two times a day  dextrose 5% + lactated ringers. 1000 milliLiter(s) (50 mL/Hr) IV Continuous <Continuous>  folic acid 1 milliGRAM(s) Oral daily  influenza   Vaccine 0.5 milliLiter(s) IntraMuscular once  metoprolol succinate  milliGRAM(s) Oral daily  mirtazapine 15 milliGRAM(s) Oral at bedtime  pancrelipase  (CREON 12,000 Lipase Units) 2 Capsule(s) Oral three times a day with meals  pantoprazole    Tablet 40 milliGRAM(s) Oral before breakfast  piperacillin/tazobactam IVPB.. 3.375 Gram(s) IV Intermittent every 8 hours  predniSONE   Tablet 40 milliGRAM(s) Oral daily    MEDICATIONS  (PRN):  acetaminophen     Tablet .. 650 milliGRAM(s) Oral every 6 hours PRN Temp greater or equal to 38C (100.4F), Mild Pain (1 - 3)  HYDROmorphone  Injectable 0.5 milliGRAM(s) IV Push every 4 hours PRN Moderate Pain (4 - 6)  HYDROmorphone  Injectable 1 milliGRAM(s) IV Push every 4 hours PRN Severe Pain (7 - 10)  melatonin 3 milliGRAM(s) Oral at bedtime PRN Insomnia  metoclopramide Injectable 10 milliGRAM(s) IV Push every 6 hours PRN vomiting/nausea  morphine  - Injectable 2 milliGRAM(s) IV Push every 4 hours PRN Severe Pain (7 - 10)  ondansetron Injectable 4 milliGRAM(s) IV Push every 4 hours PRN Nausea and/or Vomiting      CAPILLARY BLOOD GLUCOSE        I&O's Summary    09 Nov 2021 07:01  -  10 Nov 2021 07:00  --------------------------------------------------------  IN: 200 mL / OUT: 0 mL / NET: 200 mL        PHYSICAL EXAM:  Vital Signs Last 24 Hrs  T(C): 36.7 (11 Nov 2021 04:31), Max: 36.8 (10 Nov 2021 05:51)  T(F): 98.1 (11 Nov 2021 04:31), Max: 98.2 (10 Nov 2021 05:51)  HR: 89 (11 Nov 2021 04:31) (89 - 112)  BP: 104/62 (11 Nov 2021 04:31) (104/62 - 162/95)  BP(mean): --  RR: 18 (11 Nov 2021 04:31) (18 - 18)  SpO2: 99% (11 Nov 2021 04:31) (98% - 99%)    GENERAL: (+) appears in acute distress due to epigastric pain  HEAD:  Atraumatic, Normocephalic  EYES: EOMI, PERRLA, conjunctiva and sclera clear  ENMT: No tonsillar erythema, exudates, or enlargement; Moist mucous membranes, Good dentition, No lesions  NECK: Supple, No JVD, Normal thyroid  NERVOUS SYSTEM:  Alert & Oriented X3, Good concentration; Motor Strength 5/5 B/L upper and lower extremities; DTRs 2+ intact and symmetric  CHEST/LUNG: (+) inspiratory and expiratory rhonchi   HEART: Regular rate with tachycardia; No murmurs, rubs, or gallops  ABDOMEN: Soft, Nondistended; Bowel sounds present, (+) TTP epigastric area  EXTREMITIES:  2+ Peripheral Pulses, No clubbing, cyanosis, or edema  LYMPH: No lymphadenopathy noted  SKIN: No rashes or lesions    LABS:                        11.0   9.40  )-----------( 440      ( 10 Nov 2021 06:57 )             33.3     11-10    140  |  102  |  13  ----------------------------<  89  3.4<L>   |  20<L>  |  0.95    Ca    8.2<L>      10 Nov 2021 06:57  Phos  3.0     11-10  Mg     1.8     11-10    TPro  6.3  /  Alb  2.4<L>  /  TBili  0.3  /  DBili  x   /  AST  50<H>  /  ALT  23  /  AlkPhos  145<H>  11-10              Culture - Blood (collected 09 Nov 2021 21:52)  Source: .Blood Blood-Peripheral  Preliminary Report (10 Nov 2021 22:02):    No growth to date.    Culture - Blood (collected 09 Nov 2021 21:52)  Source: .Blood Blood-Peripheral  Preliminary Report (10 Nov 2021 22:02):    No growth to date.        RADIOLOGY & ADDITIONAL TESTS:  Results Reviewed:   Imaging Personally Reviewed:  Electrocardiogram Personally Reviewed:    COORDINATION OF CARE:  Care Discussed with Consultants/Other Providers [Y/N]:  Prior or Outpatient Records Reviewed [Y/N]:   ***************************************************************  Gauri Ramirez, PGY2  Internal Medicine   pager: NS: [phone] [facility]: [phone]  ***************************************************************    PROGRESS NOTE:     Patient is a 65y old  Female who presents with a chief complaint of Acute hypoxic respiratory failure (10 Nov 2021 21:48)      SUBJECTIVE / OVERNIGHT EVENTS:  This morning, patient had no vomiting but felt very nauseous and had 9/10 abdominal pain diffusely with TTP upper quadrants, which was resistant to dilaudid 1 mg and morphine 2 mg-->needed to give the next dose of dilaudid 1 mg IV 30 minutes early. She was started on pred 40 mg qd x 5 days and standing duonebs per pulmonary recs, and was transitioned off oxygen NC. CTH was negative. TTE showed moderate pulmonary pressures, calcified AV, Qtc 452 and EF 71%. Lactate increased from 2.7 from 1.9 overnight and she was given 500cc LR bolus with repeat lactate 2.4. Giving D5 LR @ 100cc/10 hrs.  She ate a sandwich yesterday, but is not eating or drinking this morning due to nausea and epigastric pain. She denies diarrhea, constipation, fevers, chills, SOB, CP, urinary sxs.    REVIEW OF SYSTEMS:  CONSTITUTIONAL: No fever, weight loss, or fatigue  EYES: No eye pain, visual disturbances, or discharge  ENMT:  No difficulty hearing, tinnitus, vertigo; No sinus or throat pain  NECK: No pain or stiffness  BREASTS: No pain, masses, or nipple discharge  RESPIRATORY: No cough, wheezing, chills or hemoptysis; No shortness of breath  CARDIOVASCULAR: No chest pain, palpitations, dizziness, or leg swelling  GASTROINTESTINAL: (+) abdominal pain. (+) nausea, (+) vomiting, No hematemesis; No diarrhea or constipation. No melena or hematochezia.  GENITOURINARY: No dysuria, frequency, hematuria, or incontinence  NEUROLOGICAL: No headaches, memory loss, loss of strength, numbness, or tremors  SKIN: No itching, burning, rashes, or lesions   LYMPH NODES: No enlarged glands  ENDOCRINE: No heat or cold intolerance; No hair loss  MUSCULOSKELETAL: No joint pain or swelling; No muscle, back, or extremity pain  PSYCHIATRIC: No depression, anxiety, mood swings, or difficulty sleeping  HEME/LYMPH: No easy bruising, or bleeding gums  ALLERY AND IMMUNOLOGIC: No hives or eczema    MEDICATIONS  (STANDING):  albuterol/ipratropium for Nebulization 3 milliLiter(s) Nebulizer every 6 hours  amLODIPine   Tablet 10 milliGRAM(s) Oral daily  apixaban 5 milliGRAM(s) Oral two times a day  dextrose 5% + lactated ringers. 1000 milliLiter(s) (50 mL/Hr) IV Continuous <Continuous>  folic acid 1 milliGRAM(s) Oral daily  influenza   Vaccine 0.5 milliLiter(s) IntraMuscular once  metoprolol succinate  milliGRAM(s) Oral daily  mirtazapine 15 milliGRAM(s) Oral at bedtime  pancrelipase  (CREON 12,000 Lipase Units) 2 Capsule(s) Oral three times a day with meals  pantoprazole    Tablet 40 milliGRAM(s) Oral before breakfast  piperacillin/tazobactam IVPB.. 3.375 Gram(s) IV Intermittent every 8 hours  predniSONE   Tablet 40 milliGRAM(s) Oral daily    MEDICATIONS  (PRN):  acetaminophen     Tablet .. 650 milliGRAM(s) Oral every 6 hours PRN Temp greater or equal to 38C (100.4F), Mild Pain (1 - 3)  HYDROmorphone  Injectable 0.5 milliGRAM(s) IV Push every 4 hours PRN Moderate Pain (4 - 6)  HYDROmorphone  Injectable 1 milliGRAM(s) IV Push every 4 hours PRN Severe Pain (7 - 10)  melatonin 3 milliGRAM(s) Oral at bedtime PRN Insomnia  metoclopramide Injectable 10 milliGRAM(s) IV Push every 6 hours PRN vomiting/nausea  morphine  - Injectable 2 milliGRAM(s) IV Push every 4 hours PRN Severe Pain (7 - 10)  ondansetron Injectable 4 milliGRAM(s) IV Push every 4 hours PRN Nausea and/or Vomiting      CAPILLARY BLOOD GLUCOSE        I&O's Summary    09 Nov 2021 07:01  -  10 Nov 2021 07:00  --------------------------------------------------------  IN: 200 mL / OUT: 0 mL / NET: 200 mL        PHYSICAL EXAM:  Vital Signs Last 24 Hrs  T(C): 36.7 (11 Nov 2021 04:31), Max: 36.8 (10 Nov 2021 05:51)  T(F): 98.1 (11 Nov 2021 04:31), Max: 98.2 (10 Nov 2021 05:51)  HR: 89 (11 Nov 2021 04:31) (89 - 112)  BP: 104/62 (11 Nov 2021 04:31) (104/62 - 162/95)  BP(mean): --  RR: 18 (11 Nov 2021 04:31) (18 - 18)  SpO2: 99% (11 Nov 2021 04:31) (98% - 99%)    GENERAL: (+) appears in acute distress due to epigastric pain  HEAD:  Atraumatic, Normocephalic  EYES: EOMI, PERRLA, conjunctiva and sclera clear  ENMT: No tonsillar erythema, exudates, or enlargement; Moist mucous membranes, Good dentition, No lesions  NECK: Supple, No JVD, Normal thyroid  NERVOUS SYSTEM:  Alert & Oriented X3, Good concentration; Motor Strength 5/5 B/L upper and lower extremities; DTRs 2+ intact and symmetric  CHEST/LUNG: (+) inspiratory and expiratory rhonchi but improved   HEART: Regular rate with tachycardia; No murmurs, rubs, or gallops  ABDOMEN: Soft, Nondistended; Bowel sounds present, (+) TTP epigastric area  EXTREMITIES:  2+ Peripheral Pulses, No clubbing, cyanosis, or edema  LYMPH: No lymphadenopathy noted  SKIN: No rashes or lesions    LABS:                        11.0   9.40  )-----------( 440      ( 10 Nov 2021 06:57 )             33.3     11-10    140  |  102  |  13  ----------------------------<  89  3.4<L>   |  20<L>  |  0.95    Ca    8.2<L>      10 Nov 2021 06:57  Phos  3.0     11-10  Mg     1.8     11-10    TPro  6.3  /  Alb  2.4<L>  /  TBili  0.3  /  DBili  x   /  AST  50<H>  /  ALT  23  /  AlkPhos  145<H>  11-10              Culture - Blood (collected 09 Nov 2021 21:52)  Source: .Blood Blood-Peripheral  Preliminary Report (10 Nov 2021 22:02):    No growth to date.    Culture - Blood (collected 09 Nov 2021 21:52)  Source: .Blood Blood-Peripheral  Preliminary Report (10 Nov 2021 22:02):    No growth to date.        RADIOLOGY & ADDITIONAL TESTS:  Results Reviewed:   Imaging Personally Reviewed:  Electrocardiogram Personally Reviewed:    COORDINATION OF CARE:  Care Discussed with Consultants/Other Providers [Y/N]:  Prior or Outpatient Records Reviewed [Y/N]:

## 2021-11-11 NOTE — PROGRESS NOTE ADULT - PROBLEM SELECTOR PLAN 8
Dispo: pending PT eval  Diet: CLD-->advance as tolerated  DVT ppx: eliquis    ***************************************************************  Gauri Ashley, PGY2  Internal Medicine   pager: NS: [phone] [facility]: [phone]  *************************************************************** Dispo: pending PT eval  Diet: soft diet-->advance as tolerated  DVT ppx: eliquis    ***************************************************************  Gauri Ramirez, PGY2  Internal Medicine   pager: NS: [phone] [facility]: [phone]  ***************************************************************

## 2021-11-11 NOTE — PROVIDER CONTACT NOTE (OTHER) - ASSESSMENT
patient is a/ox4, denies c/o chest pain and palpitations. otherwise, VSS. Patient received metoprolol and amlodipine.

## 2021-11-11 NOTE — CONSULT NOTE ADULT - SUBJECTIVE AND OBJECTIVE BOX
CHIEF COMPLAINT: Abdominal pain     HPI: 65 yr F former smoker, with a PMH of HTN, GERD, chronic pancreatitis, COPD, NSCLC stage IIIB s/p chemo on Keytruda maintenance, chronic PE on Eliquis, vocal cord polyps s/p polypectomy,      PAST MEDICAL & SURGICAL HISTORY:  HTN (hypertension)    GERD (gastroesophageal reflux disease)    Chronic pancreatitis  last episode 4/2019    ARDS survivor  2015    History of adrenal adenoma  stable on imaging    Vocal cord polyp  removal 2018, benign as per pt    Thrombophlebitis  superficial right UE during 4/2019 hospital stay, resolved as per pt    Chronic abdominal pain    Non-small cell lung cancer (NSCLC)  chemo: Alimta/ Keytruda 2/24/2021    Pulmonary embolism    COPD (chronic obstructive pulmonary disease)    Pulmonary hypertension    Anemia  transfusion 2/5/21    S/P arthroscopy of shoulder  left in 2009    S/P hip replacement  left - 2010    S/P arthroscopy of shoulder  right - 2014    S/P hip replacement, right  May 2016    History of vocal cord polypectomy  1/2018    S/P shoulder replacement, left  2016    History of pancreatic surgery  Jeffery procedure (5/8/2019)        FAMILY HISTORY:  No sig FH in first degree relatives     SOCIAL HISTORY:  Smoking: [ ] Never Smoked [x ] Former Smoker  [ ] Current Smoker  (__ packs x ___ years)  Substance Use: [x ] Never Used [ ] Used ____  EtOH Use: Social   Marital Status: [ ] Single [x ]  [ ]  [ ]   Sexual History: NC  Occupation: Retired   Recent Travel: None   Advance Directives: Full code     Allergies    diazepam (Other)  lemon (Other)    Intolerances        HOME MEDICATIONS:  Reviewed     REVIEW OF SYSTEMS:  Constitutional: [ ] negative [- ] fevers [- ] chills [- ] weight loss [ ] weight gain  HEENT: [ ] negative [ ] dry eyes [- ] eye irritation [- ] postnasal drip [ ] nasal congestion  CV: [ ] negative  [- ] chest pain [- ] orthopnea [ -] palpitations [ ] murmur  Resp: [ ] negative [- ] cough [- ] shortness of breath [ ] dyspnea [- ] wheezing [- ] sputum [ -] hemoptysis  GI: [ ] negative [+ ] nausea [- ] vomiting [ -] diarrhea [- ] constipation [+ ] abd pain [ ] dysphagia   : [ ] negative [- ] dysuria [- ] nocturia [- ] hematuria [ ] increased urinary frequency  Musculoskeletal: [ ] negative [ -] back pain [ ] myalgias [ ] arthralgias [ ] fracture  Skin: [ ] negative [ ] rash [- ] itch  Neurological: [ ] negative [ -] headache [ -] dizziness [- ] syncope [ ] weakness [ ] numbness  Psychiatric: [ ] negative [ ] anxiety [ -] depression  Endocrine: [ ] negative [ ] diabetes [- ] thyroid problem  Hematologic/Lymphatic: [ ] negative [- ] anemia [- ] bleeding problem  Allergic/Immunologic: [ ] negative [ ] itchy eyes [- ] nasal discharge [ ] hives [ ] angioedema  [x ] All other systems negative	  [ ] Unable to assess ROS because ________    OBJECTIVE:  ICU Vital Signs Last 24 Hrs  T(C): 36.7 (11 Nov 2021 11:14), Max: 36.8 (10 Nov 2021 15:45)  T(F): 98.1 (11 Nov 2021 11:14), Max: 98.2 (10 Nov 2021 15:45)  HR: 110 (11 Nov 2021 11:14) (89 - 110)  BP: 167/86 (11 Nov 2021 11:14) (104/62 - 175/90)  BP(mean): --  ABP: --  ABP(mean): --  RR: 18 (11 Nov 2021 11:14) (18 - 18)  SpO2: 95% (11 Nov 2021 11:14) (95% - 99%)        11-11 @ 07:01  -  11-11 @ 14:55  --------------------------------------------------------  IN: 440 mL / OUT: 0 mL / NET: 440 mL      CAPILLARY BLOOD GLUCOSE          PHYSICAL EXAM:  General: NAD  HEENT: PERRLA  Lymph Nodes: No palpable cervical LNs  Neck: Supple  Respiratory: CTA b/l , no wheezing or crackles   Cardiovascular: RRR, S1+S2+0  Abdomen: Soft, NT, ND, BS+  Extremities: No edema, pulses + b/l LEs  Skin: No rash   Neurological: AAOx3, grossly non focal   Psychiatry: Normal mood     HOSPITAL MEDICATIONS:  apixaban 5 milliGRAM(s) Oral two times a day    piperacillin/tazobactam IVPB.. 3.375 Gram(s) IV Intermittent every 8 hours    amLODIPine   Tablet 10 milliGRAM(s) Oral daily  metoprolol succinate  milliGRAM(s) Oral daily    predniSONE   Tablet 40 milliGRAM(s) Oral daily    albuterol/ipratropium for Nebulization 3 milliLiter(s) Nebulizer every 6 hours  budesonide  80 MICROgram(s)/formoterol 4.5 MICROgram(s) Inhaler 2 Puff(s) Inhalation two times a day  tiotropium 18 MICROgram(s) Capsule 1 Capsule(s) Inhalation daily    acetaminophen     Tablet .. 650 milliGRAM(s) Oral every 6 hours PRN  HYDROmorphone  Injectable 0.5 milliGRAM(s) IV Push every 4 hours PRN  HYDROmorphone  Injectable 1 milliGRAM(s) IV Push every 4 hours PRN  melatonin 3 milliGRAM(s) Oral at bedtime PRN  metoclopramide Injectable 10 milliGRAM(s) IV Push every 6 hours PRN  mirtazapine 15 milliGRAM(s) Oral at bedtime  morphine  - Injectable 2 milliGRAM(s) IV Push every 4 hours PRN  ondansetron Injectable 4 milliGRAM(s) IV Push every 4 hours PRN    pancrelipase  (CREON 12,000 Lipase Units) 2 Capsule(s) Oral three times a day with meals  pantoprazole    Tablet 40 milliGRAM(s) Oral before breakfast        dextrose 5% + lactated ringers. 1000 milliLiter(s) IV Continuous <Continuous>  folic acid 1 milliGRAM(s) Oral daily    influenza   Vaccine 0.5 milliLiter(s) IntraMuscular once          LABS:                        10.0   5.47  )-----------( 393      ( 11 Nov 2021 06:10 )             32.0     Hgb Trend: 10.0<--, 11.0<--, 12.3<--  11-11    140  |  107  |  7   ----------------------------<  68<L>  3.6   |  23  |  0.96    Ca    8.3<L>      11 Nov 2021 06:10  Phos  2.5     11-11  Mg     1.8     11-11    TPro  5.5<L>  /  Alb  2.3<L>  /  TBili  0.3  /  DBili  x   /  AST  19  /  ALT  13  /  AlkPhos  117  11-11    Creatinine Trend: 0.96<--, 0.95<--, 0.91<--        Venous Blood Gas:  11-10 @ 06:57  7.45/37/67/26/94.5  VBG Lactate: 2.6      MICROBIOLOGY:     RVP: negative  COVID PCR: negative     RADIOLOGY:  < from: CT Angio Chest PE Protocol w/ IV Cont (11.09.21 @ 14:09) >  Tubes/Lines: Right-sided Mediport catheter with tip in SVC.    Mediastinum and Heart: Aorta and pulmonary arteries are normal in size. Thyroid gland is unremarkable. No lymphadenopathy. No pericardial effusion.    Lungs, Pleura, and Airways: There isno acute pulmonary embolus. There is obliteration of the left lower lobe vessels with paucity of vasculature in the left lung. This is secondary to chronic pulmonary embolus in the left lower lobe. Additional calcified pulmonary embolus in the right lower lobe interlobar artery indicates that this is chronic.    Severe centrilobular emphysema noted. Bilateral reticular opacities consistent with fibrosis are also noted and are unchanged since previous exam.    CT abdomen and pelvis:    There is diffuse fatty infiltration of the liver. No focal hepatic or splenic lesions are noted.    Calcifications noted from chronic pancreatitis. Post Jeffery procedure. The adrenal glands demonstrate a right adrenal 1 cm nodule which is stable since previous exam. Both kidneys enhance symmetrically without stones or hydronephrosis. Bilateral renal cysts are noted. The retroperitoneal vasculature is within normal limits.    No evidence of bowel obstruction. Thickening of the descending colon which may be least partially be due to under distention. Diverticulosis without diverticulitis. Fibroid uterus noted.    Post bilateral hip replacements. No acute bony abnormality.    IMPRESSION:    CTA CHEST: No acute pulmonary embolus.    Chronic pulmonary embolus within the left lower lobe pulmonary arteries and within the right lower lobe interlobar artery.    CT abdomen and pelvis: Thickening of the descending colon which may be least partially be due to under distention. Correlate for signs of colitis. Otherwise no acute pathology.    [x ] Reviewed and interpreted by me    PULMONARY FUNCTION TESTS:    EKG: CHIEF COMPLAINT: Abdominal pain     HPI: 65 yr F former smoker, with a PMH of HTN, GERD, chronic pancreatitis s/p Jeffery procedure in 2019, COPD, NSCLC stage IIIB s/p chemo on Keytruda maintenance, DVT, chronic PE on Eliquis, pulmonary HTN, vocal cord polyps s/p polypectomy, now p/w abdominal pain and SOB. Pt reports that she was in her usual state of health until last Friday when she developed acute onset of 5-6/10 epigastric abdominal pain radiating to her back along with nausea, a few episodes of non bloody, bilious vomiting and shortness of breath with mild intermittent wheezing. She denies any cough, sputum production, sore throat, runny nose, CP, orthopnea, PND, LE edema, fever, chills, dysuria, hematuria. She saw Dr. Molina in the office and a Ct abd/ pelvis was reportedly negative. She has O2 at home but has not been using it over the past few months but over this past week she was requiring 2 L via NC. Due to worsening symptoms, she eventually agreed to come to the ED on 11/9/21. Of note she was hospitalized from 6/14/6/27/21 fro acute hypoxic resp failure d/t a combination of fluid overload and a possible COPD exacerbation/ PNA. She received ABx, steroids and diuretics during that admission. In the ED her vital signs were stable with an O2 sat of 98 % on 2 L NC. A CTPA was negative for acute PE and showed changes consistent with her known chronic L PE. No acute infiltrates were seen. A Ct of the abdomen/ pelvis showed possible descending colitis and she is currently being treated with Zosyn IV. Pt continues to report ongoing abdominal discomfort. She is receiving duonebs and Prednisone 40 mg daily for a possible COPD exacerbation and denies any wheezing or SOB at present. She does report that the SOB tends to occur when she has episodes of severe abdominal discomfort. Pulm consulted to assist with management.      PAST MEDICAL & SURGICAL HISTORY:  HTN (hypertension)    GERD (gastroesophageal reflux disease)    Chronic pancreatitis  last episode 4/2019    ARDS survivor  2015    History of adrenal adenoma  stable on imaging    Vocal cord polyp  removal 2018, benign as per pt    Thrombophlebitis  superficial right UE during 4/2019 hospital stay, resolved as per pt    Chronic abdominal pain    Non-small cell lung cancer (NSCLC)  chemo: Alimta/ Keytruda 2/24/2021    Pulmonary embolism    COPD (chronic obstructive pulmonary disease)    Pulmonary hypertension    Anemia  transfusion 2/5/21    S/P arthroscopy of shoulder  left in 2009    S/P hip replacement  left - 2010    S/P arthroscopy of shoulder  right - 2014    S/P hip replacement, right  May 2016    History of vocal cord polypectomy  1/2018    S/P shoulder replacement, left  2016    History of pancreatic surgery  Jeffery procedure (5/8/2019)        FAMILY HISTORY:  No sig FH in first degree relatives     SOCIAL HISTORY:  Smoking: [ ] Never Smoked [x ] Former Smoker  [ ] Current Smoker  (__ packs x ___ years)  Substance Use: [x ] Never Used [ ] Used ____  EtOH Use: Social   Marital Status: [ ] Single [x ]  [ ]  [ ]   Sexual History: NC  Occupation: Retired   Recent Travel: None   Advance Directives: Full code     Allergies    diazepam (Other)  lemon (Other)    Intolerances        HOME MEDICATIONS:  Reviewed     REVIEW OF SYSTEMS:  Constitutional: [ ] negative [- ] fevers [- ] chills [- ] weight loss [ ] weight gain  HEENT: [ ] negative [ ] dry eyes [- ] eye irritation [- ] postnasal drip [ ] nasal congestion  CV: [ ] negative  [- ] chest pain [- ] orthopnea [ -] palpitations [ ] murmur  Resp: [ ] negative [- ] cough [- ] shortness of breath [ ] dyspnea [- ] wheezing [- ] sputum [ -] hemoptysis  GI: [ ] negative [+ ] nausea [- ] vomiting [ -] diarrhea [- ] constipation [+ ] abd pain [ ] dysphagia   : [ ] negative [- ] dysuria [- ] nocturia [- ] hematuria [ ] increased urinary frequency  Musculoskeletal: [ ] negative [ -] back pain [ ] myalgias [ ] arthralgias [ ] fracture  Skin: [ ] negative [ ] rash [- ] itch  Neurological: [ ] negative [ -] headache [ -] dizziness [- ] syncope [ ] weakness [ ] numbness  Psychiatric: [ ] negative [ ] anxiety [ -] depression  Endocrine: [ ] negative [ ] diabetes [- ] thyroid problem  Hematologic/Lymphatic: [ ] negative [- ] anemia [- ] bleeding problem  Allergic/Immunologic: [ ] negative [ ] itchy eyes [- ] nasal discharge [ ] hives [ ] angioedema  [x ] All other systems negative	  [ ] Unable to assess ROS because ________    OBJECTIVE:  ICU Vital Signs Last 24 Hrs  T(C): 36.7 (11 Nov 2021 11:14), Max: 36.8 (10 Nov 2021 15:45)  T(F): 98.1 (11 Nov 2021 11:14), Max: 98.2 (10 Nov 2021 15:45)  HR: 110 (11 Nov 2021 11:14) (89 - 110)  BP: 167/86 (11 Nov 2021 11:14) (104/62 - 175/90)  BP(mean): --  ABP: --  ABP(mean): --  RR: 18 (11 Nov 2021 11:14) (18 - 18)  SpO2: 95% (11 Nov 2021 11:14) (95% - 99%)        11-11 @ 07:01  -  11-11 @ 14:55  --------------------------------------------------------  IN: 440 mL / OUT: 0 mL / NET: 440 mL      CAPILLARY BLOOD GLUCOSE          PHYSICAL EXAM:  General: NAD  HEENT: PERRLA  Lymph Nodes: No palpable cervical LNs  Neck: Supple  Respiratory: CTA b/l , no wheezing or crackles   Cardiovascular: RRR, S1+S2+0  Abdomen: Soft, mild epigastric tenderness, no R/G/R, ND, BS+  Extremities: No edema, pulses + b/l LEs  Skin: No rash   Neurological: AAOx3, grossly non focal   Psychiatry: Normal mood     HOSPITAL MEDICATIONS:  apixaban 5 milliGRAM(s) Oral two times a day    piperacillin/tazobactam IVPB.. 3.375 Gram(s) IV Intermittent every 8 hours    amLODIPine   Tablet 10 milliGRAM(s) Oral daily  metoprolol succinate  milliGRAM(s) Oral daily    predniSONE   Tablet 40 milliGRAM(s) Oral daily    albuterol/ipratropium for Nebulization 3 milliLiter(s) Nebulizer every 6 hours  budesonide  80 MICROgram(s)/formoterol 4.5 MICROgram(s) Inhaler 2 Puff(s) Inhalation two times a day  tiotropium 18 MICROgram(s) Capsule 1 Capsule(s) Inhalation daily    acetaminophen     Tablet .. 650 milliGRAM(s) Oral every 6 hours PRN  HYDROmorphone  Injectable 0.5 milliGRAM(s) IV Push every 4 hours PRN  HYDROmorphone  Injectable 1 milliGRAM(s) IV Push every 4 hours PRN  melatonin 3 milliGRAM(s) Oral at bedtime PRN  metoclopramide Injectable 10 milliGRAM(s) IV Push every 6 hours PRN  mirtazapine 15 milliGRAM(s) Oral at bedtime  morphine  - Injectable 2 milliGRAM(s) IV Push every 4 hours PRN  ondansetron Injectable 4 milliGRAM(s) IV Push every 4 hours PRN    pancrelipase  (CREON 12,000 Lipase Units) 2 Capsule(s) Oral three times a day with meals  pantoprazole    Tablet 40 milliGRAM(s) Oral before breakfast        dextrose 5% + lactated ringers. 1000 milliLiter(s) IV Continuous <Continuous>  folic acid 1 milliGRAM(s) Oral daily    influenza   Vaccine 0.5 milliLiter(s) IntraMuscular once          LABS:                        10.0   5.47  )-----------( 393      ( 11 Nov 2021 06:10 )             32.0     Hgb Trend: 10.0<--, 11.0<--, 12.3<--  11-11    140  |  107  |  7   ----------------------------<  68<L>  3.6   |  23  |  0.96    Ca    8.3<L>      11 Nov 2021 06:10  Phos  2.5     11-11  Mg     1.8     11-11    TPro  5.5<L>  /  Alb  2.3<L>  /  TBili  0.3  /  DBili  x   /  AST  19  /  ALT  13  /  AlkPhos  117  11-11    Creatinine Trend: 0.96<--, 0.95<--, 0.91<--        Venous Blood Gas:  11-10 @ 06:57  7.45/37/67/26/94.5  VBG Lactate: 2.6      MICROBIOLOGY:     RVP: negative  COVID PCR: negative     RADIOLOGY:  < from: CT Angio Chest PE Protocol w/ IV Cont (11.09.21 @ 14:09) >  Tubes/Lines: Right-sided Mediport catheter with tip in SVC.    Mediastinum and Heart: Aorta and pulmonary arteries are normal in size. Thyroid gland is unremarkable. No lymphadenopathy. No pericardial effusion.    Lungs, Pleura, and Airways: There is no acute pulmonary embolus. There is obliteration of the left lower lobe vessels with paucity of vasculature in the left lung. This is secondary to chronic pulmonary embolus in the left lower lobe. Additional calcified pulmonary embolus in the right lower lobe interlobar artery indicates that this is chronic.    Severe centrilobular emphysema noted. Bilateral reticular opacities consistent with fibrosis are also noted and are unchanged since previous exam.    CT abdomen and pelvis:    There is diffuse fatty infiltration of the liver. No focal hepatic or splenic lesions are noted.    Calcifications noted from chronic pancreatitis. Post Jeffery procedure. The adrenal glands demonstrate a right adrenal 1 cm nodule which is stable since previous exam. Both kidneys enhance symmetrically without stones or hydronephrosis. Bilateral renal cysts are noted. The retroperitoneal vasculature is within normal limits.    No evidence of bowel obstruction. Thickening of the descending colon which may be least partially be due to under distention. Diverticulosis without diverticulitis. Fibroid uterus noted.    Post bilateral hip replacements. No acute bony abnormality.    IMPRESSION:    CTA CHEST: No acute pulmonary embolus.    Chronic pulmonary embolus within the left lower lobe pulmonary arteries and within the right lower lobe interlobar artery.    CT abdomen and pelvis: Thickening of the descending colon which may be least partially be due to under distention. Correlate for signs of colitis. Otherwise no acute pathology.    [x ] Reviewed and interpreted by me    PULMONARY FUNCTION TESTS:    EKG: CHIEF COMPLAINT: Abdominal pain     HPI: 65 yr F former smoker, with a PMH of HTN, GERD, chronic pancreatitis s/p Jeffery procedure in 2019, COPD, NSCLC stage IIIB s/p chemo on Keytruda maintenance, DVT, chronic PE on Eliquis, vocal cord polyps s/p polypectomy, now p/w abdominal pain and SOB. Pt reports that she was in her usual state of health until last Friday when she developed acute onset of 5-6/10 epigastric abdominal pain radiating to her back along with nausea, a few episodes of non bloody, bilious vomiting and shortness of breath with mild intermittent wheezing. She denies any cough, sputum production, sore throat, runny nose, CP, orthopnea, PND, LE edema, fever, chills, dysuria, hematuria. She saw Dr. Molina in the office and a Ct abd/ pelvis was reportedly negative. She has O2 at home but has not been using it over the past few months but over this past week she was requiring 2 L via NC. Due to worsening symptoms, she eventually agreed to come to the ED on 11/9/21. Of note she was hospitalized from 6/14/6/27/21 fro acute hypoxic resp failure d/t a combination of fluid overload and a possible COPD exacerbation/ PNA. She received ABx, steroids and diuretics during that admission. In the ED her vital signs were stable with an O2 sat of 98 % on 2 L NC. A CTPA was negative for acute PE and showed changes consistent with her known chronic L PE. No acute infiltrates were seen. A Ct of the abdomen/ pelvis showed possible descending colitis and she is currently being treated with Zosyn IV. Pt continues to report ongoing abdominal discomfort. She is receiving duonebs and Prednisone 40 mg daily for a possible COPD exacerbation and denies any wheezing or SOB at present. She does report that the SOB tends to occur when she has episodes of severe abdominal discomfort. Pulm consulted to assist with management.      PAST MEDICAL & SURGICAL HISTORY:  HTN (hypertension)    GERD (gastroesophageal reflux disease)    Chronic pancreatitis  last episode 4/2019    ARDS survivor  2015    History of adrenal adenoma  stable on imaging    Vocal cord polyp  removal 2018, benign as per pt    Thrombophlebitis  superficial right UE during 4/2019 hospital stay, resolved as per pt    Chronic abdominal pain    Non-small cell lung cancer (NSCLC)  chemo: Alimta/ Keytruda 2/24/2021    Pulmonary embolism    COPD (chronic obstructive pulmonary disease)    Pulmonary hypertension    Anemia  transfusion 2/5/21    S/P arthroscopy of shoulder  left in 2009    S/P hip replacement  left - 2010    S/P arthroscopy of shoulder  right - 2014    S/P hip replacement, right  May 2016    History of vocal cord polypectomy  1/2018    S/P shoulder replacement, left  2016    History of pancreatic surgery  Jeffery procedure (5/8/2019)        FAMILY HISTORY:  No sig FH in first degree relatives     SOCIAL HISTORY:  Smoking: [ ] Never Smoked [x ] Former Smoker  [ ] Current Smoker  (__ packs x ___ years)  Substance Use: [x ] Never Used [ ] Used ____  EtOH Use: Social   Marital Status: [ ] Single [x ]  [ ]  [ ]   Sexual History: NC  Occupation: Retired   Recent Travel: None   Advance Directives: Full code     Allergies    diazepam (Other)  lemon (Other)    Intolerances        HOME MEDICATIONS:  Reviewed     REVIEW OF SYSTEMS:  Constitutional: [ ] negative [- ] fevers [- ] chills [- ] weight loss [ ] weight gain  HEENT: [ ] negative [ ] dry eyes [- ] eye irritation [- ] postnasal drip [ ] nasal congestion  CV: [ ] negative  [- ] chest pain [- ] orthopnea [ -] palpitations [ ] murmur  Resp: [ ] negative [- ] cough [- ] shortness of breath [ ] dyspnea [- ] wheezing [- ] sputum [ -] hemoptysis  GI: [ ] negative [+ ] nausea [- ] vomiting [ -] diarrhea [- ] constipation [+ ] abd pain [ ] dysphagia   : [ ] negative [- ] dysuria [- ] nocturia [- ] hematuria [ ] increased urinary frequency  Musculoskeletal: [ ] negative [ -] back pain [ ] myalgias [ ] arthralgias [ ] fracture  Skin: [ ] negative [ ] rash [- ] itch  Neurological: [ ] negative [ -] headache [ -] dizziness [- ] syncope [ ] weakness [ ] numbness  Psychiatric: [ ] negative [ ] anxiety [ -] depression  Endocrine: [ ] negative [ ] diabetes [- ] thyroid problem  Hematologic/Lymphatic: [ ] negative [- ] anemia [- ] bleeding problem  Allergic/Immunologic: [ ] negative [ ] itchy eyes [- ] nasal discharge [ ] hives [ ] angioedema  [x ] All other systems negative	  [ ] Unable to assess ROS because ________    OBJECTIVE:  ICU Vital Signs Last 24 Hrs  T(C): 36.7 (11 Nov 2021 11:14), Max: 36.8 (10 Nov 2021 15:45)  T(F): 98.1 (11 Nov 2021 11:14), Max: 98.2 (10 Nov 2021 15:45)  HR: 110 (11 Nov 2021 11:14) (89 - 110)  BP: 167/86 (11 Nov 2021 11:14) (104/62 - 175/90)  BP(mean): --  ABP: --  ABP(mean): --  RR: 18 (11 Nov 2021 11:14) (18 - 18)  SpO2: 95% (11 Nov 2021 11:14) (95% - 99%)        11-11 @ 07:01  -  11-11 @ 14:55  --------------------------------------------------------  IN: 440 mL / OUT: 0 mL / NET: 440 mL      CAPILLARY BLOOD GLUCOSE          PHYSICAL EXAM:  General: NAD  HEENT: PERRLA  Lymph Nodes: No palpable cervical LNs  Neck: Supple  Respiratory: CTA b/l , no wheezing or crackles   Cardiovascular: RRR, S1+S2+0  Abdomen: Soft, mild epigastric tenderness, no R/G/R, ND, BS+  Extremities: No edema, pulses + b/l LEs  Skin: No rash   Neurological: AAOx3, grossly non focal   Psychiatry: Normal mood     HOSPITAL MEDICATIONS:  apixaban 5 milliGRAM(s) Oral two times a day    piperacillin/tazobactam IVPB.. 3.375 Gram(s) IV Intermittent every 8 hours    amLODIPine   Tablet 10 milliGRAM(s) Oral daily  metoprolol succinate  milliGRAM(s) Oral daily    predniSONE   Tablet 40 milliGRAM(s) Oral daily    albuterol/ipratropium for Nebulization 3 milliLiter(s) Nebulizer every 6 hours  budesonide  80 MICROgram(s)/formoterol 4.5 MICROgram(s) Inhaler 2 Puff(s) Inhalation two times a day  tiotropium 18 MICROgram(s) Capsule 1 Capsule(s) Inhalation daily    acetaminophen     Tablet .. 650 milliGRAM(s) Oral every 6 hours PRN  HYDROmorphone  Injectable 0.5 milliGRAM(s) IV Push every 4 hours PRN  HYDROmorphone  Injectable 1 milliGRAM(s) IV Push every 4 hours PRN  melatonin 3 milliGRAM(s) Oral at bedtime PRN  metoclopramide Injectable 10 milliGRAM(s) IV Push every 6 hours PRN  mirtazapine 15 milliGRAM(s) Oral at bedtime  morphine  - Injectable 2 milliGRAM(s) IV Push every 4 hours PRN  ondansetron Injectable 4 milliGRAM(s) IV Push every 4 hours PRN    pancrelipase  (CREON 12,000 Lipase Units) 2 Capsule(s) Oral three times a day with meals  pantoprazole    Tablet 40 milliGRAM(s) Oral before breakfast        dextrose 5% + lactated ringers. 1000 milliLiter(s) IV Continuous <Continuous>  folic acid 1 milliGRAM(s) Oral daily    influenza   Vaccine 0.5 milliLiter(s) IntraMuscular once          LABS:                        10.0   5.47  )-----------( 393      ( 11 Nov 2021 06:10 )             32.0     Hgb Trend: 10.0<--, 11.0<--, 12.3<--  11-11    140  |  107  |  7   ----------------------------<  68<L>  3.6   |  23  |  0.96    Ca    8.3<L>      11 Nov 2021 06:10  Phos  2.5     11-11  Mg     1.8     11-11    TPro  5.5<L>  /  Alb  2.3<L>  /  TBili  0.3  /  DBili  x   /  AST  19  /  ALT  13  /  AlkPhos  117  11-11    Creatinine Trend: 0.96<--, 0.95<--, 0.91<--        Venous Blood Gas:  11-10 @ 06:57  7.45/37/67/26/94.5  VBG Lactate: 2.6      MICROBIOLOGY:     RVP: negative  COVID PCR: negative     RADIOLOGY:  < from: CT Angio Chest PE Protocol w/ IV Cont (11.09.21 @ 14:09) >  Tubes/Lines: Right-sided Mediport catheter with tip in SVC.    Mediastinum and Heart: Aorta and pulmonary arteries are normal in size. Thyroid gland is unremarkable. No lymphadenopathy. No pericardial effusion.    Lungs, Pleura, and Airways: There is no acute pulmonary embolus. There is obliteration of the left lower lobe vessels with paucity of vasculature in the left lung. This is secondary to chronic pulmonary embolus in the left lower lobe. Additional calcified pulmonary embolus in the right lower lobe interlobar artery indicates that this is chronic.    Severe centrilobular emphysema noted. Bilateral reticular opacities consistent with fibrosis are also noted and are unchanged since previous exam.    CT abdomen and pelvis:    There is diffuse fatty infiltration of the liver. No focal hepatic or splenic lesions are noted.    Calcifications noted from chronic pancreatitis. Post Jeffery procedure. The adrenal glands demonstrate a right adrenal 1 cm nodule which is stable since previous exam. Both kidneys enhance symmetrically without stones or hydronephrosis. Bilateral renal cysts are noted. The retroperitoneal vasculature is within normal limits.    No evidence of bowel obstruction. Thickening of the descending colon which may be least partially be due to under distention. Diverticulosis without diverticulitis. Fibroid uterus noted.    Post bilateral hip replacements. No acute bony abnormality.    IMPRESSION:    CTA CHEST: No acute pulmonary embolus.    Chronic pulmonary embolus within the left lower lobe pulmonary arteries and within the right lower lobe interlobar artery.    CT abdomen and pelvis: Thickening of the descending colon which may be least partially be due to under distention. Correlate for signs of colitis. Otherwise no acute pathology.    [x ] Reviewed and interpreted by me    TTE 11/9/21:    Conclusions:  1. Mitral annular calcification, otherwise normal mitral  valve. Minimal mitral regurgitation.  2. Calcified trileaflet aortic valve with normal opening.  Minimal aortic regurgitation.  3. Normal left ventricular systolic function. No segmental  wall motion abnormalities.  4. Mild diastolic dysfunction (Stage I).  5. Normal right ventricular size and function.  6. Normal pericardium with trace pericardial effusion.  *** Compared with echocardiogram of 6/17/2021, no  significant changes noted.      PULMONARY FUNCTION TESTS:    EKG:

## 2021-11-11 NOTE — CONSULT NOTE ADULT - ASSESSMENT
Patient is a 65 year old female with a history of HTN, COPD, NSCLC (adenocarcinoma left lung) Stage IIIB (dx 2/2020 s/p chemo followed by immunotherapy, currently on Keytruda maintenance therapy), pulmonary embolus on apixaban, chronic pancreatitis (s/p Jeffery procedure 2019), hx of ARDS (2015), GERD, chronic pain (on opioids), and s/p vocal cord polpectomy who presents with tachypnea and SOB that started last Friday, requiring 2L NC and epigastric pain with vomiting that started 4 days ago c/f COPD exacerbation vs right-sided HF vs acute pancreatitis vs infectious process.     #NSCLC, stage IIIC by imaging  -Started carboplatin/pemetrexed/pembrolizaumab given Q 3 weeks schedule. Was on maintenance pemetrexed/pembrolizumab; pemetrexed was subsequently discontinued due to severe fatigue. She remains on pembrolizumab maintenance, last dose given on 9/30/21  -NGS revealed KRAS G12V mut, low TMB and KIM. PDL1 1%.  -no plans for inpatient treatment of NSCLC  -11/9/21 CT Chest: negative for PE; CT A/P showing thickening of the descending colon, recommend correlation w/ colitis in setting of patient reporting abdominal pain  -recommended brain MRI given patient has been having increased weakness, falling at home; brain MRI on 11/10/21 negative for metastatic disease  -patient follows w/ Dr. Cage as an outpatient    #Acute respiratory failure w/ hypoxia  - Admitted for tachypnea and hypoxia, requiring 2L NC, now off O2. Etiology unclear but likely multifactorial; patient reports that she feels improved compared to admission  - CTA w/ evidence of chronic thromboembolic disease not no acute PE (11/9)  - Bedside echo ruled out pericardial effusion (11/9); TTE (11/11/21) EF 71%, calcified AV  - abx, duonebs and steroids per primary team  - pulmonary evaluation appreciated    #Gait abnormality, falls  -no structural etiology for patient's progressively worsening falls, weakness, MRI brain as above  -reports weakness is worse in the proximal muscles, reports that she has difficulty getting up after falling, arms weak  -neurological exam unremarkable, normal coordination and oppositional strength   -recommend PT evaluation  -if continues to report weakness, recommend evaluation for Lambert-Eaton myasthenic syndrome (though this is more common in SCLC and suspicion is low)    Please call with any questions.    Ranjit Tello MD, MPH  Hematology / Oncology Fellow, PGY7  p   Between 5:00 PM and 8:00 AM, please call on-call fellow

## 2021-11-12 ENCOUNTER — APPOINTMENT (OUTPATIENT)
Dept: PULMONOLOGY | Facility: CLINIC | Age: 66
End: 2021-11-12

## 2021-11-12 ENCOUNTER — NON-APPOINTMENT (OUTPATIENT)
Age: 66
End: 2021-11-12

## 2021-11-12 LAB
ALBUMIN SERPL ELPH-MCNC: 2 G/DL — LOW (ref 3.3–5)
ALP SERPL-CCNC: 104 U/L — SIGNIFICANT CHANGE UP (ref 40–120)
ALT FLD-CCNC: 18 U/L — SIGNIFICANT CHANGE UP (ref 10–45)
ANION GAP SERPL CALC-SCNC: 12 MMOL/L — SIGNIFICANT CHANGE UP (ref 5–17)
AST SERPL-CCNC: 26 U/L — SIGNIFICANT CHANGE UP (ref 10–40)
BASOPHILS # BLD AUTO: 0.04 K/UL — SIGNIFICANT CHANGE UP (ref 0–0.2)
BASOPHILS NFR BLD AUTO: 0.5 % — SIGNIFICANT CHANGE UP (ref 0–2)
BILIRUB SERPL-MCNC: 0.2 MG/DL — SIGNIFICANT CHANGE UP (ref 0.2–1.2)
BUN SERPL-MCNC: 6 MG/DL — LOW (ref 7–23)
CALCIUM SERPL-MCNC: 7.9 MG/DL — LOW (ref 8.4–10.5)
CHLORIDE SERPL-SCNC: 107 MMOL/L — SIGNIFICANT CHANGE UP (ref 96–108)
CO2 SERPL-SCNC: 22 MMOL/L — SIGNIFICANT CHANGE UP (ref 22–31)
CREAT SERPL-MCNC: 0.93 MG/DL — SIGNIFICANT CHANGE UP (ref 0.5–1.3)
EOSINOPHIL # BLD AUTO: 0.06 K/UL — SIGNIFICANT CHANGE UP (ref 0–0.5)
EOSINOPHIL NFR BLD AUTO: 0.8 % — SIGNIFICANT CHANGE UP (ref 0–6)
GLUCOSE SERPL-MCNC: 134 MG/DL — HIGH (ref 70–99)
HCT VFR BLD CALC: 28.1 % — LOW (ref 34.5–45)
HGB BLD-MCNC: 9.3 G/DL — LOW (ref 11.5–15.5)
IMM GRANULOCYTES NFR BLD AUTO: 1 % — SIGNIFICANT CHANGE UP (ref 0–1.5)
LYMPHOCYTES # BLD AUTO: 2.14 K/UL — SIGNIFICANT CHANGE UP (ref 1–3.3)
LYMPHOCYTES # BLD AUTO: 29.4 % — SIGNIFICANT CHANGE UP (ref 13–44)
MAGNESIUM SERPL-MCNC: 1.7 MG/DL — SIGNIFICANT CHANGE UP (ref 1.6–2.6)
MCHC RBC-ENTMCNC: 29.1 PG — SIGNIFICANT CHANGE UP (ref 27–34)
MCHC RBC-ENTMCNC: 33.1 GM/DL — SIGNIFICANT CHANGE UP (ref 32–36)
MCV RBC AUTO: 87.8 FL — SIGNIFICANT CHANGE UP (ref 80–100)
MONOCYTES # BLD AUTO: 0.69 K/UL — SIGNIFICANT CHANGE UP (ref 0–0.9)
MONOCYTES NFR BLD AUTO: 9.5 % — SIGNIFICANT CHANGE UP (ref 2–14)
MRSA PCR RESULT.: SIGNIFICANT CHANGE UP
NEUTROPHILS # BLD AUTO: 4.28 K/UL — SIGNIFICANT CHANGE UP (ref 1.8–7.4)
NEUTROPHILS NFR BLD AUTO: 58.8 % — SIGNIFICANT CHANGE UP (ref 43–77)
NRBC # BLD: 0 /100 WBCS — SIGNIFICANT CHANGE UP (ref 0–0)
PHOSPHATE SERPL-MCNC: 2.1 MG/DL — LOW (ref 2.5–4.5)
PLATELET # BLD AUTO: 450 K/UL — HIGH (ref 150–400)
POTASSIUM SERPL-MCNC: 3.1 MMOL/L — LOW (ref 3.5–5.3)
POTASSIUM SERPL-SCNC: 3.1 MMOL/L — LOW (ref 3.5–5.3)
PROT SERPL-MCNC: 5.3 G/DL — LOW (ref 6–8.3)
RBC # BLD: 3.2 M/UL — LOW (ref 3.8–5.2)
RBC # FLD: 18.1 % — HIGH (ref 10.3–14.5)
S AUREUS DNA NOSE QL NAA+PROBE: DETECTED
SODIUM SERPL-SCNC: 141 MMOL/L — SIGNIFICANT CHANGE UP (ref 135–145)
WBC # BLD: 7.28 K/UL — SIGNIFICANT CHANGE UP (ref 3.8–10.5)
WBC # FLD AUTO: 7.28 K/UL — SIGNIFICANT CHANGE UP (ref 3.8–10.5)

## 2021-11-12 PROCEDURE — 99232 SBSQ HOSP IP/OBS MODERATE 35: CPT

## 2021-11-12 PROCEDURE — 99233 SBSQ HOSP IP/OBS HIGH 50: CPT | Mod: GC

## 2021-11-12 RX ORDER — POTASSIUM CHLORIDE 20 MEQ
40 PACKET (EA) ORAL ONCE
Refills: 0 | Status: COMPLETED | OUTPATIENT
Start: 2021-11-12 | End: 2021-11-12

## 2021-11-12 RX ORDER — HYDROMORPHONE HYDROCHLORIDE 2 MG/ML
2.5 INJECTION INTRAMUSCULAR; INTRAVENOUS; SUBCUTANEOUS EVERY 4 HOURS
Refills: 0 | Status: DISCONTINUED | OUTPATIENT
Start: 2021-11-12 | End: 2021-11-12

## 2021-11-12 RX ORDER — HYDROMORPHONE HYDROCHLORIDE 2 MG/ML
6 INJECTION INTRAMUSCULAR; INTRAVENOUS; SUBCUTANEOUS EVERY 4 HOURS
Refills: 0 | Status: DISCONTINUED | OUTPATIENT
Start: 2021-11-12 | End: 2021-11-15

## 2021-11-12 RX ORDER — HYDROMORPHONE HYDROCHLORIDE 2 MG/ML
5 INJECTION INTRAMUSCULAR; INTRAVENOUS; SUBCUTANEOUS EVERY 4 HOURS
Refills: 0 | Status: DISCONTINUED | OUTPATIENT
Start: 2021-11-12 | End: 2021-11-12

## 2021-11-12 RX ORDER — HYDROMORPHONE HYDROCHLORIDE 2 MG/ML
4 INJECTION INTRAMUSCULAR; INTRAVENOUS; SUBCUTANEOUS EVERY 4 HOURS
Refills: 0 | Status: DISCONTINUED | OUTPATIENT
Start: 2021-11-12 | End: 2021-11-15

## 2021-11-12 RX ORDER — HYDROMORPHONE HYDROCHLORIDE 2 MG/ML
1 INJECTION INTRAMUSCULAR; INTRAVENOUS; SUBCUTANEOUS ONCE
Refills: 0 | Status: DISCONTINUED | OUTPATIENT
Start: 2021-11-12 | End: 2021-11-12

## 2021-11-12 RX ADMIN — TIOTROPIUM BROMIDE 1 CAPSULE(S): 18 CAPSULE ORAL; RESPIRATORY (INHALATION) at 12:08

## 2021-11-12 RX ADMIN — Medication 2 CAPSULE(S): at 12:07

## 2021-11-12 RX ADMIN — Medication 40 MILLIEQUIVALENT(S): at 12:07

## 2021-11-12 RX ADMIN — HYDROMORPHONE HYDROCHLORIDE 1 MILLIGRAM(S): 2 INJECTION INTRAMUSCULAR; INTRAVENOUS; SUBCUTANEOUS at 09:26

## 2021-11-12 RX ADMIN — HYDROMORPHONE HYDROCHLORIDE 1 MILLIGRAM(S): 2 INJECTION INTRAMUSCULAR; INTRAVENOUS; SUBCUTANEOUS at 16:11

## 2021-11-12 RX ADMIN — APIXABAN 5 MILLIGRAM(S): 2.5 TABLET, FILM COATED ORAL at 05:37

## 2021-11-12 RX ADMIN — Medication 3 MILLILITER(S): at 17:12

## 2021-11-12 RX ADMIN — Medication 10 MILLIGRAM(S): at 08:02

## 2021-11-12 RX ADMIN — HYDROMORPHONE HYDROCHLORIDE 1 MILLIGRAM(S): 2 INJECTION INTRAMUSCULAR; INTRAVENOUS; SUBCUTANEOUS at 04:41

## 2021-11-12 RX ADMIN — HYDROMORPHONE HYDROCHLORIDE 1 MILLIGRAM(S): 2 INJECTION INTRAMUSCULAR; INTRAVENOUS; SUBCUTANEOUS at 03:14

## 2021-11-12 RX ADMIN — Medication 3 MILLILITER(S): at 05:41

## 2021-11-12 RX ADMIN — PANTOPRAZOLE SODIUM 40 MILLIGRAM(S): 20 TABLET, DELAYED RELEASE ORAL at 05:37

## 2021-11-12 RX ADMIN — MIRTAZAPINE 15 MILLIGRAM(S): 45 TABLET, ORALLY DISINTEGRATING ORAL at 21:00

## 2021-11-12 RX ADMIN — Medication 3 MILLIGRAM(S): at 20:59

## 2021-11-12 RX ADMIN — Medication 40 MILLIEQUIVALENT(S): at 17:10

## 2021-11-12 RX ADMIN — HYDROMORPHONE HYDROCHLORIDE 1 MILLIGRAM(S): 2 INJECTION INTRAMUSCULAR; INTRAVENOUS; SUBCUTANEOUS at 14:10

## 2021-11-12 RX ADMIN — PIPERACILLIN AND TAZOBACTAM 25 GRAM(S): 4; .5 INJECTION, POWDER, LYOPHILIZED, FOR SOLUTION INTRAVENOUS at 23:45

## 2021-11-12 RX ADMIN — HYDROMORPHONE HYDROCHLORIDE 1 MILLIGRAM(S): 2 INJECTION INTRAMUSCULAR; INTRAVENOUS; SUBCUTANEOUS at 08:02

## 2021-11-12 RX ADMIN — Medication 3 MILLILITER(S): at 12:07

## 2021-11-12 RX ADMIN — Medication 10 MILLIGRAM(S): at 17:12

## 2021-11-12 RX ADMIN — Medication 1 MILLIGRAM(S): at 12:07

## 2021-11-12 RX ADMIN — ONDANSETRON 4 MILLIGRAM(S): 8 TABLET, FILM COATED ORAL at 20:58

## 2021-11-12 RX ADMIN — PIPERACILLIN AND TAZOBACTAM 25 GRAM(S): 4; .5 INJECTION, POWDER, LYOPHILIZED, FOR SOLUTION INTRAVENOUS at 14:02

## 2021-11-12 RX ADMIN — HYDROMORPHONE HYDROCHLORIDE 6 MILLIGRAM(S): 2 INJECTION INTRAMUSCULAR; INTRAVENOUS; SUBCUTANEOUS at 20:59

## 2021-11-12 RX ADMIN — APIXABAN 5 MILLIGRAM(S): 2.5 TABLET, FILM COATED ORAL at 17:11

## 2021-11-12 RX ADMIN — Medication 3 MILLILITER(S): at 23:44

## 2021-11-12 RX ADMIN — AMLODIPINE BESYLATE 10 MILLIGRAM(S): 2.5 TABLET ORAL at 05:37

## 2021-11-12 RX ADMIN — HYDROMORPHONE HYDROCHLORIDE 1 MILLIGRAM(S): 2 INJECTION INTRAMUSCULAR; INTRAVENOUS; SUBCUTANEOUS at 17:34

## 2021-11-12 RX ADMIN — Medication 2 CAPSULE(S): at 08:02

## 2021-11-12 RX ADMIN — Medication 40 MILLIGRAM(S): at 05:37

## 2021-11-12 RX ADMIN — HYDROMORPHONE HYDROCHLORIDE 1 MILLIGRAM(S): 2 INJECTION INTRAMUSCULAR; INTRAVENOUS; SUBCUTANEOUS at 12:08

## 2021-11-12 RX ADMIN — Medication 2 CAPSULE(S): at 17:11

## 2021-11-12 RX ADMIN — BUDESONIDE AND FORMOTEROL FUMARATE DIHYDRATE 2 PUFF(S): 160; 4.5 AEROSOL RESPIRATORY (INHALATION) at 05:41

## 2021-11-12 RX ADMIN — PIPERACILLIN AND TAZOBACTAM 25 GRAM(S): 4; .5 INJECTION, POWDER, LYOPHILIZED, FOR SOLUTION INTRAVENOUS at 06:13

## 2021-11-12 RX ADMIN — Medication 100 MILLIGRAM(S): at 05:37

## 2021-11-12 RX ADMIN — BUDESONIDE AND FORMOTEROL FUMARATE DIHYDRATE 2 PUFF(S): 160; 4.5 AEROSOL RESPIRATORY (INHALATION) at 17:15

## 2021-11-12 NOTE — CONSULT NOTE ADULT - ATTENDING COMMENTS
patient s/e  denies abdominal pain  feels hungry wants to eat  cont proton pump inhibitor  regular diet  no objection to anticoagulation  will add creon 24,000 units tid  d/w patient

## 2021-11-12 NOTE — PROGRESS NOTE ADULT - SUBJECTIVE AND OBJECTIVE BOX
PROGRESS NOTE:   Authored by Wing Alberts MD  Pager: [facility] [phone]; [facility] [phone]    Patient is a 65y old  Female who presents with a chief complaint of Acute hypoxic respiratory failure (11 Nov 2021 23:45)      SUBJECTIVE / OVERNIGHT EVENTS:    ADDITIONAL REVIEW OF SYSTEMS:    MEDICATIONS  (STANDING):  albuterol/ipratropium for Nebulization 3 milliLiter(s) Nebulizer every 6 hours  amLODIPine   Tablet 10 milliGRAM(s) Oral daily  apixaban 5 milliGRAM(s) Oral two times a day  budesonide  80 MICROgram(s)/formoterol 4.5 MICROgram(s) Inhaler 2 Puff(s) Inhalation two times a day  dextrose 5% + lactated ringers. 1000 milliLiter(s) (100 mL/Hr) IV Continuous <Continuous>  folic acid 1 milliGRAM(s) Oral daily  influenza   Vaccine 0.5 milliLiter(s) IntraMuscular once  metoprolol succinate  milliGRAM(s) Oral daily  mirtazapine 15 milliGRAM(s) Oral at bedtime  pancrelipase  (CREON 12,000 Lipase Units) 2 Capsule(s) Oral three times a day with meals  pantoprazole    Tablet 40 milliGRAM(s) Oral before breakfast  piperacillin/tazobactam IVPB.. 3.375 Gram(s) IV Intermittent every 8 hours  predniSONE   Tablet 40 milliGRAM(s) Oral daily  tiotropium 18 MICROgram(s) Capsule 1 Capsule(s) Inhalation daily    MEDICATIONS  (PRN):  acetaminophen     Tablet .. 650 milliGRAM(s) Oral every 6 hours PRN Temp greater or equal to 38C (100.4F), Mild Pain (1 - 3)  HYDROmorphone  Injectable 0.5 milliGRAM(s) IV Push every 4 hours PRN Moderate Pain (4 - 6)  HYDROmorphone  Injectable 1 milliGRAM(s) IV Push every 4 hours PRN Severe Pain (7 - 10)  melatonin 3 milliGRAM(s) Oral at bedtime PRN Insomnia  metoclopramide Injectable 10 milliGRAM(s) IV Push every 6 hours PRN vomiting/nausea  morphine  - Injectable 2 milliGRAM(s) IV Push every 4 hours PRN Severe Pain (7 - 10)  ondansetron Injectable 4 milliGRAM(s) IV Push every 4 hours PRN Nausea and/or Vomiting      CAPILLARY BLOOD GLUCOSE        I&O's Summary    11 Nov 2021 07:01  -  12 Nov 2021 07:00  --------------------------------------------------------  IN: 440 mL / OUT: 0 mL / NET: 440 mL        PHYSICAL EXAM:  Vital Signs Last 24 Hrs  T(C): 36.8 (12 Nov 2021 04:41), Max: 36.8 (12 Nov 2021 04:41)  T(F): 98.2 (12 Nov 2021 04:41), Max: 98.2 (12 Nov 2021 04:41)  HR: 98 (12 Nov 2021 04:41) (95 - 110)  BP: 113/77 (12 Nov 2021 04:41) (113/77 - 175/90)  BP(mean): --  RR: 18 (12 Nov 2021 04:41) (18 - 18)  SpO2: 96% (12 Nov 2021 04:41) (95% - 99%)    GENERAL: No acute distress, well-developed  HEAD:  Atraumatic, Normocephalic  EYES: EOMI, PERRLA, conjunctiva and sclera clear  NECK: Supple, no lymphadenopathy, no JVD  CHEST/LUNG: CTAB; No wheezes, rales, or rhonchi  HEART: Regular rate and rhythm; No murmurs, rubs, or gallops  ABDOMEN: Soft, non-tender, non-distended; normal bowel sounds, no organomegaly  EXTREMITIES:  2+ peripheral pulses b/l, No clubbing, cyanosis, or edema  NEUROLOGY: A&O x 3, no focal deficits  SKIN: No rashes or lesions    LABS:                        9.3    7.28  )-----------( 450      ( 12 Nov 2021 06:04 )             28.1     11-12    141  |  107  |  6<L>  ----------------------------<  134<H>  3.1<L>   |  22  |  0.93    Ca    7.9<L>      12 Nov 2021 06:04  Phos  2.1     11-12  Mg     1.7     11-12    TPro  5.3<L>  /  Alb  2.0<L>  /  TBili  0.2  /  DBili  x   /  AST  26  /  ALT  18  /  AlkPhos  104  11-12              Culture - Blood (collected 09 Nov 2021 21:52)  Source: .Blood Blood-Peripheral  Preliminary Report (10 Nov 2021 22:02):    No growth to date.    Culture - Blood (collected 09 Nov 2021 21:52)  Source: .Blood Blood-Peripheral  Preliminary Report (10 Nov 2021 22:02):    No growth to date.        RADIOLOGY & ADDITIONAL TESTS:  Results Reviewed:   Imaging Personally Reviewed:  Electrocardiogram Personally Reviewed:    COORDINATION OF CARE:  Care Discussed with Consultants/Other Providers [Y/N]:  Prior or Outpatient Records Reviewed [Y/N]:   PROGRESS NOTE:   Authored by Wing Alberts MD  Pager: [facility] [phone]; [facility] [phone]    Patient is a 65y old  Female who presents with a chief complaint of Acute hypoxic respiratory failure (11 Nov 2021 23:45)      SUBJECTIVE / OVERNIGHT EVENTS:  Patient with improvement in abdominal pain, nausea, and shortness of breath today.   Last episode of emesis yesterday AM. Comfortable on room air.  Denies fevers, chills, chest pain, palpitations, SOB, N/V/D, urinary symptoms.    ADDITIONAL REVIEW OF SYSTEMS:    CONSTITUTIONAL: No weakness, fevers or chills  RESPIRATORY: No cough, wheezing, hemoptysis; No shortness of breath  CARDIOVASCULAR: No chest pain or palpitations  GASTROINTESTINAL: No abdominal or epigastric pain. No nausea, vomiting, or hematemesis; No diarrhea or constipation. No melena or hematochezia.  GENITOURINARY: No dysuria, frequency or hematuria  All other review of systems is negative unless indicated above.    MEDICATIONS  (STANDING):  albuterol/ipratropium for Nebulization 3 milliLiter(s) Nebulizer every 6 hours  amLODIPine   Tablet 10 milliGRAM(s) Oral daily  apixaban 5 milliGRAM(s) Oral two times a day  budesonide  80 MICROgram(s)/formoterol 4.5 MICROgram(s) Inhaler 2 Puff(s) Inhalation two times a day  dextrose 5% + lactated ringers. 1000 milliLiter(s) (100 mL/Hr) IV Continuous <Continuous>  folic acid 1 milliGRAM(s) Oral daily  influenza   Vaccine 0.5 milliLiter(s) IntraMuscular once  metoprolol succinate  milliGRAM(s) Oral daily  mirtazapine 15 milliGRAM(s) Oral at bedtime  pancrelipase  (CREON 12,000 Lipase Units) 2 Capsule(s) Oral three times a day with meals  pantoprazole    Tablet 40 milliGRAM(s) Oral before breakfast  piperacillin/tazobactam IVPB.. 3.375 Gram(s) IV Intermittent every 8 hours  predniSONE   Tablet 40 milliGRAM(s) Oral daily  tiotropium 18 MICROgram(s) Capsule 1 Capsule(s) Inhalation daily    MEDICATIONS  (PRN):  acetaminophen     Tablet .. 650 milliGRAM(s) Oral every 6 hours PRN Temp greater or equal to 38C (100.4F), Mild Pain (1 - 3)  HYDROmorphone  Injectable 0.5 milliGRAM(s) IV Push every 4 hours PRN Moderate Pain (4 - 6)  HYDROmorphone  Injectable 1 milliGRAM(s) IV Push every 4 hours PRN Severe Pain (7 - 10)  melatonin 3 milliGRAM(s) Oral at bedtime PRN Insomnia  metoclopramide Injectable 10 milliGRAM(s) IV Push every 6 hours PRN vomiting/nausea  morphine  - Injectable 2 milliGRAM(s) IV Push every 4 hours PRN Severe Pain (7 - 10)  ondansetron Injectable 4 milliGRAM(s) IV Push every 4 hours PRN Nausea and/or Vomiting      CAPILLARY BLOOD GLUCOSE        I&O's Summary    11 Nov 2021 07:01  -  12 Nov 2021 07:00  --------------------------------------------------------  IN: 440 mL / OUT: 0 mL / NET: 440 mL        PHYSICAL EXAM:  Vital Signs Last 24 Hrs  T(C): 36.8 (12 Nov 2021 04:41), Max: 36.8 (12 Nov 2021 04:41)  T(F): 98.2 (12 Nov 2021 04:41), Max: 98.2 (12 Nov 2021 04:41)  HR: 98 (12 Nov 2021 04:41) (95 - 110)  BP: 113/77 (12 Nov 2021 04:41) (113/77 - 175/90)  BP(mean): --  RR: 18 (12 Nov 2021 04:41) (18 - 18)  SpO2: 96% (12 Nov 2021 04:41) (95% - 99%)    GENERAL: +Intermittent distress due to epigastric pain. Otherwise resting comfortably between episodes.  HEAD:  Atraumatic, Normocephalic  EYES: EOMI, PERRLA, conjunctiva and sclera clear  ENMT: No tonsillar erythema, exudates, or enlargement; Moist mucous membranes, Good dentition, No lesions  NECK: Supple, No JVD, Normal thyroid  NERVOUS SYSTEM:  Alert & Oriented X3, Good concentration; Motor Strength 5/5 B/L upper and lower extremities; DTRs 2+ intact and symmetric  CHEST/LUNG: (+) inspiratory and expiratory rhonchi but improved   HEART: Regular rate with tachycardia; No murmurs, rubs, or gallops  ABDOMEN: +TTP in epigastric area and LUQ. Soft, Nondistended; Bowel sounds present.  EXTREMITIES:  2+ Peripheral Pulses, No clubbing, cyanosis, or edema  LYMPH: No lymphadenopathy noted  SKIN: No rashes or lesions    LABS:                        9.3    7.28  )-----------( 450      ( 12 Nov 2021 06:04 )             28.1     11-12    141  |  107  |  6<L>  ----------------------------<  134<H>  3.1<L>   |  22  |  0.93    Ca    7.9<L>      12 Nov 2021 06:04  Phos  2.1     11-12  Mg     1.7     11-12    TPro  5.3<L>  /  Alb  2.0<L>  /  TBili  0.2  /  DBili  x   /  AST  26  /  ALT  18  /  AlkPhos  104  11-12              Culture - Blood (collected 09 Nov 2021 21:52)  Source: .Blood Blood-Peripheral  Preliminary Report (10 Nov 2021 22:02):    No growth to date.    Culture - Blood (collected 09 Nov 2021 21:52)  Source: .Blood Blood-Peripheral  Preliminary Report (10 Nov 2021 22:02):    No growth to date.        RADIOLOGY & ADDITIONAL TESTS:  Results Reviewed: Y  Imaging Personally Reviewed: Y  Electrocardiogram Personally Reviewed: Y    COORDINATION OF CARE:  Care Discussed with Consultants/Other Providers [Y/N]: Y  Prior or Outpatient Records Reviewed [Y/N]: Y

## 2021-11-12 NOTE — CONSULT NOTE ADULT - SUBJECTIVE AND OBJECTIVE BOX
HPI:    Patient is a 65 year old female with a history of HTN, COPD, NSCLC (adenocarcinoma left lung) Stage IIIB (dx 2/2020 s/p chemo followed by immunotherapy, currently on Keytruda maintenance therapy), pulmonary embolus on apixaban, chronic pancreatitis (s/p Jeffery procedure 2019), hx of ARDS (2015), GERD, chronic pain (on opioids), and s/p vocal cord polpectomy who presents with tachypnea and SOB that started last Friday, requiring 2L NC (has home O2, but last used this summer). Her  has been asking her to go to the hospital but she refused until today. She also has sharp epigastric pain that radiates to the back and nausea and vomiting.  Her epigastric pain started 4 weeks ago, and she saw Dr. Valdez who performed at CT a/p with no findings. Her epigastric pain worsened and starting last Friday she had vomiting and nausea with inability to take PO. She also has a headache. She denies CP, diarrhea, constipation, urinary symptoms, fevers, chills, cough, sore throat, rhinorrhea.     Of note, patient was hospitalized 6/14 - 6/27 for acute hypoxic respiratory failure found to have on CTA chronic thromboembolic disease, no acute PE and was treated with zosyn x 7 days, IV Solu-Medrol, diuresed, duonebs with hospital course c/b new pancytopenia requiring 1u pRBC and 3U PLTs. Surgery consulted due to abdominal pain radiating to the back, consistent wtih patients chronic pancreatitis, patient states that since her normal pain regimen was restarted, in no acute pain.      PAST MEDICAL & SURGICAL HISTORY:  HTN (hypertension)    GERD (gastroesophageal reflux disease)    Chronic pancreatitis  last episode 4/2019    ARDS survivor  2015    History of adrenal adenoma  stable on imaging    Vocal cord polyp  removal 2018, benign as per pt    Thrombophlebitis  superficial right UE during 4/2019 hospital stay, resolved as per pt    Chronic abdominal pain    Non-small cell lung cancer (NSCLC)  chemo: Alimta/ Keytruda 2/24/2021    Pulmonary embolism    COPD (chronic obstructive pulmonary disease)    Pulmonary hypertension    Anemia  transfusion 2/5/21    S/P arthroscopy of shoulder  left in 2009    S/P hip replacement  left - 2010    S/P arthroscopy of shoulder  right - 2014    S/P hip replacement, right  May 2016    History of vocal cord polypectomy  1/2018    S/P shoulder replacement, left  2016    History of pancreatic surgery  Jeffery procedure (5/8/2019)        MEDICATIONS  (STANDING):  albuterol/ipratropium for Nebulization 3 milliLiter(s) Nebulizer every 6 hours  amLODIPine   Tablet 10 milliGRAM(s) Oral daily  apixaban 5 milliGRAM(s) Oral two times a day  budesonide  80 MICROgram(s)/formoterol 4.5 MICROgram(s) Inhaler 2 Puff(s) Inhalation two times a day  folic acid 1 milliGRAM(s) Oral daily  influenza   Vaccine 0.5 milliLiter(s) IntraMuscular once  metoprolol succinate  milliGRAM(s) Oral daily  mirtazapine 15 milliGRAM(s) Oral at bedtime  pancrelipase  (CREON 12,000 Lipase Units) 2 Capsule(s) Oral three times a day with meals  pantoprazole    Tablet 40 milliGRAM(s) Oral before breakfast  piperacillin/tazobactam IVPB.. 3.375 Gram(s) IV Intermittent every 8 hours  predniSONE   Tablet 40 milliGRAM(s) Oral daily  tiotropium 18 MICROgram(s) Capsule 1 Capsule(s) Inhalation daily    MEDICATIONS  (PRN):  acetaminophen     Tablet .. 650 milliGRAM(s) Oral every 6 hours PRN Temp greater or equal to 38C (100.4F), Mild Pain (1 - 3)  HYDROmorphone   Tablet 6 milliGRAM(s) Oral every 4 hours PRN Severe Pain (7 - 10)  HYDROmorphone   Tablet 4 milliGRAM(s) Oral every 4 hours PRN Moderate Pain (4 - 6)  melatonin 3 milliGRAM(s) Oral at bedtime PRN Insomnia  metoclopramide Injectable 10 milliGRAM(s) IV Push every 6 hours PRN vomiting/nausea  morphine  - Injectable 2 milliGRAM(s) IV Push every 4 hours PRN Severe Pain (7 - 10)  ondansetron Injectable 4 milliGRAM(s) IV Push every 4 hours PRN Nausea and/or Vomiting      Allergies    diazepam (Other)  lemon (Other)    Intolerances        SOCIAL HISTORY:    FAMILY HISTORY:          PHYSICAL EXAM:  Constitutional: well developed, well nourished, NAD  Eyes: anicteric  ENMT: normal facies, symmetric  Respiratory: Breathing comfortably    Gastrointestinal: abdomen soft, nontender, nondistended.   Extremities: FROM, warm  Neurological: intact, non-focal  Psychiatric: oriented x 3; appropriate      Vital Signs Last 24 Hrs  T(C): 36.8 (12 Nov 2021 11:37), Max: 36.8 (12 Nov 2021 04:41)  T(F): 98.2 (12 Nov 2021 11:37), Max: 98.2 (12 Nov 2021 04:41)  HR: 62 (12 Nov 2021 11:37) (62 - 102)  BP: 108/70 (12 Nov 2021 11:37) (108/70 - 128/66)  BP(mean): --  RR: 18 (12 Nov 2021 11:37) (18 - 18)  SpO2: 96% (12 Nov 2021 11:37) (96% - 98%)    I&O's Summary    11 Nov 2021 07:01  -  12 Nov 2021 07:00  --------------------------------------------------------  IN: 440 mL / OUT: 0 mL / NET: 440 mL    12 Nov 2021 07:01  -  12 Nov 2021 19:31  --------------------------------------------------------  IN: 720 mL / OUT: 0 mL / NET: 720 mL            LABS:                        9.3    7.28  )-----------( 450      ( 12 Nov 2021 06:04 )             28.1     11-12    141  |  107  |  6<L>  ----------------------------<  134<H>  3.1<L>   |  22  |  0.93    Ca    7.9<L>      12 Nov 2021 06:04  Phos  2.1     11-12  Mg     1.7     11-12    TPro  5.3<L>  /  Alb  2.0<L>  /  TBili  0.2  /  DBili  x   /  AST  26  /  ALT  18  /  AlkPhos  104  11-12        CAPILLARY BLOOD GLUCOSE        LIVER FUNCTIONS - ( 12 Nov 2021 06:04 )  Alb: 2.0 g/dL / Pro: 5.3 g/dL / ALK PHOS: 104 U/L / ALT: 18 U/L / AST: 26 U/L / GGT: x             Cultures:      RADIOLOGY & ADDITIONAL STUDIES:      Plan:

## 2021-11-12 NOTE — PROGRESS NOTE ADULT - PROBLEM SELECTOR PLAN 1
Admitted for tachypnea and hypoxia, requiring 2L NC, now off O2. Etiology unclear but likely multifactorial. DDx includes COPD exacerbation, infectious process, right-sided HF, NSCLC progression. Colitis on CT a/p but no diarrhea.   - Bedside echo ruled out pericardial effusion (11/9)  - CTA w/ evidence of chronic thromboembolic disease not no acute PE (11/9)  - Trend CBC and fever curve  - BCx NGTD, f/u sputum cx, UCx, RVP  - Continue NC and wean as tolerated  - c/w duonebs, started prednisone 40 mg PO qd  - c/w zosyn, procal 1.4  - Trending lactate to peak 2.4--->2.6 (s/p 500cc LR bolus 11/10)  - TTE (11/11/21) EF 71%, calcified AV  - Pulm consulted: apprec recs  - Dr. Leahy (cardiology) consulted: apprec recs Admitted for tachypnea and hypoxia, requiring 2L NC, now off O2. Etiology unclear but likely multifactorial. DDx includes COPD exacerbation, infectious process, right-sided HF, NSCLC progression. Colitis on CT a/p but no diarrhea.   - Bedside echo ruled out pericardial effusion (11/9)  - CTA w/ evidence of chronic thromboembolic disease not no acute PE (11/9)  - Trend CBC and fever curve  - BCx NGTD, f/u sputum cx, UCx, RVP  - Weaned from NC today  - c/w duonebs, started prednisone 40 mg PO qd  - c/w Symbicort, Spiriva  - c/w zosyn, procal 1.4  - Lactate downtrended today  - TTE (11/11/21) EF 71%, calcified AV  - Pulm consulted: apprec recs  - Dr. Leahy (cardiology) consulted: apprec recs

## 2021-11-12 NOTE — CONSULT NOTE ADULT - ASSESSMENT
chronic pancreatitis   hypoxia   GERD    s/p Jeffery procedure 2019  CT a/p showing colitis but asymptomatic  symptoms improving    IVF  zofran for nausea  pain meds PRN  diet as tolerated  Dr. Molina following   PPI daily   will follow  dw patient      Advanced care planning was discussed with patient and family.  Advanced care planning forms were reviewed and discussed.  Risks, benefits and alternatives of gastroenterologic procedures were discussed in detail and all questions were answered.    30 minutes spent.

## 2021-11-12 NOTE — CONSULT NOTE ADULT - ASSESSMENT
Patient is a 65 year old female with a history of HTN, COPD, NSCLC (adenocarcinoma left lung) Stage IIIB (dx 2/2020 s/p chemo followed by immunotherapy, currently on Keytruda maintenance therapy), pulmonary embolus on apixaban, chronic pancreatitis (s/p Jeffery procedure 2019), hx of ARDS (2015), GERD, chronic pain (on opioids), and s/p vocal cord polpectomy who presents with tachypnea and SOB that started last Friday, requiring 2L NC and epigastric pain with vomiting that started 4 days ago c/f COPD exacerbation vs right-sided HF vs acute pancreatitis vs infectious process.     - No acute surgical intervention at this time  - Patient symptoms consistent with acute exacerbation, seem to have resolved  - Care as per primary team  - Discussed with attending     [id number]

## 2021-11-12 NOTE — PROGRESS NOTE ADULT - ASSESSMENT
Assessment:  1. History of PE, with chronic findings on CT  2.  HTN  3.  Lung disease  4.  Dyspnea on exertion  5.  Colitis? abdominal pains, improving        Plan:  1.  Continue apixaban 5mg BID  2.  Echo within normal limits - no further cardiac testing  3.  Trend lactate, on maintenance IVF  4.  Abdominal pain, improving - discussed with Dr. An - he is on zosyn. Await Dr. Molina's team recs  5.  appreciate pulm recs         Thank you      Vascular Cardiology Service  DIRECT SERVICE NUMBER [phone]  Office [phone]  email:   [email or web address]

## 2021-11-12 NOTE — CONSULT NOTE ADULT - SUBJECTIVE AND OBJECTIVE BOX
[facility]  Erick Keenan PA-C  [address] [place] [address]  [phone]      Chief Complaint:  Patient is a 65y old  Female who presents with a chief complaint of Acute hypoxic respiratory failure (12 Nov 2021 10:59)      HPI: Patient is a 65 year old female with a history of HTN, COPD, NSCLC (adenocarcinoma left lung) Stage IIIB (dx 2/2020 s/p chemo followed by immunotherapy, currently on Keytruda maintenance therapy), pulmonary embolus on apixaban, chronic pancreatitis (s/p Jeffery procedure 2019), hx of ARDS (2015), GERD, chronic pain (on opioids), and s/p vocal cord polpectomy who presents with tachypnea and SOB that started last Friday, requiring 2L NC (has home O2, but last used this summer). Her  has been asking her to go to the hospital but she refused until today. She also has sharp epigastric pain that radiates to the back and nausea and vomiting.  Her epigastric pain started 4 weeks ago, and she saw Dr. Valdez who performed at CT a/p with no findings. Her epigastric pain worsened and starting last Friday she had vomiting and nausea with inability to take PO. She also has a headache. She denies CP, diarrhea, constipation, urinary symptoms, fevers, chills, cough, sore throat, rhinorrhea.     Of note, patient was hospitalized 6/14 - 6/27 for acute hypoxic respiratory failure found to have on CTA chronic thromboembolic disease, no acute PE and was treated with zosyn x 7 days, IV Solu-Medrol, diuresed, duonebs with hospital course c/b new pancytopenia requiring 1u pRBC and 3U PLTs.    GI asked to consult for pancreatitis.    Allergies:  diazepam (Other)  lemon (Other)      Medications:  acetaminophen     Tablet .. 650 milliGRAM(s) Oral every 6 hours PRN  albuterol/ipratropium for Nebulization 3 milliLiter(s) Nebulizer every 6 hours  amLODIPine   Tablet 10 milliGRAM(s) Oral daily  apixaban 5 milliGRAM(s) Oral two times a day  budesonide  80 MICROgram(s)/formoterol 4.5 MICROgram(s) Inhaler 2 Puff(s) Inhalation two times a day  dextrose 5% + lactated ringers. 1000 milliLiter(s) IV Continuous <Continuous>  folic acid 1 milliGRAM(s) Oral daily  HYDROmorphone  Injectable 0.5 milliGRAM(s) IV Push every 4 hours PRN  HYDROmorphone  Injectable 1 milliGRAM(s) IV Push every 4 hours PRN  influenza   Vaccine 0.5 milliLiter(s) IntraMuscular once  melatonin 3 milliGRAM(s) Oral at bedtime PRN  metoclopramide Injectable 10 milliGRAM(s) IV Push every 6 hours PRN  metoprolol succinate  milliGRAM(s) Oral daily  mirtazapine 15 milliGRAM(s) Oral at bedtime  morphine  - Injectable 2 milliGRAM(s) IV Push every 4 hours PRN  ondansetron Injectable 4 milliGRAM(s) IV Push every 4 hours PRN  pancrelipase  (CREON 12,000 Lipase Units) 2 Capsule(s) Oral three times a day with meals  pantoprazole    Tablet 40 milliGRAM(s) Oral before breakfast  piperacillin/tazobactam IVPB.. 3.375 Gram(s) IV Intermittent every 8 hours  potassium chloride   Powder 40 milliEquivalent(s) Oral once  predniSONE   Tablet 40 milliGRAM(s) Oral daily  tiotropium 18 MICROgram(s) Capsule 1 Capsule(s) Inhalation daily      PMHX/PSHX:  HTN (hypertension)    GERD (gastroesophageal reflux disease)    Hypokalemia    Chronic pancreatitis    ARDS survivor    Insomnia    History of adrenal adenoma    Vocal cord polyp    Thrombophlebitis    Chronic abdominal pain    Non-small cell lung cancer (NSCLC)    Adenocarcinoma    Pulmonary embolism    Chronic pain    COPD (chronic obstructive pulmonary disease)    Emphysema/COPD    Pulmonary hypertension    Anemia    S/P arthroscopy of shoulder    S/P hip replacement    S/P arthroscopy of shoulder    S/P hip replacement, right    History of vocal cord polypectomy    S/P shoulder replacement, left    History of pancreatic surgery        Family history:  No pertinent family history in first degree relatives    Family history of alcohol abuse        Social History:     ROS:     General:  No wt loss, fevers, chills, night sweats, fatigue,   Eyes:  Good vision, no reported pain  ENT:  No sore throat, pain, runny nose, dysphagia  CV:  No pain, palpitations, hypo/hypertension  Resp:  No dyspnea, cough, tachypnea, wheezing  GI:  No pain, No nausea, No vomiting, No diarrhea, No constipation, No weight loss, No fever, No pruritis, No rectal bleeding, No tarry stools, No dysphagia,  :  No pain, bleeding, incontinence, nocturia  Muscle:  No pain, weakness  Neuro:  No weakness, tingling, memory problems  Psych:  No fatigue, insomnia, mood problems, depression  Endocrine:  No polyuria, polydipsia, cold/heat intolerance  Heme:  No petechiae, ecchymosis, easy bruisability  Skin:  No rash, tattoos, scars, edema      PHYSICAL EXAM:   Vital Signs:  Vital Signs Last 24 Hrs  T(C): 36.8 (12 Nov 2021 11:37), Max: 36.8 (12 Nov 2021 04:41)  T(F): 98.2 (12 Nov 2021 11:37), Max: 98.2 (12 Nov 2021 04:41)  HR: 62 (12 Nov 2021 11:37) (62 - 102)  BP: 108/70 (12 Nov 2021 11:37) (108/70 - 128/66)  BP(mean): --  RR: 18 (12 Nov 2021 11:37) (18 - 18)  SpO2: 96% (12 Nov 2021 11:37) (96% - 98%)  Daily     Daily     GENERAL:  Appears stated age,   HEENT:  NC/AT,    CHEST:  Full & symmetric excursion,   HEART:  Regular rhythm  ABDOMEN:  Soft, non-tender, non-distended,   EXTEREMITIES:  no cyanosis,clubbing or edema  SKIN:  No rash  NEURO:  Alert,    LABS:                        9.3    7.28  )-----------( 450      ( 12 Nov 2021 06:04 )             28.1     11-12    141  |  107  |  6<L>  ----------------------------<  134<H>  3.1<L>   |  22  |  0.93    Ca    7.9<L>      12 Nov 2021 06:04  Phos  2.1     11-12  Mg     1.7     11-12    TPro  5.3<L>  /  Alb  2.0<L>  /  TBili  0.2  /  DBili  x   /  AST  26  /  ALT  18  /  AlkPhos  104  11-12    LIVER FUNCTIONS - ( 12 Nov 2021 06:04 )  Alb: 2.0 g/dL / Pro: 5.3 g/dL / ALK PHOS: 104 U/L / ALT: 18 U/L / AST: 26 U/L / GGT: x                   Imaging:    < from: CT Abdomen and Pelvis w/ IV Cont (11.09.21 @ 14:09) >    EXAM:  CT ABDOMEN AND PELVIS IC                          EXAM:  CT ANGIO CHEST PULM ART WAWIC                            PROCEDURE DATE:  11/09/2021            INTERPRETATION:  Reason for Exam: Shortness of breath. chest pain.    CTA of the chest was performed from the thoracic inlet to the level of the adrenal glands following IV contrast injection of  80 cc of Omnipaque 350. No immediate complications were reported.  MIP images were also created and reviewed.    Comparison: October 6, 2021    Tubes/Lines: Right-sided Mediport catheter with tip in SVC.    Mediastinum and Heart: Aorta and pulmonary arteries are normal in size. Thyroid gland is unremarkable. No lymphadenopathy. No pericardial effusion.    Lungs, Pleura, and Airways: There isno acute pulmonary embolus. There is obliteration of the left lower lobe vessels with paucity of vasculature in the left lung. This is secondary to chronic pulmonary embolus in the left lower lobe. Additional calcified pulmonary embolus in the right lower lobe interlobar artery indicates that this is chronic.    Severe centrilobular emphysema noted. Bilateral reticular opacities consistent with fibrosis are also noted and are unchanged since previous exam.    CT abdomen and pelvis:    There is diffuse fatty infiltration of the liver. No focal hepatic or splenic lesions are noted.    Calcifications noted from chronic pancreatitis. Post Jeffery procedure. The adrenal glands demonstrate a right adrenal 1 cm nodule which is stable since previous exam. Both kidneys enhance symmetrically without stones or hydronephrosis. Bilateral renal cysts are noted. The retroperitoneal vasculature is within normal limits.    No evidence of bowel obstruction. Thickening of the descending colon which may be least partially be due to under distention. Diverticulosis without diverticulitis. Fibroid uterus noted.    Post bilateral hip replacements. No acute bony abnormality.    IMPRESSION:    CTA CHEST: No acute pulmonary embolus.    Chronic pulmonary embolus within the left lower lobe pulmonary arteries and within the right lower lobe interlobar artery.    CT abdomen and pelvis: Thickening of the descending colon which may be least partially be due to under distention. Correlate for signs of colitis. Otherwise no acute pathology.    --- End of Report ---

## 2021-11-12 NOTE — PROGRESS NOTE ADULT - PROBLEM SELECTOR PLAN 4
Hx Stage IIIB (dx 2/2020 s/p chemo now on Keytruda maintenance every 3 weeks)  - No intervention as inpatient at this time  - Hem/Onc consulted: apprec recs  - Holding Keytruda (last dose 9/30)  - negative CTH non-contrast and MR brain w/ IV contrast ruled out brain mets

## 2021-11-12 NOTE — PROGRESS NOTE ADULT - SUBJECTIVE AND OBJECTIVE BOX
Vascular Cardiology  Progress note  DIRECT SERVICE NUMBER: [phone]            EMAIL [email or web address]   OFFICE [phone]    CC:  SOB, epigastric pain    INTERVAL HISTORY:  Abd pain improving after eating breakfast. Less shortness of breath. Feeling better.         Allergies    diazepam (Other)  lemon (Other)    Intolerances    	    MEDICATIONS:  amLODIPine   Tablet 10 milliGRAM(s) Oral daily  apixaban 5 milliGRAM(s) Oral two times a day  metoprolol succinate  milliGRAM(s) Oral daily    piperacillin/tazobactam IVPB.. 3.375 Gram(s) IV Intermittent every 8 hours    albuterol/ipratropium for Nebulization 3 milliLiter(s) Nebulizer every 6 hours  budesonide  80 MICROgram(s)/formoterol 4.5 MICROgram(s) Inhaler 2 Puff(s) Inhalation two times a day  tiotropium 18 MICROgram(s) Capsule 1 Capsule(s) Inhalation daily    acetaminophen     Tablet .. 650 milliGRAM(s) Oral every 6 hours PRN  HYDROmorphone  Injectable 0.5 milliGRAM(s) IV Push every 4 hours PRN  HYDROmorphone  Injectable 1 milliGRAM(s) IV Push every 4 hours PRN  melatonin 3 milliGRAM(s) Oral at bedtime PRN  metoclopramide Injectable 10 milliGRAM(s) IV Push every 6 hours PRN  mirtazapine 15 milliGRAM(s) Oral at bedtime  morphine  - Injectable 2 milliGRAM(s) IV Push every 4 hours PRN  ondansetron Injectable 4 milliGRAM(s) IV Push every 4 hours PRN    pancrelipase  (CREON 12,000 Lipase Units) 2 Capsule(s) Oral three times a day with meals  pantoprazole    Tablet 40 milliGRAM(s) Oral before breakfast    predniSONE   Tablet 40 milliGRAM(s) Oral daily    dextrose 5% + lactated ringers. 1000 milliLiter(s) IV Continuous <Continuous>  folic acid 1 milliGRAM(s) Oral daily  influenza   Vaccine 0.5 milliLiter(s) IntraMuscular once  potassium chloride   Powder 40 milliEquivalent(s) Oral once      PAST MEDICAL & SURGICAL HISTORY:  HTN (hypertension)    GERD (gastroesophageal reflux disease)    Chronic pancreatitis  last episode 4/2019    ARDS survivor  2015    History of adrenal adenoma  stable on imaging    Vocal cord polyp  removal 2018, benign as per pt    Thrombophlebitis  superficial right UE during 4/2019 hospital stay, resolved as per pt    Chronic abdominal pain    Non-small cell lung cancer (NSCLC)  chemo: Alimta/ Keytruda 2/24/2021    Pulmonary embolism    COPD (chronic obstructive pulmonary disease)    Pulmonary hypertension    Anemia  transfusion 2/5/21    S/P arthroscopy of shoulder  left in 2009    S/P hip replacement  left - 2010    S/P arthroscopy of shoulder  right - 2014    S/P hip replacement, right  May 2016    History of vocal cord polypectomy  1/2018    S/P shoulder replacement, left  2016    History of pancreatic surgery  Jeffery procedure (5/8/2019)        FAMILY HISTORY:      SOCIAL HISTORY:  unchanged    REVIEW OF SYSTEMS:  CONSTITUTIONAL: No fever, weight loss, or fatigue  EYES: No eye pain, visual disturbances, or discharge  ENMT:  No difficulty hearing, tinnitus, vertigo; No sinus or throat pain  NECK: No pain or stiffness  RESPIRATORY: No cough, wheezing, chills or hemoptysis; No Shortness of Breath  CARDIOVASCULAR: No chest pain, palpitations, passing out, dizziness, or leg swelling  GASTROINTESTINAL: No abdominal or epigastric pain. No nausea, vomiting, or hematemesis; No diarrhea or constipation. No melena or hematochezia.  GENITOURINARY: No dysuria, frequency, hematuria, or incontinence  NEUROLOGICAL: No headaches, memory loss, loss of strength, numbness, or tremors  SKIN: No itching, burning, rashes, or lesions   LYMPH Nodes: No enlarged glands  ENDOCRINE: No heat or cold intolerance; No hair loss  MUSCULOSKELETAL: No joint pain or swelling; No muscle, back, or extremity pain  PSYCHIATRIC: No depression, anxiety, mood swings, or difficulty sleeping  HEME/LYMPH: No easy bruising, or bleeding gums  ALLERY AND IMMUNOLOGIC: No hives or eczema	    [ x] All others negative	  [ ] Unable to obtain    PHYSICAL EXAM:  T(C): 36.8 (11-12-21 @ 04:41), Max: 36.8 (11-12-21 @ 04:41)  HR: 98 (11-12-21 @ 04:41) (98 - 110)  BP: 113/77 (11-12-21 @ 04:41) (113/77 - 167/86)  RR: 18 (11-12-21 @ 04:41) (18 - 18)  SpO2: 96% (11-12-21 @ 04:41) (95% - 98%)  Wt(kg): --  I&O's Summary    11 Nov 2021 07:01  -  12 Nov 2021 07:00  --------------------------------------------------------  IN: 440 mL / OUT: 0 mL / NET: 440 mL        Appearance:  	Thin appearing woman sitting in bed, speaking full sentences  HEENT:   Normal oral mucosa, PERRL, EOMI	   Lymphatic: No lymphadenopathy  Cardiovascular:  S1S2 RRR  Respiratory:  	Bibasilar crackles  Psychiatry:  AAO x 3  Gastrointestinal:  Abdominal tenderness  Skin: No rashes, No ecchymoses, No cyanosis	  Neurologic:  no deficits  Extremities:  no edema      LABS:	 	    CBC Full  -  ( 12 Nov 2021 06:04 )  WBC Count : 7.28 K/uL  Hemoglobin : 9.3 g/dL  Hematocrit : 28.1 %  Platelet Count - Automated : 450 K/uL  Mean Cell Volume : 87.8 fl  Mean Cell Hemoglobin : 29.1 pg  Mean Cell Hemoglobin Concentration : 33.1 gm/dL  Auto Neutrophil # : 4.28 K/uL  Auto Lymphocyte # : 2.14 K/uL  Auto Monocyte # : 0.69 K/uL  Auto Eosinophil # : 0.06 K/uL  Auto Basophil # : 0.04 K/uL  Auto Neutrophil % : 58.8 %  Auto Lymphocyte % : 29.4 %  Auto Monocyte % : 9.5 %  Auto Eosinophil % : 0.8 %  Auto Basophil % : 0.5 %    11-12    141  |  107  |  6<L>  ----------------------------<  134<H>  3.1<L>   |  22  |  0.93  11-11    140  |  107  |  7   ----------------------------<  68<L>  3.6   |  23  |  0.96    Ca    7.9<L>      12 Nov 2021 06:04  Ca    8.3<L>      11 Nov 2021 06:10  Phos  2.1     11-12  Phos  2.5     11-11  Mg     1.7     11-12  Mg     1.8     11-11    TPro  5.3<L>  /  Alb  2.0<L>  /  TBili  0.2  /  DBili  x   /  AST  26  /  ALT  18  /  AlkPhos  104  11-12  TPro  5.5<L>  /  Alb  2.3<L>  /  TBili  0.3  /  DBili  x   /  AST  19  /  ALT  13  /  AlkPhos  117  11-11

## 2021-11-12 NOTE — PROGRESS NOTE ADULT - PROBLEM SELECTOR PLAN 5
Not on any outpatient nebulizers or inhalers, has O2 at home but has not used since this summer  - management per above  -off O2  - infectious workup as above Not on any outpatient nebulizers or inhalers, has O2 at home but has not used since this summer  - management per above  - off O2  - infectious workup as above

## 2021-11-12 NOTE — MEDICAL STUDENT PROGRESS NOTE(EDUCATION) - SUBJECTIVE AND OBJECTIVE BOX
PROGRESS NOTE:     Patient is a 65y old  Female who presents with a chief complaint of Acute hypoxic respiratory failure (12 Nov 2021 08:25)      SUBJECTIVE / OVERNIGHT EVENTS:  Patient reporting improvement in abdominal pain, nausea, and shortness of breath. She states that the abdominal pain improving each day, and that the pain medications are more effective. She reports that the Dilaudid works for several hours now and has not needed morphine since yesterday. She states that the Reglan is effective in managing her nausea and that she only feels abdominal pain and nausea when she gets her medications late. She vomited yesterday morning and feels it was because she had not eaten anything. She ate a baked potato last night that her  brought her and tolerated it well. She states that her SOB is much improved and only occurs now when her abdominal pain is severe. She is comfortable on room air. She denies any fever, chills, chest pain, diarrhea, or constipation.     REVIEW OF SYSTEMS:  CONSTITUTIONAL: No fever, weight loss, or fatigue  EYES: No eye pain, visual disturbances, or discharge  ENMT: No difficulty hearing, tinnitus, vertigo; No sinus or throat pain  NECK: No pain or stiffness  BREASTS: No pain, masses, or nipple discharge  RESPIRATORY: No cough, wheezing, chills or hemoptysis; No shortness of breath  CARDIOVASCULAR: No chest pain, palpitations, dizziness, or leg swelling  GASTROINTESTINAL: +Severe abdominal pain and nausea. No vomiting, hematemesis, diarrhea, constipation, melena, or hematochezia.  GENITOURINARY: No dysuria, frequency, hematuria, or incontinence  NEUROLOGICAL: No headaches, memory loss, loss of strength, numbness, or tremors  SKIN: No itching, burning, rashes, or lesions   LYMPH NODES: No enlarged glands  ENDOCRINE: No heat or cold intolerance; No hair loss  MUSCULOSKELETAL: No joint pain or swelling; No muscle, back, or extremity pain  PSYCHIATRIC: No depression, anxiety, mood swings, or difficulty sleeping  HEME/LYMPH: No easy bruising, or bleeding gums  ALLERGY AND IMMUNOLOGIC: No hives or eczema    MEDICATIONS  (STANDING):  albuterol/ipratropium for Nebulization 3 milliLiter(s) Nebulizer every 6 hours  amLODIPine   Tablet 10 milliGRAM(s) Oral daily  apixaban 5 milliGRAM(s) Oral two times a day  budesonide  80 MICROgram(s)/formoterol 4.5 MICROgram(s) Inhaler 2 Puff(s) Inhalation two times a day  dextrose 5% + lactated ringers. 1000 milliLiter(s) (100 mL/Hr) IV Continuous <Continuous>  folic acid 1 milliGRAM(s) Oral daily  influenza   Vaccine 0.5 milliLiter(s) IntraMuscular once  metoprolol succinate  milliGRAM(s) Oral daily  mirtazapine 15 milliGRAM(s) Oral at bedtime  pancrelipase  (CREON 12,000 Lipase Units) 2 Capsule(s) Oral three times a day with meals  pantoprazole    Tablet 40 milliGRAM(s) Oral before breakfast  piperacillin/tazobactam IVPB.. 3.375 Gram(s) IV Intermittent every 8 hours  potassium chloride   Powder 40 milliEquivalent(s) Oral once  predniSONE   Tablet 40 milliGRAM(s) Oral daily  tiotropium 18 MICROgram(s) Capsule 1 Capsule(s) Inhalation daily    MEDICATIONS  (PRN):  acetaminophen     Tablet .. 650 milliGRAM(s) Oral every 6 hours PRN Temp greater or equal to 38C (100.4F), Mild Pain (1 - 3)  HYDROmorphone  Injectable 0.5 milliGRAM(s) IV Push every 4 hours PRN Moderate Pain (4 - 6)  HYDROmorphone  Injectable 1 milliGRAM(s) IV Push every 4 hours PRN Severe Pain (7 - 10)  melatonin 3 milliGRAM(s) Oral at bedtime PRN Insomnia  metoclopramide Injectable 10 milliGRAM(s) IV Push every 6 hours PRN vomiting/nausea  morphine  - Injectable 2 milliGRAM(s) IV Push every 4 hours PRN Severe Pain (7 - 10)  ondansetron Injectable 4 milliGRAM(s) IV Push every 4 hours PRN Nausea and/or Vomiting      I&O's Summary  11 Nov 2021 07:01  -  12 Nov 2021 07:00  --------------------------------------------------------  IN: 440 mL / OUT: 0 mL / NET: 440 mL        PHYSICAL EXAM:  Vital Signs Last 24 Hrs  T(C): 36.8 (12 Nov 2021 04:41), Max: 36.8 (12 Nov 2021 04:41)  T(F): 98.2 (12 Nov 2021 04:41), Max: 98.2 (12 Nov 2021 04:41)  HR: 98 (12 Nov 2021 04:41) (95 - 110)  BP: 113/77 (12 Nov 2021 04:41) (113/77 - 175/90)  BP(mean): --  RR: 18 (12 Nov 2021 04:41) (18 - 18)  SpO2: 96% (12 Nov 2021 04:41) (95% - 99%)    GENERAL: +Intermittent distress due to epigastric pain. Otherwise resting comfortably between episodes.  HEAD:  Atraumatic, Normocephalic  EYES: EOMI, PERRLA, conjunctiva and sclera clear  ENMT: No tonsillar erythema, exudates, or enlargement; Moist mucous membranes, Good dentition, No lesions  NECK: Supple, No JVD, Normal thyroid  NERVOUS SYSTEM:  Alert & Oriented X3, Good concentration; Motor Strength 5/5 B/L upper and lower extremities; DTRs 2+ intact and symmetric  CHEST/LUNG: (+) inspiratory and expiratory rhonchi but improved   HEART: Regular rate with tachycardia; No murmurs, rubs, or gallops  ABDOMEN: +TTP in epigastric area and LUQ. Soft, Nondistended; Bowel sounds present.  EXTREMITIES:  2+ Peripheral Pulses, No clubbing, cyanosis, or edema  LYMPH: No lymphadenopathy noted  SKIN: No rashes or lesions    LABS:                        9.3    7.28  )-----------( 450      ( 12 Nov 2021 06:04 )             28.1     11-12    141  |  107  |  6<L>  ----------------------------<  134<H>  3.1<L>   |  22  |  0.93    Ca    7.9<L>      12 Nov 2021 06:04  Phos  2.1     11-12  Mg     1.7     11-12    TPro  5.3<L>  /  Alb  2.0<L>  /  TBili  0.2  /  DBili  x   /  AST  26  /  ALT  18  /  AlkPhos  104  11-12      Culture - Blood (collected 09 Nov 2021 21:52)  Source: .Blood Blood-Peripheral  Preliminary Report (10 Nov 2021 22:02):    No growth to date.    Culture - Blood (collected 09 Nov 2021 21:52)  Source: .Blood Blood-Peripheral  Preliminary Report (10 Nov 2021 22:02):    No growth to date.        RADIOLOGY & ADDITIONAL TESTS:  Results Reviewed:   Imaging Personally Reviewed: TTE, CTH, and MR Brain  Electrocardiogram Personally Reviewed: PROGRESS NOTE:     Patient is a 65y old  Female who presents with a chief complaint of Acute hypoxic respiratory failure (12 Nov 2021 08:25)      SUBJECTIVE / OVERNIGHT EVENTS:  Patient reporting improvement in abdominal pain, nausea, and shortness of breath. She states that the abdominal pain is improving each day, and that the pain medications are more effective. She reports that the Dilaudid works for several hours now and has not needed morphine since yesterday. She states that the Reglan is effective in managing her nausea and that she only feels abdominal pain and nausea when she gets her medications late. She vomited yesterday morning and feels it was because she had not eaten anything. She ate a baked potato last night that her  brought her and tolerated it well and would like a regular diet. She states that her SOB is much improved and only occurs now when her abdominal pain is severe. She is comfortable on room air. She denies any fever, chills, chest pain, diarrhea, or constipation.     REVIEW OF SYSTEMS:  CONSTITUTIONAL: No fever, weight loss, or fatigue  EYES: No eye pain, visual disturbances, or discharge  ENMT: No difficulty hearing, tinnitus, vertigo; No sinus or throat pain  NECK: No pain or stiffness  BREASTS: No pain, masses, or nipple discharge  RESPIRATORY: No cough, wheezing, chills or hemoptysis; No shortness of breath  CARDIOVASCULAR: No chest pain, palpitations, dizziness, or leg swelling  GASTROINTESTINAL: +Severe abdominal pain and nausea. No vomiting, hematemesis, diarrhea, constipation, melena, or hematochezia.  GENITOURINARY: No dysuria, frequency, hematuria, or incontinence  NEUROLOGICAL: No headaches, memory loss, loss of strength, numbness, or tremors  SKIN: No itching, burning, rashes, or lesions   LYMPH NODES: No enlarged glands  ENDOCRINE: No heat or cold intolerance; No hair loss  MUSCULOSKELETAL: No joint pain or swelling; No muscle, back, or extremity pain  PSYCHIATRIC: No depression, anxiety, mood swings, or difficulty sleeping  HEME/LYMPH: No easy bruising, or bleeding gums  ALLERGY AND IMMUNOLOGIC: No hives or eczema    MEDICATIONS  (STANDING):  albuterol/ipratropium for Nebulization 3 milliLiter(s) Nebulizer every 6 hours  amLODIPine   Tablet 10 milliGRAM(s) Oral daily  apixaban 5 milliGRAM(s) Oral two times a day  budesonide  80 MICROgram(s)/formoterol 4.5 MICROgram(s) Inhaler 2 Puff(s) Inhalation two times a day  dextrose 5% + lactated ringers. 1000 milliLiter(s) (100 mL/Hr) IV Continuous <Continuous>  folic acid 1 milliGRAM(s) Oral daily  influenza   Vaccine 0.5 milliLiter(s) IntraMuscular once  metoprolol succinate  milliGRAM(s) Oral daily  mirtazapine 15 milliGRAM(s) Oral at bedtime  pancrelipase  (CREON 12,000 Lipase Units) 2 Capsule(s) Oral three times a day with meals  pantoprazole    Tablet 40 milliGRAM(s) Oral before breakfast  piperacillin/tazobactam IVPB.. 3.375 Gram(s) IV Intermittent every 8 hours  potassium chloride   Powder 40 milliEquivalent(s) Oral once  predniSONE   Tablet 40 milliGRAM(s) Oral daily  tiotropium 18 MICROgram(s) Capsule 1 Capsule(s) Inhalation daily    MEDICATIONS  (PRN):  acetaminophen     Tablet .. 650 milliGRAM(s) Oral every 6 hours PRN Temp greater or equal to 38C (100.4F), Mild Pain (1 - 3)  HYDROmorphone  Injectable 0.5 milliGRAM(s) IV Push every 4 hours PRN Moderate Pain (4 - 6)  HYDROmorphone  Injectable 1 milliGRAM(s) IV Push every 4 hours PRN Severe Pain (7 - 10)  melatonin 3 milliGRAM(s) Oral at bedtime PRN Insomnia  metoclopramide Injectable 10 milliGRAM(s) IV Push every 6 hours PRN vomiting/nausea  morphine  - Injectable 2 milliGRAM(s) IV Push every 4 hours PRN Severe Pain (7 - 10)  ondansetron Injectable 4 milliGRAM(s) IV Push every 4 hours PRN Nausea and/or Vomiting      I&O's Summary  11 Nov 2021 07:01  -  12 Nov 2021 07:00  --------------------------------------------------------  IN: 440 mL / OUT: 0 mL / NET: 440 mL        PHYSICAL EXAM:  Vital Signs Last 24 Hrs  T(C): 36.8 (12 Nov 2021 04:41), Max: 36.8 (12 Nov 2021 04:41)  T(F): 98.2 (12 Nov 2021 04:41), Max: 98.2 (12 Nov 2021 04:41)  HR: 98 (12 Nov 2021 04:41) (95 - 110)  BP: 113/77 (12 Nov 2021 04:41) (113/77 - 175/90)  BP(mean): --  RR: 18 (12 Nov 2021 04:41) (18 - 18)  SpO2: 96% (12 Nov 2021 04:41) (95% - 99%)    GENERAL: +Intermittent distress due to epigastric pain. Otherwise resting comfortably between episodes.  HEAD:  Atraumatic, Normocephalic  EYES: EOMI, PERRLA, conjunctiva and sclera clear  ENMT: No tonsillar erythema, exudates, or enlargement; Moist mucous membranes, Good dentition, No lesions  NECK: Supple, No JVD, Normal thyroid  NERVOUS SYSTEM:  Alert & Oriented X3, Good concentration; Motor Strength 5/5 B/L upper and lower extremities; DTRs 2+ intact and symmetric  CHEST/LUNG: (+) inspiratory and expiratory rhonchi but improved   HEART: Regular rate with tachycardia; No murmurs, rubs, or gallops  ABDOMEN: +TTP in epigastric area and LUQ. Soft, Nondistended; Bowel sounds present.  EXTREMITIES:  2+ Peripheral Pulses, No clubbing, cyanosis, or edema  LYMPH: No lymphadenopathy noted  SKIN: No rashes or lesions    LABS:                        9.3    7.28  )-----------( 450      ( 12 Nov 2021 06:04 )             28.1     11-12    141  |  107  |  6<L>  ----------------------------<  134<H>  3.1<L>   |  22  |  0.93    Ca    7.9<L>      12 Nov 2021 06:04  Phos  2.1     11-12  Mg     1.7     11-12    TPro  5.3<L>  /  Alb  2.0<L>  /  TBili  0.2  /  DBili  x   /  AST  26  /  ALT  18  /  AlkPhos  104  11-12      Culture - Blood (collected 09 Nov 2021 21:52)  Source: .Blood Blood-Peripheral  Preliminary Report (10 Nov 2021 22:02):    No growth to date.    Culture - Blood (collected 09 Nov 2021 21:52)  Source: .Blood Blood-Peripheral  Preliminary Report (10 Nov 2021 22:02):    No growth to date.        RADIOLOGY & ADDITIONAL TESTS:  Results Reviewed:   Imaging Personally Reviewed: TTE, CTH, and MR Brain  Electrocardiogram Personally Reviewed:

## 2021-11-12 NOTE — PROGRESS NOTE ADULT - PROBLEM SELECTOR PLAN 8
Dispo: pending PT eval  Diet: soft diet-->advance as tolerated  DVT ppx: eliquis Dispo: home pending medical management  Diet: regular diet  DVT ppx: eliquis

## 2021-11-12 NOTE — PROGRESS NOTE ADULT - PROBLEM SELECTOR PLAN 2
History of chronic pancreatitis s/p Jeffery procedure 2019, follows with Dr. Molina. EKG (11/10) QTc 452 sinus tachycardia  - s/p 1L LR in ED, started on D5 LR @100 cc/hr  - c/w pancrelipase  - zofran 4mg Q4h PRN for nausea, and reglan 10 mg Q6h PRN for breakthrough nausea/vomiting  - soft diet-->advance diet as tolerated  - for pain: dilaudid 0.5 mg Q4h for moderate and dilaudid 1 mg Q4h for severe, morphine 2 mg Q6h PRN for breakthrough pain  - holding home oxycodone prn  - Dr. Molina consulted: apprec recs  - GI consulted: will reach out to Dr. Damian History of chronic pancreatitis s/p Jeffery procedure 2019, follows with Dr. Molina. EKG (11/10) QTc 452 sinus tachycardia  - s/p 1L LR in ED, started on D5 LR @100 cc/hr, now s/p IVF  - c/w pancrelipase  - zofran 4mg Q4h PRN for nausea, and reglan 10 mg Q6h PRN for breakthrough nausea/vomiting  - progressed to regular diet  - for pain: dilaudid 0.5 mg Q4h for moderate and dilaudid 1 mg Q4h for severe, morphine 2 mg Q6h PRN for breakthrough pain  - holding home oxycodone prn  - House surgery consulted, as Dr. Molina unavailable  - Dr. Damian consulted

## 2021-11-13 LAB
ALBUMIN SERPL ELPH-MCNC: 2.2 G/DL — LOW (ref 3.3–5)
ALP SERPL-CCNC: 107 U/L — SIGNIFICANT CHANGE UP (ref 40–120)
ALT FLD-CCNC: 24 U/L — SIGNIFICANT CHANGE UP (ref 10–45)
ANION GAP SERPL CALC-SCNC: 11 MMOL/L — SIGNIFICANT CHANGE UP (ref 5–17)
AST SERPL-CCNC: 25 U/L — SIGNIFICANT CHANGE UP (ref 10–40)
BASOPHILS # BLD AUTO: 0.04 K/UL — SIGNIFICANT CHANGE UP (ref 0–0.2)
BASOPHILS NFR BLD AUTO: 0.4 % — SIGNIFICANT CHANGE UP (ref 0–2)
BILIRUB SERPL-MCNC: 0.2 MG/DL — SIGNIFICANT CHANGE UP (ref 0.2–1.2)
BUN SERPL-MCNC: 6 MG/DL — LOW (ref 7–23)
CALCIUM SERPL-MCNC: 8.1 MG/DL — LOW (ref 8.4–10.5)
CHLORIDE SERPL-SCNC: 111 MMOL/L — HIGH (ref 96–108)
CO2 SERPL-SCNC: 21 MMOL/L — LOW (ref 22–31)
CREAT SERPL-MCNC: 0.94 MG/DL — SIGNIFICANT CHANGE UP (ref 0.5–1.3)
EOSINOPHIL # BLD AUTO: 0.06 K/UL — SIGNIFICANT CHANGE UP (ref 0–0.5)
EOSINOPHIL NFR BLD AUTO: 0.6 % — SIGNIFICANT CHANGE UP (ref 0–6)
GLUCOSE SERPL-MCNC: 88 MG/DL — SIGNIFICANT CHANGE UP (ref 70–99)
HCT VFR BLD CALC: 28.5 % — LOW (ref 34.5–45)
HGB BLD-MCNC: 9.1 G/DL — LOW (ref 11.5–15.5)
IMM GRANULOCYTES NFR BLD AUTO: 1.1 % — SIGNIFICANT CHANGE UP (ref 0–1.5)
LYMPHOCYTES # BLD AUTO: 2.86 K/UL — SIGNIFICANT CHANGE UP (ref 1–3.3)
LYMPHOCYTES # BLD AUTO: 26.6 % — SIGNIFICANT CHANGE UP (ref 13–44)
MAGNESIUM SERPL-MCNC: 1.9 MG/DL — SIGNIFICANT CHANGE UP (ref 1.6–2.6)
MCHC RBC-ENTMCNC: 28.6 PG — SIGNIFICANT CHANGE UP (ref 27–34)
MCHC RBC-ENTMCNC: 31.9 GM/DL — LOW (ref 32–36)
MCV RBC AUTO: 89.6 FL — SIGNIFICANT CHANGE UP (ref 80–100)
MONOCYTES # BLD AUTO: 0.89 K/UL — SIGNIFICANT CHANGE UP (ref 0–0.9)
MONOCYTES NFR BLD AUTO: 8.3 % — SIGNIFICANT CHANGE UP (ref 2–14)
NEUTROPHILS # BLD AUTO: 6.79 K/UL — SIGNIFICANT CHANGE UP (ref 1.8–7.4)
NEUTROPHILS NFR BLD AUTO: 63 % — SIGNIFICANT CHANGE UP (ref 43–77)
NRBC # BLD: 0 /100 WBCS — SIGNIFICANT CHANGE UP (ref 0–0)
PHOSPHATE SERPL-MCNC: 2.1 MG/DL — LOW (ref 2.5–4.5)
PLATELET # BLD AUTO: 423 K/UL — HIGH (ref 150–400)
POTASSIUM SERPL-MCNC: 4.1 MMOL/L — SIGNIFICANT CHANGE UP (ref 3.5–5.3)
POTASSIUM SERPL-SCNC: 4.1 MMOL/L — SIGNIFICANT CHANGE UP (ref 3.5–5.3)
PROT SERPL-MCNC: 5.4 G/DL — LOW (ref 6–8.3)
RBC # BLD: 3.18 M/UL — LOW (ref 3.8–5.2)
RBC # FLD: 18.1 % — HIGH (ref 10.3–14.5)
SODIUM SERPL-SCNC: 143 MMOL/L — SIGNIFICANT CHANGE UP (ref 135–145)
WBC # BLD: 10.76 K/UL — HIGH (ref 3.8–10.5)
WBC # FLD AUTO: 10.76 K/UL — HIGH (ref 3.8–10.5)

## 2021-11-13 PROCEDURE — 71045 X-RAY EXAM CHEST 1 VIEW: CPT | Mod: 26

## 2021-11-13 PROCEDURE — 99233 SBSQ HOSP IP/OBS HIGH 50: CPT | Mod: GC

## 2021-11-13 RX ADMIN — BUDESONIDE AND FORMOTEROL FUMARATE DIHYDRATE 2 PUFF(S): 160; 4.5 AEROSOL RESPIRATORY (INHALATION) at 05:42

## 2021-11-13 RX ADMIN — Medication 3 MILLILITER(S): at 17:19

## 2021-11-13 RX ADMIN — PIPERACILLIN AND TAZOBACTAM 25 GRAM(S): 4; .5 INJECTION, POWDER, LYOPHILIZED, FOR SOLUTION INTRAVENOUS at 17:19

## 2021-11-13 RX ADMIN — HYDROMORPHONE HYDROCHLORIDE 6 MILLIGRAM(S): 2 INJECTION INTRAMUSCULAR; INTRAVENOUS; SUBCUTANEOUS at 17:20

## 2021-11-13 RX ADMIN — PIPERACILLIN AND TAZOBACTAM 25 GRAM(S): 4; .5 INJECTION, POWDER, LYOPHILIZED, FOR SOLUTION INTRAVENOUS at 07:55

## 2021-11-13 RX ADMIN — Medication 10 MILLIGRAM(S): at 05:40

## 2021-11-13 RX ADMIN — Medication 10 MILLIGRAM(S): at 21:14

## 2021-11-13 RX ADMIN — Medication 3 MILLILITER(S): at 05:40

## 2021-11-13 RX ADMIN — Medication 3 MILLILITER(S): at 23:31

## 2021-11-13 RX ADMIN — HYDROMORPHONE HYDROCHLORIDE 6 MILLIGRAM(S): 2 INJECTION INTRAMUSCULAR; INTRAVENOUS; SUBCUTANEOUS at 09:48

## 2021-11-13 RX ADMIN — AMLODIPINE BESYLATE 10 MILLIGRAM(S): 2.5 TABLET ORAL at 05:40

## 2021-11-13 RX ADMIN — PANTOPRAZOLE SODIUM 40 MILLIGRAM(S): 20 TABLET, DELAYED RELEASE ORAL at 06:10

## 2021-11-13 RX ADMIN — Medication 3 MILLIGRAM(S): at 21:14

## 2021-11-13 RX ADMIN — HYDROMORPHONE HYDROCHLORIDE 4 MILLIGRAM(S): 2 INJECTION INTRAMUSCULAR; INTRAVENOUS; SUBCUTANEOUS at 12:18

## 2021-11-13 RX ADMIN — Medication 40 MILLIGRAM(S): at 05:40

## 2021-11-13 RX ADMIN — BUDESONIDE AND FORMOTEROL FUMARATE DIHYDRATE 2 PUFF(S): 160; 4.5 AEROSOL RESPIRATORY (INHALATION) at 17:21

## 2021-11-13 RX ADMIN — Medication 2 CAPSULE(S): at 17:20

## 2021-11-13 RX ADMIN — HYDROMORPHONE HYDROCHLORIDE 6 MILLIGRAM(S): 2 INJECTION INTRAMUSCULAR; INTRAVENOUS; SUBCUTANEOUS at 10:27

## 2021-11-13 RX ADMIN — Medication 3 MILLILITER(S): at 12:20

## 2021-11-13 RX ADMIN — Medication 2 CAPSULE(S): at 09:48

## 2021-11-13 RX ADMIN — Medication 1 MILLIGRAM(S): at 12:18

## 2021-11-13 RX ADMIN — PIPERACILLIN AND TAZOBACTAM 25 GRAM(S): 4; .5 INJECTION, POWDER, LYOPHILIZED, FOR SOLUTION INTRAVENOUS at 23:31

## 2021-11-13 RX ADMIN — APIXABAN 5 MILLIGRAM(S): 2.5 TABLET, FILM COATED ORAL at 05:40

## 2021-11-13 RX ADMIN — APIXABAN 5 MILLIGRAM(S): 2.5 TABLET, FILM COATED ORAL at 17:20

## 2021-11-13 RX ADMIN — HYDROMORPHONE HYDROCHLORIDE 6 MILLIGRAM(S): 2 INJECTION INTRAMUSCULAR; INTRAVENOUS; SUBCUTANEOUS at 21:13

## 2021-11-13 RX ADMIN — Medication 2 CAPSULE(S): at 12:37

## 2021-11-13 RX ADMIN — MIRTAZAPINE 15 MILLIGRAM(S): 45 TABLET, ORALLY DISINTEGRATING ORAL at 21:14

## 2021-11-13 RX ADMIN — Medication 100 MILLIGRAM(S): at 05:41

## 2021-11-13 RX ADMIN — TIOTROPIUM BROMIDE 1 CAPSULE(S): 18 CAPSULE ORAL; RESPIRATORY (INHALATION) at 12:18

## 2021-11-13 RX ADMIN — HYDROMORPHONE HYDROCHLORIDE 6 MILLIGRAM(S): 2 INJECTION INTRAMUSCULAR; INTRAVENOUS; SUBCUTANEOUS at 05:44

## 2021-11-13 NOTE — PROGRESS NOTE ADULT - SUBJECTIVE AND OBJECTIVE BOX
[facility]  Erick Keenan PA-C  [facility] [place] [address]  [phone]      INTERVAL HPI/OVERNIGHT EVENTS:  pt seen and examined, lying comfortably in bed  has some abdominal pain but improving and well controlled with meds  tolerating regular diet, no N/V    MEDICATIONS  (STANDING):  albuterol/ipratropium for Nebulization 3 milliLiter(s) Nebulizer every 6 hours  amLODIPine   Tablet 10 milliGRAM(s) Oral daily  apixaban 5 milliGRAM(s) Oral two times a day  budesonide  80 MICROgram(s)/formoterol 4.5 MICROgram(s) Inhaler 2 Puff(s) Inhalation two times a day  folic acid 1 milliGRAM(s) Oral daily  influenza   Vaccine 0.5 milliLiter(s) IntraMuscular once  metoprolol succinate  milliGRAM(s) Oral daily  mirtazapine 15 milliGRAM(s) Oral at bedtime  pancrelipase  (CREON 12,000 Lipase Units) 2 Capsule(s) Oral three times a day with meals  pantoprazole    Tablet 40 milliGRAM(s) Oral before breakfast  piperacillin/tazobactam IVPB.. 3.375 Gram(s) IV Intermittent every 8 hours  predniSONE   Tablet 40 milliGRAM(s) Oral daily  tiotropium 18 MICROgram(s) Capsule 1 Capsule(s) Inhalation daily    MEDICATIONS  (PRN):  acetaminophen     Tablet .. 650 milliGRAM(s) Oral every 6 hours PRN Temp greater or equal to 38C (100.4F), Mild Pain (1 - 3)  HYDROmorphone   Tablet 4 milliGRAM(s) Oral every 4 hours PRN Moderate Pain (4 - 6)  HYDROmorphone   Tablet 6 milliGRAM(s) Oral every 4 hours PRN Severe Pain (7 - 10)  melatonin 3 milliGRAM(s) Oral at bedtime PRN Insomnia  metoclopramide Injectable 10 milliGRAM(s) IV Push every 6 hours PRN vomiting/nausea  morphine  - Injectable 2 milliGRAM(s) IV Push every 4 hours PRN Severe Pain (7 - 10)  ondansetron Injectable 4 milliGRAM(s) IV Push every 4 hours PRN Nausea and/or Vomiting      Allergies    diazepam (Other)  lemon (Other)    Intolerances        ROS:   General:  No wt loss, fevers, chills, night sweats, fatigue,   Eyes:  Good vision, no reported pain  ENT:  No sore throat, pain, runny nose, dysphagia  CV:  No pain, palpitations, hypo/hypertension  Resp:  No dyspnea, cough, tachypnea, wheezing  GI:  + pain, No nausea, No vomiting, No diarrhea, No constipation, No weight loss, No fever, No pruritis, No rectal bleeding, No tarry stools, No dysphagia,  :  No pain, bleeding, incontinence, nocturia  Muscle:  No pain, weakness  Neuro:  No weakness, tingling, memory problems  Psych:  No fatigue, insomnia, mood problems, depression  Endocrine:  No polyuria, polydipsia, cold/heat intolerance  Heme:  No petechiae, ecchymosis, easy bruisability  Skin:  No rash, tattoos, scars, edema      PHYSICAL EXAM:   Vital Signs:  Vital Signs Last 24 Hrs  T(C): 36.8 (13 Nov 2021 09:45), Max: 37.1 (13 Nov 2021 04:40)  T(F): 98.2 (13 Nov 2021 09:45), Max: 98.8 (13 Nov 2021 04:40)  HR: 98 (13 Nov 2021 09:45) (62 - 100)  BP: 113/74 (13 Nov 2021 09:45) (107/68 - 113/74)  BP(mean): --  RR: 18 (13 Nov 2021 09:45) (18 - 18)  SpO2: 98% (13 Nov 2021 09:45) (96% - 98%)  Daily     Daily     GENERAL:  Appears stated age,   HEENT:  NC/AT,    CHEST:  Full & symmetric excursion,   HEART:  Regular rhythm,  ABDOMEN:  Soft, non-tender, non-distended,  EXTEREMITIES:  no cyanosis  SKIN:  No rash  NEURO:  Alert,       LABS:                        9.1    10.76 )-----------( 423      ( 13 Nov 2021 06:22 )             28.5     11-13    143  |  111<H>  |  6<L>  ----------------------------<  88  4.1   |  21<L>  |  0.94    Ca    8.1<L>      13 Nov 2021 06:22  Phos  2.1     11-13  Mg     1.9     11-13    TPro  5.4<L>  /  Alb  2.2<L>  /  TBili  0.2  /  DBili  x   /  AST  25  /  ALT  24  /  AlkPhos  107  11-13          RADIOLOGY & ADDITIONAL TESTS:

## 2021-11-13 NOTE — PROGRESS NOTE ADULT - ASSESSMENT
Patient is a 65 year old female with a history of HTN, COPD, NSCLC (adenocarcinoma left lung) Stage IIIB (dx 2/2020 s/p chemo followed by immunotherapy, currently on Keytruda maintenance therapy), pulmonary embolus on apixaban, chronic pancreatitis (s/p Jeffery procedure 2019), hx of ARDS (2015), GERD, chronic pain (on opioids), and s/p vocal cord polpectomy who presents with tachypnea and SOB that started last Friday, requiring 2L NC and epigastric pain with vomiting that started 4 days ago c/f COPD exacerbation vs right-sided HF vs acute pancreatitis vs infectious process.     Plan:  - No acute surgical intervention at this time  - Patient symptoms consistent with acute exacerbation, seem to have resolved  - Page with questions or concerns, reconsult prn    [id number]

## 2021-11-13 NOTE — PROGRESS NOTE ADULT - PROBLEM SELECTOR PLAN 2
History of chronic pancreatitis s/p Jeffery procedure 2019, follows with Dr. Molina. EKG (11/10) QTc 452 sinus tachycardia  - s/p 1L LR in ED, started on D5 LR @100 cc/hr, now s/p IVF  - c/w pancrelipase  - zofran 4mg Q4h PRN for nausea, and reglan 10 mg Q6h PRN for breakthrough nausea/vomiting  - progressed to regular diet  - for pain: dilaudid 0.5 mg Q4h for moderate and dilaudid 1 mg Q4h for severe, morphine 2 mg Q6h PRN for breakthrough pain  - holding home oxycodone prn  - House surgery consulted, as Dr. Molina unavailable  - Dr. Damian consulted History of chronic pancreatitis s/p Jeffery procedure 2019, follows with Dr. Molina. EKG (11/10) QTc 452 sinus tachycardia  - s/p 1L LR in ED, started on D5 LR @100 cc/hr, now s/p IVF  - c/w pancrelipase  - zofran 4mg Q4h PRN for nausea, and reglan 10 mg Q6h PRN for breakthrough nausea/vomiting  - progressed to regular diet  - for pain: dilaudid 0.5 mg Q4h for moderate and dilaudid 1 mg Q4h for severe  - holding home oxycodone prn  - House surgery consulted, as Dr. Molina unavailable  - Dr. Damian consulted and GI following: apprec recs

## 2021-11-13 NOTE — PROGRESS NOTE ADULT - ASSESSMENT
Patient is a 65 year old female with a history of HTN, COPD, NSCLC (adenocarcinoma left lung) Stage IIIB (dx 2/2020 s/p chemo followed by immunotherapy, currently on Keytruda maintenance therapy), pulmonary embolus on apixaban, chronic pancreatitis (s/p Jeffery procedure 2019), hx of ARDS (2015), GERD, chronic pain (on opioids), and s/p vocal cord polpectomy who presents with tachypnea and SOB that started last Friday, requiring 2L NC and epigastric pain with vomiting that started 4 days ago c/f COPD exacerbation vs right-sided HF vs acute pancreatitis vs infectious process.  Patient is a 65 year old female with a history of HTN, COPD, NSCLC (adenocarcinoma left lung) Stage IIIB (dx 2/2020 s/p chemo followed by immunotherapy, currently on Keytruda maintenance therapy), pulmonary embolus on apixaban, chronic pancreatitis (s/p Jeffery procedure 2019), hx of ARDS (2015), GERD, chronic pain (on opioids), and s/p vocal cord polpectomy who presents with tachypnea and SOB that started last Friday, requiring 2L NC and epigastric pain with vomiting that started 4 days before admission c/f COPD exacerbation vs acute pancreatitis vs infectious process.

## 2021-11-13 NOTE — PROGRESS NOTE ADULT - SUBJECTIVE AND OBJECTIVE BOX
Red Team Progress Note  [id number]    Subjective:   Patient seen at bedside this AM. Tolerating breakfast, had N/V day before. Has abdominal pain, improving.    Objective:  Vital Signs  T(C): 36.8 (11-13 @ 09:45), Max: 37.1 (11-13 @ 04:40)  HR: 98 (11-13 @ 09:45) (62 - 100)  BP: 113/74 (11-13 @ 09:45) (107/68 - 113/74)  RR: 18 (11-13 @ 09:45) (18 - 18)  SpO2: 98% (11-13 @ 09:45) (96% - 98%)      I&O's Detail    11-12-21 @ 07:01  -  11-13-21 @ 07:00  --------------------------------------------------------  IN: 720 mL / OUT: 0 mL / NET: 720 mL        Physical Exam:  GEN: resting in bed comfortably in NAD  RESP: no increased WOB  ABD: soft, non-distended, mild epigastric tenderness  EXTR: warm, well-perfused    Labs:                        9.1    10.76 )-----------( 423      ( 13 Nov 2021 06:22 )             28.5   11-13    143  |  111<H>  |  6<L>  ----------------------------<  88  4.1   |  21<L>  |  0.94    Ca    8.1<L>      13 Nov 2021 06:22  Phos  2.1     11-13  Mg     1.9     11-13    TPro  5.4<L>  /  Alb  2.2<L>  /  TBili  0.2  /  DBili  x   /  AST  25  /  ALT  24  /  AlkPhos  107  11-13    CAPILLARY BLOOD GLUCOSE          Medications:   MEDICATIONS  (STANDING):  albuterol/ipratropium for Nebulization 3 milliLiter(s) Nebulizer every 6 hours  amLODIPine   Tablet 10 milliGRAM(s) Oral daily  apixaban 5 milliGRAM(s) Oral two times a day  budesonide  80 MICROgram(s)/formoterol 4.5 MICROgram(s) Inhaler 2 Puff(s) Inhalation two times a day  folic acid 1 milliGRAM(s) Oral daily  influenza   Vaccine 0.5 milliLiter(s) IntraMuscular once  metoprolol succinate  milliGRAM(s) Oral daily  mirtazapine 15 milliGRAM(s) Oral at bedtime  pancrelipase  (CREON 12,000 Lipase Units) 2 Capsule(s) Oral three times a day with meals  pantoprazole    Tablet 40 milliGRAM(s) Oral before breakfast  piperacillin/tazobactam IVPB.. 3.375 Gram(s) IV Intermittent every 8 hours  predniSONE   Tablet 40 milliGRAM(s) Oral daily  tiotropium 18 MICROgram(s) Capsule 1 Capsule(s) Inhalation daily    MEDICATIONS  (PRN):  acetaminophen     Tablet .. 650 milliGRAM(s) Oral every 6 hours PRN Temp greater or equal to 38C (100.4F), Mild Pain (1 - 3)  HYDROmorphone   Tablet 4 milliGRAM(s) Oral every 4 hours PRN Moderate Pain (4 - 6)  HYDROmorphone   Tablet 6 milliGRAM(s) Oral every 4 hours PRN Severe Pain (7 - 10)  melatonin 3 milliGRAM(s) Oral at bedtime PRN Insomnia  metoclopramide Injectable 10 milliGRAM(s) IV Push every 6 hours PRN vomiting/nausea  morphine  - Injectable 2 milliGRAM(s) IV Push every 4 hours PRN Severe Pain (7 - 10)  ondansetron Injectable 4 milliGRAM(s) IV Push every 4 hours PRN Nausea and/or Vomiting      Imaging:

## 2021-11-13 NOTE — PROGRESS NOTE ADULT - PROBLEM SELECTOR PLAN 1
Admitted for tachypnea and hypoxia, requiring 2L NC, now off O2. Etiology unclear but likely multifactorial. DDx includes COPD exacerbation, infectious process, right-sided HF, NSCLC progression. Colitis on CT a/p but no diarrhea.   - Bedside echo ruled out pericardial effusion (11/9)  - CTA w/ evidence of chronic thromboembolic disease not no acute PE (11/9)  - Trend CBC and fever curve  - BCx NGTD, f/u sputum cx, UCx, RVP  - Weaned from NC today  - c/w duonebs, started prednisone 40 mg PO qd  - c/w Symbicort, Spiriva  - c/w zosyn, procal 1.4  - Lactate downtrended today  - TTE (11/11/21) EF 71%, calcified AV  - Pulm consulted: apprec recs  - Dr. Leahy (cardiology) consulted: apprec recs Admitted for tachypnea and hypoxia, requiring 2L NC, now off O2. Etiology unclear but likely multifactorial. DDx includes COPD exacerbation, infectious process, right-sided HF, NSCLC progression. Colitis on CT a/p but no diarrhea.   - Bedside echo ruled out pericardial effusion (11/9)  - CTA w/ evidence of chronic thromboembolic disease not no acute PE (11/9)  - Trend CBC and fever curve  - BCx NGTD, f/u sputum cx, UCx, RVP  - c/w duonebs, prednisone 40 mg PO qd x 5 days (11/10 - 11/15)  - c/w Symbicort, Spiriva  - c/w zosyn, procal 1.4  - Lactate downtrended   - TTE (11/11/21) EF 71%, calcified AV  - Pulm consulted: apprec recs  - Dr. Leahy (cardiology) consulted: apprec recs

## 2021-11-13 NOTE — PROGRESS NOTE ADULT - ASSESSMENT
chronic pancreatitis   hypoxia   GERD    s/p Jeffery procedure 2019  CT a/p showing colitis but asymptomatic  symptoms improving    IVF  zofran and pain meds PRN  reg diet as tolerated  no objection to anticoagulation  cont creon 24,000 units tid  surgery following   PPI daily   will follow  dw patient      Advanced care planning was discussed with patient and family.  Advanced care planning forms were reviewed and discussed.  Risks, benefits and alternatives of gastroenterologic procedures were discussed in detail and all questions were answered.    30 minutes spent.

## 2021-11-13 NOTE — PROGRESS NOTE ADULT - SUBJECTIVE AND OBJECTIVE BOX
***************************************************************  Gauri Ashley, PGY2  Internal Medicine   pager: NS: [phone] [facility]: [phone]  ***************************************************************    PROGRESS NOTE:     Patient is a 65y old  Female who presents with a chief complaint of Acute hypoxic respiratory failure (12 Nov 2021 19:31)      SUBJECTIVE / OVERNIGHT EVENTS:      MEDICATIONS  (STANDING):  albuterol/ipratropium for Nebulization 3 milliLiter(s) Nebulizer every 6 hours  amLODIPine   Tablet 10 milliGRAM(s) Oral daily  apixaban 5 milliGRAM(s) Oral two times a day  budesonide  80 MICROgram(s)/formoterol 4.5 MICROgram(s) Inhaler 2 Puff(s) Inhalation two times a day  folic acid 1 milliGRAM(s) Oral daily  influenza   Vaccine 0.5 milliLiter(s) IntraMuscular once  metoprolol succinate  milliGRAM(s) Oral daily  mirtazapine 15 milliGRAM(s) Oral at bedtime  pancrelipase  (CREON 12,000 Lipase Units) 2 Capsule(s) Oral three times a day with meals  pantoprazole    Tablet 40 milliGRAM(s) Oral before breakfast  piperacillin/tazobactam IVPB.. 3.375 Gram(s) IV Intermittent every 8 hours  predniSONE   Tablet 40 milliGRAM(s) Oral daily  tiotropium 18 MICROgram(s) Capsule 1 Capsule(s) Inhalation daily    MEDICATIONS  (PRN):  acetaminophen     Tablet .. 650 milliGRAM(s) Oral every 6 hours PRN Temp greater or equal to 38C (100.4F), Mild Pain (1 - 3)  HYDROmorphone   Tablet 4 milliGRAM(s) Oral every 4 hours PRN Moderate Pain (4 - 6)  HYDROmorphone   Tablet 6 milliGRAM(s) Oral every 4 hours PRN Severe Pain (7 - 10)  melatonin 3 milliGRAM(s) Oral at bedtime PRN Insomnia  metoclopramide Injectable 10 milliGRAM(s) IV Push every 6 hours PRN vomiting/nausea  morphine  - Injectable 2 milliGRAM(s) IV Push every 4 hours PRN Severe Pain (7 - 10)  ondansetron Injectable 4 milliGRAM(s) IV Push every 4 hours PRN Nausea and/or Vomiting      CAPILLARY BLOOD GLUCOSE        I&O's Summary    11 Nov 2021 07:01  -  12 Nov 2021 07:00  --------------------------------------------------------  IN: 440 mL / OUT: 0 mL / NET: 440 mL    12 Nov 2021 07:01  -  13 Nov 2021 05:36  --------------------------------------------------------  IN: 720 mL / OUT: 0 mL / NET: 720 mL        PHYSICAL EXAM:  Vital Signs Last 24 Hrs  T(C): 37.1 (13 Nov 2021 04:40), Max: 37.1 (13 Nov 2021 04:40)  T(F): 98.8 (13 Nov 2021 04:40), Max: 98.8 (13 Nov 2021 04:40)  HR: 100 (13 Nov 2021 04:40) (62 - 100)  BP: 107/68 (13 Nov 2021 04:40) (107/68 - 111/73)  BP(mean): --  RR: 18 (13 Nov 2021 04:40) (18 - 18)  SpO2: 96% (13 Nov 2021 04:40) (96% - 97%)    CONSTITUTIONAL: NAD, well-developed  RESPIRATORY: Normal respiratory effort; lungs are clear to auscultation bilaterally  CARDIOVASCULAR: Regular rate and rhythm, normal S1 and S2, no murmur/rub/gallop; No lower extremity edema; Peripheral pulses are 2+ bilaterally  ABDOMEN: Nontender to palpation, normoactive bowel sounds, no rebound/guarding; No hepatosplenomegaly  MUSCLOSKELETAL: no clubbing or cyanosis of digits; no joint swelling or tenderness to palpation  NEURO: CN 2-12 grossly intact, moves all limbs spontaneously  PSYCH: A+O to person, place, and time; affect appropriate    LABS:                        9.3    7.28  )-----------( 450      ( 12 Nov 2021 06:04 )             28.1     11-12    141  |  107  |  6<L>  ----------------------------<  134<H>  3.1<L>   |  22  |  0.93    Ca    7.9<L>      12 Nov 2021 06:04  Phos  2.1     11-12  Mg     1.7     11-12    TPro  5.3<L>  /  Alb  2.0<L>  /  TBili  0.2  /  DBili  x   /  AST  26  /  ALT  18  /  AlkPhos  104  11-12                RADIOLOGY & ADDITIONAL TESTS:  Results Reviewed:   Imaging Personally Reviewed:  Electrocardiogram Personally Reviewed:    COORDINATION OF CARE:  Care Discussed with Consultants/Other Providers [Y/N]:  Prior or Outpatient Records Reviewed [Y/N]:   ***************************************************************  Gauri Ashley, PGY2  Internal Medicine   pager: NS: [phone] [facility]: [id number]  ***************************************************************    PROGRESS NOTE:     Patient is a 65y old  Female who presents with a chief complaint of Acute hypoxic respiratory failure (12 Nov 2021 19:31)      SUBJECTIVE / OVERNIGHT EVENTS:  No acute events overnight. Patient has abdominal pain but it is much improved. She has been tolerating regular diet and ate shrimp, french fries, salad with no vomiting or nausea. Dilaudid is controlling her pain. She denies CP, SOB, f/c, d/c.     MEDICATIONS  (STANDING):  albuterol/ipratropium for Nebulization 3 milliLiter(s) Nebulizer every 6 hours  amLODIPine   Tablet 10 milliGRAM(s) Oral daily  apixaban 5 milliGRAM(s) Oral two times a day  budesonide  80 MICROgram(s)/formoterol 4.5 MICROgram(s) Inhaler 2 Puff(s) Inhalation two times a day  folic acid 1 milliGRAM(s) Oral daily  influenza   Vaccine 0.5 milliLiter(s) IntraMuscular once  metoprolol succinate  milliGRAM(s) Oral daily  mirtazapine 15 milliGRAM(s) Oral at bedtime  pancrelipase  (CREON 12,000 Lipase Units) 2 Capsule(s) Oral three times a day with meals  pantoprazole    Tablet 40 milliGRAM(s) Oral before breakfast  piperacillin/tazobactam IVPB.. 3.375 Gram(s) IV Intermittent every 8 hours  predniSONE   Tablet 40 milliGRAM(s) Oral daily  tiotropium 18 MICROgram(s) Capsule 1 Capsule(s) Inhalation daily    MEDICATIONS  (PRN):  acetaminophen     Tablet .. 650 milliGRAM(s) Oral every 6 hours PRN Temp greater or equal to 38C (100.4F), Mild Pain (1 - 3)  HYDROmorphone   Tablet 4 milliGRAM(s) Oral every 4 hours PRN Moderate Pain (4 - 6)  HYDROmorphone   Tablet 6 milliGRAM(s) Oral every 4 hours PRN Severe Pain (7 - 10)  melatonin 3 milliGRAM(s) Oral at bedtime PRN Insomnia  metoclopramide Injectable 10 milliGRAM(s) IV Push every 6 hours PRN vomiting/nausea  morphine  - Injectable 2 milliGRAM(s) IV Push every 4 hours PRN Severe Pain (7 - 10)  ondansetron Injectable 4 milliGRAM(s) IV Push every 4 hours PRN Nausea and/or Vomiting      CAPILLARY BLOOD GLUCOSE        I&O's Summary    11 Nov 2021 07:01  -  12 Nov 2021 07:00  --------------------------------------------------------  IN: 440 mL / OUT: 0 mL / NET: 440 mL    12 Nov 2021 07:01  -  13 Nov 2021 05:36  --------------------------------------------------------  IN: 720 mL / OUT: 0 mL / NET: 720 mL        PHYSICAL EXAM:  Vital Signs Last 24 Hrs  T(C): 37.1 (13 Nov 2021 04:40), Max: 37.1 (13 Nov 2021 04:40)  T(F): 98.8 (13 Nov 2021 04:40), Max: 98.8 (13 Nov 2021 04:40)  HR: 100 (13 Nov 2021 04:40) (62 - 100)  BP: 107/68 (13 Nov 2021 04:40) (107/68 - 111/73)  BP(mean): --  RR: 18 (13 Nov 2021 04:40) (18 - 18)  SpO2: 96% (13 Nov 2021 04:40) (96% - 97%)    GENERAL: comfortable, NAD  HEAD:  Atraumatic, Normocephalic  EYES: EOMI, PERRLA, conjunctiva and sclera clear  ENMT: No tonsillar erythema, exudates, or enlargement; Moist mucous membranes, Good dentition, No lesions  NECK: Supple, No JVD, Normal thyroid  NERVOUS SYSTEM:  Alert & Oriented X3, Good concentration; Motor Strength 5/5 B/L upper and lower extremities; DTRs 2+ intact and symmetric  CHEST/LUNG: (+) inspiratory and expiratory rhonchi but improved   HEART: Regular rate with tachycardia; No murmurs, rubs, or gallops  ABDOMEN: Soft, Nondistended; Bowel sounds present, (+) TTP epigastric area  EXTREMITIES:  2+ Peripheral Pulses, No clubbing, cyanosis, or edema  LYMPH: No lymphadenopathy noted  SKIN: No rashes or lesions    LABS:                        9.3    7.28  )-----------( 450      ( 12 Nov 2021 06:04 )             28.1     11-12    141  |  107  |  6<L>  ----------------------------<  134<H>  3.1<L>   |  22  |  0.93    Ca    7.9<L>      12 Nov 2021 06:04  Phos  2.1     11-12  Mg     1.7     11-12    TPro  5.3<L>  /  Alb  2.0<L>  /  TBili  0.2  /  DBili  x   /  AST  26  /  ALT  18  /  AlkPhos  104  11-12                RADIOLOGY & ADDITIONAL TESTS:  Results Reviewed:   Imaging Personally Reviewed:  Electrocardiogram Personally Reviewed:    COORDINATION OF CARE:  Care Discussed with Consultants/Other Providers [Y/N]:  Prior or Outpatient Records Reviewed [Y/N]:   ***************************************************************  Gauriashley Ramirez, PGY2  Internal Medicine   pager: NS: [phone] [facility]: [phone]  ***************************************************************    PROGRESS NOTE:     Patient is a 65y old  Female who presents with a chief complaint of Acute hypoxic respiratory failure (12 Nov 2021 19:31)      SUBJECTIVE / OVERNIGHT EVENTS:  No acute events overnight. Patient has abdominal pain but it is much improved. She has been tolerating regular diet and ate shrimp, french fries, salad with no vomiting or nausea. Dilaudid is controlling her pain. Discontinued morphine today. Will get CXR to see if port is accessable and usable for blood draws. She denies CP, SOB, f/c, d/c.     MEDICATIONS  (STANDING):  albuterol/ipratropium for Nebulization 3 milliLiter(s) Nebulizer every 6 hours  amLODIPine   Tablet 10 milliGRAM(s) Oral daily  apixaban 5 milliGRAM(s) Oral two times a day  budesonide  80 MICROgram(s)/formoterol 4.5 MICROgram(s) Inhaler 2 Puff(s) Inhalation two times a day  folic acid 1 milliGRAM(s) Oral daily  influenza   Vaccine 0.5 milliLiter(s) IntraMuscular once  metoprolol succinate  milliGRAM(s) Oral daily  mirtazapine 15 milliGRAM(s) Oral at bedtime  pancrelipase  (CREON 12,000 Lipase Units) 2 Capsule(s) Oral three times a day with meals  pantoprazole    Tablet 40 milliGRAM(s) Oral before breakfast  piperacillin/tazobactam IVPB.. 3.375 Gram(s) IV Intermittent every 8 hours  predniSONE   Tablet 40 milliGRAM(s) Oral daily  tiotropium 18 MICROgram(s) Capsule 1 Capsule(s) Inhalation daily    MEDICATIONS  (PRN):  acetaminophen     Tablet .. 650 milliGRAM(s) Oral every 6 hours PRN Temp greater or equal to 38C (100.4F), Mild Pain (1 - 3)  HYDROmorphone   Tablet 4 milliGRAM(s) Oral every 4 hours PRN Moderate Pain (4 - 6)  HYDROmorphone   Tablet 6 milliGRAM(s) Oral every 4 hours PRN Severe Pain (7 - 10)  melatonin 3 milliGRAM(s) Oral at bedtime PRN Insomnia  metoclopramide Injectable 10 milliGRAM(s) IV Push every 6 hours PRN vomiting/nausea  morphine  - Injectable 2 milliGRAM(s) IV Push every 4 hours PRN Severe Pain (7 - 10)  ondansetron Injectable 4 milliGRAM(s) IV Push every 4 hours PRN Nausea and/or Vomiting      CAPILLARY BLOOD GLUCOSE        I&O's Summary    11 Nov 2021 07:01  -  12 Nov 2021 07:00  --------------------------------------------------------  IN: 440 mL / OUT: 0 mL / NET: 440 mL    12 Nov 2021 07:01  -  13 Nov 2021 05:36  --------------------------------------------------------  IN: 720 mL / OUT: 0 mL / NET: 720 mL        PHYSICAL EXAM:  Vital Signs Last 24 Hrs  T(C): 37.1 (13 Nov 2021 04:40), Max: 37.1 (13 Nov 2021 04:40)  T(F): 98.8 (13 Nov 2021 04:40), Max: 98.8 (13 Nov 2021 04:40)  HR: 100 (13 Nov 2021 04:40) (62 - 100)  BP: 107/68 (13 Nov 2021 04:40) (107/68 - 111/73)  BP(mean): --  RR: 18 (13 Nov 2021 04:40) (18 - 18)  SpO2: 96% (13 Nov 2021 04:40) (96% - 97%)    GENERAL: comfortable, NAD  HEAD:  Atraumatic, Normocephalic  EYES: EOMI, PERRLA, conjunctiva and sclera clear  ENMT: No tonsillar erythema, exudates, or enlargement; Moist mucous membranes, Good dentition, No lesions  NECK: Supple, No JVD, Normal thyroid  NERVOUS SYSTEM:  Alert & Oriented X3, Good concentration; Motor Strength 5/5 B/L upper and lower extremities; DTRs 2+ intact and symmetric  CHEST/LUNG: (+) inspiratory and expiratory rhonchi but improved   HEART: Regular rate with tachycardia; No murmurs, rubs, or gallops  ABDOMEN: Soft, Nondistended; Bowel sounds present, (+) TTP epigastric area  EXTREMITIES:  2+ Peripheral Pulses, No clubbing, cyanosis, or edema  LYMPH: No lymphadenopathy noted  SKIN: No rashes or lesions    LABS:                        9.3    7.28  )-----------( 450      ( 12 Nov 2021 06:04 )             28.1     11-12    141  |  107  |  6<L>  ----------------------------<  134<H>  3.1<L>   |  22  |  0.93    Ca    7.9<L>      12 Nov 2021 06:04  Phos  2.1     11-12  Mg     1.7     11-12    TPro  5.3<L>  /  Alb  2.0<L>  /  TBili  0.2  /  DBili  x   /  AST  26  /  ALT  18  /  AlkPhos  104  11-12                RADIOLOGY & ADDITIONAL TESTS:  Results Reviewed:   Imaging Personally Reviewed:  Electrocardiogram Personally Reviewed:    COORDINATION OF CARE:  Care Discussed with Consultants/Other Providers [Y/N]:  Prior or Outpatient Records Reviewed [Y/N]:

## 2021-11-13 NOTE — PROGRESS NOTE ADULT - PROBLEM SELECTOR PLAN 5
Not on any outpatient nebulizers or inhalers, has O2 at home but has not used since this summer  - management per above  - off O2  - infectious workup as above Not on any outpatient nebulizers or inhalers, has O2 at home but has not used since this summer  - c/w duonebs, prednisone 40 mg PO qd x 5 days (11/10 - 11/15)  - c/w Symbicort, Spiriva  - off O2  - infectious workup as above  - Pulm following: apprec recs

## 2021-11-14 LAB
ALBUMIN SERPL ELPH-MCNC: 2.3 G/DL — LOW (ref 3.3–5)
ALP SERPL-CCNC: 121 U/L — HIGH (ref 40–120)
ALT FLD-CCNC: 23 U/L — SIGNIFICANT CHANGE UP (ref 10–45)
ANION GAP SERPL CALC-SCNC: 12 MMOL/L — SIGNIFICANT CHANGE UP (ref 5–17)
AST SERPL-CCNC: 21 U/L — SIGNIFICANT CHANGE UP (ref 10–40)
BASOPHILS # BLD AUTO: 0.03 K/UL — SIGNIFICANT CHANGE UP (ref 0–0.2)
BASOPHILS NFR BLD AUTO: 0.3 % — SIGNIFICANT CHANGE UP (ref 0–2)
BILIRUB SERPL-MCNC: 0.2 MG/DL — SIGNIFICANT CHANGE UP (ref 0.2–1.2)
BUN SERPL-MCNC: 6 MG/DL — LOW (ref 7–23)
CALCIUM SERPL-MCNC: 8.3 MG/DL — LOW (ref 8.4–10.5)
CHLORIDE SERPL-SCNC: 110 MMOL/L — HIGH (ref 96–108)
CO2 SERPL-SCNC: 21 MMOL/L — LOW (ref 22–31)
CREAT SERPL-MCNC: 1.04 MG/DL — SIGNIFICANT CHANGE UP (ref 0.5–1.3)
CULTURE RESULTS: SIGNIFICANT CHANGE UP
CULTURE RESULTS: SIGNIFICANT CHANGE UP
EOSINOPHIL # BLD AUTO: 0.07 K/UL — SIGNIFICANT CHANGE UP (ref 0–0.5)
EOSINOPHIL NFR BLD AUTO: 0.7 % — SIGNIFICANT CHANGE UP (ref 0–6)
GLUCOSE SERPL-MCNC: 89 MG/DL — SIGNIFICANT CHANGE UP (ref 70–99)
HCT VFR BLD CALC: 28.9 % — LOW (ref 34.5–45)
HGB BLD-MCNC: 9.2 G/DL — LOW (ref 11.5–15.5)
IMM GRANULOCYTES NFR BLD AUTO: 1.9 % — HIGH (ref 0–1.5)
LYMPHOCYTES # BLD AUTO: 2.9 K/UL — SIGNIFICANT CHANGE UP (ref 1–3.3)
LYMPHOCYTES # BLD AUTO: 29.4 % — SIGNIFICANT CHANGE UP (ref 13–44)
MAGNESIUM SERPL-MCNC: 2 MG/DL — SIGNIFICANT CHANGE UP (ref 1.6–2.6)
MCHC RBC-ENTMCNC: 28.5 PG — SIGNIFICANT CHANGE UP (ref 27–34)
MCHC RBC-ENTMCNC: 31.8 GM/DL — LOW (ref 32–36)
MCV RBC AUTO: 89.5 FL — SIGNIFICANT CHANGE UP (ref 80–100)
MONOCYTES # BLD AUTO: 0.97 K/UL — HIGH (ref 0–0.9)
MONOCYTES NFR BLD AUTO: 9.8 % — SIGNIFICANT CHANGE UP (ref 2–14)
NEUTROPHILS # BLD AUTO: 5.69 K/UL — SIGNIFICANT CHANGE UP (ref 1.8–7.4)
NEUTROPHILS NFR BLD AUTO: 57.9 % — SIGNIFICANT CHANGE UP (ref 43–77)
NRBC # BLD: 0 /100 WBCS — SIGNIFICANT CHANGE UP (ref 0–0)
PHOSPHATE SERPL-MCNC: 2.6 MG/DL — SIGNIFICANT CHANGE UP (ref 2.5–4.5)
PLATELET # BLD AUTO: 391 K/UL — SIGNIFICANT CHANGE UP (ref 150–400)
POTASSIUM SERPL-MCNC: 3.7 MMOL/L — SIGNIFICANT CHANGE UP (ref 3.5–5.3)
POTASSIUM SERPL-SCNC: 3.7 MMOL/L — SIGNIFICANT CHANGE UP (ref 3.5–5.3)
PROT SERPL-MCNC: 5.2 G/DL — LOW (ref 6–8.3)
RBC # BLD: 3.23 M/UL — LOW (ref 3.8–5.2)
RBC # FLD: 18.5 % — HIGH (ref 10.3–14.5)
SODIUM SERPL-SCNC: 143 MMOL/L — SIGNIFICANT CHANGE UP (ref 135–145)
SPECIMEN SOURCE: SIGNIFICANT CHANGE UP
SPECIMEN SOURCE: SIGNIFICANT CHANGE UP
WBC # BLD: 9.85 K/UL — SIGNIFICANT CHANGE UP (ref 3.8–10.5)
WBC # FLD AUTO: 9.85 K/UL — SIGNIFICANT CHANGE UP (ref 3.8–10.5)

## 2021-11-14 PROCEDURE — 99232 SBSQ HOSP IP/OBS MODERATE 35: CPT | Mod: GC

## 2021-11-14 RX ORDER — BUDESONIDE AND FORMOTEROL FUMARATE DIHYDRATE 160; 4.5 UG/1; UG/1
2 AEROSOL RESPIRATORY (INHALATION)
Qty: 1 | Refills: 0
Start: 2021-11-14 | End: 2021-12-13

## 2021-11-14 RX ORDER — ONDANSETRON 8 MG/1
4 TABLET, FILM COATED ORAL EVERY 6 HOURS
Refills: 0 | Status: DISCONTINUED | OUTPATIENT
Start: 2021-11-14 | End: 2021-11-15

## 2021-11-14 RX ADMIN — Medication 3 MILLILITER(S): at 17:28

## 2021-11-14 RX ADMIN — APIXABAN 5 MILLIGRAM(S): 2.5 TABLET, FILM COATED ORAL at 05:23

## 2021-11-14 RX ADMIN — ONDANSETRON 4 MILLIGRAM(S): 8 TABLET, FILM COATED ORAL at 05:23

## 2021-11-14 RX ADMIN — Medication 2 CAPSULE(S): at 17:27

## 2021-11-14 RX ADMIN — Medication 100 MILLIGRAM(S): at 05:22

## 2021-11-14 RX ADMIN — TIOTROPIUM BROMIDE 1 CAPSULE(S): 18 CAPSULE ORAL; RESPIRATORY (INHALATION) at 12:13

## 2021-11-14 RX ADMIN — PIPERACILLIN AND TAZOBACTAM 25 GRAM(S): 4; .5 INJECTION, POWDER, LYOPHILIZED, FOR SOLUTION INTRAVENOUS at 16:26

## 2021-11-14 RX ADMIN — Medication 3 MILLILITER(S): at 05:22

## 2021-11-14 RX ADMIN — AMLODIPINE BESYLATE 10 MILLIGRAM(S): 2.5 TABLET ORAL at 05:22

## 2021-11-14 RX ADMIN — Medication 3 MILLILITER(S): at 23:09

## 2021-11-14 RX ADMIN — HYDROMORPHONE HYDROCHLORIDE 6 MILLIGRAM(S): 2 INJECTION INTRAMUSCULAR; INTRAVENOUS; SUBCUTANEOUS at 12:15

## 2021-11-14 RX ADMIN — Medication 40 MILLIGRAM(S): at 05:22

## 2021-11-14 RX ADMIN — BUDESONIDE AND FORMOTEROL FUMARATE DIHYDRATE 2 PUFF(S): 160; 4.5 AEROSOL RESPIRATORY (INHALATION) at 05:22

## 2021-11-14 RX ADMIN — ONDANSETRON 4 MILLIGRAM(S): 8 TABLET, FILM COATED ORAL at 21:05

## 2021-11-14 RX ADMIN — PIPERACILLIN AND TAZOBACTAM 25 GRAM(S): 4; .5 INJECTION, POWDER, LYOPHILIZED, FOR SOLUTION INTRAVENOUS at 06:12

## 2021-11-14 RX ADMIN — PANTOPRAZOLE SODIUM 40 MILLIGRAM(S): 20 TABLET, DELAYED RELEASE ORAL at 06:12

## 2021-11-14 RX ADMIN — MIRTAZAPINE 15 MILLIGRAM(S): 45 TABLET, ORALLY DISINTEGRATING ORAL at 21:05

## 2021-11-14 RX ADMIN — HYDROMORPHONE HYDROCHLORIDE 6 MILLIGRAM(S): 2 INJECTION INTRAMUSCULAR; INTRAVENOUS; SUBCUTANEOUS at 16:50

## 2021-11-14 RX ADMIN — Medication 2 CAPSULE(S): at 12:13

## 2021-11-14 RX ADMIN — BUDESONIDE AND FORMOTEROL FUMARATE DIHYDRATE 2 PUFF(S): 160; 4.5 AEROSOL RESPIRATORY (INHALATION) at 17:28

## 2021-11-14 RX ADMIN — HYDROMORPHONE HYDROCHLORIDE 6 MILLIGRAM(S): 2 INJECTION INTRAMUSCULAR; INTRAVENOUS; SUBCUTANEOUS at 16:20

## 2021-11-14 RX ADMIN — Medication 1 MILLIGRAM(S): at 12:14

## 2021-11-14 RX ADMIN — APIXABAN 5 MILLIGRAM(S): 2.5 TABLET, FILM COATED ORAL at 17:27

## 2021-11-14 RX ADMIN — PIPERACILLIN AND TAZOBACTAM 25 GRAM(S): 4; .5 INJECTION, POWDER, LYOPHILIZED, FOR SOLUTION INTRAVENOUS at 23:09

## 2021-11-14 RX ADMIN — Medication 3 MILLIGRAM(S): at 21:05

## 2021-11-14 RX ADMIN — HYDROMORPHONE HYDROCHLORIDE 6 MILLIGRAM(S): 2 INJECTION INTRAMUSCULAR; INTRAVENOUS; SUBCUTANEOUS at 21:05

## 2021-11-14 RX ADMIN — HYDROMORPHONE HYDROCHLORIDE 6 MILLIGRAM(S): 2 INJECTION INTRAMUSCULAR; INTRAVENOUS; SUBCUTANEOUS at 05:23

## 2021-11-14 RX ADMIN — Medication 3 MILLILITER(S): at 12:14

## 2021-11-14 RX ADMIN — HYDROMORPHONE HYDROCHLORIDE 6 MILLIGRAM(S): 2 INJECTION INTRAMUSCULAR; INTRAVENOUS; SUBCUTANEOUS at 12:45

## 2021-11-14 RX ADMIN — Medication 2 CAPSULE(S): at 10:10

## 2021-11-14 NOTE — PROGRESS NOTE ADULT - ASSESSMENT
chronic pancreatitis   hypoxia   GERD    s/p Jeffery procedure 2019  CT a/p showing colitis but asymptomatic  symptoms improving    IVF  zofran and pain meds PRN  reg diet as tolerated  no objection to anticoagulation  cont creon 24,000 units tid  surgery following   PPI daily   dc planning tomorrow as per primary   will follow  dw patient      Advanced care planning was discussed with patient and family.  Advanced care planning forms were reviewed and discussed.  Risks, benefits and alternatives of gastroenterologic procedures were discussed in detail and all questions were answered.    30 minutes spent.

## 2021-11-14 NOTE — PROGRESS NOTE ADULT - PROBLEM SELECTOR PLAN 3
History of DVT/PE on eliquis  - continue home eliquis 5 mg BID  - CTA w/ evidence of chronic thromboembolic disease but no acute PE Patient with one or more new problems requiring additional work-up/treatment.

## 2021-11-14 NOTE — PROGRESS NOTE ADULT - SUBJECTIVE AND OBJECTIVE BOX
***************************************************************  Gauri Ashley, PGY2  Internal Medicine   pager: [phone]: [phone] [facility]: [phone]  ***************************************************************    PROGRESS NOTE:     Patient is a 65y old  Female who presents with a chief complaint of Acute hypoxic respiratory failure (13 Nov 2021 11:19)      SUBJECTIVE / OVERNIGHT EVENTS:      MEDICATIONS  (STANDING):  albuterol/ipratropium for Nebulization 3 milliLiter(s) Nebulizer every 6 hours  amLODIPine   Tablet 10 milliGRAM(s) Oral daily  apixaban 5 milliGRAM(s) Oral two times a day  budesonide  80 MICROgram(s)/formoterol 4.5 MICROgram(s) Inhaler 2 Puff(s) Inhalation two times a day  folic acid 1 milliGRAM(s) Oral daily  influenza   Vaccine 0.5 milliLiter(s) IntraMuscular once  metoprolol succinate  milliGRAM(s) Oral daily  mirtazapine 15 milliGRAM(s) Oral at bedtime  pancrelipase  (CREON 12,000 Lipase Units) 2 Capsule(s) Oral three times a day with meals  pantoprazole    Tablet 40 milliGRAM(s) Oral before breakfast  piperacillin/tazobactam IVPB.. 3.375 Gram(s) IV Intermittent every 8 hours  predniSONE   Tablet 40 milliGRAM(s) Oral daily  tiotropium 18 MICROgram(s) Capsule 1 Capsule(s) Inhalation daily    MEDICATIONS  (PRN):  acetaminophen     Tablet .. 650 milliGRAM(s) Oral every 6 hours PRN Temp greater or equal to 38C (100.4F), Mild Pain (1 - 3)  HYDROmorphone   Tablet 4 milliGRAM(s) Oral every 4 hours PRN Moderate Pain (4 - 6)  HYDROmorphone   Tablet 6 milliGRAM(s) Oral every 4 hours PRN Severe Pain (7 - 10)  melatonin 3 milliGRAM(s) Oral at bedtime PRN Insomnia  metoclopramide Injectable 10 milliGRAM(s) IV Push every 6 hours PRN vomiting/nausea  ondansetron Injectable 4 milliGRAM(s) IV Push every 4 hours PRN Nausea and/or Vomiting      CAPILLARY BLOOD GLUCOSE        I&O's Summary    12 Nov 2021 07:01  -  13 Nov 2021 07:00  --------------------------------------------------------  IN: 720 mL / OUT: 0 mL / NET: 720 mL        PHYSICAL EXAM:  Vital Signs Last 24 Hrs  T(C): 36.8 (14 Nov 2021 05:05), Max: 36.9 (13 Nov 2021 12:55)  T(F): 98.3 (14 Nov 2021 05:05), Max: 98.4 (13 Nov 2021 12:55)  HR: 103 (14 Nov 2021 05:05) (96 - 103)  BP: 133/82 (14 Nov 2021 05:05) (107/65 - 135/75)  BP(mean): --  RR: 18 (14 Nov 2021 05:05) (18 - 19)  SpO2: 95% (14 Nov 2021 05:05) (94% - 98%)    GENERAL: comfortable, NAD  HEAD:  Atraumatic, Normocephalic  EYES: EOMI, PERRLA, conjunctiva and sclera clear  ENMT: No tonsillar erythema, exudates, or enlargement; Moist mucous membranes, Good dentition, No lesions  NECK: Supple, No JVD, Normal thyroid  NERVOUS SYSTEM:  Alert & Oriented X3, Good concentration; Motor Strength 5/5 B/L upper and lower extremities; DTRs 2+ intact and symmetric  CHEST/LUNG: (+) inspiratory and expiratory rhonchi but improved   HEART: Regular rate with tachycardia; No murmurs, rubs, or gallops  ABDOMEN: Soft, Nondistended; Bowel sounds present, (+) TTP epigastric area  EXTREMITIES:  2+ Peripheral Pulses, No clubbing, cyanosis, or edema  LYMPH: No lymphadenopathy noted  SKIN: No rashes or lesions    LABS:                        9.1    10.76 )-----------( 423      ( 13 Nov 2021 06:22 )             28.5     11-13    143  |  111<H>  |  6<L>  ----------------------------<  88  4.1   |  21<L>  |  0.94    Ca    8.1<L>      13 Nov 2021 06:22  Phos  2.1     11-13  Mg     1.9     11-13    TPro  5.4<L>  /  Alb  2.2<L>  /  TBili  0.2  /  DBili  x   /  AST  25  /  ALT  24  /  AlkPhos  107  11-13                RADIOLOGY & ADDITIONAL TESTS:  Results Reviewed:   Imaging Personally Reviewed:  Electrocardiogram Personally Reviewed:    COORDINATION OF CARE:  Care Discussed with Consultants/Other Providers [Y/N]:  Prior or Outpatient Records Reviewed [Y/N]:   ***************************************************************  Gauri Ramirez, PGY2  Internal Medicine   pager: NS: [phone] [facility]: [phone]  ***************************************************************    PROGRESS NOTE:     Patient is a 65y old  Female who presents with a chief complaint of Acute hypoxic respiratory failure (13 Nov 2021 11:19)      SUBJECTIVE / OVERNIGHT EVENTS:  Patient had no acute events overnight. She had no vomiting or nausea and is tolerating regular diet. She continues to endorse abdominal pain 5/10 whereas the normally has 1-2/10 at home from her chronic pancreatitis. She says that the PO pain medication takes longer to work and it is concerning to her, but it is manageable. She is upset that her mediport is not being accessed for blood draws. A CXR was performed yesterday and will confirm placement and allow the IV nurse to access it today to ensure it is working. Last time it was used was for chemo on 9/30. She denies fevers, chills, diarrhea, constipation, chest pain, shortness of breath, edema. Planning to discharge tomorrow. Patient is ambulating and is transitioned to all oral pain and nausea medications.    MEDICATIONS  (STANDING):  albuterol/ipratropium for Nebulization 3 milliLiter(s) Nebulizer every 6 hours  amLODIPine   Tablet 10 milliGRAM(s) Oral daily  apixaban 5 milliGRAM(s) Oral two times a day  budesonide  80 MICROgram(s)/formoterol 4.5 MICROgram(s) Inhaler 2 Puff(s) Inhalation two times a day  folic acid 1 milliGRAM(s) Oral daily  influenza   Vaccine 0.5 milliLiter(s) IntraMuscular once  metoprolol succinate  milliGRAM(s) Oral daily  mirtazapine 15 milliGRAM(s) Oral at bedtime  pancrelipase  (CREON 12,000 Lipase Units) 2 Capsule(s) Oral three times a day with meals  pantoprazole    Tablet 40 milliGRAM(s) Oral before breakfast  piperacillin/tazobactam IVPB.. 3.375 Gram(s) IV Intermittent every 8 hours  predniSONE   Tablet 40 milliGRAM(s) Oral daily  tiotropium 18 MICROgram(s) Capsule 1 Capsule(s) Inhalation daily    MEDICATIONS  (PRN):  acetaminophen     Tablet .. 650 milliGRAM(s) Oral every 6 hours PRN Temp greater or equal to 38C (100.4F), Mild Pain (1 - 3)  HYDROmorphone   Tablet 4 milliGRAM(s) Oral every 4 hours PRN Moderate Pain (4 - 6)  HYDROmorphone   Tablet 6 milliGRAM(s) Oral every 4 hours PRN Severe Pain (7 - 10)  melatonin 3 milliGRAM(s) Oral at bedtime PRN Insomnia  metoclopramide Injectable 10 milliGRAM(s) IV Push every 6 hours PRN vomiting/nausea  ondansetron Injectable 4 milliGRAM(s) IV Push every 4 hours PRN Nausea and/or Vomiting      CAPILLARY BLOOD GLUCOSE        I&O's Summary    12 Nov 2021 07:01  -  13 Nov 2021 07:00  --------------------------------------------------------  IN: 720 mL / OUT: 0 mL / NET: 720 mL        PHYSICAL EXAM:  Vital Signs Last 24 Hrs  T(C): 36.8 (14 Nov 2021 05:05), Max: 36.9 (13 Nov 2021 12:55)  T(F): 98.3 (14 Nov 2021 05:05), Max: 98.4 (13 Nov 2021 12:55)  HR: 103 (14 Nov 2021 05:05) (96 - 103)  BP: 133/82 (14 Nov 2021 05:05) (107/65 - 135/75)  BP(mean): --  RR: 18 (14 Nov 2021 05:05) (18 - 19)  SpO2: 95% (14 Nov 2021 05:05) (94% - 98%)    GENERAL: comfortable, NAD  HEAD:  Atraumatic, Normocephalic  EYES: EOMI, PERRLA, conjunctiva and sclera clear  ENMT: No tonsillar erythema, exudates, or enlargement; Moist mucous membranes, Good dentition, No lesions  NECK: Supple, No JVD, Normal thyroid  NERVOUS SYSTEM:  Alert & Oriented X3, Good concentration; Motor Strength 5/5 B/L upper and lower extremities; DTRs 2+ intact and symmetric  CHEST/LUNG: (+) inspiratory and expiratory rhonchi but improved   HEART: Regular rate with tachycardia; No murmurs, rubs, or gallops  ABDOMEN: Soft, Nondistended; Bowel sounds present, (+) TTP epigastric area  EXTREMITIES:  2+ Peripheral Pulses, No clubbing, cyanosis, or edema  LYMPH: No lymphadenopathy noted  SKIN: No rashes or lesions    LABS:                        9.1    10.76 )-----------( 423      ( 13 Nov 2021 06:22 )             28.5     11-13    143  |  111<H>  |  6<L>  ----------------------------<  88  4.1   |  21<L>  |  0.94    Ca    8.1<L>      13 Nov 2021 06:22  Phos  2.1     11-13  Mg     1.9     11-13    TPro  5.4<L>  /  Alb  2.2<L>  /  TBili  0.2  /  DBili  x   /  AST  25  /  ALT  24  /  AlkPhos  107  11-13                RADIOLOGY & ADDITIONAL TESTS:  Results Reviewed:   Imaging Personally Reviewed:  Electrocardiogram Personally Reviewed:    COORDINATION OF CARE:  Care Discussed with Consultants/Other Providers [Y/N]:  Prior or Outpatient Records Reviewed [Y/N]:

## 2021-11-14 NOTE — PROGRESS NOTE ADULT - PROBLEM SELECTOR PLAN 1
Admitted for tachypnea and hypoxia, requiring 2L NC, now off O2. Etiology unclear but likely multifactorial. DDx includes COPD exacerbation, infectious process, right-sided HF, NSCLC progression. Colitis on CT a/p but no diarrhea.   - Bedside echo ruled out pericardial effusion (11/9)  - CTA w/ evidence of chronic thromboembolic disease not no acute PE (11/9)  - Trend CBC and fever curve  - BCx NGTD, f/u sputum cx, UCx, RVP  - c/w duonebs, prednisone 40 mg PO qd x 5 days (11/10 - 11/15)  - c/w Symbicort, Spiriva  - c/w zosyn, procal 1.4  - Lactate downtrended   - TTE (11/11/21) EF 71%, calcified AV  - Pulm consulted: apprec recs  - Dr. Leahy (cardiology) consulted: apprec recs Admitted for tachypnea and hypoxia, requiring 2L NC, now off O2. Etiology unclear but likely multifactorial. DDx includes COPD exacerbation, infectious process, right-sided HF, NSCLC progression. Colitis on CT a/p but no diarrhea.   - Bedside echo ruled out pericardial effusion (11/9)  - CTA w/ evidence of chronic thromboembolic disease not no acute PE (11/9)  - Trend CBC and fever curve  - BCx NGTD, f/u sputum cx, UCx, RVP  - c/w duonebs, prednisone 40 mg PO qd x 5 days (11/10 - 11/15)  - c/w Symbicort, Spiriva  - c/w zosyn until discharge, procal 1.4  - Lactate downtrended   - TTE (11/11/21) EF 71%, calcified AV  - Pulm consulted: apprec recs  - Dr. Leahy (cardiology) consulted: apprec recs

## 2021-11-14 NOTE — PROGRESS NOTE ADULT - ASSESSMENT
Patient is a 65 year old female with a history of HTN, COPD, NSCLC (adenocarcinoma left lung) Stage IIIB (dx 2/2020 s/p chemo followed by immunotherapy, currently on Keytruda maintenance therapy), pulmonary embolus on apixaban, chronic pancreatitis (s/p Jeffery procedure 2019), hx of ARDS (2015), GERD, chronic pain (on opioids), and s/p vocal cord polpectomy who presents with tachypnea and SOB that started last Friday, requiring 2L NC and epigastric pain with vomiting that started 4 days before admission c/f COPD exacerbation vs acute pancreatitis vs infectious process.

## 2021-11-14 NOTE — PROGRESS NOTE ADULT - PROBLEM SELECTOR PLAN 2
History of chronic pancreatitis s/p Jeffery procedure 2019, follows with Dr. Molina. EKG (11/10) QTc 452 sinus tachycardia  - s/p 1L LR in ED, started on D5 LR @100 cc/hr, now s/p IVF  - c/w pancrelipase  - zofran 4mg Q4h PRN for nausea, and reglan 10 mg Q6h PRN for breakthrough nausea/vomiting  - progressed to regular diet  - for pain: dilaudid 0.5 mg Q4h for moderate and dilaudid 1 mg Q4h for severe  - holding home oxycodone prn  - House surgery consulted, as Dr. Molina unavailable  - Dr. Damian consulted and GI following: apprec recs History of chronic pancreatitis s/p Jeffery procedure 2019, follows with Dr. Molina. EKG (11/10) QTc 452 sinus tachycardia  - s/p 1L LR in ED, started on D5 LR @100 cc/hr, now s/p IVF  - c/w pancrelipase  - tolerating regular diet  - for pain: dilaudid 4 mg PO Q4h for moderate and dilaudid 6 mg PO Q4h for severe  - zofran 4 mg PO Q6h for nausea/vomiting  - holding home oxycodone prn  - House surgery consulted, as Dr. Molina unavailable  - Dr. Damian consulted and GI following: apprec recs

## 2021-11-14 NOTE — PROGRESS NOTE ADULT - PROBLEM SELECTOR PLAN 5
Not on any outpatient nebulizers or inhalers, has O2 at home but has not used since this summer  - c/w duonebs, prednisone 40 mg PO qd x 5 days (11/10 - 11/15)  - c/w Symbicort, Spiriva  - off O2  - infectious workup as above  - Pulm following: apprec recs

## 2021-11-14 NOTE — PROGRESS NOTE ADULT - SUBJECTIVE AND OBJECTIVE BOX
[facility]  Erick Keenan PA-C  [facility] [place] [address]  [phone]      INTERVAL HPI/OVERNIGHT EVENTS:  pt seen and examined, c/o of pain when first waking up, pain better with eating   feeling hungry, tolerating regular diet, no N/V    MEDICATIONS  (STANDING):  albuterol/ipratropium for Nebulization 3 milliLiter(s) Nebulizer every 6 hours  amLODIPine   Tablet 10 milliGRAM(s) Oral daily  apixaban 5 milliGRAM(s) Oral two times a day  budesonide  80 MICROgram(s)/formoterol 4.5 MICROgram(s) Inhaler 2 Puff(s) Inhalation two times a day  folic acid 1 milliGRAM(s) Oral daily  influenza   Vaccine 0.5 milliLiter(s) IntraMuscular once  metoprolol succinate  milliGRAM(s) Oral daily  mirtazapine 15 milliGRAM(s) Oral at bedtime  pancrelipase  (CREON 12,000 Lipase Units) 2 Capsule(s) Oral three times a day with meals  pantoprazole    Tablet 40 milliGRAM(s) Oral before breakfast  piperacillin/tazobactam IVPB.. 3.375 Gram(s) IV Intermittent every 8 hours  predniSONE   Tablet 40 milliGRAM(s) Oral daily  tiotropium 18 MICROgram(s) Capsule 1 Capsule(s) Inhalation daily    MEDICATIONS  (PRN):  acetaminophen     Tablet .. 650 milliGRAM(s) Oral every 6 hours PRN Temp greater or equal to 38C (100.4F), Mild Pain (1 - 3)  HYDROmorphone   Tablet 4 milliGRAM(s) Oral every 4 hours PRN Moderate Pain (4 - 6)  HYDROmorphone   Tablet 6 milliGRAM(s) Oral every 4 hours PRN Severe Pain (7 - 10)  melatonin 3 milliGRAM(s) Oral at bedtime PRN Insomnia  metoclopramide Injectable 10 milliGRAM(s) IV Push every 6 hours PRN vomiting/nausea  morphine  - Injectable 2 milliGRAM(s) IV Push every 4 hours PRN Severe Pain (7 - 10)  ondansetron Injectable 4 milliGRAM(s) IV Push every 4 hours PRN Nausea and/or Vomiting      Allergies    diazepam (Other)  lemon (Other)    Intolerances        ROS:   General:  No wt loss, fevers, chills, night sweats, fatigue,   Eyes:  Good vision, no reported pain  ENT:  No sore throat, pain, runny nose, dysphagia  CV:  No pain, palpitations, hypo/hypertension  Resp:  No dyspnea, cough, tachypnea, wheezing  GI:  + pain, No nausea, No vomiting, No diarrhea, No constipation, No weight loss, No fever, No pruritis, No rectal bleeding, No tarry stools, No dysphagia,  :  No pain, bleeding, incontinence, nocturia  Muscle:  No pain, weakness  Neuro:  No weakness, tingling, memory problems  Psych:  No fatigue, insomnia, mood problems, depression  Endocrine:  No polyuria, polydipsia, cold/heat intolerance  Heme:  No petechiae, ecchymosis, easy bruisability  Skin:  No rash, tattoos, scars, edema      PHYSICAL EXAM:   Vital Signs:  Vital Signs Last 24 Hrs  T(C): 36.8 (13 Nov 2021 09:45), Max: 37.1 (13 Nov 2021 04:40)  T(F): 98.2 (13 Nov 2021 09:45), Max: 98.8 (13 Nov 2021 04:40)  HR: 98 (13 Nov 2021 09:45) (62 - 100)  BP: 113/74 (13 Nov 2021 09:45) (107/68 - 113/74)  BP(mean): --  RR: 18 (13 Nov 2021 09:45) (18 - 18)  SpO2: 98% (13 Nov 2021 09:45) (96% - 98%)  Daily     Daily     GENERAL:  Appears stated age,   HEENT:  NC/AT,    CHEST:  Full & symmetric excursion,   HEART:  Regular rhythm,  ABDOMEN:  Soft, non-tender, non-distended,  EXTEREMITIES:  no cyanosis  SKIN:  No rash  NEURO:  Alert,       LABS:                        9.1    10.76 )-----------( 423      ( 13 Nov 2021 06:22 )             28.5     11-13    143  |  111<H>  |  6<L>  ----------------------------<  88  4.1   |  21<L>  |  0.94    Ca    8.1<L>      13 Nov 2021 06:22  Phos  2.1     11-13  Mg     1.9     11-13    TPro  5.4<L>  /  Alb  2.2<L>  /  TBili  0.2  /  DBili  x   /  AST  25  /  ALT  24  /  AlkPhos  107  11-13          RADIOLOGY & ADDITIONAL TESTS:

## 2021-11-15 ENCOUNTER — TRANSCRIPTION ENCOUNTER (OUTPATIENT)
Age: 66
End: 2021-11-15

## 2021-11-15 VITALS
OXYGEN SATURATION: 97 % | SYSTOLIC BLOOD PRESSURE: 121 MMHG | TEMPERATURE: 98 F | DIASTOLIC BLOOD PRESSURE: 73 MMHG | HEART RATE: 104 BPM | RESPIRATION RATE: 18 BRPM

## 2021-11-15 DIAGNOSIS — K52.9 NONINFECTIVE GASTROENTERITIS AND COLITIS, UNSPECIFIED: ICD-10-CM

## 2021-11-15 DIAGNOSIS — K86.1 OTHER CHRONIC PANCREATITIS: ICD-10-CM

## 2021-11-15 LAB
ALBUMIN SERPL ELPH-MCNC: 2.5 G/DL — LOW (ref 3.3–5)
ALP SERPL-CCNC: 133 U/L — HIGH (ref 40–120)
ALT FLD-CCNC: 26 U/L — SIGNIFICANT CHANGE UP (ref 10–45)
ANION GAP SERPL CALC-SCNC: 14 MMOL/L — SIGNIFICANT CHANGE UP (ref 5–17)
AST SERPL-CCNC: 19 U/L — SIGNIFICANT CHANGE UP (ref 10–40)
BASOPHILS # BLD AUTO: 0.03 K/UL — SIGNIFICANT CHANGE UP (ref 0–0.2)
BASOPHILS NFR BLD AUTO: 0.3 % — SIGNIFICANT CHANGE UP (ref 0–2)
BILIRUB SERPL-MCNC: 0.2 MG/DL — SIGNIFICANT CHANGE UP (ref 0.2–1.2)
BUN SERPL-MCNC: 6 MG/DL — LOW (ref 7–23)
CALCIUM SERPL-MCNC: 8.3 MG/DL — LOW (ref 8.4–10.5)
CHLORIDE SERPL-SCNC: 109 MMOL/L — HIGH (ref 96–108)
CO2 SERPL-SCNC: 21 MMOL/L — LOW (ref 22–31)
CREAT SERPL-MCNC: 1.15 MG/DL — SIGNIFICANT CHANGE UP (ref 0.5–1.3)
EOSINOPHIL # BLD AUTO: 0.08 K/UL — SIGNIFICANT CHANGE UP (ref 0–0.5)
EOSINOPHIL NFR BLD AUTO: 0.7 % — SIGNIFICANT CHANGE UP (ref 0–6)
GLUCOSE SERPL-MCNC: 117 MG/DL — HIGH (ref 70–99)
HCT VFR BLD CALC: 29.6 % — LOW (ref 34.5–45)
HCT VFR BLD CALC: 30 % — LOW (ref 34.5–45)
HGB BLD-MCNC: 9.2 G/DL — LOW (ref 11.5–15.5)
HGB BLD-MCNC: 9.2 G/DL — LOW (ref 11.5–15.5)
IMM GRANULOCYTES NFR BLD AUTO: 2.5 % — HIGH (ref 0–1.5)
LYMPHOCYTES # BLD AUTO: 3.95 K/UL — HIGH (ref 1–3.3)
LYMPHOCYTES # BLD AUTO: 33.9 % — SIGNIFICANT CHANGE UP (ref 13–44)
MAGNESIUM SERPL-MCNC: 1.8 MG/DL — SIGNIFICANT CHANGE UP (ref 1.6–2.6)
MCHC RBC-ENTMCNC: 28.3 PG — SIGNIFICANT CHANGE UP (ref 27–34)
MCHC RBC-ENTMCNC: 28.8 PG — SIGNIFICANT CHANGE UP (ref 27–34)
MCHC RBC-ENTMCNC: 30.7 GM/DL — LOW (ref 32–36)
MCHC RBC-ENTMCNC: 31.1 GM/DL — LOW (ref 32–36)
MCV RBC AUTO: 92.3 FL — SIGNIFICANT CHANGE UP (ref 80–100)
MCV RBC AUTO: 92.5 FL — SIGNIFICANT CHANGE UP (ref 80–100)
MONOCYTES # BLD AUTO: 1.06 K/UL — HIGH (ref 0–0.9)
MONOCYTES NFR BLD AUTO: 9.1 % — SIGNIFICANT CHANGE UP (ref 2–14)
NEUTROPHILS # BLD AUTO: 6.25 K/UL — SIGNIFICANT CHANGE UP (ref 1.8–7.4)
NEUTROPHILS NFR BLD AUTO: 53.5 % — SIGNIFICANT CHANGE UP (ref 43–77)
NRBC # BLD: 1 /100 WBCS — HIGH (ref 0–0)
NRBC # BLD: 2 /100 WBCS — HIGH (ref 0–0)
PHOSPHATE SERPL-MCNC: 2.9 MG/DL — SIGNIFICANT CHANGE UP (ref 2.5–4.5)
PLATELET # BLD AUTO: 416 K/UL — HIGH (ref 150–400)
PLATELET # BLD AUTO: 437 K/UL — HIGH (ref 150–400)
POTASSIUM SERPL-MCNC: 3 MMOL/L — LOW (ref 3.5–5.3)
POTASSIUM SERPL-SCNC: 3 MMOL/L — LOW (ref 3.5–5.3)
PROT SERPL-MCNC: 5.4 G/DL — LOW (ref 6–8.3)
RBC # BLD: 3.2 M/UL — LOW (ref 3.8–5.2)
RBC # BLD: 3.25 M/UL — LOW (ref 3.8–5.2)
RBC # FLD: 18.6 % — HIGH (ref 10.3–14.5)
RBC # FLD: 18.6 % — HIGH (ref 10.3–14.5)
SODIUM SERPL-SCNC: 144 MMOL/L — SIGNIFICANT CHANGE UP (ref 135–145)
WBC # BLD: 10.62 K/UL — HIGH (ref 3.8–10.5)
WBC # BLD: 11.66 K/UL — HIGH (ref 3.8–10.5)
WBC # FLD AUTO: 10.62 K/UL — HIGH (ref 3.8–10.5)
WBC # FLD AUTO: 11.66 K/UL — HIGH (ref 3.8–10.5)

## 2021-11-15 PROCEDURE — 82330 ASSAY OF CALCIUM: CPT

## 2021-11-15 PROCEDURE — 71045 X-RAY EXAM CHEST 1 VIEW: CPT

## 2021-11-15 PROCEDURE — 93306 TTE W/DOPPLER COMPLETE: CPT

## 2021-11-15 PROCEDURE — 76700 US EXAM ABDOM COMPLETE: CPT

## 2021-11-15 PROCEDURE — A9585: CPT

## 2021-11-15 PROCEDURE — 83690 ASSAY OF LIPASE: CPT

## 2021-11-15 PROCEDURE — 84484 ASSAY OF TROPONIN QUANT: CPT

## 2021-11-15 PROCEDURE — 83605 ASSAY OF LACTIC ACID: CPT

## 2021-11-15 PROCEDURE — 82803 BLOOD GASES ANY COMBINATION: CPT

## 2021-11-15 PROCEDURE — 71275 CT ANGIOGRAPHY CHEST: CPT | Mod: MA

## 2021-11-15 PROCEDURE — 99285 EMERGENCY DEPT VISIT HI MDM: CPT

## 2021-11-15 PROCEDURE — 83880 ASSAY OF NATRIURETIC PEPTIDE: CPT

## 2021-11-15 PROCEDURE — 99232 SBSQ HOSP IP/OBS MODERATE 35: CPT

## 2021-11-15 PROCEDURE — 80053 COMPREHEN METABOLIC PANEL: CPT

## 2021-11-15 PROCEDURE — 36415 COLL VENOUS BLD VENIPUNCTURE: CPT

## 2021-11-15 PROCEDURE — 86769 SARS-COV-2 COVID-19 ANTIBODY: CPT

## 2021-11-15 PROCEDURE — 84100 ASSAY OF PHOSPHORUS: CPT

## 2021-11-15 PROCEDURE — 94640 AIRWAY INHALATION TREATMENT: CPT

## 2021-11-15 PROCEDURE — 87641 MR-STAPH DNA AMP PROBE: CPT

## 2021-11-15 PROCEDURE — 96374 THER/PROPH/DIAG INJ IV PUSH: CPT | Mod: XU

## 2021-11-15 PROCEDURE — 87640 STAPH A DNA AMP PROBE: CPT

## 2021-11-15 PROCEDURE — 83735 ASSAY OF MAGNESIUM: CPT

## 2021-11-15 PROCEDURE — 96375 TX/PRO/DX INJ NEW DRUG ADDON: CPT

## 2021-11-15 PROCEDURE — 84145 PROCALCITONIN (PCT): CPT

## 2021-11-15 PROCEDURE — 70553 MRI BRAIN STEM W/O & W/DYE: CPT

## 2021-11-15 PROCEDURE — 85014 HEMATOCRIT: CPT

## 2021-11-15 PROCEDURE — 82435 ASSAY OF BLOOD CHLORIDE: CPT

## 2021-11-15 PROCEDURE — 97161 PT EVAL LOW COMPLEX 20 MIN: CPT

## 2021-11-15 PROCEDURE — 82947 ASSAY GLUCOSE BLOOD QUANT: CPT

## 2021-11-15 PROCEDURE — 85025 COMPLETE CBC W/AUTO DIFF WBC: CPT

## 2021-11-15 PROCEDURE — 74177 CT ABD & PELVIS W/CONTRAST: CPT | Mod: MA

## 2021-11-15 PROCEDURE — 84132 ASSAY OF SERUM POTASSIUM: CPT

## 2021-11-15 PROCEDURE — U0003: CPT

## 2021-11-15 PROCEDURE — 70450 CT HEAD/BRAIN W/O DYE: CPT

## 2021-11-15 PROCEDURE — U0005: CPT

## 2021-11-15 PROCEDURE — 85027 COMPLETE CBC AUTOMATED: CPT

## 2021-11-15 PROCEDURE — 85018 HEMOGLOBIN: CPT

## 2021-11-15 PROCEDURE — 87040 BLOOD CULTURE FOR BACTERIA: CPT

## 2021-11-15 PROCEDURE — 0225U NFCT DS DNA&RNA 21 SARSCOV2: CPT

## 2021-11-15 PROCEDURE — 99239 HOSP IP/OBS DSCHRG MGMT >30: CPT

## 2021-11-15 PROCEDURE — 99233 SBSQ HOSP IP/OBS HIGH 50: CPT

## 2021-11-15 PROCEDURE — 93308 TTE F-UP OR LMTD: CPT

## 2021-11-15 PROCEDURE — 93005 ELECTROCARDIOGRAM TRACING: CPT

## 2021-11-15 PROCEDURE — 84295 ASSAY OF SERUM SODIUM: CPT

## 2021-11-15 RX ORDER — AMLODIPINE BESYLATE 2.5 MG/1
1 TABLET ORAL
Qty: 0 | Refills: 0 | DISCHARGE
Start: 2021-11-15

## 2021-11-15 RX ORDER — BUDESONIDE AND FORMOTEROL FUMARATE DIHYDRATE 160; 4.5 UG/1; UG/1
2 AEROSOL RESPIRATORY (INHALATION)
Qty: 1 | Refills: 0
Start: 2021-11-15 | End: 2021-12-14

## 2021-11-15 RX ORDER — ALBUTEROL 90 UG/1
2 AEROSOL, METERED ORAL
Qty: 1 | Refills: 0
Start: 2021-11-15 | End: 2021-12-14

## 2021-11-15 RX ORDER — CHLORHEXIDINE GLUCONATE 213 G/1000ML
1 SOLUTION TOPICAL
Refills: 0 | Status: DISCONTINUED | OUTPATIENT
Start: 2021-11-15 | End: 2021-11-15

## 2021-11-15 RX ORDER — FUROSEMIDE 40 MG
1 TABLET ORAL
Qty: 0 | Refills: 0 | DISCHARGE

## 2021-11-15 RX ORDER — AMLODIPINE BESYLATE 2.5 MG/1
1 TABLET ORAL
Qty: 0 | Refills: 0 | DISCHARGE

## 2021-11-15 RX ORDER — SENNA PLUS 8.6 MG/1
2 TABLET ORAL AT BEDTIME
Refills: 0 | Status: DISCONTINUED | OUTPATIENT
Start: 2021-11-15 | End: 2021-11-15

## 2021-11-15 RX ORDER — TIOTROPIUM BROMIDE 18 UG/1
1 CAPSULE ORAL; RESPIRATORY (INHALATION)
Qty: 1 | Refills: 0
Start: 2021-11-15 | End: 2021-12-14

## 2021-11-15 RX ORDER — POTASSIUM CHLORIDE 20 MEQ
40 PACKET (EA) ORAL EVERY 4 HOURS
Refills: 0 | Status: COMPLETED | OUTPATIENT
Start: 2021-11-15 | End: 2021-11-15

## 2021-11-15 RX ADMIN — PANTOPRAZOLE SODIUM 40 MILLIGRAM(S): 20 TABLET, DELAYED RELEASE ORAL at 05:28

## 2021-11-15 RX ADMIN — HYDROMORPHONE HYDROCHLORIDE 6 MILLIGRAM(S): 2 INJECTION INTRAMUSCULAR; INTRAVENOUS; SUBCUTANEOUS at 01:45

## 2021-11-15 RX ADMIN — TIOTROPIUM BROMIDE 1 CAPSULE(S): 18 CAPSULE ORAL; RESPIRATORY (INHALATION) at 12:15

## 2021-11-15 RX ADMIN — HYDROMORPHONE HYDROCHLORIDE 6 MILLIGRAM(S): 2 INJECTION INTRAMUSCULAR; INTRAVENOUS; SUBCUTANEOUS at 14:34

## 2021-11-15 RX ADMIN — AMLODIPINE BESYLATE 10 MILLIGRAM(S): 2.5 TABLET ORAL at 05:26

## 2021-11-15 RX ADMIN — APIXABAN 5 MILLIGRAM(S): 2.5 TABLET, FILM COATED ORAL at 05:26

## 2021-11-15 RX ADMIN — Medication 40 MILLIEQUIVALENT(S): at 12:15

## 2021-11-15 RX ADMIN — Medication 40 MILLIEQUIVALENT(S): at 08:47

## 2021-11-15 RX ADMIN — HYDROMORPHONE HYDROCHLORIDE 6 MILLIGRAM(S): 2 INJECTION INTRAMUSCULAR; INTRAVENOUS; SUBCUTANEOUS at 06:25

## 2021-11-15 RX ADMIN — Medication 1 MILLIGRAM(S): at 12:16

## 2021-11-15 RX ADMIN — PIPERACILLIN AND TAZOBACTAM 25 GRAM(S): 4; .5 INJECTION, POWDER, LYOPHILIZED, FOR SOLUTION INTRAVENOUS at 06:16

## 2021-11-15 RX ADMIN — Medication 40 MILLIGRAM(S): at 05:25

## 2021-11-15 RX ADMIN — Medication 3 MILLILITER(S): at 12:16

## 2021-11-15 RX ADMIN — ONDANSETRON 4 MILLIGRAM(S): 8 TABLET, FILM COATED ORAL at 05:26

## 2021-11-15 RX ADMIN — Medication 3 MILLILITER(S): at 05:27

## 2021-11-15 RX ADMIN — HYDROMORPHONE HYDROCHLORIDE 6 MILLIGRAM(S): 2 INJECTION INTRAMUSCULAR; INTRAVENOUS; SUBCUTANEOUS at 01:05

## 2021-11-15 RX ADMIN — HYDROMORPHONE HYDROCHLORIDE 6 MILLIGRAM(S): 2 INJECTION INTRAMUSCULAR; INTRAVENOUS; SUBCUTANEOUS at 05:25

## 2021-11-15 RX ADMIN — Medication 2 CAPSULE(S): at 12:15

## 2021-11-15 RX ADMIN — Medication 2 CAPSULE(S): at 08:47

## 2021-11-15 RX ADMIN — BUDESONIDE AND FORMOTEROL FUMARATE DIHYDRATE 2 PUFF(S): 160; 4.5 AEROSOL RESPIRATORY (INHALATION) at 05:28

## 2021-11-15 RX ADMIN — HYDROMORPHONE HYDROCHLORIDE 6 MILLIGRAM(S): 2 INJECTION INTRAMUSCULAR; INTRAVENOUS; SUBCUTANEOUS at 13:02

## 2021-11-15 RX ADMIN — Medication 100 MILLIGRAM(S): at 05:26

## 2021-11-15 NOTE — PROGRESS NOTE ADULT - SUBJECTIVE AND OBJECTIVE BOX
[facility]  Erick Keenan PA-C  [facility] [place] [address]  [phone]      INTERVAL HPI/OVERNIGHT EVENTS:  pt seen and examined, abdominal pain better controlled, still SOB when walking from bathroom to bed   feeling hungry, tolerating regular diet, no N/V    MEDICATIONS  (STANDING):  albuterol/ipratropium for Nebulization 3 milliLiter(s) Nebulizer every 6 hours  amLODIPine   Tablet 10 milliGRAM(s) Oral daily  apixaban 5 milliGRAM(s) Oral two times a day  budesonide  80 MICROgram(s)/formoterol 4.5 MICROgram(s) Inhaler 2 Puff(s) Inhalation two times a day  folic acid 1 milliGRAM(s) Oral daily  influenza   Vaccine 0.5 milliLiter(s) IntraMuscular once  metoprolol succinate  milliGRAM(s) Oral daily  mirtazapine 15 milliGRAM(s) Oral at bedtime  pancrelipase  (CREON 12,000 Lipase Units) 2 Capsule(s) Oral three times a day with meals  pantoprazole    Tablet 40 milliGRAM(s) Oral before breakfast  piperacillin/tazobactam IVPB.. 3.375 Gram(s) IV Intermittent every 8 hours  predniSONE   Tablet 40 milliGRAM(s) Oral daily  tiotropium 18 MICROgram(s) Capsule 1 Capsule(s) Inhalation daily    MEDICATIONS  (PRN):  acetaminophen     Tablet .. 650 milliGRAM(s) Oral every 6 hours PRN Temp greater or equal to 38C (100.4F), Mild Pain (1 - 3)  HYDROmorphone   Tablet 4 milliGRAM(s) Oral every 4 hours PRN Moderate Pain (4 - 6)  HYDROmorphone   Tablet 6 milliGRAM(s) Oral every 4 hours PRN Severe Pain (7 - 10)  melatonin 3 milliGRAM(s) Oral at bedtime PRN Insomnia  metoclopramide Injectable 10 milliGRAM(s) IV Push every 6 hours PRN vomiting/nausea  morphine  - Injectable 2 milliGRAM(s) IV Push every 4 hours PRN Severe Pain (7 - 10)  ondansetron Injectable 4 milliGRAM(s) IV Push every 4 hours PRN Nausea and/or Vomiting      Allergies    diazepam (Other)  lemon (Other)    Intolerances        ROS:   General:  No wt loss, fevers, chills, night sweats, fatigue,   Eyes:  Good vision, no reported pain  ENT:  No sore throat, pain, runny nose, dysphagia  CV:  No pain, palpitations, hypo/hypertension  Resp:  + dyspnea, cough, tachypnea, wheezing  GI:  + pain, No nausea, No vomiting, No diarrhea, No constipation, No weight loss, No fever, No pruritis, No rectal bleeding, No tarry stools, No dysphagia,  :  No pain, bleeding, incontinence, nocturia  Muscle:  No pain, weakness  Neuro:  No weakness, tingling, memory problems  Psych:  No fatigue, insomnia, mood problems, depression  Endocrine:  No polyuria, polydipsia, cold/heat intolerance  Heme:  No petechiae, ecchymosis, easy bruisability  Skin:  No rash, tattoos, scars, edema      PHYSICAL EXAM:   Vital Signs:  Vital Signs Last 24 Hrs  T(C): 36.8 (13 Nov 2021 09:45), Max: 37.1 (13 Nov 2021 04:40)  T(F): 98.2 (13 Nov 2021 09:45), Max: 98.8 (13 Nov 2021 04:40)  HR: 98 (13 Nov 2021 09:45) (62 - 100)  BP: 113/74 (13 Nov 2021 09:45) (107/68 - 113/74)  BP(mean): --  RR: 18 (13 Nov 2021 09:45) (18 - 18)  SpO2: 98% (13 Nov 2021 09:45) (96% - 98%)  Daily     Daily     GENERAL:  Appears stated age,   HEENT:  NC/AT,    CHEST:  Full & symmetric excursion,   HEART:  Regular rhythm,  ABDOMEN:  Soft, non-tender, non-distended,  EXTEREMITIES:  no cyanosis  SKIN:  No rash  NEURO:  Alert,       LABS:                        9.1    10.76 )-----------( 423      ( 13 Nov 2021 06:22 )             28.5     11-13    143  |  111<H>  |  6<L>  ----------------------------<  88  4.1   |  21<L>  |  0.94    Ca    8.1<L>      13 Nov 2021 06:22  Phos  2.1     11-13  Mg     1.9     11-13    TPro  5.4<L>  /  Alb  2.2<L>  /  TBili  0.2  /  DBili  x   /  AST  25  /  ALT  24  /  AlkPhos  107  11-13          RADIOLOGY & ADDITIONAL TESTS:

## 2021-11-15 NOTE — PROGRESS NOTE ADULT - PROBLEM SELECTOR PLAN 5
Not on any outpatient nebulizers or inhalers, has O2 at home but has not used since this summer  - c/w duonebs, prednisone 40 mg PO qd x 5 days (11/10 - 11/15)  - c/w Symbicort, Spiriva  - off O2  - infectious workup as above  - Pulm following: apprec recs Hx Stage IIIB (dx 2/2020 s/p chemo now on Keytruda maintenance every 3 weeks)  - No intervention as inpatient at this time  - Hem/Onc consulted: apprec recs  - Holding Keytruda (last dose 9/30)  - negative CTH non-contrast and MR brain w/ IV contrast ruled out brain mets

## 2021-11-15 NOTE — PROGRESS NOTE ADULT - REASON FOR ADMISSION
Acute hypoxic respiratory failure

## 2021-11-15 NOTE — PROGRESS NOTE ADULT - PROBLEM SELECTOR PLAN 8
Dispo: home pending medical management  Diet: regular diet  DVT ppx: eliquis - c/w home omeprazole 40 mg qd

## 2021-11-15 NOTE — PROGRESS NOTE ADULT - ATTENDING COMMENTS
Breathing improved  Continue eliquis  D/C planning with outpatient vasc/card followup      Armond [phone]
Continue eliquis 5mg BID  no further cardiac testing  appreciate sub-specialty care      Armond [phone]
65 year old female with PMH HTN, COPD, NSCLC currently on Keytruda, PE on Eliquis and chronic pancreatitis s/p Jeffery procedure who initially presented with abdominal pain. The patient states she has had the pain for several weeks and saw her surgeon Dr. Molina who performed a CT scan but it was negative for acute findings. In the days leading up to her presentation, her pain worsened and she began to have vomiting and inability to tolerate PO so she came to the ED. Her CT scan showed thickening of the descending colon which may be least partially be due to under distention-correlate for signs of colitis however the patient does not have any diarrhea. Additionally, her abdominal ultrasound was negative for acute pathology. For now, will treat with analgesics and antiemetics. Additionally, will consult patient's surgeon Dr. Molina for evaluation as well as GI Dr. Gresham who previously saw the patient in 2019 for pancreatitis. Oncology, cardiology and pulmonary were also consulted, will follow up recs. Can treat with zosyn for now for possible colitis, the patient seems to be improving. Her diet was advanced to regular and will attempt to transition her to PO pain medications later today. Rest of plan as above.    Jose A Convissar, DO  Pager [phone]  If no answer [phone]
65 year old female with PMH HTN, COPD, NSCLC currently on Keytruda, PE on Eliquis and chronic pancreatitis s/p Jeffery procedure who initially presented with abdominal pain. The patient states she has had the pain for several weeks and saw her surgeon Dr. Molina who performed a CT scan but it was negative for acute findings. In the days leading up to her presentation, her pain worsened and she began to have vomiting and inability to tolerate PO so she came to the ED. Her CT scan showed thickening of the descending colon which may be least partially be due to under distention-correlate for signs of colitis however the patient does not have any diarrhea. Additionally, her abdominal ultrasound was negative for acute pathology. For now, will treat with analgesics and antiemetics.  surgeon Dr. Molina and GI Dr. Gresham consulted. . Oncology, cardiology and pulmonary were also consulted, will follow up recs. Can treat with zosyn for now for possible colitis, the patient seems to be improving.     when ready dc will transition zosyn to PO.    Katie Mckinney D.O.  Hospitalist- available on MS teams
65 year old female with PMH HTN, COPD, NSCLC currently on Keytruda, PE on Eliquis and chronic pancreatitis s/p Jeffery procedure who presented with abdominal pain. The patient states she has had the pain for several weeks and saw her surgeon Dr. Molina who performed a CT scan but it was negative for acute findings. Prior to presentation, her pain worsened and she began to have vomiting and inability to tolerate PO so she came to the ED. On presentation, CT showed thickening of the descending colon which may be least partially be due to under distention-correlate for signs of colitis however the patient does not have any diarrhea. Additionally, her abdominal ultrasound was negative for acute pathology. For now, will treat with analgesics and antiemetics. Additionally, will consult patient's surgeon Dr. Molina for evaluation. The patient's oncologist and cardiologist were also consulted, will follow up recs. Her CT chest showed severe emphysema and fibrosis-will consult pulmonary team. Follow up infectious workup, can treat with zosyn for now for possible colitis. Rest of plan as above.    Jose A Convissar, DO  Pager [phone]  If no answer [phone]
65 year old female with PMH HTN, COPD, NSCLC currently on Keytruda, PE on Eliquis and chronic pancreatitis s/p Jeffery procedure who initially presented with abdominal pain. The patient states she has had the pain for several weeks and saw her surgeon Dr. Molina who performed a CT scan but it was negative for acute findings. In the days leading up to her presentation, her pain worsened and she began to have vomiting and inability to tolerate PO so she came to the ED. Her CT scan showed thickening of the descending colon which may be least partially be due to under distention-correlate for signs of colitis however the patient does not have any diarrhea. Additionally, her abdominal ultrasound was negative for acute pathology. For now, will treat with analgesics and antiemetics.  surgeon Dr. Molina and GI Dr. Gresham. Oncology, cardiology and pulmonary were also consulted, will follow up recs. Can treat with zosyn for now for possible colitis, the patient seems to be improving.     when ready dc will transition zosyn to PO. (end date 11/16)    Katie Mckinney D.O.  Hospitalist- available on MS teams
65 year old female with PMH HTN, COPD, NSCLC currently on Keytruda, PE on Eliquis and chronic pancreatitis s/p Jeffery procedure who presents with abdominal pain. The patient states she has had the pain for several weeks and saw her surgeon Dr. Molina who performed a CT scan but it was negative for acute findings. Over the weekend, her pain worsened and she began to have vomiting and inability to tolerate PO so she came to the ED. On presentation, CT showed thickening of the descending colon which may be least partially be due to under distention-correlate for signs of colitis however the patient does not have any diarrhea. Additionally, her abdominal ultrasound was negative for acute pathology. For now, will treat with analgesics and antiemetics. Additionally, will consult patient's surgeon Dr. Molina for evaluation. The patient's oncologist and cardiologist were also consulted, will follow up recs. Her CT chest showed severe emphysema and fibrosis-will consult pulmonary team. Follow up infectious workup, can treat with zosyn for now for possible colitis. Rest of plan as above.    Jose A Convissar, DO  Pager [phone]  If no answer [phone]
65 year old female with PMH HTN, COPD, NSCLC currently on Keytruda, PE on Eliquis and chronic pancreatitis s/p Jeffery procedure who initially presented with abdominal pain. The patient states she has had the pain for several weeks and saw her surgeon Dr. Molina who performed a CT scan but it was negative for acute findings. In the days leading up to her presentation, her pain worsened and she began to have vomiting and inability to tolerate PO so she came to the ED. Her CT scan showed thickening of the descending colon which may be least partially be due to under distention-correlate for signs of colitis-she completed a 7 day course of zosyn for colitis. Her abdominal ultrasound was negative for acute pathology. She was treated with analgesics and antiemetics and her symptoms improved and she was deemed medically stable for discharge with PCP, cardiology, pulmonary, surgery, GI and heme/onc follow up. Rest of plan as above. Total time spent discharge planning 45 minutes.    Jose A Convissar,   Pager [phone]  If no answer [phone]

## 2021-11-15 NOTE — PROGRESS NOTE ADULT - PROBLEM SELECTOR PLAN 2
History of chronic pancreatitis s/p Jeffery procedure 2019, follows with Dr. Molina. EKG (11/10) QTc 452 sinus tachycardia  - s/p 1L LR in ED, started on D5 LR @100 cc/hr, now s/p IVF  - c/w pancrelipase  - tolerating regular diet  - for pain: dilaudid 4 mg PO Q4h for moderate and dilaudid 6 mg PO Q4h for severe  - zofran 4 mg PO Q6h for nausea/vomiting  - holding home oxycodone prn  - House surgery consulted, as Dr. Molina unavailable  - Dr. Damian consulted and GI following: apprec recs Found on CT abd/pelvis  -no diarrhea  -on zosyn (11/9- ), will tx for 7 day course. Last day 11/15.

## 2021-11-15 NOTE — MEDICAL STUDENT PROGRESS NOTE(EDUCATION) - NS MD HP STUD ASPLAN ASSES FT
Patient is a 65 year old female with a history of HTN, COPD, NSCLC (adenocarcinoma left lung) Stage IIIB (dx 2/2020 s/p chemo followed by immunotherapy, currently on Keytruda maintenance therapy), pulmonary embolus on apixaban, chronic pancreatitis (s/p Jeffery procedure 2019), hx of ARDS (2015), GERD, chronic pain (on opioids), and s/p vocal cord polpectomy who presents with tachypnea and SOB that started last Friday, requiring 2L NC and epigastric pain with vomiting that started 4 days ago c/f COPD exacerbation vs right-sided HF vs acute pancreatitis vs infectious process.
Patient is a 65 year old female with a history of HTN, COPD, NSCLC (adenocarcinoma left lung) Stage IIIB (dx 2/2020 s/p chemo followed by immunotherapy, currently on Keytruda maintenance therapy), pulmonary embolus on apixaban, chronic pancreatitis (s/p Jeffery procedure 2019), hx of ARDS (2015), GERD, chronic pain (on opioids), and s/p vocal cord polypectomy who presents with tachypnea and SOB that started last Friday, requiring 2L NC and epigastric pain with vomiting that started 4 days ago c/f COPD exacerbation vs right-sided HF vs acute pancreatitis vs infectious process.
Patient is a 65 year old female with a history of HTN, COPD, NSCLC (adenocarcinoma left lung) Stage IIIB (dx 2/2020 s/p chemo followed by immunotherapy, currently on Keytruda maintenance therapy), pulmonary embolus on apixaban, chronic pancreatitis (s/p Jeffery procedure 2019), hx of ARDS (2015), GERD, chronic pain (on opioids), and s/p vocal cord polypectomy who presents with tachypnea and SOB that started last Friday, requiring 2L NC and epigastric pain with vomiting that started 4 days before admission c/f COPD exacerbation vs acute pancreatitis vs infectious process.

## 2021-11-15 NOTE — DISCHARGE NOTE NURSING/CASE MANAGEMENT/SOCIAL WORK - NSDCFUADDAPPT_GEN_ALL_CORE_FT
Please make an appointment with Dr. Abad (pulmonary medicine) within 1-2 week of hospital discharge.     Please make an appointment with Dr. Cage within 1-2 week of hospital discharge to determine when to restart Keytruda and for further management of your lung cancer.    Please HOLD your Lasix 40mg daily until you follow up with your primary care doctor. She/He can determine if you can resume taking it.

## 2021-11-15 NOTE — MEDICAL STUDENT PROGRESS NOTE(EDUCATION) - NS MD HP STUD ASPLAN PLAN FT
Problem/Plan - 1:  ·  Problem: Acute respiratory failure with hypoxia.   ·  Plan: Admitted for tachypnea and hypoxia, requiring 2L NC, now off O2. Etiology unclear but likely multifactorial. DDx includes COPD exacerbation, infectious process, right-sided HF, NSCLC progression. Colitis on CT a/p but no diarrhea.   - Bedside echo ruled out pericardial effusion (11/9)  - CTA w/ evidence of chronic thromboembolic disease not no acute PE (11/9)  - Trend CBC and fever curve  - BCx NGTD, RVP negative.  - c/w Duonebs, prednisone 40 mg PO qd x 5 days (11/10 - 11/15)  - c/w Symbicort, Spiriva  - c/w IV Zosyn until discharge then switch to PO Zosyn until 11/16, procal 1.4  - Lactate downtrended   - TTE (11/11/21) EF 71%, calcified AV  - Pulm consulted: apprec recs  - Dr. Leahy (cardiology) consulted: c/w apixaban, consult pulm.     Problem/Plan - 2:  ·  Problem: Acute pancreatitis.   ·  Plan: History of chronic pancreatitis s/p Jeffery procedure 2019, follows with Dr. Molina. EKG (11/10) QTc 452 sinus tachycardia  - s/p 1L LR in ED, started on D5 LR @100 cc/hr, now s/p IVF  - c/w pancrelipase  - tolerating regular diet  - for pain: dilaudid 4 mg PO Q4h for moderate and dilaudid 6 mg PO Q4h for severe  - zofran 4 mg PO Q6h for nausea/vomiting  - holding home oxycodone prn  - House surgery consulted, as Dr. Molina unavailable  - Dr. Damian consulted and GI following: apprec recs.     Problem/Plan - 3:  ·  Problem: Pulmonary thromboembolism.   ·  Plan: History of DVT/PE on eliquis  - C/w home apixaban 5 mg BID  - CTA w/ evidence of chronic thromboembolic disease but no acute PE.     Problem/Plan - 4:  ·  Problem: Lung cancer.   ·  Plan: Hx Stage IIIB (dx 2/2020 s/p chemo now on Keytruda maintenance every 3 weeks)  - No intervention as inpatient at this time  - Hem/Onc consulted: apprec recs  - Holding Keytruda (last dose 9/30)  - negative CTH non-contrast and MR brain w/ IV contrast ruled out brain mets.     Problem/Plan - 5:  ·  Problem: COPD with emphysema.   ·  Plan: Not on any outpatient nebulizers or inhalers, has O2 at home but has not used since this summer  - c/w duonebs, prednisone 40 mg PO qd x 5 days (11/10 - 11/15)  - c/w Symbicort, Spiriva  - off O2  - infectious workup as above  - Pulm following: apprec recs.     Problem/Plan - 6:  ·  Problem: Hypertension.   ·  Plan: - c/w home amlodipine 10 mg qd  - c/w home metoprolol succinate 100 mg qd.     Problem/Plan - 7:  ·  Problem: GERD (gastroesophageal reflux disease).   ·  Plan: - c/w home omeprazole 40 mg qd.     Problem/Plan - 8:  ·  Problem: Need for prophylactic measure.   ·  Plan: Dispo: home pending medical management  - Diet: regular diet  - DVT ppx: eliquis.
 Problem/Plan - 1:  ·  Problem: Acute respiratory failure with hypoxia.   ·  Plan: Admitted for tachypnea and hypoxia, requiring 2L NC. Etiology unclear but likely multifactorial. DDx includes COPD exacerbation, infectious process, right-sided HF, NSCLC progression.  - Bedside echo ruled out pericardial effusion (11/9)  - CTA w/ evidence of chronic thromboembolic disease not no acute PE (11/9)  - Trend CBC and fever curve  - F/u BCx (NGTD), Sputum cx, UCx, RVP (negative)  - O2 weaned, patiently currently satting well on RA  - C/w duonebs, started prednisone 40 mg PO qd  - C/w zosyn, procal 1.4  - Trending lactate to peak 2.4--->2.6 (s/p 500cc LR bolus 11/10)  - TTE (11/10): no significant changes from 6/2021. Calcified aortic valve, normal LV systolic function, mild diastolic dysfunction. EF 71%.  - Pulm consulted: appreciate recs  - Dr. Leahy (cardiology) consulted: c/w apixaban 5mg BID, consult pulmonary.     Problem/Plan - 2:  ·  Problem: Acute pancreatitis.   ·  Plan: History of chronic pancreatitis s/p Jeffery procedure 2019, follows with Dr. Molina. Still reports 10/10 abdominal pain and nausea.  - S/p 1L LR in ED, started on D5 LR @50 cc/hr  - C/w pancrelipase  - Zofran 4mg Q4h PRN for nausea, and Reglan 10 mg Q6h PRN for breakthrough nausea/vomiting  - CLD-->advance diet as tolerated. Patient reportedly ate half a sandwich yesterday and did not vomit.  - For pain: dilaudid 0.5 mg Q4h for moderate and dilaudid 1 mg Q4h for severe, morphine 2 mg Q6h PRN for breakthrough pain  - Holding home oxycodone prn  - Dr. Molina consulted: appreciate recs.     Problem/Plan - 3:  ·  Problem: Pulmonary thromboembolism.   ·  Plan: History of DVT/PE on eliquis  - CTA w/ evidence of chronic thromboembolic disease but no acute PE.  - C/w PO apixaban 5 mg BID     Problem/Plan - 4:  ·  Problem: Lung cancer.   ·  Plan: Hx Stage IIIB (dx 2/2020 s/p chemo now on Keytruda maintenance every 3 weeks)  - No intervention as inpatient at this time  - Holding Keytruda (last dose 9/30)  - Per Hem/Onc recs: CTH non-contrast and MR brain w/ IV contrast to rule out brain mets.  - CTH: no acute hemorrhage, extra-axial collections, or displaced calvarial fracture.  - MR brain w/ IV contrast: no evidence of metastatic disease. 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     Problem/Plan - 5:  ·  Problem: COPD with emphysema.   ·  Plan: Not on any outpatient nebulizers or inhalers, has O2 at home but has not used since this summer  - management per above  - c/w O2 as above  - infectious workup as above.     Problem/Plan - 6:  ·  Problem: Hypertension.   ·  Plan: - c/w home amlodipine 10 mg qd  - c/w home metoprolol succinate 100 mg qd.     Problem/Plan - 7:  ·  Problem: GERD (gastroesophageal reflux disease).   ·  Plan: - c/w home omeprazole 40 mg qd.     Problem/Plan - 8:  ·  Problem: Need for prophylactic measure.   ·  Plan: Dispo: pending PT eval  Diet: CLD-->advance as tolerated  DVT ppx: eliquis
 Problem/Plan - 1:  ·  Problem: Acute respiratory failure with hypoxia.   ·  Plan: Admitted for tachypnea and hypoxia, requiring 2L NC, now off O2. Etiology unclear but likely multifactorial. DDx includes COPD exacerbation, infectious process, right-sided HF, NSCLC progression. Colitis on CT a/p but no diarrhea.   - Bedside echo ruled out pericardial effusion (11/9)  - CTA w/ evidence of chronic thromboembolic disease not no acute PE (11/9)  - Trend CBC and fever curve  - BCx: NGTD; RVP negative, f/u sputum cx, UCx  - O2 weaned, patient satting well on RA  - Per Pulm recs: c/w duonebs, prednisone 40 mg PO qd, Singulair, and Spiriva  - C/w zosyn, procal 1.4  - Trending lactate to peak 2.4--->2.6 (s/p 500cc LR bolus 11/10)  - TTE (11/11/21) EF 71%, calcified AV  - Dr. Leahy (cardiology) consulted: c/w apixaban 5mg BID, consult pulm.     Problem/Plan - 2:  ·  Problem: Acute pancreatitis.   ·  Plan: History of chronic pancreatitis s/p Jeffery procedure 2019, follows with Dr. Molina. EKG (11/10) QTc 452 sinus tachycardia. Reporting improvement in abdominal pain since admission.  - S/p 1L LR in ED, started on D5 LR @100 cc/hr  - C/w pancrelipase  - PRN Zofran 4mg Q4h for nausea, and Reglan 10 mg Q6h PRN for breakthrough nausea/vomiting  - Advance to regular diet.   - For pain: Dilaudid 0.5 mg Q4h for moderate and Dilaudid 1 mg Q4h for severe, morphine 2 mg Q6h PRN for breakthrough pain  - Holding home oxycodone prn  - Dr. Molina consulted: apprec recs  - GI consulted: will reach out to Dr. Damian.     Problem/Plan - 3:  ·  Problem: Pulmonary thromboembolism.   ·  Plan: History of DVT/PE on eliquis  - CTA w/ evidence of chronic thromboembolic disease but no acute PE.  - C/w PO apixaban 5 mg BID     Problem/Plan - 4:  ·  Problem: Lung cancer.   ·  Plan: Hx Stage IIIB (dx 2/2020 s/p chemo now on Keytruda maintenance every 3 weeks)  - No intervention as inpatient at this time  - Hem/Onc consulted: apprec recs  - Holding Keytruda (last dose 9/30)  - Negative CTH non-contrast and MR brain w/ IV contrast ruled out brain mets.     Problem/Plan - 5:  ·  Problem: COPD with emphysema.   ·  Plan: Not on any outpatient nebulizers or inhalers, has O2 at home but has not used since this summer  - Management per above  - Off O2  - Infectious workup as above.     Problem/Plan - 6:  ·  Problem: Hypertension.   ·  Plan: - C/w home amlodipine 10 mg qd  - C/w home metoprolol succinate 100 mg qd.     Problem/Plan - 7:  ·  Problem: GERD (gastroesophageal reflux disease).   ·  Plan: - C/w home omeprazole 40 mg qd.     Problem/Plan - 8:  ·  Problem: Need for prophylactic measure.   ·  Plan: Dispo: pending PT eval  - Diet: advance diet to regular diet  - DVT PPx: apixaban 5mg BID

## 2021-11-15 NOTE — PROGRESS NOTE ADULT - SUBJECTIVE AND OBJECTIVE BOX
[facility] - Division of Pulmonary, Critical Care and Sleep Medicine   Please call [phone] between 8am-pm weekdays, [phone] after hours and weekends    Interval Events: no acute events overnight, no fevers or chills, no chest pain or dyspnea. Able to walk to and from the bathroom and take a shower without developing dyspnea.    REVIEW OF SYSTEMS:  Constitutional: no fever, chills, fatigue  Neuro: no headache, numbness, weakness  Resp: no cough, wheezing, shortness of breath  CVS: no chest pain, palpitations, leg swelling  GI: +abdominal pain, no nausea, vomiting, diarrhea   : no dysuria, frequency, incontinence  Skin: no itching, burning, rashes, or lesions   Msk: no joint pain or swelling  Psych: no depression, anxiety  [x] All other systems negative    OBJECTIVE:  ICU Vital Signs Last 24 Hrs  T(C): 36.6 (15 Nov 2021 12:51), Max: 36.7 (15 Nov 2021 05:15)  T(F): 97.9 (15 Nov 2021 12:51), Max: 98.1 (15 Nov 2021 05:15)  HR: 104 (15 Nov 2021 12:51) (99 - 105)  BP: 121/73 (15 Nov 2021 12:51) (121/73 - 151/84)  RR: 18 (15 Nov 2021 12:51) (18 - 18)  SpO2: 97% (15 Nov 2021 12:51) (95% - 97%)    11-14 @ 07:01  -  11-15 @ 07:00  --------------------------------------------------------  IN: 540 mL / OUT: 0 mL / NET: 540 mL        PHYSICAL EXAM:  Gen: NAD; AAOx3  Neuro: CN II-XII grossly intact; moving all extremities   HEENT: NC/AT; EOMI; MMM  CV: normal S1 & S2; regular rate and rhythm   Pulm: clear to auscultation bilaterally; no wheezing or rales  GI: soft; ND; +epigastric tenderness to palpation  Ext: no edema; pulses intact  Skin: warm, well perfused    HOSPITAL MEDICATIONS:  MEDICATIONS  (STANDING):  albuterol/ipratropium for Nebulization 3 milliLiter(s) Nebulizer every 6 hours  amLODIPine   Tablet 10 milliGRAM(s) Oral daily  apixaban 5 milliGRAM(s) Oral two times a day  budesonide  80 MICROgram(s)/formoterol 4.5 MICROgram(s) Inhaler 2 Puff(s) Inhalation two times a day  chlorhexidine 2% Cloths 1 Application(s) Topical <User Schedule>  folic acid 1 milliGRAM(s) Oral daily  influenza   Vaccine 0.5 milliLiter(s) IntraMuscular once  metoprolol succinate  milliGRAM(s) Oral daily  mirtazapine 15 milliGRAM(s) Oral at bedtime  pancrelipase  (CREON 12,000 Lipase Units) 2 Capsule(s) Oral three times a day with meals  pantoprazole    Tablet 40 milliGRAM(s) Oral before breakfast  piperacillin/tazobactam IVPB.. 3.375 Gram(s) IV Intermittent every 8 hours  senna 2 Tablet(s) Oral at bedtime  tiotropium 18 MICROgram(s) Capsule 1 Capsule(s) Inhalation daily    MEDICATIONS  (PRN):  acetaminophen     Tablet .. 650 milliGRAM(s) Oral every 6 hours PRN Temp greater or equal to 38C (100.4F), Mild Pain (1 - 3)  HYDROmorphone   Tablet 4 milliGRAM(s) Oral every 4 hours PRN Moderate Pain (4 - 6)  HYDROmorphone   Tablet 6 milliGRAM(s) Oral every 4 hours PRN Severe Pain (7 - 10)  melatonin 3 milliGRAM(s) Oral at bedtime PRN Insomnia  ondansetron    Tablet 4 milliGRAM(s) Oral every 6 hours PRN Nausea and/or Vomiting      LABS:                        9.2    11.66 )-----------( 416      ( 15 Nov 2021 06:48 )             29.6     Hgb Trend: 9.2<--, 9.2<--, 9.1<--, 9.3<--, 10.0<--  11-15    144  |  109<H>  |  6<L>  ----------------------------<  117<H>  3.0<L>   |  21<L>  |  1.15    Ca    8.3<L>      15 Nov 2021 06:47  Phos  2.9     11-15  Mg     1.8     11-15    TPro  5.4<L>  /  Alb  2.5<L>  /  TBili  0.2  /  DBili  x   /  AST  19  /  ALT  26  /  AlkPhos  133<H>  11-15    Creatinine Trend: 1.15<--, 1.04<--, 0.94<--, 0.93<--, 0.96<--, 0.95<--    CTPA (11/11/21): No acute PE. +Chronic PE in LLL with obliteration of the LLL vessels with paucity of vasculature in the left lung. Additional calcified PE in the RLL interlobar artery consistent with chronic PE. Severe centrilobular emphysema along with bilateral reticular opacities consistent with fibrosis, unchanged. The LLL nodular opacity present on CT chest from Feb 2020 is no longer present and now replaced with a pulmonary cyst.

## 2021-11-15 NOTE — PROGRESS NOTE ADULT - SUBJECTIVE AND OBJECTIVE BOX
Sobeida Rodriguez (Jose) PGY-3  Pager: NS) [phone]/ (PXG) [phone]    Patient is a 65y old  Female who presents with a chief complaint of Acute hypoxic respiratory failure (14 Nov 2021 11:33)      SUBJECTIVE / OVERNIGHT EVENTS:  No acute events over night. Denies any fevers/chills, headache, CP, SOB, abd pain, N/V/D, constipation, or leg swelling.       MEDICATIONS  (STANDING):  albuterol/ipratropium for Nebulization 3 milliLiter(s) Nebulizer every 6 hours  amLODIPine   Tablet 10 milliGRAM(s) Oral daily  apixaban 5 milliGRAM(s) Oral two times a day  budesonide  80 MICROgram(s)/formoterol 4.5 MICROgram(s) Inhaler 2 Puff(s) Inhalation two times a day  folic acid 1 milliGRAM(s) Oral daily  influenza   Vaccine 0.5 milliLiter(s) IntraMuscular once  metoprolol succinate  milliGRAM(s) Oral daily  mirtazapine 15 milliGRAM(s) Oral at bedtime  pancrelipase  (CREON 12,000 Lipase Units) 2 Capsule(s) Oral three times a day with meals  pantoprazole    Tablet 40 milliGRAM(s) Oral before breakfast  piperacillin/tazobactam IVPB.. 3.375 Gram(s) IV Intermittent every 8 hours  potassium chloride    Tablet ER 40 milliEquivalent(s) Oral every 4 hours  tiotropium 18 MICROgram(s) Capsule 1 Capsule(s) Inhalation daily    MEDICATIONS  (PRN):  acetaminophen     Tablet .. 650 milliGRAM(s) Oral every 6 hours PRN Temp greater or equal to 38C (100.4F), Mild Pain (1 - 3)  HYDROmorphone   Tablet 4 milliGRAM(s) Oral every 4 hours PRN Moderate Pain (4 - 6)  HYDROmorphone   Tablet 6 milliGRAM(s) Oral every 4 hours PRN Severe Pain (7 - 10)  melatonin 3 milliGRAM(s) Oral at bedtime PRN Insomnia  ondansetron    Tablet 4 milliGRAM(s) Oral every 6 hours PRN Nausea and/or Vomiting          OBJECTIVE:  Vital Signs Last 24 Hrs  T(C): 36.7 (15 Nov 2021 05:15), Max: 36.9 (14 Nov 2021 11:30)  T(F): 98.1 (15 Nov 2021 05:15), Max: 98.4 (14 Nov 2021 11:30)  HR: 99 (15 Nov 2021 05:15) (99 - 105)  BP: 151/84 (15 Nov 2021 05:15) (106/67 - 151/84)  BP(mean): --  RR: 18 (15 Nov 2021 05:15) (18 - 18)  SpO2: 97% (15 Nov 2021 05:15) (95% - 99%)  PHYSICAL EXAM:  GENERAL: NAD, well-developed  HEAD:  Atraumatic, Normocephalic  EYES: conjunctiva and sclera clear  NECK: Supple, No JVD  CHEST/LUNG: Clear to auscultation bilaterally; No wheeze  HEART: Regular rate and rhythm; No murmurs, rubs, or gallops  ABDOMEN: Soft, Nontender, Nondistended; Bowel sounds present  EXTREMITIES:  No clubbing, cyanosis, or edema  PSYCH: AAOx3  NEUROLOGY: non-focal  SKIN: No rashes or lesions    CAPILLARY BLOOD GLUCOSE        I&O's Summary    14 Nov 2021 07:01  -  15 Nov 2021 07:00  --------------------------------------------------------  IN: 540 mL / OUT: 0 mL / NET: 540 mL              LABS:                        9.2    11.66 )-----------( 416      ( 15 Nov 2021 06:48 )             29.6     11-15    144  |  109<H>  |  6<L>  ----------------------------<  117<H>  3.0<L>   |  21<L>  |  1.15    Ca    8.3<L>      15 Nov 2021 06:47  Phos  2.9     11-15  Mg     1.8     11-15    TPro  5.4<L>  /  Alb  2.5<L>  /  TBili  0.2  /  DBili  x   /  AST  19  /  ALT  26  /  AlkPhos  133<H>  11-15                  RADIOLOGY & ADDITIONAL TESTS:       Sobeida Rodriguez (Jose) PGY-3  Pager: NS) [phone]/ (ATY) [phone]    Patient is a 65y old  Female who presents with a chief complaint of Acute hypoxic respiratory failure (14 Nov 2021 11:33)      SUBJECTIVE / OVERNIGHT EVENTS:  No acute events over night.   Feels well overall. Reports the current pain regie      MEDICATIONS  (STANDING):  albuterol/ipratropium for Nebulization 3 milliLiter(s) Nebulizer every 6 hours  amLODIPine   Tablet 10 milliGRAM(s) Oral daily  apixaban 5 milliGRAM(s) Oral two times a day  budesonide  80 MICROgram(s)/formoterol 4.5 MICROgram(s) Inhaler 2 Puff(s) Inhalation two times a day  folic acid 1 milliGRAM(s) Oral daily  influenza   Vaccine 0.5 milliLiter(s) IntraMuscular once  metoprolol succinate  milliGRAM(s) Oral daily  mirtazapine 15 milliGRAM(s) Oral at bedtime  pancrelipase  (CREON 12,000 Lipase Units) 2 Capsule(s) Oral three times a day with meals  pantoprazole    Tablet 40 milliGRAM(s) Oral before breakfast  piperacillin/tazobactam IVPB.. 3.375 Gram(s) IV Intermittent every 8 hours  potassium chloride    Tablet ER 40 milliEquivalent(s) Oral every 4 hours  tiotropium 18 MICROgram(s) Capsule 1 Capsule(s) Inhalation daily    MEDICATIONS  (PRN):  acetaminophen     Tablet .. 650 milliGRAM(s) Oral every 6 hours PRN Temp greater or equal to 38C (100.4F), Mild Pain (1 - 3)  HYDROmorphone   Tablet 4 milliGRAM(s) Oral every 4 hours PRN Moderate Pain (4 - 6)  HYDROmorphone   Tablet 6 milliGRAM(s) Oral every 4 hours PRN Severe Pain (7 - 10)  melatonin 3 milliGRAM(s) Oral at bedtime PRN Insomnia  ondansetron    Tablet 4 milliGRAM(s) Oral every 6 hours PRN Nausea and/or Vomiting          OBJECTIVE:  Vital Signs Last 24 Hrs  T(C): 36.7 (15 Nov 2021 05:15), Max: 36.9 (14 Nov 2021 11:30)  T(F): 98.1 (15 Nov 2021 05:15), Max: 98.4 (14 Nov 2021 11:30)  HR: 99 (15 Nov 2021 05:15) (99 - 105)  BP: 151/84 (15 Nov 2021 05:15) (106/67 - 151/84)  BP(mean): --  RR: 18 (15 Nov 2021 05:15) (18 - 18)  SpO2: 97% (15 Nov 2021 05:15) (95% - 99%)  PHYSICAL EXAM:  GENERAL: NAD, well-developed  HEAD:  Atraumatic, Normocephalic  EYES: conjunctiva and sclera clear  NECK: Supple, No JVD  CHEST/LUNG: Clear to auscultation bilaterally; No wheeze  HEART: Regular rate and rhythm; No murmurs, rubs, or gallops  ABDOMEN: Soft, Nontender, Nondistended; Bowel sounds present  EXTREMITIES:  No clubbing, cyanosis, or edema  PSYCH: AAOx3  NEUROLOGY: non-focal  SKIN: No rashes or lesions    CAPILLARY BLOOD GLUCOSE        I&O's Summary    14 Nov 2021 07:01  -  15 Nov 2021 07:00  --------------------------------------------------------  IN: 540 mL / OUT: 0 mL / NET: 540 mL              LABS:                        9.2    11.66 )-----------( 416      ( 15 Nov 2021 06:48 )             29.6     11-15    144  |  109<H>  |  6<L>  ----------------------------<  117<H>  3.0<L>   |  21<L>  |  1.15    Ca    8.3<L>      15 Nov 2021 06:47  Phos  2.9     11-15  Mg     1.8     11-15    TPro  5.4<L>  /  Alb  2.5<L>  /  TBili  0.2  /  DBili  x   /  AST  19  /  ALT  26  /  AlkPhos  133<H>  11-15                  RADIOLOGY & ADDITIONAL TESTS:       Sobeida Rodriguez (Jose) PGY-3  Pager: NS) [phone]/ (CYF) [phone]    Patient is a 65y old  Female who presents with a chief complaint of Acute hypoxic respiratory failure (14 Nov 2021 11:33)      SUBJECTIVE / OVERNIGHT EVENTS:  No acute events over night.   Feels well overall. Reports the current pain regimen lasts for about 3 hours before pain coming back.   She has a pain doctor outside who prescribes her oxy and manages her pain      MEDICATIONS  (STANDING):  albuterol/ipratropium for Nebulization 3 milliLiter(s) Nebulizer every 6 hours  amLODIPine   Tablet 10 milliGRAM(s) Oral daily  apixaban 5 milliGRAM(s) Oral two times a day  budesonide  80 MICROgram(s)/formoterol 4.5 MICROgram(s) Inhaler 2 Puff(s) Inhalation two times a day  folic acid 1 milliGRAM(s) Oral daily  influenza   Vaccine 0.5 milliLiter(s) IntraMuscular once  metoprolol succinate  milliGRAM(s) Oral daily  mirtazapine 15 milliGRAM(s) Oral at bedtime  pancrelipase  (CREON 12,000 Lipase Units) 2 Capsule(s) Oral three times a day with meals  pantoprazole    Tablet 40 milliGRAM(s) Oral before breakfast  piperacillin/tazobactam IVPB.. 3.375 Gram(s) IV Intermittent every 8 hours  potassium chloride    Tablet ER 40 milliEquivalent(s) Oral every 4 hours  tiotropium 18 MICROgram(s) Capsule 1 Capsule(s) Inhalation daily    MEDICATIONS  (PRN):  acetaminophen     Tablet .. 650 milliGRAM(s) Oral every 6 hours PRN Temp greater or equal to 38C (100.4F), Mild Pain (1 - 3)  HYDROmorphone   Tablet 4 milliGRAM(s) Oral every 4 hours PRN Moderate Pain (4 - 6)  HYDROmorphone   Tablet 6 milliGRAM(s) Oral every 4 hours PRN Severe Pain (7 - 10)  melatonin 3 milliGRAM(s) Oral at bedtime PRN Insomnia  ondansetron    Tablet 4 milliGRAM(s) Oral every 6 hours PRN Nausea and/or Vomiting    REVIEW OF SYSTEMS:  Constitutional: [-] fevers, [ -] chills, [- ] weight loss, [- ] weight gain  HEENT: [ -] vision problems, [- ] eye pain, [ -] nasal congestion, [- ] rhinorrhea, [- ] sore throat, [- ] dysphagia  CV: [- ] chest pain, [- ] orthopnea, [- ] palpitations  Resp: [- ] cough, [- ] dyspnea, [- ] wheezing, [ -] hemoptysis  GI: [- ] nausea, [- ] vomiting, [- ] diarrhea, [ -] constipation, [- ] abdominal pain  : [- ] dysuria [- ] nocturia [- ] hematuria [ -] increased urinary frequency  Musculoskeletal: [- ] back pain [ -] myalgias [- ] arthralgias [- ] fracture  Skin: [- ] rash [ -] itch  Neurological: [ -] headache [- ] dizziness [- ] syncope [- ] weakness [- ] numbness  Psychiatric: [- ] anxiety [- ] depression  Endocrine: [- ] diabetes [ -] thyroid problem  Hematologic/Lymphatic: [- ] anemia [- ] bleeding problem  Allergic/Immunologic: [ -] itchy eyes [ -] nasal discharge [- ] hives [ -] angioedema        OBJECTIVE:  Vital Signs Last 24 Hrs  T(C): 36.7 (15 Nov 2021 05:15), Max: 36.9 (14 Nov 2021 11:30)  T(F): 98.1 (15 Nov 2021 05:15), Max: 98.4 (14 Nov 2021 11:30)  HR: 99 (15 Nov 2021 05:15) (99 - 105)  BP: 151/84 (15 Nov 2021 05:15) (106/67 - 151/84)  BP(mean): --  RR: 18 (15 Nov 2021 05:15) (18 - 18)  SpO2: 97% (15 Nov 2021 05:15) (95% - 99%)  PHYSICAL EXAM:  GENERAL: NAD  HEAD:  Atraumatic, Normocephalic  EYES: conjunctiva and sclera clear  NECK: Supple, No JVD  CHEST/LUNG: Clear to auscultation bilaterally; No wheeze  HEART: Regular rate and rhythm; No murmurs, rubs, or gallops  ABDOMEN: Soft, Nontender, Nondistended; Bowel sounds present  EXTREMITIES:  No clubbing, cyanosis, or edema  PSYCH: AAOx3  NEUROLOGY: non-focal  SKIN: No rashes or lesions    CAPILLARY BLOOD GLUCOSE        I&O's Summary    14 Nov 2021 07:01  -  15 Nov 2021 07:00  --------------------------------------------------------  IN: 540 mL / OUT: 0 mL / NET: 540 mL              LABS:                        9.2    11.66 )-----------( 416      ( 15 Nov 2021 06:48 )             29.6     11-15    144  |  109<H>  |  6<L>  ----------------------------<  117<H>  3.0<L>   |  21<L>  |  1.15    Ca    8.3<L>      15 Nov 2021 06:47  Phos  2.9     11-15  Mg     1.8     11-15    TPro  5.4<L>  /  Alb  2.5<L>  /  TBili  0.2  /  DBili  x   /  AST  19  /  ALT  26  /  AlkPhos  133<H>  11-15                  RADIOLOGY & ADDITIONAL TESTS:

## 2021-11-15 NOTE — PROGRESS NOTE ADULT - PROVIDER SPECIALTY LIST ADULT
Vascular Cardiology
Vascular Cardiology
Gastroenterology
Surgery
Gastroenterology
Gastroenterology
Pulmonology
Vascular Cardiology
Internal Medicine

## 2021-11-15 NOTE — PROGRESS NOTE ADULT - ASSESSMENT
Patient is a 65 year old female with a history of HTN, COPD, NSCLC (adenocarcinoma left lung) Stage IIIB (dx 2/2020 s/p chemo followed by immunotherapy, currently on Keytruda maintenance therapy), pulmonary embolus on apixaban, chronic pancreatitis (s/p Jeffery procedure 2019), hx of ARDS (2015), GERD, chronic pain (on opioids), and s/p vocal cord polpectomy who presents with tachypnea and SOB that started last Friday, requiring 2L NC and epigastric pain with vomiting that started 4 days before admission c/f COPD exacerbation vs acute pancreatitis vs infectious process.  Patient is a 65 year old female with a history of HTN, COPD, NSCLC (adenocarcinoma left lung) Stage IIIB (dx 2/2020 s/p chemo followed by immunotherapy, currently on Keytruda maintenance therapy), pulmonary embolus on apixaban, chronic pancreatitis (s/p Jeffery procedure 2019), hx of ARDS (2015), GERD, chronic pain (on opioids), and s/p vocal cord polpectomy who presents with tachypnea and SOB & epigastric pain c/f COPD exacerbation and colitis.

## 2021-11-15 NOTE — PROGRESS NOTE ADULT - PROBLEM SELECTOR PLAN 1
Admitted for tachypnea and hypoxia, requiring 2L NC, now off O2. Etiology unclear but likely multifactorial. DDx includes COPD exacerbation, infectious process, right-sided HF, NSCLC progression. Colitis on CT a/p but no diarrhea.   - Bedside echo ruled out pericardial effusion (11/9)  - CTA w/ evidence of chronic thromboembolic disease not no acute PE (11/9)  - Trend CBC and fever curve  - BCx NGTD, f/u sputum cx, UCx, RVP  - c/w duonebs, prednisone 40 mg PO qd x 5 days (11/10 - 11/15)  - c/w Symbicort, Spiriva  - c/w zosyn until discharge, procal 1.4  - Lactate downtrended   - TTE (11/11/21) EF 71%, calcified AV  - Pulm consulted: apprec recs  - Dr. Leahy (cardiology) consulted: apprec recs Admitted for tachypnea and hypoxia, requiring 2L NC, now off O2. Etiology unclear but likely multifactorial. DDx includes COPD exacerbation, right-sided HF, NSCLC progression.   - Bedside echo ruled out pericardial effusion (11/9)  - CTA w/ evidence of chronic thromboembolic disease not no acute PE (11/9), no evidence of PNA  - c/w duonebs, prednisone 40 mg PO qd x 5 days (11/10 - 11/15)  - c/w Symbicort, Spiriva  - TTE (11/11/21) EF 71%, calcified AV  - Pulm consulted: apprec recs, no further intervention  - Dr. Leahy (cardiology) consulted: apprec recs, no further intervention

## 2021-11-15 NOTE — PROGRESS NOTE ADULT - PROBLEM SELECTOR PLAN 3
History of DVT/PE on eliquis  - continue home eliquis 5 mg BID  - CTA w/ evidence of chronic thromboembolic disease but no acute PE History of chronic pancreatitis s/p Jeffery procedure 2019, follows with Dr. Molina. EKG (11/10) QTc 452 sinus tachycardia  - c/w pancrelipase  - tolerating regular diet  - for pain: dilaudid 4 mg PO Q4h for moderate and dilaudid 6 mg PO Q4h for severe  - zofran 4 mg PO Q6h for nausea/vomiting  - holding home oxycodone prn  - House surgery consulted, as Dr. Molina unavailable  - Dr. Damian consulted and GI following: apprec recs History of chronic pancreatitis s/p Jeffery procedure 2019, follows with Dr. Molina. EKG (11/10) QTc 452 sinus tachycardia  - c/w pancrelipase  - tolerating regular diet  - for pain: dilaudid 4 mg PO Q4h for moderate and dilaudid 6 mg PO Q4h for severe  - zofran 4 mg PO Q6h for nausea/vomiting  - holding home oxycodone 30mg q6h prn  - House surgery consulted, as Dr. Molina unavailable  - Dr. Damian consulted and GI following: apprec recs  - started senna for bowel regimen ppx

## 2021-11-15 NOTE — PROGRESS NOTE ADULT - ASSESSMENT
Assessment:  1. History of PE, with chronic findings on CT  2.  HTN  3.  Lung disease  4.  Dyspnea on exertion  5.  Colitis?, chronic pancreatitis, abdominal pains, improving      Plan:  1.  Continue apixaban 5mg BID  2.  Echo within normal limits - no further cardiac testing  3.  Abdominal pain, improving - discussed with Dr. An - she is on zosyn for possible colitis. No surgical intervention per Dr. Molina's team  4.  appreciate pulm recs         Thank you      Vascular Cardiology Service  DIRECT SERVICE NUMBER [phone]  Office [phone]  email:   [email or web address]   Assessment:  1. History of PE, with chronic findings on CT  2.  HTN  3.  Lung disease  4.  Dyspnea on exertion  5.  Colitis?, chronic pancreatitis, abdominal pains, improving      Plan:  1.  Continue apixaban 5mg BID  2.  Echo within normal limits - no further cardiac testing  3.  Abdominal pain, improving - discussed with Dr. An - she is on zosyn for possible colitis. No surgical intervention per Dr. Molina's team.  4.  appreciate pulm recs  5.  No further testing from vascular cardiology perspective      Thank you      Vascular Cardiology Service  DIRECT SERVICE NUMBER [phone]  Office [phone]  email:   [email or web address]

## 2021-11-15 NOTE — MEDICAL STUDENT PROGRESS NOTE(EDUCATION) - SUBJECTIVE AND OBJECTIVE BOX
PROGRESS NOTE:   Patient is a 65y old  Female who presents with a chief complaint of Acute hypoxic respiratory failure (15 Nov 2021 09:17).    SUBJECTIVE / OVERNIGHT EVENTS:  No acute events overnight. Patient reports intermittent abdominal pain (less severe than admission) a few hours after taking her Dilaudid. She expressed concern about pain management on discharge from the hospital and would not like to leave if she still feels pain for an hour or so between doses of Dilaudid. She has not felt nausea since Friday and has not vomited since Thursday. She had a normal bowel movement this morning. She still endorses intermittent SOB, which is no longer correlated with her abdominal pain, but she states that this is non-limiting. She appears comfortable on room air. She denies any fever, chills, cough, chest pain, diarrhea, or constipation.    REVIEW OF SYSTEMS:  CONSTITUTIONAL: No fever, weight loss, or fatigue  EYES: No eye pain, visual disturbances, or discharge  ENMT: No difficulty hearing, tinnitus, vertigo; No sinus or throat pain  NECK: No pain or stiffness  BREASTS: No pain, masses, or nipple discharge  RESPIRATORY: No cough, wheezing, chills or hemoptysis; No shortness of breath  CARDIOVASCULAR: No chest pain, palpitations, dizziness, or leg swelling  GASTROINTESTINAL: +Abdominal pain. No nausea, vomiting, hematemesis, diarrhea, constipation, melena, or hematochezia.  GENITOURINARY: No dysuria, frequency, hematuria, or incontinence  NEUROLOGICAL: No headaches, memory loss, loss of strength, numbness, or tremors  SKIN: No itching, burning, rashes, or lesions   LYMPH NODES: No enlarged glands  ENDOCRINE: No heat or cold intolerance; No hair loss  MUSCULOSKELETAL: No joint pain or swelling; No muscle, back, or extremity pain  PSYCHIATRIC: No depression, anxiety, mood swings, or difficulty sleeping  HEME/LYMPH: No easy bruising, or bleeding gums  ALLERGY AND IMMUNOLOGIC: No hives or eczema      MEDICATIONS  (STANDING):  albuterol/ipratropium for Nebulization 3 milliLiter(s) Nebulizer every 6 hours  amLODIPine   Tablet 10 milliGRAM(s) Oral daily  apixaban 5 milliGRAM(s) Oral two times a day  budesonide  80 MICROgram(s)/formoterol 4.5 MICROgram(s) Inhaler 2 Puff(s) Inhalation two times a day  chlorhexidine 2% Cloths 1 Application(s) Topical <User Schedule>  folic acid 1 milliGRAM(s) Oral daily  influenza   Vaccine 0.5 milliLiter(s) IntraMuscular once  metoprolol succinate  milliGRAM(s) Oral daily  mirtazapine 15 milliGRAM(s) Oral at bedtime  pancrelipase  (CREON 12,000 Lipase Units) 2 Capsule(s) Oral three times a day with meals  pantoprazole    Tablet 40 milliGRAM(s) Oral before breakfast  piperacillin/tazobactam IVPB.. 3.375 Gram(s) IV Intermittent every 8 hours  potassium chloride    Tablet ER 40 milliEquivalent(s) Oral every 4 hours  tiotropium 18 MICROgram(s) Capsule 1 Capsule(s) Inhalation daily    MEDICATIONS  (PRN):  acetaminophen     Tablet .. 650 milliGRAM(s) Oral every 6 hours PRN Temp greater or equal to 38C (100.4F), Mild Pain (1 - 3)  HYDROmorphone   Tablet 4 milliGRAM(s) Oral every 4 hours PRN Moderate Pain (4 - 6)  HYDROmorphone   Tablet 6 milliGRAM(s) Oral every 4 hours PRN Severe Pain (7 - 10)  melatonin 3 milliGRAM(s) Oral at bedtime PRN Insomnia  ondansetron    Tablet 4 milliGRAM(s) Oral every 6 hours PRN Nausea and/or Vomiting      I&O's Summary  14 Nov 2021 07:01  -  15 Nov 2021 07:00  --------------------------------------------------------  IN: 540 mL / OUT: 0 mL / NET: 540 mL      PHYSICAL EXAM:  Vital Signs Last 24 Hrs  T(C): 36.7 (15 Nov 2021 05:15), Max: 36.9 (14 Nov 2021 11:30)  T(F): 98.1 (15 Nov 2021 05:15), Max: 98.4 (14 Nov 2021 11:30)  HR: 99 (15 Nov 2021 05:15) (99 - 105)  BP: 151/84 (15 Nov 2021 05:15) (106/67 - 151/84)  BP(mean): --  RR: 18 (15 Nov 2021 05:15) (18 - 18)  SpO2: 97% (15 Nov 2021 05:15) (95% - 99%)    GENERAL: NAD, resting comfortably.  HEAD:  Atraumatic, Normocephalic  EYES: EOMI, PERRLA, conjunctiva and sclera clear  ENMT: No tonsillar erythema, exudates, or enlargement; Moist mucous membranes, Good dentition, No lesions  NECK: Supple, No JVD, Normal thyroid  NERVOUS SYSTEM:  Alert & Oriented X3, Good concentration; Motor Strength 5/5 B/L upper and lower extremities; DTRs 2+ intact and symmetric  CHEST/LUNG: (+) inspiratory and expiratory rhonchi but improved   HEART: Regular rate with tachycardia; No murmurs, rubs, or gallops  ABDOMEN: +TTP in epigastric area. Soft, Nondistended; Bowel sounds present.  EXTREMITIES:  2+ Peripheral Pulses, No clubbing, cyanosis, or edema  LYMPH: No lymphadenopathy noted  SKIN: No rashes or lesions    LABS:                        9.2    11.66 )-----------( 416      ( 15 Nov 2021 06:48 )             29.6     11-15    144  |  109<H>  |  6<L>  ----------------------------<  117<H>  3.0<L>   |  21<L>  |  1.15    Ca    8.3<L>      15 Nov 2021 06:47  Phos  2.9     11-15  Mg     1.8     11-15    TPro  5.4<L>  /  Alb  2.5<L>  /  TBili  0.2  /  DBili  x   /  AST  19  /  ALT  26  /  AlkPhos  133<H>  11-15    RADIOLOGY & ADDITIONAL TESTS:  Results Reviewed:   Imaging Personally Reviewed:  Electrocardiogram Personally Reviewed:

## 2021-11-15 NOTE — DISCHARGE NOTE NURSING/CASE MANAGEMENT/SOCIAL WORK - PATIENT PORTAL LINK FT
You can access the FollowMyHealth Patient Portal offered by [facility] by registering at the following website: [email or web address]. By joining [employer]’s FollowMyHealth portal, you will also be able to view your health information using other applications (apps) compatible with our system.

## 2021-11-15 NOTE — PROGRESS NOTE ADULT - PROBLEM SELECTOR PLAN 6
- c/w home amlodipine 10 mg qd  - c/w home metoprolol succinate 100 mg qd Not on any outpatient nebulizers or inhalers, has O2 at home but has not used since this summer  - c/w duonebs, prednisone 40 mg PO qd x 5 days (11/10 - 11/15)  - c/w Symbicort, Spiriva  - off O2  - infectious workup as above  - Pulm following: apprec recs

## 2021-11-15 NOTE — PROGRESS NOTE ADULT - SUBJECTIVE AND OBJECTIVE BOX
Vascular Cardiology  Progress note  DIRECT SERVICE NUMBER: [phone]            EMAIL [email or web address]   OFFICE [phone]    CC:  abd pain    INTERVAL HISTORY:  EDWARD Continues to have mild abd pain radiating to the back. No leg pain or SOB.         Allergies    diazepam (Other)  lemon (Other)    Intolerances    	    MEDICATIONS:  amLODIPine   Tablet 10 milliGRAM(s) Oral daily  apixaban 5 milliGRAM(s) Oral two times a day  metoprolol succinate  milliGRAM(s) Oral daily    piperacillin/tazobactam IVPB.. 3.375 Gram(s) IV Intermittent every 8 hours    albuterol/ipratropium for Nebulization 3 milliLiter(s) Nebulizer every 6 hours  budesonide  80 MICROgram(s)/formoterol 4.5 MICROgram(s) Inhaler 2 Puff(s) Inhalation two times a day  tiotropium 18 MICROgram(s) Capsule 1 Capsule(s) Inhalation daily    acetaminophen     Tablet .. 650 milliGRAM(s) Oral every 6 hours PRN  HYDROmorphone   Tablet 4 milliGRAM(s) Oral every 4 hours PRN  HYDROmorphone   Tablet 6 milliGRAM(s) Oral every 4 hours PRN  melatonin 3 milliGRAM(s) Oral at bedtime PRN  mirtazapine 15 milliGRAM(s) Oral at bedtime  ondansetron    Tablet 4 milliGRAM(s) Oral every 6 hours PRN    pancrelipase  (CREON 12,000 Lipase Units) 2 Capsule(s) Oral three times a day with meals  pantoprazole    Tablet 40 milliGRAM(s) Oral before breakfast      chlorhexidine 2% Cloths 1 Application(s) Topical <User Schedule>  folic acid 1 milliGRAM(s) Oral daily  influenza   Vaccine 0.5 milliLiter(s) IntraMuscular once  potassium chloride    Tablet ER 40 milliEquivalent(s) Oral every 4 hours      PAST MEDICAL & SURGICAL HISTORY:  HTN (hypertension)    GERD (gastroesophageal reflux disease)    Chronic pancreatitis  last episode 4/2019    ARDS survivor  2015    History of adrenal adenoma  stable on imaging    Vocal cord polyp  removal 2018, benign as per pt    Thrombophlebitis  superficial right UE during 4/2019 hospital stay, resolved as per pt    Chronic abdominal pain    Non-small cell lung cancer (NSCLC)  chemo: Alimta/ Keytruda 2/24/2021    Pulmonary embolism    COPD (chronic obstructive pulmonary disease)    Pulmonary hypertension    Anemia  transfusion 2/5/21    S/P arthroscopy of shoulder  left in 2009    S/P hip replacement  left - 2010    S/P arthroscopy of shoulder  right - 2014    S/P hip replacement, right  May 2016    History of vocal cord polypectomy  1/2018    S/P shoulder replacement, left  2016    History of pancreatic surgery  Jeffery procedure (5/8/2019)        FAMILY HISTORY:      SOCIAL HISTORY:  unchanged    REVIEW OF SYSTEMS:  CONSTITUTIONAL: No fever, weight loss, or fatigue  EYES: No eye pain, visual disturbances, or discharge  ENMT:  No difficulty hearing, tinnitus, vertigo; No sinus or throat pain  NECK: No pain or stiffness  RESPIRATORY: No cough, wheezing, chills or hemoptysis; No Shortness of Breath  CARDIOVASCULAR: No chest pain, palpitations, passing out, dizziness, or leg swelling  GASTROINTESTINAL: No abdominal or epigastric pain. No nausea, vomiting, or hematemesis; No diarrhea or constipation. No melena or hematochezia.  GENITOURINARY: No dysuria, frequency, hematuria, or incontinence  NEUROLOGICAL: No headaches, memory loss, loss of strength, numbness, or tremors  SKIN: No itching, burning, rashes, or lesions   LYMPH Nodes: No enlarged glands  ENDOCRINE: No heat or cold intolerance; No hair loss  MUSCULOSKELETAL: No joint pain or swelling; No muscle, back, or extremity pain  PSYCHIATRIC: No depression, anxiety, mood swings, or difficulty sleeping  HEME/LYMPH: No easy bruising, or bleeding gums  ALLERY AND IMMUNOLOGIC: No hives or eczema	    [ x] All others negative	  [ ] Unable to obtain    PHYSICAL EXAM:  T(C): 36.7 (11-15-21 @ 05:15), Max: 36.9 (11-14-21 @ 11:30)  HR: 99 (11-15-21 @ 05:15) (99 - 105)  BP: 151/84 (11-15-21 @ 05:15) (106/67 - 151/84)  RR: 18 (11-15-21 @ 05:15) (18 - 18)  SpO2: 97% (11-15-21 @ 05:15) (95% - 99%)  Wt(kg): --  I&O's Summary    14 Nov 2021 07:01  -  15 Nov 2021 07:00  --------------------------------------------------------  IN: 540 mL / OUT: 0 mL / NET: 540 mL    Appearance:  	Thin appearing woman sitting in bed, speaking full sentences  HEENT:   Normal oral mucosa, PERRL, EOMI	   Lymphatic: No lymphadenopathy  Cardiovascular:  S1S2 RRR  Respiratory:  	Dry b/l faint crackles  Psychiatry:  AAO x 3  Gastrointestinal:  Abdominal tenderness  Skin: No rashes, No ecchymoses, No cyanosis	  Neurologic:  no deficits  Extremities:  no edema      LABS:	 	    CBC Full  -  ( 15 Nov 2021 06:48 )  WBC Count : 11.66 K/uL  Hemoglobin : 9.2 g/dL  Hematocrit : 29.6 %  Platelet Count - Automated : 416 K/uL  Mean Cell Volume : 92.5 fl  Mean Cell Hemoglobin : 28.8 pg  Mean Cell Hemoglobin Concentration : 31.1 gm/dL  Auto Neutrophil # : 6.25 K/uL  Auto Lymphocyte # : 3.95 K/uL  Auto Monocyte # : 1.06 K/uL  Auto Eosinophil # : 0.08 K/uL  Auto Basophil # : 0.03 K/uL  Auto Neutrophil % : 53.5 %  Auto Lymphocyte % : 33.9 %  Auto Monocyte % : 9.1 %  Auto Eosinophil % : 0.7 %  Auto Basophil % : 0.3 %    11-15    144  |  109<H>  |  6<L>  ----------------------------<  117<H>  3.0<L>   |  21<L>  |  1.15  11-14    143  |  110<H>  |  6<L>  ----------------------------<  89  3.7   |  21<L>  |  1.04    Ca    8.3<L>      15 Nov 2021 06:47  Ca    8.3<L>      14 Nov 2021 06:27  Phos  2.9     11-15  Phos  2.6     11-14  Mg     1.8     11-15  Mg     2.0     11-14    TPro  5.4<L>  /  Alb  2.5<L>  /  TBili  0.2  /  DBili  x   /  AST  19  /  ALT  26  /  AlkPhos  133<H>  11-15  TPro  5.2<L>  /  Alb  2.3<L>  /  TBili  0.2  /  DBili  x   /  AST  21  /  ALT  23  /  AlkPhos  121<H>  11-14

## 2021-11-15 NOTE — PROGRESS NOTE ADULT - ASSESSMENT
chronic pancreatitis   hypoxia   GERD    s/p Jeffery procedure 2019  CT a/p showing colitis but asymptomatic, on abx  symptoms improving    zofran and pain meds PRN  reg diet as tolerated  no objection to anticoagulation  cont creon 24,000 units tid  surgery following   PPI daily   dc planning as per primary   will follow  dw patient      Advanced care planning was discussed with patient and family.  Advanced care planning forms were reviewed and discussed.  Risks, benefits and alternatives of gastroenterologic procedures were discussed in detail and all questions were answered.    30 minutes spent.

## 2021-11-15 NOTE — PROGRESS NOTE ADULT - PROBLEM SELECTOR PLAN 7
- c/w home omeprazole 40 mg qd - c/w home amlodipine 10 mg qd  - c/w home metoprolol succinate 100 mg qd

## 2021-11-15 NOTE — DISCHARGE NOTE NURSING/CASE MANAGEMENT/SOCIAL WORK - NSDCVIVACCINE_GEN_ALL_CORE_FT
COVID-19 vaccine, vector-nr, rS-Ad26, PF, 0.5 mL (Gi); 28-Jun-2021 16:10; Fina Rivera (ISHA); Gi; [id number] (Exp. Date: 07-Aug-2021); IntraMuscular; Deltoid Left.; 0.5 milliLiter(s);

## 2021-11-15 NOTE — PROGRESS NOTE ADULT - PROBLEM SELECTOR PLAN 4
Hx Stage IIIB (dx 2/2020 s/p chemo now on Keytruda maintenance every 3 weeks)  - No intervention as inpatient at this time  - Hem/Onc consulted: apprec recs  - Holding Keytruda (last dose 9/30)  - negative CTH non-contrast and MR brain w/ IV contrast ruled out brain mets History of DVT/PE on eliquis  - continue home eliquis 5 mg BID  - CTA w/ evidence of chronic thromboembolic disease but no acute PE

## 2021-11-15 NOTE — PROGRESS NOTE ADULT - PROBLEM SELECTOR PLAN 9
Dispo: home pending medical management  Diet: regular diet  DVT ppx: eliquis Dispo: home pending medical management  Diet: regular diet  DVT ppx: eliquis  Dispo: PT home w/o needs. Pending pain regimen finalization

## 2021-11-16 ENCOUNTER — NON-APPOINTMENT (OUTPATIENT)
Age: 66
End: 2021-11-16

## 2021-11-29 NOTE — DISCHARGE NOTE PROVIDER - CARE PROVIDER_API CALL
Writer initiated PA for Novolog flexpen.    Maxx Cage; MBBS)  Hematology; [facility]; Medical Oncology  [address], [place] [id number]  Phone: [phone]  Fax: [phone]  Established Patient  Follow Up Time: 1 week    Hank Molina)  Surgery  [address] [place] [address]  Phone: [phone]  Fax: [phone]  Established Patient  Follow Up Time: 1 week    Sourav Leahy (DO)  Cardiovascular Disease; Internal Medicine; Nuclear Cardiology  [address] [place] [address]  Phone: [phone]  Fax: [phone]  Established Patient  Follow Up Time: 2 weeks   Maxx Cage; MBBS)  Hematology; HospicePalliative Medicine; Medical Oncology  [address]  [place], [place] [id number]  Phone: [phone]  Fax: [phone]  Established Patient  Follow Up Time: 1 week    Hank Molina)  Surgery  [address] [place] [address]  Phone: [phone]  Fax: [phone]  Established Patient  Follow Up Time: 1 week    Sourav Leahy (DO)  Cardiovascular Disease; Internal Medicine; Nuclear Cardiology  [address] [place] [address]  Phone: [phone]  Fax: [phone]  Established Patient  Follow Up Time: 2 weeks    Ramy Abad)  Critical Care Medicine; Pulmonary Disease  [address] [facility] [address], [address]  [place], [place] [id number]  Phone: [phone]  Fax: [phone]  Follow Up Time: 1 week

## 2021-11-30 ENCOUNTER — NON-APPOINTMENT (OUTPATIENT)
Age: 66
End: 2021-11-30

## 2021-12-02 ENCOUNTER — APPOINTMENT (OUTPATIENT)
Dept: INFUSION THERAPY | Facility: HOSPITAL | Age: 66
End: 2021-12-02

## 2021-12-08 ENCOUNTER — OUTPATIENT (OUTPATIENT)
Dept: OUTPATIENT SERVICES | Facility: HOSPITAL | Age: 66
LOS: 1 days | Discharge: HOME | End: 2021-12-08

## 2021-12-08 ENCOUNTER — NON-APPOINTMENT (OUTPATIENT)
Age: 66
End: 2021-12-08

## 2021-12-08 DIAGNOSIS — Z96.612 PRESENCE OF LEFT ARTIFICIAL SHOULDER JOINT: Chronic | ICD-10-CM

## 2021-12-08 DIAGNOSIS — Z96.641 PRESENCE OF RIGHT ARTIFICIAL HIP JOINT: Chronic | ICD-10-CM

## 2021-12-08 DIAGNOSIS — Z98.89 OTHER SPECIFIED POSTPROCEDURAL STATES: Chronic | ICD-10-CM

## 2021-12-08 DIAGNOSIS — Z98.890 OTHER SPECIFIED POSTPROCEDURAL STATES: Chronic | ICD-10-CM

## 2021-12-08 DIAGNOSIS — C34.90 MALIGNANT NEOPLASM OF UNSPECIFIED PART OF UNSPECIFIED BRONCHUS OR LUNG: ICD-10-CM

## 2021-12-09 ENCOUNTER — APPOINTMENT (OUTPATIENT)
Dept: HEMATOLOGY ONCOLOGY | Facility: CLINIC | Age: 66
End: 2021-12-09
Payer: MEDICARE

## 2021-12-09 VITALS
OXYGEN SATURATION: 96 % | DIASTOLIC BLOOD PRESSURE: 70 MMHG | BODY MASS INDEX: 20.06 KG/M2 | SYSTOLIC BLOOD PRESSURE: 111 MMHG | RESPIRATION RATE: 14 BRPM | TEMPERATURE: 98 F | HEART RATE: 97 BPM | WEIGHT: 116.84 LBS

## 2021-12-09 DIAGNOSIS — R10.9 UNSPECIFIED ABDOMINAL PAIN: ICD-10-CM

## 2021-12-09 PROCEDURE — 99215 OFFICE O/P EST HI 40 MIN: CPT

## 2021-12-09 NOTE — HISTORY OF PRESENT ILLNESS
[Disease: _____________________] : Disease: [unfilled] [N: ___] : N[unfilled] [M: ___] : M[unfilled] [AJCC Stage: ____] : AJCC Stage: [unfilled] [de-identified] : Ms. Travis is a 66-year-old female with a history of recently diagnosed non-small cell lung adenocarcinoma of left lung, pulmonary embolus on apixaban, HTN, chronic pancreatitis s/p Jeffery procedure (2019), history of ARDS (2015), GERD, chronic pain on opioids, and s/p vocal cord polypectomy who presents for consultation visit\par Brief hx: Pt was recently hospitalized in mid-Feb 2020 to [facility] for dyspnea, left-sided back pain, and fatigue with loss of appetite. Found on CTPA (February 2020) to have a large filling defect in the L main pulmonary artery extending into the left upper lobar and left interlobar pulm arteries with complete opacification of the LLL pulm artery. There were thin linear filling defects in the bifurcation of the right upper pulmonary artery and the right interlobar pulmonary arteries extending to the right lower pulmonary artery, which appeared chronic. CT also showed mediastinal adenopathy, including left paratracheal, subcarinal, and left hilar. There is also a wedge shaped opacity in the posterior left lower lobe suspicious for malignancy. There is centrilobular emphysema. 2D-echo (Feb 2020) with normal LV systolic function, mild concentric LVH, normal LA, RV enlargement with normal RV systolic function, and estimated PASP 39 consistent with borderline pulmonary hypertension. She was started on heparin infusion and discharged on Apixaban. Hypercoagulable workup negative, including Factor V Leiden, Prothrombin gene mutation, and lupus anticoagulant. \par \par She had EBUS/TBNA on Feb 19, 2020, found to have adenocarcinoma in right and left paratracheal and subcarinal lymph nodes (R4, L4, level 7). She is pending PFTs, PET/CT, brain MRI. Patient was discharged on Oxycodone ER BID and Tylenol prn. She is not constipated and is on a bowel regimen with polyethylene glycol. Still does not have a good appetite. She has lost 15 lbs over last 3 months. No fevers, chills, or chest pain. Reports occasional cough for which she takes benzonatate perle about once daily. Declining influenza vaccination. Takes Duonebs twice daily as prescribed, but denies dyspnea. \par \par Last colonoscopy in 2018 normal. Mammogram in June 2019 negative. No personal or family history of miscarriages or spontaneous abortions. Reports history of smoking but denies any history of COPD or lung cancer. No family history of malignancy, sarcoidosis, or TB. No recent prolonged immobility.\par \par Of note, active smoker, quit end of last year, on and off 4-6 cigarettes/day. She had CXR for lung cancer screening in the 1980s but nothing recently. Denies etoh use. \par \par 4/6/20: No new complaints. Persistent pain in the chest and occasional cough. COntinues to lose weight,. No other constitutional symptoms. \par \par 4/16/20: Pain in the chest improved. She has been nauseous and c/o abd cramps since starting chemo. She has been taking Zofran with minimal benefit. She has not been using Reglan because she was told not to by nutritionist. She has lost 2 lbs. \par \par 5/6/2020: Pt being seen in treatment area. Pt reported that she tolerated the 2nd cycle better with the change in premeds and adding dexamethasone following the treatment for 2 days. She still experiences days of severe nausea and vomiting but not continuously. Pt has has normal bowel function. She denies SOB/ slight fatigue. Denies pain\par \par 5/20/2020: Pt being seen in treatment area. Pt is here today for cycle 3. She states that for the last 2 days she has been unable to keep anything down. She vomited the entire 2 days. This is an ongoing and chronic problem and is unrelated to chemo. She has been evaluated by GI without discovering etiology for it. Initial brain imaging (-) for metastaic disease. No headaches or dizziness. Pt states it always lasts 2 days and spontaneously resolves. Today she states she was able to tolerate broth and mandarin oranges. No fever. No c/o of cough/CP/hemoptysis or SOB. \par  \par \par 8/12/20: On maintenance now. Scans in June 2020 shows \par \par 9/2/2020: Pt seen in treatment room. Feeling well. Previously experiencing significant nausea and vomiting. Resolved. Now using omeprazole on BID dosing. No new complaints\par \par 9/23/2020: Pt seen in treatment room. SHe has been experiencing malaise and significant diarrhea since friday. Appetite poor. (+) weight loss. No fever but feels very washed out. Had scans last friday as well. \par \par 11/11/2020: Pt returns for follow up. Patient had called last week to say she was canceling her treatment and office visit . Dr. Cage spoke with the patient who said that she was having pain and that her pancreas is acting up. Patient has a history of chronic pancreatitis. As per patient she has been in touch with her PCP and GI doctor. She was rescheduled for this week. She states that as suddenly as the pain came on, it left the same way. She has not had any problems since. SHe reports she had follow up with pain management doctor as well. No other complaints\par \par 12/24/20: Left oral swelling. Has lost multiple teeth spontaneously. Has appt with oral surgeon. Has gained some weight. Has stopped smoking.\par \par 2/3/21: Pt has been fatigued, she is stressed because her friends (who she has had no contact with recently) are sick with COVID. \par \par 3/17/21: Pt seen in treatment room. Pt recently underwent a hemorrhoidectomy in the OR. She is still recovering and reports continued post op pain. Received call from Radiologist following recent CT imaging. New left retroperitoneal gas of uncertain etiology. These findings were reported to the patient and emailed to surgical team by me . Pt states that she has follow up scheduled next week but it is with covering physician as her doctor is now on maternity leave. She did have some back pain but it has since resolved. No other new complaints. \par \par 4/29/21: Doing better. No abd pain anymore. Repeat CT abd done on 3/29 showed no intra-abd pathology. LE swelling. Had repeat doppler and echo which were normal. \par \par 6/9/21: Pt returns for follow up. She is scheduled for treatment today. Pt states that over last ~3 weeks she has developed severe LE edema. Both extremities are swollen all the way up to her proximal thighs B/L. She has been evaluated by her vascular doctor. No DVT's noted on US (per patient report). she remains on Eliquis 5 mg BID. She was started on Lasix 40mg daily which has little to no impact on edema. She states that she is very uncomfortable and finds it difficult to ambulate\par \par 7/22/21: Patient came in for follow-up after recent hospitalization on 6/14/21- 6/28/21 due to a fall and low oxygen saturation.  While hospitalized patient was place in supplemental oxygen.   Patient's hospital course was complicated by platelet counts,  bone ivy was done and since came back normal.  Patient is currently on 2L of supplemental oxygen.  She complain of fatigue and SOB.  Patient requested to reschedule today's treatment.   Patient denies diarrhea, rash and itching.  \par \par 8/18/21: Pt returns for follow up. She is O2 dependent. She states she is feeling better. Offers no complaints \par \par 9/30/21: Doing well. On and off O2 use. No pain.\par \par 12/9/21: feels tired, has dry throat. recently admitted for dyspnea. HEr last treatment with Keytruda was 9/30). In the hosptial, was diagnosed with COPD exacerbation and diverticulitis. She responded to steroids and abx. \par  [de-identified] : adeno ca

## 2021-12-09 NOTE — REVIEW OF SYSTEMS
[Fatigue] : fatigue [Recent Change In Weight] : ~T recent weight change [Lower Ext Edema] : no lower extremity edema [Cough] : cough [SOB on Exertion] : shortness of breath during exertion [Abdominal Pain] : no abdominal pain [Vomiting] : no vomiting [Diarrhea] : no diarrhea [Negative] : Allergic/Immunologic

## 2021-12-09 NOTE — ASSESSMENT
[FreeTextEntry1] : 65 yo w  diagnosed lung adeno ca\par Stage IIIC by imaging. Large volume of disease and hence, not started on RT\par Started carbo/Alimta/Pem given Q 3 weeks schedule. was on maintenance alimta/keytruda. Alimta was subsequently discontinued due to severe fatigue. She remains on Keytruda maintenance.\par NGS revealed KRAS G12V mut, low TMB and KIM. PDL1 1%.\par Using Oxycodone 20 mg as needed. Pt follows with pain md. \par DVT on Eliquis. Repeat doppler shows no DVT\par Pt has stopped smoking. Congratulated pt and encouraged her to stay off of tobacco\par Recent hospitalization as above- concern for irAE (colitis and pneumonitis)\par will hold Keytruda and repeat scans in 3 months\par Due for scans- will order\par COVID vaccinated- J&J. Getting booster today\par  [Palliative] : Goals of care discussed with patient: Palliative

## 2021-12-09 NOTE — PHYSICAL EXAM
[Restricted in physically strenuous activity but ambulatory and able to carry out work of a light or sedentary nature] : Status 1- Restricted in physically strenuous activity but ambulatory and able to carry out work of a light or sedentary nature, e.g., light house work, office work [Thin] : thin [Normal] : grossly intact [de-identified] : clear bilaterally.   no adventitious breath sounds [de-identified] : steady gait

## 2021-12-16 ENCOUNTER — APPOINTMENT (OUTPATIENT)
Dept: PULMONOLOGY | Facility: CLINIC | Age: 66
End: 2021-12-16

## 2021-12-21 ENCOUNTER — APPOINTMENT (OUTPATIENT)
Dept: SURGERY | Facility: CLINIC | Age: 66
End: 2021-12-21
Payer: MEDICARE

## 2021-12-21 VITALS
OXYGEN SATURATION: 98 % | DIASTOLIC BLOOD PRESSURE: 86 MMHG | SYSTOLIC BLOOD PRESSURE: 125 MMHG | HEIGHT: 64 IN | RESPIRATION RATE: 16 BRPM | WEIGHT: 116 LBS | TEMPERATURE: 98.6 F | BODY MASS INDEX: 19.81 KG/M2 | HEART RATE: 124 BPM

## 2021-12-21 DIAGNOSIS — K86.0 ALCOHOL-INDUCED CHRONIC PANCREATITIS: ICD-10-CM

## 2021-12-21 PROCEDURE — 99213 OFFICE O/P EST LOW 20 MIN: CPT

## 2021-12-22 DIAGNOSIS — Z86.718 PERSONAL HISTORY OF OTHER VENOUS THROMBOSIS AND EMBOLISM: ICD-10-CM

## 2021-12-23 ENCOUNTER — APPOINTMENT (OUTPATIENT)
Dept: INFUSION THERAPY | Facility: HOSPITAL | Age: 66
End: 2021-12-23

## 2021-12-28 RX ORDER — MIRTAZAPINE 15 MG/1
15 TABLET, FILM COATED ORAL
Qty: 60 | Refills: 2 | Status: ACTIVE | COMMUNITY
Start: 2020-07-07 | End: 1900-01-01

## 2021-12-30 ENCOUNTER — OUTPATIENT (OUTPATIENT)
Dept: OUTPATIENT SERVICES | Facility: HOSPITAL | Age: 66
LOS: 1 days | End: 2021-12-30
Payer: MEDICARE

## 2021-12-30 ENCOUNTER — APPOINTMENT (OUTPATIENT)
Dept: ULTRASOUND IMAGING | Facility: HOSPITAL | Age: 66
End: 2021-12-30
Payer: MEDICARE

## 2021-12-30 DIAGNOSIS — Z96.612 PRESENCE OF LEFT ARTIFICIAL SHOULDER JOINT: Chronic | ICD-10-CM

## 2021-12-30 DIAGNOSIS — Z98.890 OTHER SPECIFIED POSTPROCEDURAL STATES: Chronic | ICD-10-CM

## 2021-12-30 DIAGNOSIS — Z96.641 PRESENCE OF RIGHT ARTIFICIAL HIP JOINT: Chronic | ICD-10-CM

## 2021-12-30 DIAGNOSIS — Z86.718 PERSONAL HISTORY OF OTHER VENOUS THROMBOSIS AND EMBOLISM: ICD-10-CM

## 2021-12-30 DIAGNOSIS — Z98.89 OTHER SPECIFIED POSTPROCEDURAL STATES: Chronic | ICD-10-CM

## 2021-12-30 PROCEDURE — 93970 EXTREMITY STUDY: CPT | Mod: 26

## 2021-12-30 PROCEDURE — 93970 EXTREMITY STUDY: CPT

## 2022-01-05 PROBLEM — K86.0 ALCOHOL-INDUCED CHRONIC PANCREATITIS: Status: ACTIVE | Noted: 2019-04-30

## 2022-01-05 NOTE — ASSESSMENT
[FreeTextEntry1] : 65 y/o F, s/p Jeffery procedure 5/2019 for chronic pancreatitis. Was diagnosed with non-small cell lung cancer in Feb 2020 s/p chemo and immunotherapy.  Recurrent abdominal pain, no acute findings on CT 11/9.    Taking PPI twice daily.   Discussed with patient after Jeffery surgery she can still have acute exacerbations of chronic pancreatitis.  RTC as needed.

## 2022-01-05 NOTE — HISTORY OF PRESENT ILLNESS
[de-identified] : Ms. HIMANSHU VILLEGAS is a 66 year old female presents for follow-up visit. Patient well known to me with history of chronic calcific pancreatitis, and is s/p Jeffery procedure on 5/8/19. She did great post-operatively and no longer required recurrent hospital admissions for pancreatitis/pain control after surgery. Unfortunately she was diagnosed with non-small cell lung cancer stage IIIB, in February 2020. She is being followed by medical oncology for this and underwent chemotherapy, followed by immunotherapy (currently on maintenance Keytruda). She reports recent onset of pancreatic pain, and also weight loss, decreased appetite. Having normal bowel movements while on Creon 3x daily with meals. \par CT abdomen on 10/6/21 performed for further evaluation of her unexplained pain, with findings again noting chronic pancreatitis. \par \par CT c/a/p 11/9/21 during hospital admission for acute hypoxic respiratory failure. CT report noting chronic pulmonary embolus within the left lower lobe pulmonary arteries and within the right lower lobe interlobar artery. Thickening of the descending colon which may be least partially be due to under distention.  Chronic pancreatitis again noted. \par \par Patient presents for follow-up for recurrent abdominal pain. She reports episodes of shortness of breath with exertion. She is on Eliquis for PE. He abdominal pain is still intermittent. Tolerating PO diet. Weight is stable.

## 2022-01-05 NOTE — PHYSICAL EXAM
[Normal] : oriented to person, place and time, with appropriate affect [de-identified] : thin appearing female, well developing, no acute distress  [de-identified] : anicteric  [de-identified] : normal respiratory effort  [de-identified] : no deformities appreciated

## 2022-01-12 NOTE — PROGRESS NOTE ADULT - PROVIDER SPECIALTY LIST ADULT
Patient arrives ambulatory requesting COVID testing.  Patient reports she has fatigue.   Hospitalist Internal Medicine

## 2022-01-18 ENCOUNTER — APPOINTMENT (OUTPATIENT)
Dept: CARDIOLOGY | Facility: CLINIC | Age: 67
End: 2022-01-18

## 2022-02-01 RX ORDER — MEGESTROL ACETATE 40 MG/ML
400 SUSPENSION ORAL DAILY
Qty: 60 | Refills: 3 | Status: ACTIVE | COMMUNITY
Start: 2021-10-12 | End: 1900-01-01

## 2022-02-10 NOTE — H&P PST ADULT - ATTENDING PHYSICIAN: I HAVE REVIEWED THE CLINICAL DOCUMENTATION AND AGREE WITH THE ABOVE NOTE
Problem: Fluid Volume Excess, Risk for  Goal: # Absence of Rapid Weight Gain (no more than 2kg in 24 hours)  Description: FVE Risk Patients may gain weight (but not more than 2 kg) but may not require aggressive treatment if in the absence of dyspnea; FVE (actual) patients should be monitored to achieve no weight gain.   Outcome: Outcome Met, Continue evaluating goal progress toward completion  Goal: # Absence of New Onset Dyspnea  Description: Dyspnea greater than SOB with Activity may be indicator of fluid volume excess  Outcome: Outcome Met, Continue evaluating goal progress toward completion  Goal: # Verbalizes understanding of FVE prevention plan  Description: Document on Patient Education Activity  Outcome: Outcome Met, Continue evaluating goal progress toward completion     Problem: Fluid Volume Excess  Goal: # Fluid Volume Excess Symptoms Resolved  Description: Treatment often consists of oxygen and respiratory support with diuretic therapy at doses that exceed usual dose (typically doubled).  Monitor patient response to treatment.    Acute dyspnea should resolve quickly if dose is adequate and kidney function is adequate. Dyspnea/SOB should only be observed with Activity after effective treatment. Patient should be able to lie down comfortably, without SOB.  Outcome: Outcome Met, Continue evaluating goal progress toward completion  Goal: # Oxygenation is maintained (SpO2 greater than or equal to 90% or as ordered)  Outcome: Outcome Met, Continue evaluating goal progress toward completion  Goal: # Verbalizes understanding of FVE management plan  Description: Document on Patient Education Activity  Outcome: Outcome Met, Continue evaluating goal progress toward completion     Problem: At Risk for Falls  Goal: # Patient does not fall  2/10/2022 0000 by Bella Alamo RN  Outcome: Outcome Not Met, Continue to Monitor  2/10/2022 0000 by Bella Alamo RN  Outcome: Outcome Not Met, Continue to  Monitor  Goal: # Takes action to control fall-related risks  2/10/2022 0000 by Bella Alamo RN  Outcome: Outcome Not Met, Continue to Monitor  2/10/2022 0000 by Bella Alamo RN  Outcome: Outcome Not Met, Continue to Monitor  Goal: # Verbalizes understanding of fall risk/precautions  Description: Document education using the patient education activity  2/10/2022 0000 by Bella Alamo RN  Outcome: Outcome Not Met, Continue to Monitor  2/10/2022 0000 by Bella Alamo RN  Outcome: Outcome Not Met, Continue to Monitor     Problem: At Risk for Injury Due to Fall  Goal: # Patient does not fall  2/10/2022 0000 by Bella Alamo RN  Outcome: Outcome Not Met, Continue to Monitor  2/10/2022 0000 by Bella Alamo RN  Outcome: Outcome Not Met, Continue to Monitor  Goal: # Takes action to control condition specific risks  2/10/2022 0000 by Bella Alamo RN  Outcome: Outcome Not Met, Continue to Monitor  2/10/2022 0000 by Bella Alamo RN  Outcome: Outcome Not Met, Continue to Monitor  Goal: # Verbalizes understanding of fall-related injury personal risks  Description: Document education using the patient education activity  2/10/2022 0000 by Bella Alamo RN  Outcome: Outcome Not Met, Continue to Monitor  2/10/2022 0000 by Bella Alamo RN  Outcome: Outcome Not Met, Continue to Monitor     Problem: Breathing Pattern Ineffective  Goal: Air exchange is effective, demonstrated by Sp02 sat of greater then or = 92% (or as ordered)  2/10/2022 0000 by Bella Alamo RN  Outcome: Outcome Not Met, Continue to Monitor  2/10/2022 0000 by Bella Alamo RN  Outcome: Outcome Not Met, Continue to Monitor  Goal: Respiratory pattern is quiet and regular without report of SOB  2/10/2022 0000 by Bella Alamo RN  Outcome: Outcome Not Met, Continue to Monitor  2/10/2022 0000 by Bella Alamo RN  Outcome: Outcome Not Met, Continue to Monitor  Goal: Breathing pattern demonstrates  minimal apnea during sleep with appropriate use of airway pressure support devices  2/10/2022 0000 by Bella Alamo RN  Outcome: Outcome Not Met, Continue to Monitor  2/10/2022 0000 by Bella Alamo RN  Outcome: Outcome Not Met, Continue to Monitor  Goal: Verbalizes/demonstrates effective breathing management strategies  Description: Document education using the patient education activity.   2/10/2022 0000 by Bella Alamo RN  Outcome: Outcome Not Met, Continue to Monitor  2/10/2022 0000 by Bella Alamo RN  Outcome: Outcome Not Met, Continue to Monitor  Goal: Minimize respiratory effort related to dyspnea/shortness of breath (Hospice)  2/10/2022 0000 by Bella Alamo RN  Outcome: Outcome Not Met, Continue to Monitor  2/10/2022 0000 by Bella Alamo RN  Outcome: Outcome Not Met, Continue to Monitor     Problem: Pneumonia  Goal: S/S of acute pneumonia are resolved  Description: If acute pneumonia is present, monitor for resolution of fever, cough, secretions and other test values based on presentation.  2/10/2022 0000 by Bella Alamo RN  Outcome: Outcome Not Met, Continue to Monitor  2/10/2022 0000 by Bella Alamo RN  Outcome: Outcome Not Met, Continue to Monitor  Goal: Verbalizes understanding of pneumonia, treatment, and ongoing prevention  Description: Document on Patient Education Activity  2/10/2022 0000 by Bella Alamo RN  Outcome: Outcome Not Met, Continue to Monitor  2/10/2022 0000 by Bella Alamo RN  Outcome: Outcome Not Met, Continue to Monitor      Statement Selected

## 2022-03-07 NOTE — MEDICAL STUDENT ADULT H&P (EDUCATION) - NS MD HP STUD PE EYES FT
[facility] [phone]   EOMI, PERRLA, conjunctiva and sclera clear

## 2022-03-30 ENCOUNTER — APPOINTMENT (OUTPATIENT)
Dept: PULMONOLOGY | Facility: CLINIC | Age: 67
End: 2022-03-30
Payer: MEDICARE

## 2022-03-30 VITALS
RESPIRATION RATE: 16 BRPM | OXYGEN SATURATION: 93 % | SYSTOLIC BLOOD PRESSURE: 117 MMHG | HEART RATE: 101 BPM | TEMPERATURE: 98 F | WEIGHT: 125 LBS | BODY MASS INDEX: 21.34 KG/M2 | HEIGHT: 64 IN | DIASTOLIC BLOOD PRESSURE: 77 MMHG

## 2022-03-30 DIAGNOSIS — Z86.711 PERSONAL HISTORY OF PULMONARY EMBOLISM: ICD-10-CM

## 2022-03-30 DIAGNOSIS — F17.200 NICOTINE DEPENDENCE, UNSPECIFIED, UNCOMPLICATED: ICD-10-CM

## 2022-03-30 DIAGNOSIS — Z23 ENCOUNTER FOR IMMUNIZATION: ICD-10-CM

## 2022-03-30 PROCEDURE — 90677 PCV20 VACCINE IM: CPT

## 2022-03-30 PROCEDURE — 99215 OFFICE O/P EST HI 40 MIN: CPT | Mod: 25

## 2022-03-30 PROCEDURE — G0009: CPT

## 2022-03-30 RX ORDER — NICOTINE POLACRILEX 4 MG/1
4 GUM, CHEWING ORAL
Qty: 1 | Refills: 1 | Status: ACTIVE | COMMUNITY
Start: 2022-03-30 | End: 1900-01-01

## 2022-03-30 RX ORDER — HYDROCORTISONE ACETATE 25 MG/1
25 SUPPOSITORY RECTAL
Qty: 24 | Refills: 0 | Status: COMPLETED | COMMUNITY
Start: 2020-10-29 | End: 2022-03-30

## 2022-03-30 RX ORDER — METOCLOPRAMIDE 10 MG/1
10 TABLET ORAL EVERY 6 HOURS
Qty: 40 | Refills: 3 | Status: COMPLETED | COMMUNITY
Start: 2020-04-08 | End: 2022-03-30

## 2022-03-30 RX ORDER — CHLORHEXIDINE GLUCONATE 4 %
5 LIQUID (ML) TOPICAL
Refills: 0 | Status: COMPLETED | COMMUNITY
End: 2022-03-30

## 2022-03-30 RX ORDER — ONDANSETRON 8 MG/1
8 TABLET, ORALLY DISINTEGRATING ORAL
Qty: 12 | Refills: 0 | Status: COMPLETED | COMMUNITY
Start: 2021-03-12 | End: 2022-03-30

## 2022-03-30 RX ORDER — ERYTHROMYCIN 250 MG/1
250 TABLET, FILM COATED ORAL 3 TIMES DAILY
Qty: 90 | Refills: 0 | Status: COMPLETED | COMMUNITY
Start: 2021-10-12 | End: 2022-03-30

## 2022-03-30 RX ORDER — AMOXICILLIN 500 MG/1
500 CAPSULE ORAL
Qty: 21 | Refills: 0 | Status: COMPLETED | COMMUNITY
Start: 2020-12-24 | End: 2022-03-30

## 2022-03-30 RX ORDER — METOPROLOL SUCCINATE 100 MG/1
100 TABLET, EXTENDED RELEASE ORAL
Refills: 0 | Status: ACTIVE | COMMUNITY

## 2022-03-30 RX ORDER — DEXAMETHASONE 4 MG/1
4 TABLET ORAL
Qty: 4 | Refills: 8 | Status: COMPLETED | COMMUNITY
Start: 2020-04-16 | End: 2022-03-30

## 2022-03-30 RX ORDER — OXYCODONE 5 MG/1
5 TABLET ORAL
Qty: 20 | Refills: 0 | Status: COMPLETED | COMMUNITY
Start: 2021-03-08 | End: 2022-03-30

## 2022-03-30 RX ORDER — BUDESONIDE AND FORMOTEROL FUMARATE DIHYDRATE 160; 4.5 UG/1; UG/1
160-4.5 AEROSOL RESPIRATORY (INHALATION) TWICE DAILY
Qty: 1 | Refills: 5 | Status: ACTIVE | COMMUNITY
Start: 2022-03-30 | End: 1900-01-01

## 2022-03-30 RX ORDER — TIOTROPIUM BROMIDE 18 UG/1
18 CAPSULE ORAL; RESPIRATORY (INHALATION) DAILY
Qty: 1 | Refills: 3 | Status: ACTIVE | COMMUNITY
Start: 2022-03-30 | End: 1900-01-01
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
## 2022-03-30 NOTE — PHYSICAL EXAM
[No Acute Distress] : no acute distress [Well Nourished] : well nourished [Well Developed] : well developed [Normal Oropharynx] : normal oropharynx [Normal Appearance] : normal appearance [Supple] : supple [No Neck Mass] : no neck mass [No JVD] : no jvd [Normal Rate/Rhythm] : normal rate/rhythm [Normal Pulses] : normal pulses [Normal S1, S2] : normal s1, s2 [No Murmurs] : no murmurs [No Resp Distress] : no resp distress [No Acc Muscle Use] : no acc muscle use [Clear to Auscultation Bilaterally] : clear to auscultation bilaterally [No Abnormalities] : no abnormalities [Benign] : benign [Not Tender] : not tender [Soft] : soft [Normal Gait] : normal gait [No Clubbing] : no clubbing [No Cyanosis] : no cyanosis [No Edema] : no edema [FROM] : FROM [Normal Color/ Pigmentation] : normal color/ pigmentation [No Focal Deficits] : no focal deficits [Cranial Nerves Intact] : cranial nerves intact [No Motor Deficits] : no motor deficits [Oriented x3] : oriented x3 [Normal Affect] : normal affect [TextBox_68] : no wheezing or rales

## 2022-04-06 NOTE — CONSULT NOTE ADULT - CONSULT REQUESTED BY NAME
----- Message from Eneida Calixto sent at 4/6/2022  1:41 PM CDT -----  Regarding: Running Late  Contact: Patient  Patient running 10-15 minutes late due to traffic      
Provider notified.   
Dr. An
[place]
Dr. An
Medicine
Medicine

## 2022-04-11 LAB — SARS-COV-2 N GENE NPH QL NAA+PROBE: NOT DETECTED

## 2022-04-13 ENCOUNTER — APPOINTMENT (OUTPATIENT)
Dept: PULMONOLOGY | Facility: CLINIC | Age: 67
End: 2022-04-13
Payer: MEDICARE

## 2022-04-13 PROCEDURE — 94060 EVALUATION OF WHEEZING: CPT

## 2022-04-13 PROCEDURE — 94729 DIFFUSING CAPACITY: CPT

## 2022-04-13 PROCEDURE — 94726 PLETHYSMOGRAPHY LUNG VOLUMES: CPT

## 2022-05-13 NOTE — DIETITIAN INITIAL EVALUATION ADULT. - DOB: +DATEOFBIRTH
Dr Maciel Kumar  RE:  Refill     Patient asking for refill of the following medication  Also, patient is asking why the Naproxen was not filled        oxyCODONE (OXY-IR) 5 MG capsule [[id number]]     Order Details  Dose: 5 mg Route: Oral     Pharmacy    [facility] [address] [place] [address], [address] [place] [address]   Phone:  [phone]  Fax:  [phone] Statement Selected

## 2022-05-25 NOTE — PROGRESS NOTE ADULT - SUBJECTIVE AND OBJECTIVE BOX
INTERVAL EVENTS  Bronchoscopy with EBUS done yesterday  No acute events overnight    REVIEW OF SYSTEMS  CONSTITUTIONAL: no fever or chills  CARDIOVASCULAR: no edema  RESPIRATORY: as above  REST OF SYSTEMS: negative      OBJECTIVE    Vital Signs Last 24 Hrs  T(C): 37 (20 Feb 2020 05:32), Max: 37 (20 Feb 2020 05:32)  T(F): 98.6 (20 Feb 2020 05:32), Max: 98.6 (20 Feb 2020 05:32)  HR: 103 (20 Feb 2020 05:32) (101 - 105)  BP: 107/82 (20 Feb 2020 05:32) (107/82 - 118/71)  BP(mean): --  RR: 20 (20 Feb 2020 05:32) (18 - 20)  SpO2: 94% (20 Feb 2020 05:32) (94% - 100%)    PHYSICAL EXAM:  General: no acute distress  HEENT: normocephalic  Eyes: extraocular movements intact, pupils equal and reactive to light  Neck: supple  Respiratory: non labored breathing, decreased breath sounds in bases  Cardiovascular: normal rate, regular rhythm  Gastrointestinal: abdomen soft, non tender, non distended  Extremities: no lower extremity edema  Neurological: alert, oriented, no focal deficits  Psychiatric: cooperative      MEDICATIONS  (STANDING):  acetaminophen   Tablet .. 650 milliGRAM(s) Oral every 6 hours  albuterol/ipratropium for Nebulization 3 milliLiter(s) Nebulizer every 8 hours  amLODIPine   Tablet 10 milliGRAM(s) Oral daily  heparin  Infusion. 1000 Unit(s)/Hr (10 mL/Hr) IV Continuous <Continuous>  lidocaine   Patch 1 Patch Transdermal daily  melatonin 5 milliGRAM(s) Oral at bedtime  methyl salicylate 15%/menthol 10% Topical Cream 1 Application(s) Topical four times a day  metoprolol succinate  milliGRAM(s) Oral daily  oxyCODONE  ER Tablet 15 milliGRAM(s) Oral two times a day  pancrelipase  (CREON 12,000 Lipase Units) 2 Capsule(s) Oral three times a day with meals  pantoprazole    Tablet 40 milliGRAM(s) Oral before breakfast  polyethylene glycol 3350 17 Gram(s) Oral daily    MEDICATIONS  (PRN):  benzonatate 100 milliGRAM(s) Oral three times a day PRN Cough  heparin  Injectable 5000 Unit(s) IV Push every 6 hours PRN For aPTT less than 40  heparin  Injectable 2500 Unit(s) IV Push every 6 hours PRN For aPTT between 40 - 57  morphine  - Injectable 4 milliGRAM(s) IV Push every 3 hours PRN Severe Pain (7 - 10)  ondansetron    Tablet 8 milliGRAM(s) Oral four times a day PRN Nausea and/or Vomiting  oxyCODONE    IR 20 milliGRAM(s) Oral every 6 hours PRN Moderate Pain (4 - 6)      LABORATORY                                  8.4    10.84 )-----------( 620      ( 20 Feb 2020 06:00 )             28.1     02-20    143  |  108  |  6<L>  ----------------------------<  89  4.6   |  22  |  0.74    Ca    8.6      20 Feb 2020 06:00  Phos  3.2     02-20  Mg     1.8     02-20    PTT - ( 20 Feb 2020 00:57 )  PTT:70.4 sec      RADIOLOGY    CT Angio Chest w/ IV Cont (02.13.20 @ 07:54) >  Thin linear filling defects within the bifurcation of the right upper pulmonary artery and the right interlobar pulmonary arteries, extending into the right lower lobar pulmonary artery, which may represent an acute or subacute pulmonary embolus/web.  A large filling defect is present within the left main pulmonary artery extending into the left upper lobar and left interlobar pulmonary arteries with complete opacification of the left lower lobar pulmonary artery, which is age indeterminant.    < end of copied text >      CT Abdomen and Pelvis w/ IV Cont (02.13.20 @ 07:58) >  LIVER: Subcentimeter hypodense focus adjacent tothe gallbladder, too small to characterize.  BILE DUCTS: Normal caliber.  GALLBLADDER: Within normal limits.  SPLEEN: Within normal limits.  PANCREAS: Calcifications within the pancreas, likely related to chronic pancreatitis.  ADRENALS: Indeterminant 0.8 cm right adrenal lesion, not significantly changed. KIDNEYS/URETERS: Bilateral renal cysts. Additional hypodense foci in the bilateral kidneys, too small to characterize.  BLADDER: Evaluation is slightly limited secondary to streak artifact from bilateral hip arthroplasties.  REPRODUCTIVE ORGANS: Visualized portions are within normal limits. Limited evaluation secondary to streak artifact from bilateral hip arthroplasties.  BOWEL: Small hiatal hernia. No bowel obstruction. Postsurgical changes are present. Appendix is normal.  PERITONEUM: No ascites.  VESSELS: Calcific atherosclerosis of the aorta.  RETROPERITONEUM/LYMPH NODES: No lymphadenopathy.    ABDOMINAL WALL: Postsurgical changes.  BONES: Degenerative changes of the spine. Bilateral hip with arthroplasty.    < end of copied text >        Transthoracic Echocardiogram (02.14.20 @ 05:59) >  Conclusions:  1. Normal left atrium.LA volume index = 34 cc/m2.  2. Mild concentric left ventricular hypertrophy.  3. Normal left ventricular systolic function. No segmental wall motion abnormalities.  4. Right ventricular enlargement with normal right ventricular systolic function.  5. Estimated pulmonary artery systolic pressure equals 39 mm Hg, assuming right atrial pressure equals 8 mm Hg, consistent with borderline pulmonary pressures.    < end of copied text > INTERVAL EVENTS  Bronchoscopy with EBUS done yesterday  No acute events overnight  No shortness of breath or chest pain    REVIEW OF SYSTEMS  CONSTITUTIONAL: no fever or chills  CARDIOVASCULAR: no edema  RESPIRATORY: as above  REST OF SYSTEMS: negative      OBJECTIVE    Vital Signs Last 24 Hrs  T(C): 37 (20 Feb 2020 05:32), Max: 37 (20 Feb 2020 05:32)  T(F): 98.6 (20 Feb 2020 05:32), Max: 98.6 (20 Feb 2020 05:32)  HR: 103 (20 Feb 2020 05:32) (101 - 105)  BP: 107/82 (20 Feb 2020 05:32) (107/82 - 118/71)  BP(mean): --  RR: 20 (20 Feb 2020 05:32) (18 - 20)  SpO2: 94% (20 Feb 2020 05:32) (94% - 100%)    PHYSICAL EXAM:  General: no acute distress  HEENT: normocephalic  Eyes: extraocular movements intact, pupils equal and reactive to light  Neck: supple  Respiratory: non labored breathing, decreased breath sounds in bases  Cardiovascular: normal rate, regular rhythm  Gastrointestinal: abdomen soft, non tender, non distended  Extremities: no lower extremity edema  Neurological: alert, oriented, no focal deficits  Psychiatric: cooperative      MEDICATIONS  (STANDING):  acetaminophen   Tablet .. 650 milliGRAM(s) Oral every 6 hours  albuterol/ipratropium for Nebulization 3 milliLiter(s) Nebulizer every 8 hours  amLODIPine   Tablet 10 milliGRAM(s) Oral daily  heparin  Infusion. 1000 Unit(s)/Hr (10 mL/Hr) IV Continuous <Continuous>  lidocaine   Patch 1 Patch Transdermal daily  melatonin 5 milliGRAM(s) Oral at bedtime  methyl salicylate 15%/menthol 10% Topical Cream 1 Application(s) Topical four times a day  metoprolol succinate  milliGRAM(s) Oral daily  oxyCODONE  ER Tablet 15 milliGRAM(s) Oral two times a day  pancrelipase  (CREON 12,000 Lipase Units) 2 Capsule(s) Oral three times a day with meals  pantoprazole    Tablet 40 milliGRAM(s) Oral before breakfast  polyethylene glycol 3350 17 Gram(s) Oral daily    MEDICATIONS  (PRN):  benzonatate 100 milliGRAM(s) Oral three times a day PRN Cough  heparin  Injectable 5000 Unit(s) IV Push every 6 hours PRN For aPTT less than 40  heparin  Injectable 2500 Unit(s) IV Push every 6 hours PRN For aPTT between 40 - 57  morphine  - Injectable 4 milliGRAM(s) IV Push every 3 hours PRN Severe Pain (7 - 10)  ondansetron    Tablet 8 milliGRAM(s) Oral four times a day PRN Nausea and/or Vomiting  oxyCODONE    IR 20 milliGRAM(s) Oral every 6 hours PRN Moderate Pain (4 - 6)      LABORATORY                                  8.4    10.84 )-----------( 620      ( 20 Feb 2020 06:00 )             28.1     02-20    143  |  108  |  6<L>  ----------------------------<  89  4.6   |  22  |  0.74    Ca    8.6      20 Feb 2020 06:00  Phos  3.2     02-20  Mg     1.8     02-20    PTT - ( 20 Feb 2020 00:57 )  PTT:70.4 sec      RADIOLOGY    CT Angio Chest w/ IV Cont (02.13.20 @ 07:54) >  Thin linear filling defects within the bifurcation of the right upper pulmonary artery and the right interlobar pulmonary arteries, extending into the right lower lobar pulmonary artery, which may represent an acute or subacute pulmonary embolus/web.  A large filling defect is present within the left main pulmonary artery extending into the left upper lobar and left interlobar pulmonary arteries with complete opacification of the left lower lobar pulmonary artery, which is age indeterminant.    < end of copied text >      CT Abdomen and Pelvis w/ IV Cont (02.13.20 @ 07:58) >  LIVER: Subcentimeter hypodense focus adjacent tothe gallbladder, too small to characterize.  BILE DUCTS: Normal caliber.  GALLBLADDER: Within normal limits.  SPLEEN: Within normal limits.  PANCREAS: Calcifications within the pancreas, likely related to chronic pancreatitis.  ADRENALS: Indeterminant 0.8 cm right adrenal lesion, not significantly changed. KIDNEYS/URETERS: Bilateral renal cysts. Additional hypodense foci in the bilateral kidneys, too small to characterize.  BLADDER: Evaluation is slightly limited secondary to streak artifact from bilateral hip arthroplasties.  REPRODUCTIVE ORGANS: Visualized portions are within normal limits. Limited evaluation secondary to streak artifact from bilateral hip arthroplasties.  BOWEL: Small hiatal hernia. No bowel obstruction. Postsurgical changes are present. Appendix is normal.  PERITONEUM: No ascites.  VESSELS: Calcific atherosclerosis of the aorta.  RETROPERITONEUM/LYMPH NODES: No lymphadenopathy.    ABDOMINAL WALL: Postsurgical changes.  BONES: Degenerative changes of the spine. Bilateral hip with arthroplasty.    < end of copied text >        Transthoracic Echocardiogram (02.14.20 @ 05:59) >  Conclusions:  1. Normal left atrium.LA volume index = 34 cc/m2.  2. Mild concentric left ventricular hypertrophy.  3. Normal left ventricular systolic function. No segmental wall motion abnormalities.  4. Right ventricular enlargement with normal right ventricular systolic function.  5. Estimated pulmonary artery systolic pressure equals 39 mm Hg, assuming right atrial pressure equals 8 mm Hg, consistent with borderline pulmonary pressures.    < end of copied text > PERRL/EOMI detailed exam

## 2022-06-16 ENCOUNTER — OUTPATIENT (OUTPATIENT)
Dept: OUTPATIENT SERVICES | Facility: HOSPITAL | Age: 67
LOS: 1 days | Discharge: HOME | End: 2022-06-16

## 2022-06-16 DIAGNOSIS — Z98.89 OTHER SPECIFIED POSTPROCEDURAL STATES: Chronic | ICD-10-CM

## 2022-06-16 DIAGNOSIS — Z96.641 PRESENCE OF RIGHT ARTIFICIAL HIP JOINT: Chronic | ICD-10-CM

## 2022-06-16 DIAGNOSIS — Z98.890 OTHER SPECIFIED POSTPROCEDURAL STATES: Chronic | ICD-10-CM

## 2022-06-16 DIAGNOSIS — C34.90 MALIGNANT NEOPLASM OF UNSPECIFIED PART OF UNSPECIFIED BRONCHUS OR LUNG: ICD-10-CM

## 2022-06-16 DIAGNOSIS — Z96.612 PRESENCE OF LEFT ARTIFICIAL SHOULDER JOINT: Chronic | ICD-10-CM

## 2022-06-21 ENCOUNTER — APPOINTMENT (OUTPATIENT)
Dept: HEMATOLOGY ONCOLOGY | Facility: CLINIC | Age: 67
End: 2022-06-21
Payer: MEDICARE

## 2022-06-21 ENCOUNTER — RESULT REVIEW (OUTPATIENT)
Age: 67
End: 2022-06-21

## 2022-06-21 VITALS
WEIGHT: 114.64 LBS | HEART RATE: 75 BPM | SYSTOLIC BLOOD PRESSURE: 124 MMHG | HEIGHT: 64 IN | BODY MASS INDEX: 19.57 KG/M2 | DIASTOLIC BLOOD PRESSURE: 87 MMHG | RESPIRATION RATE: 16 BRPM | OXYGEN SATURATION: 97 % | TEMPERATURE: 97.7 F

## 2022-06-21 DIAGNOSIS — G89.3 NEOPLASM RELATED PAIN (ACUTE) (CHRONIC): ICD-10-CM

## 2022-06-21 DIAGNOSIS — K86.1 OTHER CHRONIC PANCREATITIS: ICD-10-CM

## 2022-06-21 LAB
BASOPHILS # BLD AUTO: 0.04 K/UL — SIGNIFICANT CHANGE UP (ref 0–0.2)
BASOPHILS NFR BLD AUTO: 0.5 % — SIGNIFICANT CHANGE UP (ref 0–2)
EOSINOPHIL # BLD AUTO: 0.06 K/UL — SIGNIFICANT CHANGE UP (ref 0–0.5)
EOSINOPHIL NFR BLD AUTO: 0.7 % — SIGNIFICANT CHANGE UP (ref 0–6)
HCT VFR BLD CALC: 39.7 % — SIGNIFICANT CHANGE UP (ref 34.5–45)
HGB BLD-MCNC: 12.5 G/DL — SIGNIFICANT CHANGE UP (ref 11.5–15.5)
IMM GRANULOCYTES NFR BLD AUTO: 0.5 % — SIGNIFICANT CHANGE UP (ref 0–1.5)
LYMPHOCYTES # BLD AUTO: 3.69 K/UL — HIGH (ref 1–3.3)
LYMPHOCYTES # BLD AUTO: 46 % — HIGH (ref 13–44)
MCHC RBC-ENTMCNC: 27.3 PG — SIGNIFICANT CHANGE UP (ref 27–34)
MCHC RBC-ENTMCNC: 31.5 G/DL — LOW (ref 32–36)
MCV RBC AUTO: 86.7 FL — SIGNIFICANT CHANGE UP (ref 80–100)
MONOCYTES # BLD AUTO: 0.47 K/UL — SIGNIFICANT CHANGE UP (ref 0–0.9)
MONOCYTES NFR BLD AUTO: 5.9 % — SIGNIFICANT CHANGE UP (ref 2–14)
NEUTROPHILS # BLD AUTO: 3.72 K/UL — SIGNIFICANT CHANGE UP (ref 1.8–7.4)
NEUTROPHILS NFR BLD AUTO: 46.4 % — SIGNIFICANT CHANGE UP (ref 43–77)
NRBC # BLD: 0 /100 WBCS — SIGNIFICANT CHANGE UP (ref 0–0)
PLATELET # BLD AUTO: 290 K/UL — SIGNIFICANT CHANGE UP (ref 150–400)
RBC # BLD: 4.58 M/UL — SIGNIFICANT CHANGE UP (ref 3.8–5.2)
RBC # FLD: 17.2 % — HIGH (ref 10.3–14.5)
WBC # BLD: 8.02 K/UL — SIGNIFICANT CHANGE UP (ref 3.8–10.5)
WBC # FLD AUTO: 8.02 K/UL — SIGNIFICANT CHANGE UP (ref 3.8–10.5)

## 2022-06-21 PROCEDURE — 99214 OFFICE O/P EST MOD 30 MIN: CPT

## 2022-06-21 NOTE — REVIEW OF SYSTEMS
[Fatigue] : fatigue [Recent Change In Weight] : ~T recent weight change [Cough] : cough [SOB on Exertion] : shortness of breath during exertion [Negative] : Allergic/Immunologic [Lower Ext Edema] : no lower extremity edema [Abdominal Pain] : no abdominal pain [Vomiting] : no vomiting [Diarrhea] : no diarrhea [FreeTextEntry2] : low weight (10 lbs since last visit)

## 2022-06-21 NOTE — PHYSICAL EXAM
[Restricted in physically strenuous activity but ambulatory and able to carry out work of a light or sedentary nature] : Status 1- Restricted in physically strenuous activity but ambulatory and able to carry out work of a light or sedentary nature, e.g., light house work, office work [Thin] : thin [Normal] : grossly intact [de-identified] : clear bilaterally.   no adventitious breath sounds [de-identified] : steady gait

## 2022-06-21 NOTE — HISTORY OF PRESENT ILLNESS
[Disease: _____________________] : Disease: [unfilled] [N: ___] : N[unfilled] [M: ___] : M[unfilled] [AJCC Stage: ____] : AJCC Stage: [unfilled] [de-identified] : Ms. Travis is a 66-year-old female with a history of recently diagnosed non-small cell lung adenocarcinoma of left lung, pulmonary embolus on apixaban, HTN, chronic pancreatitis s/p Jeffery procedure (2019), history of ARDS (2015), GERD, chronic pain on opioids, and s/p vocal cord polypectomy who presents for consultation visit\par Brief hx: Pt was recently hospitalized in mid-Feb 2020 to [facility] for dyspnea, left-sided back pain, and fatigue with loss of appetite. Found on CTPA (February 2020) to have a large filling defect in the L main pulmonary artery extending into the left upper lobar and left interlobar pulm arteries with complete opacification of the LLL pulm artery. There were thin linear filling defects in the bifurcation of the right upper pulmonary artery and the right interlobar pulmonary arteries extending to the right lower pulmonary artery, which appeared chronic. CT also showed mediastinal adenopathy, including left paratracheal, subcarinal, and left hilar. There is also a wedge shaped opacity in the posterior left lower lobe suspicious for malignancy. There is centrilobular emphysema. 2D-echo (Feb 2020) with normal LV systolic function, mild concentric LVH, normal LA, RV enlargement with normal RV systolic function, and estimated PASP 39 consistent with borderline pulmonary hypertension. She was started on heparin infusion and discharged on Apixaban. Hypercoagulable workup negative, including Factor V Leiden, Prothrombin gene mutation, and lupus anticoagulant. \par \par She had EBUS/TBNA on Feb 19, 2020, found to have adenocarcinoma in right and left paratracheal and subcarinal lymph nodes (R4, L4, level 7). She is pending PFTs, PET/CT, brain MRI. Patient was discharged on Oxycodone ER BID and Tylenol prn. She is not constipated and is on a bowel regimen with polyethylene glycol. Still does not have a good appetite. She has lost 15 lbs over last 3 months. No fevers, chills, or chest pain. Reports occasional cough for which she takes benzonatate perle about once daily. Declining influenza vaccination. Takes Duonebs twice daily as prescribed, but denies dyspnea. \par \par Last colonoscopy in 2018 normal. Mammogram in June 2019 negative. No personal or family history of miscarriages or spontaneous abortions. Reports history of smoking but denies any history of COPD or lung cancer. No family history of malignancy, sarcoidosis, or TB. No recent prolonged immobility.\par \par Of note, active smoker, quit end of last year, on and off 4-6 cigarettes/day. She had CXR for lung cancer screening in the 1980s but nothing recently. Denies etoh use. \par \par 4/6/20: No new complaints. Persistent pain in the chest and occasional cough. COntinues to lose weight,. No other constitutional symptoms. \par \par 4/16/20: Pain in the chest improved. She has been nauseous and c/o abd cramps since starting chemo. She has been taking Zofran with minimal benefit. She has not been using Reglan because she was told not to by nutritionist. She has lost 2 lbs. \par \par 5/6/2020: Pt being seen in treatment area. Pt reported that she tolerated the 2nd cycle better with the change in premeds and adding dexamethasone following the treatment for 2 days. She still experiences days of severe nausea and vomiting but not continuously. Pt has has normal bowel function. She denies SOB/ slight fatigue. Denies pain\par \par 5/20/2020: Pt being seen in treatment area. Pt is here today for cycle 3. She states that for the last 2 days she has been unable to keep anything down. She vomited the entire 2 days. This is an ongoing and chronic problem and is unrelated to chemo. She has been evaluated by GI without discovering etiology for it. Initial brain imaging (-) for metastaic disease. No headaches or dizziness. Pt states it always lasts 2 days and spontaneously resolves. Today she states she was able to tolerate broth and mandarin oranges. No fever. No c/o of cough/CP/hemoptysis or SOB. \par  \par \par 8/12/20: On maintenance now. Scans in June 2020 shows \par \par 9/2/2020: Pt seen in treatment room. Feeling well. Previously experiencing significant nausea and vomiting. Resolved. Now using omeprazole on BID dosing. No new complaints\par \par 9/23/2020: Pt seen in treatment room. SHe has been experiencing malaise and significant diarrhea since friday. Appetite poor. (+) weight loss. No fever but feels very washed out. Had scans last friday as well. \par \par 11/11/2020: Pt returns for follow up. Patient had called last week to say she was canceling her treatment and office visit . Dr. Cage spoke with the patient who said that she was having pain and that her pancreas is acting up. Patient has a history of chronic pancreatitis. As per patient she has been in touch with her PCP and GI doctor. She was rescheduled for this week. She states that as suddenly as the pain came on, it left the same way. She has not had any problems since. SHe reports she had follow up with pain management doctor as well. No other complaints\par \par 12/24/20: Left oral swelling. Has lost multiple teeth spontaneously. Has appt with oral surgeon. Has gained some weight. Has stopped smoking.\par \par 2/3/21: Pt has been fatigued, she is stressed because her friends (who she has had no contact with recently) are sick with COVID. \par \par 3/17/21: Pt seen in treatment room. Pt recently underwent a hemorrhoidectomy in the OR. She is still recovering and reports continued post op pain. Received call from Radiologist following recent CT imaging. New left retroperitoneal gas of uncertain etiology. These findings were reported to the patient and emailed to surgical team by me . Pt states that she has follow up scheduled next week but it is with covering physician as her doctor is now on maternity leave. She did have some back pain but it has since resolved. No other new complaints. \par \par 4/29/21: Doing better. No abd pain anymore. Repeat CT abd done on 3/29 showed no intra-abd pathology. LE swelling. Had repeat doppler and echo which were normal. \par \par 6/9/21: Pt returns for follow up. She is scheduled for treatment today. Pt states that over last ~3 weeks she has developed severe LE edema. Both extremities are swollen all the way up to her proximal thighs B/L. She has been evaluated by her vascular doctor. No DVT's noted on US (per patient report). she remains on Eliquis 5 mg BID. She was started on Lasix 40mg daily which has little to no impact on edema. She states that she is very uncomfortable and finds it difficult to ambulate\par \par 7/22/21: Patient came in for follow-up after recent hospitalization on 6/14/21- 6/28/21 due to a fall and low oxygen saturation.  While hospitalized patient was place in supplemental oxygen.   Patient's hospital course was complicated by platelet counts,  bone ivy was done and since came back normal.  Patient is currently on 2L of supplemental oxygen.  She complain of fatigue and SOB.  Patient requested to reschedule today's treatment.   Patient denies diarrhea, rash and itching.  \par \par 8/18/21: Pt returns for follow up. She is O2 dependent. She states she is feeling better. Offers no complaints \par \par 9/30/21: Doing well. On and off O2 use. No pain.\par \par 12/9/21: feels tired, has dry throat. recently admitted for dyspnea. HEr last treatment with Keytruda was 9/30). In the hosptial, was diagnosed with COPD exacerbation and diverticulitis. She responded to steroids and abx. \par \par 6/21/22: Pt is here after 6 month hiatus. She has had a few bouts of pancreatitis flare. No recent COPD or diarrhea. She uses O2 as needed [de-identified] : adeno ca

## 2022-06-21 NOTE — ASSESSMENT
[Palliative] : Goals of care discussed with patient: Palliative [FreeTextEntry1] : 67 yo w  diagnosed lung adeno ca Stage IIIC by imaging. Large volume of disease and hence, not started on RT Started carbo/Alimta/Pem given Q 3 weeks schedule. was on maintenance alimta/keytruda. Alimta was subsequently discontinued due to severe fatigue. She remains on Keytruda maintenance. NGS revealed KRAS G12V mut, low TMB and KIM. PDL1 1%. \par She has hx of   DVT and remains on Eliquis. \par Pt has stopped smoking. Congratulated pt and encouraged her to stay off of tobacco \par Last November, pt was hospitalized as above- concern for irAE (colitis and pneumonitis, Keytruda was discontinued. Last tx was in November\par Will repeat scans- PET/CT and brain MRI\par OV in 1 month to follow up\par Educated importance of close follow up\par Labs today

## 2022-06-22 LAB
ALBUMIN SERPL ELPH-MCNC: 3.3 G/DL
ALP BLD-CCNC: 127 U/L
ALT SERPL-CCNC: 10 U/L
ANION GAP SERPL CALC-SCNC: 15 MMOL/L
AST SERPL-CCNC: 23 U/L
BILIRUB SERPL-MCNC: 0.2 MG/DL
BUN SERPL-MCNC: 9 MG/DL
CALCIUM SERPL-MCNC: 8.9 MG/DL
CEA SERPL-MCNC: 8.8 NG/ML
CHLORIDE SERPL-SCNC: 105 MMOL/L
CO2 SERPL-SCNC: 24 MMOL/L
CREAT SERPL-MCNC: 1.01 MG/DL
EGFR: 62 ML/MIN/1.73M2
GLUCOSE SERPL-MCNC: 102 MG/DL
MAGNESIUM SERPL-MCNC: 1.7 MG/DL
POTASSIUM SERPL-SCNC: 4.1 MMOL/L
PROT SERPL-MCNC: 6.2 G/DL
SODIUM SERPL-SCNC: 143 MMOL/L
TSH SERPL-ACNC: 0.9 UIU/ML

## 2022-07-12 ENCOUNTER — NON-APPOINTMENT (OUTPATIENT)
Age: 67
End: 2022-07-12

## 2022-07-12 ENCOUNTER — APPOINTMENT (OUTPATIENT)
Dept: CARDIOLOGY | Facility: CLINIC | Age: 67
End: 2022-07-12

## 2022-07-12 VITALS
SYSTOLIC BLOOD PRESSURE: 109 MMHG | HEIGHT: 64 IN | OXYGEN SATURATION: 98 % | WEIGHT: 123 LBS | HEART RATE: 68 BPM | DIASTOLIC BLOOD PRESSURE: 69 MMHG | BODY MASS INDEX: 21 KG/M2

## 2022-07-12 DIAGNOSIS — I82.401 ACUTE EMBOLISM AND THROMBOSIS OF UNSPECIFIED DEEP VEINS OF RIGHT LOWER EXTREMITY: ICD-10-CM

## 2022-07-12 PROCEDURE — 99214 OFFICE O/P EST MOD 30 MIN: CPT

## 2022-07-12 NOTE — REASON FOR VISIT
[Follow-Up - Clinic] : a clinic follow-up of [FreeTextEntry1] : Followup visit 7/12/2022\par \par She is doing well\par smokes 4 cig per month\par She has oxygen at home, company wants to take it back\par She follows with Dr. Abad\par She states that SOMETIMES the O2 sat is 85%\par she is using inhalers\par Legs feel ok \par Abdomen feels well\par She has plans for PET scan and MRI later this month \par \par Remains on eliquis 5mg BID\par BP and HR well controlled\par She needs to have teeth pulled\par

## 2022-07-12 NOTE — ASSESSMENT
[FreeTextEntry1] : \par Assessment:\par 1. History of PE\par 2. History of Below knee DVT\par 3. Non-small cell lung CA\par 4. Recent Hemorrhoid Surgery 3/8\par 5. Leg Swelling above the Knee\par 6. HTN\par 7. Mild Pulm HTN\par \par \par Plan:\par 1.  Continue current meds\par 2.  Continue eliquis 5mg BID\par 3.  For home oxgyen, she should reach out to Dr. Abad\par 4.  If dental work needs to be done, extraction, can hold eliquis 2 days (4 doses) prior and resume day after.  \par 5.  Oncology followup\par 6.  Return in 6 months\par \par

## 2022-07-21 ENCOUNTER — RESULT REVIEW (OUTPATIENT)
Age: 67
End: 2022-07-21

## 2022-07-21 ENCOUNTER — APPOINTMENT (OUTPATIENT)
Dept: MRI IMAGING | Facility: IMAGING CENTER | Age: 67
End: 2022-07-21

## 2022-07-21 ENCOUNTER — APPOINTMENT (OUTPATIENT)
Dept: NUCLEAR MEDICINE | Facility: IMAGING CENTER | Age: 67
End: 2022-07-21

## 2022-07-21 ENCOUNTER — OUTPATIENT (OUTPATIENT)
Dept: OUTPATIENT SERVICES | Facility: HOSPITAL | Age: 67
LOS: 1 days | End: 2022-07-21
Payer: MEDICARE

## 2022-07-21 DIAGNOSIS — Z96.612 PRESENCE OF LEFT ARTIFICIAL SHOULDER JOINT: Chronic | ICD-10-CM

## 2022-07-21 DIAGNOSIS — Z98.890 OTHER SPECIFIED POSTPROCEDURAL STATES: Chronic | ICD-10-CM

## 2022-07-21 DIAGNOSIS — Z98.89 OTHER SPECIFIED POSTPROCEDURAL STATES: Chronic | ICD-10-CM

## 2022-07-21 DIAGNOSIS — Z96.641 PRESENCE OF RIGHT ARTIFICIAL HIP JOINT: Chronic | ICD-10-CM

## 2022-07-21 DIAGNOSIS — C34.92 MALIGNANT NEOPLASM OF UNSPECIFIED PART OF LEFT BRONCHUS OR LUNG: ICD-10-CM

## 2022-07-21 PROCEDURE — 70553 MRI BRAIN STEM W/O & W/DYE: CPT

## 2022-07-21 PROCEDURE — 78815 PET IMAGE W/CT SKULL-THIGH: CPT | Mod: 26,PS

## 2022-07-21 PROCEDURE — A9552: CPT

## 2022-07-21 PROCEDURE — 70553 MRI BRAIN STEM W/O & W/DYE: CPT | Mod: 26

## 2022-07-21 PROCEDURE — 78815 PET IMAGE W/CT SKULL-THIGH: CPT

## 2022-07-21 PROCEDURE — A9585: CPT

## 2022-08-11 ENCOUNTER — APPOINTMENT (OUTPATIENT)
Dept: HEMATOLOGY ONCOLOGY | Facility: CLINIC | Age: 67
End: 2022-08-11

## 2022-08-11 VITALS
TEMPERATURE: 97.9 F | WEIGHT: 118.39 LBS | DIASTOLIC BLOOD PRESSURE: 73 MMHG | RESPIRATION RATE: 16 BRPM | OXYGEN SATURATION: 97 % | BODY MASS INDEX: 20.21 KG/M2 | SYSTOLIC BLOOD PRESSURE: 120 MMHG | HEIGHT: 63.98 IN | HEART RATE: 81 BPM

## 2022-08-11 PROCEDURE — 99214 OFFICE O/P EST MOD 30 MIN: CPT

## 2022-08-12 NOTE — PATIENT PROFILE ADULT - NUMBER OF YRS
Pt presents with left middle finger numbness and tingling, from 2nd knuckle to finger tip. Pt reports carrying a bag of groceries using middle finger. Symptoms since last evening at 9pm.     No pain, no bruising or redness.
10

## 2022-08-15 NOTE — PHYSICAL EXAM
[Restricted in physically strenuous activity but ambulatory and able to carry out work of a light or sedentary nature] : Status 1- Restricted in physically strenuous activity but ambulatory and able to carry out work of a light or sedentary nature, e.g., light house work, office work [Thin] : thin [Normal] : grossly intact [de-identified] : steady gait  [de-identified] : clear bilaterally.   no adventitious breath sounds

## 2022-08-15 NOTE — ASSESSMENT
[Palliative] : Goals of care discussed with patient: Palliative [FreeTextEntry1] : 65 yo w  diagnosed lung adeno ca Stage IIIC by imaging. Large volume of disease and hence, not started on RT Started carbo/Alimta/Pem given Q 3 weeks schedule. was on maintenance alimta/keytruda. Alimta was subsequently discontinued due to severe fatigue. She remains on Keytruda maintenance. NGS revealed KRAS G12V mut, low TMB and KIM. PDL1 1%. \par She has hx of   DVT and remains on Eliquis. \par Pt has stopped smoking. Congratulated pt and encouraged her to stay off of tobacco .\par Last November, pt was hospitalized as above- concern for irAE (colitis and pneumonitis, Keytruda was discontinued. Last tx was in November 2021.\par Continues to experience abdominal pain related to pancreatitis for which she is continuing to follow up with her GI specialist and pain management physician. \par PET scan and MRI brain from July 2022 reviewed. PET scan stable. MRI brain without evidence of disease. \par CEA 8.8 \par Patient to follow up in 3 months at which time it will be determined when she needs repeat scans. \par

## 2022-08-15 NOTE — REVIEW OF SYSTEMS
[Recent Change In Weight] : ~T recent weight change [Cough] : cough [SOB on Exertion] : shortness of breath during exertion [Abdominal Pain] : abdominal pain [Negative] : Allergic/Immunologic [Fatigue] : no fatigue [Vision Problems] : no vision problems [Dysphagia] : no dysphagia [Odynophagia] : no odynophagia [Chest Pain] : no chest pain [Lower Ext Edema] : no lower extremity edema [Diarrhea] : no diarrhea [Dizziness] : no dizziness [FreeTextEntry2] : gaining weight  [FreeTextEntry7] : related to chronic pancreatitis

## 2022-08-15 NOTE — HISTORY OF PRESENT ILLNESS
[Disease: _____________________] : Disease: [unfilled] [N: ___] : N[unfilled] [M: ___] : M[unfilled] [AJCC Stage: ____] : AJCC Stage: [unfilled] [de-identified] : Ms. Travis is a 66-year-old female with a history of recently diagnosed non-small cell lung adenocarcinoma of left lung, pulmonary embolus on apixaban, HTN, chronic pancreatitis s/p Jeffery procedure (2019), history of ARDS (2015), GERD, chronic pain on opioids, and s/p vocal cord polypectomy who presents for consultation visit\par Brief hx: Pt was recently hospitalized in mid-Feb 2020 to [facility] for dyspnea, left-sided back pain, and fatigue with loss of appetite. Found on CTPA (February 2020) to have a large filling defect in the L main pulmonary artery extending into the left upper lobar and left interlobar pulm arteries with complete opacification of the LLL pulm artery. There were thin linear filling defects in the bifurcation of the right upper pulmonary artery and the right interlobar pulmonary arteries extending to the right lower pulmonary artery, which appeared chronic. CT also showed mediastinal adenopathy, including left paratracheal, subcarinal, and left hilar. There is also a wedge shaped opacity in the posterior left lower lobe suspicious for malignancy. There is centrilobular emphysema. 2D-echo (Feb 2020) with normal LV systolic function, mild concentric LVH, normal LA, RV enlargement with normal RV systolic function, and estimated PASP 39 consistent with borderline pulmonary hypertension. She was started on heparin infusion and discharged on Apixaban. Hypercoagulable workup negative, including Factor V Leiden, Prothrombin gene mutation, and lupus anticoagulant. \par \par She had EBUS/TBNA on Feb 19, 2020, found to have adenocarcinoma in right and left paratracheal and subcarinal lymph nodes (R4, L4, level 7). She is pending PFTs, PET/CT, brain MRI. Patient was discharged on Oxycodone ER BID and Tylenol prn. She is not constipated and is on a bowel regimen with polyethylene glycol. Still does not have a good appetite. She has lost 15 lbs over last 3 months. No fevers, chills, or chest pain. Reports occasional cough for which she takes benzonatate perle about once daily. Declining influenza vaccination. Takes Duonebs twice daily as prescribed, but denies dyspnea. \par \par Last colonoscopy in 2018 normal. Mammogram in June 2019 negative. No personal or family history of miscarriages or spontaneous abortions. Reports history of smoking but denies any history of COPD or lung cancer. No family history of malignancy, sarcoidosis, or TB. No recent prolonged immobility.\par \par Of note, active smoker, quit end of last year, on and off 4-6 cigarettes/day. She had CXR for lung cancer screening in the 1980s but nothing recently. Denies etoh use. \par \par 4/6/20: No new complaints. Persistent pain in the chest and occasional cough. COntinues to lose weight,. No other constitutional symptoms. \par \par 4/16/20: Pain in the chest improved. She has been nauseous and c/o abd cramps since starting chemo. She has been taking Zofran with minimal benefit. She has not been using Reglan because she was told not to by nutritionist. She has lost 2 lbs. \par \par 5/6/2020: Pt being seen in treatment area. Pt reported that she tolerated the 2nd cycle better with the change in premeds and adding dexamethasone following the treatment for 2 days. She still experiences days of severe nausea and vomiting but not continuously. Pt has has normal bowel function. She denies SOB/ slight fatigue. Denies pain\par \par 5/20/2020: Pt being seen in treatment area. Pt is here today for cycle 3. She states that for the last 2 days she has been unable to keep anything down. She vomited the entire 2 days. This is an ongoing and chronic problem and is unrelated to chemo. She has been evaluated by GI without discovering etiology for it. Initial brain imaging (-) for metastaic disease. No headaches or dizziness. Pt states it always lasts 2 days and spontaneously resolves. Today she states she was able to tolerate broth and mandarin oranges. No fever. No c/o of cough/CP/hemoptysis or SOB. \par  \par \par 8/12/20: On maintenance now. Scans in June 2020 shows \par \par 9/2/2020: Pt seen in treatment room. Feeling well. Previously experiencing significant nausea and vomiting. Resolved. Now using omeprazole on BID dosing. No new complaints\par \par 9/23/2020: Pt seen in treatment room. SHe has been experiencing malaise and significant diarrhea since friday. Appetite poor. (+) weight loss. No fever but feels very washed out. Had scans last friday as well. \par \par 11/11/2020: Pt returns for follow up. Patient had called last week to say she was canceling her treatment and office visit . Dr. Cage spoke with the patient who said that she was having pain and that her pancreas is acting up. Patient has a history of chronic pancreatitis. As per patient she has been in touch with her PCP and GI doctor. She was rescheduled for this week. She states that as suddenly as the pain came on, it left the same way. She has not had any problems since. SHe reports she had follow up with pain management doctor as well. No other complaints\par \par 12/24/20: Left oral swelling. Has lost multiple teeth spontaneously. Has appt with oral surgeon. Has gained some weight. Has stopped smoking.\par \par 2/3/21: Pt has been fatigued, she is stressed because her friends (who she has had no contact with recently) are sick with COVID. \par \par 3/17/21: Pt seen in treatment room. Pt recently underwent a hemorrhoidectomy in the OR. She is still recovering and reports continued post op pain. Received call from Radiologist following recent CT imaging. New left retroperitoneal gas of uncertain etiology. These findings were reported to the patient and emailed to surgical team by me . Pt states that she has follow up scheduled next week but it is with covering physician as her doctor is now on maternity leave. She did have some back pain but it has since resolved. No other new complaints. \par \par 4/29/21: Doing better. No abd pain anymore. Repeat CT abd done on 3/29 showed no intra-abd pathology. LE swelling. Had repeat doppler and echo which were normal. \par \par 6/9/21: Pt returns for follow up. She is scheduled for treatment today. Pt states that over last ~3 weeks she has developed severe LE edema. Both extremities are swollen all the way up to her proximal thighs B/L. She has been evaluated by her vascular doctor. No DVT's noted on US (per patient report). she remains on Eliquis 5 mg BID. She was started on Lasix 40mg daily which has little to no impact on edema. She states that she is very uncomfortable and finds it difficult to ambulate\par \par 7/22/21: Patient came in for follow-up after recent hospitalization on 6/14/21- 6/28/21 due to a fall and low oxygen saturation.  While hospitalized patient was place in supplemental oxygen.   Patient's hospital course was complicated by platelet counts,  bone ivy was done and since came back normal.  Patient is currently on 2L of supplemental oxygen.  She complain of fatigue and SOB.  Patient requested to reschedule today's treatment.   Patient denies diarrhea, rash and itching.  \par \par 8/18/21: Pt returns for follow up. She is O2 dependent. She states she is feeling better. Offers no complaints \par \par 9/30/21: Doing well. On and off O2 use. No pain.\par \par 12/9/21: feels tired, has dry throat. recently admitted for dyspnea. HEr last treatment with Keytruda was 9/30). In the hosptial, was diagnosed with COPD exacerbation and diverticulitis. She responded to steroids and abx. \par \par 6/21/22: Pt is here after 6 month hiatus. She has had a few bouts of pancreatitis flare. No recent COPD or diarrhea. She uses O2 as needed\par \par 8/11/2022: patient presents today for follow up after having scans done. She is having increased abdominal pain related to her known chronic pancreatitis as her insurance has not allowed her to refill her dilaudid script however her pain management physician is working on an appeal. Otherwise she has no new complaints. Reports her SOB is at baseline (only with exertion) and she is not experiencing any chest pain. Her appetite is good and she is gaining weight.  [de-identified] : adeno ca

## 2022-10-11 NOTE — PATIENT PROFILE ADULT - NSPROMUTINFOINDIVIDFT_GEN_A_NUR
[de-identified] : Mr. 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 Jones\par Cardiology: Dr. Abraham Zheng ([place])\par \par He was previously followed for his post-transplant care by transplant surgeon Dr. Phil Cristina (last seen on 11/9/21) and is being seen today to transition his post-transplant care to me.\par \par His current immunosuppression consists of prednisone 5 mg po daily,  mg po bid, tacrolimus 0.5 mg po q12h, and everolimus 0.75 mg po q12h. He is also on Truvada given his history of HBV pre-transplant as well as a history of post-transplant viremia (with last detectable HBV PCR on 8/14/15 and undetectable since then). He denies any history of post-transplant hepatic allograft complications, including no post-transplant liver biopsies or rejection episodes, stating "since day one, it's been perfect". His donor was 19 years old. He has not been on any other antiviral medication post-transplant apart from the Truvada. He has been on quadruple immunosuppression unchanged for several years after everolimus was added as part of a renal sparing regimen to reduce tacrolimus dosing.\par \par He and his wife report that he was recently hospitalized at [facility] from 8/19/22-8/23/22 due to a "massive heart attack" (STEMI) necessitating placement of 3 coronary stents and with resulting ischemic cardiomyopathy with HFrEF, now on GDMT. He is scheduled for another coronary angiogram for placement of a fourth stent on 10/25/22. He reports that he is feeling better since the intervention as he no longer has exertional chest pain and dyspnea.\par \par His wife says that he needs a new PCP as his prior long-term PCP Dr. Jones is retiring. He also needs an endocrinologist and was provided a suggestion today. She describes his diabetes as "uncontrolled sometimes", though they think his last HbA1c while inpatient was ~7%. He sometimes uses his Toujeo long-acting insulin twice daily (instead of once daily) if having a lot of hyperglycemia. He typically injects his short-acting insulin using a slide scale, with 5-9 units with meals.\par \par He hasn't seen a dermatologist since his transplant. He thinks his last colonoscopy was pre-transplant with his and his wife's gastroenterologist, Dr. Desir.\par \par He has received 3 Pfizer vaccinations for COVID-19 (5/2121, 6/11/21, and 8/28/21). His wife thinks he has received both pneumococcal vaccinations. His last Tdap was >10 years ago. He has not received Shingrix vaccinations. He has not received this year's seasonal influenza vaccination yet.\par \par He is retired. He is  for 36 years. His wife, Kendra, works a [employer] as a . They live in [place]. They have two daughters (one of whom is getting  on 11/26/22), one son, and one granddaughter.
no

## 2022-11-02 NOTE — PROVIDER CONTACT NOTE (OTHER) - RECOMMENDATIONS
Patient is not taking his Synjardy on a regular basis. Patient did not bring in his logbook or meter to visit. He is having some highs (300) and lows (50) -due to eating inconsistently (skips lunch) and being more active at times. I will follow up with him in one week by phone.  notify provider ask for dose to be given 55 mins early.

## 2022-11-03 ENCOUNTER — OUTPATIENT (OUTPATIENT)
Dept: OUTPATIENT SERVICES | Facility: HOSPITAL | Age: 67
LOS: 1 days | Discharge: HOME | End: 2022-11-03

## 2022-11-03 DIAGNOSIS — Z96.612 PRESENCE OF LEFT ARTIFICIAL SHOULDER JOINT: Chronic | ICD-10-CM

## 2022-11-03 DIAGNOSIS — Z98.89 OTHER SPECIFIED POSTPROCEDURAL STATES: Chronic | ICD-10-CM

## 2022-11-03 DIAGNOSIS — C34.90 MALIGNANT NEOPLASM OF UNSPECIFIED PART OF UNSPECIFIED BRONCHUS OR LUNG: ICD-10-CM

## 2022-11-03 DIAGNOSIS — Z98.890 OTHER SPECIFIED POSTPROCEDURAL STATES: Chronic | ICD-10-CM

## 2022-11-03 DIAGNOSIS — Z96.641 PRESENCE OF RIGHT ARTIFICIAL HIP JOINT: Chronic | ICD-10-CM

## 2022-11-11 ENCOUNTER — RESULT REVIEW (OUTPATIENT)
Age: 67
End: 2022-11-11

## 2022-11-11 ENCOUNTER — APPOINTMENT (OUTPATIENT)
Dept: HEMATOLOGY ONCOLOGY | Facility: CLINIC | Age: 67
End: 2022-11-11

## 2022-11-11 VITALS
BODY MASS INDEX: 17.69 KG/M2 | HEART RATE: 74 BPM | OXYGEN SATURATION: 95 % | WEIGHT: 103.62 LBS | RESPIRATION RATE: 16 BRPM | TEMPERATURE: 98.1 F | DIASTOLIC BLOOD PRESSURE: 67 MMHG | HEIGHT: 63.98 IN | SYSTOLIC BLOOD PRESSURE: 113 MMHG

## 2022-11-11 DIAGNOSIS — G89.29 OTHER CHRONIC PAIN: ICD-10-CM

## 2022-11-11 DIAGNOSIS — R63.0 ANOREXIA: ICD-10-CM

## 2022-11-11 LAB
ACANTHOCYTES BLD QL SMEAR: SLIGHT — SIGNIFICANT CHANGE UP
ANISOCYTOSIS BLD QL: SLIGHT — SIGNIFICANT CHANGE UP
BASOPHILS # BLD AUTO: 0.07 K/UL — SIGNIFICANT CHANGE UP (ref 0–0.2)
BASOPHILS NFR BLD AUTO: 0.7 % — SIGNIFICANT CHANGE UP (ref 0–2)
DACRYOCYTES BLD QL SMEAR: SLIGHT — SIGNIFICANT CHANGE UP
ELLIPTOCYTES BLD QL SMEAR: SLIGHT — SIGNIFICANT CHANGE UP
EOSINOPHIL # BLD AUTO: 0.03 K/UL — SIGNIFICANT CHANGE UP (ref 0–0.5)
EOSINOPHIL NFR BLD AUTO: 0.3 % — SIGNIFICANT CHANGE UP (ref 0–6)
HCT VFR BLD CALC: 41.8 % — SIGNIFICANT CHANGE UP (ref 34.5–45)
HGB BLD-MCNC: 13.2 G/DL — SIGNIFICANT CHANGE UP (ref 11.5–15.5)
IMM GRANULOCYTES NFR BLD AUTO: 0.9 % — SIGNIFICANT CHANGE UP (ref 0–0.9)
LYMPHOCYTES # BLD AUTO: 4.41 K/UL — HIGH (ref 1–3.3)
LYMPHOCYTES # BLD AUTO: 41.3 % — SIGNIFICANT CHANGE UP (ref 13–44)
MCHC RBC-ENTMCNC: 27.5 PG — SIGNIFICANT CHANGE UP (ref 27–34)
MCHC RBC-ENTMCNC: 31.6 G/DL — LOW (ref 32–36)
MCV RBC AUTO: 87.1 FL — SIGNIFICANT CHANGE UP (ref 80–100)
MONOCYTES # BLD AUTO: 0.63 K/UL — SIGNIFICANT CHANGE UP (ref 0–0.9)
MONOCYTES NFR BLD AUTO: 5.9 % — SIGNIFICANT CHANGE UP (ref 2–14)
NEUTROPHILS # BLD AUTO: 5.45 K/UL — SIGNIFICANT CHANGE UP (ref 1.8–7.4)
NEUTROPHILS NFR BLD AUTO: 50.9 % — SIGNIFICANT CHANGE UP (ref 43–77)
NRBC # BLD: 0 /100 WBCS — SIGNIFICANT CHANGE UP (ref 0–0)
PLAT MORPH BLD: NORMAL — SIGNIFICANT CHANGE UP
PLATELET # BLD AUTO: 352 K/UL — SIGNIFICANT CHANGE UP (ref 150–400)
POIKILOCYTOSIS BLD QL AUTO: SLIGHT — SIGNIFICANT CHANGE UP
RBC # BLD: 4.8 M/UL — SIGNIFICANT CHANGE UP (ref 3.8–5.2)
RBC # FLD: 15.8 % — HIGH (ref 10.3–14.5)
RBC BLD AUTO: ABNORMAL
WBC # BLD: 10.69 K/UL — HIGH (ref 3.8–10.5)
WBC # FLD AUTO: 10.69 K/UL — HIGH (ref 3.8–10.5)

## 2022-11-11 PROCEDURE — 99214 OFFICE O/P EST MOD 30 MIN: CPT

## 2022-11-11 RX ORDER — PREDNISONE 20 MG/1
20 TABLET ORAL DAILY
Qty: 7 | Refills: 0 | Status: ACTIVE | COMMUNITY
Start: 2022-11-11 | End: 1900-01-01

## 2022-11-11 NOTE — HISTORY OF PRESENT ILLNESS
[Disease: _____________________] : Disease: [unfilled] [N: ___] : N[unfilled] [M: ___] : M[unfilled] [AJCC Stage: ____] : AJCC Stage: [unfilled] [de-identified] : Ms. 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 Found on CTPA (February 2020) to have a large filling defect in the L main pulmonary artery extending into the left upper lobar and left interlobar pulm arteries with complete opacification of the LLL pulm artery. There were thin linear filling defects in the bifurcation of the right upper pulmonary artery and the right interlobar pulmonary arteries extending to the right lower pulmonary artery, which appeared chronic. CT also showed mediastinal adenopathy, including left paratracheal, subcarinal, and left hilar. There is also a wedge shaped opacity in the posterior left lower lobe suspicious for malignancy. There is centrilobular emphysema. 2D-echo (Feb 2020) with normal LV systolic function, mild concentric LVH, normal LA, RV enlargement with normal RV systolic function, and estimated PASP 39 consistent with borderline pulmonary hypertension. She was started on heparin infusion and discharged on Apixaban. Hypercoagulable workup negative, including Factor V Leiden, Prothrombin gene mutation, and lupus anticoagulant. \par \par She had EBUS/TBNA on Feb 19, 2020, found to have adenocarcinoma in right and left paratracheal and subcarinal lymph nodes (R4, L4, level 7). She is pending PFTs, PET/CT, brain MRI. Patient was discharged on Oxycodone ER BID and Tylenol prn. She is not constipated and is on a bowel regimen with polyethylene glycol. Still does not have a good appetite. She has lost 15 lbs over last 3 months. No fevers, chills, or chest pain. Reports occasional cough for which she takes benzonatate perle about once daily. Declining influenza vaccination. Takes Duonebs twice daily as prescribed, but denies dyspnea. \par \par Last colonoscopy in 2018 normal. Mammogram in June 2019 negative. No personal or family history of miscarriages or spontaneous abortions. Reports history of smoking but denies any history of COPD or lung cancer. No family history of malignancy, sarcoidosis, or TB. No recent prolonged immobility.\par \par Of note, active smoker, quit end of last year, on and off 4-6 cigarettes/day. She had CXR for lung cancer screening in the 1980s but nothing recently. Denies etoh use. \par \par 4/6/20: No new complaints. Persistent pain in the chest and occasional cough. COntinues to lose weight,. No other constitutional symptoms. \par \par 4/16/20: Pain in the chest improved. She has been nauseous and c/o abd cramps since starting chemo. She has been taking Zofran with minimal benefit. She has not been using Reglan because she was told not to by nutritionist. She has lost 2 lbs. \par \par 5/6/2020: Pt being seen in treatment area. Pt reported that she tolerated the 2nd cycle better with the change in premeds and adding dexamethasone following the treatment for 2 days. She still experiences days of severe nausea and vomiting but not continuously. Pt has has normal bowel function. She denies SOB/ slight fatigue. Denies pain\par \par 5/20/2020: Pt being seen in treatment area. Pt is here today for cycle 3. She states that for the last 2 days she has been unable to keep anything down. She vomited the entire 2 days. This is an ongoing and chronic problem and is unrelated to chemo. She has been evaluated by GI without discovering etiology for it. Initial brain imaging (-) for metastaic disease. No headaches or dizziness. Pt states it always lasts 2 days and spontaneously resolves. Today she states she was able to tolerate broth and mandarin oranges. No fever. No c/o of cough/CP/hemoptysis or SOB. \par  \par \par 8/12/20: On maintenance now. Scans in June 2020 shows \par \par 9/2/2020: Pt seen in treatment room. Feeling well. Previously experiencing significant nausea and vomiting. Resolved. Now using omeprazole on BID dosing. No new complaints\par \par 9/23/2020: Pt seen in treatment room. SHe has been experiencing malaise and significant diarrhea since friday. Appetite poor. (+) weight loss. No fever but feels very washed out. Had scans last friday as well. \par \par 11/11/2020: Pt returns for follow up. Patient had called last week to say she was canceling her treatment and office visit . Dr. Cage spoke with the patient who said that she was having pain and that her pancreas is acting up. Patient has a history of chronic pancreatitis. As per patient she has been in touch with her PCP and GI doctor. She was rescheduled for this week. She states that as suddenly as the pain came on, it left the same way. She has not had any problems since. SHe reports she had follow up with pain management doctor as well. No other complaints\par \par 12/24/20: Left oral swelling. Has lost multiple teeth spontaneously. Has appt with oral surgeon. Has gained some weight. Has stopped smoking.\par \par 2/3/21: Pt has been fatigued, she is stressed because her friends (who she has had no contact with recently) are sick with COVID. \par \par 3/17/21: Pt seen in treatment room. Pt recently underwent a hemorrhoidectomy in the OR. She is still recovering and reports continued post op pain. Received call from Radiologist following recent CT imaging. New left retroperitoneal gas of uncertain etiology. These findings were reported to the patient and emailed to surgical team by me . Pt states that she has follow up scheduled next week but it is with covering physician as her doctor is now on maternity leave. She did have some back pain but it has since resolved. No other new complaints. \par \par 4/29/21: Doing better. No abd pain anymore. Repeat CT abd done on 3/29 showed no intra-abd pathology. LE swelling. Had repeat doppler and echo which were normal. \par \par 6/9/21: Pt returns for follow up. She is scheduled for treatment today. Pt states that over last ~3 weeks she has developed severe LE edema. Both extremities are swollen all the way up to her proximal thighs B/L. She has been evaluated by her vascular doctor. No DVT's noted on US (per patient report). she remains on Eliquis 5 mg BID. She was started on Lasix 40mg daily which has little to no impact on edema. She states that she is very uncomfortable and finds it difficult to ambulate\par \par 7/22/21: Patient came in for follow-up after recent hospitalization on 6/14/21- 6/28/21 due to a fall and low oxygen saturation.  While hospitalized patient was place in supplemental oxygen.   Patient's hospital course was complicated by platelet counts,  bone ivy was done and since came back normal.  Patient is currently on 2L of supplemental oxygen.  She complain of fatigue and SOB.  Patient requested to reschedule today's treatment.   Patient denies diarrhea, rash and itching.  \par \par 8/18/21: Pt returns for follow up. She is O2 dependent. She states she is feeling better. Offers no complaints \par \par 9/30/21: Doing well. On and off O2 use. No pain.\par \par 12/9/21: feels tired, has dry throat. recently admitted for dyspnea. HEr last treatment with Keytruda was 9/30). In the hosptial, was diagnosed with COPD exacerbation and diverticulitis. She responded to steroids and abx. \par \par 6/21/22: Pt is here after 6 month hiatus. She has had a few bouts of pancreatitis flare. No recent COPD or diarrhea. She uses O2 as needed\par \par 8/11/2022: patient presents today for follow up after having scans done. She is having increased abdominal pain related to her known chronic pancreatitis as her insurance has not allowed her to refill her dilaudid script however her pain management physician is working on an appeal. Otherwise she has no new complaints. Reports her SOB is at baseline (only with exertion) and she is not experiencing any chest pain. Her appetite is good and she is gaining weight. \par \par 11/11/22: Patient seen today for follow up. Reports that 3 weeks ago she developed productive cough with copious amounts of sputum that made her nauseous for which she saw her PCP who prescribed her Augmentin x7 days and she felt some improvement in her symptoms. As a result of the nausea she was not able to eat or drink much and has lost a few pounds as a result. Her symptoms are improving but she still has a cough  [de-identified] : adeno ca

## 2022-11-11 NOTE — ASSESSMENT
[Palliative] : Goals of care discussed with patient: Palliative [FreeTextEntry1] : 65 yo w  diagnosed lung adeno ca Stage IIIC by imaging. Large volume of disease and hence, not started on RT Started carbo/Alimta/Pem given Q 3 weeks schedule. Was on maintenance alimta/keytruda. Alimta was subsequently discontinued due to severe fatigue. She remains on Keytruda maintenance.  NGS revealed KRAS G12V mut, low TMB and KIM. PDL1 1%. Last November, pt was hospitalized and concern for irAE (colitis and pneumonitis, Keytruda was discontinued. Last tx was in November 2021. PET scan and MRI brain from July 2022 reviewed. PET scan stable. MRI brain without evidence of disease. \par \par Given ongoing cough and wheeze on exam, prescribed pred 20mg x7 days and urged patient to follow up with her pulmonologist. \par Will obtain CT Chest to evaluate symptoms and she is also due for scans soon. \par Continues to experience abdominal pain related to pancreatitis, is now taking dilaudid PRN. Continue follow up with her GI specialist and pain management physician. \par She has hx of   DVT and remains on Eliquis.\par Pt has stopped smoking. Congratulated pt and encouraged her to stay off of tobacco .\par OV in 2 weeks to evaluate symptoms and review scans

## 2022-11-11 NOTE — PHYSICAL EXAM
[Restricted in physically strenuous activity but ambulatory and able to carry out work of a light or sedentary nature] : Status 1- Restricted in physically strenuous activity but ambulatory and able to carry out work of a light or sedentary nature, e.g., light house work, office work [Thin] : thin [Normal] : grossly intact [de-identified] : Bilateral expiratory wheezes

## 2022-11-11 NOTE — REVIEW OF SYSTEMS
[Recent Change In Weight] : ~T recent weight change [Cough] : cough [SOB on Exertion] : shortness of breath during exertion [Abdominal Pain] : abdominal pain [Negative] : Allergic/Immunologic [Fever] : no fever [Fatigue] : no fatigue [Vision Problems] : no vision problems [Dysphagia] : no dysphagia [Odynophagia] : no odynophagia [Chest Pain] : no chest pain [Lower Ext Edema] : no lower extremity edema [Vomiting] : no vomiting [Diarrhea] : no diarrhea [Dysuria] : no dysuria [Joint Pain] : no joint pain [Muscle Pain] : no muscle pain [Skin Rash] : no skin rash [Dizziness] : no dizziness [FreeTextEntry2] : weight loss, see above  [FreeTextEntry7] : intermittent, related to chronic pancreatitis

## 2022-11-14 LAB
ALBUMIN SERPL ELPH-MCNC: 3.2 G/DL
ALP BLD-CCNC: 107 U/L
ALT SERPL-CCNC: 10 U/L
ANION GAP SERPL CALC-SCNC: 13 MMOL/L
AST SERPL-CCNC: 37 U/L
BILIRUB SERPL-MCNC: 0.4 MG/DL
BUN SERPL-MCNC: 11 MG/DL
CALCIUM SERPL-MCNC: 9 MG/DL
CEA SERPL-MCNC: 8.1 NG/ML
CHLORIDE SERPL-SCNC: 99 MMOL/L
CO2 SERPL-SCNC: 29 MMOL/L
CREAT SERPL-MCNC: 1.06 MG/DL
EGFR: 58 ML/MIN/1.73M2
GLUCOSE SERPL-MCNC: 119 MG/DL
POTASSIUM SERPL-SCNC: 5 MMOL/L
PROCALCITONIN SERPL-MCNC: 1.43 NG/ML
PROT SERPL-MCNC: 6.2 G/DL
SODIUM SERPL-SCNC: 141 MMOL/L
TSH SERPL-ACNC: 0.93 UIU/ML

## 2022-11-21 ENCOUNTER — APPOINTMENT (OUTPATIENT)
Dept: CT IMAGING | Facility: IMAGING CENTER | Age: 67
End: 2022-11-21

## 2022-11-21 ENCOUNTER — OUTPATIENT (OUTPATIENT)
Dept: OUTPATIENT SERVICES | Facility: HOSPITAL | Age: 67
LOS: 1 days | End: 2022-11-21
Payer: MEDICARE

## 2022-11-21 DIAGNOSIS — Z96.641 PRESENCE OF RIGHT ARTIFICIAL HIP JOINT: Chronic | ICD-10-CM

## 2022-11-21 DIAGNOSIS — C34.92 MALIGNANT NEOPLASM OF UNSPECIFIED PART OF LEFT BRONCHUS OR LUNG: ICD-10-CM

## 2022-11-21 DIAGNOSIS — Z98.89 OTHER SPECIFIED POSTPROCEDURAL STATES: Chronic | ICD-10-CM

## 2022-11-21 DIAGNOSIS — Z98.890 OTHER SPECIFIED POSTPROCEDURAL STATES: Chronic | ICD-10-CM

## 2022-11-21 DIAGNOSIS — Z96.612 PRESENCE OF LEFT ARTIFICIAL SHOULDER JOINT: Chronic | ICD-10-CM

## 2022-11-21 PROCEDURE — 71260 CT THORAX DX C+: CPT | Mod: 26

## 2022-11-21 PROCEDURE — 74177 CT ABD & PELVIS W/CONTRAST: CPT

## 2022-11-21 PROCEDURE — 71260 CT THORAX DX C+: CPT

## 2022-11-21 PROCEDURE — 74177 CT ABD & PELVIS W/CONTRAST: CPT | Mod: 26

## 2022-11-25 ENCOUNTER — APPOINTMENT (OUTPATIENT)
Dept: HEMATOLOGY ONCOLOGY | Facility: CLINIC | Age: 67
End: 2022-11-25

## 2022-11-25 VITALS
OXYGEN SATURATION: 97 % | RESPIRATION RATE: 16 BRPM | WEIGHT: 106.26 LBS | TEMPERATURE: 97.9 F | SYSTOLIC BLOOD PRESSURE: 108 MMHG | BODY MASS INDEX: 18.25 KG/M2 | HEART RATE: 94 BPM | DIASTOLIC BLOOD PRESSURE: 65 MMHG

## 2022-11-25 DIAGNOSIS — R11.0 NAUSEA: ICD-10-CM

## 2022-11-25 DIAGNOSIS — R19.7 DIARRHEA, UNSPECIFIED: ICD-10-CM

## 2022-11-25 DIAGNOSIS — I26.99 OTHER PULMONARY EMBOLISM W/OUT ACUTE COR PULMONALE: ICD-10-CM

## 2022-11-25 DIAGNOSIS — J44.9 CHRONIC OBSTRUCTIVE PULMONARY DISEASE, UNSPECIFIED: ICD-10-CM

## 2022-11-25 PROCEDURE — 99214 OFFICE O/P EST MOD 30 MIN: CPT

## 2022-11-25 NOTE — ASSESSMENT
[Palliative] : Goals of care discussed with patient: Palliative [FreeTextEntry1] : 67 yo w  diagnosed lung adeno ca Stage IIIC by imaging. Large volume of disease and hence, not started on RT Started carbo/Alimta/Pem given Q 3 weeks schedule. Was on maintenance alimta/keytruda. Alimta was subsequently discontinued due to severe fatigue. She remains on Keytruda maintenance.  NGS revealed KRAS G12V mut, low TMB and KIM. PDL1 1%. Last November, pt was hospitalized and concern for irAE (colitis and pneumonitis, Keytruda was discontinued. Last tx was in November 2021. PET scan and MRI brain from July 2022 reviewed. PET scan stable. MRI brain without evidence of disease. Most recent CT scans from November 2022 show stable mediastinal nodes. \par \par Most recent CT scans done November 2022 are stable. \par Continue to follow up with pulmonology\par Continues to experience abdominal pain related to pancreatitis, is now taking dilaudid PRN. Continue follow up with her GI specialist and pain management physician. \par She has hx of   DVT and remains on Eliquis.\par Pt has stopped smoking. Congratulated pt and encouraged her to stay off of tobacco .\par OV in 3 months

## 2022-11-25 NOTE — REVIEW OF SYSTEMS
[Recent Change In Weight] : ~T recent weight change [Abdominal Pain] : abdominal pain [Negative] : Allergic/Immunologic [Fever] : no fever [Fatigue] : no fatigue [Vision Problems] : no vision problems [Dysphagia] : no dysphagia [Odynophagia] : no odynophagia [Chest Pain] : no chest pain [Lower Ext Edema] : no lower extremity edema [Cough] : no cough [SOB on Exertion] : no shortness of breath during exertion [Vomiting] : no vomiting [Diarrhea] : no diarrhea [Dysuria] : no dysuria [Joint Pain] : no joint pain [Muscle Pain] : no muscle pain [Skin Rash] : no skin rash [Dizziness] : no dizziness [FreeTextEntry2] : 3 pound weight gain [FreeTextEntry7] : intermittent, related to chronic pancreatitis

## 2022-11-25 NOTE — HISTORY OF PRESENT ILLNESS
[Disease: _____________________] : Disease: [unfilled] [N: ___] : N[unfilled] [M: ___] : M[unfilled] [AJCC Stage: ____] : AJCC Stage: [unfilled] [de-identified] : Ms. Travis is a 66-year-old female with a history of recently diagnosed non-small cell lung adenocarcinoma of left lung, pulmonary embolus on apixaban, HTN, chronic pancreatitis s/p Jeffery procedure (2019), history of ARDS (2015), GERD, chronic pain on opioids, and s/p vocal cord polypectomy who presents for consultation visit\par Brief hx: Pt was recently hospitalized in mid-Feb 2020 to [facility] for dyspnea, left-sided back pain, and fatigue with loss of appetite. Found on CTPA (February 2020) to have a large filling defect in the L main pulmonary artery extending into the left upper lobar and left interlobar pulm arteries with complete opacification of the LLL pulm artery. There were thin linear filling defects in the bifurcation of the right upper pulmonary artery and the right interlobar pulmonary arteries extending to the right lower pulmonary artery, which appeared chronic. CT also showed mediastinal adenopathy, including left paratracheal, subcarinal, and left hilar. There is also a wedge shaped opacity in the posterior left lower lobe suspicious for malignancy. There is centrilobular emphysema. 2D-echo (Feb 2020) with normal LV systolic function, mild concentric LVH, normal LA, RV enlargement with normal RV systolic function, and estimated PASP 39 consistent with borderline pulmonary hypertension. She was started on heparin infusion and discharged on Apixaban. Hypercoagulable workup negative, including Factor V Leiden, Prothrombin gene mutation, and lupus anticoagulant. \par \par She had EBUS/TBNA on Feb 19, 2020, found to have adenocarcinoma in right and left paratracheal and subcarinal lymph nodes (R4, L4, level 7). She is pending PFTs, PET/CT, brain MRI. Patient was discharged on Oxycodone ER BID and Tylenol prn. She is not constipated and is on a bowel regimen with polyethylene glycol. Still does not have a good appetite. She has lost 15 lbs over last 3 months. No fevers, chills, or chest pain. Reports occasional cough for which she takes benzonatate perle about once daily. Declining influenza vaccination. Takes Duonebs twice daily as prescribed, but denies dyspnea. \par \par Last colonoscopy in 2018 normal. Mammogram in June 2019 negative. No personal or family history of miscarriages or spontaneous abortions. Reports history of smoking but denies any history of COPD or lung cancer. No family history of malignancy, sarcoidosis, or TB. No recent prolonged immobility.\par \par Of note, active smoker, quit end of last year, on and off 4-6 cigarettes/day. She had CXR for lung cancer screening in the 1980s but nothing recently. Denies etoh use. \par \par 4/6/20: No new complaints. Persistent pain in the chest and occasional cough. COntinues to lose weight,. No other constitutional symptoms. \par \par 4/16/20: Pain in the chest improved. She has been nauseous and c/o abd cramps since starting chemo. She has been taking Zofran with minimal benefit. She has not been using Reglan because she was told not to by nutritionist. She has lost 2 lbs. \par \par 5/6/2020: Pt being seen in treatment area. Pt reported that she tolerated the 2nd cycle better with the change in premeds and adding dexamethasone following the treatment for 2 days. She still experiences days of severe nausea and vomiting but not continuously. Pt has has normal bowel function. She denies SOB/ slight fatigue. Denies pain\par \par 5/20/2020: Pt being seen in treatment area. Pt is here today for cycle 3. She states that for the last 2 days she has been unable to keep anything down. She vomited the entire 2 days. This is an ongoing and chronic problem and is unrelated to chemo. She has been evaluated by GI without discovering etiology for it. Initial brain imaging (-) for metastaic disease. No headaches or dizziness. Pt states it always lasts 2 days and spontaneously resolves. Today she states she was able to tolerate broth and mandarin oranges. No fever. No c/o of cough/CP/hemoptysis or SOB. \par \par 8/12/20: On maintenance now. Scans in June 2020 shows \par \par 9/2/2020: Pt seen in treatment room. Feeling well. Previously experiencing significant nausea and vomiting. Resolved. Now using omeprazole on BID dosing. No new complaints\par \par 9/23/2020: Pt seen in treatment room. SHe has been experiencing malaise and significant diarrhea since friday. Appetite poor. (+) weight loss. No fever but feels very washed out. Had scans last friday as well. \par \par 11/11/2020: Pt returns for follow up. Patient had called last week to say she was canceling her treatment and office visit . Dr. Cage spoke with the patient who said that she was having pain and that her pancreas is acting up. Patient has a history of chronic pancreatitis. As per patient she has been in touch with her PCP and GI doctor. She was rescheduled for this week. She states that as suddenly as the pain came on, it left the same way. She has not had any problems since. SHe reports she had follow up with pain management doctor as well. No other complaints\par \par 12/24/20: Left oral swelling. Has lost multiple teeth spontaneously. Has appt with oral surgeon. Has gained some weight. Has stopped smoking.\par \par 2/3/21: Pt has been fatigued, she is stressed because her friends (who she has had no contact with recently) are sick with COVID. \par \par 3/17/21: Pt seen in treatment room. Pt recently underwent a hemorrhoidectomy in the OR. She is still recovering and reports continued post op pain. Received call from Radiologist following recent CT imaging. New left retroperitoneal gas of uncertain etiology. These findings were reported to the patient and emailed to surgical team by me . Pt states that she has follow up scheduled next week but it is with covering physician as her doctor is now on maternity leave. She did have some back pain but it has since resolved. No other new complaints. \par \par 4/29/21: Doing better. No abd pain anymore. Repeat CT abd done on 3/29 showed no intra-abd pathology. LE swelling. Had repeat doppler and echo which were normal. \par \par 6/9/21: Pt returns for follow up. She is scheduled for treatment today. Pt states that over last ~3 weeks she has developed severe LE edema. Both extremities are swollen all the way up to her proximal thighs B/L. She has been evaluated by her vascular doctor. No DVT's noted on US (per patient report). she remains on Eliquis 5 mg BID. She was started on Lasix 40mg daily which has little to no impact on edema. She states that she is very uncomfortable and finds it difficult to ambulate\par \par 7/22/21: Patient came in for follow-up after recent hospitalization on 6/14/21- 6/28/21 due to a fall and low oxygen saturation.  While hospitalized patient was place in supplemental oxygen.   Patient's hospital course was complicated by platelet counts,  bone ivy was done and since came back normal.  Patient is currently on 2L of supplemental oxygen.  She complain of fatigue and SOB.  Patient requested to reschedule today's treatment.   Patient denies diarrhea, rash and itching.  \par \par 8/18/21: Pt returns for follow up. She is O2 dependent. She states she is feeling better. Offers no complaints \par \par 9/30/21: Doing well. On and off O2 use. No pain.\par \par 12/9/21: feels tired, has dry throat. recently admitted for dyspnea. HEr last treatment with Keytruda was 9/30). In the hosptial, was diagnosed with COPD exacerbation and diverticulitis. She responded to steroids and abx. \par \par 6/21/22: Pt is here after 6 month hiatus. She has had a few bouts of pancreatitis flare. No recent COPD or diarrhea. She uses O2 as needed\par \par 8/11/2022: patient presents today for follow up after having scans done. She is having increased abdominal pain related to her known chronic pancreatitis as her insurance has not allowed her to refill her dilaudid script however her pain management physician is working on an appeal. Otherwise she has no new complaints. Reports her SOB is at baseline (only with exertion) and she is not experiencing any chest pain. Her appetite is good and she is gaining weight. \par \par 11/11/22: Patient seen today for follow up. Reports that 3 weeks ago she developed productive cough with copious amounts of sputum that made her nauseous for which she saw her PCP who prescribed her Augmentin x7 days and she felt some improvement in her symptoms. As a result of the nausea she was not able to eat or drink much and has lost a few pounds as a result. Her symptoms are improving but she still has a cough \par \par 11/23/22: Seen today for follow up. She had CT scans done that showed chronic PE, smoking related lung disease, and stable mediastinal nodes. She reports that since taking prednisone her cough and nausea have resolved, her appetite has improved and she gained 3 pounds. She will see her pulmonologist soon.  [de-identified] : adeno ca

## 2022-11-25 NOTE — PHYSICAL EXAM
[Restricted in physically strenuous activity but ambulatory and able to carry out work of a light or sedentary nature] : Status 1- Restricted in physically strenuous activity but ambulatory and able to carry out work of a light or sedentary nature, e.g., light house work, office work [Thin] : thin [Normal] : clear to auscultation bilaterally, no dullness, no wheezing

## 2022-11-28 RX ORDER — FUROSEMIDE 40 MG/1
40 TABLET ORAL
Qty: 90 | Refills: 1 | Status: ACTIVE | COMMUNITY
Start: 2021-04-23 | End: 1900-01-01

## 2022-12-01 ENCOUNTER — RX RENEWAL (OUTPATIENT)
Age: 67
End: 2022-12-01

## 2022-12-01 RX ORDER — FOLIC ACID 1 MG/1
1 TABLET ORAL
Qty: 90 | Refills: 1 | Status: ACTIVE | COMMUNITY
Start: 2020-03-13 | End: 1900-01-01

## 2022-12-01 NOTE — PROGRESS NOTE ADULT - ATTENDING COMMENTS
HEMATOLOGY ONCOLOGY PROGRESS NOTE     Kasandra Hagen is a 69 [facility] old [facility] at [facility] ( LOS: 10 days )    Assessment/Plan:   Stage IV non-small cell lung carcinoma:  - diagnosed in March 2020 with bone and liver metastases s/p chemoimmunotherapy, currently on maintenance atezolizumab with stable disease, last dose received on 9/8/22.   - Immunotherapy on hold since then due pneumonia/pneumonitis   - f/u Dr. 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    - CTA 11/21 with no evidence of PE, extensive bilateral ground glass opacities; suspicious for atypical pneumonia (viral, fungal, PJP) vs immunotherapy-related pneumonitis which is a diagnosis of exclusion.   - s/p bronchoscopy 11/22; had significant amount of purulent secretions with normal airways.  - BAL and infectious work up negative.   - currently on 50% FiO2 via HFNC; weaning slowly as tolerated.   - was on solumedrol, switched to prednisone.   - continue prednisone 60 mg daily, will need long slow taper course  - discontinue immunotherapy permanently.  - continue ppx bactrim and PPI while on steroids.   - appreciate pulm/critical care input.      Bone metastases  - diffuse osseous metastases throughout the spine, right hip and femur appear sclerotic on recent imaging  - s/p x2 courses of palliative radiation to right hip and femur 10/2021  - CTA showed left 8th rib and new 7th rib fractures  - on xgeva monthly outpatient  - currently asymptomatic      Chronic anemia  - Anemia of malignancy/chronic disease  - Hb stable     Leukocytosis   - with neutrophilic predominance  - most likely reactive to inflammation and steroids.     Left LE DVT  - venous doppler 11/25  thrombus in the left popliteal and peroneal veins.  - remote history of DVT treated  with eliquis for few months.    - on heparin gtt.   - anticipate transition to DOAC when stable.       Subjective:   Interval History:  No acute events. Improving slowly. No dyspnea at rest, fever, chest pain.       Patient Active Problem List   Diagnosis   • Secondary malignant neoplasm of bone and bone marrow (CMS/HCC)   • Malignant neoplasm of lower lobe of lung (CMS/HCC)   • Pneumonia, bacterial   • Pneumonia   • Palliative care encounter        Current Facility-Administered Medications   Medication   • insulin lispro (ADMELOG,HumaLOG) - Correction Dose   • famotidine (PEPCID) tablet 20 mg   • acetaminophen (TYLENOL) tablet 650 mg   • dextrose 50 % injection 25 g   • dextrose 50 % injection 12.5 g   • glucagon (GLUCAGEN) injection 1 mg   • dextrose (GLUTOSE) 40 % gel 15 g   • dextrose (GLUTOSE) 40 % gel 30 g   • predniSONE (DELTASONE) tablet 60 mg   • heparin (porcine) 25,000 units/250 mL in dextrose 5 % infusion   • heparin (porcine) injection 5,600 Units   • heparin (porcine) injection 2,800 Units   • sulfamethoxazole-trimethoprim (BACTRIM DS) 800-160 MG tablet 1 tablet   • metoPROLOL tartrate (LOPRESSOR) tablet 50 mg   • melatonin tablet 6 mg   • lidocaine 2% urethral (UROJET) 2 % jelly 10 mL   • ipratropium-albuterol (DUONEB) 0.5-2.5 (3) MG/3ML nebulizer solution 3 mL   • ipratropium-albuterol (DUONEB) 0.5-2.5 (3) MG/3ML nebulizer solution 3 mL   • sodium chloride 0.9 % flush bag 25 mL   • sodium chloride (PF) 0.9 % injection 2 mL   • umeclidinium-vilanterol (ANORO ELLIPTA) 62.5-25 MCG/ACT inhaler 1 puff   • aspirin (ECOTRIN) enteric coated tablet 81 mg   • atorvastatin (LIPITOR) tablet 40 mg   • ondansetron (ZOFRAN ODT) disintegrating tablet 4 mg        Objective:     Visit Vitals  /77 (BP Location: RUE - Right upper extremity, Patient Position: Semi-Valles's)   Pulse (!) 126   Temp 97.9 °F (36.6 °C) (Oral)   Resp (!) 24   Ht 5' 1\" (1.549 m)   Wt 73.3 kg (161 lb 9.6 oz)   SpO2 95%   BMI 30.53 kg/m²         Physical Exam:  Constitutional: Alert, cooperative, oriented.   Head: Normocephalic, no scars.   Eyes: Sclera anicteric, pupils equal.   Respiratory: No distress. Normal efforts. Rhonchi bilaterally.  Cardiovascular: Regular rate and rhythm, no murmurs  Abdomen: Soft, non-distended, non-tender.  Extremities: No visual deformities or edema.   Psychiatric: Coherent speech. Verbalizes understanding of our discussions today      I&O:     Intake/Output Summary (Last 24 hours) at 12/1/2022 1215  Last data filed at 12/1/2022 0900  Gross per 24 hour   Intake 275.36 ml   Output 450 ml   Net -174.64 ml       Data Review:  Recent Labs   Lab 12/01/22 0457 11/30/22 0413 11/29/22  0650   WBC 18.7* 18.5* 21.5*   RBC 4.15 3.99* 4.05   HGB 10.5* 9.7* 10.1*   HCT 34.0* 32.1* 32.4*   * 411 404     Recent Labs   Lab 12/01/22 0457 11/30/22 0413 11/29/22  0650   SODIUM 137 139 138   POTASSIUM 4.2 4.3 3.9   CHLORIDE 104 106 103   CO2 27 27 30   BUN 24* 33* 38*   CREATININE 0.64 0.58 0.70   CALCIUM 8.7 8.2* 8.2*   ALBUMIN 2.4* 2.1* 2.3*   BILIRUBIN 0.3 0.3 0.4   ALKPT 148* 130* 139*   GPT 82* 70* 49   AST 32 40* 32   GLUCOSE 80 88 92     Recent Labs   Lab 12/01/22 0457 11/30/22 0413 11/29/22  2043 11/26/22  1512 11/26/22  0824   PTT 44* 56* 50*   < > 24   PT  --   --   --   --  12.3*   INR  --   --   --   --  1.2    < > = values in this interval not displayed.         Results for orders placed or performed during the hospital encounter of 11/21/22 (from the past 72 hour(s))   XR CHEST PA OR AP 1 VIEW    Impression    FINDINGS/IMPRESSION:    There are residual left upper lobe and bilateral (left side more sensitive  than) mid lung and bibasilar infiltrates, which appears similar to the  findings observed on the previous examination of 11/26/2022.    There is a left subclavian Port-A-Cath.  There are multiple metallic  surgical clips.  There is deformity of multiple left ribs.  There are  degenerative changes of the thoracic  spine.  The heart size is normal.   There is calcification of the thoracic aorta.    There is no demonstrable pneumothorax.    Electronically Signed by: NGUYEN WATTS MD   Signed on: 12/1/2022 8:28 AM           XR CHEST PA OR AP 1 VIEW    Result Date: 12/1/2022  EXAMINATION:  XR CHEST PA OR AP 1 VIEW. INDICATION: Shortness of breath.     FINDINGS/IMPRESSION: There are residual left upper lobe and bilateral (left side more sensitive than) mid lung and bibasilar infiltrates, which appears similar to the findings observed on the previous examination of 11/26/2022. There is a left subclavian Port-A-Cath.  There are multiple metallic surgical clips.  There is deformity of multiple left ribs.  There are degenerative changes of the thoracic spine.  The heart size is normal. There is calcification of the thoracic aorta. There is no demonstrable pneumothorax. Electronically Signed by: NGUYEN WATTS MD Signed on: 12/1/2022 8:28 AM         Marely Velasquez MD  Hematology Oncology  [facility]    Pt seen and examined, agree with above. Acute hypoxic resp failure with b/l GGOs on Ct chest. Clinically improving, now transitioned from high flow to NC. Completed a course of Zosyn for possible bacterial PNA. Cont slow steroid taper as outlined above, along with bactrim for PCP px. Gentle diuresis to keep negative fluid balance. Will require outpt pulm FUP with short interval repeat Ct chest in 3-4 weeks.

## 2023-01-10 ENCOUNTER — APPOINTMENT (OUTPATIENT)
Dept: CARDIOLOGY | Facility: CLINIC | Age: 68
End: 2023-01-10
Payer: MEDICARE

## 2023-01-10 VITALS
WEIGHT: 105 LBS | DIASTOLIC BLOOD PRESSURE: 80 MMHG | OXYGEN SATURATION: 98 % | BODY MASS INDEX: 17.93 KG/M2 | HEART RATE: 73 BPM | SYSTOLIC BLOOD PRESSURE: 96 MMHG | HEIGHT: 63.98 IN

## 2023-01-10 DIAGNOSIS — R63.4 ABNORMAL WEIGHT LOSS: ICD-10-CM

## 2023-01-10 DIAGNOSIS — Z87.891 PERSONAL HISTORY OF NICOTINE DEPENDENCE: ICD-10-CM

## 2023-01-10 DIAGNOSIS — Z81.1 FAMILY HISTORY OF ALCOHOL ABUSE AND DEPENDENCE: ICD-10-CM

## 2023-01-10 DIAGNOSIS — Z86.718 PERSONAL HISTORY OF OTHER VENOUS THROMBOSIS AND EMBOLISM: ICD-10-CM

## 2023-01-10 PROCEDURE — 99214 OFFICE O/P EST MOD 30 MIN: CPT

## 2023-01-10 NOTE — ASSESSMENT
[FreeTextEntry1] : \par Assessment:\par 1. History of PE\par 2. History of Below knee DVT\par 3. Non-small cell lung CA\par 4. Recent Hemorrhoid Surgery 3/8\par 5. Leg Swelling above the Knee\par 6. HTN\par 7. Mild Pulm HTN\par \par \par Plan:\par 1.  Continue current meds\par 2.  Continue eliquis 5mg BID\par 3.  For home oxygen, she should reach out to Dr. Abad\par 4.  Reduce lasix to 40mg EVERY OTHER DAY \par 5.  Oncology followup\par 6.  Return in 6 months\par \par

## 2023-01-10 NOTE — PHYSICAL EXAM
[General Appearance - Well Developed] : well developed [Normal Appearance] : normal appearance [General Appearance - Well Nourished] : well nourished [Heart Rate And Rhythm] : heart rate and rhythm were normal [Heart Sounds] : normal S1 and S2 [Murmurs] : no murmurs present [Bowel Sounds] : normal bowel sounds [Abdomen Soft] : soft [Abdomen Tenderness] : non-tender [Abnormal Walk] : normal gait [Nail Clubbing] : no clubbing of the fingernails [Cyanosis, Localized] : no localized cyanosis [] : no rash [No Skin Ulcers] : no skin ulcer [Oriented To Time, Place, And Person] : oriented to person, place, and time [Auscultation Breath Sounds / Voice Sounds] : lungs were clear to auscultation bilaterally [FreeTextEntry1] : Palpable DP and PT B/L.  + 1+ edema bilaterally

## 2023-01-10 NOTE — REASON FOR VISIT
[Follow-Up - Clinic] : a clinic follow-up of [FreeTextEntry1] : 1/10/23\par \par Denies C/P or SOB.\par No LE pain or edema.\par Reports recent weight loss of 20 lbs over 3-4 months  due lack of appetite.\par Reports feeling light headed in the mornings or when standing up too fast.\par Feeling well otherwise.\par Reports legs feeling dry.\par Denies any bleeding on Eliquis.\par \par Medications:\par Eliquis 5 mg BID\par Norvasc 5 mg QD\par Folic Acid\par Furosemide 40 mg QD\par Inhalers\par Remeron\par Toprol  mg QD\par Omeprazole\par Zofran PRN\par \par Orthostatic VS\par lying BP 10/764 HR 73 \par sitting /67 \par standing /67 HR 90\par Oxycodone PRN\par Potassium 10 meq QD\par Vitamins\par \par Followup visit 7/12/2022\par \par She is doing well\par smokes 4 cig per month\par She has oxygen at home, company wants to take it back\par She follows with Dr. Abad\par She states that SOMETIMES the O2 sat is 85%\par she is using inhalers\par Legs feel ok \par Abdomen feels well\par She has plans for PET scan and MRI later this month \par \par Remains on eliquis 5mg BID\par BP and HR well controlled\par She needs to have teeth pulled\par

## 2023-02-17 ENCOUNTER — RX RENEWAL (OUTPATIENT)
Age: 68
End: 2023-02-17

## 2023-02-17 RX ORDER — METOPROLOL SUCCINATE 100 MG/1
100 TABLET, EXTENDED RELEASE ORAL DAILY
Qty: 90 | Refills: 3 | Status: ACTIVE | COMMUNITY
Start: 2023-02-17 | End: 1900-01-01

## 2023-02-23 ENCOUNTER — OUTPATIENT (OUTPATIENT)
Dept: OUTPATIENT SERVICES | Facility: HOSPITAL | Age: 68
LOS: 1 days | Discharge: HOME | End: 2023-02-23

## 2023-02-23 DIAGNOSIS — Z98.890 OTHER SPECIFIED POSTPROCEDURAL STATES: Chronic | ICD-10-CM

## 2023-02-23 DIAGNOSIS — C34.90 MALIGNANT NEOPLASM OF UNSPECIFIED PART OF UNSPECIFIED BRONCHUS OR LUNG: ICD-10-CM

## 2023-02-23 DIAGNOSIS — Z96.612 PRESENCE OF LEFT ARTIFICIAL SHOULDER JOINT: Chronic | ICD-10-CM

## 2023-02-23 DIAGNOSIS — Z98.89 OTHER SPECIFIED POSTPROCEDURAL STATES: Chronic | ICD-10-CM

## 2023-02-23 DIAGNOSIS — Z96.641 PRESENCE OF RIGHT ARTIFICIAL HIP JOINT: Chronic | ICD-10-CM

## 2023-03-02 ENCOUNTER — APPOINTMENT (OUTPATIENT)
Dept: HEMATOLOGY ONCOLOGY | Facility: CLINIC | Age: 68
End: 2023-03-02
Payer: MEDICARE

## 2023-03-02 ENCOUNTER — APPOINTMENT (OUTPATIENT)
Dept: HEMATOLOGY ONCOLOGY | Facility: CLINIC | Age: 68
End: 2023-03-02

## 2023-03-02 VITALS
HEART RATE: 70 BPM | RESPIRATION RATE: 16 BRPM | OXYGEN SATURATION: 98 % | BODY MASS INDEX: 16.56 KG/M2 | DIASTOLIC BLOOD PRESSURE: 57 MMHG | SYSTOLIC BLOOD PRESSURE: 117 MMHG | HEIGHT: 63.98 IN | WEIGHT: 96.98 LBS | TEMPERATURE: 98 F

## 2023-03-02 DIAGNOSIS — C34.92 MALIGNANT NEOPLASM OF UNSPECIFIED PART OF LEFT BRONCHUS OR LUNG: ICD-10-CM

## 2023-03-02 PROCEDURE — 99214 OFFICE O/P EST MOD 30 MIN: CPT

## 2023-03-08 NOTE — HISTORY OF PRESENT ILLNESS
[Disease: _____________________] : Disease: [unfilled] [N: ___] : N[unfilled] [M: ___] : M[unfilled] [AJCC Stage: ____] : AJCC Stage: [unfilled] [de-identified] : Ms. Travis is a 66-year-old female with a history of recently diagnosed non-small cell lung adenocarcinoma of left lung, pulmonary embolus on apixaban, HTN, chronic pancreatitis s/p Jeffery procedure (2019), history of ARDS (2015), GERD, chronic pain on opioids, and s/p vocal cord polypectomy who presents for consultation visit\par Brief hx: Pt was recently hospitalized in mid-Feb 2020 to [facility] for dyspnea, left-sided back pain, and fatigue with loss of appetite. Found on CTPA (February 2020) to have a large filling defect in the L main pulmonary artery extending into the left upper lobar and left interlobar pulm arteries with complete opacification of the LLL pulm artery. There were thin linear filling defects in the bifurcation of the right upper pulmonary artery and the right interlobar pulmonary arteries extending to the right lower pulmonary artery, which appeared chronic. CT also showed mediastinal adenopathy, including left paratracheal, subcarinal, and left hilar. There is also a wedge shaped opacity in the posterior left lower lobe suspicious for malignancy. There is centrilobular emphysema. 2D-echo (Feb 2020) with normal LV systolic function, mild concentric LVH, normal LA, RV enlargement with normal RV systolic function, and estimated PASP 39 consistent with borderline pulmonary hypertension. She was started on heparin infusion and discharged on Apixaban. Hypercoagulable workup negative, including Factor V Leiden, Prothrombin gene mutation, and lupus anticoagulant. \par \par She had EBUS/TBNA on Feb 19, 2020, found to have adenocarcinoma in right and left paratracheal and subcarinal lymph nodes (R4, L4, level 7). She is pending PFTs, PET/CT, brain MRI. Patient was discharged on Oxycodone ER BID and Tylenol prn. She is not constipated and is on a bowel regimen with polyethylene glycol. Still does not have a good appetite. She has lost 15 lbs over last 3 months. No fevers, chills, or chest pain. Reports occasional cough for which she takes benzonatate perle about once daily. Declining influenza vaccination. Takes Duonebs twice daily as prescribed, but denies dyspnea. \par \par Last colonoscopy in 2018 normal. Mammogram in June 2019 negative. No personal or family history of miscarriages or spontaneous abortions. Reports history of smoking but denies any history of COPD or lung cancer. No family history of malignancy, sarcoidosis, or TB. No recent prolonged immobility.\par \par Of note, active smoker, quit end of last year, on and off 4-6 cigarettes/day. She had CXR for lung cancer screening in the 1980s but nothing recently. Denies etoh use. \par \par 4/6/20: No new complaints. Persistent pain in the chest and occasional cough. COntinues to lose weight,. No other constitutional symptoms. \par \par 4/16/20: Pain in the chest improved. She has been nauseous and c/o abd cramps since starting chemo. She has been taking Zofran with minimal benefit. She has not been using Reglan because she was told not to by nutritionist. She has lost 2 lbs. \par \par 5/6/2020: Pt being seen in treatment area. Pt reported that she tolerated the 2nd cycle better with the change in premeds and adding dexamethasone following the treatment for 2 days. She still experiences days of severe nausea and vomiting but not continuously. Pt has has normal bowel function. She denies SOB/ slight fatigue. Denies pain\par \par 5/20/2020: Pt being seen in treatment area. Pt is here today for cycle 3. She states that for the last 2 days she has been unable to keep anything down. She vomited the entire 2 days. This is an ongoing and chronic problem and is unrelated to chemo. She has been evaluated by GI without discovering etiology for it. Initial brain imaging (-) for metastaic disease. No headaches or dizziness. Pt states it always lasts 2 days and spontaneously resolves. Today she states she was able to tolerate broth and mandarin oranges. No fever. No c/o of cough/CP/hemoptysis or SOB. \par \par 8/12/20: On maintenance now. Scans in June 2020 shows \par \par 9/2/2020: Pt seen in treatment room. Feeling well. Previously experiencing significant nausea and vomiting. Resolved. Now using omeprazole on BID dosing. No new complaints\par \par 9/23/2020: Pt seen in treatment room. SHe has been experiencing malaise and significant diarrhea since friday. Appetite poor. (+) weight loss. No fever but feels very washed out. Had scans last friday as well. \par \par 11/11/2020: Pt returns for follow up. Patient had called last week to say she was canceling her treatment and office visit . Dr. Cage spoke with the patient who said that she was having pain and that her pancreas is acting up. Patient has a history of chronic pancreatitis. As per patient she has been in touch with her PCP and GI doctor. She was rescheduled for this week. She states that as suddenly as the pain came on, it left the same way. She has not had any problems since. SHe reports she had follow up with pain management doctor as well. No other complaints\par \par 12/24/20: Left oral swelling. Has lost multiple teeth spontaneously. Has appt with oral surgeon. Has gained some weight. Has stopped smoking.\par \par 2/3/21: Pt has been fatigued, she is stressed because her friends (who she has had no contact with recently) are sick with COVID. \par \par 3/17/21: Pt seen in treatment room. Pt recently underwent a hemorrhoidectomy in the OR. She is still recovering and reports continued post op pain. Received call from Radiologist following recent CT imaging. New left retroperitoneal gas of uncertain etiology. These findings were reported to the patient and emailed to surgical team by me . Pt states that she has follow up scheduled next week but it is with covering physician as her doctor is now on maternity leave. She did have some back pain but it has since resolved. No other new complaints. \par \par 4/29/21: Doing better. No abd pain anymore. Repeat CT abd done on 3/29 showed no intra-abd pathology. LE swelling. Had repeat doppler and echo which were normal. \par \par 6/9/21: Pt returns for follow up. She is scheduled for treatment today. Pt states that over last ~3 weeks she has developed severe LE edema. Both extremities are swollen all the way up to her proximal thighs B/L. She has been evaluated by her vascular doctor. No DVT's noted on US (per patient report). she remains on Eliquis 5 mg BID. She was started on Lasix 40mg daily which has little to no impact on edema. She states that she is very uncomfortable and finds it difficult to ambulate\par \par 7/22/21: Patient came in for follow-up after recent hospitalization on 6/14/21- 6/28/21 due to a fall and low oxygen saturation.  While hospitalized patient was place in supplemental oxygen.   Patient's hospital course was complicated by platelet counts,  bone ivy was done and since came back normal.  Patient is currently on 2L of supplemental oxygen.  She complain of fatigue and SOB.  Patient requested to reschedule today's treatment.   Patient denies diarrhea, rash and itching.  \par \par 8/18/21: Pt returns for follow up. She is O2 dependent. She states she is feeling better. Offers no complaints \par \par 9/30/21: Doing well. On and off O2 use. No pain.\par \par 12/9/21: feels tired, has dry throat. recently admitted for dyspnea. HEr last treatment with Keytruda was 9/30). In the hosptial, was diagnosed with COPD exacerbation and diverticulitis. She responded to steroids and abx. \par \par 6/21/22: Pt is here after 6 month hiatus. She has had a few bouts of pancreatitis flare. No recent COPD or diarrhea. She uses O2 as needed\par \par 8/11/2022: patient presents today for follow up after having scans done. She is having increased abdominal pain related to her known chronic pancreatitis as her insurance has not allowed her to refill her dilaudid script however her pain management physician is working on an appeal. Otherwise she has no new complaints. Reports her SOB is at baseline (only with exertion) and she is not experiencing any chest pain. Her appetite is good and she is gaining weight. \par \par 11/11/22: Patient seen today for follow up. Reports that 3 weeks ago she developed productive cough with copious amounts of sputum that made her nauseous for which she saw her PCP who prescribed her Augmentin x7 days and she felt some improvement in her symptoms. As a result of the nausea she was not able to eat or drink much and has lost a few pounds as a result. Her symptoms are improving but she still has a cough \par \par 11/23/22: Seen today for follow up. She had CT scans done that showed chronic PE, smoking related lung disease, and stable mediastinal nodes. She reports that since taking prednisone her cough and nausea have resolved, her appetite has improved and she gained 3 pounds. She will see her pulmonologist soon. \par \par 3/2/23: doing well today. had dec appetite randomly about 1 month ago, lost at least 10 pounds, however over past 2 weeks appetite back to normal and she has been regaining weight. no dysphagia, odynophagia or other new symptoms associate with dec appetite  [de-identified] : adeno ca

## 2023-03-08 NOTE — ASSESSMENT
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 Large volume of disease and hence, not started on RT Started carbo/Alimta/Pem given Q 3 weeks schedule. Was on maintenance alimta/keytruda. Alimta was subsequently discontinued due to severe fatigue. She remains on Keytruda maintenance.  NGS revealed KRAS G12V mut, low TMB and KIM. PDL1 1%. Last November, pt was hospitalized and concern for irAE (colitis and pneumonitis, Keytruda was discontinued. Last tx was in November 2021. PET scan and MRI brain from July 2022 reviewed. PET scan stable. MRI brain without evidence of disease. Most recent CT scans from November 2022 show stable mediastinal nodes. \par \par Most recent CT scans done November 2022 are stable. \par Continue to follow up with pulmonology\par Continues to experience abdominal pain related to pancreatitis, is now taking dilaudid PRN. Continue follow up with her GI specialist and pain management physician. \par She has hx of   DVT and remains on Eliquis.\par Pt has stopped smoking. Congratulated pt and encouraged her to stay off of tobacco .\par -interval scans ordered today and will f/u. CBC, CMP, CEA today \par -OV in 3 months \par \par d/w Dr. Cage\par Jermaine Kelley Heme/onc PGY6 \par \par I was with the hematology/ oncology fellow, Dr. Kelley for the entire visit and agree with above documentation\par

## 2023-03-20 NOTE — PATIENT PROFILE ADULT - FUNCTIONAL SCREEN CURRENT LEVEL: EATING, MLM
PAST SURGICAL HISTORY:  Disruption of closure of skull or craniotomy     Presence of IVC filter     Status post craniectomy      0 = independent

## 2023-03-21 NOTE — CONSULT NOTE ADULT - ASSESSMENT
Shelley here for 2L NS hydration today.     Patient denies any concerns with previous infusions.  See MAR for infusion details.  Patient tolerated procedure well.   Patient discharged in stable, ambulatory condition. Next hydration 3/24/23     65 yr F former smoker, with a PMH of HTN, GERD, chronic pancreatitis s/p Jeffery procedure in 2019, COPD, NSCLC stage IIIB s/p chemo on Keytruda maintenance, DVT, chronic PE on Eliquis, pulmonary HTN, vocal cord polyps s/p polypectomy, now p/w abdominal pain and SOB. 65 yr F former smoker, with a PMH of HTN, GERD, chronic pancreatitis s/p Jeffery procedure in 2019, COPD, NSCLC stage IIIB s/p chemo on Keytruda maintenance, DVT, chronic PE on Eliquis, pulmonary HTN, vocal cord polyps s/p polypectomy, now p/w abdominal pain and SOB, found to have possible colitis and COPD exacerbation.     1. COPD exacerbation:  - Ct chest with extensive emphysema with basilar fibrotic changes and chronic PE. No obvious infiltrate to suggest PNA  - Although her SOB appears to be triggered by the episodes of abd pain, it is reasonable to treat her with a 5 day course of Prednisone 40 mg daily for a possible COPD exacerbation.   - Cont Duonebs Q6H  - Add Symbicort BID and Spiriva daily with a spacer  - RT for inhaler teaching   - Pain control to prevent splinting   - O2 saturation is stable on 2 L NC, titrate down as tolerated to keep saturation > 88 %  - Incentive spirometry/ OBC if tolerated     2. Chronic PE:  - Cont AC with Eliquis   - No evidence of acute PE on CTPA, pulm pressures and RV function normal on TTE, proBNP 1281 but no clinical evidence of volume overload     3. Abd pain/ ? Colitis:  - Receiving IV hydration with D5LR, lactate trending down  - Lipase negative  - Remains on Zosyn for possible colitis, Bcxs NGTD  - Awaiting surgery/ GI evals   - Would advise very careful monitoring of her volume status while receiving IV hydration to avoid fluid overload    4. Gen:  - DVT Px: on Eliquis  - Requires outpt FUP with Dr. Abad ( [phone])  - Plan discussed in detail with the patient and Medicine resident Dr. Ramirez 65 yr F former smoker, with a PMH of HTN, GERD, chronic pancreatitis s/p Jeffery procedure in 2019, COPD, NSCLC stage IIIB s/p chemo on Keytruda maintenance, DVT, chronic PE on Eliquis, pulmonary HTN, vocal cord polyps s/p polypectomy, now p/w abdominal pain and SOB, found to have possible colitis, acute on chronic resp failure with hypoxia and a COPD exacerbation.     1. Acute on chronic resp failure with hypoxia/COPD exacerbation:  - Ct chest with extensive emphysema with basilar fibrotic changes and chronic PE. No obvious infiltrate to suggest PNA, however Zosyn will cover any pulonary infection as well  - Although her SOB appears to be triggered by the episodes of abd pain, it is reasonable to treat her with a 5 day course of Prednisone 40 mg daily for a possible COPD exacerbation.   - Cont Duonebs Q6H  - Add Symbicort BID and Spiriva daily with a spacer  - RT for inhaler teaching   - Pain control to prevent splinting   - O2 saturation is stable on 2 L NC, titrate down as tolerated to keep saturation > 88 %  - Incentive spirometry/ OBC if tolerated     2. Chronic PE:  - Cont AC with Eliquis   - No evidence of acute PE on CTPA, pulm pressures and RV function normal on TTE, proBNP 1281 but no clinical evidence of volume overload     3. Abd pain/ ? Colitis:  - Receiving IV hydration with D5LR, lactate trending down  - Lipase negative  - Remains on Zosyn for possible colitis, Bcxs NGTD  - Awaiting surgery/ GI evals   - Would advise very careful monitoring of her volume status while receiving IV hydration to avoid fluid overload    4. Gen:  - DVT Px: on Eliquis  - Requires outpt FUP with Dr. Abad ( [phone])  - Plan discussed in detail with the patient and Medicine resident Dr. Ramirez

## 2023-04-10 ENCOUNTER — RX RENEWAL (OUTPATIENT)
Age: 68
End: 2023-04-10

## 2023-04-10 RX ORDER — AMLODIPINE BESYLATE 5 MG/1
5 TABLET ORAL DAILY
Qty: 90 | Refills: 1 | Status: ACTIVE | COMMUNITY
Start: 2023-04-10 | End: 1900-01-01

## 2023-04-10 NOTE — PROGRESS NOTE ADULT - ASSESSMENT
New patient no show x1. No call. We called several times with mailbox full. Letter sent  Unclear what consult is even for.  PINEDA Baird     64 yr old F PMH chronic pancreatitis, HTN p/w left sided back pain/SOB admitted with unproked PE, found to have mediastinal and hilar lymphadenopathy awaiting bronchoscopy/bx on wednesday 2/19.

## 2023-05-16 RX ORDER — APIXABAN 5 MG/1
5 TABLET, FILM COATED ORAL
Qty: 60 | Refills: 5 | Status: ACTIVE | COMMUNITY
Start: 2021-10-25 | End: 1900-01-01

## 2023-05-17 ENCOUNTER — OUTPATIENT (OUTPATIENT)
Dept: OUTPATIENT SERVICES | Facility: HOSPITAL | Age: 68
LOS: 1 days | Discharge: HOME | End: 2023-05-17

## 2023-05-17 DIAGNOSIS — Z98.890 OTHER SPECIFIED POSTPROCEDURAL STATES: Chronic | ICD-10-CM

## 2023-05-17 DIAGNOSIS — Z96.641 PRESENCE OF RIGHT ARTIFICIAL HIP JOINT: Chronic | ICD-10-CM

## 2023-05-17 DIAGNOSIS — Z98.89 OTHER SPECIFIED POSTPROCEDURAL STATES: Chronic | ICD-10-CM

## 2023-05-17 DIAGNOSIS — C34.90 MALIGNANT NEOPLASM OF UNSPECIFIED PART OF UNSPECIFIED BRONCHUS OR LUNG: ICD-10-CM

## 2023-05-17 DIAGNOSIS — Z96.612 PRESENCE OF LEFT ARTIFICIAL SHOULDER JOINT: Chronic | ICD-10-CM

## 2023-06-01 ENCOUNTER — APPOINTMENT (OUTPATIENT)
Dept: HEMATOLOGY ONCOLOGY | Facility: CLINIC | Age: 68
End: 2023-06-01

## 2023-07-14 ENCOUNTER — APPOINTMENT (OUTPATIENT)
Dept: CARDIOLOGY | Facility: CLINIC | Age: 68
End: 2023-07-14

## 2023-08-23 NOTE — REVIEW OF SYSTEMS
[Recent Change In Weight] : ~T recent weight change [Abdominal Pain] : abdominal pain [Negative] : Allergic/Immunologic [Fever] : no fever [Fatigue] : no fatigue [Vision Problems] : no vision problems [Dysphagia] : no dysphagia [Odynophagia] : no odynophagia [Chest Pain] : no chest pain [Lower Ext Edema] : no lower extremity edema [Cough] : no cough [SOB on Exertion] : no shortness of breath during exertion [Dysuria] : no dysuria [Vomiting] : no vomiting [Joint Pain] : no joint pain [Muscle Pain] : no muscle pain [Skin Rash] : no skin rash [Dizziness] : no dizziness [FreeTextEntry2] : 3 pound weight gain [FreeTextEntry7] : intermittent, related to chronic pancreatitis

## 2023-08-23 NOTE — HISTORY OF PRESENT ILLNESS
[Disease: _____________________] : Disease: [unfilled] [N: ___] : N[unfilled] [M: ___] : M[unfilled] [AJCC Stage: ____] : AJCC Stage: [unfilled] [de-identified] : Ms. Travis is a 66-year-old female with a history of recently diagnosed non-small cell lung adenocarcinoma of left lung, pulmonary embolus on apixaban, HTN, chronic pancreatitis s/p Jeffery procedure (2019), history of ARDS (2015), GERD, chronic pain on opioids, and s/p vocal cord polypectomy who presents for consultation visit\par Brief hx: Pt was recently hospitalized in mid-Feb 2020 to [facility] for dyspnea, left-sided back pain, and fatigue with loss of appetite. Found on CTPA (February 2020) to have a large filling defect in the L main pulmonary artery extending into the left upper lobar and left interlobar pulm arteries with complete opacification of the LLL pulm artery. There were thin linear filling defects in the bifurcation of the right upper pulmonary artery and the right interlobar pulmonary arteries extending to the right lower pulmonary artery, which appeared chronic. CT also showed mediastinal adenopathy, including left paratracheal, subcarinal, and left hilar. There is also a wedge shaped opacity in the posterior left lower lobe suspicious for malignancy. There is centrilobular emphysema. 2D-echo (Feb 2020) with normal LV systolic function, mild concentric LVH, normal LA, RV enlargement with normal RV systolic function, and estimated PASP 39 consistent with borderline pulmonary hypertension. She was started on heparin infusion and discharged on Apixaban. Hypercoagulable workup negative, including Factor V Leiden, Prothrombin gene mutation, and lupus anticoagulant. \par \par She had EBUS/TBNA on Feb 19, 2020, found to have adenocarcinoma in right and left paratracheal and subcarinal lymph nodes (R4, L4, level 7). She is pending PFTs, PET/CT, brain MRI. Patient was discharged on Oxycodone ER BID and Tylenol prn. She is not constipated and is on a bowel regimen with polyethylene glycol. Still does not have a good appetite. She has lost 15 lbs over last 3 months. No fevers, chills, or chest pain. Reports occasional cough for which she takes benzonatate perle about once daily. Declining influenza vaccination. Takes Duonebs twice daily as prescribed, but denies dyspnea. \par \par Last colonoscopy in 2018 normal. Mammogram in June 2019 negative. No personal or family history of miscarriages or spontaneous abortions. Reports history of smoking but denies any history of COPD or lung cancer. No family history of malignancy, sarcoidosis, or TB. No recent prolonged immobility.\par \par Of note, active smoker, quit end of last year, on and off 4-6 cigarettes/day. She had CXR for lung cancer screening in the 1980s but nothing recently. Denies etoh use. \par \par 4/6/20: No new complaints. Persistent pain in the chest and occasional cough. COntinues to lose weight,. No other constitutional symptoms. \par \par 4/16/20: Pain in the chest improved. She has been nauseous and c/o abd cramps since starting chemo. She has been taking Zofran with minimal benefit. She has not been using Reglan because she was told not to by nutritionist. She has lost 2 lbs. \par \par 5/6/2020: Pt being seen in treatment area. Pt reported that she tolerated the 2nd cycle better with the change in premeds and adding dexamethasone following the treatment for 2 days. She still experiences days of severe nausea and vomiting but not continuously. Pt has has normal bowel function. She denies SOB/ slight fatigue. Denies pain\par \par 5/20/2020: Pt being seen in treatment area. Pt is here today for cycle 3. She states that for the last 2 days she has been unable to keep anything down. She vomited the entire 2 days. This is an ongoing and chronic problem and is unrelated to chemo. She has been evaluated by GI without discovering etiology for it. Initial brain imaging (-) for metastaic disease. No headaches or dizziness. Pt states it always lasts 2 days and spontaneously resolves. Today she states she was able to tolerate broth and mandarin oranges. No fever. No c/o of cough/CP/hemoptysis or SOB. \par \par 8/12/20: On maintenance now. Scans in June 2020 shows \par \par 9/2/2020: Pt seen in treatment room. Feeling well. Previously experiencing significant nausea and vomiting. Resolved. Now using omeprazole on BID dosing. No new complaints\par \par 9/23/2020: Pt seen in treatment room. SHe has been experiencing malaise and significant diarrhea since friday. Appetite poor. (+) weight loss. No fever but feels very washed out. Had scans last friday as well. \par \par 11/11/2020: Pt returns for follow up. Patient had called last week to say she was canceling her treatment and office visit . Dr. Cage spoke with the patient who said that she was having pain and that her pancreas is acting up. Patient has a history of chronic pancreatitis. As per patient she has been in touch with her PCP and GI doctor. She was rescheduled for this week. She states that as suddenly as the pain came on, it left the same way. She has not had any problems since. SHe reports she had follow up with pain management doctor as well. No other complaints\par \par 12/24/20: Left oral swelling. Has lost multiple teeth spontaneously. Has appt with oral surgeon. Has gained some weight. Has stopped smoking.\par \par 2/3/21: Pt has been fatigued, she is stressed because her friends (who she has had no contact with recently) are sick with COVID. \par \par 3/17/21: Pt seen in treatment room. Pt recently underwent a hemorrhoidectomy in the OR. She is still recovering and reports continued post op pain. Received call from Radiologist following recent CT imaging. New left retroperitoneal gas of uncertain etiology. These findings were reported to the patient and emailed to surgical team by me . Pt states that she has follow up scheduled next week but it is with covering physician as her doctor is now on maternity leave. She did have some back pain but it has since resolved. No other new complaints. \par \par 4/29/21: Doing better. No abd pain anymore. Repeat CT abd done on 3/29 showed no intra-abd pathology. LE swelling. Had repeat doppler and echo which were normal. \par \par 6/9/21: Pt returns for follow up. She is scheduled for treatment today. Pt states that over last ~3 weeks she has developed severe LE edema. Both extremities are swollen all the way up to her proximal thighs B/L. She has been evaluated by her vascular doctor. No DVT's noted on US (per patient report). she remains on Eliquis 5 mg BID. She was started on Lasix 40mg daily which has little to no impact on edema. She states that she is very uncomfortable and finds it difficult to ambulate\par \par 7/22/21: Patient came in for follow-up after recent hospitalization on 6/14/21- 6/28/21 due to a fall and low oxygen saturation.  While hospitalized patient was place in supplemental oxygen.   Patient's hospital course was complicated by platelet counts,  bone ivy was done and since came back normal.  Patient is currently on 2L of supplemental oxygen.  She complain of fatigue and SOB.  Patient requested to reschedule today's treatment.   Patient denies diarrhea, rash and itching.  \par \par 8/18/21: Pt returns for follow up. She is O2 dependent. She states she is feeling better. Offers no complaints \par \par 9/30/21: Doing well. On and off O2 use. No pain.\par \par 12/9/21: feels tired, has dry throat. recently admitted for dyspnea. HEr last treatment with Keytruda was 9/30). In the hosptial, was diagnosed with COPD exacerbation and diverticulitis. She responded to steroids and abx. \par \par 6/21/22: Pt is here after 6 month hiatus. She has had a few bouts of pancreatitis flare. No recent COPD or diarrhea. She uses O2 as needed\par \par 8/11/2022: patient presents today for follow up after having scans done. She is having increased abdominal pain related to her known chronic pancreatitis as her insurance has not allowed her to refill her dilaudid script however her pain management physician is working on an appeal. Otherwise she has no new complaints. Reports her SOB is at baseline (only with exertion) and she is not experiencing any chest pain. Her appetite is good and she is gaining weight. \par \par 11/11/22: Patient seen today for follow up. Reports that 3 weeks ago she developed productive cough with copious amounts of sputum that made her nauseous for which she saw her PCP who prescribed her Augmentin x7 days and she felt some improvement in her symptoms. As a result of the nausea she was not able to eat or drink much and has lost a few pounds as a result. Her symptoms are improving but she still has a cough \par \par 11/23/22: Seen today for follow up. She had CT scans done that showed chronic PE, smoking related lung disease, and stable mediastinal nodes. She reports that since taking prednisone her cough and nausea have resolved, her appetite has improved and she gained 3 pounds. She will see her pulmonologist soon. \par \par 3/2/23: doing well today. had dec appetite randomly about 1 month ago, lost at least 10 pounds, however over past 2 weeks appetite back to normal and she has been regaining weight. no dysphagia, odynophagia or other new symptoms associate with dec appetite  [de-identified] : adeno ca

## 2024-01-01 NOTE — H&P ADULT - NSICDXPASTMEDICALHX_GEN_ALL_CORE_FT
2024 08:12 PAST MEDICAL HISTORY:  ARDS survivor 2015    Chronic abdominal pain     Chronic pancreatitis last episode 4/2019    GERD (gastroesophageal reflux disease)     History of adrenal adenoma stable on imaging    HTN (hypertension)     Thrombophlebitis superficial right UE during 4/2019 hospital stay, resolved as per pt    Vocal cord polyp removal 2018, benign as per pt

## 2024-08-28 NOTE — H&P ADULT - NSHPREVIEWOFSYSTEMS_GEN_ALL_CORE
What Is The Reason For Today's Visit?: Full Body Skin Examination
What Is The Reason For Today's Visit? (Being Monitored For X): concerning skin lesions on a periodic basis
REVIEW OF SYSTEMS:    CONSTITUTIONAL: +Weakness, No fevers or chills  EYES/ENT: No visual changes;  No vertigo or throat pain, +dizziness  NECK: No pain or stiffness  RESPIRATORY: No cough, wheezing, hemoptysis; No shortness of breath  CARDIOVASCULAR: No chest pain or palpitations  GASTROINTESTINAL: +severe abdominal epigastric pain. +N/V, No hematemesis; No diarrhea or constipation. No melena or hematochezia.  GENITOURINARY: No dysuria, frequency or hematuria  NEUROLOGICAL: No numbness   SKIN: No itching, burning, rashes, or lesions; Scar is nonpainful to light touch      All other review of systems is negative unless indicated above.

## 2024-09-27 NOTE — PROGRESS NOTE ADULT - PROBLEM/PLAN-3
Optimize BG control to improve wound healing     
DISPLAY PLAN FREE TEXT

## 2025-03-14 NOTE — PROVIDER CONTACT NOTE (OTHER) - ACTION/TREATMENT ORDERED:
Patient does not like current oxygen delivery device, wants a smaller and lighter one. Current orders for 6 L NC. 15 minute discussion with patient recent pulmonary stress test results and severe COPD diagnosis, along with  disease's progressive nature - evident on 6MWT. Also explained the different O2 device capabilities based on patient requirements. Primary pulmonary MD Dr. Tegan Balbuena ( was Dr. Galdamez priorly). Advised to maintain prescribed O2 dose and check sats with pulse ox. Spoke with Hermelinda with [facility] and [facility] representative regarding changing current company. New order placed.     MD made aware. Order of Iv hydralizine balance training/bed mobility training/gait training/strengthening/transfer training balance training/bed mobility training/gait training/strengthening/transfer training

## 2025-04-19 NOTE — ED PROVIDER NOTE - DATE/TIME 1
------------baum, continued close outpt fu.  - Damian Licea MD   ------------------------------------------------------ 29-Mar-2021 18:45

## 2025-06-09 NOTE — PHYSICAL THERAPY INITIAL EVALUATION ADULT - ADDITIONAL COMMENTS
Prior to admission pt reports being independent of all ADL's & functional mobility without AD. Pt resides in house with spouse, 3 steps to enter, pt resides on first floor. Pt has bath tub with shower chair, owns RW and SAC, however does not use. (+) driving.
no

## 2025-06-17 NOTE — PROGRESS NOTE ADULT - SUBJECTIVE AND OBJECTIVE BOX
[facility] Medicine  Progress Note    Patient Name: Kuldeep Villela  MRN: [id number]  Patient Class: IP- Inpatient   Admission Date: 6/9/2025  Length of Stay: 7 days  Attending Physician: Cintia Anton MD  Primary Care Provider: Ginna Musa MD        Subjective     Principal Problem:Compression fracture of T12 vertebra        HPI:  Kuldeep Villela is a 73 y.o.F with PMHx of arthritis, lumbar stenosis, HTN, HLD, depression who [place] [facility] [place] of worsening low back/L flank pain for the last week. Endorses chronic low back pain for which she sees a specialist for, but has worsened recently. Denies any known injury to area. She reports she felt like it may be the beginning of a UTI so she started taking Azo at home for prevention. States she normally ambulates with a walker; however, the pain has been so severe that she has not been able to ambulate at all. Endorses bilateral lower extremity weakness. Denies bladder or bowel incontinence, denies numbness of lower extremities. Denies fever, chills, chest pain, palpitations, SOB, cough, abdominal pain, n/v/d, dysuria, headaches, or any other symptoms at this time.     In ED: afebrile, VSS on RA. CBC without leukocytosis, Hgb 11.1. CMP notable for Cr 1.5 (~1.0), otherwise unremarkable. UA non-infectious appearing. CT abd/pelvis with new compression deformity of T12, perinephric haziness and stranding, L>R, correlate with renal disease, small periumbilical abd wall hernia involving bowel without inflammatory or obstructive change. S/p rocephin, ketamine, toradol 15mg inj, oxy-acetaminophen 10mg, 1L IVF bolus in ED. TLSO brace ordered. Admitted to  for further evaluation.     Overview/Hospital Course:  Pt admitted for continued management of T12 compression fx. NSGY consulted, no acute intervention warranted at this time. MRI spine with sedation pending and scheduled for 6/12. MRI concerning for compression fracture with  Day 5\3 of Anesthesia Pain Management Service    SUBJECTIVE: I'm doing ok. A little pain to move    Pain Scale Score:	[X] Refer to charted pain scores    THERAPY:    [ ] IV PCA Morphine		[ ] 5 mg/mL	[ ] 1 mg/mL  [X] IV PCA Hydromorphone	[ ] 5 mg/mL	[X] 1 mg/mL  [ ] IV PCA Fentanyl		[ ] 50 micrograms/mL    Demand dose: 0.3 mg     Lockout: 6 minutes   Continuous Rate: 0 mg/hr  4 Hour Limit: 6 mg    MEDICATIONS  (STANDING):  amLODIPine   Tablet 10 milliGRAM(s) Oral daily  dextrose 5% + sodium chloride 0.45% with potassium chloride 20 mEq/L 1000 milliLiter(s) (30 mL/Hr) IV Continuous <Continuous>  erythromycin    ethylsuccinate Suspension 40 mG/mL 500 milliGRAM(s) Oral every 8 hours  heparin  Injectable 5000 Unit(s) SubCutaneous every 8 hours  HYDROmorphone PCA (1 mG/mL) 30 milliLiter(s) PCA Continuous PCA Continuous  metoprolol succinate  milliGRAM(s) Oral daily  ondansetron Injectable 4 milliGRAM(s) IV Push every 6 hours  pantoprazole    Tablet 40 milliGRAM(s) Oral before breakfast    MEDICATIONS  (PRN):  HYDROmorphone PCA (1 mG/mL) Rescue Clinician Bolus 0.5 milliGRAM(s) IV Push every 15 minutes PRN for Pain Scale GREATER THAN 6  metoclopramide Injectable 10 milliGRAM(s) IV Push every 4 hours PRN nausea and vomting      OBJECTIVE:    Sedation Score:	[ X] Alert 	[ ] Drowsy 	[ ] Arousable	[ ] Asleep	[ ] Unresponsive    Side Effects:	[X ] None	[ ] Nausea	[ ] Vomiting	[ ] Pruritus  		[ ] Other:    Vital Signs Last 24 Hrs  T(C): 36.8 (13 May 2019 05:38), Max: 36.9 (12 May 2019 12:42)  T(F): 98.2 (13 May 2019 05:38), Max: 98.4 (12 May 2019 12:42)  HR: 100 (13 May 2019 05:38) (93 - 100)  BP: 156/77 (13 May 2019 05:38) (127/52 - 160/83)  BP(mean): --  RR: 18 (13 May 2019 05:38) (18 - 18)  SpO2: 94% (13 May 2019 05:38) (94% - 98%)    ASSESSMENT/ PLAN    Therapy to  be:               [X] Continued   [ ] Discontinued   [ ] Changed to PRN Analgesics    Documentation and Verification of current medications:   [X] Done	[ ] Not done, not eligible    Comments: Plan to transition to po analgesics prn after lunch instability and severe spinal canal stenosis concerning for cord edema. Recommended to stop mobility with PT/OT pending surgical stabilization of spine. She was taken to the OR 6/14/25 for  T10-L2 posterior fusion. Monitored post op with pain control. Started on IV abx while drains in place. PT/OT restarted with TLSO brace. XR spine showed stabilization.     Interval History: NAEO. Working well with therapy this AM. Still with some pain. No bowel movement.     Review of Systems  Objective:     Vital Signs (Most Recent):  Temp: 97.5 °F (36.4 °C) (06/17/25 1101)  Pulse: 90 (06/17/25 1101)  Resp: 18 (06/17/25 1144)  BP: 137/63 (06/17/25 1101)  SpO2: 95 % (06/17/25 1101) Vital Signs (24h Range):  Temp:  [97.5 °F (36.4 °C)-98.5 °F (36.9 °C)] 97.5 °F (36.4 °C)  Pulse:  [] 90  Resp:  [16-18] 18  SpO2:  [93 %-99 %] 95 %  BP: (117-141)/(56-73) 137/63     Weight: 107 kg (236 lb)  Body mass index is 44.59 kg/m².    Intake/Output Summary (Last 24 hours) at 6/17/2025 1201  Last data filed at 6/17/2025 0641  Gross per 24 hour   Intake --   Output 1365 ml   Net -1365 ml         Physical Exam  Constitutional:       Appearance: Normal appearance.   HENT:      Head: Normocephalic and atraumatic.      Nose: Nose normal.      Mouth/Throat:      Mouth: Mucous membranes are moist.   Eyes:      Extraocular Movements: Extraocular movements intact.      Pupils: Pupils are equal, round, and reactive to light.   Cardiovascular:      Rate and Rhythm: Normal rate and regular rhythm.      Heart sounds: No murmur heard.     No gallop.   Pulmonary:      Effort: Pulmonary effort is normal.      Breath sounds: Normal breath sounds. No wheezing or rales.   Abdominal:      General: Abdomen is flat. Bowel sounds are normal. There is no distension.      Palpations: Abdomen is soft.      Tenderness: There is no abdominal tenderness.   Musculoskeletal:         General: Tenderness present. No swelling. Normal range of motion.      Cervical back:  Normal range of motion and neck supple.      Right lower leg: No edema.      Left lower leg: No edema.   Skin:     General: Skin is warm and dry.      Capillary Refill: Capillary refill takes less than 2 seconds.   Neurological:      General: No focal deficit present.      Mental Status: She is alert. Mental status is at baseline.      Motor: Weakness present.      Comments: Drains in place   Weakness and ROM improving   Psychiatric:         Mood and Affect: Mood normal.               Significant Labs: All pertinent labs within the past 24 hours have been reviewed.    Significant Imaging: I have reviewed all pertinent imaging results/findings within the past 24 hours.      Assessment & Plan  Compression fracture of T12 vertebra  Acute on chronic back pain    73 y.o.F with new compression deformity of T12 and new inability to ambulate 2/2 pain. On admit, AFVSS, labs reviewed. CT a/p with: New compression deformity of T12 when compared to CT abdomen pelvis 03/22/2025. MRI T/L spine with sedation ordered. NSGY consulted. S/P T10-L2 posterior fusion 6/14      Plan:   - TLSO brace ordered   - NSGY consulted, appreciate recommendations  - MRI spine with sedation scheduled for 6/12, concerning for compression fracture with instability and severe spinal canal stenosis concerning for cord edema   -s/p T10-L2 posterior fusion 6/14  -IV abx while drains in place   - MM pain regimen initiated  - PT/OT with TLSO brace in   -remove tom montior I/Os closely   -bowel regimen increased    - fall precautions     Hypertension  Patient's blood pressure range in the last 24 hours was: BP  Min: 117/64  Max: 141/68.The patient's inpatient anti-hypertensive regimen is listed below:  Current Antihypertensives  , Every 12 hours, Oral  amLODIPine tablet 10 mg, Daily, Oral    Plan  - BP is controlled, no changes needed to their regimen    Hyperlipemia  - continue home statin    Debility  Patient with Acute on chronic debility due to  fracture/prosthesis. The patient's latest AMPAC (Activity Measure for Post Acute Care) Score is listed below.    AM-PAC Score - How much help does the patient need for each activity listed  Basic Mobility Total Score: 13  Turning over in bed (including adjusting bedclothes, sheets and blankets)?: A little  Sitting down on and standing up from a chair with arms (e.g., wheelchair, bedside commode, etc.): A lot  Moving from lying on back to sitting on the side of the bed?: A little  Moving to and from a bed to a chair (including a wheelchair)?: A lot  Need to walk in hospital room?: A lot  Climbing 3-5 steps with a railing?: Unable    Plan  - PT/OT consulted  - Fall precautions in place  - PT/OT recommending moderate intensity therapy at ID      Depression with anxiety  Patient has recurrent depression which is mild and is currently controlled. Will Continue anti-depressant medications. We will not consult psychiatry at this time. Patient does not display psychosis at this time. Continue to monitor closely and adjust plan of care as needed.  - continue home medications    Hyperkalemia  -mild, will start lokelma and recheck           MALACHI (acute kidney injury)  RESOLVED  MALACHI is likely due to pre-renal azotemia due to dehydration. Baseline creatinine is ~1.0. Most recent creatinine and eGFR are listed below.  Recent Labs     06/15/25  0251 06/16/25  0951   CREATININE 1.0 1.0   EGFRNORACEVR 60* 60*      Plan  - MALACHI is stable  - Avoid nephrotoxins and renally dose meds for GFR listed above  - Monitor urine output, serial BMP, and adjust therapy as needed  - s/p 1L IVF bolus in ED, encouraging good oral hydration       Tobacco dependency  Dangers of cigarette smoking were reviewed with patient in detail. Patient was Counseled for 3-10 minutes. Nicotine replacement options were discussed. Nicotine replacement was discussed- prescribed  Compression fracture of body of thoracic vertebra      VTE Risk Mitigation (From admission,  onward)           Ordered     enoxaparin injection 40 mg  Every 12 hours         06/17/25 0951     IP VTE HIGH RISK PATIENT  Once         06/14/25 1204     Place sequential compression device  Until discontinued         06/10/25 0731                    Discharge Planning   MATT: 6/20/2025     Code Status: Full Code   Medical Readiness for Discharge Date:   Discharge Plan A: Skilled Nursing Facility   Discharge Delays: None known at this time            Please place Justification for DME        Cintia Anton MD  Department of Hospital Medicine   [facility]
